# Patient Record
Sex: MALE | Race: BLACK OR AFRICAN AMERICAN | Employment: UNEMPLOYED | URBAN - METROPOLITAN AREA
[De-identification: names, ages, dates, MRNs, and addresses within clinical notes are randomized per-mention and may not be internally consistent; named-entity substitution may affect disease eponyms.]

---

## 2017-07-11 ENCOUNTER — HOSPITAL ENCOUNTER (EMERGENCY)
Age: 62
Discharge: HOME | End: 2017-07-11
Attending: EMERGENCY MEDICINE | Admitting: EMERGENCY MEDICINE
Payer: SELF-PAY

## 2017-07-11 VITALS
RESPIRATION RATE: 67 BRPM | HEIGHT: 71 IN | TEMPERATURE: 98.7 F | DIASTOLIC BLOOD PRESSURE: 78 MMHG | SYSTOLIC BLOOD PRESSURE: 140 MMHG | OXYGEN SATURATION: 96 % | BODY MASS INDEX: 26.46 KG/M2 | WEIGHT: 189 LBS

## 2017-07-11 DIAGNOSIS — Z91.14 NONCOMPLIANCE WITH MEDICATIONS: ICD-10-CM

## 2017-07-11 DIAGNOSIS — R73.9 HYPERGLYCEMIA: Primary | ICD-10-CM

## 2017-07-11 LAB — GLUCOSE BLD STRIP.AUTO-MCNC: 299 MG/DL (ref 70–110)

## 2017-07-11 PROCEDURE — 82962 GLUCOSE BLOOD TEST: CPT

## 2017-07-11 PROCEDURE — 99283 EMERGENCY DEPT VISIT LOW MDM: CPT

## 2017-07-11 NOTE — ED PROVIDER NOTES
[employer]  [facility] EMERGENCY DEPT      60 y.o. male with noted past medical history who presents to the emergency department for medication refill. Pt states that he has been out of his insulin for 2 days. Pt is supposed to take Humulin 70/30 20 units in the AM and 10 at night. Pt notes that he was released from incarceration without his medication. No other complaints. No current facility-administered medications for this encounter. Current Outpatient Prescriptions   Medication Sig    insulin NPH/insulin regular (HUMULIN 70/30) 100 unit/mL (70-30) injection Take 20 units SQ in the morning. Take 10 units SQ in the evening.  lisinopril (PRINIVIL, ZESTRIL) 20 mg tablet Take 1 Tab by mouth daily.  rosuvastatin (CRESTOR) 40 mg tablet Take 1 Tab by mouth nightly.  amLODIPine (NORVASC) 5 mg tablet Take 1 Tab by mouth daily. Indications: HYPERTENSION    carvedilol (COREG) 12.5 mg tablet Take 1 Tab by mouth two (2) times daily (with meals). Indications: CHRONIC HEART FAILURE    albuterol (PROVENTIL HFA, VENTOLIN HFA, PROAIR HFA) 90 mcg/actuation inhaler Take 2 Puffs by inhalation every four (4) hours as needed for Wheezing.  budesonide-formoterol (SYMBICORT) 80-4.5 mcg/actuation HFAA inhaler Take 2 Puffs by inhalation two (2) times a day.  thiamine (B-1) 100 mg tablet Take 1 Tab by mouth daily.  Lancets misc Use as directed for blood glucose monitoring.  Syringe, Disposable, syrg Use as directed for insuliin administration.  aspirin delayed-release 81 mg tablet Take 1 Tab by mouth daily.  multivitamin, tx-iron-ca-min (THERA-M W/ IRON) 9 mg iron-400 mcg tab tablet Take 1 Tab by mouth daily.        Past Medical History:   Diagnosis Date    Cardiac LV ejection fraction 30-35% 7/15/15    Cardiomyopathy ([address] 7/15/15    35% per ECHO    Cocaine use 8/9/14    + UDS, persistent use    Diabetes ([address] 8/9/2014    8.1 hgbA1C 8/9/14    Heart failure ([facility] [address]     Hepatitis C infection 8/20/14 Genotype 1A    History of cocaine abuse 8/10/2016    History of heroin abuse 8/10/2016    Homelessness 9/7/2015    Hypertension 2000       History reviewed. No pertinent surgical history. Family History   Problem Relation Age of Onset    Diabetes Mother     Cancer Maternal Grandmother        Social History     Social History    Marital status: SINGLE     Spouse name: N/A    Number of children: N/A    Years of education: N/A     Occupational History    Not on file. 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    Drug use: Yes     Special: Cocaine, Marijuana, Heroin      Comment: 2 x heroin experimental, smoked Cocaine with last 7/27/15    Sexual activity: No     Other Topics Concern     Service No    Blood Transfusions No    Caffeine Concern Yes    Occupational Exposure No    Hobby Hazards No    Sleep Concern No    Stress Concern No    Weight Concern No    Special Diet No    Back Care No    Exercise No    Bike Helmet No    Seat Belt Yes     Social History Narrative       No Known Allergies    Patient's primary care provider (as noted in EPIC):  Iveth Fitzpatrick NP    REVIEW OF SYSTEMS:    Constitutional:  Negative for diaphoresis. HENT:  Negative for congestion. Respiratory:  Negative for cough and shortness of breath. Cardiovascular:  Negative for chest pain and palpitations. Gastrointestinal:  Negative for diarrhea. Genitourinary:  Negative for flank pain. Musculoskeletal:  Negative for back pain. Skin:  Negative for pallor. Neurological:  Negative for weakness.      Visit Vitals    /78 (BP 1 Location: Left arm, BP Patient Position: At rest;Sitting)    Temp 98.7 °F (37.1 °C)    Resp (!) 67    Ht 5' 11\" (1.803 m)    Wt 85.7 kg (189 lb)    SpO2 96%    BMI 26.36 kg/m2       PHYSICAL EXAM:    CONSTITUTIONAL:  Alert, in no apparent distress;  well developed;  well nourished. HEAD:  Normocephalic, atraumatic. EYES:  EOMI. Non-icteric sclera. Normal conjunctiva. ENTM:  Nose:  no rhinorrhea. Throat:  no erythema or exudate, mucous membranes moist.  NECK:  No JVD. Supple  RESPIRATORY:  Chest clear, equal breath sounds, good air movement. CARDIOVASCULAR:  Regular rate and rhythm. No murmurs, rubs, or gallops. GI:  Normal bowel sounds, abdomen soft and non-tender. No rebound or guarding. BACK:  Non-tender. UPPER EXT:  Normal inspection. LOWER EXT:  No edema, no calf tenderness. Distal pulses intact. NEURO:  Moves all four extremities, and grossly normal motor exam.  SKIN:  No rashes;  Normal for age. PSYCH:  Alert and normal affect. Abnormal lab results from this emergency department encounter:  Labs Reviewed   GLUCOSE, POC - Abnormal; Notable for the following:        Result Value    Glucose (POC) 299 (*)     All other components within normal limits       Lab values for this patient within approximately the last 12 hours:  Recent Results (from the past 12 hour(s))   GLUCOSE, POC    Collection Time: 07/11/17  2:15 PM   Result Value Ref Range    Glucose (POC) 299 (H) 70 - 110 mg/dL       Radiologist and cardiologist interpretations if available at time of this note:  No results found. Medication(s) ordered for patient during this emergency visit encounter:  Medications - No data to display    [address]:  Based upon the patient's presentation with noted HPI and PE, along with the work up done in the emergency department, the patient is seeking a medication refill. DIAGNOSIS:  1. Medication refill, insulin    SPECIFIC PATIENT INSTRUCTIONS FROM THE PHYSICIAN WHO TREATED YOU IN THE ER TODAY:  1.   Return if any concerns or worsening of condition(s). 2.  Take the medication(s) as prescribed. 3.  FOLLOW UP APPOINTMENT:  Your primary doctor in 1-2 weeks. 4.  Do not run out of your medications that have been prescribed to you. When you have only a couple weeks of on a medication remaining before you run out of it, then call the physician who prescribed it to allow enough time for the your physician to rewrite for the prescription (before you run out of it). Sourav Mccormick M.D. Provider Attestation:  If a scribe was utilized in generation of this patient record, I personally performed the services described in the documentation, reviewed the documentation, as recorded by the scribe in my presence, and it accurately records the patient's history of presenting illness, review of systems, patient physical examination, and procedures performed by me as the attending physician. Sourav Mccormick M.D. [facility] Board Certified Emergency Physician  7/11/2017.  2:17 PM      Scribe Attestation:   Nathalia Cancino am scribing for and in the presence of Zena Simpson MD on this day 07/11/17 at 2:20 PM   mihaela Maldonado    Provider Attestation:  I personally performed the services described in the documentation, reviewed the documentation, as recorded by the scribe in my presence, and it accurately and completely records my words and actions.   Zena Simpson MD. 2:20 PM      Signed by: Mihaela Maldonado, 2:20 PM

## 2017-07-11 NOTE — DISCHARGE INSTRUCTIONS
SPECIFIC PATIENT INSTRUCTIONS FROM THE PHYSICIAN WHO TREATED YOU IN THE ER TODAY:  1. Return if any concerns or worsening of condition(s). 2.  Take the medication(s) as prescribed. 3.  FOLLOW UP APPOINTMENT:  Your primary doctor in 1-2 weeks. 4.  Do not run out of your medications that have been prescribed to you. When you have only a couple weeks of on a medication remaining before you run out of it, then call the physician who prescribed it to allow enough time for the your physician to rewrite for the prescription (before you run out of it). Learning About High Blood Sugar  What is high blood sugar? Your body turns the food you eat into glucose (sugar), which it uses for energy. But if your body isn't able to use the sugar right away, it can build up in your blood and lead to high blood sugar. When the amount of sugar in your blood stays too high for too much of the time, you may have diabetes. Diabetes is a disease that can cause serious health problems. The good news is that lifestyle changes may help you get your blood sugar back to normal and avoid or delay diabetes. What causes high blood sugar? Sugar (glucose) can build up in your blood if you:  · Are overweight. · Have a family history of diabetes. · Take certain medicines, such as steroids. What are the symptoms? Having high blood sugar may not cause any symptoms at all. Or it may make you feel very thirsty or very hungry. You may also urinate more often than usual, have blurry vision, or lose weight without trying. How is high blood sugar treated? You can take steps to lower your blood sugar level if you understand what makes it get higher. Your doctor may want you to learn how to test your blood sugar level at home. Then you can see how illness, stress, or different kinds of food or medicine raise or lower your blood sugar level. Other tests may be needed to see if you have diabetes.   How can you prevent high blood sugar?  · Watch your weight. If you're overweight, losing just a small amount of weight may help. Reducing fat around your waist is most important. · Limit the amount of calories, sweets, and unhealthy fat you eat. Ask your doctor if a dietitian can help you. A registered dietitian can help you create meal plans that fit your lifestyle. · Get at least 30 minutes of exercise on most days of the week. Exercise helps control your blood sugar. It also helps you maintain a healthy weight. Walking is a good choice. You also may want to do other activities, such as running, swimming, cycling, or playing tennis or team sports. · If your doctor prescribed medicines, take them exactly as prescribed. Call your doctor if you think you are having a problem with your medicine. You will get more details on the specific medicines your doctor prescribes. Follow-up care is a key part of your treatment and safety. Be sure to make and go to all appointments, and call your doctor if you are having problems. It's also a good idea to know your test results and keep a list of the medicines you take. Where can you learn more? Go to http://mami-esperanza.info/. Enter O108 in the search box to learn more about \"Learning About High Blood Sugar. \"  Current as of: March 13, 2017  Content Version: 11.3  © 0992-1664 Healthwise, Incorporated. Care instructions adapted under license by [employer] (which disclaims liability or warranty for this information). If you have questions about a medical condition or this instruction, always ask your healthcare professional. [address] any warranty or liability for your use of this information. ALLGOOB Activation    Thank you for requesting access to ALLGOOB. Please follow the instructions below to securely access and download your online medical record.  ALLGOOB allows you to send messages to your doctor, view your test results, renew your prescriptions, schedule appointments, and more. How Do I Sign Up? 1. In your internet browser, go to [email or web address]. 2. Click on the First Time User? Click Here link in the Sign In box. You will see the New Member Sign Up page. 3. Enter your iWelcome Access Code exactly as it appears below. You will not need to use this code after youve completed the sign-up process. If you do not sign up before the expiration date, you must request a new code. iWelcome Access Code: [email or web address]  Expires: 10/9/2017  2:20 PM (This is the date your iWelcome access code will )    4. Enter the last four digits of your Social Security Number (xxxx) and Date of Birth (mm/dd/yyyy) as indicated and click Submit. You will be taken to the next sign-up page. 5. Create a iWelcome ID. This will be your iWelcome login ID and cannot be changed, so think of one that is secure and easy to remember. 6. Create a iWelcome password. You can change your password at any time. 7. Enter your Password Reset Question and Answer. This can be used at a later time if you forget your password. 8. Enter your e-mail address. You will receive e-mail notification when new information is available in [address] 9. Click Sign Up. You can now view and download portions of your medical record. 10. Click the Download Summary menu link to download a portable copy of your medical information. Additional Information    If you have questions, please visit the Frequently Asked Questions section of the iWelcome website at [email or web address] Remember, iWelcome is NOT to be used for urgent needs. For medical emergencies, dial 911.

## 2017-07-11 NOTE — ED TRIAGE NOTES
Pt reports out of Humulin 70/30:dosage: 20 units in am/10 units in pm; no insulin since yesterday around 0530

## 2017-07-11 NOTE — PROGRESS NOTES
Patient stated he cannot afford his medications. Patient will be provided with indigent meds. Patient without insurance. Patient stated he was active with the [facility], however he was incarcerated and his \"paperwork \". Patient had [facility] application and instructions.   SW provided the patient with [facility] Schedule for July and August.

## 2017-08-09 ENCOUNTER — HOSPITAL ENCOUNTER (INPATIENT)
Age: 62
LOS: 2 days | Discharge: HOME | DRG: 683 | End: 2017-08-11
Attending: EMERGENCY MEDICINE | Admitting: INTERNAL MEDICINE
Payer: SELF-PAY

## 2017-08-09 ENCOUNTER — APPOINTMENT (OUTPATIENT)
Dept: CT IMAGING | Age: 62
DRG: 683 | End: 2017-08-09
Attending: EMERGENCY MEDICINE
Payer: SELF-PAY

## 2017-08-09 ENCOUNTER — APPOINTMENT (OUTPATIENT)
Dept: GENERAL RADIOLOGY | Age: 62
DRG: 683 | End: 2017-08-09
Attending: EMERGENCY MEDICINE
Payer: SELF-PAY

## 2017-08-09 DIAGNOSIS — N17.9 ACUTE ON CHRONIC RENAL FAILURE (HCC): ICD-10-CM

## 2017-08-09 DIAGNOSIS — E87.1 HYPONATREMIA: ICD-10-CM

## 2017-08-09 DIAGNOSIS — I10 ESSENTIAL HYPERTENSION WITH GOAL BLOOD PRESSURE LESS THAN 140/90: ICD-10-CM

## 2017-08-09 DIAGNOSIS — E78.5 DYSLIPIDEMIA: ICD-10-CM

## 2017-08-09 DIAGNOSIS — N18.9 ACUTE ON CHRONIC RENAL FAILURE (HCC): ICD-10-CM

## 2017-08-09 DIAGNOSIS — J44.9 COPD, MILD (HCC): ICD-10-CM

## 2017-08-09 DIAGNOSIS — R73.9 HYPERGLYCEMIA: Primary | ICD-10-CM

## 2017-08-09 LAB
ADMINISTERED INITIALS, ADMINIT: NORMAL
ALBUMIN SERPL BCP-MCNC: 3.2 G/DL (ref 3.4–5)
ALBUMIN/GLOB SERPL: 0.7 {RATIO} (ref 0.8–1.7)
ALP SERPL-CCNC: 111 U/L (ref 45–117)
ALT SERPL-CCNC: 30 U/L (ref 16–61)
ANION GAP BLD CALC-SCNC: 8 MMOL/L (ref 3–18)
AST SERPL W P-5'-P-CCNC: 27 U/L (ref 15–37)
BASOPHILS # BLD AUTO: 0 K/UL (ref 0–0.1)
BASOPHILS # BLD: 1 % (ref 0–2)
BILIRUB DIRECT SERPL-MCNC: 0.2 MG/DL (ref 0–0.2)
BILIRUB SERPL-MCNC: 0.4 MG/DL (ref 0.2–1)
BUN SERPL-MCNC: 38 MG/DL (ref 7–18)
BUN/CREAT SERPL: 12 (ref 12–20)
CALCIUM SERPL-MCNC: 7.9 MG/DL (ref 8.5–10.1)
CHLORIDE SERPL-SCNC: 88 MMOL/L (ref 100–108)
CK MB CFR SERPL CALC: 2.3 % (ref 0–4)
CK MB SERPL-MCNC: 6 NG/ML (ref 5–25)
CK SERPL-CCNC: 263 U/L (ref 39–308)
CO2 SERPL-SCNC: 25 MMOL/L (ref 21–32)
CREAT SERPL-MCNC: 3.15 MG/DL (ref 0.6–1.3)
D50 ADMINISTERED, D50ADM: 0 ML
D50 ORDER, D50ORD: 0 ML
DIFFERENTIAL METHOD BLD: ABNORMAL
EOSINOPHIL # BLD: 0.3 K/UL (ref 0–0.4)
EOSINOPHIL NFR BLD: 4 % (ref 0–5)
ERYTHROCYTE [DISTWIDTH] IN BLOOD BY AUTOMATED COUNT: 12.7 % (ref 11.6–14.5)
EST. AVERAGE GLUCOSE BLD GHB EST-MCNC: 266 MG/DL
ETHANOL SERPL-MCNC: <3 MG/DL (ref 0–3)
GLOBULIN SER CALC-MCNC: 4.4 G/DL (ref 2–4)
GLUCOSE BLD STRIP.AUTO-MCNC: >600 MG/DL (ref 70–110)
GLUCOSE SERPL-MCNC: 980 MG/DL (ref 74–99)
GLUCOSE, GLC: 980 MG/DL
HBA1C MFR BLD: 10.9 % (ref 4.2–5.6)
HCT VFR BLD AUTO: 35.8 % (ref 36–48)
HGB BLD-MCNC: 12.2 G/DL (ref 13–16)
HIGH TARGET, HITG: 180 MG/DL
INSULIN ADMINSTERED, INSADM: 18.4 UNITS/HOUR
INSULIN ORDER, INSORD: 18.4 UNITS/HOUR
LOW TARGET, LOT: 140 MG/DL
LYMPHOCYTES # BLD AUTO: 24 % (ref 21–52)
LYMPHOCYTES # BLD: 1.9 K/UL (ref 0.9–3.6)
MAGNESIUM SERPL-MCNC: 2.3 MG/DL (ref 1.6–2.6)
MCH RBC QN AUTO: 31.4 PG (ref 24–34)
MCHC RBC AUTO-ENTMCNC: 34.1 G/DL (ref 31–37)
MCV RBC AUTO: 92 FL (ref 74–97)
MINUTES UNTIL NEXT BG, NBG: 60 MIN
MONOCYTES # BLD: 0.9 K/UL (ref 0.05–1.2)
MONOCYTES NFR BLD AUTO: 11 % (ref 3–10)
MULTIPLIER, MUL: 0.02
NEUTS SEG # BLD: 4.9 K/UL (ref 1.8–8)
NEUTS SEG NFR BLD AUTO: 60 % (ref 40–73)
ORDER INITIALS, ORDINIT: NORMAL
PLATELET # BLD AUTO: 285 K/UL (ref 135–420)
PMV BLD AUTO: 11.5 FL (ref 9.2–11.8)
POTASSIUM SERPL-SCNC: 4.3 MMOL/L (ref 3.5–5.5)
PROT SERPL-MCNC: 7.6 G/DL (ref 6.4–8.2)
RBC # BLD AUTO: 3.89 M/UL (ref 4.7–5.5)
SODIUM SERPL-SCNC: 121 MMOL/L (ref 136–145)
TROPONIN I SERPL-MCNC: 0.18 NG/ML (ref 0–0.04)
WBC # BLD AUTO: 8 K/UL (ref 4.6–13.2)

## 2017-08-09 PROCEDURE — 83036 HEMOGLOBIN GLYCOSYLATED A1C: CPT | Performed by: EMERGENCY MEDICINE

## 2017-08-09 PROCEDURE — 80053 COMPREHEN METABOLIC PANEL: CPT | Performed by: EMERGENCY MEDICINE

## 2017-08-09 PROCEDURE — 65660000004 HC RM CVT STEPDOWN

## 2017-08-09 PROCEDURE — 99285 EMERGENCY DEPT VISIT HI MDM: CPT

## 2017-08-09 PROCEDURE — 80307 DRUG TEST PRSMV CHEM ANLYZR: CPT | Performed by: EMERGENCY MEDICINE

## 2017-08-09 PROCEDURE — 70450 CT HEAD/BRAIN W/O DYE: CPT

## 2017-08-09 PROCEDURE — 82248 BILIRUBIN DIRECT: CPT | Performed by: EMERGENCY MEDICINE

## 2017-08-09 PROCEDURE — 74011636637 HC RX REV CODE- 636/637: Performed by: EMERGENCY MEDICINE

## 2017-08-09 PROCEDURE — 74011000258 HC RX REV CODE- 258: Performed by: EMERGENCY MEDICINE

## 2017-08-09 PROCEDURE — 96365 THER/PROPH/DIAG IV INF INIT: CPT

## 2017-08-09 PROCEDURE — 85025 COMPLETE CBC W/AUTO DIFF WBC: CPT | Performed by: EMERGENCY MEDICINE

## 2017-08-09 PROCEDURE — 82550 ASSAY OF CK (CPK): CPT | Performed by: EMERGENCY MEDICINE

## 2017-08-09 PROCEDURE — 71010 XR CHEST PORT: CPT

## 2017-08-09 PROCEDURE — 83735 ASSAY OF MAGNESIUM: CPT | Performed by: EMERGENCY MEDICINE

## 2017-08-09 PROCEDURE — 93005 ELECTROCARDIOGRAM TRACING: CPT

## 2017-08-09 PROCEDURE — 82962 GLUCOSE BLOOD TEST: CPT

## 2017-08-09 RX ORDER — MAGNESIUM SULFATE 100 %
4 CRYSTALS MISCELLANEOUS AS NEEDED
Status: DISCONTINUED | OUTPATIENT
Start: 2017-08-09 | End: 2017-08-11 | Stop reason: HOSPADM

## 2017-08-09 RX ORDER — DEXTROSE 50 % IN WATER (D50W) INTRAVENOUS SYRINGE
25-50 AS NEEDED
Status: DISCONTINUED | OUTPATIENT
Start: 2017-08-09 | End: 2017-08-11 | Stop reason: HOSPADM

## 2017-08-09 RX ADMIN — SODIUM CHLORIDE 18.4 UNITS/HR: 900 INJECTION, SOLUTION INTRAVENOUS at 22:41

## 2017-08-09 NOTE — IP AVS SNAPSHOT
[address] [address] Joe Bond Patient: Taina Arroyo MRN: [id number] OMK:[birth year] You are allergic to the following No active allergies Recent Documentation Height Weight BMI Smoking Status 1.803 m 75.3 kg 23.15 kg/m2 Former Smoker Unresulted Labs Order Current Status COCAINE, UR, CONFIRM In process Emergency Contacts Name Discharge Info Relation Home Work Mobile aJron Dominguez DISCHARGE CAREGIVER [3] Child [2] [phone] About your hospitalization You were admitted on:  August 9, 2017 You last received care in the:  [facility] [address] You were discharged on:  August 11, 2017 Unit phone number:  [phone] Why you were hospitalized Your primary diagnosis was:  Not on File Your diagnoses also included:  Hyperglycemia, Acute On Chronic Renal Failure (Hcc) Providers Seen During Your Hospitalizations Provider Role Specialty Primary office phone Albert Fraire MD Attending Provider Emergency Medicine [phone] Lawrence Chua MD Attending Provider Internal Medicine [phone] Murali Kahn MD Attending Provider [facility] [phone] Your Primary Care Physician (PCP) Primary Care Physician Office Phone [facility] [address] [phone] Follow-up Information Follow up With Details Comments Contact Info Jb Sun NP  gave pt care-a-van schedule due to clinic not open fridays and pt is without a phone to reach later with an appt. 0 [facility] [phone] Current Discharge Medication List  
  
START taking these medications Dose & Instructions Dispensing Information Comments Morning Noon Evening Bedtime  
 insulin detemir 100 unit/mL (3 mL) Inpn Commonly known as:  Vicente Pert  
   
 Your last dose was: Your next dose is:    
   
   
 Dose:  20 Units 20 Units by SubCUTAneous route Before breakfast and dinner. Quantity:  2 Pen Refills:  4  
     
   
   
   
  
 insulin lispro 100 unit/mL kwikpen Commonly known as:  HumaLOG KwikPen Your last dose was: Your next dose is:    
   
   
 Dose:  5 Units 5 Units by SubCUTAneous route Before breakfast, lunch, and dinner. Quantity:  2 Package Refills:  4 CONTINUE these medications which have CHANGED Dose & Instructions Dispensing Information Comments Morning Noon Evening Bedtime  
 amLODIPine 10 mg tablet Commonly known as:  Ramsey Rings What changed:   
- medication strength 
- how much to take Your last dose was: Your next dose is:    
   
   
 Dose:  10 mg Take 1 Tab by mouth daily. Indications: hypertension Quantity:  30 Tab Refills:  4 CONTINUE these medications which have NOT CHANGED Dose & Instructions Dispensing Information Comments Morning Noon Evening Bedtime  
 albuterol 90 mcg/actuation inhaler Commonly known as:  PROVENTIL HFA, VENTOLIN HFA, PROAIR HFA Your last dose was: Your next dose is:    
   
   
 Dose:  2 Puff Take 2 Puffs by inhalation every four (4) hours as needed for Wheezing. Quantity:  2 Inhaler Refills:  3  
     
   
   
   
  
 aspirin delayed-release 81 mg tablet Your last dose was: Your next dose is:    
   
   
 Dose:  81 mg Take 1 Tab by mouth daily. Quantity:  30 Tab Refills:  0  
     
   
   
   
  
 budesonide-formoterol 80-4.5 mcg/actuation Hfaa inhaler Commonly known as:  SYMBICORT Your last dose was: Your next dose is:    
   
   
 Dose:  2 Puff Take 2 Puffs by inhalation two (2) times a day. Quantity:  2 Inhaler Refills:  3 Lancets Misc Your last dose was: Your next dose is: Use as directed for blood glucose monitoring. Quantity:  1 Each Refills:  11  
     
   
   
   
  
 lisinopril 20 mg tablet Commonly known as:  Amanda Alvarez Your last dose was: Your next dose is:    
   
   
 Dose:  20 mg Take 1 Tab by mouth daily. Quantity:  30 Tab Refills:  4  
     
   
   
   
  
 multivitamin, tx-iron-ca-min 9 mg iron-400 mcg Tab tablet Commonly known as:  THERA-M w/ IRON Your last dose was: Your next dose is:    
   
   
 Dose:  1 Tab Take 1 Tab by mouth daily. Quantity:  30 Tab Refills:  0  
     
   
   
   
  
 rosuvastatin 40 mg tablet Commonly known as:  CRESTOR Your last dose was: Your next dose is:    
   
   
 Dose:  40 mg Take 1 Tab by mouth nightly. Quantity:  30 Tab Refills:  4  
     
   
   
   
  
 thiamine 100 mg tablet Commonly known as:  B-1 Your last dose was: Your next dose is:    
   
   
 Dose:  100 mg Take 1 Tab by mouth daily. Quantity:  30 Tab Refills:  0 STOP taking these medications   
 carvedilol 12.5 mg tablet Commonly known as:  COREG  
   
  
 insulin NPH/insulin regular 100 unit/mL (70-30) injection Commonly known as:  HumuLIN 70/30 Syringe (Disposable) Syrg Where to Get Your Medications Information on where to get these meds will be given to you by the nurse or doctor. ! Ask your nurse or doctor about these medications  
  albuterol 90 mcg/actuation inhaler  
 amLODIPine 10 mg tablet  
 budesonide-formoterol 80-4.5 mcg/actuation Hfaa inhaler  
 insulin detemir 100 unit/mL (3 mL) Inpn  
 insulin lispro 100 unit/mL kwikpen Lancets Misc  
 lisinopril 20 mg tablet  
 rosuvastatin 40 mg tablet Discharge Instructions None Discharge Orders None MyChart Announcement  We are excited to announce that we are making your provider's discharge notes available to you in Friendster. You will see these notes when they are completed and signed by the physician that discharged you from your recent hospital stay. If you have any questions or concerns about any information you see in Friendster, please call the Health Information Department where you were seen or reach out to your Primary Care Provider for more information about your plan of care. Introducing [facility]! Hamon Sherly introduces Friendster patient portal. Now you can access parts of your medical record, email your doctor's office, and request medication refills online. 1. In your internet browser, go to [email or web address] 2. Click on the First Time User? Click Here link in the Sign In box. You will see the New Member Sign Up page. 3. Enter your Friendster Access Code exactly as it appears below. You will not need to use this code after youve completed the sign-up process. If you do not sign up before the expiration date, you must request a new code. · Friendster Access Code: [email or web address] Expires: 10/9/2017  2:20 PM 
 
4. Enter the last four digits of your Social Security Number (xxxx) and Date of Birth (mm/dd/yyyy) as indicated and click Submit. You will be taken to the next sign-up page. 5. Create a Friendster ID. This will be your Friendster login ID and cannot be changed, so think of one that is secure and easy to remember. 6. Create a Friendster password. You can change your password at any time. 7. Enter your Password Reset Question and Answer. This can be used at a later time if you forget your password. 8. Enter your e-mail address. You will receive e-mail notification when new information is available in [address] 9. Click Sign Up. You can now view and download portions of your medical record. 10. Click the Download Summary menu link to download a portable copy of your medical information. If you have questions, please visit the Frequently Asked Questions section of the MyChart website. Remember, MyChart is NOT to be used for urgent needs. For medical emergencies, dial 911. Now available from your iPhone and Android! General Information Please provide this summary of care documentation to your next provider. Patient Signature:  ____________________________________________________________ Date:  ____________________________________________________________  
  
Leola George Provider Signature:  ____________________________________________________________ Date:  ____________________________________________________________

## 2017-08-09 NOTE — ED TRIAGE NOTES
Pt state [place]. Pt states he has not taken any of his medication since he was released from custodial July 11th.

## 2017-08-10 LAB
ADMINISTERED INITIALS, ADMINIT: NORMAL
ANION GAP BLD CALC-SCNC: 7 MMOL/L (ref 3–18)
ATRIAL RATE: 73 BPM
BASOPHILS # BLD AUTO: 0.1 K/UL (ref 0–0.1)
BASOPHILS # BLD: 1 % (ref 0–2)
BUN SERPL-MCNC: 30 MG/DL (ref 7–18)
BUN/CREAT SERPL: 13 (ref 12–20)
CALCIUM SERPL-MCNC: 8.9 MG/DL (ref 8.5–10.1)
CALCULATED P AXIS, ECG09: 62 DEGREES
CALCULATED R AXIS, ECG10: -50 DEGREES
CALCULATED T AXIS, ECG11: -155 DEGREES
CHLORIDE SERPL-SCNC: 102 MMOL/L (ref 100–108)
CO2 SERPL-SCNC: 25 MMOL/L (ref 21–32)
CREAT SERPL-MCNC: 2.23 MG/DL (ref 0.6–1.3)
D50 ADMINISTERED, D50ADM: 0 ML
D50 ORDER, D50ORD: 0 ML
DIAGNOSIS, 93000: NORMAL
DIFFERENTIAL METHOD BLD: ABNORMAL
EOSINOPHIL # BLD: 0.4 K/UL (ref 0–0.4)
EOSINOPHIL NFR BLD: 3 % (ref 0–5)
ERYTHROCYTE [DISTWIDTH] IN BLOOD BY AUTOMATED COUNT: 12.2 % (ref 11.6–14.5)
EST. AVERAGE GLUCOSE BLD GHB EST-MCNC: 283 MG/DL
GLUCOSE BLD STRIP.AUTO-MCNC: 104 MG/DL (ref 70–110)
GLUCOSE BLD STRIP.AUTO-MCNC: 128 MG/DL (ref 70–110)
GLUCOSE BLD STRIP.AUTO-MCNC: 136 MG/DL (ref 70–110)
GLUCOSE BLD STRIP.AUTO-MCNC: 150 MG/DL (ref 70–110)
GLUCOSE BLD STRIP.AUTO-MCNC: 151 MG/DL (ref 70–110)
GLUCOSE BLD STRIP.AUTO-MCNC: 161 MG/DL (ref 70–110)
GLUCOSE BLD STRIP.AUTO-MCNC: 173 MG/DL (ref 70–110)
GLUCOSE BLD STRIP.AUTO-MCNC: 199 MG/DL (ref 70–110)
GLUCOSE BLD STRIP.AUTO-MCNC: 231 MG/DL (ref 70–110)
GLUCOSE BLD STRIP.AUTO-MCNC: 271 MG/DL (ref 70–110)
GLUCOSE BLD STRIP.AUTO-MCNC: 285 MG/DL (ref 70–110)
GLUCOSE BLD STRIP.AUTO-MCNC: 286 MG/DL (ref 70–110)
GLUCOSE BLD STRIP.AUTO-MCNC: 314 MG/DL (ref 70–110)
GLUCOSE BLD STRIP.AUTO-MCNC: 334 MG/DL (ref 70–110)
GLUCOSE BLD STRIP.AUTO-MCNC: 347 MG/DL (ref 70–110)
GLUCOSE BLD STRIP.AUTO-MCNC: 476 MG/DL (ref 70–110)
GLUCOSE SERPL-MCNC: 144 MG/DL (ref 74–99)
GLUCOSE, GLC: 104 MG/DL
GLUCOSE, GLC: 128 MG/DL
GLUCOSE, GLC: 136 MG/DL
GLUCOSE, GLC: 150 MG/DL
GLUCOSE, GLC: 151 MG/DL
GLUCOSE, GLC: 161 MG/DL
GLUCOSE, GLC: 173 MG/DL
GLUCOSE, GLC: 199 MG/DL
GLUCOSE, GLC: 231 MG/DL
GLUCOSE, GLC: 285 MG/DL
GLUCOSE, GLC: 286 MG/DL
GLUCOSE, GLC: 314 MG/DL
GLUCOSE, GLC: 347 MG/DL
GLUCOSE, GLC: 476 MG/DL
HBA1C MFR BLD: 11.5 % (ref 4.2–5.6)
HCT VFR BLD AUTO: 34 % (ref 36–48)
HGB BLD-MCNC: 12.2 G/DL (ref 13–16)
HIGH TARGET, HITG: 180 MG/DL
INSULIN ADMINSTERED, INSADM: 0.4 UNITS/HOUR
INSULIN ADMINSTERED, INSADM: 1.4 UNITS/HOUR
INSULIN ADMINSTERED, INSADM: 1.7 UNITS/HOUR
INSULIN ADMINSTERED, INSADM: 1.8 UNITS/HOUR
INSULIN ADMINSTERED, INSADM: 1.8 UNITS/HOUR
INSULIN ADMINSTERED, INSADM: 2 UNITS/HOUR
INSULIN ADMINSTERED, INSADM: 2.3 UNITS/HOUR
INSULIN ADMINSTERED, INSADM: 2.3 UNITS/HOUR
INSULIN ADMINSTERED, INSADM: 2.5 UNITS/HOUR
INSULIN ADMINSTERED, INSADM: 2.8 UNITS/HOUR
INSULIN ADMINSTERED, INSADM: 4.2 UNITS/HOUR
INSULIN ADMINSTERED, INSADM: 4.5 UNITS/HOUR
INSULIN ADMINSTERED, INSADM: 8.6 UNITS/HOUR
INSULIN ADMINSTERED, INSADM: 9 UNITS/HOUR
INSULIN ORDER, INSORD: 0.4 UNITS/HOUR
INSULIN ORDER, INSORD: 1.4 UNITS/HOUR
INSULIN ORDER, INSORD: 1.7 UNITS/HOUR
INSULIN ORDER, INSORD: 1.8 UNITS/HOUR
INSULIN ORDER, INSORD: 1.8 UNITS/HOUR
INSULIN ORDER, INSORD: 2 UNITS/HOUR
INSULIN ORDER, INSORD: 2.3 UNITS/HOUR
INSULIN ORDER, INSORD: 2.3 UNITS/HOUR
INSULIN ORDER, INSORD: 2.5 UNITS/HOUR
INSULIN ORDER, INSORD: 2.8 UNITS/HOUR
INSULIN ORDER, INSORD: 4.2 UNITS/HOUR
INSULIN ORDER, INSORD: 4.5 UNITS/HOUR
INSULIN ORDER, INSORD: 8.6 UNITS/HOUR
INSULIN ORDER, INSORD: 9 UNITS/HOUR
LOW TARGET, LOT: 140 MG/DL
LYMPHOCYTES # BLD AUTO: 34 % (ref 21–52)
LYMPHOCYTES # BLD: 3.7 K/UL (ref 0.9–3.6)
MAGNESIUM SERPL-MCNC: 2.4 MG/DL (ref 1.6–2.6)
MCH RBC QN AUTO: 31.1 PG (ref 24–34)
MCHC RBC AUTO-ENTMCNC: 35.9 G/DL (ref 31–37)
MCV RBC AUTO: 86.7 FL (ref 74–97)
MINUTES UNTIL NEXT BG, NBG: 60 MIN
MONOCYTES # BLD: 0.8 K/UL (ref 0.05–1.2)
MONOCYTES NFR BLD AUTO: 8 % (ref 3–10)
MULTIPLIER, MUL: 0.01
MULTIPLIER, MUL: 0.02
MULTIPLIER, MUL: 0.03
MULTIPLIER, MUL: 0.03
MULTIPLIER, MUL: 0.04
NEUTS SEG # BLD: 6.1 K/UL (ref 1.8–8)
NEUTS SEG NFR BLD AUTO: 54 % (ref 40–73)
ORDER INITIALS, ORDINIT: NORMAL
P-R INTERVAL, ECG05: 150 MS
PLATELET # BLD AUTO: 292 K/UL (ref 135–420)
PMV BLD AUTO: 10.7 FL (ref 9.2–11.8)
POTASSIUM SERPL-SCNC: 3.2 MMOL/L (ref 3.5–5.5)
Q-T INTERVAL, ECG07: 404 MS
QRS DURATION, ECG06: 90 MS
QTC CALCULATION (BEZET), ECG08: 445 MS
RBC # BLD AUTO: 3.92 M/UL (ref 4.7–5.5)
SODIUM SERPL-SCNC: 134 MMOL/L (ref 136–145)
TROPONIN I SERPL-MCNC: 0.08 NG/ML (ref 0–0.04)
TROPONIN I SERPL-MCNC: 0.13 NG/ML (ref 0–0.04)
VENTRICULAR RATE, ECG03: 73 BPM
WBC # BLD AUTO: 11 K/UL (ref 4.6–13.2)

## 2017-08-10 PROCEDURE — 65660000000 HC RM CCU STEPDOWN

## 2017-08-10 PROCEDURE — 74011000258 HC RX REV CODE- 258: Performed by: HOSPITALIST

## 2017-08-10 PROCEDURE — 74011000258 HC RX REV CODE- 258: Performed by: EMERGENCY MEDICINE

## 2017-08-10 PROCEDURE — 82962 GLUCOSE BLOOD TEST: CPT

## 2017-08-10 PROCEDURE — 74011636637 HC RX REV CODE- 636/637: Performed by: HOSPITALIST

## 2017-08-10 PROCEDURE — 80048 BASIC METABOLIC PNL TOTAL CA: CPT | Performed by: INTERNAL MEDICINE

## 2017-08-10 PROCEDURE — 84484 ASSAY OF TROPONIN QUANT: CPT | Performed by: INTERNAL MEDICINE

## 2017-08-10 PROCEDURE — 83036 HEMOGLOBIN GLYCOSYLATED A1C: CPT | Performed by: INTERNAL MEDICINE

## 2017-08-10 PROCEDURE — 74011250637 HC RX REV CODE- 250/637: Performed by: EMERGENCY MEDICINE

## 2017-08-10 PROCEDURE — 36415 COLL VENOUS BLD VENIPUNCTURE: CPT | Performed by: INTERNAL MEDICINE

## 2017-08-10 PROCEDURE — 85025 COMPLETE CBC W/AUTO DIFF WBC: CPT | Performed by: INTERNAL MEDICINE

## 2017-08-10 PROCEDURE — 74011000250 HC RX REV CODE- 250: Performed by: HOSPITALIST

## 2017-08-10 PROCEDURE — 74011250636 HC RX REV CODE- 250/636: Performed by: INTERNAL MEDICINE

## 2017-08-10 PROCEDURE — 83735 ASSAY OF MAGNESIUM: CPT | Performed by: INTERNAL MEDICINE

## 2017-08-10 PROCEDURE — 74011250636 HC RX REV CODE- 250/636: Performed by: HOSPITALIST

## 2017-08-10 PROCEDURE — 74011636637 HC RX REV CODE- 636/637: Performed by: EMERGENCY MEDICINE

## 2017-08-10 PROCEDURE — 74011250637 HC RX REV CODE- 250/637: Performed by: INTERNAL MEDICINE

## 2017-08-10 PROCEDURE — 74011636637 HC RX REV CODE- 636/637: Performed by: INTERNAL MEDICINE

## 2017-08-10 RX ORDER — ALBUTEROL SULFATE 90 UG/1
2 AEROSOL, METERED RESPIRATORY (INHALATION)
Status: DISCONTINUED | OUTPATIENT
Start: 2017-08-10 | End: 2017-08-10 | Stop reason: CLARIF

## 2017-08-10 RX ORDER — ACETAMINOPHEN 500 MG
1000 TABLET ORAL
Status: COMPLETED | OUTPATIENT
Start: 2017-08-10 | End: 2017-08-10

## 2017-08-10 RX ORDER — INSULIN GLARGINE 100 [IU]/ML
25 INJECTION, SOLUTION SUBCUTANEOUS
Status: DISCONTINUED | OUTPATIENT
Start: 2017-08-10 | End: 2017-08-11

## 2017-08-10 RX ORDER — ACETAMINOPHEN 500 MG
500 TABLET ORAL
Status: DISCONTINUED | OUTPATIENT
Start: 2017-08-10 | End: 2017-08-11 | Stop reason: HOSPADM

## 2017-08-10 RX ORDER — ASPIRIN 81 MG/1
81 TABLET ORAL DAILY
Status: DISCONTINUED | OUTPATIENT
Start: 2017-08-10 | End: 2017-08-11 | Stop reason: HOSPADM

## 2017-08-10 RX ORDER — BUDESONIDE AND FORMOTEROL FUMARATE DIHYDRATE 80; 4.5 UG/1; UG/1
2 AEROSOL RESPIRATORY (INHALATION)
Status: DISCONTINUED | OUTPATIENT
Start: 2017-08-10 | End: 2017-08-11 | Stop reason: HOSPADM

## 2017-08-10 RX ORDER — BUDESONIDE AND FORMOTEROL FUMARATE DIHYDRATE 80; 4.5 UG/1; UG/1
2 AEROSOL RESPIRATORY (INHALATION) 2 TIMES DAILY
Status: DISCONTINUED | OUTPATIENT
Start: 2017-08-10 | End: 2017-08-10

## 2017-08-10 RX ORDER — ROSUVASTATIN CALCIUM 20 MG/1
40 TABLET, COATED ORAL
Status: DISCONTINUED | OUTPATIENT
Start: 2017-08-10 | End: 2017-08-10

## 2017-08-10 RX ORDER — LANOLIN ALCOHOL/MO/W.PET/CERES
100 CREAM (GRAM) TOPICAL DAILY
Status: DISCONTINUED | OUTPATIENT
Start: 2017-08-10 | End: 2017-08-11 | Stop reason: HOSPADM

## 2017-08-10 RX ORDER — ONDANSETRON 2 MG/ML
4 INJECTION INTRAMUSCULAR; INTRAVENOUS
Status: DISCONTINUED | OUTPATIENT
Start: 2017-08-10 | End: 2017-08-10

## 2017-08-10 RX ORDER — SODIUM CHLORIDE 9 MG/ML
100 INJECTION, SOLUTION INTRAVENOUS CONTINUOUS
Status: DISCONTINUED | OUTPATIENT
Start: 2017-08-10 | End: 2017-08-11

## 2017-08-10 RX ORDER — INSULIN LISPRO 100 [IU]/ML
8 INJECTION, SOLUTION INTRAVENOUS; SUBCUTANEOUS
Status: DISCONTINUED | OUTPATIENT
Start: 2017-08-11 | End: 2017-08-11 | Stop reason: HOSPADM

## 2017-08-10 RX ORDER — INSULIN LISPRO 100 [IU]/ML
INJECTION, SOLUTION INTRAVENOUS; SUBCUTANEOUS
Status: DISCONTINUED | OUTPATIENT
Start: 2017-08-11 | End: 2017-08-10

## 2017-08-10 RX ORDER — ALBUTEROL SULFATE 0.83 MG/ML
2.5 SOLUTION RESPIRATORY (INHALATION)
Status: DISCONTINUED | OUTPATIENT
Start: 2017-08-10 | End: 2017-08-11 | Stop reason: HOSPADM

## 2017-08-10 RX ORDER — ONDANSETRON 2 MG/ML
4 INJECTION INTRAMUSCULAR; INTRAVENOUS
Status: DISCONTINUED | OUTPATIENT
Start: 2017-08-10 | End: 2017-08-11 | Stop reason: HOSPADM

## 2017-08-10 RX ORDER — ROSUVASTATIN CALCIUM 20 MG/1
40 TABLET, COATED ORAL
Status: DISCONTINUED | OUTPATIENT
Start: 2017-08-10 | End: 2017-08-11 | Stop reason: HOSPADM

## 2017-08-10 RX ORDER — DEXTROSE 50 % IN WATER (D50W) INTRAVENOUS SYRINGE
25-50 AS NEEDED
Status: DISCONTINUED | OUTPATIENT
Start: 2017-08-10 | End: 2017-08-10

## 2017-08-10 RX ORDER — INSULIN LISPRO 100 [IU]/ML
INJECTION, SOLUTION INTRAVENOUS; SUBCUTANEOUS
Status: DISCONTINUED | OUTPATIENT
Start: 2017-08-10 | End: 2017-08-11 | Stop reason: HOSPADM

## 2017-08-10 RX ORDER — AMLODIPINE BESYLATE 5 MG/1
5 TABLET ORAL DAILY
Status: DISCONTINUED | OUTPATIENT
Start: 2017-08-10 | End: 2017-08-11

## 2017-08-10 RX ORDER — MAGNESIUM SULFATE 100 %
16 CRYSTALS MISCELLANEOUS AS NEEDED
Status: DISCONTINUED | OUTPATIENT
Start: 2017-08-10 | End: 2017-08-10

## 2017-08-10 RX ORDER — HEPARIN SODIUM 5000 [USP'U]/ML
5000 INJECTION, SOLUTION INTRAVENOUS; SUBCUTANEOUS EVERY 8 HOURS
Status: DISCONTINUED | OUTPATIENT
Start: 2017-08-10 | End: 2017-08-11 | Stop reason: HOSPADM

## 2017-08-10 RX ORDER — HYDRALAZINE HYDROCHLORIDE 20 MG/ML
10 INJECTION INTRAMUSCULAR; INTRAVENOUS
Status: DISCONTINUED | OUTPATIENT
Start: 2017-08-10 | End: 2017-08-11 | Stop reason: HOSPADM

## 2017-08-10 RX ORDER — ACETAMINOPHEN 325 MG/1
650 TABLET ORAL
Status: DISCONTINUED | OUTPATIENT
Start: 2017-08-10 | End: 2017-08-10

## 2017-08-10 RX ORDER — CARVEDILOL 12.5 MG/1
12.5 TABLET ORAL 2 TIMES DAILY WITH MEALS
Status: DISCONTINUED | OUTPATIENT
Start: 2017-08-10 | End: 2017-08-11

## 2017-08-10 RX ADMIN — ACETAMINOPHEN 1000 MG: 500 TABLET ORAL at 00:41

## 2017-08-10 RX ADMIN — POTASSIUM CHLORIDE: 2 INJECTION, SOLUTION, CONCENTRATE INTRAVENOUS at 14:22

## 2017-08-10 RX ADMIN — POTASSIUM CHLORIDE: 2 INJECTION, SOLUTION, CONCENTRATE INTRAVENOUS at 16:39

## 2017-08-10 RX ADMIN — POTASSIUM CHLORIDE: 2 INJECTION, SOLUTION, CONCENTRATE INTRAVENOUS at 15:22

## 2017-08-10 RX ADMIN — SODIUM CHLORIDE 2.8 UNITS/HR: 900 INJECTION, SOLUTION INTRAVENOUS at 04:02

## 2017-08-10 RX ADMIN — HEPARIN SODIUM 5000 UNITS: 5000 INJECTION, SOLUTION INTRAVENOUS; SUBCUTANEOUS at 21:50

## 2017-08-10 RX ADMIN — Medication 100 MG: at 08:42

## 2017-08-10 RX ADMIN — SODIUM CHLORIDE 0.4 UNITS/HR: 900 INJECTION, SOLUTION INTRAVENOUS at 08:36

## 2017-08-10 RX ADMIN — CARVEDILOL 12.5 MG: 12.5 TABLET, FILM COATED ORAL at 08:42

## 2017-08-10 RX ADMIN — ASPIRIN 81 MG: 81 TABLET, COATED ORAL at 08:42

## 2017-08-10 RX ADMIN — SODIUM CHLORIDE 100 ML/HR: 900 INJECTION, SOLUTION INTRAVENOUS at 15:27

## 2017-08-10 RX ADMIN — CARVEDILOL 12.5 MG: 12.5 TABLET, FILM COATED ORAL at 16:46

## 2017-08-10 RX ADMIN — INSULIN GLARGINE 25 UNITS: 100 INJECTION, SOLUTION SUBCUTANEOUS at 16:46

## 2017-08-10 RX ADMIN — ROSUVASTATIN CALCIUM 40 MG: 20 TABLET ORAL at 21:50

## 2017-08-10 RX ADMIN — INSULIN LISPRO 8 UNITS: 100 INJECTION, SOLUTION INTRAVENOUS; SUBCUTANEOUS at 22:52

## 2017-08-10 RX ADMIN — HEPARIN SODIUM 5000 UNITS: 5000 INJECTION, SOLUTION INTRAVENOUS; SUBCUTANEOUS at 05:35

## 2017-08-10 RX ADMIN — AMLODIPINE BESYLATE 5 MG: 5 TABLET ORAL at 08:42

## 2017-08-10 RX ADMIN — SODIUM CHLORIDE 2.3 UNITS/HR: 900 INJECTION, SOLUTION INTRAVENOUS at 14:19

## 2017-08-10 RX ADMIN — HEPARIN SODIUM 5000 UNITS: 5000 INJECTION, SOLUTION INTRAVENOUS; SUBCUTANEOUS at 14:23

## 2017-08-10 RX ADMIN — MULTIPLE VITAMINS W/ MINERALS TAB 1 TABLET: TAB at 08:42

## 2017-08-10 NOTE — DIABETES MGMT
GLYCEMIC CONTROL PLAN OF CARE    Assessment/Recommendations:  Pt is a 60year old male with a past medical history significant for CHF, type 2 diabetes, Hepatitis C, and drug abuse. Blood glucose elevated upon admission and pt started on insulin drip, current rate 0.4 units per hour  Blood glucose has trended down, plan to transition from insulin drip to subcutaneous insulin. Consider 15 units of Lantus insulin, stop insulin drip two hours after giving basal insulin and add correctional Lispro insulin 4 times daily ACHS  Pt is tolerating diet and eating well  Recommend case management consult, pt reports being homeless, no longer able to get insulin from [facility] after losing ID  Diabetes education    Most recent blood glucose values:  8/10/2017 03:13 8/10/2017 04:00 8/10/2017 05:32 8/10/2017 06:38 8/10/2017 08:28   231 (H) 199 (H) 150 (H) 161 (H) 104     Current A1C of 11.5% is equivalent to average blood glucose of283 mg/dl over the past 2-3 months. Current hospital diabetes medications:   Glucostabilizer insulin drip     Home diabetes medications:  Pt reports taking Humulin 70/30 insulin 20 units in the morning and 10 units in the evening with dinner (hasn't been able to take in weeks due to financial difficulty)    Diet: Diabetic Consistent Carbohydrate    Diabetes Patient/Family Education Record    Factors That  May Influence Patients Ability  to Learn or  Comply With  Recommendations:    []   Language barrier    []   Cultural needs   []   Motivation    []   Cognitive limitation    []   Physical   [x]   Education    []   Physiological factors   []   Hearing/vision/speaking impairment   []   Pentecostalism beliefs    [x]   Financial factors   []  Other:   []  No factors identified at this time.      Person Instructed:   [x]   Patient   []   Family   []  Other     Preference for Learning:   [x]   Verbal   [x]   Written   []  Demonstration     Level of Comprehension & Competence:    []  Good [x] Fair                                     []  Poor                             [x]  Needs Reinforcement   [x]  Teachback completed    Education Component:   [x]  Medication management, including how to administer insulin (if appropriate) and potential medication integ ractions Pt states he has not been able to get insulin in a few weeks and reports he used to get insulin [facility] but after going to [facility] he lost his ID and can no longer go to the [facility]    [x]  Nutritional management including the role of carbohydrate intake pt reports getting meals at [employer], etc. Typically only gets two meals a day.  Stressed importance of eating meals when he takes his insulin to avoid low blood sugar.    []  Exercise   [x]  Signs, symptoms, and treatment of hyperglycemia and hypoglycemia   [] Treatment of hyperglycemia and hypoglycemia   [x]  Importance of blood glucose monitoring and how to obtain a blood glucose meter Pt states he lost glucometer when he was in [facility], will provide new generic brand glucometer and test strips   []  Instruction on use of blood glucose meter   [x]  Discuss the importance of HbA1C monitoring and provide patient with  results   []  Sick day guidelines   []  Proper use and disposal of lancets, needles, syringes or insulin pens (if appropriate)   [x]  Potential long-term complications (retinopathy, kidney disease, neuropathy, heart disease, stroke, vascular disease, foot care)   [] Provide emergency contact number and contact number for more information    [x]  Goal:  Patient/family will demonstrate understanding of Diabetes Self Management Skills by: 8/17/16  Plan for post-discharge education or self-management support:    [x] Outpatient class schedule provided            [] Patient Declined    [] Scheduled for outpatient classes (date) _______       Karis Mccarthy RD, CDE

## 2017-08-10 NOTE — PROGRESS NOTES
Hospitalist Progress Note    Patient: Nyasia Maciel MRN: [id number]  CSN: [id number]    YOB: 1955  Age: 60 y.o. Sex: male    DOA: 8/9/2017 LOS:  LOS: 1 day          Patient is found sitting up on edge of bed eating lunch. Reports he has been homeless for quite some time, does not like to stay with family or friends. He was staying in someone's RV, but was incarcerated for ~ 10 months, and RV no longer available. He accepts CM consult for list of homeless shelters. He denies pain anywhere. States he tested negative for HIV while incarcerated. He denies sharing needles, but in the past would leave his needles outside and thinks someone found them and used them. He reports unintentional weight loss. States he still feels thirsty. Assessment/Plan     1. DM2 with hyperglycemia, A1c 11.5 - xsition insulin gtt --> lantus / ssi / ADA diet. Consult diabetes educator. 2. ARF on CKD 3 - ACE held. On fluids  3. Hyponatremia - hypovolemic and in the setting of hyperlgycemia  4. Dyslipidemia on statin  5. Multiple uds + cocaine - recheck  6. etOH abuse teenager - August 2014 - MTF  7. Unintentional weight loss - consult nutritionist. Oren Claude. Check height  8. Hypokalemia replete as needed. Mag ok  9. Hypertension controlled on current regimen. ACE held. 10. dvt prophylaxis  11. Full code. Additional Notes:      Case discussed with:  []Patient  []Family  []Nursing  []Case Management  DVT Prophylaxis:  []Lovenox  []Hep SQ  []SCDs  []Coumadin   []On Heparin gtt    Vital signs/Intake and Output:  Visit Vitals    /90    Pulse 68    Temp 98.6 °F (37 °C)    Resp 14    Wt 76.5 kg (168 lb 10.4 oz)    SpO2 98%    BMI 23.52 kg/m2     Current Shift:     Last three shifts:  08/08 1901 - 08/10 0700  In: 38.6 [I.V.:38.6]  Out: -     Awake alert and oriented. Thin. Sitting up on edge of bed eating lunch. Ncat. Perrl. Poor dentition  RRR  cta b.l  Soft nt nd nabs  No edema.  dp 2+ b.l  No focal deficit  No rash    Medications Reviewed      Labs: Results:       Chemistry Recent Labs      08/10/17   0600 08/09/17 2019   GLU  144*  980*   NA  134*  121*   K  3.2*  4.3   CL  102  88*   CO2  25  25   BUN  30*  38*   CREA  2.23*  3.15*   CA  8.9  7.9*   AGAP  7  8   BUCR  13  12   AP   --   111   TP   --   7.6   ALB   --   3.2*   GLOB   --   4.4*   AGRAT   --   0.7*      CBC w/Diff Recent Labs      08/10/17   0600 08/09/17 2019   WBC  11.0  8.0   RBC  3.92*  3.89*   HGB  12.2*  12.2*   HCT  34.0*  35.8*   PLT  292  285   GRANS  54  60   LYMPH  34  24   EOS  3  4      Cardiac Enzymes Recent Labs      08/09/17 2019   CPK  263   CKND1  2.3      Coagulation No results for input(s): PTP, INR, APTT in the last 72 hours. No lab exists for component: INREXT    Lipid Panel Lab Results   Component Value Date/Time    Cholesterol, total 186 08/03/2016 12:37 PM    HDL Cholesterol 68 08/03/2016 12:37 PM    LDL, calculated 73.6 08/03/2016 12:37 PM    VLDL, calculated 44.4 08/03/2016 12:37 PM    Triglyceride 222 08/03/2016 12:37 PM    CHOL/HDL Ratio 2.7 08/03/2016 12:37 PM      BNP No results for input(s): BNPP in the last 72 hours. Liver Enzymes Recent Labs      08/09/17 2019   TP  7.6   ALB  3.2*   AP  111   SGOT  27      Thyroid Studies No results found for: T4, T3U, TSH, TSHEXT     Procedures/imaging: see electronic medical records for all procedures/Xrays and details which were not copied into this note but were reviewed prior to creation of Plan.

## 2017-08-10 NOTE — ED PROVIDER NOTES
HPI Comments: 9:29 PM Kelley Pina is a 60 y.o. male with a hx of HTN, DM, Heart Failure, and Cocaine and Heroine Abuse who presents to the ED c/o SOB that began today. He is also complaining of non-radiating CP, HA, and intermittent slurred speech x 1 day. The patient states that his symptoms began while walking form [employer]. He explains that he was recently released from [facility] 7/10/17 and has been without his diabetic, hypertension, and cholesterol medications since. The patient notes that he spent 10.5 months in [facility] and is currently homeless. He reports smoking cigarettes. Patient denies NVD, abd pain, changes in vision, extremity weakness, and additional complaints or concerns. The history is provided by the patient. Past Medical History:   Diagnosis Date    Cardiac LV ejection fraction 30-35% 7/15/15    Cardiomyopathy ([facility] 7/15/15    35% per ECHO    Cocaine use 8/9/14    + UDS, persistent use    Diabetes ([facility] 8/9/2014    8.1 hgbA1C 8/9/14    Heart failure ([facility]     Hepatitis C infection 8/20/14    Genotype 1A    History of cocaine abuse 8/10/2016    History of heroin abuse 8/10/2016    Homelessness 9/7/2015    Hypertension 2000       History reviewed. No pertinent surgical history. Family History:   Problem Relation Age of Onset    Diabetes Mother     Cancer Maternal Grandmother        Social History     Social History    Marital status: SINGLE     Spouse name: N/A    Number of children: N/A    Years of education: N/A     Occupational History    Not on file.      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    Drug use: Yes     Special: Cocaine, Marijuana, Heroin      Comment: 2 x heroin experimental, smoked Cocaine with last 7/27/15    Sexual activity: No     Other Topics Concern     Service No    Blood Transfusions No    Caffeine Concern Yes    Occupational Exposure No    Hobby Hazards No    Sleep Concern No    Stress Concern No    Weight Concern No    Special Diet No    Back Care No    Exercise No    Bike Helmet No    Seat Belt Yes     Social History Narrative         ALLERGIES: Review of patient's allergies indicates no known allergies. Review of Systems   Constitutional: Negative for activity change, appetite change, diaphoresis and fever. HENT: Negative for congestion, dental problem, ear pain, hearing loss, nosebleeds, postnasal drip, sinus pressure, sneezing and tinnitus. Eyes: Negative for photophobia, discharge, redness and visual disturbance. Respiratory: Positive for shortness of breath. Negative for cough, choking, wheezing and stridor. Cardiovascular: Positive for chest pain. Negative for palpitations and leg swelling. Gastrointestinal: Negative for abdominal distention, abdominal pain, anal bleeding and blood in stool. Genitourinary: Negative for decreased urine volume, difficulty urinating, discharge, dysuria, frequency, hematuria, penile swelling, scrotal swelling, testicular pain and urgency. Musculoskeletal: Negative for arthralgias, back pain, gait problem, joint swelling, myalgias and neck pain. Skin: Negative for color change and pallor. Neurological: Positive for speech difficulty and headaches. Negative for dizziness, tremors, seizures and syncope. Hematological: Negative for adenopathy. Does not bruise/bleed easily. Psychiatric/Behavioral: Negative for agitation, behavioral problems, confusion and hallucinations. The patient is not nervous/anxious.         Vitals:    08/09/17 1933 08/09/17 2015 08/09/17 2030 08/09/17 2045   BP: 144/90 (!) 182/93 (!) 196/97 (!) 191/102   Pulse: 82   72   Resp: 16   18   Temp: 98.3 °F (36.8 °C)      SpO2: 98% 100% 100% 97%   Weight: 81.6 kg (180 lb)               Physical Exam   Constitutional: He is oriented to person, place, and time. He appears well-developed and well-nourished. HENT:   Head: Normocephalic and atraumatic. Right Ear: External ear normal.   Left Ear: External ear normal.   Nose: Nose normal.   Mouth/Throat: Oropharynx is clear and moist.   Eyes: Conjunctivae and EOM are normal. Pupils are equal, round, and reactive to light. Right eye exhibits no discharge. No scleral icterus. Neck: Normal range of motion. Neck supple. No JVD present. No thyromegaly present. Cardiovascular: Normal rate, regular rhythm and intact distal pulses. Exam reveals no gallop and no friction rub. No murmur heard. Pulmonary/Chest: No respiratory distress. He has no wheezes. He has no rales. He exhibits no tenderness. Abdominal: He exhibits no distension and no mass. There is no tenderness. There is no rebound and no guarding. Musculoskeletal: Normal range of motion. He exhibits no edema. Lymphadenopathy:     He has no cervical adenopathy. Neurological: He is alert and oriented to person, place, and time. No cranial nerve deficit. Coordination normal.   Motor 5/5   Skin: Skin is warm and dry. No rash noted. No erythema. Psychiatric: He has a normal mood and affect. His behavior is normal. Judgment normal.   Nursing note and vitals reviewed. MDM  Number of Diagnoses or Management Options  Diagnosis management comments: 60year old male hypertensive diabetic with no permanent address and no ability to obtain meds. He presents with headache, slurred speech. His glucose is 900, sodium 121, creatinine rising. Treatment for Hyperglycemia initiated.  CT noted,suspect the changes are subacute to chronic       Amount and/or Complexity of Data Reviewed  Clinical lab tests: ordered and reviewed  Tests in the radiology section of CPT®: ordered and reviewed    Risk of Complications, Morbidity, and/or Mortality  Presenting problems: moderate      ED Course       Procedures    Vitals:  Patient Vitals for the past 12 hrs:   Temp Pulse Resp BP SpO2   08/09/17 2045 - 72 18 (!) 191/102 97 %   08/09/17 2030 - - - (!) 196/97 100 %   08/09/17 2015 - - - (!) 182/93 100 %   08/09/17 1933 98.3 °F (36.8 °C) 82 16 144/90 98 %   100% on RA, indicating adequate oxygenation. Medications ordered:   Medications   insulin regular (NOVOLIN R, HUMULIN R) 100 Units in 0.9% sodium chloride 100 mL infusion (not administered)   glucose chewable tablet 16 g (not administered)   glucagon (GLUCAGEN) injection 1 mg (not administered)   dextrose (D50W) injection syrg 12.5-25 g (not administered)         Lab findings:  Recent Results (from the past 12 hour(s))   EKG, 12 LEAD, INITIAL    Collection Time: 08/09/17  8:06 PM   Result Value Ref Range    Ventricular Rate 73 BPM    Atrial Rate 73 BPM    P-R Interval 150 ms    QRS Duration 90 ms    Q-T Interval 404 ms    QTC Calculation (Bezet) 445 ms    Calculated P Axis 62 degrees    Calculated R Axis -50 degrees    Calculated T Axis -155 degrees    Diagnosis       Normal sinus rhythm  Left anterior fascicular block  Left ventricular hypertrophy with repolarization abnormality  Abnormal ECG  When compared with ECG of 09-JUL-2016 05:16,  No significant change was found     CBC WITH AUTOMATED DIFF    Collection Time: 08/09/17  8:19 PM   Result Value Ref Range    WBC 8.0 4.6 - 13.2 K/uL    RBC 3.89 (L) 4.70 - 5.50 M/uL    HGB 12.2 (L) 13.0 - 16.0 g/dL    HCT 35.8 (L) 36.0 - 48.0 %    MCV 92.0 74.0 - 97.0 FL    MCH 31.4 24.0 - 34.0 PG    MCHC 34.1 31.0 - 37.0 g/dL    RDW 12.7 11.6 - 14.5 %    PLATELET 382 054 - 908 K/uL    MPV 11.5 9.2 - 11.8 FL    NEUTROPHILS 60 40 - 73 %    LYMPHOCYTES 24 21 - 52 %    MONOCYTES 11 (H) 3 - 10 %    EOSINOPHILS 4 0 - 5 %    BASOPHILS 1 0 - 2 %    ABS. NEUTROPHILS 4.9 1.8 - 8.0 K/UL    ABS. LYMPHOCYTES 1.9 0.9 - 3.6 K/UL    ABS.  MONOCYTES 0.9 0.05 - 1.2 K/UL    ABS. EOSINOPHILS 0.3 0.0 - 0.4 K/UL    ABS. BASOPHILS 0.0 0.0 - 0.1 K/UL    DF AUTOMATED     METABOLIC PANEL, COMPREHENSIVE    Collection Time: 08/09/17  8:19 PM   Result Value Ref Range    Sodium 121 (L) 136 - 145 mmol/L    Potassium 4.3 3.5 - 5.5 mmol/L    Chloride 88 (L) 100 - 108 mmol/L    CO2 25 21 - 32 mmol/L    Anion gap 8 3.0 - 18 mmol/L    Glucose 980 (HH) 74 - 99 mg/dL    BUN 38 (H) 7.0 - 18 MG/DL    Creatinine 3.15 (H) 0.6 - 1.3 MG/DL    BUN/Creatinine ratio 12 12 - 20      GFR est AA 24 (L) >60 ml/min/1.73m2    GFR est non-AA 20 (L) >60 ml/min/1.73m2    Calcium 7.9 (L) 8.5 - 10.1 MG/DL    Bilirubin, total 0.4 0.2 - 1.0 MG/DL    ALT (SGPT) 30 16 - 61 U/L    AST (SGOT) 27 15 - 37 U/L    Alk. phosphatase 111 45 - 117 U/L    Protein, total 7.6 6.4 - 8.2 g/dL    Albumin 3.2 (L) 3.4 - 5.0 g/dL    Globulin 4.4 (H) 2.0 - 4.0 g/dL    A-G Ratio 0.7 (L) 0.8 - 1.7     CARDIAC PANEL,(CK, CKMB & TROPONIN)    Collection Time: 08/09/17  8:19 PM   Result Value Ref Range     39 - 308 U/L    CK - MB 6.0 (H) <3.6 ng/ml    CK-MB Index 2.3 0.0 - 4.0 %    Troponin-I, Qt. 0.18 (H) 0.0 - 0.045 NG/ML   GLUCOSE, POC    Collection Time: 08/09/17  8:27 PM   Result Value Ref Range    Glucose (POC) >600 (HH) 70 - 110 mg/dL       EKG interpretation by ED Physician:      X-Ray, CT or other radiology findings or impressions:  XR CHEST PORT   Final Result      CT HEAD WO CONT    (Results Pending)       Progress notes, Consult notes or additional Procedure notes:       Reevaluation of patient:       Disposition:  Diagnosis: No diagnosis found. Disposition: admit    Follow-up Information     None           Patient's Medications   Start Taking    No medications on file   Continue Taking    ALBUTEROL (PROVENTIL HFA, VENTOLIN HFA, PROAIR HFA) 90 MCG/ACTUATION INHALER    Take 2 Puffs by inhalation every four (4) hours as needed for Wheezing. AMLODIPINE (NORVASC) 5 MG TABLET    Take 1 Tab by mouth daily.  Indications: HYPERTENSION    ASPIRIN DELAYED-RELEASE 81 MG TABLET    Take 1 Tab by mouth daily. BUDESONIDE-FORMOTEROL (SYMBICORT) 80-4.5 MCG/ACTUATION HFAA INHALER    Take 2 Puffs by inhalation two (2) times a day. CARVEDILOL (COREG) 12.5 MG TABLET    Take 1 Tab by mouth two (2) times daily (with meals). Indications: CHRONIC HEART FAILURE    INSULIN NPH/INSULIN REGULAR (HUMULIN 70/30) 100 UNIT/ML (70-30) INJECTION    Take 20 units SQ in the morning. Take 10 units SQ in the evening. LANCETS MISC    Use as directed for blood glucose monitoring. LISINOPRIL (PRINIVIL, ZESTRIL) 20 MG TABLET    Take 1 Tab by mouth daily. MULTIVITAMIN, TX-IRON-CA-MIN (THERA-M W/ IRON) 9 MG IRON-400 MCG TAB TABLET    Take 1 Tab by mouth daily. ROSUVASTATIN (CRESTOR) 40 MG TABLET    Take 1 Tab by mouth nightly. SYRINGE, DISPOSABLE, SYRG    Use as directed for insuliin administration. THIAMINE (B-1) 100 MG TABLET    Take 1 Tab by mouth daily. These Medications have changed    No medications on file   Stop Taking    No medications on file           Scribe Attestation      Laura Moran acting as a scribe for and in the presence of Karlynn Peabody, MD      August 09, 2017 at 9:41 PM       Provider Attestation:      I personally performed the services described in the documentation, reviewed the documentation, as recorded by the scribe in my presence, and it accurately and completely records my words and actions.  August 09, 2017 at 9:41 PM - Karlynn Peabody, MD

## 2017-08-10 NOTE — PROGRESS NOTES
NUTRITION    Nursing Referral: [facility]     RECOMMENDATIONS / PLAN:     - Add supplements: Glucerna Shake TID.   - Continue RD inpatient monitoring and evaluation. NUTRITION INTERVENTIONS & DIAGNOSIS:     [x] Meals/Snacks: modified diet  [x] Medical food supplementation: initiate   [x] Vitamin/mineral supplementation: multivitamin with iron, thiamine      Nutrition Diagnosis: Unintentional weight loss related to inadequate energy intake as evidenced by 20 lb, 11% weight loss x month. ASSESSMENT:     Pt ate 100% of breakfast. Agreeable to supplements. Average po intake adequate to meet patients estimated nutritional needs:   [x] Yes     [] No   [] Unable to determine at this time    Diet: DIET DIABETIC CONSISTENT CARB Regular      Food Allergies: NKFA  Current Appetite:   [x] Good     [] Fair     [] Poor     [] Other:  Appetite/meal intake prior to admission:   [x] Good     [] Fair     [] Poor     [] Other:  Feeding Limitations:  [] Swallowing difficulty    [] Chewing difficulty    [] Other:  Current Meal Intake: No data found. BM: PTA   Skin Integrity: WDL  Edema: none   Pertinent Medications: Reviewed: insulin drip stopped     Recent Labs      08/10/17   0600  08/09/17   2019   NA  134*  121*   K  3.2*  4.3   CL  102  88*   CO2  25  25   GLU  144*  980*   BUN  30*  38*   CREA  2.23*  3.15*   CA  8.9  7.9*   MG  2.4  2.3   ALB   --   3.2*   SGOT   --   27   ALT   --   30       Intake/Output Summary (Last 24 hours) at 08/10/17 1000  Last data filed at 08/10/17 0402   Gross per 24 hour   Intake            38.63 ml   Output                0 ml   Net            38.63 ml       Anthropometrics:  Ht Readings from Last 1 Encounters:   07/11/17 5' 11\" (1.803 m)     Last 3 Recorded Weights in this Encounter    08/09/17 1933 08/10/17 0303 08/10/17 0800   Weight: 81.6 kg (180 lb) 76.1 kg (167 lb 12.3 oz) 76.5 kg (168 lb 10.4 oz)     Body mass index is 23.52 kg/(m^2).           Weight History: patient reports 20 lb, 11% weight loss x month PTA, previous weight was 180 lb    Weight Metrics 8/10/2017 7/11/2017 8/10/2016 8/4/2016 7/11/2016 5/24/2016 2/22/2016   Weight 168 lb 10.4 oz 189 lb 185 lb 176 lb 164 lb 170 lb 180 lb   BMI 23.52 kg/m2 26.36 kg/m2 25.81 kg/m2 24.56 kg/m2 22.88 kg/m2 23.72 kg/m2 25.12 kg/m2        Admitting Diagnosis: Hyperglycemia  Acute on chronic renal failure (HCC)  Pertinent PMHx: CHF, DM, HTN, Hepatitis C    Education Needs:        [x] None identified  [] Identified - Not appropriate at this time  []  Identified and addressed - refer to education log  Learning Limitations:   [x] None identified  [] Identified    Cultural, Jewish & ethnic food preferences:  [x] None identified    [] Identified and addressed     ESTIMATED NUTRITION NEEDS:     Calories: 0161-1847 kcal (MSJx1.2-1.3) based on  [x] Actual BW 76 kg     [] IBW   Protein: 61-91 gm (0.8-1.2 gm/kg) based on  [x] Actual BW      [] IBW   Fluid: 1 mL/kcal     MONITORING & EVALUATION:     Nutrition Goal(s):   1. Po intake of meals will meet >75% of patient estimated nutritional needs within the next 7 days.   Outcome:  [] Met/Ongoing    []  Not Met    [x] New/Initial Goal     Monitoring:   [x] Diet tolerance   [x] Meal intake   [x] Supplement intake   [] GI symptoms/ability to tolerate po diet   [] Respiratory status   [] Plan of care      Previous Recommendations (for follow-up assessments only):     []   Implemented       []   Not Implemented (RD to address)     [] No Recommendation Made     Discharge Planning: diabetic diet  [x] Participated in care planning, discharge planning, & interdisciplinary rounds as appropriate      Laura Peter, [address], [address] [place] [address]    Pager: [phone]

## 2017-08-10 NOTE — ED NOTES
11:20 PM Consult: I discussed care with beulah Dowell. It was a standard discussion, including history of patients chief complaint, available diagnostic results, and treatment course. He agrees to accept the patient. X-Ray, CT or other radiology findings:  CT HEAD WO CONT   Final Result   IMPRESSIONS:     No evidence of intracranial hemorrhages.     Subacute or older prominent focal infarction in lateral portion of right  parietal lobe and temporoparietal area.     Smaller subacute or older infarction in the anterior portion of left frontal  lobe. XR CHEST PORT   Final Result   Impression:  1. No acute cardiopulmonary process. Scribe Attestation      Carvel Hearing acting as a scribe for and in the presence of Stephany Alvares MD      August 10, 2017 at 12:15 AM       Provider Attestation:      I personally performed the services described in the documentation, reviewed the documentation, as recorded by the scribe in my presence, and it accurately and completely records my words and actions.  August 10, 2017 at 1:15 AM - Stephany Alvares MD

## 2017-08-10 NOTE — ROUTINE PROCESS
TRANSFER - OUT REPORT:    Verbal report given to Tr RN(name) on [employer]  being transferred to [facility](unit) for routine progression of care       Report consisted of patients Situation, Background, Assessment and   Recommendations(SBAR). Information from the following report(s) SBAR, ED Summary, Intake/Output, MAR and Recent Results was reviewed with the receiving nurse. Lines:   Peripheral IV 08/09/17 Left Antecubital (Active)   Site Assessment Clean, dry, & intact 8/9/2017  8:43 PM   Phlebitis Assessment 0 8/9/2017  8:43 PM   Infiltration Assessment 0 8/9/2017  8:43 PM   Dressing Status Clean, dry, & intact 8/9/2017  8:43 PM       Peripheral IV 08/10/17 Right Antecubital (Active)   Site Assessment Clean, dry, & intact 8/10/2017  2:16 AM   Phlebitis Assessment 0 8/10/2017  2:16 AM   Infiltration Assessment 0 8/10/2017  2:16 AM   Dressing Status Clean, dry, & intact 8/10/2017  2:16 AM   Dressing Type Transparent 8/10/2017  2:16 AM   Hub Color/Line Status Green 8/10/2017  2:16 AM       Peripheral IV 08/10/17 Right Wrist (Active)   Site Assessment Clean, dry, & intact 8/10/2017  2:22 AM   Phlebitis Assessment 0 8/10/2017  2:22 AM   Infiltration Assessment 0 8/10/2017  2:22 AM   Dressing Status Clean, dry, & intact 8/10/2017  2:22 AM   Dressing Type Transparent 8/10/2017  2:22 AM   Hub Color/Line Status Pink 8/10/2017  2:22 AM        Opportunity for questions and clarification was provided.       Patient transported with:   Monitor  Registered Nurse

## 2017-08-10 NOTE — H&P
History and Physical      NAME:  Radha Dempsey   :   1955   MRN:   [id number]     Date/Time:  8/10/2017     CHIEF COMPLAINT: SOB     HISTORY OF PRESENT ILLNESS:     Mr. Kandi Schreiber is a 60 y.o.   male with a PMH of chronic systolic CHF with EF 52%, DM-2, HTN, Hepatitis C, Drug abuse who presents with c/c of  Shortness of breathing. He denies chest pain or palpitation. He has no fever or chills. He was recently released from [facility] on 07/10/17 and not taking any of his medications and is a homeless. He is now found to have blood glucose of 980, looks dehydrated, Na of 121 and Cr of 3.15. Here in ED he was started on insulin drip and admitted for further management. He has no abdominal pain, nausea or vomiting. No urinary complaints. Past Medical History:   Diagnosis Date    Cardiac LV ejection fraction 30-35% 7/15/15    Cardiomyopathy ([facility] [address] 7/15/15    35% per ECHO    Cocaine use 14    + UDS, persistent use    Diabetes ([facility] [address] 2014    8.1 hgbA1C 14    Heart failure ([facility] [address]     Hepatitis C infection 14    Genotype 1A    History of cocaine abuse 8/10/2016    History of heroin abuse 8/10/2016    Homelessness 2015    Hypertension 2000        History reviewed. No pertinent surgical history. Social History   Substance Use Topics    Smoking status: Former Smoker     Packs/day: 0.30     Types: Cigarettes     Quit date: 2016    Smokeless tobacco: Never Used    Alcohol use 0.0 oz/week     0 Standard drinks or equivalent per week      Comment: started heavy drinking at teen till in Aug 2014 but has gone into remission since then        Family History   Problem Relation Age of Onset    Diabetes Mother     Cancer Maternal Grandmother         No Known Allergies     Prior to Admission medications    Medication Sig Start Date End Date Taking?  Authorizing Provider   insulin NPH/insulin regular (HUMULIN 70/30) 100 unit/mL (70-30) injection Take 20 units SQ in the morning. Take 10 units SQ in the evening. 7/11/17   Karely Jauregui MD   lisinopril (PRINIVIL, ZESTRIL) 20 mg tablet Take 1 Tab by mouth daily. 8/10/16   Nubia Cameron NP   rosuvastatin (CRESTOR) 40 mg tablet Take 1 Tab by mouth nightly. 8/10/16   Nubia Cameron NP   amLODIPine (NORVASC) 5 mg tablet Take 1 Tab by mouth daily. Indications: HYPERTENSION 8/10/16   Elbert Martinez NP   carvedilol (COREG) 12.5 mg tablet Take 1 Tab by mouth two (2) times daily (with meals). Indications: CHRONIC HEART FAILURE 8/10/16   Nubia Cameron NP   albuterol (PROVENTIL HFA, VENTOLIN HFA, PROAIR HFA) 90 mcg/actuation inhaler Take 2 Puffs by inhalation every four (4) hours as needed for Wheezing. 8/3/16   Nubia Cameron NP   budesonide-formoterol (SYMBICORT) 80-4.5 mcg/actuation HFAA inhaler Take 2 Puffs by inhalation two (2) times a day. 8/3/16   Nubia Cameron NP   thiamine (B-1) 100 mg tablet Take 1 Tab by mouth daily. 7/12/16   Rose Pino MD   Lancets misc Use as directed for blood glucose monitoring. 7/12/16   Rose Pino MD   Syringe, Disposable, syrg Use as directed for insuliin administration. 7/12/16   Rose Pino MD   aspirin delayed-release 81 mg tablet Take 1 Tab by mouth daily. 12/1/15   Macrina Foster MD   multivitamin, tx-iron-ca-min (THERA-M W/ IRON) 9 mg iron-400 mcg tab tablet Take 1 Tab by mouth daily.  12/1/15   Macrina Foster MD       REVIEW OF SYSTEMS:     CONSTITUTIONAL: No Fever, No chills, No weight loss, No Night sweats  HEENT:  No epistaxis, No diff in swallowing  CVS: No chest pain, No palpitations, No syncope, No peripheral edema, No PND, No orthopnea  RS: No cough, No hemoptysis, No pleuritic chest pain  GI: No abd pain, No vomitting, No diarrhea, No hematemesis, No rectal bleeding, No acid reflux or heartburn  NEURO: No focal weakness, No headaches, No seizures  PSYCH: No anxiety, No depression  MUSCULOSKLETAL: No joint pain or swelling  : No hematuria or dysuria  SKIN: No rash      Physical Exam:    VITALS:    Vital signs reviewed; most recent are:    Visit Vitals    /88    Pulse 72    Temp 98 °F (36.7 °C)    Resp 15    Wt 76.1 kg (167 lb 12.3 oz)    SpO2 97%    BMI 23.4 kg/m2     SpO2 Readings from Last 6 Encounters:   08/10/17 97%   07/11/17 96%   08/10/16 98%   07/12/16 98%   05/25/16 95%   04/05/16 96%          Intake/Output Summary (Last 24 hours) at 08/10/17 0709  Last data filed at 08/10/17 0402   Gross per 24 hour   Intake            38.63 ml   Output                0 ml   Net            38.63 ml          GENERAL: Not in acute distress  HEENT: pink conjunctiva, un icteric sclera,   NECK: No lymphadenopthy or thyroid swelling, JVD not seen  LYMPH: No supraclavicular or cervical or axillary nodes on both sides  CVS: S1S2, No murmurs, No gallop or rub  RS: CTA, No wheezing or crackles  Abd: Soft, non tender, not distended, No guarding, No rigidity  NEURO:  No focal neurologic deficits   Extrm: no leg edema or swelling   Skin: No rash      Labs:  Recent Results (from the past 24 hour(s))   EKG, 12 LEAD, INITIAL    Collection Time: 08/09/17  8:06 PM   Result Value Ref Range    Ventricular Rate 73 BPM    Atrial Rate 73 BPM    P-R Interval 150 ms    QRS Duration 90 ms    Q-T Interval 404 ms    QTC Calculation (Bezet) 445 ms    Calculated P Axis 62 degrees    Calculated R Axis -50 degrees    Calculated T Axis -155 degrees    Diagnosis       Normal sinus rhythm  Left anterior fascicular block  Left ventricular hypertrophy with repolarization abnormality  Abnormal ECG  When compared with ECG of 09-JUL-2016 05:16,  No significant change was found     CBC WITH AUTOMATED DIFF    Collection Time: 08/09/17  8:19 PM   Result Value Ref Range    WBC 8.0 4.6 - 13.2 K/uL    RBC 3.89 (L) 4.70 - 5.50 M/uL    HGB 12.2 (L) 13.0 - 16.0 g/dL    HCT 35.8 (L) 36.0 - 48.0 %    MCV 92.0 74.0 - 97.0 FL    MCH 31.4 24.0 - 34.0 PG    MCHC 34.1 31.0 - 37.0 g/dL    RDW 12.7 11.6 - 14.5 %    PLATELET 404 724 - 605 K/uL    MPV 11.5 9.2 - 11.8 FL    NEUTROPHILS 60 40 - 73 %    LYMPHOCYTES 24 21 - 52 %    MONOCYTES 11 (H) 3 - 10 %    EOSINOPHILS 4 0 - 5 %    BASOPHILS 1 0 - 2 %    ABS. NEUTROPHILS 4.9 1.8 - 8.0 K/UL    ABS. LYMPHOCYTES 1.9 0.9 - 3.6 K/UL    ABS. MONOCYTES 0.9 0.05 - 1.2 K/UL    ABS. EOSINOPHILS 0.3 0.0 - 0.4 K/UL    ABS. BASOPHILS 0.0 0.0 - 0.1 K/UL    DF AUTOMATED     METABOLIC PANEL, COMPREHENSIVE    Collection Time: 08/09/17  8:19 PM   Result Value Ref Range    Sodium 121 (L) 136 - 145 mmol/L    Potassium 4.3 3.5 - 5.5 mmol/L    Chloride 88 (L) 100 - 108 mmol/L    CO2 25 21 - 32 mmol/L    Anion gap 8 3.0 - 18 mmol/L    Glucose 980 (HH) 74 - 99 mg/dL    BUN 38 (H) 7.0 - 18 MG/DL    Creatinine 3.15 (H) 0.6 - 1.3 MG/DL    BUN/Creatinine ratio 12 12 - 20      GFR est AA 24 (L) >60 ml/min/1.73m2    GFR est non-AA 20 (L) >60 ml/min/1.73m2    Calcium 7.9 (L) 8.5 - 10.1 MG/DL    Bilirubin, total 0.4 0.2 - 1.0 MG/DL    ALT (SGPT) 30 16 - 61 U/L    AST (SGOT) 27 15 - 37 U/L    Alk.  phosphatase 111 45 - 117 U/L    Protein, total 7.6 6.4 - 8.2 g/dL    Albumin 3.2 (L) 3.4 - 5.0 g/dL    Globulin 4.4 (H) 2.0 - 4.0 g/dL    A-G Ratio 0.7 (L) 0.8 - 1.7     CARDIAC PANEL,(CK, CKMB & TROPONIN)    Collection Time: 08/09/17  8:19 PM   Result Value Ref Range     39 - 308 U/L    CK - MB 6.0 (H) <3.6 ng/ml    CK-MB Index 2.3 0.0 - 4.0 %    Troponin-I, Qt. 0.18 (H) 0.0 - 0.045 NG/ML   HEMOGLOBIN A1C WITH EAG    Collection Time: 08/09/17  8:19 PM   Result Value Ref Range    Hemoglobin A1c 10.9 (H) 4.2 - 5.6 %    Est. average glucose 266 mg/dL   ETHYL ALCOHOL    Collection Time: 08/09/17  8:19 PM   Result Value Ref Range    ALCOHOL(ETHYL),SERUM <3 0 - 3 MG/DL   BILIRUBIN, DIRECT    Collection Time: 08/09/17  8:19 PM   Result Value Ref Range    Bilirubin, direct 0.2 0.0 - 0.2 MG/DL   MAGNESIUM    Collection Time: 08/09/17  8:19 PM   Result Value Ref Range    Magnesium 2. 3 1.6 - 2.6 mg/dL   GLUCOSE, POC    Collection Time: 08/09/17  8:27 PM   Result Value Ref Range    Glucose (POC) >600 (HH) 70 - 110 mg/dL   GLUCOSTABILIZER    Collection Time: 08/09/17 10:38 PM   Result Value Ref Range    Glucose 980 mg/dL    Insulin order 18.4 units/hour    Insulin adminstered 18.4 units/hour    Multiplier 0.020     Low target 140 mg/dL    High target 180 mg/dL    D50 order 0.0 ml    D50 administered 0.00 ml    Minutes until next BG 60 min    Order initials ae     Administered initials ae    GLUCOSE, POC    Collection Time: 08/10/17 12:06 AM   Result Value Ref Range    Glucose (POC) 476 (HH) 70 - 110 mg/dL   GLUCOSTABILIZER    Collection Time: 08/10/17 12:07 AM   Result Value Ref Range    Glucose 476 mg/dL    Insulin order 4.2 units/hour    Insulin adminstered 4.2 units/hour    Multiplier 0.010     Low target 140 mg/dL    High target 180 mg/dL    D50 order 0.0 ml    D50 administered 0.00 ml    Minutes until next BG 60 min    Order initials ae     Administered initials ae    GLUCOSE, POC    Collection Time: 08/10/17  1:54 AM   Result Value Ref Range    Glucose (POC) 314 (H) 70 - 110 mg/dL   GLUCOSTABILIZER    Collection Time: 08/10/17  1:55 AM   Result Value Ref Range    Glucose 314 mg/dL    Insulin order 2.5 units/hour    Insulin adminstered 2.5 units/hour    Multiplier 0.010     Low target 140 mg/dL    High target 180 mg/dL    D50 order 0.0 ml    D50 administered 0.00 ml    Minutes until next BG 60 min    Order initials ae     Administered initials ae    GLUCOSE, POC    Collection Time: 08/10/17  3:13 AM   Result Value Ref Range    Glucose (POC) 231 (H) 70 - 110 mg/dL   GLUCOSTABILIZER    Collection Time: 08/10/17  3:14 AM   Result Value Ref Range    Glucose 231 mg/dL    Insulin order 1.7 units/hour    Insulin adminstered 1.7 units/hour    Multiplier 0.010     Low target 140 mg/dL    High target 180 mg/dL    D50 order 0.0 ml    D50 administered 0.00 ml    Minutes until next BG 60 min    Order initials jf     Administered initials jf    GLUCOSE, POC    Collection Time: 08/10/17  4:00 AM   Result Value Ref Range    Glucose (POC) 199 (H) 70 - 110 mg/dL   GLUCOSTABILIZER    Collection Time: 08/10/17  4:01 AM   Result Value Ref Range    Glucose 199 mg/dL    Insulin order 2.8 units/hour    Insulin adminstered 2.8 units/hour    Multiplier 0.020     Low target 140 mg/dL    High target 180 mg/dL    D50 order 0.0 ml    D50 administered 0.00 ml    Minutes until next BG 60 min    Order initials jf     Administered initials jf    GLUCOSE, POC    Collection Time: 08/10/17  5:32 AM   Result Value Ref Range    Glucose (POC) 150 (H) 70 - 110 mg/dL   GLUCOSTABILIZER    Collection Time: 08/10/17  5:33 AM   Result Value Ref Range    Glucose 150 mg/dL    Insulin order 1.8 units/hour    Insulin adminstered 1.8 units/hour    Multiplier 0.020     Low target 140 mg/dL    High target 180 mg/dL    D50 order 0.0 ml    D50 administered 0.00 ml    Minutes until next BG 60 min    Order initials jf     Administered initials ethel    METABOLIC PANEL, BASIC    Collection Time: 08/10/17  6:00 AM   Result Value Ref Range    Sodium 134 (L) 136 - 145 mmol/L    Potassium 3.2 (L) 3.5 - 5.5 mmol/L    Chloride 102 100 - 108 mmol/L    CO2 25 21 - 32 mmol/L    Anion gap 7 3.0 - 18 mmol/L    Glucose 144 (H) 74 - 99 mg/dL    BUN 30 (H) 7.0 - 18 MG/DL    Creatinine 2.23 (H) 0.6 - 1.3 MG/DL    BUN/Creatinine ratio 13 12 - 20      GFR est AA 36 (L) >60 ml/min/1.73m2    GFR est non-AA 30 (L) >60 ml/min/1.73m2    Calcium 8.9 8.5 - 10.1 MG/DL   MAGNESIUM    Collection Time: 08/10/17  6:00 AM   Result Value Ref Range    Magnesium 2.4 1.6 - 2.6 mg/dL   CBC WITH AUTOMATED DIFF    Collection Time: 08/10/17  6:00 AM   Result Value Ref Range    WBC 11.0 4.6 - 13.2 K/uL    RBC 3.92 (L) 4.70 - 5.50 M/uL    HGB 12.2 (L) 13.0 - 16.0 g/dL    HCT 34.0 (L) 36.0 - 48.0 %    MCV 86.7 74.0 - 97.0 FL    MCH 31.1 24.0 - 34.0 PG    MCHC 35.9 31.0 - 37.0 g/dL    RDW 12.2 11.6 - 14.5 %    PLATELET 776 902 - 111 K/uL    MPV 10.7 9.2 - 11.8 FL    NEUTROPHILS 54 40 - 73 %    LYMPHOCYTES 34 21 - 52 %    MONOCYTES 8 3 - 10 %    EOSINOPHILS 3 0 - 5 %    BASOPHILS 1 0 - 2 %    ABS. NEUTROPHILS 6.1 1.8 - 8.0 K/UL    ABS. LYMPHOCYTES 3.7 (H) 0.9 - 3.6 K/UL    ABS. MONOCYTES 0.8 0.05 - 1.2 K/UL    ABS. EOSINOPHILS 0.4 0.0 - 0.4 K/UL    ABS. BASOPHILS 0.1 0.0 - 0.1 K/UL    DF AUTOMATED     GLUCOSE, POC    Collection Time: 08/10/17  6:38 AM   Result Value Ref Range    Glucose (POC) 161 (H) 70 - 110 mg/dL   GLUCOSTABILIZER    Collection Time: 08/10/17  6:39 AM   Result Value Ref Range    Glucose 161 mg/dL    Insulin order 2.0 units/hour    Insulin adminstered 2.0 units/hour    Multiplier 0.020     Low target 140 mg/dL    High target 180 mg/dL    D50 order 0.0 ml    D50 administered 0.00 ml    Minutes until next BG 60 min    Order initials jf     Administered initials jf          Active Problems:    Hyperglycemia (8/9/2017)      Acute on chronic renal failure (HCC) (8/9/2017)        Assessment:       1. CHICA  On CKD-2  2. Dehydration  3. Hyponatremia  4. Hyperglycemia with uncontrolled DM-2  5. Hypertensive urgency  6. Mild troponin elevation, probably demand ischemia   7. Chronic systolic CHF with EF 89%,   8. Hepatitis C,   9. H/o Drug abuse   10. Homelessness     Plan:       · Continue gentle IVF hydration  · Continue IV insulin drip for now and switch to his normal regimen  · Monitor renal function, electrolytes and volume status  · Trend  Troponin  · Echo as his last one is more than a year nnow  · Restart his home medications  · Need assistance to get his medications refilled  · Full code  · DVT prophylaxsis        Total time:  68 minutes             _______________________________________________________________________        Attending Physician:  Deborah Basilio MD

## 2017-08-10 NOTE — INTERDISCIPLINARY ROUNDS
CRITICAL CARE INTERDISCIPLINARY ROUNDS      Patient Information:    Name:   Gracie Krause    Age:   60 y.o. Admission Date:   8/9/2017    Readmit Risk Assessment Information:      Readmit Risk Tool Support Systems: None, Relationship with Primary Physician Group: Not seen within the past 12 months    Surgery Date:      Day of Stay:     Expected Discharge Date:        Attending Provider:   Charmayne Gelinas, MD    Surgeon:        Consultant:       Primary Care Provider:   Yane Menjivar NP    Problem List:     Patient Active Problem List   Diagnosis Code    Stage 3 chronic renal impairment associated with type 2 diabetes mellitus ([facility] [address] E11.22, N18.3    Hepatitis C infection B19.20    Type 2 diabetes mellitus with insulin therapy ([facility] [address] E11.9, Z79.4    Homelessness Z59.0    CHF (congestive heart failure) ([facility] [address] I50.9    Cardiomyopathy ([facility] [address] I42.9    COPD, mild ([facility] [address] J44.9    Essential hypertension I10    Insulin dependent type 2 diabetes mellitus, controlled ([facility] [address] E11.9, Z79.4    NSTEMI (non-ST elevated myocardial infarction) ([facility] [address] K43.5    Systolic CHF, chronic (HCC) I50.22    Chronic renal insufficiency N18.9    History of cocaine abuse Z87.898    History of heroin abuse Z87.898    Decreased GFR R94.4    Elevated HDL E78.89    Hypertriglyceridemia E78.1    Elevated serum creatinine R79.89    Elevated alkaline phosphatase level R74.8    Hyperglycemia R73.9    Acute on chronic renal failure (HCC) N17.9, N18.9       Principal Problem:  <principal problem not specified>    Procedure:       During rounds the following quality care indicators and evidence based practices were addressed :     DVT Prophylaxis, Pressure Injury Prevention, Pain Management, Sepsis resuscitation and management, Nutritional Status, Critical Care Interventions Airways, Drains and Lines and IHI Bundles:  Light Bundle Followed           Acute MI/PCI:   Not applicable    Heart Failure:    Not applicable    Cardiac Surgery: Not applicable    SCIP Measures for other Surgeries:   Not applicable    Pneumonia:    Appropriate Antibiotic Selection (ICU versus Non-ICU)    Stroke:  Patient's Personal Risk Factors for Stroke are:   hypertension, family history, hyperlipidemia or diabetes mellitus    NIH Stroke Score       Transfer Level of Care:  Progressing to Transfer    The patient will require the following interventions based on the Readmission Risk Assessment:  Pharmacy evaluation and teaching, Care Management involvement for home health follow up for:  mobility and assistance with ADL's and Spiritual Care evaluation      Discharge Management:  Home    Anticipated Discharge Date:  3      Interdisciplinary team rounds were held  with the following team membersCare Management, Diabetes Treatment Specialist, Nursing, Nutrition, Pastoral Care, Pharmacy, Physician and Clinical Coordinator and the  patient. Plan of care discussed. See clinical pathway and/or care plan for interventions and desired outcomes. Stop insulin drip start basal insulin and order diet. Transfer to floor.

## 2017-08-10 NOTE — ED NOTES
Pt. Reports to ED via triage for CP (substernal non-radiating), headache behind right eye and slurred speech x1day. Has not had any of his medications since he was released from long-term PMH of HTN, DM. POC BG >600.

## 2017-08-10 NOTE — PROGRESS NOTES
Albuterol neb was therapeutically interchanged for Albuterol INH  per the P&T Committee approved Therapeutic Interchanges Policy.     Jennifer Solis [facility], Pharmacist  8/10/2017 4:31 AM

## 2017-08-10 NOTE — CDMP QUERY
Please clarify if this patient is being treated/managed for:    => Mild or Moderate Protein Calorie Malnutrition  =>Other Explanation of clinical findings  =>Unable to Determine (no explanation of clinical findings)    The medical record reflects the following:    Risk:Add supplements: Glucerna Shake TID.   - Continue RD inpatient monitoring and evaluation.      NUTRITION INTERVENTIONS & DIAGNOSIS:      Meals/Snacks: modified diet   Medical food supplementation: initiate    Vitamin/mineral supplementation: multivitamin with iron, thiamine       Nutrition Diagnosis: Unintentional weight loss related to inadequate energy intake as evidenced by 20 lb, 11% weight loss x month.     Please clarify and document your clinical opinion in the progress notes and discharge summary including the definitive and/or presumptive diagnosis, (suspected or probable), related to the above clinical findings. Please include clinical findings supporting your diagnosis. If you DECLINE this query or would like to communicate with [facility], please utilize the \"[place] message box\" at the TOP of the Progress Note on the right.       Thank you,

## 2017-08-11 VITALS
HEART RATE: 66 BPM | SYSTOLIC BLOOD PRESSURE: 149 MMHG | HEIGHT: 71 IN | RESPIRATION RATE: 17 BRPM | TEMPERATURE: 98.1 F | BODY MASS INDEX: 23.24 KG/M2 | WEIGHT: 166 LBS | OXYGEN SATURATION: 99 % | DIASTOLIC BLOOD PRESSURE: 85 MMHG

## 2017-08-11 PROBLEM — N18.9 ACUTE ON CHRONIC RENAL FAILURE (HCC): Status: RESOLVED | Noted: 2017-08-09 | Resolved: 2017-08-11

## 2017-08-11 PROBLEM — R73.9 HYPERGLYCEMIA: Status: RESOLVED | Noted: 2017-08-09 | Resolved: 2017-08-11

## 2017-08-11 PROBLEM — N17.9 ACUTE ON CHRONIC RENAL FAILURE (HCC): Status: RESOLVED | Noted: 2017-08-09 | Resolved: 2017-08-11

## 2017-08-11 LAB
ANION GAP BLD CALC-SCNC: 8 MMOL/L (ref 3–18)
BASOPHILS # BLD AUTO: 0 K/UL (ref 0–0.1)
BASOPHILS # BLD: 0 % (ref 0–2)
BUN SERPL-MCNC: 28 MG/DL (ref 7–18)
BUN/CREAT SERPL: 15 (ref 12–20)
CALCIUM SERPL-MCNC: 8 MG/DL (ref 8.5–10.1)
CHLORIDE SERPL-SCNC: 105 MMOL/L (ref 100–108)
CHOLEST SERPL-MCNC: 153 MG/DL
CO2 SERPL-SCNC: 23 MMOL/L (ref 21–32)
CREAT SERPL-MCNC: 1.87 MG/DL (ref 0.6–1.3)
DIFFERENTIAL METHOD BLD: ABNORMAL
EOSINOPHIL # BLD: 0.4 K/UL (ref 0–0.4)
EOSINOPHIL NFR BLD: 4 % (ref 0–5)
ERYTHROCYTE [DISTWIDTH] IN BLOOD BY AUTOMATED COUNT: 12.1 % (ref 11.6–14.5)
GLUCOSE BLD STRIP.AUTO-MCNC: 231 MG/DL (ref 70–110)
GLUCOSE BLD STRIP.AUTO-MCNC: 317 MG/DL (ref 70–110)
GLUCOSE SERPL-MCNC: 217 MG/DL (ref 74–99)
HCT VFR BLD AUTO: 33.1 % (ref 36–48)
HDLC SERPL-MCNC: 42 MG/DL (ref 40–60)
HDLC SERPL: 3.6 {RATIO} (ref 0–5)
HGB BLD-MCNC: 11.8 G/DL (ref 13–16)
LDLC SERPL CALC-MCNC: 77.4 MG/DL (ref 0–100)
LIPID PROFILE,FLP: ABNORMAL
LYMPHOCYTES # BLD AUTO: 47 % (ref 21–52)
LYMPHOCYTES # BLD: 4.3 K/UL (ref 0.9–3.6)
MAGNESIUM SERPL-MCNC: 2.2 MG/DL (ref 1.6–2.6)
MCH RBC QN AUTO: 31 PG (ref 24–34)
MCHC RBC AUTO-ENTMCNC: 35.6 G/DL (ref 31–37)
MCV RBC AUTO: 86.9 FL (ref 74–97)
MONOCYTES # BLD: 0.8 K/UL (ref 0.05–1.2)
MONOCYTES NFR BLD AUTO: 9 % (ref 3–10)
NEUTS SEG # BLD: 3.7 K/UL (ref 1.8–8)
NEUTS SEG NFR BLD AUTO: 40 % (ref 40–73)
PHOSPHATE SERPL-MCNC: 2.4 MG/DL (ref 2.5–4.9)
PLATELET # BLD AUTO: 262 K/UL (ref 135–420)
PMV BLD AUTO: 11 FL (ref 9.2–11.8)
POTASSIUM SERPL-SCNC: 3.6 MMOL/L (ref 3.5–5.5)
RBC # BLD AUTO: 3.81 M/UL (ref 4.7–5.5)
SODIUM SERPL-SCNC: 136 MMOL/L (ref 136–145)
TRIGL SERPL-MCNC: 168 MG/DL (ref ?–150)
VLDLC SERPL CALC-MCNC: 33.6 MG/DL
WBC # BLD AUTO: 9.3 K/UL (ref 4.6–13.2)

## 2017-08-11 PROCEDURE — 74011250636 HC RX REV CODE- 250/636: Performed by: INTERNAL MEDICINE

## 2017-08-11 PROCEDURE — 83735 ASSAY OF MAGNESIUM: CPT | Performed by: HOSPITALIST

## 2017-08-11 PROCEDURE — 36415 COLL VENOUS BLD VENIPUNCTURE: CPT | Performed by: HOSPITALIST

## 2017-08-11 PROCEDURE — 80048 BASIC METABOLIC PNL TOTAL CA: CPT | Performed by: HOSPITALIST

## 2017-08-11 PROCEDURE — 85025 COMPLETE CBC W/AUTO DIFF WBC: CPT | Performed by: HOSPITALIST

## 2017-08-11 PROCEDURE — 74011636637 HC RX REV CODE- 636/637: Performed by: INTERNAL MEDICINE

## 2017-08-11 PROCEDURE — 74011250636 HC RX REV CODE- 250/636: Performed by: HOSPITALIST

## 2017-08-11 PROCEDURE — 80353 DRUG SCREENING COCAINE: CPT | Performed by: FAMILY MEDICINE

## 2017-08-11 PROCEDURE — 80061 LIPID PANEL: CPT | Performed by: HOSPITALIST

## 2017-08-11 PROCEDURE — 80307 DRUG TEST PRSMV CHEM ANLYZR: CPT | Performed by: FAMILY MEDICINE

## 2017-08-11 PROCEDURE — 74011250637 HC RX REV CODE- 250/637: Performed by: INTERNAL MEDICINE

## 2017-08-11 PROCEDURE — 74011636637 HC RX REV CODE- 636/637: Performed by: HOSPITALIST

## 2017-08-11 PROCEDURE — 82962 GLUCOSE BLOOD TEST: CPT

## 2017-08-11 PROCEDURE — 84100 ASSAY OF PHOSPHORUS: CPT | Performed by: HOSPITALIST

## 2017-08-11 RX ORDER — BUDESONIDE AND FORMOTEROL FUMARATE DIHYDRATE 80; 4.5 UG/1; UG/1
2 AEROSOL RESPIRATORY (INHALATION) 2 TIMES DAILY
Qty: 2 INHALER | Refills: 3 | Status: SHIPPED | OUTPATIENT
Start: 2017-08-11 | End: 2017-12-28

## 2017-08-11 RX ORDER — ALBUTEROL SULFATE 90 UG/1
2 AEROSOL, METERED RESPIRATORY (INHALATION)
Qty: 2 INHALER | Refills: 3 | Status: SHIPPED | OUTPATIENT
Start: 2017-08-11 | End: 2018-01-16 | Stop reason: SDUPTHER

## 2017-08-11 RX ORDER — INSULIN GLARGINE 100 [IU]/ML
30 INJECTION, SOLUTION SUBCUTANEOUS
Status: DISCONTINUED | OUTPATIENT
Start: 2017-08-11 | End: 2017-08-11 | Stop reason: HOSPADM

## 2017-08-11 RX ORDER — AMLODIPINE BESYLATE 10 MG/1
10 TABLET ORAL DAILY
Status: DISCONTINUED | OUTPATIENT
Start: 2017-08-12 | End: 2017-08-11 | Stop reason: HOSPADM

## 2017-08-11 RX ORDER — AMLODIPINE BESYLATE 10 MG/1
10 TABLET ORAL DAILY
Qty: 30 TAB | Refills: 4 | Status: SHIPPED | OUTPATIENT
Start: 2017-08-11 | End: 2017-12-28

## 2017-08-11 RX ORDER — ROSUVASTATIN CALCIUM 40 MG/1
40 TABLET, COATED ORAL
Qty: 30 TAB | Refills: 4 | Status: SHIPPED | OUTPATIENT
Start: 2017-08-11 | End: 2017-12-28

## 2017-08-11 RX ORDER — LANCETS
EACH MISCELLANEOUS
Qty: 1 EACH | Refills: 11 | Status: ON HOLD | OUTPATIENT
Start: 2017-08-11 | End: 2018-04-13

## 2017-08-11 RX ORDER — INSULIN LISPRO 100 [IU]/ML
5 INJECTION, SOLUTION INTRAVENOUS; SUBCUTANEOUS
Qty: 2 PACKAGE | Refills: 4 | Status: SHIPPED | OUTPATIENT
Start: 2017-08-11 | End: 2017-12-28

## 2017-08-11 RX ORDER — LISINOPRIL 20 MG/1
20 TABLET ORAL DAILY
Qty: 30 TAB | Refills: 4 | Status: SHIPPED | OUTPATIENT
Start: 2017-08-11 | End: 2017-12-28

## 2017-08-11 RX ADMIN — INSULIN LISPRO 8 UNITS: 100 INJECTION, SOLUTION INTRAVENOUS; SUBCUTANEOUS at 09:11

## 2017-08-11 RX ADMIN — INSULIN LISPRO 4 UNITS: 100 INJECTION, SOLUTION INTRAVENOUS; SUBCUTANEOUS at 12:14

## 2017-08-11 RX ADMIN — MULTIPLE VITAMINS W/ MINERALS TAB 1 TABLET: TAB at 08:55

## 2017-08-11 RX ADMIN — AMLODIPINE BESYLATE 5 MG: 5 TABLET ORAL at 08:55

## 2017-08-11 RX ADMIN — CARVEDILOL 12.5 MG: 12.5 TABLET, FILM COATED ORAL at 08:00

## 2017-08-11 RX ADMIN — HEPARIN SODIUM 5000 UNITS: 5000 INJECTION, SOLUTION INTRAVENOUS; SUBCUTANEOUS at 07:04

## 2017-08-11 RX ADMIN — ASPIRIN 81 MG: 81 TABLET, COATED ORAL at 08:55

## 2017-08-11 RX ADMIN — BUDESONIDE AND FORMOTEROL FUMARATE DIHYDRATE 2 PUFF: 80; 4.5 AEROSOL RESPIRATORY (INHALATION) at 08:00

## 2017-08-11 RX ADMIN — INSULIN LISPRO 8 UNITS: 100 INJECTION, SOLUTION INTRAVENOUS; SUBCUTANEOUS at 12:15

## 2017-08-11 RX ADMIN — INSULIN LISPRO 8 UNITS: 100 INJECTION, SOLUTION INTRAVENOUS; SUBCUTANEOUS at 09:08

## 2017-08-11 RX ADMIN — Medication 100 MG: at 08:55

## 2017-08-11 RX ADMIN — SODIUM CHLORIDE 100 ML/HR: 900 INJECTION, SOLUTION INTRAVENOUS at 07:04

## 2017-08-11 NOTE — ROUTINE PROCESS
Paged Hospitalist on call to inform doctor that patients sliding scale order does not start until 0730 tomorrow morning (8/10/17), verbal readback to start sliding scale immediately.

## 2017-08-11 NOTE — DISCHARGE SUMMARY
Discharge Summary    Patient: Nyasia Maciel MRN: [id number]  CSN: [id number]    YOB: 1955  Age: 60 y.o.   Sex: male    DOA: 8/9/2017 LOS:  LOS: 2 days   Discharge Date:      Admission Diagnoses: Hyperglycemia  Acute on chronic renal failure [facility])    Discharge Diagnoses:    Problem List as of 8/11/2017  Date Reviewed: 12/2/2015          Codes Class Noted - Resolved    History of cocaine abuse ICD-10-CM: Z87.898  ICD-9-CM: 305.63  8/10/2016 - Present        History of heroin abuse ICD-10-CM: Z87.898  ICD-9-CM: 305.53  8/10/2016 - Present        Decreased GFR ICD-10-CM: R94.4  ICD-9-CM: 794.4  8/10/2016 - Present        Elevated HDL ICD-10-CM: E78.89  ICD-9-CM: 785.9  8/10/2016 - Present        Hypertriglyceridemia ICD-10-CM: E78.1  ICD-9-CM: 272.1  8/10/2016 - Present        Elevated serum creatinine ICD-10-CM: R79.89  ICD-9-CM: 790.99  8/10/2016 - Present        Elevated alkaline phosphatase level ICD-10-CM: R74.8  ICD-9-CM: 790.5  8/10/2016 - Present        Systolic CHF, chronic (HCC) ICD-10-CM: I50.22  ICD-9-CM: 428.22, 428.0  7/10/2016 - Present        Chronic renal insufficiency ICD-10-CM: N18.9  ICD-9-CM: 585.9  7/10/2016 - Present        NSTEMI (non-ST elevated myocardial infarction) ([facility] ICD-10-CM: I21.4  ICD-9-CM: 410.70  7/8/2016 - Present        COPD, mild ([facility] ICD-10-CM: J44.9  ICD-9-CM: 496  12/2/2015 - Present        Essential hypertension ICD-10-CM: I10  ICD-9-CM: 401.9  12/2/2015 - Present        Insulin dependent type 2 diabetes mellitus, controlled ([facility] ICD-10-CM: E11.9, Z79.4  ICD-9-CM: 250.00, V58.67  12/2/2015 - Present        CHF (congestive heart failure) ([facility] ICD-10-CM: I50.9  ICD-9-CM: 428.0  11/30/2015 - Present        Cardiomyopathy ([facility] ICD-10-CM: I42.9  ICD-9-CM: 425.4  11/30/2015 - Present        Homelessness ICD-10-CM: Z59.0  ICD-9-CM: V60.0  10/19/2015 - Present        Type 2 diabetes mellitus with insulin therapy ([facility] ICD-10-CM: E11.9, Z79.4  ICD-9-CM: 250.00, V58.67  7/6/2015 - Present        Stage 3 chronic renal impairment associated with type 2 diabetes mellitus ([facility] ICD-10-CM: E11.22, N18.3  ICD-9-CM: 250.40, 585.3  11/4/2014 - Present        Hepatitis C infection ICD-10-CM: B19.20  ICD-9-CM: 070.70  11/4/2014 - Present        RESOLVED: Hyperglycemia ICD-10-CM: R73.9  ICD-9-CM: 790.29  8/9/2017 - 8/11/2017        RESOLVED: Acute on chronic renal failure ([facility] ICD-10-CM: N17.9, N18.9  ICD-9-CM: 584.9, 585.9  8/9/2017 - 8/11/2017        RESOLVED: Tobacco abuse counseling ICD-10-CM: Z71.6  ICD-9-CM: V65.42, 305.1  12/2/2015 - 8/10/2016        RESOLVED: Acute on chronic combined systolic and diastolic CHF, NYHA class 4 ([facility] ICD-10-CM: I50.43  ICD-9-CM: 428.43, 428.0  11/30/2015 - 8/10/2016        RESOLVED: Renovascular hypertension ICD-10-CM: I15.0  ICD-9-CM: 405.91  6/2/2015 - 8/10/2016        RESOLVED: Acute chest pain ICD-10-CM: R07.9  ICD-9-CM: 786.50  6/2/2015 - 8/10/2016        RESOLVED: Pedal edema ICD-10-CM: R60.0  ICD-9-CM: 782.3  6/2/2015 - 8/10/2016        RESOLVED: Cocaine use ICD-10-CM: F14.10  ICD-9-CM: 305.60  8/15/2014 - 8/10/2016        RESOLVED: HCV antibody positive ICD-10-CM: R76.8  ICD-9-CM: 795.79  8/15/2014 - 8/10/2016        RESOLVED: HTN (hypertension) ICD-10-CM: I10  ICD-9-CM: 401.9  8/5/2013 - 6/2/2015        RESOLVED: Diabetes mellitus ([facility] ICD-10-CM: E11.9  ICD-9-CM: 250.00  11/12/2012 - 7/6/2015            Reason for Admission  60 y.o AA male with a PMH of chronic systolic CHF with EF 08%, DM-2, HTN, Hepatitis C, drug abuse who presented to the ED with shortness of breath. He denied chest pain or palpitations, denied fever or chills. He was recently released from [facility] on 07/10/17 and not taking any of his medications and is homeless. In the ED, he was found to have blood glucose of 980, had evidence of dehydration, Na of 121 and Cr of 3.15 with baseline around 1.8. In ED, he was started on insulin drip and admitted for further management. He denied abdominal pain, nausea or vomiting. No urinary complaints. Discharge Condition: Good    PHYSICAL EXAM at discharge. Visit Vitals    /85    Pulse 62    Temp 97.8 °F (36.6 °C)    Resp 17    Ht 5' 11\" (1.803 m)    Wt 76.5 kg (168 lb 10.4 oz)    SpO2 97%    BMI 23.52 kg/m2     Awake alert and oriented. Thin. Ncat. Perrl. Poor dentition  RRR  cta b.l  Soft nt nd nabs  No edema. dp 2+ b.l  No focal deficit  No rash    Hospital Course:   1. DM2 with hyperglycemia, A1c 11.5 - xsitioned insulin gtt --> lantus / ssi / ADA diet. diabetes educator worked with him. 2. ARF on CKD 3 - ACE held. Improving after fluids  3. Hyponatremia - hypovolemic and in the setting of hyperglycemia - resolving. 4. Dyslipidemia on statin  5. Multiple uds + cocaine - uds + this admission also   6. etOH abuse teenager - August 2014 - MTF  7. Mild protein calorie malnutrition AEB Unintentional weight loss related to inadequate energy intake as evidenced by 20 lb, 11% weight loss x month. Given supplements per nutritionist; and multivit. 8. Hypokalemia repleted. Mag ok  9. Hypertension controlled on current regimen. Resume ACE at discharge; norvasc increased. 11. dvt prophylaxis was given in the form of heparin subcut tid. 11. Full code. Discharge to home, provided 1 month supply of indigent meds. Patient agrees; all questions answered to the best of my ability.      Consults: none    Significant Diagnostic Studies: labs:   Recent Results (from the past 24 hour(s))   GLUCOSE, POC    Collection Time: 08/10/17 11:29 AM   Result Value Ref Range    Glucose (POC) 347 (H) 70 - 110 mg/dL   GLUCOSTABILIZER    Collection Time: 08/10/17 11:30 AM   Result Value Ref Range    Glucose 347 mg/dL    Insulin order 8.6 units/hour    Insulin adminstered 8.6 units/hour    Multiplier 0.030     Low target 140 mg/dL    High target 180 mg/dL    D50 order 0.0 ml    D50 administered 0.00 ml    Minutes until next BG 60 min    Order initials bic     Administered initials bic    GLUCOSE, POC    Collection Time: 08/10/17 12:13 PM   Result Value Ref Range    Glucose (POC) 285 (H) 70 - 110 mg/dL   GLUCOSTABILIZER    Collection Time: 08/10/17 12:33 PM   Result Value Ref Range    Glucose 285 mg/dL    Insulin order 9.0 units/hour    Insulin adminstered 9.0 units/hour    Multiplier 0.040     Low target 140 mg/dL    High target 180 mg/dL    D50 order 0.0 ml    D50 administered 0.00 ml    Minutes until next BG 60 min    Order initials bic     Administered initials bic    GLUCOSE, POC    Collection Time: 08/10/17  2:08 PM   Result Value Ref Range    Glucose (POC) 136 (H) 70 - 110 mg/dL   GLUCOSTABILIZER    Collection Time: 08/10/17  2:10 PM   Result Value Ref Range    Glucose 136 mg/dL    Insulin order 2.3 units/hour    Insulin adminstered 2.3 units/hour    Multiplier 0.030     Low target 140 mg/dL    High target 180 mg/dL    D50 order 0.0 ml    D50 administered 0.00 ml    Minutes until next BG 60 min    Order initials bic     Administered initials bic    GLUCOSE, POC    Collection Time: 08/10/17  3:17 PM   Result Value Ref Range    Glucose (POC) 128 (H) 70 - 110 mg/dL   GLUCOSTABILIZER    Collection Time: 08/10/17  3:19 PM   Result Value Ref Range    Glucose 128 mg/dL    Insulin order 1.4 units/hour    Insulin adminstered 1.4 units/hour    Multiplier 0.020     Low target 140 mg/dL    High target 180 mg/dL    D50 order 0.0 ml    D50 administered 0.00 ml    Minutes until next BG 60 min    Order initials bic     Administered initials bic    TROPONIN I    Collection Time: 08/10/17  4:20 PM   Result Value Ref Range    Troponin-I, Qt. 0.08 (H) 0.0 - 0.045 NG/ML   GLUCOSE, POC    Collection Time: 08/10/17  4:32 PM   Result Value Ref Range    Glucose (POC) 173 (H) 70 - 110 mg/dL   GLUCOSTABILIZER    Collection Time: 08/10/17  4:33 PM   Result Value Ref Range    Glucose 173 mg/dL    Insulin order 2.3 units/hour    Insulin adminstered 2.3 units/hour    Multiplier 0.020     Low target 140 mg/dL    High target 180 mg/dL    D50 order 0.0 ml    D50 administered 0.00 ml    Minutes until next BG 60 min    Order initials bic     Administered initials bic    GLUCOSE, POC    Collection Time: 08/10/17  5:14 PM   Result Value Ref Range    Glucose (POC) 151 (H) 70 - 110 mg/dL   GLUCOSTABILIZER    Collection Time: 08/10/17  5:50 PM   Result Value Ref Range    Glucose 151 mg/dL    Insulin order 1.8 units/hour    Insulin adminstered 1.8 units/hour    Multiplier 0.020     Low target 140 mg/dL    High target 180 mg/dL    D50 order 0.0 ml    D50 administered 0.00 ml    Minutes until next BG 60 min    Order initials bc     Administered initials bc    GLUCOSE, POC    Collection Time: 08/10/17  6:52 PM   Result Value Ref Range    Glucose (POC) 271 (H) 70 - 110 mg/dL   GLUCOSE, POC    Collection Time: 08/10/17  9:47 PM   Result Value Ref Range    Glucose (POC) 334 (H) 70 - 110 mg/dL   CBC WITH AUTOMATED DIFF    Collection Time: 08/11/17  1:40 AM   Result Value Ref Range    WBC 9.3 4.6 - 13.2 K/uL    RBC 3.81 (L) 4.70 - 5.50 M/uL    HGB 11.8 (L) 13.0 - 16.0 g/dL    HCT 33.1 (L) 36.0 - 48.0 %    MCV 86.9 74.0 - 97.0 FL    MCH 31.0 24.0 - 34.0 PG    MCHC 35.6 31.0 - 37.0 g/dL    RDW 12.1 11.6 - 14.5 %    PLATELET 347 667 - 273 K/uL    MPV 11.0 9.2 - 11.8 FL    NEUTROPHILS 40 40 - 73 %    LYMPHOCYTES 47 21 - 52 %    MONOCYTES 9 3 - 10 %    EOSINOPHILS 4 0 - 5 %    BASOPHILS 0 0 - 2 %    ABS. NEUTROPHILS 3.7 1.8 - 8.0 K/UL    ABS. LYMPHOCYTES 4.3 (H) 0.9 - 3.6 K/UL    ABS. MONOCYTES 0.8 0.05 - 1.2 K/UL    ABS. EOSINOPHILS 0.4 0.0 - 0.4 K/UL    ABS.  BASOPHILS 0.0 0.0 - 0.1 K/UL    DF AUTOMATED     MAGNESIUM    Collection Time: 08/11/17  1:40 AM   Result Value Ref Range    Magnesium 2.2 1.6 - 2.6 mg/dL   METABOLIC PANEL, BASIC    Collection Time: 08/11/17  1:40 AM   Result Value Ref Range    Sodium 136 136 - 145 mmol/L    Potassium 3.6 3.5 - 5.5 mmol/L    Chloride 105 100 - 108 mmol/L    CO2 23 21 - 32 mmol/L    Anion gap 8 3.0 - 18 mmol/L    Glucose 217 (H) 74 - 99 mg/dL    BUN 28 (H) 7.0 - 18 MG/DL    Creatinine 1.87 (H) 0.6 - 1.3 MG/DL    BUN/Creatinine ratio 15 12 - 20      GFR est AA 44 (L) >60 ml/min/1.73m2    GFR est non-AA 37 (L) >60 ml/min/1.73m2    Calcium 8.0 (L) 8.5 - 10.1 MG/DL   PHOSPHORUS    Collection Time: 08/11/17  1:40 AM   Result Value Ref Range    Phosphorus 2.4 (L) 2.5 - 4.9 MG/DL   LIPID PANEL    Collection Time: 08/11/17  1:40 AM   Result Value Ref Range    LIPID PROFILE          Cholesterol, total 153 <200 MG/DL    Triglyceride 168 (H) <150 MG/DL    HDL Cholesterol 42 40 - 60 MG/DL    LDL, calculated 77.4 0 - 100 MG/DL    VLDL, calculated 33.6 MG/DL    CHOL/HDL Ratio 3.6 0 - 5.0     DRUG SCREEN UR - W/ CONFIRM    Collection Time: 08/11/17  3:25 AM   Result Value Ref Range    BENZODIAZEPINE NEGATIVE  NEG      BARBITURATES NEGATIVE  NEG      THC (TH-CANNABINOL) NEGATIVE  NEG      OPIATES NEGATIVE  NEG      PCP(PHENCYCLIDINE) NEGATIVE  NEG      COCAINE POSITIVE (A) NEG      AMPHETAMINES NEGATIVE  NEG      METHADONE NEGATIVE  NEG      HDSCOM (NOTE)    GLUCOSE, POC    Collection Time: 08/11/17  9:01 AM   Result Value Ref Range    Glucose (POC) 317 (H) 70 - 110 mg/dL     IMAGING  CT Results (most recent):    Results from [place] encounter on 08/09/17   CT HEAD WO CONT   Narrative CT scan of head, without intravenous contrast:        INDICATION:    Right-sided headache. History of hypertension, cardiomyopathy, diabetes. TECHNIQUE:    Contiguous 5 mm axial sections of head are obtained, without intravenous  contrast.    Coronal and sagittal images are reformatted.     All CT scans at this facility are performed using dose optimization technique as  appropriate to a  performed  examination, to include automated exposer control,  adjustment mA and / or  KV according to patient size (including appropriate  matching  for site specific examination), or use  of iterative reconstruction  technique. COMPARISON STUDY: None. FINDINGS:    No evidence of intracranial hemorrhage. The study shows hypodense infarction, which subacute or older  infarction  measuring 5 cm AP, 4 cm craniocaudad and 3.3 cm transverse, involving lateral  portion of right parietal lobe and temporoparietal area. There is asymmetrically  mild ex vacuo dilatation of the atrium of the right lateral ventricle. In upper anterior portion of left frontal lobe there is a focal infarction,  involving cortex and subcortical white matter. This infarction is probably older  or subacute. In the periventricular and central white matter there are moderate chronic  microvascular ischemic changes. Cerebral ventricles are of normal size without  midline shift. No definable focal abnormality in brainstem or cerebellum. No evidence of  intracranial mass lesion. No diagnostic finding in calvarium, base of skull, visualized paranasal sinuses  or in visualized portions of orbits. IMPRESSIONS:    No evidence of intracranial hemorrhages. Subacute or older prominent focal infarction in lateral portion of right  parietal lobe and temporoparietal area. Smaller subacute or older infarction in the anterior portion of left frontal  lobe. XR Results (most recent):    Results from Hospital Encounter encounter on 08/09/17   XR CHEST PORT   Narrative EXAM: Chest Radiograph    INDICATION: Shortness of breath    TECHNIQUE: AP view of the chest    COMPARISON: 7/8/2016, 4/5/2016, 2/22/2016 and 2/12/2016    FINDINGS: No pneumothorax identified. The lungs are clear. No infiltrates  appreciated. No effusions identified. The cardiomediastinal silhouette is  unremarkable. The pulmonary vasculature is unremarkable. The osseous  structures are unremarkable. Impression Impression:  1. No acute cardiopulmonary process.         EKG Results     Procedure 720 Value Units Date/Time    EKG, 12 LEAD, INITIAL [[id number]] Collected:  08/09/17 2006    Order Status:  Completed Updated:  08/10/17 0848     Ventricular Rate 73 BPM      Atrial Rate 73 BPM      P-R Interval 150 ms      QRS Duration 90 ms      Q-T Interval 404 ms      QTC Calculation (Bezet) 445 ms      Calculated P Axis 62 degrees      Calculated R Axis -50 degrees      Calculated T Axis -155 degrees      Diagnosis --     Normal sinus rhythm  Left anterior fascicular block  Left ventricular hypertrophy with repolarization abnormality  Abnormal ECG  When compared with ECG of 09-JUL-2016 05:16,  No significant change was found  Confirmed by Jaime Silva MD, --- ([id number]) on 8/10/2017 8:48:06 AM          Discharge Medications:     Current Discharge Medication List      START taking these medications    Details   insulin detemir (LEVEMIR FLEXPEN) 100 unit/mL (3 mL) inpn 20 Units by SubCUTAneous route Before breakfast and dinner. Qty: 2 Pen, Refills: 4      insulin lispro (HUMALOG KWIKPEN) 100 unit/mL kwikpen 5 Units by SubCUTAneous route Before breakfast, lunch, and dinner. Qty: 2 Package, Refills: 4         CONTINUE these medications which have CHANGED    Details   amLODIPine (NORVASC) 10 mg tablet Take 1 Tab by mouth daily. Indications: hypertension  Qty: 30 Tab, Refills: 4    Associated Diagnoses: Essential hypertension with goal blood pressure less than 140/90         CONTINUE these medications which have NOT CHANGED    Details   lisinopril (PRINIVIL, ZESTRIL) 20 mg tablet Take 1 Tab by mouth daily. Qty: 30 Tab, Refills: 6    Associated Diagnoses: Essential hypertension with goal blood pressure less than 140/90      rosuvastatin (CRESTOR) 40 mg tablet Take 1 Tab by mouth nightly. Qty: 30 Tab, Refills: 6    Associated Diagnoses: Dyslipidemia      albuterol (PROVENTIL HFA, VENTOLIN HFA, PROAIR HFA) 90 mcg/actuation inhaler Take 2 Puffs by inhalation every four (4) hours as needed for Wheezing.   Qty: 3 Inhaler, Refills: 3    Associated Diagnoses: COPD, mild ([address] budesonide-formoterol (SYMBICORT) 80-4.5 mcg/actuation HFAA inhaler Take 2 Puffs by inhalation two (2) times a day. Qty: 3 Inhaler, Refills: 3    Associated Diagnoses: COPD, mild (HCC)      thiamine (B-1) 100 mg tablet Take 1 Tab by mouth daily. Qty: 30 Tab, Refills: 0      Lancets misc Use as directed for blood glucose monitoring. Qty: 1 Each, Refills: 11      aspirin delayed-release 81 mg tablet Take 1 Tab by mouth daily. Qty: 30 Tab, Refills: 0      multivitamin, tx-iron-ca-min (THERA-M W/ IRON) 9 mg iron-400 mcg tab tablet Take 1 Tab by mouth daily. Qty: 30 Tab, Refills: 0         STOP taking these medications       insulin NPH/insulin regular (HUMULIN 70/30) 100 unit/mL (70-30) injection Comments:   Reason for Stopping:         carvedilol (COREG) 12.5 mg tablet Comments:   Reason for Stopping:         Syringe, Disposable, syrg Comments:   Reason for Stopping:               Activity: as tolerated. Diet: Diabetic Diet    Wound Care: None needed    Follow-up: care a van. Patient was instructed that he needs to obtain valid ID.     Minutes spent on discharge: greater than 30

## 2017-08-11 NOTE — PROGRESS NOTES
Care Management Interventions  PCP Verified by CM: No  Palliative Care Consult (Criteria: CHF and RRAT>21): No  Reason for No Palliative Care Consult: Other (see comment)  Mode of Transport at Discharge: Other (see comment)  Transition of Care Consult (CM Consult): Discharge Planning  MyChart Signup: No  Discharge Durable Medical Equipment: No  Health Maintenance Reviewed: Yes  Physical Therapy Consult: No  Occupational Therapy Consult: No  Speech Therapy Consult: No  Current Support Network: Other (homeless)  Confirm Follow Up Transport: Self  Plan discussed with Pt/Family/Caregiver: Yes     Patient 60years old male was seen in ICU, room 302. He is AAO x 4, he says, he is homeless, he is self care & independent w/o any DMEs, no need voiced. Patient was provided with shelters lists, he was appreciative. Patient says, he does not need any equipment, however, he will need assistance with meds at [facility] meds form filled faxed to Rx. Patient asks for bus ticket, will be provided.

## 2017-08-11 NOTE — DIABETES MGMT
GLYCEMIC CONTROL PLAN OF CARE    Pt educated regarding insulin pen insulin administration. Pt reports he is used to using an insulin pen but accepted handout discussing instructions.      Emre Hair MS, [address] [place] [address], [facility] [place]  Pager: [phone]

## 2017-08-11 NOTE — PROGRESS NOTES
Ready for discharge, awaiting outpatient medications. 1400  Patient given shoes, jacket, outpatient medications filled. Patient has bus ticket from care manager. Understands how to take medications. Has schedule for care-a-van and understands how to access care. Has no questions at present.

## 2017-08-11 NOTE — ROUTINE PROCESS
Bedside and Verbal shift change report given to Yin Stanley RN (oncoming nurse) by Ashley Cotter RN (offgoing nurse). Report included the following information SBAR, Kardex, Intake/Output, MAR, Accordion and Cardiac Rhythm Sinus Rudell Fake.

## 2017-08-17 LAB
AMPHET UR QL SCN: NEGATIVE
BARBITURATES UR QL SCN: NEGATIVE
BENZODIAZ UR QL: NEGATIVE
BZE UR CFM-MCNC: 530 NG/ML
BZE UR QL: POSITIVE
CANNABINOIDS UR QL SCN: NEGATIVE
COCAINE UR QL SCN: POSITIVE
HDSCOM,HDSCOM: ABNORMAL
METHADONE UR QL: NEGATIVE
OPIATES UR QL: NEGATIVE
PCP UR QL: NEGATIVE

## 2017-12-24 ENCOUNTER — APPOINTMENT (OUTPATIENT)
Dept: CT IMAGING | Age: 62
End: 2017-12-24
Attending: EMERGENCY MEDICINE
Payer: SELF-PAY

## 2017-12-24 ENCOUNTER — HOSPITAL ENCOUNTER (EMERGENCY)
Age: 62
Discharge: HOME | End: 2017-12-24
Attending: EMERGENCY MEDICINE
Payer: SELF-PAY

## 2017-12-24 VITALS
SYSTOLIC BLOOD PRESSURE: 198 MMHG | TEMPERATURE: 100.1 F | DIASTOLIC BLOOD PRESSURE: 92 MMHG | HEART RATE: 64 BPM | OXYGEN SATURATION: 100 % | RESPIRATION RATE: 14 BRPM

## 2017-12-24 DIAGNOSIS — T40.2X4A OPIOID OVERDOSE, UNDETERMINED INTENT, INITIAL ENCOUNTER (HCC): ICD-10-CM

## 2017-12-24 DIAGNOSIS — R73.9 HYPERGLYCEMIA: Primary | ICD-10-CM

## 2017-12-24 LAB
ALBUMIN SERPL-MCNC: 3.2 G/DL (ref 3.4–5)
ALBUMIN/GLOB SERPL: 0.6 {RATIO} (ref 0.8–1.7)
ALP SERPL-CCNC: 136 U/L (ref 45–117)
ALT SERPL-CCNC: 27 U/L (ref 16–61)
AMPHET UR QL SCN: NEGATIVE
ANION GAP SERPL CALC-SCNC: 8 MMOL/L (ref 3–18)
APAP SERPL-MCNC: <2 UG/ML (ref 10–30)
APPEARANCE UR: CLEAR
APTT PPP: 27.5 SEC (ref 23–36.4)
AST SERPL-CCNC: 24 U/L (ref 15–37)
BACTERIA URNS QL MICRO: NEGATIVE /HPF
BARBITURATES UR QL SCN: NEGATIVE
BASOPHILS # BLD: 0 K/UL (ref 0–0.1)
BASOPHILS NFR BLD: 0 % (ref 0–2)
BENZODIAZ UR QL: NEGATIVE
BILIRUB SERPL-MCNC: 0.5 MG/DL (ref 0.2–1)
BILIRUB UR QL: NEGATIVE
BUN SERPL-MCNC: 25 MG/DL (ref 7–18)
BUN/CREAT SERPL: 13 (ref 12–20)
CALCIUM SERPL-MCNC: 9.3 MG/DL (ref 8.5–10.1)
CANNABINOIDS UR QL SCN: NEGATIVE
CHLORIDE SERPL-SCNC: 97 MMOL/L (ref 100–108)
CK SERPL-CCNC: 128 U/L (ref 39–308)
CO2 SERPL-SCNC: 30 MMOL/L (ref 21–32)
COCAINE UR QL SCN: POSITIVE
COLOR UR: YELLOW
CREAT SERPL-MCNC: 1.94 MG/DL (ref 0.6–1.3)
DIFFERENTIAL METHOD BLD: ABNORMAL
EOSINOPHIL # BLD: 0.1 K/UL (ref 0–0.4)
EOSINOPHIL NFR BLD: 1 % (ref 0–5)
EPITH CASTS URNS QL MICRO: NEGATIVE /LPF (ref 0–5)
ERYTHROCYTE [DISTWIDTH] IN BLOOD BY AUTOMATED COUNT: 12 % (ref 11.6–14.5)
ETHANOL SERPL-MCNC: 3 MG/DL (ref 0–3)
GLOBULIN SER CALC-MCNC: 5.4 G/DL (ref 2–4)
GLUCOSE BLD STRIP.AUTO-MCNC: 178 MG/DL (ref 70–110)
GLUCOSE BLD STRIP.AUTO-MCNC: 383 MG/DL (ref 70–110)
GLUCOSE SERPL-MCNC: 407 MG/DL (ref 74–99)
GLUCOSE UR STRIP.AUTO-MCNC: >1000 MG/DL
HCT VFR BLD AUTO: 42.7 % (ref 36–48)
HDSCOM,HDSCOM: ABNORMAL
HGB BLD-MCNC: 14.9 G/DL (ref 13–16)
HGB UR QL STRIP: ABNORMAL
INR PPP: 0.8 (ref 0.8–1.2)
KETONES UR QL STRIP.AUTO: NEGATIVE MG/DL
LEUKOCYTE ESTERASE UR QL STRIP.AUTO: NEGATIVE
LIPASE SERPL-CCNC: 483 U/L (ref 73–393)
LYMPHOCYTES # BLD: 1.4 K/UL (ref 0.9–3.6)
LYMPHOCYTES NFR BLD: 14 % (ref 21–52)
MCH RBC QN AUTO: 30.5 PG (ref 24–34)
MCHC RBC AUTO-ENTMCNC: 34.9 G/DL (ref 31–37)
MCV RBC AUTO: 87.3 FL (ref 74–97)
METHADONE UR QL: NEGATIVE
MONOCYTES # BLD: 0.5 K/UL (ref 0.05–1.2)
MONOCYTES NFR BLD: 5 % (ref 3–10)
NEUTS SEG # BLD: 7.9 K/UL (ref 1.8–8)
NEUTS SEG NFR BLD: 80 % (ref 40–73)
NITRITE UR QL STRIP.AUTO: NEGATIVE
OPIATES UR QL: POSITIVE
PCP UR QL: NEGATIVE
PH UR STRIP: 6.5 [PH] (ref 5–8)
PLATELET # BLD AUTO: 329 K/UL (ref 135–420)
PMV BLD AUTO: 10.9 FL (ref 9.2–11.8)
POTASSIUM SERPL-SCNC: 4.1 MMOL/L (ref 3.5–5.5)
PROT SERPL-MCNC: 8.6 G/DL (ref 6.4–8.2)
PROT UR STRIP-MCNC: 300 MG/DL
PROTHROMBIN TIME: 11 SEC (ref 11.5–15.2)
RBC # BLD AUTO: 4.89 M/UL (ref 4.7–5.5)
RBC #/AREA URNS HPF: NORMAL /HPF (ref 0–5)
SALICYLATES SERPL-MCNC: <2.8 MG/DL (ref 2.8–20)
SODIUM SERPL-SCNC: 135 MMOL/L (ref 136–145)
SP GR UR REFRACTOMETRY: 1.03 (ref 1–1.03)
UROBILINOGEN UR QL STRIP.AUTO: 0.2 EU/DL (ref 0.2–1)
WBC # BLD AUTO: 9.9 K/UL (ref 4.6–13.2)
WBC URNS QL MICRO: NEGATIVE /HPF (ref 0–4)

## 2017-12-24 PROCEDURE — 85730 THROMBOPLASTIN TIME PARTIAL: CPT | Performed by: EMERGENCY MEDICINE

## 2017-12-24 PROCEDURE — 80053 COMPREHEN METABOLIC PANEL: CPT | Performed by: EMERGENCY MEDICINE

## 2017-12-24 PROCEDURE — 70450 CT HEAD/BRAIN W/O DYE: CPT

## 2017-12-24 PROCEDURE — 82962 GLUCOSE BLOOD TEST: CPT

## 2017-12-24 PROCEDURE — 81001 URINALYSIS AUTO W/SCOPE: CPT | Performed by: EMERGENCY MEDICINE

## 2017-12-24 PROCEDURE — 80307 DRUG TEST PRSMV CHEM ANLYZR: CPT | Performed by: EMERGENCY MEDICINE

## 2017-12-24 PROCEDURE — 96374 THER/PROPH/DIAG INJ IV PUSH: CPT

## 2017-12-24 PROCEDURE — 74011636637 HC RX REV CODE- 636/637: Performed by: EMERGENCY MEDICINE

## 2017-12-24 PROCEDURE — 99285 EMERGENCY DEPT VISIT HI MDM: CPT

## 2017-12-24 PROCEDURE — 82550 ASSAY OF CK (CPK): CPT | Performed by: EMERGENCY MEDICINE

## 2017-12-24 PROCEDURE — 85025 COMPLETE CBC W/AUTO DIFF WBC: CPT | Performed by: EMERGENCY MEDICINE

## 2017-12-24 PROCEDURE — 83690 ASSAY OF LIPASE: CPT | Performed by: EMERGENCY MEDICINE

## 2017-12-24 PROCEDURE — 74011250637 HC RX REV CODE- 250/637: Performed by: EMERGENCY MEDICINE

## 2017-12-24 PROCEDURE — 85610 PROTHROMBIN TIME: CPT | Performed by: EMERGENCY MEDICINE

## 2017-12-24 RX ORDER — ACETAMINOPHEN 325 MG/1
650 TABLET ORAL ONCE
Status: COMPLETED | OUTPATIENT
Start: 2017-12-24 | End: 2017-12-24

## 2017-12-24 RX ORDER — MIDAZOLAM HYDROCHLORIDE 1 MG/ML
2 INJECTION, SOLUTION INTRAMUSCULAR; INTRAVENOUS ONCE
Status: DISCONTINUED | OUTPATIENT
Start: 2017-12-24 | End: 2017-12-24 | Stop reason: HOSPADM

## 2017-12-24 RX ADMIN — ACETAMINOPHEN 650 MG: 325 TABLET, FILM COATED ORAL at 15:48

## 2017-12-24 RX ADMIN — HUMAN INSULIN 10 UNITS: 100 INJECTION, SOLUTION SUBCUTANEOUS at 16:45

## 2017-12-24 NOTE — DISCHARGE INSTRUCTIONS
Learning About High Blood Sugar  What is high blood sugar? Your body turns the food you eat into glucose (sugar), which it uses for energy. But if your body isn't able to use the sugar right away, it can build up in your blood and lead to high blood sugar. When the amount of sugar in your blood stays too high for too much of the time, you may have diabetes. Diabetes is a disease that can cause serious health problems. The good news is that lifestyle changes may help you get your blood sugar back to normal and avoid or delay diabetes. What causes high blood sugar? Sugar (glucose) can build up in your blood if you:  · Are overweight. · Have a family history of diabetes. · Take certain medicines, such as steroids. What are the symptoms? Having high blood sugar may not cause any symptoms at all. Or it may make you feel very thirsty or very hungry. You may also urinate more often than usual, have blurry vision, or lose weight without trying. How is high blood sugar treated? You can take steps to lower your blood sugar level if you understand what makes it get higher. Your doctor may want you to learn how to test your blood sugar level at home. Then you can see how illness, stress, or different kinds of food or medicine raise or lower your blood sugar level. Other tests may be needed to see if you have diabetes. How can you prevent high blood sugar? · Watch your weight. If you're overweight, losing just a small amount of weight may help. Reducing fat around your waist is most important. · Limit the amount of calories, sweets, and unhealthy fat you eat. Ask your doctor if a dietitian can help you. A registered dietitian can help you create meal plans that fit your lifestyle. · Get at least 30 minutes of exercise on most days of the week. Exercise helps control your blood sugar. It also helps you maintain a healthy weight. Walking is a good choice.  You also may want to do other activities, such as running, swimming, cycling, or playing tennis or team sports. · If your doctor prescribed medicines, take them exactly as prescribed. Call your doctor if you think you are having a problem with your medicine. You will get more details on the specific medicines your doctor prescribes. Follow-up care is a key part of your treatment and safety. Be sure to make and go to all appointments, and call your doctor if you are having problems. It's also a good idea to know your test results and keep a list of the medicines you take. Where can you learn more? Go to http://mamiTelestreamesperanza.info/. Enter O108 in the search box to learn more about \"Learning About High Blood Sugar. \"  Current as of: March 13, 2017  Content Version: 11.4  © 9353-4481 [employer]. Care instructions adapted under license by [employer] (which disclaims liability or warranty for this information). If you have questions about a medical condition or this instruction, always ask your healthcare professional. [place] [address] any warranty or liability for your use of this information. Alcohol, Drug, or Poison Ingestion: Care Instructions  Your Care Instructions    A person can become very sick, or die, from swallowing or using alcohol, drugs, or poisons. Alcohol poisoning occurs when a person drinks a large amount of alcohol. Alcohol can stop nerve signals that control breathing. It can also stop the gag reflex that prevents choking. Alcohol poisoning is serious. It can lead to brain damage or death if it's not treated right away. Drugs can be used by accident or on purpose. They can be swallowed, inhaled, injected, or absorbed through the skin. Drugs include over-the-counter medicine (such as aspirin or acetaminophen) and prescription medicine. They also include vitamins and supplements. And they include illegal drugs such as cocaine and heroin. And poisons are all around us.  They include household , cosmetics, houseplants, and garden chemicals. The doctor has checked you carefully, but problems can develop later. If you notice any problems or new symptoms, get medical treatment right away. Follow-up care is a key part of your treatment and safety. Be sure to make and go to all appointments, and call your doctor if you are having problems. It's also a good idea to know your test results and keep a list of the medicines you take. How can you care for yourself at home? Alcohol problems  · Talk to your doctor or counselor about programs that can help you stop using alcohol. · Plan ways to avoid being tempted to drink. ¨ Get rid of all alcohol in your home. ¨ Avoid places where you tend to drink. ¨ Stay away from places or events that offer alcohol. ¨ Stay away from people who drink a lot. Drug problems  · Talk to your doctor about programs that can help you stop using drugs. · Get rid of any drugs you might be tempted to misuse. · Learn how to say no when other people use drugs. · Don't spend time with people who use drugs. Poison prevention  · Keep products in the containers they came in. Keep them with the original labels. · Be careful when you use cleaning products, paints, solvents, and pesticides. Read labels before use. Use a fan to move strong odors and fumes out of your home. · Do not mix cleaning products. Try to use nontoxic . These include vinegar, lemon juice, and baking soda. When should you call for help? Poison control centers, hospitals, or your doctor can give immediate advice in the case of a poisoning. The [facility] [employer] number is [phone]. Have the poison container with you so you can give complete information to the poison control center, such as what the poison or substance is, how much was taken and when. Do not try to make the person vomit.   ?Call 911 anytime you think you may need emergency care. For example, call if you or someone else:  ? · Has used or currently uses alcohol or drugs and is very confused or can't stay awake. ? · Has passed out (lost consciousness). ? · Has severe trouble breathing. ? · Is having a seizure. ?Call your doctor now or seek immediate medical care if you or someone else:  ? · Has new symptoms, or is not acting normally. ? Watch closely for changes in your health, and be sure to contact your doctor if:  ? · You do not get better as expected. ? · You need help with drug or alcohol problems. ? · You have problems with depression or other mental health issues. Where can you learn more? Go to http://mami-esperanza.info/. Enter W334 in the search box to learn more about \"Alcohol, Drug, or Poison Ingestion: Care Instructions. \"  Current as of: March 20, 2017  Content Version: 11.4  © 6462-6504 [employer]. Care instructions adapted under license by [employer] (which disclaims liability or warranty for this information). If you have questions about a medical condition or this instruction, always ask your healthcare professional. [place] [address] any warranty or liability for your use of this information.

## 2017-12-24 NOTE — ED PROVIDER NOTES
EMERGENCY DEPARTMENT HISTORY AND PHYSICAL EXAM    4:07 PM      Date: 12/24/2017  Patient Name: Marina Medeiros    History of Presenting Illness     Chief Complaint   Patient presents with    High Blood Sugar         History Provided By: Patient    Chief Complaint: Hyperglycemia  Duration:  Unknown  Timing:  Constant  Location: N/A  Quality: N/A  Severity: Mild  Modifying Factors: Did not report  Associated Symptoms: denies any other associated signs or symptoms      Additional History (Context): Marina Medeiros is a 60 y.o. male with diabetes, hypertension and cocaine use, cardiomyopathy, hepatitis C infection, heart failure, and heroin abuse who presents with hyperglycemia. Pt does not want to be in ER. Claims to have no issues. However, friends called 46 for him. States that he only takes insulin when he has it. Denies head trauma, CP, leg pain, diarrhea, and dysuria. Pt is homeless. HPI limited due to AMS. PCP: None    Current Facility-Administered Medications   Medication Dose Route Frequency Provider Last Rate Last Dose    midazolam (VERSED) injection 2 mg  2 mg IntraVENous ONCE Radha Boateng MD         Current Outpatient Prescriptions   Medication Sig Dispense Refill    amLODIPine (NORVASC) 10 mg tablet Take 1 Tab by mouth daily. Indications: hypertension 30 Tab 4    insulin detemir (LEVEMIR FLEXPEN) 100 unit/mL (3 mL) inpn 20 Units by SubCUTAneous route Before breakfast and dinner. 2 Pen 4    insulin lispro (HUMALOG KWIKPEN) 100 unit/mL kwikpen 5 Units by SubCUTAneous route Before breakfast, lunch, and dinner. 2 Package 4    albuterol (PROVENTIL HFA, VENTOLIN HFA, PROAIR HFA) 90 mcg/actuation inhaler Take 2 Puffs by inhalation every four (4) hours as needed for Wheezing. 2 Inhaler 3    budesonide-formoterol (SYMBICORT) 80-4.5 mcg/actuation HFAA inhaler Take 2 Puffs by inhalation two (2) times a day. 2 Inhaler 3    Lancets misc Use as directed for blood glucose monitoring.  1 Each 11    lisinopril (PRINIVIL, ZESTRIL) 20 mg tablet Take 1 Tab by mouth daily. 30 Tab 4    rosuvastatin (CRESTOR) 40 mg tablet Take 1 Tab by mouth nightly. 30 Tab 4    thiamine (B-1) 100 mg tablet Take 1 Tab by mouth daily. 30 Tab 0    aspirin delayed-release 81 mg tablet Take 1 Tab by mouth daily. 30 Tab 0    multivitamin, tx-iron-ca-min (THERA-M W/ IRON) 9 mg iron-400 mcg tab tablet Take 1 Tab by mouth daily. 30 Tab 0       Past History     Past Medical History:  Past Medical History:   Diagnosis Date    Cardiac LV ejection fraction 30-35% 7/15/15    Cardiomyopathy ([facility] [address] 7/15/15    35% per ECHO    Cocaine use 8/9/14    + UDS, persistent use    Diabetes ([facility] [address] 8/9/2014    8.1 hgbA1C 8/9/14    Heart failure ([facility] [address]     Hepatitis C infection 8/20/14    Genotype 1A    History of cocaine abuse 8/10/2016    History of heroin abuse 8/10/2016    Homelessness 9/7/2015    Hypertension 2000       Past Surgical History:  No past surgical history on file. Family History:  Family History   Problem Relation Age of Onset    Diabetes Mother     Cancer Maternal Grandmother        Social History:  Social History   Substance Use Topics    Smoking status: Former Smoker     Packs/day: 0.30     Types: Cigarettes     Quit date: 7/4/2016    Smokeless tobacco: Never Used    Alcohol use 0.0 oz/week     0 Standard drinks or equivalent per week      Comment: started heavy drinking at teen till in Aug 2014 but has gone into remission since then       Allergies:  No Known Allergies      Review of Systems       Review of Systems   Unable to perform ROS: Mental status change   Cardiovascular: Negative for chest pain. Gastrointestinal: Negative for diarrhea. Genitourinary: Negative for dysuria.    Musculoskeletal:        Denies leg pain   Neurological:        Denies head trauma         Physical Exam     Visit Vitals    BP (!) 198/92    Pulse 64    Temp 100.1 °F (37.8 °C)    Resp 14    SpO2 100%         Physical Exam   Constitutional: He is oriented to person, place, and time. He appears well-developed. He is uncooperative. Appears intoxicated, stumbling. HENT:   Head: Normocephalic and atraumatic. Eyes: Conjunctivae and EOM are normal.   Neck: Normal range of motion. Cardiovascular: Normal rate, regular rhythm and normal heart sounds. Exam reveals no gallop and no friction rub. No murmur heard. Pulmonary/Chest: Effort normal and breath sounds normal. No stridor. Abdominal: Soft. There is no tenderness. Musculoskeletal: Normal range of motion. He exhibits no tenderness. No leg pain or swelling. Neurological: He is alert and oriented to person, place, and time. Equal bilateral strength   Skin: Skin is warm and dry. He is not diaphoretic. Psychiatric: He has a normal mood and affect. His behavior is normal.   Uncooperative    Nursing note and vitals reviewed.         Diagnostic Study Results     Labs -  Recent Results (from the past 12 hour(s))   GLUCOSE, POC    Collection Time: 12/24/17  3:20 PM   Result Value Ref Range    Glucose (POC) 383 (H) 70 - 110 mg/dL   URINALYSIS W/ RFLX MICROSCOPIC    Collection Time: 12/24/17  3:43 PM   Result Value Ref Range    Color YELLOW      Appearance CLEAR      Specific gravity 1.029 1.005 - 1.030      pH (UA) 6.5 5.0 - 8.0      Protein 300 (A) NEG mg/dL    Glucose >1000 (A) NEG mg/dL    Ketone NEGATIVE  NEG mg/dL    Bilirubin NEGATIVE  NEG      Blood SMALL (A) NEG      Urobilinogen 0.2 0.2 - 1.0 EU/dL    Nitrites NEGATIVE  NEG      Leukocyte Esterase NEGATIVE  NEG     METABOLIC PANEL, COMPREHENSIVE    Collection Time: 12/24/17  3:43 PM   Result Value Ref Range    Sodium 135 (L) 136 - 145 mmol/L    Potassium 4.1 3.5 - 5.5 mmol/L    Chloride 97 (L) 100 - 108 mmol/L    CO2 30 21 - 32 mmol/L    Anion gap 8 3.0 - 18 mmol/L    Glucose 407 (HH) 74 - 99 mg/dL    BUN 25 (H) 7.0 - 18 MG/DL    Creatinine 1.94 (H) 0.6 - 1.3 MG/DL    BUN/Creatinine ratio 13 12 - 20      GFR est AA 43 (L) >60 ml/min/1.73m2    GFR est non-AA 35 (L) >60 ml/min/1.73m2    Calcium 9.3 8.5 - 10.1 MG/DL    Bilirubin, total 0.5 0.2 - 1.0 MG/DL    ALT (SGPT) 27 16 - 61 U/L    AST (SGOT) 24 15 - 37 U/L    Alk. phosphatase 136 (H) 45 - 117 U/L    Protein, total 8.6 (H) 6.4 - 8.2 g/dL    Albumin 3.2 (L) 3.4 - 5.0 g/dL    Globulin 5.4 (H) 2.0 - 4.0 g/dL    A-G Ratio 0.6 (L) 0.8 - 1.7     LIPASE    Collection Time: 12/24/17  3:43 PM   Result Value Ref Range    Lipase 483 (H) 73 - 393 U/L   CBC WITH AUTOMATED DIFF    Collection Time: 12/24/17  3:43 PM   Result Value Ref Range    WBC 9.9 4.6 - 13.2 K/uL    RBC 4.89 4.70 - 5.50 M/uL    HGB 14.9 13.0 - 16.0 g/dL    HCT 42.7 36.0 - 48.0 %    MCV 87.3 74.0 - 97.0 FL    MCH 30.5 24.0 - 34.0 PG    MCHC 34.9 31.0 - 37.0 g/dL    RDW 12.0 11.6 - 14.5 %    PLATELET 880 049 - 673 K/uL    MPV 10.9 9.2 - 11.8 FL    NEUTROPHILS 80 (H) 40 - 73 %    LYMPHOCYTES 14 (L) 21 - 52 %    MONOCYTES 5 3 - 10 %    EOSINOPHILS 1 0 - 5 %    BASOPHILS 0 0 - 2 %    ABS. NEUTROPHILS 7.9 1.8 - 8.0 K/UL    ABS. LYMPHOCYTES 1.4 0.9 - 3.6 K/UL    ABS. MONOCYTES 0.5 0.05 - 1.2 K/UL    ABS. EOSINOPHILS 0.1 0.0 - 0.4 K/UL    ABS.  BASOPHILS 0.0 0.0 - 0.1 K/UL    DF AUTOMATED     URINE MICROSCOPIC ONLY    Collection Time: 12/24/17  3:43 PM   Result Value Ref Range    WBC NEGATIVE  0 - 4 /hpf    RBC 3 to 5 0 - 5 /hpf    Epithelial cells NEGATIVE  0 - 5 /lpf    Bacteria NEGATIVE  NEG /hpf   ACETAMINOPHEN    Collection Time: 12/24/17  3:43 PM   Result Value Ref Range    Acetaminophen level <2 (L) 10.0 - 30.0 ug/mL   CK    Collection Time: 12/24/17  3:43 PM   Result Value Ref Range     39 - 308 U/L   PROTHROMBIN TIME + INR    Collection Time: 12/24/17  3:43 PM   Result Value Ref Range    Prothrombin time 11.0 (L) 11.5 - 15.2 sec    INR 0.8 0.8 - 1.2     PTT    Collection Time: 12/24/17  3:43 PM   Result Value Ref Range    aPTT 27.5 23.0 - 36.4 SEC   ETHYL ALCOHOL    Collection Time: 12/24/17  3:43 PM   Result Value Ref Range ALCOHOL(ETHYL),SERUM 3 0 - 3 MG/DL   SALICYLATE    Collection Time: 12/24/17  3:43 PM   Result Value Ref Range    Salicylate level <3.3 (L) 2.8 - 20.0 MG/DL   GLUCOSE, POC    Collection Time: 12/24/17  6:37 PM   Result Value Ref Range    Glucose (POC) 178 (H) 70 - 110 mg/dL       Radiologic Studies -   CT HEAD WO CONT   Final Result        CT HEAD WO CONT:     1.   No acute intracranial process identified. If continued clinical concern for  acute ischemia, consider MRI for further evaluation.     2.  Mild proximal foraminal and loss and changes consistent with prior ischemia.             Medical Decision Making   Provider Notes (Medical Decision Making): Pt uncooperative, attempting to urinate on the wall. Will check tox cause for his altered mental status and for DKA give his noncompliance with his insulin. Narcan for diagnostic purposes as with small pupils. I am the first provider for this patient. I reviewed the vital signs, available nursing notes, past medical history, past surgical history, family history and social history. Vital Signs-Reviewed the patient's vital signs. Records Reviewed: Nursing Notes and Old Medical Records (Time of Review: 4:07 PM)    ED Course: Progress Notes, Reevaluation, and Consults:  4:15 PM  Just gave pt narcan. Woke up a little more and had better respirations. Will expand workup to include toxin related  for causes of AMS and CT of head. -Re-evaluted the patient. Came in to ED with hyperglycemia and AMS. Likely drug related as much improved after narcan and with time   -Results including labs and CT head were discussed and reviewed with pt who understood the implications. Otherwise reassuring results     -We discussed the diagnosis, treatment, and plan.  Next steps in close outpt care include: close PMD follow up, drug rehab when ready.     -They verbally convey understanding and agreement of the signs, symptoms, diagnosis, treatment and prognosis and additionally agree to follow up as discussed. All questions were answered, and we reviewed pertinent return precautions as seen in the discharge paperwork. Pt understood follow up instructions, and would return to the ED if any worsening or concerns. Diagnosis     Clinical Impression:   1. Hyperglycemia    2. Opioid overdose, undetermined intent, initial encounter        Disposition: Discharge    Follow-up Information     Follow up With Details Comments Juan Achristenbrett Schedule an appointment as soon as possible for a visit  [place] [facility] EMERGENCY DEPT  If symptoms worsen [address] [place] [id number]  [phone]           Discharge Medication List as of 12/24/2017  6:44 PM      CONTINUE these medications which have NOT CHANGED    Details   amLODIPine (NORVASC) 10 mg tablet Take 1 Tab by mouth daily. Indications: hypertension, Print, Disp-30 Tab, R-4      insulin detemir (LEVEMIR FLEXPEN) 100 unit/mL (3 mL) inpn 20 Units by SubCUTAneous route Before breakfast and dinner., Print, Disp-2 Pen, R-4      insulin lispro (HUMALOG KWIKPEN) 100 unit/mL kwikpen 5 Units by SubCUTAneous route Before breakfast, lunch, and dinner., Print, Disp-2 Package, R-4      albuterol (PROVENTIL HFA, VENTOLIN HFA, PROAIR HFA) 90 mcg/actuation inhaler Take 2 Puffs by inhalation every four (4) hours as needed for Wheezing., Print, Disp-2 Inhaler, R-3      budesonide-formoterol (SYMBICORT) 80-4.5 mcg/actuation HFAA inhaler Take 2 Puffs by inhalation two (2) times a day., Print, Disp-2 Inhaler, R-3      Lancets misc Use as directed for blood glucose monitoring., Print, Disp-1 Each, R-11      lisinopril (PRINIVIL, ZESTRIL) 20 mg tablet Take 1 Tab by mouth daily. , Print, Disp-30 Tab, R-4      rosuvastatin (CRESTOR) 40 mg tablet Take 1 Tab by mouth nightly. , Print, Disp-30 Tab, R-4      thiamine (B-1) 100 mg tablet Take 1 Tab by mouth daily. , Print, Disp-30 Tab, R-0      aspirin delayed-release 81 mg tablet Take 1 Tab by mouth daily. , Print, Disp-30 Tab, R-0      multivitamin, tx-iron-ca-min (THERA-M W/ IRON) 9 mg iron-400 mcg tab tablet Take 1 Tab by mouth daily. , Print, Disp-30 Tab, R-0           _______________________________    Attestations:  Scribe Attestation     Ederjohn Carvalhos acting as a scribe for and in the presence of Nathaniel Womack MD      December 24, 2017 at 4:07 PM       Provider Attestation:      I personally performed the services described in the documentation, reviewed the documentation, as recorded by the scribe in my presence, and it accurately and completely records my words and actions.  December 24, 2017 at 4:07 PM - Nathaniel Womack MD    _______________________________

## 2017-12-24 NOTE — ED TRIAGE NOTES
BIBA, pt states he does not want to be here. Pt BS has been high (500's) for the past few days. Pt is noncompliant with medications. Pt has not been taking insulin. Pt aaox4 pt intentionally ignores.

## 2017-12-28 ENCOUNTER — APPOINTMENT (OUTPATIENT)
Dept: GENERAL RADIOLOGY | Age: 62
End: 2017-12-28
Attending: EMERGENCY MEDICINE
Payer: SELF-PAY

## 2017-12-28 ENCOUNTER — APPOINTMENT (OUTPATIENT)
Dept: GENERAL RADIOLOGY | Age: 62
End: 2017-12-28
Attending: PHYSICIAN ASSISTANT
Payer: SELF-PAY

## 2017-12-28 ENCOUNTER — HOSPITAL ENCOUNTER (EMERGENCY)
Age: 62
Discharge: HOME | End: 2017-12-28
Attending: EMERGENCY MEDICINE
Payer: SELF-PAY

## 2017-12-28 VITALS
RESPIRATION RATE: 16 BRPM | WEIGHT: 155.3 LBS | HEART RATE: 81 BPM | SYSTOLIC BLOOD PRESSURE: 177 MMHG | BODY MASS INDEX: 21.66 KG/M2 | TEMPERATURE: 98.1 F | OXYGEN SATURATION: 99 % | DIASTOLIC BLOOD PRESSURE: 97 MMHG

## 2017-12-28 DIAGNOSIS — R73.9 HYPERGLYCEMIA: ICD-10-CM

## 2017-12-28 DIAGNOSIS — I10 ESSENTIAL HYPERTENSION WITH GOAL BLOOD PRESSURE LESS THAN 140/90: ICD-10-CM

## 2017-12-28 DIAGNOSIS — N18.9 CHRONIC RENAL IMPAIRMENT, UNSPECIFIED CKD STAGE: ICD-10-CM

## 2017-12-28 DIAGNOSIS — J44.9 COPD, MILD (HCC): ICD-10-CM

## 2017-12-28 DIAGNOSIS — E78.5 DYSLIPIDEMIA: ICD-10-CM

## 2017-12-28 DIAGNOSIS — R13.13 PHARYNGEAL DYSPHAGIA: Primary | ICD-10-CM

## 2017-12-28 LAB
AMPHET UR QL SCN: NEGATIVE
ANION GAP SERPL CALC-SCNC: 10 MMOL/L (ref 3–18)
BARBITURATES UR QL SCN: NEGATIVE
BASOPHILS # BLD: 0 K/UL (ref 0–0.06)
BASOPHILS NFR BLD: 1 % (ref 0–2)
BENZODIAZ UR QL: NEGATIVE
BUN SERPL-MCNC: 41 MG/DL (ref 7–18)
BUN/CREAT SERPL: 18 (ref 12–20)
CALCIUM SERPL-MCNC: 8.4 MG/DL (ref 8.5–10.1)
CANNABINOIDS UR QL SCN: NEGATIVE
CHLORIDE SERPL-SCNC: 101 MMOL/L (ref 100–108)
CK MB CFR SERPL CALC: 1.3 % (ref 0–4)
CK MB SERPL-MCNC: 3.9 NG/ML (ref 5–25)
CK SERPL-CCNC: 291 U/L (ref 39–308)
CO2 SERPL-SCNC: 26 MMOL/L (ref 21–32)
COCAINE UR QL SCN: POSITIVE
CREAT SERPL-MCNC: 2.26 MG/DL (ref 0.6–1.3)
DIFFERENTIAL METHOD BLD: ABNORMAL
EOSINOPHIL # BLD: 0.1 K/UL (ref 0–0.4)
EOSINOPHIL NFR BLD: 2 % (ref 0–5)
ERYTHROCYTE [DISTWIDTH] IN BLOOD BY AUTOMATED COUNT: 12.1 % (ref 11.6–14.5)
ETHANOL SERPL-MCNC: <3 MG/DL (ref 0–3)
GLUCOSE SERPL-MCNC: 373 MG/DL (ref 74–99)
HCT VFR BLD AUTO: 38 % (ref 36–48)
HDSCOM,HDSCOM: ABNORMAL
HGB BLD-MCNC: 13 G/DL (ref 13–16)
LYMPHOCYTES # BLD: 1.8 K/UL (ref 0.9–3.6)
LYMPHOCYTES NFR BLD: 22 % (ref 21–52)
MCH RBC QN AUTO: 30.2 PG (ref 24–34)
MCHC RBC AUTO-ENTMCNC: 34.2 G/DL (ref 31–37)
MCV RBC AUTO: 88.4 FL (ref 74–97)
METHADONE UR QL: NEGATIVE
MONOCYTES # BLD: 0.7 K/UL (ref 0.05–1.2)
MONOCYTES NFR BLD: 9 % (ref 3–10)
NEUTS SEG # BLD: 5.5 K/UL (ref 1.8–8)
NEUTS SEG NFR BLD: 66 % (ref 40–73)
OPIATES UR QL: NEGATIVE
PCP UR QL: NEGATIVE
PLATELET # BLD AUTO: 273 K/UL (ref 135–420)
PMV BLD AUTO: 10.8 FL (ref 9.2–11.8)
POTASSIUM SERPL-SCNC: 3.9 MMOL/L (ref 3.5–5.5)
RBC # BLD AUTO: 4.3 M/UL (ref 4.7–5.5)
SODIUM SERPL-SCNC: 137 MMOL/L (ref 136–145)
TROPONIN I SERPL-MCNC: 0.06 NG/ML (ref 0–0.04)
WBC # BLD AUTO: 8.2 K/UL (ref 4.6–13.2)

## 2017-12-28 PROCEDURE — 93005 ELECTROCARDIOGRAM TRACING: CPT

## 2017-12-28 PROCEDURE — 99284 EMERGENCY DEPT VISIT MOD MDM: CPT

## 2017-12-28 PROCEDURE — 71020 XR CHEST PA LAT: CPT

## 2017-12-28 PROCEDURE — 85025 COMPLETE CBC W/AUTO DIFF WBC: CPT | Performed by: PHYSICIAN ASSISTANT

## 2017-12-28 PROCEDURE — 80048 BASIC METABOLIC PNL TOTAL CA: CPT | Performed by: PHYSICIAN ASSISTANT

## 2017-12-28 PROCEDURE — 80307 DRUG TEST PRSMV CHEM ANLYZR: CPT | Performed by: EMERGENCY MEDICINE

## 2017-12-28 PROCEDURE — 82553 CREATINE MB FRACTION: CPT | Performed by: PHYSICIAN ASSISTANT

## 2017-12-28 PROCEDURE — 80307 DRUG TEST PRSMV CHEM ANLYZR: CPT | Performed by: PHYSICIAN ASSISTANT

## 2017-12-28 PROCEDURE — 70360 X-RAY EXAM OF NECK: CPT

## 2017-12-28 RX ORDER — AMLODIPINE BESYLATE 10 MG/1
10 TABLET ORAL DAILY
Qty: 30 TAB | Refills: 4 | Status: SHIPPED | OUTPATIENT
Start: 2017-12-28 | End: 2018-02-13 | Stop reason: ALTCHOICE

## 2017-12-28 RX ORDER — LANOLIN ALCOHOL/MO/W.PET/CERES
100 CREAM (GRAM) TOPICAL DAILY
Qty: 30 TAB | Refills: 0 | Status: ON HOLD | OUTPATIENT
Start: 2017-12-28 | End: 2018-04-13

## 2017-12-28 RX ORDER — BUDESONIDE AND FORMOTEROL FUMARATE DIHYDRATE 80; 4.5 UG/1; UG/1
2 AEROSOL RESPIRATORY (INHALATION) 2 TIMES DAILY
Qty: 2 INHALER | Refills: 3 | Status: SHIPPED | OUTPATIENT
Start: 2017-12-28 | End: 2018-01-16 | Stop reason: SDUPTHER

## 2017-12-28 RX ORDER — LISINOPRIL 20 MG/1
20 TABLET ORAL DAILY
Qty: 30 TAB | Refills: 4 | Status: ON HOLD | OUTPATIENT
Start: 2017-12-28 | End: 2018-04-13

## 2017-12-28 RX ORDER — ROSUVASTATIN CALCIUM 40 MG/1
40 TABLET, COATED ORAL
Qty: 30 TAB | Refills: 4 | Status: SHIPPED | OUTPATIENT
Start: 2017-12-28 | End: 2018-02-13 | Stop reason: ALTCHOICE

## 2017-12-28 RX ORDER — INSULIN LISPRO 100 [IU]/ML
5 INJECTION, SOLUTION INTRAVENOUS; SUBCUTANEOUS
Qty: 2 PACKAGE | Refills: 4 | Status: SHIPPED | OUTPATIENT
Start: 2017-12-28 | End: 2018-02-01 | Stop reason: SDUPTHER

## 2017-12-28 NOTE — PROGRESS NOTES
Per ED provider patient lost all identification and is unable to  his medications. Per chart, patient is indigent. SW will fill the patient's medication this ED visit. Patient provided with [facility] Jan and Feb schedules. Additionally, patient given enrollment packet for [facility]. Patient provided with address and phone number of [place] for assistance with obtaining a new ID. Patient instructed to bring his ED Discharge Papers to  [facility] for proof of identity to be seen. Patient also informed he can contact the retirement at which he was incarcerated. They can also assist in providing copies of wrist band or any proof he may have had on his person at the time of incarceration.

## 2017-12-28 NOTE — ED NOTES
The L.C. met with the client in the ER Department room 20 to discuss what day and location they will use for their follow up appointment with the [facility]. The client stated they will attend the location on [address] [phone] and arrive time is 7:30 a.m.

## 2017-12-28 NOTE — DISCHARGE INSTRUCTIONS
Resume taking your regular medications including aspirin and increase your oral hydration  Monitor you blood sugars and resume taking your insulin  Return immediately for increasing pain, fever, weakness, change in vision or any other concerns           Learning About Swallowing Problems  What are swallowing problems? Certain health problems that affect the nervous system can cause trouble swallowing. These conditions include stroke, ALS (also known as Mere Gehrig's disease), Parkinson's disease, and multiple sclerosis. The muscles and nerves that help move food through the throat and esophagus may not work right. Growths, such as cancer, and other problems with your esophagus can also make it hard to swallow. The esophagus is the tube that leads from your throat to your stomach. How are swallowing problems diagnosed? A doctor or speech therapist will examine you to check for swallowing problems. You may get swallowing tests to check how well your throat muscles work. For these tests, you swallow a special liquid that helps the doctor see your throat and esophagus on an X-ray or video screen. Other tests use a thin, flexible tube called a scope to check for problems with your esophagus. The doctor puts the scope in your mouth and down your throat to look at your esophagus. What are the symptoms? Symptoms of swallowing problems may include:  · Trouble getting food or liquids to go down on the first try. · Gagging, choking, or coughing when you swallow. · Having food or liquids come back up through your throat, mouth, or nose after you swallow. · Feeling like foods or liquids are stuck in some part of your throat or chest.  · Pain when you swallow. How are swallowing problems treated? How swallowing problems are treated depends on the cause. The main goals of treatment will be to help you eat and swallow safely and get good nutrition. This is important for your health and quality of life.   You may learn exercises to train your throat muscles to work together so you're able to swallow better. Learning certain ways to put food in your mouth or to position your head while eating may also help. Your doctor or a speech therapist may recommend changes to your diet to help make it easier to swallow. You may need to avoid certain foods or liquids. You also may need to change the thickness of foods or liquids in your diet. To eat and swallow safely, follow any instructions you get from your doctor or therapist. These ideas may help:  · Sit upright when eating, drinking, and taking pills. · Take small bites of food. Chew completely and swallow before taking another bite. · Take small sips of liquids. Hold the liquid in your mouth as you prepare to swallow. · If eating makes you tired, eat smaller but more frequent meals. · If you cough or choke, lean forward and keep your chin tipped downward while you cough. Where can you learn more? Go to http://mami-esperanza.info/. Enter [id number] in the search box to learn more about \"Learning About Swallowing Problems. \"  Current as of: March 20, 2017  Content Version: 11.4  © 0596-7990 [employer]. Care instructions adapted under license by [employer] (which disclaims liability or warranty for this information). If you have questions about a medical condition or this instruction, always ask your healthcare professional. [address] any warranty or liability for your use of this information.

## 2017-12-28 NOTE — ED NOTES
61 yo M with hx of DM, HTN, HLD, CHF who presents to the ED c/o central chest pain and slurred speech x 3 days. No hx of TIA/CVA. CN II-XII intact. Good  strength b/l UE, no facial droop or extremity weakness. Was evaluated in the ED 4 days ago for intoxication, CT brain performed at that time. I performed a brief evaluation, including history and physical, of the patient here in triage and I have determined that pt will need further treatment and evaluation from the main side ER physician. I have placed initial orders to help in expediting patients care.      December 28, 2017 at 2:10 PM - Gonsalo White        Visit Vitals    /89 (BP 1 Location: Left arm, BP Patient Position: At rest)    Pulse 85    Temp 98.1 °F (36.7 °C)    Resp 18    Wt 70.4 kg (155 lb 4.8 oz)    SpO2 98%    BMI 21.66 kg/m2

## 2017-12-28 NOTE — ED PROVIDER NOTES
EMERGENCY DEPARTMENT HISTORY AND PHYSICAL EXAM    2:59 PM      Date: 12/28/2017  Patient Name: Latrice Gordon    History of Presenting Illness     Chief Complaint   Patient presents with    Dysarthria    Lethargy    Chest Pain         History Provided By: Patient    Chief Complaint: CP  Duration:  3 days  Timing:  Intermittent, Progressive and Worsening  Location: Chest  Quality: Sharp  Severity: not reported  Modifying Factors: None  Associated Symptoms: Nausea and slurred speech      Additional History (Context): Latrice Gordon is a 60 y.o. male with PMHx of HTN, DM and CHFwho presents to the ED with c/o intermittent, sharp, CP for the past 3 days. Associated symptoms include slurred speech, productive cough with slimy sputum and nausea after eating. Admits he was in the ED for similar sx 4 days ago, notes symptoms have progressively worsened. Claims he is out of his daily medications, ran out 1-2 weeks ago. States he visits [facility] for medications, but lost his wallet and ID and has been unable to obtain entry. Claims he took his insulin this morning. Denies vomiting or fever. Denies recent trauma or injury. No other symptoms or concerns were expressed. PCP: None    Current Outpatient Prescriptions   Medication Sig Dispense Refill    amLODIPine (NORVASC) 10 mg tablet Take 1 Tab by mouth daily. Indications: hypertension 30 Tab 4    insulin detemir (LEVEMIR FLEXPEN) 100 unit/mL (3 mL) inpn 20 Units by SubCUTAneous route Before breakfast and dinner. 2 Pen 4    insulin lispro (HUMALOG KWIKPEN) 100 unit/mL kwikpen 5 Units by SubCUTAneous route Before breakfast, lunch, and dinner. 2 Package 4    budesonide-formoterol (SYMBICORT) 80-4.5 mcg/actuation HFAA Take 2 Puffs by inhalation two (2) times a day. 2 Inhaler 3    lisinopril (PRINIVIL, ZESTRIL) 20 mg tablet Take 1 Tab by mouth daily. 30 Tab 4    rosuvastatin (CRESTOR) 40 mg tablet Take 1 Tab by mouth nightly.  30 Tab 4    thiamine (B-1) 100 mg tablet Take 1 Tab by mouth daily. 30 Tab 0    multivitamin, tx-iron-ca-min (THERA-M W/ IRON) 9 mg iron-400 mcg tab tablet Take 1 Tab by mouth daily. 30 Tab 0    albuterol (PROVENTIL HFA, VENTOLIN HFA, PROAIR HFA) 90 mcg/actuation inhaler Take 2 Puffs by inhalation every four (4) hours as needed for Wheezing. 2 Inhaler 3    Lancets misc Use as directed for blood glucose monitoring. 1 Each 11    aspirin delayed-release 81 mg tablet Take 1 Tab by mouth daily. 30 Tab 0       Past History     Past Medical History:  Past Medical History:   Diagnosis Date    Cardiac LV ejection fraction 30-35% 7/15/15    Cardiomyopathy ([facility] [address] 7/15/15    35% per ECHO    Cocaine use 8/9/14    + UDS, persistent use    Diabetes ([facility] [address] 8/9/2014    8.1 hgbA1C 8/9/14    Heart failure ([facility] [address]     Hepatitis C infection 8/20/14    Genotype 1A    History of cocaine abuse 8/10/2016    History of heroin abuse 8/10/2016    Homelessness 9/7/2015    Hypertension 2000       Past Surgical History:  History reviewed. No pertinent surgical history. Family History:  Family History   Problem Relation Age of Onset    Diabetes Mother     Cancer Maternal Grandmother        Social History:  Social History   Substance Use Topics    Smoking status: Former Smoker     Packs/day: 0.30     Types: Cigarettes     Quit date: 7/4/2016    Smokeless tobacco: Never Used    Alcohol use 0.0 oz/week     0 Standard drinks or equivalent per week      Comment: started heavy drinking at teen till in Aug 2014 but has gone into remission since then       Allergies:  No Known Allergies      Review of Systems       Review of Systems   Constitutional: Negative for activity change, appetite change, chills, diaphoresis, fatigue, fever and unexpected weight change. HENT: Positive for trouble swallowing.  Negative for congestion, dental problem, drooling, ear discharge, ear pain, facial swelling, hearing loss, mouth sores, nosebleeds, postnasal drip, rhinorrhea, sinus pressure, sneezing, sore throat and tinnitus. Eyes: Negative for photophobia, pain, discharge, redness, itching and visual disturbance. Respiratory: Positive for cough (productive, slimy). Negative for apnea, choking, chest tightness, shortness of breath, wheezing and stridor. Cardiovascular: Negative for chest pain, palpitations and leg swelling. Gastrointestinal: Positive for nausea. Negative for abdominal distention, abdominal pain, anal bleeding, blood in stool, constipation, diarrhea, rectal pain and vomiting. Endocrine: Negative for cold intolerance, heat intolerance, polydipsia, polyphagia and polyuria. Genitourinary: Negative for decreased urine volume, difficulty urinating, dysuria, enuresis, flank pain, frequency, genital sores, hematuria and urgency. Musculoskeletal: Negative for arthralgias, back pain, gait problem, joint swelling, myalgias, neck pain and neck stiffness. Skin: Negative for color change, pallor, rash and wound. Allergic/Immunologic: Negative for environmental allergies, food allergies and immunocompromised state. Neurological: Positive for speech difficulty. Negative for dizziness, tremors, seizures, syncope, facial asymmetry, weakness, light-headedness, numbness and headaches. Hematological: Negative for adenopathy. Does not bruise/bleed easily. Psychiatric/Behavioral: Negative for agitation, behavioral problems, confusion, decreased concentration, dysphoric mood, hallucinations, self-injury, sleep disturbance and suicidal ideas. The patient is not nervous/anxious and is not hyperactive. All other systems reviewed and are negative. Physical Exam     Visit Vitals    /89 (BP 1 Location: Left arm, BP Patient Position: At rest)    Pulse 85    Temp 98.1 °F (36.7 °C)    Resp 18    Wt 70.4 kg (155 lb 4.8 oz)    SpO2 98%    BMI 21.66 kg/m2         Physical Exam   Constitutional: He is oriented to person, place, and time.  He appears well-developed and well-nourished. Alert and appropriate in no apparent marked discomfort or acute respiratory distress   HENT:   Head: Normocephalic and atraumatic. Right Ear: External ear normal.   Left Ear: External ear normal.   Mouth/Throat: Oropharynx is clear and moist. No oropharyngeal exudate. Eyes: Conjunctivae and EOM are normal. Pupils are equal, round, and reactive to light. Right eye exhibits no discharge. Left eye exhibits no discharge. No scleral icterus. Neck: Normal range of motion. Neck supple. No tracheal deviation present. No thyromegaly present. Cardiovascular: Normal rate, regular rhythm, normal heart sounds and intact distal pulses. No murmur heard. Pulmonary/Chest: Effort normal and breath sounds normal. No respiratory distress. He has no wheezes. He has no rales. Abdominal: Soft. Bowel sounds are normal. He exhibits no distension. There is no tenderness. There is no rebound and no guarding. Musculoskeletal: Normal range of motion. He exhibits no edema or tenderness. Lymphadenopathy:     He has no cervical adenopathy. Neurological: He is alert and oriented to person, place, and time. No cranial nerve deficit. Coordination normal.   CN II-XII grossly intact; gait intact; no perceptible difficulty with speech   Skin: Skin is warm. No rash noted. No erythema. Psychiatric: He has a normal mood and affect. His behavior is normal. Judgment and thought content normal.   Nursing note and vitals reviewed.         Diagnostic Study Results     Labs -  Recent Results (from the past 12 hour(s))   EKG, 12 LEAD, INITIAL    Collection Time: 12/28/17  2:14 PM   Result Value Ref Range    Ventricular Rate 79 BPM    Atrial Rate 79 BPM    P-R Interval 130 ms    QRS Duration 80 ms    Q-T Interval 408 ms    QTC Calculation (Bezet) 467 ms    Calculated P Axis 79 degrees    Calculated R Axis -16 degrees    Calculated T Axis -135 degrees    Diagnosis       Normal sinus rhythm  Possible Left atrial enlargement  Left ventricular hypertrophy  T wave abnormality, consider inferolateral ischemia  Prolonged QT  Abnormal ECG  When compared with ECG of 09-AUG-2017 20:06,  Left anterior fascicular block is no longer present     DRUG SCREEN, URINE    Collection Time: 12/28/17  3:00 PM   Result Value Ref Range    BENZODIAZEPINES NEGATIVE  NEG      BARBITURATES NEGATIVE  NEG      THC (TH-CANNABINOL) NEGATIVE  NEG      OPIATES NEGATIVE  NEG      PCP(PHENCYCLIDINE) NEGATIVE  NEG      COCAINE POSITIVE (A) NEG      AMPHETAMINES NEGATIVE  NEG      METHADONE NEGATIVE  NEG      HDSCOM (NOTE)    CBC WITH AUTOMATED DIFF    Collection Time: 12/28/17  3:17 PM   Result Value Ref Range    WBC 8.2 4.6 - 13.2 K/uL    RBC 4.30 (L) 4.70 - 5.50 M/uL    HGB 13.0 13.0 - 16.0 g/dL    HCT 38.0 36.0 - 48.0 %    MCV 88.4 74.0 - 97.0 FL    MCH 30.2 24.0 - 34.0 PG    MCHC 34.2 31.0 - 37.0 g/dL    RDW 12.1 11.6 - 14.5 %    PLATELET 307 264 - 741 K/uL    MPV 10.8 9.2 - 11.8 FL    NEUTROPHILS 66 40 - 73 %    LYMPHOCYTES 22 21 - 52 %    MONOCYTES 9 3 - 10 %    EOSINOPHILS 2 0 - 5 %    BASOPHILS 1 0 - 2 %    ABS. NEUTROPHILS 5.5 1.8 - 8.0 K/UL    ABS. LYMPHOCYTES 1.8 0.9 - 3.6 K/UL    ABS. MONOCYTES 0.7 0.05 - 1.2 K/UL    ABS. EOSINOPHILS 0.1 0.0 - 0.4 K/UL    ABS.  BASOPHILS 0.0 0.0 - 0.06 K/UL    DF AUTOMATED     METABOLIC PANEL, BASIC    Collection Time: 12/28/17  3:17 PM   Result Value Ref Range    Sodium 137 136 - 145 mmol/L    Potassium 3.9 3.5 - 5.5 mmol/L    Chloride 101 100 - 108 mmol/L    CO2 26 21 - 32 mmol/L    Anion gap 10 3.0 - 18 mmol/L    Glucose 373 (H) 74 - 99 mg/dL    BUN 41 (H) 7.0 - 18 MG/DL    Creatinine 2.26 (H) 0.6 - 1.3 MG/DL    BUN/Creatinine ratio 18 12 - 20      GFR est AA 36 (L) >60 ml/min/1.73m2    GFR est non-AA 29 (L) >60 ml/min/1.73m2    Calcium 8.4 (L) 8.5 - 10.1 MG/DL   CARDIAC PANEL,(CK, CKMB & TROPONIN)    Collection Time: 12/28/17  3:17 PM   Result Value Ref Range     39 - 308 U/L    CK - MB 3.9 (H) <3.6 ng/ml    CK-MB Index 1.3 0.0 - 4.0 %    Troponin-I, Qt. 0.06 (H) 0.0 - 0.045 NG/ML   ETHYL ALCOHOL    Collection Time: 12/28/17  3:17 PM   Result Value Ref Range    ALCOHOL(ETHYL),SERUM <3 0 - 3 MG/DL       Radiologic Studies -   XR NECK SOFT TISSUE   Final Result      XR CHEST PA LAT   Final Result      XR CHEST PA LAT:   IMPRESSION:    Unremarkable chest.         XR NECK SOFT TISSUE:   IMPRESSION: Unremarkable soft tissue of the neck. Advanced degenerative disc  disease at C5-6 and C6-7. Medical Decision Making   I am the first provider for this patient. I reviewed the vital signs, available nursing notes, past medical history, past surgical history, family history and social history. Vital Signs-Reviewed the patient's vital signs. Pulse Oximetry Analysis -  98% on room air, normal    Cardiac Monitor:  Rate: 83  Rhythm:  Normal Sinus Rhythm    EKG: Interpreted by the EP. Time Interpreted: 1415   Rate: 79   Rhythm: Normal Sinus Rhythm, prolonged QT, non-specific S-T and T-wave changes without interval change from prior EKG   Interpretation:Abnormal   Comparison: 8/2017    Records Reviewed: Nursing Notes and Old Medical Records (Time of Review: 2:59 PM)    ED Course: Progress Notes, Reevaluation, and Consults:  Serial neurovascular examinations were stable and normal. Social work was consulted and assisted with helping obtain patient's regular medications which were restarted prior to discharge, including medication for hypertension. Patient had progressive improvement in his blood glucose and felt comfortable with outpatient follow-up. He was tolerating oral intake without difficulty. Provider Notes (Medical Decision Making): Patient with perceived change in speech but reassuring neurovascular examination and recent CT scan. Has been non-compliant with medications and will evaluate for possible electrolyte abnormality, toxidrome, anemia, arrhythmia, ACS, or renal injury.  Will resume his regular medications and plan on establishing outpatient follow-up. The patient agrees with this plan. Diagnosis     Clinical Impression:   1. Essential hypertension with goal blood pressure less than 140/90    2. COPD, mild ([address]    3. Dyslipidemia        Disposition: Discharge. Follow-up Information     None           Patient's Medications   Start Taking    No medications on file   Continue Taking    ALBUTEROL (PROVENTIL HFA, VENTOLIN HFA, PROAIR HFA) 90 MCG/ACTUATION INHALER    Take 2 Puffs by inhalation every four (4) hours as needed for Wheezing. ASPIRIN DELAYED-RELEASE 81 MG TABLET    Take 1 Tab by mouth daily. LANCETS MISC    Use as directed for blood glucose monitoring. These Medications have changed    Modified Medication Previous Medication    AMLODIPINE (NORVASC) 10 MG TABLET amLODIPine (NORVASC) 10 mg tablet       Take 1 Tab by mouth daily. Indications: hypertension    Take 1 Tab by mouth daily. Indications: hypertension    BUDESONIDE-FORMOTEROL (SYMBICORT) 80-4.5 MCG/ACTUATION HFAA budesonide-formoterol (SYMBICORT) 80-4.5 mcg/actuation HFAA inhaler       Take 2 Puffs by inhalation two (2) times a day. Take 2 Puffs by inhalation two (2) times a day. INSULIN DETEMIR (LEVEMIR FLEXPEN) 100 UNIT/ML (3 ML) INPN insulin detemir (LEVEMIR FLEXPEN) 100 unit/mL (3 mL) inpn       20 Units by SubCUTAneous route Before breakfast and dinner. 20 Units by SubCUTAneous route Before breakfast and dinner. INSULIN LISPRO (HUMALOG KWIKPEN) 100 UNIT/ML KWIKPEN insulin lispro (HUMALOG KWIKPEN) 100 unit/mL kwikpen       5 Units by SubCUTAneous route Before breakfast, lunch, and dinner. 5 Units by SubCUTAneous route Before breakfast, lunch, and dinner. LISINOPRIL (PRINIVIL, ZESTRIL) 20 MG TABLET lisinopril (PRINIVIL, ZESTRIL) 20 mg tablet       Take 1 Tab by mouth daily. Take 1 Tab by mouth daily.     MULTIVITAMIN, TX-IRON-CA-MIN (THERA-M W/ IRON) 9 MG IRON-400 MCG TAB TABLET multivitamin, tx-iron-ca-min (THERA-M W/ IRON) 9 mg iron-400 mcg tab tablet       Take 1 Tab by mouth daily. Take 1 Tab by mouth daily. ROSUVASTATIN (CRESTOR) 40 MG TABLET rosuvastatin (CRESTOR) 40 mg tablet       Take 1 Tab by mouth nightly. Take 1 Tab by mouth nightly. THIAMINE (B-1) 100 MG TABLET thiamine (B-1) 100 mg tablet       Take 1 Tab by mouth daily. Take 1 Tab by mouth daily. Stop Taking    No medications on file     _______________________________    Attestations:  Navdeep E Malachi Weinstein acting as a scribe for and in the presence of Katie Messer MD      December 28, 2017 at 2:59 PM       Provider Attestation:      I personally performed the services described in the documentation, reviewed the documentation, as recorded by the scribe in my presence, and it accurately and completely records my words and actions.  December 28, 2017 at 2:59 PM - Katie Messer MD    _______________________________

## 2017-12-28 NOTE — ED TRIAGE NOTES
C/o slurred speech and lethargy X 3 days. Pt alert and oriented X 4 no weakness to extremities pt ambulatory in ED with steady gait no assist and no difficulty controlling secretions.  Pt speech clear, States not HX of stroke

## 2017-12-29 LAB
ATRIAL RATE: 79 BPM
CALCULATED P AXIS, ECG09: 79 DEGREES
CALCULATED R AXIS, ECG10: -16 DEGREES
CALCULATED T AXIS, ECG11: -135 DEGREES
DIAGNOSIS, 93000: NORMAL
P-R INTERVAL, ECG05: 130 MS
Q-T INTERVAL, ECG07: 408 MS
QRS DURATION, ECG06: 80 MS
QTC CALCULATION (BEZET), ECG08: 467 MS
VENTRICULAR RATE, ECG03: 79 BPM

## 2018-01-03 ENCOUNTER — OFFICE VISIT (OUTPATIENT)
Dept: FAMILY MEDICINE CLINIC | Age: 63
End: 2018-01-03

## 2018-01-03 VITALS
OXYGEN SATURATION: 100 % | BODY MASS INDEX: 22.26 KG/M2 | RESPIRATION RATE: 18 BRPM | DIASTOLIC BLOOD PRESSURE: 74 MMHG | HEART RATE: 87 BPM | SYSTOLIC BLOOD PRESSURE: 114 MMHG | HEIGHT: 71 IN | WEIGHT: 159 LBS

## 2018-01-03 DIAGNOSIS — Z00.00 HEALTH CARE MAINTENANCE: Primary | ICD-10-CM

## 2018-01-03 LAB — GLUCOSE POC: 172 MG/DL

## 2018-01-03 NOTE — PROGRESS NOTES
HPI  Maribel Ramirez is a 60 y.o. male being seen today for   Chief Complaint   Patient presents with    New Patient     [facility] services   [facility] Follow Up     [facility] ED-  HTN and Diabetes monitor and medicate   . IOV for this patient who was seen in ED last week. At the time he was out of meds for chf, htn and DM. he states that he currently has all his medications and is taking them. Feels well. In the past was seen at [facility] clinic for chronic care. At this time he believes he will be following up with care a [facility] although may transfer back to [facility] clinic at some time. Past Medical History:   Diagnosis Date    Cardiac LV ejection fraction 30-35% 7/15/15    Cardiomyopathy ([facility] [address] 7/15/15    35% per ECHO    Cocaine use 8/9/14    + UDS, persistent use    Diabetes ([facility] [address] 8/9/2014    8.1 hgbA1C 8/9/14    Heart failure ([facility] [address]     Hepatitis C infection 8/20/14    Genotype 1A    History of cocaine abuse 8/10/2016    History of heroin abuse 8/10/2016    Homelessness 9/7/2015    Hypertension 2000         ROS  Patient states that he is feeling well. Denies complaints of chest pain, shortness of breath, swelling of legs, dizziness or weakness. he denies nausea, vomiting or diarrhea. Current Outpatient Prescriptions   Medication Sig    insulin detemir (LEVEMIR FLEXPEN) 100 unit/mL (3 mL) inpn 20 Units by SubCUTAneous route Before breakfast and dinner.  insulin lispro (HUMALOG KWIKPEN) 100 unit/mL kwikpen 5 Units by SubCUTAneous route Before breakfast, lunch, and dinner.  budesonide-formoterol (SYMBICORT) 80-4.5 mcg/actuation HFAA Take 2 Puffs by inhalation two (2) times a day.  lisinopril (PRINIVIL, ZESTRIL) 20 mg tablet Take 1 Tab by mouth daily.  rosuvastatin (CRESTOR) 40 mg tablet Take 1 Tab by mouth nightly.  albuterol (PROVENTIL HFA, VENTOLIN HFA, PROAIR HFA) 90 mcg/actuation inhaler Take 2 Puffs by inhalation every four (4) hours as needed for Wheezing.     Lancets misc Use as directed for blood glucose monitoring.  aspirin delayed-release 81 mg tablet Take 1 Tab by mouth daily.  amLODIPine (NORVASC) 10 mg tablet Take 1 Tab by mouth daily. Indications: hypertension    thiamine (B-1) 100 mg tablet Take 1 Tab by mouth daily.  multivitamin, tx-iron-ca-min (THERA-M W/ IRON) 9 mg iron-400 mcg tab tablet Take 1 Tab by mouth daily. No current facility-administered medications for this visit. PE  Visit Vitals    /74 (BP 1 Location: Right arm, BP Patient Position: Sitting)    Pulse 87    Resp 18    Ht 5' 11\" (1.803 m)    Wt 159 lb (72.1 kg)    SpO2 100%    BMI 22.18 kg/m2        Alert and oriented with normal mood and affect. he is well developed and well nourished . Lungs are clear without wheezing. Heart rate is regular without murmurs or gallops. There is no lower extremity edema. Results for orders placed or performed in visit on 01/03/18   AMB POC GLUCOSE BLOOD, BY GLUCOSE MONITORING DEVICE   Result Value Ref Range    Glucose  mg/dL         Assessment and Plan:        ICD-10-CM ICD-9-CM    1. Health care maintenance Z00.00 V70.0 AMB POC GLUCOSE BLOOD, BY GLUCOSE MONITORING DEVICE   doing great now he has all his meds.    Will sign him up for Pap for all his current meds (change to caduet 10/80 and benicar 40 for avaialabilty on program)  Return if he is running out of meds before his program meds come in        Jl Sanon MD

## 2018-01-03 NOTE — PROGRESS NOTES
No chief complaint on file. 1. When and where did you last receive medical care? Yes When: 12/28/[facility]-Blood sugar high- speech slurred      2. When and where did you last have preventive care such as mammogram, pap smears or colon screening? No history of colon screening. 3. What is your current living situation (for example, live alone, live in home with immediate family members)? Homeless    4. Do you have any problems with communication such trouble seeing, hearing, or understanding instructions? No    5. Do you have an advance directive? This is a document that you can give to family members with instructions for how you would want them to make health care decisions for you if you were unable to speak for yourself. (For example, unconscious, delerious)No    PMH/FH/Social Hx reviewed and updated as needed     Applicable screenings reviewed and updated as needed  Medication reconciliation performed. Patient does not need medication refills. Health Maintenance reviewed.

## 2018-01-16 DIAGNOSIS — J44.9 COPD, MILD (HCC): ICD-10-CM

## 2018-01-16 RX ORDER — BUDESONIDE AND FORMOTEROL FUMARATE DIHYDRATE 80; 4.5 UG/1; UG/1
2 AEROSOL RESPIRATORY (INHALATION) 2 TIMES DAILY
Qty: 2 INHALER | Refills: 3 | Status: ON HOLD | COMMUNITY
Start: 2018-01-16 | End: 2018-04-13

## 2018-01-16 RX ORDER — ALBUTEROL SULFATE 90 UG/1
2 AEROSOL, METERED RESPIRATORY (INHALATION)
Qty: 3 INHALER | Refills: 3 | Status: ON HOLD | COMMUNITY
Start: 2018-01-16 | End: 2018-04-13

## 2018-01-16 NOTE — TELEPHONE ENCOUNTER
Received fax application from [employer] and chart notes reviewed. Pharmacy Assistance Medication approved for direct mail to pt.     Ventolin 2 puff q 4 hour prn  symbicort 80/4.5 2 puff bid

## 2018-01-30 NOTE — TELEPHONE ENCOUNTER
Kayleigh sent patient Mayme Hamman 4 vials of Humalog through the PAP.  Letter sent to patient for  and order routed to provider

## 2018-01-30 NOTE — LETTER
1/30/2018 11:58 AM 
 
Mr. Pyle Files [address] 
[address] [place] [address] Thank you for participating in the [address] [place] [address] Patient Assistance Program. 
 
This is notification that your item(s) was delivered to us. Please  your item(s) between 11:00 am and 1:00 pm, at one of our [place] sites within 14 days. When you arrive, you will need to register inside as a \"nurse visit\" to  your medication. Notify the registrar that you are there to  medication and they will register you as soon as possible. There may be a short wait but we try to make the process as fast as possible. If it has been more than 3 months since you saw the doctor, please come early and plan to stay for an office visit on the day you  your medicine. If you fail to follow-up for regular appointments, the [address] [place] [address] may discontinue providing your medication. To see when the [address] will be in your neighborhood, go to 
[email or web address] 
or call [phone], select your language (1 for english or 2 for Uzbek), and then option 2 Sincerely, Abbey Fothergill, MD

## 2018-02-01 RX ORDER — INSULIN LISPRO 100 [IU]/ML
INJECTION, SOLUTION INTRAVENOUS; SUBCUTANEOUS
Qty: 4 VIAL | Refills: 0 | Status: SHIPPED | COMMUNITY
Start: 2018-02-01 | End: 2018-04-13

## 2018-02-01 NOTE — TELEPHONE ENCOUNTER
Med list and chart notes reviewed.   Pharmacy Assistance Medication approved for pickup.    5 units tid with meals

## 2018-02-09 NOTE — LETTER
2/9/2018 8:39 AM 
 
Mr. Sabas Ochoa [address] 
[address] [facility] Thank you for participating in the [address] [place] [address] Patient Assistance Program. 
 
This is notification that your item(s) was delivered to us. Please  your item(s) between 11:00 am and 1:00 pm, at one of our [place] sites within 14 days. When you arrive, you will need to register inside as a \"nurse visit\" to  your medication. Notify the registrar that you are there to  medication and they will register you as soon as possible. There may be a short wait but we try to make the process as fast as possible. If you fail to follow-up for regular appointments, the [address] [place] [address] may discontinue providing your medication. To see when the [address] will be in your neighborhood, go to 
[email or web address] 
or call [phone], select your language (1 for english or 2 for Chinese), and then option 2. Sincerely, Jessie Rogers MD

## 2018-02-09 NOTE — TELEPHONE ENCOUNTER
[employer] sent patient, Latrice Gordon, 90 tablets of Caduet 10-80 mg per tablet via [address] [place] [address]. Letter sent to patient and order routed to provider.

## 2018-02-13 RX ORDER — AMLODIPINE BESYLATE AND ATORVASTATIN CALCIUM 10; 80 MG/1; MG/1
1 TABLET, FILM COATED ORAL DAILY
Qty: 90 TAB | Refills: 0 | Status: ON HOLD | COMMUNITY
Start: 2018-02-13 | End: 2018-04-13

## 2018-02-13 NOTE — TELEPHONE ENCOUNTER
Med list and chart notes reviewed. Pharmacy Assistance Medication approved for pickup.     caduet 10/80mg po daily    Will replace his amlodipine and crestor

## 2018-02-21 ENCOUNTER — OFFICE VISIT (OUTPATIENT)
Dept: FAMILY MEDICINE CLINIC | Age: 63
End: 2018-02-21

## 2018-02-21 VITALS
DIASTOLIC BLOOD PRESSURE: 85 MMHG | HEIGHT: 71 IN | OXYGEN SATURATION: 99 % | TEMPERATURE: 98.6 F | SYSTOLIC BLOOD PRESSURE: 160 MMHG | WEIGHT: 170 LBS | HEART RATE: 82 BPM | BODY MASS INDEX: 23.8 KG/M2 | RESPIRATION RATE: 16 BRPM

## 2018-02-21 DIAGNOSIS — Z00.00 ROUTINE CHECK-UP: Primary | ICD-10-CM

## 2018-02-21 LAB — GLUCOSE POC: NORMAL MG/DL

## 2018-02-21 NOTE — PROGRESS NOTES
HPI  Yael Wong is a 60 y.o. male being seen today for   Chief Complaint   Patient presents with    Follow-up   . he states that he is out of amlodipine and low on levemir. Here to  PAP meds. caduet is here but not levemir yet. He feels fine. Past Medical History:   Diagnosis Date    Cardiac LV ejection fraction 30-35% 7/15/15    Cardiomyopathy ([facility] 7/15/15    35% per ECHO    Cocaine use 8/9/14    + UDS, persistent use    Diabetes ([facility] 8/9/2014    8.1 hgbA1C 8/9/14    [facility]     Hepatitis C infection 8/20/14    Genotype 1A    History of cocaine abuse 8/10/2016    History of heroin abuse 8/10/2016    Homelessness 9/7/2015    Hypertension 2000         ROS  Patient states that he is feeling well. Denies complaints of chest pain, shortness of breath, swelling of legs, dizziness or weakness. he denies nausea, vomiting or diarrhea. Current Outpatient Prescriptions   Medication Sig    amLODIPine-atorvastatin (CADUET) 10-80 mg per tablet Take 1 Tab by mouth daily.  insulin lispro (HUMALOG) 100 unit/mL injection 5 units tid with meals    budesonide-formoterol (SYMBICORT) 80-4.5 mcg/actuation HFAA Take 2 Puffs by inhalation two (2) times a day.  albuterol (PROVENTIL HFA, VENTOLIN HFA, PROAIR HFA) 90 mcg/actuation inhaler Take 2 Puffs by inhalation every four (4) hours as needed for Wheezing.  insulin detemir (LEVEMIR FLEXPEN) 100 unit/mL (3 mL) inpn 20 Units by SubCUTAneous route Before breakfast and dinner.  lisinopril (PRINIVIL, ZESTRIL) 20 mg tablet Take 1 Tab by mouth daily.  thiamine (B-1) 100 mg tablet Take 1 Tab by mouth daily.  multivitamin, tx-iron-ca-min (THERA-M W/ IRON) 9 mg iron-400 mcg tab tablet Take 1 Tab by mouth daily.  Lancets misc Use as directed for blood glucose monitoring.  aspirin delayed-release 81 mg tablet Take 1 Tab by mouth daily. No current facility-administered medications for this visit.         PE  Visit Vitals  /85 (BP 1 Location: Left arm, BP Patient Position: Sitting)    Pulse 82    Temp 98.6 °F (37 °C) (Oral)    Resp 16    Ht 5' 11\" (1.803 m)    Wt 170 lb (77.1 kg)    SpO2 99%    BMI 23.71 kg/m2        Alert and oriented with normal mood and affect. he is well developed and well nourished . Lungs are clear without wheezing. Heart rate is regular without murmurs or gallops. There is no lower extremity edema. Results for orders placed or performed in visit on 02/21/18   AMB POC GLUCOSE BLOOD, BY GLUCOSE MONITORING DEVICE   Result Value Ref Range    Glucose POC [facility] mg/dL         Assessment and Plan:        ICD-10-CM ICD-9-CM    1. Routine check-up Z00.00 V70.0 AMB POC GLUCOSE BLOOD, BY GLUCOSE MONITORING DEVICE       Restart bp meds  Await levemir. He can increase his humalog to 10 tid if he runs out of levemir.      Follow up 3 mos or prn    David Rosales MD

## 2018-02-21 NOTE — PROGRESS NOTES
Per provider patient picked up Pharmacy Assistance Program medication. Medication: Caduet 10-80 mg tablet (90 tablets)  : [employer]    Lot  [id number]          Exp 01/2019    Medication: Humalog 100 unit/mL 10 mL vials (4 vials)  ManufacturerGladarsh Horse     Lot  [id number]         Exp 07/2020    Patient verified understanding of medication and directions. Discharge instructions reviewed with patient. Medication list and understanding of medications reviewed with patient. OTC and herbal medications reviewed and added to med list if applicable  Barriers to adherence assessed.     Guidance given regarding new medications this visit, including reason for taking this medicine, and common side effects

## 2018-04-10 ENCOUNTER — HOSPITAL ENCOUNTER (INPATIENT)
Age: 63
LOS: 2 days | Discharge: HOME | DRG: 065 | End: 2018-04-13
Attending: EMERGENCY MEDICINE | Admitting: EMERGENCY MEDICINE
Payer: SELF-PAY

## 2018-04-10 ENCOUNTER — APPOINTMENT (OUTPATIENT)
Dept: GENERAL RADIOLOGY | Age: 63
DRG: 065 | End: 2018-04-10
Attending: EMERGENCY MEDICINE
Payer: SELF-PAY

## 2018-04-10 ENCOUNTER — APPOINTMENT (OUTPATIENT)
Dept: CT IMAGING | Age: 63
DRG: 065 | End: 2018-04-10
Attending: EMERGENCY MEDICINE
Payer: SELF-PAY

## 2018-04-10 DIAGNOSIS — J44.9 COPD, MILD (HCC): ICD-10-CM

## 2018-04-10 DIAGNOSIS — I10 ESSENTIAL HYPERTENSION WITH GOAL BLOOD PRESSURE LESS THAN 140/90: ICD-10-CM

## 2018-04-10 LAB
APTT PPP: 27.5 SEC (ref 23–36.4)
ETHANOL SERPL-MCNC: <3 MG/DL (ref 0–3)
INR PPP: 0.8 (ref 0.8–1.2)
PROTHROMBIN TIME: 11.1 SEC (ref 11.5–15.2)

## 2018-04-10 PROCEDURE — 80307 DRUG TEST PRSMV CHEM ANLYZR: CPT | Performed by: EMERGENCY MEDICINE

## 2018-04-10 PROCEDURE — 99285 EMERGENCY DEPT VISIT HI MDM: CPT

## 2018-04-10 PROCEDURE — 93005 ELECTROCARDIOGRAM TRACING: CPT

## 2018-04-10 PROCEDURE — 85730 THROMBOPLASTIN TIME PARTIAL: CPT | Performed by: EMERGENCY MEDICINE

## 2018-04-10 PROCEDURE — 84484 ASSAY OF TROPONIN QUANT: CPT | Performed by: EMERGENCY MEDICINE

## 2018-04-10 PROCEDURE — 70450 CT HEAD/BRAIN W/O DYE: CPT

## 2018-04-10 PROCEDURE — 85025 COMPLETE CBC W/AUTO DIFF WBC: CPT | Performed by: EMERGENCY MEDICINE

## 2018-04-10 PROCEDURE — 80053 COMPREHEN METABOLIC PANEL: CPT | Performed by: EMERGENCY MEDICINE

## 2018-04-10 PROCEDURE — 71045 X-RAY EXAM CHEST 1 VIEW: CPT

## 2018-04-10 PROCEDURE — 85610 PROTHROMBIN TIME: CPT | Performed by: EMERGENCY MEDICINE

## 2018-04-10 PROCEDURE — 82010 KETONE BODYS QUAN: CPT | Performed by: EMERGENCY MEDICINE

## 2018-04-10 NOTE — IP AVS SNAPSHOT
[address] [address] Agatha Martinez Patient: Javire Resendiz MRN: [id number] CAREY:[id number] About your hospitalization You were admitted on:  April 11, 2018 You last received care in the:  [facility] [address] You were discharged on:  April 13, 2018 Why you were hospitalized Your primary diagnosis was:  Not on File Your diagnoses also included:  Tia (Transient Ischemic Attack), Cva (Cerebral Vascular Accident) (Hcc), Cardiac Lv Ejection Fraction 30-35%, Cocaine Use, Hypertension Follow-up Information Follow up With Details Comments Contact Info None Call in 1 day  None ([phone] Patient stated that they have no PCP Discharge Orders None A check erick indicates which time of day the medication should be taken. My Medications START taking these medications Instructions Each Dose to Equal  
 Morning Noon Evening Bedtime  
 aspirin 325 mg tablet Commonly known as:  ASPIRIN Start taking on:  4/14/2018 Replaces:  aspirin delayed-release 81 mg tablet Your last dose was: Your next dose is: Take 1 Tab by mouth daily. 325 mg Blood-Glucose Meter monitoring kit Your last dose was: Your next dose is:    
   
   
 Check blood sugar qAC and qHS. carvedilol 3.125 mg tablet Commonly known as:  Nicolasa Nguyen Your last dose was: Your next dose is: Take 1 Tab by mouth every twelve (12) hours. 3.125 mg  
    
   
   
   
  
 glucose blood VI test strips strip Commonly known as:  GENSTRIP TEST STRIP Your last dose was: Your next dose is:    
   
   
 Check blood sugar qAC and qHS. hydrALAZINE 25 mg tablet Commonly known as:  APRESOLINE Your last dose was: Your next dose is: Take 1 Tab by mouth three (3) times daily.   
 25 mg  
 insulin glargine 100 unit/mL injection Commonly known as:  LANTUS Your last dose was: Your next dose is:    
   
   
 12 Units by SubCUTAneous route daily. 12 Units Insulin Syringe-Needle U-100 1/2 mL 30 gauge x 5/16 Syrg Commonly known as:  INSULIN SYRINGE Your last dose was: Your next dose is: For insulin use. isosorbide mononitrate ER 30 mg tablet Commonly known as:  IMDUR Your last dose was: Your next dose is: Take 1 Tab by mouth daily. 30 mg CHANGE how you take these medications Instructions Each Dose to Equal  
 Morning Noon Evening Bedtime  
 insulin lispro 100 unit/mL injection Commonly known as:  HUMALOG What changed:  additional instructions Your last dose was: Your next dose is: For Blood Sugar (mg/dL) of:  Less than 150 =  0 units 150 -199 =  3 units 200 -249 =  6 units 250 -299 =  9 units 300 -349 =  12 units 350 and above =  15 units, call MD. Janes Lind taking these medications Instructions Each Dose to Equal  
 Morning Noon Evening Bedtime  
 albuterol 90 mcg/actuation inhaler Commonly known as:  PROVENTIL HFA, VENTOLIN HFA, PROAIR HFA Your last dose was: Your next dose is: Take 2 Puffs by inhalation every four (4) hours as needed for Wheezing. 2 Puff  
    
   
   
   
  
 amLODIPine-atorvastatin 10-80 mg per tablet Commonly known as:  CADUET Your last dose was: Your next dose is: Take 1 Tab by mouth daily. 1 Tab  
    
   
   
   
  
 budesonide-formoterol 80-4.5 mcg/actuation Hfaa Commonly known as:  SYMBICORT Your last dose was: Your next dose is: Take 2 Puffs by inhalation two (2) times a day. 2 Puff Lancets Misc Your last dose was: Your next dose is:    
   
   
 Use as directed for blood glucose monitoring. lisinopril 20 mg tablet Commonly known as:  Michael George Your last dose was: Your next dose is: Take 1 Tab by mouth daily. 20 mg  
    
   
   
   
  
 multivitamin, tx-iron-ca-min 9 mg iron-400 mcg Tab tablet Commonly known as:  THERA-M w/ IRON Your last dose was: Your next dose is: Take 1 Tab by mouth daily. 1 Tab  
    
   
   
   
  
 thiamine 100 mg tablet Commonly known as:  B-1 Your last dose was: Your next dose is: Take 1 Tab by mouth daily. 100 mg  
    
   
   
   
  
  
STOP taking these medications   
 aspirin delayed-release 81 mg tablet Replaced by:  aspirin 325 mg tablet  
   
  
 insulin detemir U-100 100 unit/mL (3 mL) Inpn Commonly known as:  Thomas Richard Where to Get Your Medications These medications were sent to [address] [facility]  [address], [address] [facility] [address] Phone:  [phone]  
  albuterol 90 mcg/actuation inhaler  
 amLODIPine-atorvastatin 10-80 mg per tablet  
 aspirin 325 mg tablet Blood-Glucose Meter monitoring kit  
 budesonide-formoterol 80-4.5 mcg/actuation Hfaa  
 carvedilol 3.125 mg tablet  
 glucose blood VI test strips strip  
 hydrALAZINE 25 mg tablet  
 insulin glargine 100 unit/mL injection Insulin Syringe-Needle U-100 1/2 mL 30 gauge x 5/16 Syrg  
 isosorbide mononitrate ER 30 mg tablet Lancets Misc  
 lisinopril 20 mg tablet  
 multivitamin, tx-iron-ca-min 9 mg iron-400 mcg Tab tablet  
 thiamine 100 mg tablet Information on where to get these meds will be given to you by the nurse or doctor. ! Ask your nurse or doctor about these medications  
  insulin lispro 100 unit/mL injection Discharge Instructions Patient armband removed and shredded MyChart Activation Thank you for requesting access to [employer]. Please follow the instructions below to securely access and download your online medical record. [employer] allows you to send messages to your doctor, view your test results, renew your prescriptions, schedule appointments, and more. How Do I Sign Up? 1. In your internet browser, go to www.[address] 
2. Click on the First Time User? Click Here link in the Sign In box. You will be redirect to the New Member Sign Up page. 3. Enter your [employer] Access Code exactly as it appears below. You will not need to use this code after youve completed the sign-up process. If you do not sign up before the expiration date, you must request a new code. [employer] Access Code: [email or web address] Expires: 2018 11:40 PM (This is the date your [employer] access code will ) 4. Enter the last four digits of your Social Security Number (xxxx) and Date of Birth (mm/dd/yyyy) as indicated and click Submit. You will be taken to the next sign-up page. 5. Create a [employer] ID. This will be your [employer] login ID and cannot be changed, so think of one that is secure and easy to remember. 6. Create a [employer] password. You can change your password at any time. 7. Enter your Password Reset Question and Answer. This can be used at a later time if you forget your password. 8. Enter your e-mail address. You will receive e-mail notification when new information is available in [address] 9. Click Sign Up. You can now view and download portions of your medical record. 10. Click the Download Summary menu link to download a portable copy of your medical information. Additional Information If you have questions, please visit the Frequently Asked Questions section of the [employer] website at [email or web address] ([email or web address] Remember, [employer] is NOT to be used for urgent needs. For medical emergencies, dial 911. DISCHARGE SUMMARY from Nurse PATIENT INSTRUCTIONS: 
 
 
F-face looks uneven A-arms unable to move or move unevenly S-speech slurred or non-existent T-time-call 911 as soon as signs and symptoms begin-DO NOT go Back to bed or wait to see if you get better-TIME IS BRAIN. Warning Signs of HEART ATTACK Call 911 if you have these symptoms: 
? Chest discomfort. Most heart attacks involve discomfort in the center of the chest that lasts more than a few minutes, or that goes away and comes back. It can feel like uncomfortable pressure, squeezing, fullness, or pain. ? Discomfort in other areas of the upper body. Symptoms can include pain or discomfort in one or both arms, the back, neck, jaw, or stomach. ? Shortness of breath with or without chest discomfort. ? Other signs may include breaking out in a cold sweat, nausea, or lightheadedness. Don't wait more than five minutes to call [address]! Fast action can save your life. Calling 911 is almost always the fastest way to get lifesaving treatment. Emergency Medical Services staff can begin treatment when they arrive  up to an hour sooner than if someone gets to the hospital by car. The discharge information has been reviewed with the patient. The patient verbalized understanding. Discharge medications reviewed with the patient and appropriate educational materials and side effects teaching were provided. ___________________________________________________________________________________________________________________________________ MyChart Announcement We are excited to announce that we are making your provider's discharge notes available to you in [employer]. You will see these notes when they are completed and signed by the physician that discharged you from your recent hospital stay. If you have any questions or concerns about any information you see in [employer], please call the Health Information Department where you were seen or reach out to your Primary Care Provider for more information about your plan of care. Introducing [facility]! Bea Gordon introduces [employer] patient portal. Now you can access parts of your medical record, email your doctor's office, and request medication refills online. 1. In your internet browser, go to [email or web address] 2. Click on the First Time User? Click Here link in the Sign In box. You will see the New Member Sign Up page. 3. Enter your [employer] Access Code exactly as it appears below. You will not need to use this code after youve completed the sign-up process. If you do not sign up before the expiration date, you must request a new code. · [employer] Access Code: [email or web address] Expires: 7/9/2018 11:40 PM 
 
4. Enter the last four digits of your Social Security Number (xxxx) and Date of Birth (mm/dd/yyyy) as indicated and click Submit. You will be taken to the next sign-up page. 5. Create a [employer] ID. This will be your [employer] login ID and cannot be changed, so think of one that is secure and easy to remember. 6. Create a [employer] password. You can change your password at any time. 7. Enter your Password Reset Question and Answer. This can be used at a later time if you forget your password. 8. Enter your e-mail address. You will receive e-mail notification when new information is available in [address] 9. Click Sign Up. You can now view and download portions of your medical record.  
10. Click the Download Summary menu link to download a portable copy of your medical information. If you have questions, please visit the Frequently Asked Questions section of the [employer] website. Remember, [employer] is NOT to be used for urgent needs. For medical emergencies, dial 911. Now available from your iPhone and Android! Introducing George Villareal As a [facility]using patient, I wanted to make you aware of our electronic visit tool called George Villareal. [facility] 24/7 allows you to connect within minutes with a medical provider 24 hours a day, seven days a week via a mobile device or tablet or logging into a secure website from your computer. You can access George Villareal from anywhere in the [place]. A virtual visit might be right for you when you have a simple condition and feel like you just dont want to get out of bed, or cant get away from work for an appointment, when your regular [facility]using provider is not available (evenings, weekends or holidays), or when youre out of town and need minor care. Electronic visits cost only $49 and if the [facility] 24/7 provider determines a prescription is needed to treat your condition, one can be electronically transmitted to a nearby pharmacy*. Please take a moment to enroll today if you have not already done so. The enrollment process is free and takes just a few minutes. To enroll, please download the [facility]using 24/7 beau to your tablet or phone, or visit [email or web address] to enroll on your computer. And, as an [address] [place] [address] patient with a [employer] account, the results of your visits will be scanned into your electronic medical record and your primary care provider will be able to view the scanned results. We urge you to continue to see your regular [facility]using provider for your ongoing medical care.   And while your primary care provider may not be the one available when you seek a George Villareal virtual visit, the peace of mind you get from getting a real diagnosis real time can be priceless. For more information on George Garzarossifin, view our Frequently Asked Questions (FAQs) at [email or web address]. Sincerely, 
 
Irene Velazco MD 
Chief Medical Officer [employer] *:  certain medications cannot be prescribed via George Villareal Unresulted Labs-Please follow up with your PCP about these lab tests Order Current Status BETA-HYDROXYBUTYRATE In process Providers Seen During Your Hospitalization Provider Specialty Primary office phone Filipe Kelly MD Emergency Medicine [phone] Carrol Mcgraw MD Internal Medicine [phone] Reina Garcia MD Internal Medicine [phone] Priti Christy MD [facility] [phone] Your Primary Care Physician (PCP) Primary Care Physician Office Phone Office Fax NONE ** None ** ** None ** You are allergic to the following No active allergies Recent Documentation Height Weight BMI Smoking Status 1.803 m 69.4 kg 21.35 kg/m2 Former Smoker Emergency Contacts Name Discharge Info Relation Home Work Mobile Jaron Dominguez DISCHARGE CAREGIVER [3] Child [2] [phone] Patient Belongings The following personal items are in your possession at time of discharge: 
  Dental Appliances: None  Visual Aid: Glasses, At home      Home Medications: None   Jewelry: Watch, With patient  Clothing: Pants, Socks, Belt, Shirt, Undergarments, Footwear, Jacket/Coat (2 jackets)    Other Valuables: Cell Phone, Wallet (, 5 dollars in wallet) Discharge Instructions Attachments/References HYDRALAZINE (BY MOUTH) (ENGLISH) AMLODIPINE (BY MOUTH) (ENGLISH) LISINOPRIL (BY MOUTH) (ENGLISH) CARVEDILOL (BY MOUTH) (ENGLISH) INSULIN GLARGINE (BY INJECTION) (ENGLISH) INSULIN LISPRO (BY INJECTION) (ENGLISH) Patient Handouts Hydralazine (By mouth) Hydralazine Hydrochloride (xno-MKSY-m-carolinaen judy-droe-KLOR-kevyn) Treats high blood pressure. Brand Name(s):  
There may be other brand names for this medicine. When This Medicine Should Not Be Used: This medicine is not right for everyone. Do not use it if you had an allergic reaction to hydralazine, or if you have coronary artery disease or rheumatic heart disease. How to Use This Medicine:  
Tablet · Take your medicine as directed. Your dose may need to be changed several times to find what works best for you. · Missed dose: Take a dose as soon as you remember. If it is almost time for your next dose, wait until then and take a regular dose. Do not take extra medicine to make up for a missed dose. · Store the medicine in a closed container at room temperature, away from heat, moisture, and direct light. Drugs and Foods to Avoid: Ask your doctor or pharmacist before using any other medicine, including over-the-counter medicines, vitamins, and herbal products. · Some foods and medicines can affect how hydralazine works. Tell your doctor if you are using diazoxide or an MAO inhibitor. Warnings While Using This Medicine: · Tell your doctor if you are pregnant or breastfeeding, or if you have kidney disease, heart or blood vessel disease, heart rhythm problems, lupus, or if you had a heart attack or stroke. · This medicine may cause the following problems: 
¨ Lupus-like syndrome ¨ Changes in heart rhythm ¨ Nerve problems · This medicine may lower your blood pressure too much and cause you to feel dizzy. Do not drive or do anything else that could be dangerous until you know how this medicine affects you. · Your doctor will do lab tests at regular visits to check on the effects of this medicine. Keep all appointments. · Keep all medicine out of the reach of children. Never share your medicine with anyone. Possible Side Effects While Using This Medicine: Call your doctor right away if you notice any of these side effects: · Allergic reaction: Itching or hives, swelling in your face or hands, swelling or tingling in your mouth or throat, chest tightness, trouble breathing · Change in how much or how often you urinate · Chest pain that may spread to your arms, jaw, back, or neck, trouble breathing, unusual sweating, faintness · Fast, pounding, or uneven heartbeat · Fever, chills, cough, sore throat, and body aches · Lightheadedness, dizziness, or fainting · Numbness, tingling, or burning pain in your hands, arms, legs, or feet · Unusual bleeding, bruising, or weakness If you notice these less serious side effects, talk with your doctor: · Diarrhea, nausea, vomiting, loss of appetite · Headache · Stuffy nose or watery eyes If you notice other side effects that you think are caused by this medicine, tell your doctor. Call your doctor for medical advice about side effects. You may report side effects to FDA at 8-629-FDA-4379 © 2017 [facility] Information is for End User's use only and may not be sold, redistributed or otherwise used for commercial purposes. The above information is an  only. It is not intended as medical advice for individual conditions or treatments. Talk to your doctor, nurse or pharmacist before following any medical regimen to see if it is safe and effective for you. Amlodipine (By mouth) Amlodipine (aq-EGQ-um-peen) Treats high blood pressure and angina (chest pain). This medicine is a calcium channel blocker. Brand Name(s): Norvasc There may be other brand names for this medicine. When This Medicine Should Not Be Used: This medicine is not right for everyone. Do not use it if you had an allergic reaction to amlodipine. How to Use This Medicine:  
Tablet, Dissolving Tablet · Take your medicine as directed.  Your dose may need to be changed several times to find what works best for you. Take this medicine at the same time each day. · Read and follow the patient instructions that come with this medicine. Talk to your doctor or pharmacist if you have any questions. · Missed dose: Take a dose as soon as you remember. If it has been more than 12 hours since you were supposed to take your dose, skip the missed dose and take your next regular dose at the regular time. · Store the medicine in a closed container at room temperature, away from heat, moisture, and direct light. Drugs and Foods to Avoid: Ask your doctor or pharmacist before using any other medicine, including over-the-counter medicines, vitamins, and herbal products. · Some medicines can affect how amlodipine works. Tell your doctor if you are also using any of the following: ¨ Clarithromycin, cyclosporine, diltiazem, itraconazole, ritonavir, sildenafil, simvastatin, tacrolimus Warnings While Using This Medicine: · Tell your doctor if you are pregnant or breastfeeding, or if you have liver disease, heart disease, coronary artery disease, or aortic stenosis. · This medicine could lower your blood pressure too much, especially when you first use it or if you are dehydrated. Stand or sit up slowly if you feel lightheaded or dizzy. · Your doctor will check your progress and the effects of this medicine at regular visits. Keep all appointments. · Do not stop using this medicine without asking your doctor, even if you feel well. This medicine will not cure high blood pressure, but it will help keep it in normal range. You may have to take blood pressure medicine for the rest of your life. · Keep all medicine out of the reach of children. Never share your medicine with anyone. Possible Side Effects While Using This Medicine:  
Call your doctor right away if you notice any of these side effects: · Allergic reaction: Itching or hives, swelling in your face or hands, swelling or tingling in your mouth or throat, chest tightness, trouble breathing · Lightheadedness, dizziness · New or worsening chest pain · Swelling in your hands, ankles, or legs · Trouble breathing, nausea, unusual sweating, fainting If you notice other side effects that you think are caused by this medicine, tell your doctor. Call your doctor for medical advice about side effects. You may report side effects to FDA at 1-432-EWL-9201 © 2017 [address] Information is for End User's use only and may not be sold, redistributed or otherwise used for commercial purposes. The above information is an  only. It is not intended as medical advice for individual conditions or treatments. Talk to your doctor, nurse or pharmacist before following any medical regimen to see if it is safe and effective for you. Lisinopril (By mouth) Lisinopril (lye-SIN-oh-pril) Treats high blood pressure and heart failure. Also given to reduce the risk of death after a heart attack. This medicine is an ACE inhibitor. Brand Name(s): Prinivil, Qbrelis, Zestril There may be other brand names for this medicine. When This Medicine Should Not Be Used: This medicine is not right for everyone. Do not use it if you had an allergic reaction to lisinopril or another ACE inhibitor, or if you are pregnant. How to Use This Medicine:  
Liquid, Tablet · Take your medicine as directed. Your dose may need to be changed several times to find what works best for you. · Oral liquid: Measure the oral liquid medicine with a marked measuring spoon, oral syringe, or medicine cup. · Missed dose: Take a dose as soon as you remember. If it is almost time for your next dose, wait until then and take a regular dose. Do not take extra medicine to make up for a missed dose. · Store the medicine in a closed container at room temperature, away from heat, moisture, and direct light. Drugs and Foods to Avoid: Ask your doctor or pharmacist before using any other medicine, including over-the-counter medicines, vitamins, and herbal products. · Do not use this medicine together with aliskiren if you have diabetes. · Some foods and medicines may affect how lisinopril works. Tell your doctor if you are using any of the following: ¨ Aliskiren, everolimus, lithium, sirolimus, temsirolimus ¨ Another blood pressure medicine, including an angiotensin receptor blocker (ARB) ¨ Diuretic (water pill, including amiloride, spironolactone, triamterene) ¨ Insulin or diabetes medicine ¨ NSAID pain or arthritis medicine (including aspirin, celecoxib, diclofenac, ibuprofen, naproxen) · Ask your doctor before you use any medicine, supplement, or salt substitute that contains potassium. Warnings While Using This Medicine: · It is not safe to take this medicine during pregnancy. It could harm an unborn baby. Tell your doctor right away if you become pregnant. · Tell your doctor if you are breastfeeding, or if you have kidney disease, liver disease, diabetes, or heart or blood vessel disease. · This medicine may cause the following problems: ¨ Angioedema (severe swelling) ¨ Kidney problems ¨ Serious liver problems · This medicine could lower your blood pressure too much, especially when you first use it or if you are dehydrated. Stand or sit up slowly if you feel lightheaded or dizzy. · Do not stop using this medicine without asking your doctor, even if you feel well. This medicine will not cure your high blood pressure, but it will help keep it in a normal range. You may have to take blood pressure medicine for the rest of your life. · Tell any doctor or dentist who treats you that you are using this medicine. · Your doctor will do lab tests at regular visits to check on the effects of this medicine. Keep all appointments. · Keep all medicine out of the reach of children. Never share your medicine with anyone. Possible Side Effects While Using This Medicine:  
Call your doctor right away if you notice any of these side effects: · Allergic reaction: Itching or hives, swelling in your face or hands, swelling or tingling in your mouth or throat, chest tightness, trouble breathing · Blistering, peeling, or red skin rash · Change in how much or how often you urinate · Confusion, weakness, uneven heartbeat, trouble breathing, numbness or tingling in your hands, feet, or lips · Dark urine or pale stools, nausea, vomiting, loss of appetite, stomach pain, yellow skin or eyes · Fever, chills, sore throat, body aches · Lightheadedness, dizziness, fainting · Severe stomach pain (with or without nausea or vomiting) If you notice these less serious side effects, talk with your doctor: · Dry cough If you notice other side effects that you think are caused by this medicine, tell your doctor. Call your doctor for medical advice about side effects. You may report side effects to FDA at 0-115-JDU-7907 © 2017 [facility] Information is for End User's use only and may not be sold, redistributed or otherwise used for commercial purposes. The above information is an  only. It is not intended as medical advice for individual conditions or treatments. Talk to your doctor, nurse or pharmacist before following any medical regimen to see if it is safe and effective for you. Carvedilol (By mouth) Carvedilol ([id number]-ol) Treats high blood pressure and heart failure. Also reduces the risk of death after a heart attack. This medicine is a beta-blocker. Brand Name(s): Coreg, Coreg CR There may be other brand names for this medicine. When This Medicine Should Not Be Used: This medicine is not right for everyone. Do not use it if you had an allergic reaction to carvedilol, or if you have asthma, severe liver disease, or certain heart problems. Ask your doctor about these heart problems. How to Use This Medicine:  
Long Acting Capsule, Tablet · Take your medicine as directed. Your dose may need to be changed several times to find what works best for you. · It is best to take this medicine with food or milk. · Extended-release capsule instructions: ¨ Take the capsule in the morning with food. ¨ Swallow the capsule whole. Do not crush or chew it. ¨ If you cannot swallow the capsule, you may open it and sprinkle the medicine over a spoonful of applesauce. Swallow the applesauce right away. · Read and follow the patient instructions that come with this medicine. Talk to your doctor or pharmacist if you have any questions. · Store the medicine in a closed container at room temperature, away from heat, moisture, and direct light. · Missed dose: Take a dose as soon as you remember. If it is almost time for your next dose, wait until then and take a regular dose. Do not take extra medicine to make up for a missed dose. Drugs and Foods to Avoid: Ask your doctor or pharmacist before using any other medicine, including over-the-counter medicines, vitamins, and herbal products. · Some medicines can affect how carvedilol works. Tell your doctor if you are using amiodarone, clonidine, diltiazem, cyclosporine, digoxin, fluconazole, reserpine, rifampin, verapamil, or an MAO inhibitor (MAOI). Warnings While Using This Medicine: · Tell your doctor if you are pregnant or breastfeeding, or if you have kidney disease, liver disease, bradycardia (slow heartbeat), coronary artery disease, circulation problems, edema (fluid retention or swelling), heart or blood vessel problems, low blood pressure, lung problems (such as bronchitis or emphysema), an overactive thyroid, pheochromocytoma, or frequent chest pains. Tell your doctor if you have a history of severe allergic reactions or if you are scheduled to have surgery. · This medicine may cause the following problems: ¨ Changes to your blood sugar level (if you have diabetes, report any blood sugar level changes to your doctor) ¨ Fewer tears than usual in contact lens wearers ¨ An eye problem called Intraoperative Floppy Iris Syndrome during cataract surgery · This medicine may make you dizzy or drowsy. Do not drive, use machines, or do anything else that could be dangerous if you are not alert. · Do not stop using this medicine suddenly. Your doctor will need to slowly decrease your dose before you stop it completely. · Keep all medicine out of the reach of children. Never share your medicine with anyone. Possible Side Effects While Using This Medicine:  
Call your doctor right away if you notice any of these side effects: · Allergic reaction: Itching or hives, swelling in your face or hands, swelling or tingling in your mouth or throat, chest tightness, trouble breathing · Change in how much or how often you urinate · Chest pain that may spread to your arms, jaw, back, or neck, trouble breathing, nausea, unusual sweating, faintness · Leg pain when you walk, legs and feet that feel cold or numb · Lightheadedness, dizziness, or fainting · Rapid weight gain, swelling in your hands, ankles, or feet · Shaking, trembling, sweating, hunger, confusion · Slow, fast, or uneven heartbeat · Unusual bleeding or bruising · Wheezing or trouble breathing If you notice these less serious side effects, talk with your doctor: · Diarrhea · Trouble having sex · Unusual tiredness or weakness If you notice other side effects that you think are caused by this medicine, tell your doctor. Call your doctor for medical advice about side effects. You may report side effects to FDA at 6-704-FDA-0574 © 2017 [address] Information is for End User's use only and may not be sold, redistributed or otherwise used for commercial purposes. The above information is an  only.  It is not intended as medical advice for individual conditions or treatments. Talk to your doctor, nurse or pharmacist before following any medical regimen to see if it is safe and effective for you. Insulin Glargine (By injection) Insulin Glargine, Recombinant (IN-franklin-yadi ARIANNEceferinoStellaMMary Graceliyah) Treats diabetes. Brand Name(s): Basaglar KwikPen, Lantus, Lantus Novaplus, Lantus SoloStar, Lantus SoloStar Novaplus, Toujeo There may be other brand names for this medicine. When This Medicine Should Not Be Used: This medicine is not right for everyone. Do not use it if you had an allergic reaction to insulin glargine. How to Use This Medicine:  
Injectable · Your healthcare provider will work with you to personalize your dose and treatment based on your insulin needs and lifestyle. You will be taught how to give yourself the injections. Make sure you understand all instructions. Ask the doctor, nurse, or pharmacist if you have questions. · If you use insulin once a day, it is best to use it at about the same time every day. · Always double-check both the concentration (strength) of your insulin and your dose. Concentration and dose are not the same. The dose is how many units of insulin you will use. The concentration tells how many units of insulin are in each milliliter (mL), such as 100 units/mL (U-100), but this does not mean you will use 100 units at a time. · Read and follow the patient instructions that come with this medicine. Talk to your doctor or pharmacist if you have any questions. · This medicine should look clear before you use it. Do not shake the vial. Do not mix this medicine with any other insulin or with water. · You will be shown the body areas where this shot can be given. Use a different body area each time you give yourself a shot. Keep track of where you give each shot to make sure you rotate body areas. · Use a new needle and syringe each time you inject your medicine.  If you use a syringe, use only the kind that is made for insulin injections. Some insulin must be given with a specific type of syringe or needle. Ask your pharmacist if you are not sure which one to use. · Always check the label before use, to make sure you have the correct type of insulin. Do not change the brand, type, or concentration unless your doctor tells you to. If you use a pump or other device, make sure the insulin is made for that device. · Unopened medicine: Store the vials, cartridges, and SoloStar® pens in the refrigerator. Protect from light. Do not freeze. · Opened medicine: ¨ Vials: Store in the refrigerator or at room temperature in a cool place, away from sunlight and heat. Use within 28 days. ¨ Cartridge or [employer]: Store at room temperature, away from direct heat and light. Do not refrigerate. Throw away any opened cartridge or Lantus® SoloStar® pen after 28 days. Throw away any opened [employer] after 42 days. · Throw away used needles in a hard, closed container that the needles cannot poke through. Keep this container away from children and pets. Drugs and Foods to Avoid: Ask your doctor or pharmacist before using any other medicine, including over-the-counter medicines, vitamins, and herbal products. · Some medicines can change the amount of insulin you need to use and make it harder for you to control your diabetes. Tell your doctor about all other medicines that you are using. · Do not drink alcohol while you are using this medicine. Warnings While Using This Medicine: · Tell your doctor if you are pregnant or breastfeeding, or if you have kidney disease, liver disease, heart disease, or heart failure. · This medicine may cause the following problems: 
¨ Low blood sugar or low potassium levels in the blood ¨ Fluid retention or heart failure (when used with thiazolidinedione [TZD] medicine) · Never share insulin pens, needles, or cartridges with anyone.  Sharing these can pass hepatitis viruses, HIV, or other illnesses from one person to another. · Keep all medicine out of the reach of children. Never share your medicine with anyone. Possible Side Effects While Using This Medicine:  
Call your doctor right away if you notice any of these side effects: · Allergic reaction: Itching or hives, swelling in your face or hands, swelling or tingling in your mouth or throat, chest tightness, trouble breathing · Dry mouth, increased thirst, muscle cramps, nausea or vomiting, uneven heartbeat · Rapid weight gain, swelling in your hands, ankles, or feet, trouble breathing, tiredness · Shaking, trembling, sweating, fast or pounding heartbeat, lightheadedness, hunger, confusion If you notice these less serious side effects, talk with your doctor: · Redness, pain, itching, swelling, or any skin changes where the shot was given If you notice other side effects that you think are caused by this medicine, tell your doctor. Call your doctor for medical advice about side effects. You may report side effects to FDA at 9-245-FDA-0897 © 2017 [address] Information is for End User's use only and may not be sold, redistributed or otherwise used for commercial purposes. The above information is an  only. It is not intended as medical advice for individual conditions or treatments. Talk to your doctor, nurse or pharmacist before following any medical regimen to see if it is safe and effective for you. Insulin Lispro (By injection) Insulin Lispro, Recombinant (IN-franklin-yadi LIS-pro, ree-KOM-bi-nant) Treats diabetes. Brand Name(s): HumaLOG, HumaLOG Jayson KwikPen, HumaLOG KwikPen, HumaLOG Pen, Lispro-PFC There may be other brand names for this medicine. When This Medicine Should Not Be Used: This medicine is not right for everyone. Do not use it if you had an allergic reaction to insulin lispro. How to Use This Medicine:  
Injectable · IV: A nurse or other health professional may give you this medicine into a vein if you are in the hospital. 
· Your healthcare provider will work with you to personalize your dose and treatment based on your insulin needs and lifestyle. You will be taught how to give yourself the injections. Make sure you understand all instructions. Ask the doctor, nurse, or pharmacist if you have questions. · Always double-check both the concentration (strength) of your insulin and your dose. Concentration and dose are not the same. The dose is how many units of insulin you will use. The concentration tells how many units of insulin are in each milliliter (mL), such as 100 units/mL (U-100), but this does not mean you will use 100 units at a time. · Read and follow the patient instructions that come with this medicine. Talk to your doctor or pharmacist if you have any questions. · You will be shown the body areas where this shot can be given. Use a different body area each time you give yourself a shot. Keep track of where you give each shot to make sure you rotate body areas. · Always check the label before use to make sure you have the correct type of insulin. Do not change the brand, type, or concentration unless your doctor tells you to. If you use a pump or other device, make sure the insulin is made for that device. · Use this medicine 15 minutes before a meal or right after you eat. · Vial: Use only syringes that are made for insulin injections. Use a new syringe and needle each time you give yourself an injection. · Cartridge, KwikPen®, or Jayson Michaela Pedrazaie: Use a new needle each time you give yourself an injection. · Do not use the pen if it is broken or damaged. · Insulin pump:  
¨ Keep the pump and pump equipment away from heat and direct light. Heat may increase the temperature of the insulin, and prevent it from working as it should. ¨ Use insulin lispro by itself. Do not mix it with other insulins. ¨ Change the insulin solution in the pump reservoir at least every 7 days. Change the infusion set and infusion site at least every 3 days. ¨ Tell your doctor right away if your insulin pump breaks or leaks. Your blood sugar levels may change rapidly. You may need to give yourself injections until your pump is fixed. · Do not mix different types of insulin, unless your doctor tells you to. If you are told to mix lispro with a longer-acting insulin, draw up insulin lispro into the syringe first. Then draw up the longer-acting insulin and inject it right away. · Do not transfer Humalog® U-200 from the KwikPen to a syringe for use. · The insulin solution should look clear and colorless. Do not use it if it is cloudy or clumpy. · Keep all medicine away from heat and direct light. · Unopened medicine: Store unused vials, pens, or cartridges in the refrigerator. Do not freeze. This medicine may be stored at room temperature for up to 28 days. Throw the medicine away after the expiration date has passed. · Opened medicine: ¨ Vials: Keep in the refrigerator or at room temperature for up to 28 days. ¨ Cartridge, KwikPen®, or Jayson Lindie Click: Do not refrigerate the cartridge or pen that you are currently using. Store it at room temperature in a cool place for up to 28 days. · Throw away used needles in a hard, closed container that the needles cannot poke through. Keep this container away from children and pets. Drugs and Foods to Avoid: Ask your doctor or pharmacist before using any other medicine, including over-the-counter medicines, vitamins, and herbal products. · Some medicines can change the amount of insulin you need to use and make it harder for you to control your diabetes. Make sure your doctor knows about all other medicines that you are using. · Do not drink alcohol while you are using this medicine. Warnings While Using This Medicine: · Tell your doctor if you are pregnant or breastfeeding, or if you have kidney disease, liver disease, heart disease, or heart failure. · This medicine may cause the following problems: 
¨ Low blood sugar or low potassium levels in the blood ¨ Fluid retention or heart failure (when used together with a thiazolidinedione [TZD] medicine) · Never share insulin pens, cartridges, or needles with anyone. Sharing these can pass hepatitis viruses, HIV, and other illnesses from one person to another. · Keep all medicine out of the reach of children. Never share your medicine with anyone. Possible Side Effects While Using This Medicine:  
Call your doctor right away if you notice any of these side effects: · Allergic reaction: Itching or hives, swelling in your face or hands, swelling or tingling in your mouth or throat, chest tightness, trouble breathing · Dry mouth, increased thirst, muscle cramps, nausea or vomiting, uneven heartbeat · Rapid weight gain, swelling in your hands, ankles, or feet, trouble breathing, tiredness · Shaking, trembling, sweating, fast or pounding heartbeat, lightheadedness, hunger, confusion If you notice these less serious side effects, talk with your doctor: · Redness, itching, swelling, or any skin changes where the shot is given If you notice other side effects that you think are caused by this medicine, tell your doctor. Call your doctor for medical advice about side effects. You may report side effects to FDA at 5-697-FDA-6656 © 2017 [address]  Information is for End User's use only and may not be sold, redistributed or otherwise used for commercial purposes. The above information is an  only. It is not intended as medical advice for individual conditions or treatments. Talk to your doctor, nurse or pharmacist before following any medical regimen to see if it is safe and effective for you. Please provide this summary of care documentation to your next provider. Signatures-by signing, you are acknowledging that this After Visit Summary has been reviewed with you and you have received a copy. Patient Signature:  ____________________________________________________________ Date:  ____________________________________________________________  
  
Leola George Provider Signature:  ____________________________________________________________ Date:  ____________________________________________________________

## 2018-04-10 NOTE — IP AVS SNAPSHOT
Summary of Care Report The Summary of Care report has been created to help improve care coordination. Users with access to [employer] or [address] (Web-based application) may access additional patient information including the Discharge Summary. If you are not currently a [address] user and need more information, please call the number listed below in the [place] [address] section and ask to be connected with Medical Records. Facility Information Name Address Phone [address] [facility] [address] [phone] Patient Information Patient Name Sex  Samantha Narayan ([id number]) Male 1955 Discharge Information Admitting Provider Service Area Unit Harvey Ochoa MD / [address] Neuro Sci [facility] / [phone] Discharge Provider Discharge Date/Time Discharge Disposition Destination (none) 2018 (Pending) AHR (none) Patient Language Language ENGLISH [13] Hospital Problems as of 2018  Reviewed: 2015  9:08 AM by Malik Mcknight NP Class Noted - Resolved Last Modified POA Active Problems TIA (transient ischemic attack)  2018 - Present 2018 by Sera Wilkerson MD Unknown Entered by Sera Wilkerson MD  
  CVA (cerebral vascular accident) ([facility] [address]  2018 - Present 2018 by Harvey Ochoa MD Unknown Entered by Harvey Ochoa MD  
  Cardiac LV ejection fraction 30-35%  7/15/2015 - Present 2018 by Jackie Martinez, NP Unknown Entered by Jackie Martinez NP Cocaine use  2014 - Present 2018 by Jackie Martinez, NP Unknown Entered by Jackie Martinez NP Overview Signed 2018 12:01 PM by Jackie Martinez, NP  
   + UDS, persistent use Hypertension  2000 - Present 2018 by Jackie Martinez, NP Unknown Entered by Christiano Motta, NP Non-Hospital Problems as of 4/13/2018  Reviewed: 12/2/2015  9:08 AM by Carrol Painter, NP Class Noted - Resolved Last Modified Active Problems Stage 3 chronic renal impairment associated with type 2 diabetes mellitus ([address]  11/4/2014 - Present 11/30/2015 by Christiano Motta, NP Entered by Carrol Painter, NP Hepatitis C infection  11/4/2014 - Present 11/30/2015 by Christiano Motta, NP Entered by Carrol Painter, NP Type 2 diabetes mellitus with insulin therapy ([address]  7/6/2015 - Present 7/10/2016 by Celeste Poole MD  
  Entered by Carrol Painter, NP Homelessness  10/19/2015 - Present 11/30/2015 by Christiano Motta, NP Entered by Carrol Painter, NP  
  CHF (congestive heart failure) ([address]  11/30/2015 - Present 11/30/2015 by Vish Watson MD  
  Entered by Vish Watson MD  
  Cardiomyopathy [facility])  11/30/2015 - Present 11/30/2015 by Christiano Motta, NP Entered by Christiano Motta, NP  
  COPD, mild ([address]  12/2/2015 - Present 12/2/2015 by Carrol Painter, NP Entered by Carrol Painter, NP Essential hypertension  12/2/2015 - Present 7/10/2016 by Celeste Poole MD  
  Entered by Carrol Painter, NP Insulin dependent type 2 diabetes mellitus, controlled ([address]  12/2/2015 - Present 12/2/2015 by Carrol Painter, NP Entered by Carrol Painter, NP  
  NSTEMI (non-ST elevated myocardial infarction) ([address]  7/8/2016 - Present 7/8/2016 by Tavia Garcia  
  Entered by Latasha Robert  
  Systolic CHF, chronic ([address]  7/10/2016 - Present 7/10/2016 by Celeste Poole MD  
  Entered by Celeste Poole MD  
  Chronic renal insufficiency  7/10/2016 - Present 7/10/2016 by Celeste Poole MD  
  Entered by Celeste Poole MD  
  History of cocaine abuse  8/10/2016 - Present 8/10/2016 by Trey Santiago NP Entered by Trey Santiago NP History of heroin abuse  8/10/2016 - Present 8/10/2016 by Trey Santiago NP   Entered by Trey Santiago NP  
 Decreased GFR  8/10/2016 - Present 8/10/2016 by [facility], NP Entered by [facility], NP Elevated HDL  8/10/2016 - Present 8/10/2016 by [facility], NP Entered by [facility], NP Hypertriglyceridemia  8/10/2016 - Present 8/10/2016 by [facility], NP Entered by [facility], NP Elevated serum creatinine  8/10/2016 - Present 8/10/2016 by [facility], NP Entered by [facility], NP Elevated alkaline phosphatase level  8/10/2016 - Present 8/10/2016 by [facility], NP Entered by [facility], NP You are allergic to the following No active allergies Current Discharge Medication List  
  
START taking these medications Dose & Instructions Dispensing Information Comments  
 aspirin 325 mg tablet Commonly known as:  ASPIRIN Start taking on:  4/14/2018 Replaces:  aspirin delayed-release 81 mg tablet Dose:  325 mg Take 1 Tab by mouth daily. Quantity:  30 Tab Refills:  1 Blood-Glucose Meter monitoring kit Check blood sugar qAC and qHS. Quantity:  1 Kit Refills:  0  
   
 carvedilol 3.125 mg tablet Commonly known as:  Boaz Lopez Dose:  3.125 mg Take 1 Tab by mouth every twelve (12) hours. Quantity:  60 Tab Refills:  1  
   
 glucose blood VI test strips strip Commonly known as:  GENSTRIP TEST STRIP Check blood sugar qAC and qHS. Quantity:  200 Strip Refills:  1  
   
 hydrALAZINE 25 mg tablet Commonly known as:  APRESOLINE Dose:  25 mg Take 1 Tab by mouth three (3) times daily. Quantity:  90 Tab Refills:  1  
   
 insulin glargine 100 unit/mL injection Commonly known as:  LANTUS Dose:  12 Units 12 Units by SubCUTAneous route daily. Quantity:  1 Vial  
Refills:  3 Insulin Syringe-Needle U-100 1/2 mL 30 gauge x 5/16 Syrg Commonly known as:  INSULIN SYRINGE For insulin use. Quantity:  100 Syringe Refills:  2 isosorbide mononitrate ER 30 mg tablet Commonly known as:  IMDUR Dose:  30 mg Take 1 Tab by mouth daily. Quantity:  30 Tab Refills:  1 CONTINUE these medications which have CHANGED Dose & Instructions Dispensing Information Comments  
 insulin lispro 100 unit/mL injection Commonly known as:  HUMALOG What changed:  additional instructions For Blood Sugar (mg/dL) of:  Less than 150 =  0 units 150 -199 =  3 units 200 -249 =  6 units 250 -299 =  9 units 300 -349 =  12 units 350 and above =  15 units, call MD.  
 Quantity:  1 Vial  
Refills:  3 CONTINUE these medications which have NOT CHANGED Dose & Instructions Dispensing Information Comments  
 albuterol 90 mcg/actuation inhaler Commonly known as:  PROVENTIL HFA, VENTOLIN HFA, PROAIR HFA Dose:  2 Puff Take 2 Puffs by inhalation every four (4) hours as needed for Wheezing. Quantity:  1 Inhaler Refills:  1 Pharmacy Assistance Medication. This medication is mailed directly to patient from [employer]. amLODIPine-atorvastatin 10-80 mg per tablet Commonly known as:  CADUET Dose:  1 Tab Take 1 Tab by mouth daily. Quantity:  30 Tab Refills:  1 Pharmacy Assistance Program  
  
 budesonide-formoterol 80-4.5 mcg/actuation Hfaa Commonly known as:  SYMBICORT Dose:  2 Puff Take 2 Puffs by inhalation two (2) times a day. Quantity:  1 Inhaler Refills:  1 Pharmacy Assistance Medication. This medication is mailed directly to patient from [employer]. Lancets Misc Use as directed for blood glucose monitoring. Quantity:  200 Each Refills:  1  
   
 lisinopril 20 mg tablet Commonly known as:  Tildon Hyacinth Dose:  20 mg Take 1 Tab by mouth daily. Quantity:  30 Tab Refills:  1  
   
 multivitamin, tx-iron-ca-min 9 mg iron-400 mcg Tab tablet Commonly known as:  THERA-M w/ IRON Dose:  1 Tab Take 1 Tab by mouth daily. Quantity:  30 Tab Refills:  1  
   
 thiamine 100 mg tablet Commonly known as:  B-1 Dose:  100 mg Take 1 Tab by mouth daily. Quantity:  30 Tab Refills:  1 STOP taking these medications Comments  
 aspirin delayed-release 81 mg tablet Replaced by:  aspirin 325 mg tablet  
   
   
 insulin detemir U-100 100 unit/mL (3 mL) Inpn Commonly known as:  Arby Melcroft Current Immunizations Name Date Influenza Vaccine Deb Chong) 10/19/2015 Influenza Vaccine (Quad) PF 2013 Influenza Vaccine (Trivalent) 10/13/2014 Influenza Vaccine Split 2012 Pneumococcal Polysaccharide (PPSV-23) 2015 Follow-up Information Follow up With Details Comments Contact Info None Call in 1 day  None ([phone]) Patient stated that they have no PCP Discharge Instructions Patient armband removed and shredded Key [employer] Activation Thank you for requesting access to [employer]. Please follow the instructions below to securely access and download your online medical record. [employer] allows you to send messages to your doctor, view your test results, renew your prescriptions, schedule appointments, and more. How Do I Sign Up? 1. In your internet browser, go to [email or web address] 
2. Click on the First Time User? Click Here link in the Sign In box. You will be redirect to the New Member Sign Up page. 3. Enter your [employer] Access Code exactly as it appears below. You will not need to use this code after youve completed the sign-up process. If you do not sign up before the expiration date, you must request a new code. [employer] Access Code: [email or web address] Expires: 2018 11:40 PM (This is the date your [employer] access code will ) 4. Enter the last four digits of your Social Security Number (xxxx) and Date of Birth (mm/dd/yyyy) as indicated and click Submit. You will be taken to the next sign-up page. 5. Create a [employer] ID. This will be your [employer] login ID and cannot be changed, so think of one that is secure and easy to remember. 6. Create a [employer] password. You can change your password at any time. 7. Enter your Password Reset Question and Answer. This can be used at a later time if you forget your password. 8. Enter your e-mail address. You will receive e-mail notification when new information is available in [address] 9. Click Sign Up. You can now view and download portions of your medical record. 10. Click the Download Summary menu link to download a portable copy of your medical information. Additional Information If you have questions, please visit the Frequently Asked Questions section of the [employer] website at [email or web address] Remember, [employer] is NOT to be used for urgent needs. For medical emergencies, dial 911. DISCHARGE SUMMARY from Nurse PATIENT INSTRUCTIONS: 
 
 
F-face looks uneven A-arms unable to move or move unevenly S-speech slurred or non-existent T-time-call 911 as soon as signs and symptoms begin-DO NOT go Back to bed or wait to see if you get better-TIME IS BRAIN. Warning Signs of HEART ATTACK Call 911 if you have these symptoms: 
? Chest discomfort. Most heart attacks involve discomfort in the center of the chest that lasts more than a few minutes, or that goes away and comes back. It can feel like uncomfortable pressure, squeezing, fullness, or pain. ? Discomfort in other areas of the upper body. Symptoms can include pain or discomfort in one or both arms, the back, neck, jaw, or stomach. ? Shortness of breath with or without chest discomfort. ? Other signs may include breaking out in a cold sweat, nausea, or lightheadedness. Don't wait more than five minutes to call [address]! Fast action can save your life. Calling 911 is almost always the fastest way to get lifesaving treatment. Emergency Medical Services staff can begin treatment when they arrive  up to an hour sooner than if someone gets to the hospital by car. The discharge information has been reviewed with the patient. The patient verbalized understanding. Discharge medications reviewed with the patient and appropriate educational materials and side effects teaching were provided. ___________________________________________________________________________________________________________________________________ Chart Review Routing History Recipient Method Report Sent By [facility] Fax: [phone] Fax [facility] IP AMB RESULT REPORT [employer] [[id number]] 11/30/2015  5:10 AM 11/30/2015 Mindy Ag NP Phone: [phone] In Basket IP Auto Routed Billy Mendoza MD [[id number]] 12/2/2015 10:32 AM 12/02/2015 Willian Mondragon MD  
Phone: [phone] In Basket IP Auto Routed Billy Mendoza MD [[id number]] 12/2/2015 10:32 AM 12/02/2015 Marilu Rosenthal MD  
Phone: [phone] In Basket IP Auto Routed [employer], MD [[id number]] 12/2/2015 10:32 AM 12/02/2015 Isis Cain MD  
Phone: [phone] In Basket IP Auto Routed [employer], MD [[id number]] 12/2/2015 10:32 AM 12/02/2015 Isis Cain MD  
Phone: [phone] In [employer] Routed Levell Diones [[id number]] 12/26/2015 12:37 AM 12/26/2015 Mindy Ag NP Phone: [phone] In Basket IP Auto Routed [employer] [[id number]] 7/12/2016 11:00 AM 07/12/2016 Hanna Cortez MD  
Phone: [phone] In Basket IP Auto Routed [employer] [[id number]] 7/12/2016 11:00 AM 07/12/2016 Bev Carcamo MD  
Phone: [phone] In Basket IP Auto Routed [employer] [[id number]] 7/12/2016 11:00 AM 07/12/2016 [facility] Fax: [phone] Fax [facility] IP AMB RESULT REPORT IMAGING Lorne Friends [[id number]] 12/24/2017  6:01 PM 12/24/2017 Estephania Platt MD  
Phone: [phone] In [employer] Routed [employer] [place] [[id number]] 4/12/2018 10:06 PM 04/12/2018

## 2018-04-10 NOTE — IP AVS SNAPSHOT
[address] [address] Agatha Martinez Patient: Javier Resendiz MRN: [id number] QSE:[birth year]47 A check erick indicates which time of day the medication should be taken. My Medications START taking these medications Instructions Each Dose to Equal  
 Morning Noon Evening Bedtime  
 aspirin 325 mg tablet Commonly known as:  ASPIRIN Start taking on:  4/14/2018 Replaces:  aspirin delayed-release 81 mg tablet Your last dose was: Your next dose is: Take 1 Tab by mouth daily. 325 mg Blood-Glucose Meter monitoring kit Your last dose was: Your next dose is:    
   
   
 Check blood sugar qAC and qHS. carvedilol 3.125 mg tablet Commonly known as:  Nicolasa Nguyen Your last dose was: Your next dose is: Take 1 Tab by mouth every twelve (12) hours. 3.125 mg  
    
   
   
   
  
 glucose blood VI test strips strip Commonly known as:  GENSTRIP TEST STRIP Your last dose was: Your next dose is:    
   
   
 Check blood sugar qAC and qHS. hydrALAZINE 25 mg tablet Commonly known as:  APRESOLINE Your last dose was: Your next dose is: Take 1 Tab by mouth three (3) times daily. 25 mg  
    
   
   
   
  
 insulin glargine 100 unit/mL injection Commonly known as:  LANTUS Your last dose was: Your next dose is:    
   
   
 12 Units by SubCUTAneous route daily. 12 Units Insulin Syringe-Needle U-100 1/2 mL 30 gauge x 5/16 Syrg Commonly known as:  INSULIN SYRINGE Your last dose was: Your next dose is: For insulin use. isosorbide mononitrate ER 30 mg tablet Commonly known as:  IMDUR Your last dose was: Your next dose is: Take 1 Tab by mouth daily. 30 mg CHANGE how you take these medications Instructions Each Dose to Equal  
 Morning Noon Evening Bedtime  
 insulin lispro 100 unit/mL injection Commonly known as:  HUMALOG What changed:  additional instructions Your last dose was: Your next dose is: For Blood Sugar (mg/dL) of:  Less than 150 =  0 units 150 -199 =  3 units 200 -249 =  6 units 250 -299 =  9 units 300 -349 =  12 units 350 and above =  15 units, call MD. Kay Boo taking these medications Instructions Each Dose to Equal  
 Morning Noon Evening Bedtime  
 albuterol 90 mcg/actuation inhaler Commonly known as:  PROVENTIL HFA, VENTOLIN HFA, PROAIR HFA Your last dose was: Your next dose is: Take 2 Puffs by inhalation every four (4) hours as needed for Wheezing. 2 Puff  
    
   
   
   
  
 amLODIPine-atorvastatin 10-80 mg per tablet Commonly known as:  CADUET Your last dose was: Your next dose is: Take 1 Tab by mouth daily. 1 Tab  
    
   
   
   
  
 budesonide-formoterol 80-4.5 mcg/actuation Hfaa Commonly known as:  SYMBICORT Your last dose was: Your next dose is: Take 2 Puffs by inhalation two (2) times a day. 2 Puff Lancets Misc Your last dose was: Your next dose is:    
   
   
 Use as directed for blood glucose monitoring. lisinopril 20 mg tablet Commonly known as:  Tildon Hyacinth Your last dose was: Your next dose is: Take 1 Tab by mouth daily. 20 mg  
    
   
   
   
  
 multivitamin, tx-iron-ca-min 9 mg iron-400 mcg Tab tablet Commonly known as:  THERA-M w/ IRON Your last dose was: Your next dose is: Take 1 Tab by mouth daily. 1 Tab  
    
   
   
   
  
 thiamine 100 mg tablet Commonly known as:  B-1  
   
 Your last dose was: Your next dose is: Take 1 Tab by mouth daily. 100 mg  
    
   
   
   
  
  
STOP taking these medications   
 aspirin delayed-release 81 mg tablet Replaced by:  aspirin 325 mg tablet  
   
  
 insulin detemir U-100 100 unit/mL (3 mL) Inpn Commonly known as:  Letty Maldonado Where to Get Your Medications These medications were sent to [address], [address] [facility]  [address], [address] [place], [place] [address] Phone:  [phone]  
  albuterol 90 mcg/actuation inhaler  
 amLODIPine-atorvastatin 10-80 mg per tablet  
 aspirin 325 mg tablet Blood-Glucose Meter monitoring kit  
 budesonide-formoterol 80-4.5 mcg/actuation Hfaa  
 carvedilol 3.125 mg tablet  
 glucose blood VI test strips strip  
 hydrALAZINE 25 mg tablet  
 insulin glargine 100 unit/mL injection Insulin Syringe-Needle U-100 1/2 mL 30 gauge x 5/16 Syrg  
 isosorbide mononitrate ER 30 mg tablet Lancets Misc  
 lisinopril 20 mg tablet  
 multivitamin, tx-iron-ca-min 9 mg iron-400 mcg Tab tablet  
 thiamine 100 mg tablet Information on where to get these meds will be given to you by the nurse or doctor. ! Ask your nurse or doctor about these medications  
  insulin lispro 100 unit/mL injection

## 2018-04-11 ENCOUNTER — APPOINTMENT (OUTPATIENT)
Dept: MRI IMAGING | Age: 63
DRG: 065 | End: 2018-04-11
Attending: EMERGENCY MEDICINE
Payer: SELF-PAY

## 2018-04-11 PROBLEM — G45.9 TIA (TRANSIENT ISCHEMIC ATTACK): Status: ACTIVE | Noted: 2018-04-11

## 2018-04-11 PROBLEM — I63.9 CVA (CEREBRAL VASCULAR ACCIDENT) (HCC): Status: ACTIVE | Noted: 2018-04-11

## 2018-04-11 LAB
ALBUMIN SERPL-MCNC: 2.5 G/DL (ref 3.4–5)
ALBUMIN/GLOB SERPL: 0.6 {RATIO} (ref 0.8–1.7)
ALP SERPL-CCNC: 131 U/L (ref 45–117)
ALT SERPL-CCNC: 43 U/L (ref 16–61)
AMPHET UR QL SCN: NEGATIVE
ANION GAP SERPL CALC-SCNC: 6 MMOL/L (ref 3–18)
AST SERPL-CCNC: 36 U/L (ref 15–37)
BARBITURATES UR QL SCN: NEGATIVE
BASOPHILS # BLD: 0 K/UL (ref 0–0.1)
BASOPHILS NFR BLD: 1 % (ref 0–2)
BENZODIAZ UR QL: NEGATIVE
BILIRUB SERPL-MCNC: 0.3 MG/DL (ref 0.2–1)
BUN SERPL-MCNC: 27 MG/DL (ref 7–18)
BUN/CREAT SERPL: 10 (ref 12–20)
CALCIUM SERPL-MCNC: 8.1 MG/DL (ref 8.5–10.1)
CANNABINOIDS UR QL SCN: NEGATIVE
CHLORIDE SERPL-SCNC: 94 MMOL/L (ref 100–108)
CK MB CFR SERPL CALC: 1.9 % (ref 0–4)
CK MB CFR SERPL CALC: 1.9 % (ref 0–4)
CK MB SERPL-MCNC: 3.9 NG/ML (ref 5–25)
CK MB SERPL-MCNC: 5.5 NG/ML (ref 5–25)
CK SERPL-CCNC: 209 U/L (ref 39–308)
CK SERPL-CCNC: 294 U/L (ref 39–308)
CO2 SERPL-SCNC: 30 MMOL/L (ref 21–32)
COCAINE UR QL SCN: POSITIVE
CREAT SERPL-MCNC: 2.67 MG/DL (ref 0.6–1.3)
DIFFERENTIAL METHOD BLD: ABNORMAL
EOSINOPHIL # BLD: 0.3 K/UL (ref 0–0.4)
EOSINOPHIL NFR BLD: 4 % (ref 0–5)
ERYTHROCYTE [DISTWIDTH] IN BLOOD BY AUTOMATED COUNT: 12.2 % (ref 11.6–14.5)
GLOBULIN SER CALC-MCNC: 4.1 G/DL (ref 2–4)
GLUCOSE BLD STRIP.AUTO-MCNC: 204 MG/DL (ref 70–110)
GLUCOSE BLD STRIP.AUTO-MCNC: 293 MG/DL (ref 70–110)
GLUCOSE BLD STRIP.AUTO-MCNC: 386 MG/DL (ref 70–110)
GLUCOSE BLD STRIP.AUTO-MCNC: 404 MG/DL (ref 70–110)
GLUCOSE BLD STRIP.AUTO-MCNC: 477 MG/DL (ref 70–110)
GLUCOSE SERPL-MCNC: 634 MG/DL (ref 74–99)
HBA1C MFR BLD: 13.2 % (ref 4.2–5.6)
HCT VFR BLD AUTO: 34 % (ref 36–48)
HDSCOM,HDSCOM: ABNORMAL
HGB BLD-MCNC: 11.6 G/DL (ref 13–16)
LYMPHOCYTES # BLD: 2.4 K/UL (ref 0.9–3.6)
LYMPHOCYTES NFR BLD: 30 % (ref 21–52)
MCH RBC QN AUTO: 29.5 PG (ref 24–34)
MCHC RBC AUTO-ENTMCNC: 34.1 G/DL (ref 31–37)
MCV RBC AUTO: 86.5 FL (ref 74–97)
METHADONE UR QL: NEGATIVE
MONOCYTES # BLD: 0.8 K/UL (ref 0.05–1.2)
MONOCYTES NFR BLD: 9 % (ref 3–10)
NEUTS SEG # BLD: 4.6 K/UL (ref 1.8–8)
NEUTS SEG NFR BLD: 56 % (ref 40–73)
OPIATES UR QL: POSITIVE
PCP UR QL: NEGATIVE
PLATELET # BLD AUTO: 306 K/UL (ref 135–420)
PMV BLD AUTO: 10.6 FL (ref 9.2–11.8)
POTASSIUM SERPL-SCNC: 4.5 MMOL/L (ref 3.5–5.5)
PROT SERPL-MCNC: 6.6 G/DL (ref 6.4–8.2)
RBC # BLD AUTO: 3.93 M/UL (ref 4.7–5.5)
SODIUM SERPL-SCNC: 130 MMOL/L (ref 136–145)
TROPONIN I SERPL-MCNC: 0.08 NG/ML (ref 0–0.04)
TROPONIN I SERPL-MCNC: 0.08 NG/ML (ref 0–0.04)
TROPONIN I SERPL-MCNC: 0.09 NG/ML (ref 0–0.04)
WBC # BLD AUTO: 8.1 K/UL (ref 4.6–13.2)

## 2018-04-11 PROCEDURE — 92526 ORAL FUNCTION THERAPY: CPT

## 2018-04-11 PROCEDURE — 74011250636 HC RX REV CODE- 250/636: Performed by: EMERGENCY MEDICINE

## 2018-04-11 PROCEDURE — 74011250637 HC RX REV CODE- 250/637: Performed by: EMERGENCY MEDICINE

## 2018-04-11 PROCEDURE — 70547 MR ANGIOGRAPHY NECK W/O DYE: CPT

## 2018-04-11 PROCEDURE — 96360 HYDRATION IV INFUSION INIT: CPT

## 2018-04-11 PROCEDURE — 65660000000 HC RM CCU STEPDOWN

## 2018-04-11 PROCEDURE — 92610 EVALUATE SWALLOWING FUNCTION: CPT

## 2018-04-11 PROCEDURE — 83036 HEMOGLOBIN GLYCOSYLATED A1C: CPT | Performed by: INTERNAL MEDICINE

## 2018-04-11 PROCEDURE — 70551 MRI BRAIN STEM W/O DYE: CPT

## 2018-04-11 PROCEDURE — 82962 GLUCOSE BLOOD TEST: CPT

## 2018-04-11 PROCEDURE — 74011636637 HC RX REV CODE- 636/637: Performed by: EMERGENCY MEDICINE

## 2018-04-11 PROCEDURE — 74011000250 HC RX REV CODE- 250: Performed by: INTERNAL MEDICINE

## 2018-04-11 PROCEDURE — 82550 ASSAY OF CK (CPK): CPT | Performed by: EMERGENCY MEDICINE

## 2018-04-11 PROCEDURE — 70544 MR ANGIOGRAPHY HEAD W/O DYE: CPT

## 2018-04-11 PROCEDURE — 80307 DRUG TEST PRSMV CHEM ANLYZR: CPT | Performed by: EMERGENCY MEDICINE

## 2018-04-11 PROCEDURE — 36415 COLL VENOUS BLD VENIPUNCTURE: CPT | Performed by: INTERNAL MEDICINE

## 2018-04-11 PROCEDURE — 93306 TTE W/DOPPLER COMPLETE: CPT

## 2018-04-11 RX ORDER — DOCUSATE SODIUM 100 MG/1
100 CAPSULE, LIQUID FILLED ORAL 2 TIMES DAILY
Status: DISCONTINUED | OUTPATIENT
Start: 2018-04-11 | End: 2018-04-13 | Stop reason: HOSPADM

## 2018-04-11 RX ORDER — ASPIRIN 300 MG/1
300 SUPPOSITORY RECTAL DAILY
Status: DISCONTINUED | OUTPATIENT
Start: 2018-04-11 | End: 2018-04-11

## 2018-04-11 RX ORDER — INSULIN LISPRO 100 [IU]/ML
INJECTION, SOLUTION INTRAVENOUS; SUBCUTANEOUS
Status: DISCONTINUED | OUTPATIENT
Start: 2018-04-11 | End: 2018-04-13 | Stop reason: HOSPADM

## 2018-04-11 RX ORDER — HEPARIN SODIUM 5000 [USP'U]/ML
5000 INJECTION, SOLUTION INTRAVENOUS; SUBCUTANEOUS EVERY 8 HOURS
Status: DISCONTINUED | OUTPATIENT
Start: 2018-04-11 | End: 2018-04-13 | Stop reason: HOSPADM

## 2018-04-11 RX ORDER — ASPIRIN 325 MG
325 TABLET ORAL DAILY
Status: DISCONTINUED | OUTPATIENT
Start: 2018-04-11 | End: 2018-04-13 | Stop reason: HOSPADM

## 2018-04-11 RX ORDER — BUDESONIDE AND FORMOTEROL FUMARATE DIHYDRATE 80; 4.5 UG/1; UG/1
2 AEROSOL RESPIRATORY (INHALATION)
Status: DISCONTINUED | OUTPATIENT
Start: 2018-04-11 | End: 2018-04-13 | Stop reason: HOSPADM

## 2018-04-11 RX ORDER — SODIUM CHLORIDE 0.9 % (FLUSH) 0.9 %
5-10 SYRINGE (ML) INJECTION AS NEEDED
Status: DISCONTINUED | OUTPATIENT
Start: 2018-04-11 | End: 2018-04-13 | Stop reason: HOSPADM

## 2018-04-11 RX ORDER — INSULIN LISPRO 100 [IU]/ML
INJECTION, SOLUTION INTRAVENOUS; SUBCUTANEOUS EVERY 6 HOURS
Status: DISCONTINUED | OUTPATIENT
Start: 2018-04-11 | End: 2018-04-11

## 2018-04-11 RX ORDER — SODIUM CHLORIDE 9 MG/ML
75 INJECTION, SOLUTION INTRAVENOUS CONTINUOUS
Status: DISPENSED | OUTPATIENT
Start: 2018-04-11 | End: 2018-04-12

## 2018-04-11 RX ORDER — LABETALOL HYDROCHLORIDE 5 MG/ML
5 INJECTION, SOLUTION INTRAVENOUS
Status: DISCONTINUED | OUTPATIENT
Start: 2018-04-11 | End: 2018-04-13 | Stop reason: HOSPADM

## 2018-04-11 RX ORDER — ATORVASTATIN CALCIUM 10 MG/1
10 TABLET, FILM COATED ORAL
Status: DISCONTINUED | OUTPATIENT
Start: 2018-04-11 | End: 2018-04-13 | Stop reason: HOSPADM

## 2018-04-11 RX ORDER — INSULIN GLARGINE 100 [IU]/ML
10 INJECTION, SOLUTION SUBCUTANEOUS DAILY
Status: DISCONTINUED | OUTPATIENT
Start: 2018-04-11 | End: 2018-04-11

## 2018-04-11 RX ORDER — DEXTROSE 50 % IN WATER (D50W) INTRAVENOUS SYRINGE
25-50 AS NEEDED
Status: DISCONTINUED | OUTPATIENT
Start: 2018-04-11 | End: 2018-04-13 | Stop reason: HOSPADM

## 2018-04-11 RX ORDER — SODIUM CHLORIDE 9 MG/ML
10 INJECTION INTRAMUSCULAR; INTRAVENOUS; SUBCUTANEOUS
Status: COMPLETED | OUTPATIENT
Start: 2018-04-11 | End: 2018-04-11

## 2018-04-11 RX ORDER — SODIUM CHLORIDE 0.9 % (FLUSH) 0.9 %
5-10 SYRINGE (ML) INJECTION EVERY 8 HOURS
Status: DISCONTINUED | OUTPATIENT
Start: 2018-04-11 | End: 2018-04-13 | Stop reason: HOSPADM

## 2018-04-11 RX ORDER — ALBUTEROL SULFATE 0.83 MG/ML
2.5 SOLUTION RESPIRATORY (INHALATION)
Status: DISCONTINUED | OUTPATIENT
Start: 2018-04-11 | End: 2018-04-13 | Stop reason: HOSPADM

## 2018-04-11 RX ORDER — ALBUTEROL SULFATE 90 UG/1
2 AEROSOL, METERED RESPIRATORY (INHALATION)
Status: DISCONTINUED | OUTPATIENT
Start: 2018-04-11 | End: 2018-04-11

## 2018-04-11 RX ORDER — MAGNESIUM SULFATE 100 %
4 CRYSTALS MISCELLANEOUS AS NEEDED
Status: DISCONTINUED | OUTPATIENT
Start: 2018-04-11 | End: 2018-04-13 | Stop reason: HOSPADM

## 2018-04-11 RX ORDER — INSULIN GLARGINE 100 [IU]/ML
10 INJECTION, SOLUTION SUBCUTANEOUS ONCE
Status: COMPLETED | OUTPATIENT
Start: 2018-04-11 | End: 2018-04-11

## 2018-04-11 RX ORDER — ACETAMINOPHEN 325 MG/1
650 TABLET ORAL
Status: DISCONTINUED | OUTPATIENT
Start: 2018-04-11 | End: 2018-04-13 | Stop reason: HOSPADM

## 2018-04-11 RX ORDER — ASPIRIN 325 MG
325 TABLET ORAL
Status: COMPLETED | OUTPATIENT
Start: 2018-04-11 | End: 2018-04-11

## 2018-04-11 RX ORDER — ASPIRIN 325 MG
325 TABLET ORAL DAILY
Status: DISCONTINUED | OUTPATIENT
Start: 2018-04-11 | End: 2018-04-11

## 2018-04-11 RX ORDER — INSULIN GLARGINE 100 [IU]/ML
20 INJECTION, SOLUTION SUBCUTANEOUS DAILY
Status: DISCONTINUED | OUTPATIENT
Start: 2018-04-12 | End: 2018-04-12

## 2018-04-11 RX ADMIN — DOCUSATE SODIUM 100 MG: 100 CAPSULE, LIQUID FILLED ORAL at 17:20

## 2018-04-11 RX ADMIN — SODIUM CHLORIDE 75 ML/HR: 900 INJECTION, SOLUTION INTRAVENOUS at 11:03

## 2018-04-11 RX ADMIN — ASPIRIN 325 MG ORAL TABLET 325 MG: 325 PILL ORAL at 17:23

## 2018-04-11 RX ADMIN — INSULIN GLARGINE 10 UNITS: 100 INJECTION, SOLUTION SUBCUTANEOUS at 10:50

## 2018-04-11 RX ADMIN — HEPARIN SODIUM 5000 UNITS: 5000 INJECTION, SOLUTION INTRAVENOUS; SUBCUTANEOUS at 17:21

## 2018-04-11 RX ADMIN — ATORVASTATIN CALCIUM 10 MG: 20 TABLET, FILM COATED ORAL at 22:14

## 2018-04-11 RX ADMIN — ATORVASTATIN CALCIUM 10 MG: 20 TABLET, FILM COATED ORAL at 03:57

## 2018-04-11 RX ADMIN — INSULIN LISPRO 10 UNITS: 100 INJECTION, SOLUTION INTRAVENOUS; SUBCUTANEOUS at 11:08

## 2018-04-11 RX ADMIN — HEPARIN SODIUM 5000 UNITS: 5000 INJECTION, SOLUTION INTRAVENOUS; SUBCUTANEOUS at 10:00

## 2018-04-11 RX ADMIN — Medication 10 ML: at 22:14

## 2018-04-11 RX ADMIN — Medication 10 ML: at 10:59

## 2018-04-11 RX ADMIN — ASPIRIN 325 MG ORAL TABLET 325 MG: 325 PILL ORAL at 03:57

## 2018-04-11 RX ADMIN — INSULIN LISPRO 9 UNITS: 100 INJECTION, SOLUTION INTRAVENOUS; SUBCUTANEOUS at 22:15

## 2018-04-11 RX ADMIN — INSULIN GLARGINE 10 UNITS: 100 INJECTION, SOLUTION SUBCUTANEOUS at 15:00

## 2018-04-11 RX ADMIN — Medication 10 ML: at 17:23

## 2018-04-11 RX ADMIN — SODIUM CHLORIDE 10 ML: 9 INJECTION, SOLUTION INTRAMUSCULAR; INTRAVENOUS; SUBCUTANEOUS at 15:42

## 2018-04-11 RX ADMIN — SODIUM CHLORIDE 1000 ML: 900 INJECTION, SOLUTION INTRAVENOUS at 01:30

## 2018-04-11 NOTE — ED PROVIDER NOTES
EMERGENCY DEPARTMENT HISTORY AND PHYSICAL EXAM    11:27 PM      Date: 4/10/2018  Patient Name: Nyasia Maciel    History of Presenting Illness     Chief Complaint   Patient presents with    Other     slurred speach         History Provided By: Patient    Chief Complaint: Slurred speech  Duration:  Days  Timing:  Intermittent  Severity: Mild  Associated Symptoms: drooling with speech      Additional History (Context): Nyasia Maciel is a 58 y.o. male with a history of HTN, diabetes, Hepatitis C, and cardiomyopathy, who presents to the ED with complaint of slurred speech which has been intermittent since yesterday morning (4/9/18). Patient states that he has been drooling with speech. He denies history of CVA. Patient denies associated headache, facial asymmetry, focal weakness, chest pain, shortness of breath, nausea, vomiting, abdominal pain, or vision changes. PCP: None    Current Facility-Administered Medications   Medication Dose Route Frequency Provider Last Rate Last Dose    atorvastatin (LIPITOR) tablet 10 mg  10 mg Oral QHS Cindy Munoz MD   10 mg at 04/11/18 1507     Current Outpatient Prescriptions   Medication Sig Dispense Refill    amLODIPine-atorvastatin (CADUET) 10-80 mg per tablet Take 1 Tab by mouth daily. 90 Tab 0    insulin lispro (HUMALOG) 100 unit/mL injection 5 units tid with meals 4 Vial 0    budesonide-formoterol (SYMBICORT) 80-4.5 mcg/actuation HFAA Take 2 Puffs by inhalation two (2) times a day. 2 Inhaler 3    albuterol (PROVENTIL HFA, VENTOLIN HFA, PROAIR HFA) 90 mcg/actuation inhaler Take 2 Puffs by inhalation every four (4) hours as needed for Wheezing. 3 Inhaler 3    insulin detemir (LEVEMIR FLEXPEN) 100 unit/mL (3 mL) inpn 20 Units by SubCUTAneous route Before breakfast and dinner. 2 Pen 4    lisinopril (PRINIVIL, ZESTRIL) 20 mg tablet Take 1 Tab by mouth daily. 30 Tab 4    thiamine (B-1) 100 mg tablet Take 1 Tab by mouth daily.  30 Tab 0    multivitamin, tx-iron-ca-min (THERA-M W/ IRON) 9 mg iron-400 mcg tab tablet Take 1 Tab by mouth daily. 30 Tab 0    Lancets misc Use as directed for blood glucose monitoring. 1 Each 11    aspirin delayed-release 81 mg tablet Take 1 Tab by mouth daily. 30 Tab 0       Past History     Past Medical History:  Past Medical History:   Diagnosis Date    Cardiac LV ejection fraction 30-35% 7/15/15    Cardiomyopathy ([facility] [address] 7/15/15    35% per ECHO    Cocaine use 8/9/14    + UDS, persistent use    Diabetes ([facility] [address] 8/9/2014    8.1 hgbA1C 8/9/14    Heart failure ([facility] [address]     Hepatitis C infection 8/20/14    Genotype 1A    History of cocaine abuse 8/10/2016    History of heroin abuse 8/10/2016    Homelessness 9/7/2015    Hypertension 2000       Past Surgical History:  History reviewed. No pertinent surgical history. Family History:  Family History   Problem Relation Age of Onset    Liver Disease Father     Diabetes Mother     Cancer Maternal Grandmother     No Known Problems Sister     Diabetes Brother     No Known Problems Brother     No Known Problems Sister     No Known Problems Sister        Social History:  Social History   Substance Use Topics    Smoking status: Former Smoker     Packs/day: 0.30     Types: Cigarettes     Quit date: 7/4/2016    Smokeless tobacco: Never Used    Alcohol use 0.0 oz/week     0 Standard drinks or equivalent per week      Comment: started heavy drinking at teen till in Aug 2014 but has gone into remission since then       Allergies:  No Known Allergies      Review of Systems       Review of Systems   Constitutional: Negative. Negative for chills and fever. HENT: Negative. Eyes: Negative. Negative for visual disturbance. Respiratory: Negative. Negative for shortness of breath. Cardiovascular: Negative. Negative for chest pain. Gastrointestinal: Negative. Negative for abdominal pain, nausea and vomiting. Endocrine: Negative. Genitourinary: Negative. Musculoskeletal: Negative. Negative for back pain. Skin: Negative. Allergic/Immunologic: Negative. Neurological: Positive for speech difficulty. Negative for facial asymmetry, weakness and headaches. Hematological: Negative. Psychiatric/Behavioral: Negative. All other systems reviewed and are negative. Physical Exam     Visit Vitals    /86    Pulse 70    Temp 98.4 °F (36.9 °C)    Resp 15    SpO2 97%         Physical Exam:  . Patient Vitals for the past 12 hrs:   Temp Pulse Resp BP SpO2   04/11/18 0530 - - - 155/86 97 %   04/11/18 0515 - 70 15 152/87 99 %   04/11/18 0430 - 66 10 144/75 99 %   04/11/18 0345 - 61 9 146/80 99 %   04/11/18 0334 - 63 8 149/84 99 %   04/11/18 0300 - 65 11 153/86 100 %   04/11/18 0230 - - - 151/90 99 %   04/11/18 0215 - - - 155/84 100 %   04/11/18 0200 - - - (!) 156/92 99 %   04/11/18 0145 - - - 145/78 100 %   04/11/18 0130 - 87 15 (!) 167/119 100 %   04/11/18 0119 - 70 15 161/90 99 %   04/11/18 0045 - 80 16 151/55 97 %   04/10/18 2330 98.4 °F (36.9 °C) 69 13 151/89 99 %     Gen: Well developed, well nourished 58 y.o. male  HEENT: Normocephalic, atraumatic  Respiratory: No accessory muscle use No wheeze, No rales, No rhonchi. Normal chest wall excursion. No subcutaneous air, no rib crepitus  Cardiovascular: Regular rhythm and rate, Normal pulses, Normal perfusion. No edema  Gastrointestinal: Non distended, Non tender, No masses. No ascites. No organomegaly. No evidence of trauma  Musculoskeletal: Full range of motion at all other tested joints. No joint effusions. Neuro: Normal strength, Normal sensation. No ataxia. Cranial nerves: Weaker on left face that right. Exam is intermittent in that when distracted, he has normal movement on both sides of his face. Skin: No rash, petechia or purpura. Warm and dry  Psyche: No suicidal ideation, No homicidal ideation. No hallucinations. Organized thoughts.   Heme: Normal  : Deferred      Diagnostic Study Results     Labs -  Recent Results (from the past 12 hour(s))   CBC WITH AUTOMATED DIFF    Collection Time: 04/10/18 11:33 PM   Result Value Ref Range    WBC 8.1 4.6 - 13.2 K/uL    RBC 3.93 (L) 4.70 - 5.50 M/uL    HGB 11.6 (L) 13.0 - 16.0 g/dL    HCT 34.0 (L) 36.0 - 48.0 %    MCV 86.5 74.0 - 97.0 FL    MCH 29.5 24.0 - 34.0 PG    MCHC 34.1 31.0 - 37.0 g/dL    RDW 12.2 11.6 - 14.5 %    PLATELET 718 077 - 065 K/uL    MPV 10.6 9.2 - 11.8 FL    NEUTROPHILS 56 40 - 73 %    LYMPHOCYTES 30 21 - 52 %    MONOCYTES 9 3 - 10 %    EOSINOPHILS 4 0 - 5 %    BASOPHILS 1 0 - 2 %    ABS. NEUTROPHILS 4.6 1.8 - 8.0 K/UL    ABS. LYMPHOCYTES 2.4 0.9 - 3.6 K/UL    ABS. MONOCYTES 0.8 0.05 - 1.2 K/UL    ABS. EOSINOPHILS 0.3 0.0 - 0.4 K/UL    ABS. BASOPHILS 0.0 0.0 - 0.1 K/UL    DF AUTOMATED     PTT    Collection Time: 04/10/18 11:33 PM   Result Value Ref Range    aPTT 27.5 23.0 - 36.4 SEC   PROTHROMBIN TIME + INR    Collection Time: 04/10/18 11:33 PM   Result Value Ref Range    Prothrombin time 11.1 (L) 11.5 - 15.2 sec    INR 0.8 0.8 - 1.2     METABOLIC PANEL, COMPREHENSIVE    Collection Time: 04/10/18 11:33 PM   Result Value Ref Range    Sodium 130 (L) 136 - 145 mmol/L    Potassium 4.5 3.5 - 5.5 mmol/L    Chloride 94 (L) 100 - 108 mmol/L    CO2 30 21 - 32 mmol/L    Anion gap 6 3.0 - 18 mmol/L    Glucose 634 (HH) 74 - 99 mg/dL    BUN 27 (H) 7.0 - 18 MG/DL    Creatinine 2.67 (H) 0.6 - 1.3 MG/DL    BUN/Creatinine ratio 10 (L) 12 - 20      GFR est AA 29 (L) >60 ml/min/1.73m2    GFR est non-AA 24 (L) >60 ml/min/1.73m2    Calcium 8.1 (L) 8.5 - 10.1 MG/DL    Bilirubin, total 0.3 0.2 - 1.0 MG/DL    ALT (SGPT) 43 16 - 61 U/L    AST (SGOT) 36 15 - 37 U/L    Alk.  phosphatase 131 (H) 45 - 117 U/L    Protein, total 6.6 6.4 - 8.2 g/dL    Albumin 2.5 (L) 3.4 - 5.0 g/dL    Globulin 4.1 (H) 2.0 - 4.0 g/dL    A-G Ratio 0.6 (L) 0.8 - 1.7     TROPONIN I    Collection Time: 04/10/18 11:33 PM   Result Value Ref Range    Troponin-I, Qt. 0.09 (H) 0.0 - 0.045 NG/ML   ETHYL ALCOHOL    Collection Time: 04/10/18 11:33 PM   Result Value Ref Range    ALCOHOL(ETHYL),SERUM <3 0 - 3 MG/DL   DRUG SCREEN, URINE    Collection Time: 04/11/18  3:00 AM   Result Value Ref Range    BENZODIAZEPINES NEGATIVE  NEG      BARBITURATES NEGATIVE  NEG      THC (TH-CANNABINOL) NEGATIVE  NEG      OPIATES POSITIVE (A) NEG      PCP(PHENCYCLIDINE) NEGATIVE  NEG      COCAINE POSITIVE (A) NEG      AMPHETAMINES NEGATIVE  NEG      METHADONE NEGATIVE  NEG      HDSCOM (NOTE)    GLUCOSE, POC    Collection Time: 04/11/18  4:04 AM   Result Value Ref Range    Glucose (POC) 477 (HH) 70 - 110 mg/dL       Radiologic Studies -   CT HEAD WO CONT   Final Result      XR CHEST SNGL V    (Results Pending)   Radiologist's interpretation of CT Head (Read by Dr. Rhonda Perkins): No acute abnormalities. Chronic white matter ischemic changes and evidence of  remote insults to the high left frontal and right parietal lobe. Medical Decision Making   I am the first provider for this patient. I reviewed the vital signs, available nursing notes, past medical history, past surgical history, family history and social history. Vital Signs-Reviewed the patient's vital signs. Pulse Oximetry Analysis -  98% on room air     Cardiac Monitor:  Rate: 64 bpm  Rhythm:  Sinus Rhythm    EKG: Interpreted by the EP. Time Interpreted: 11:54 PM   Rate: 69 bpm   Rhythm: Sinus Rhythm    Interpretation: Left anterior fascicular block, T-wave inversions in V3-V6. No STEMI. Records Reviewed: Nursing Notes, Old Medical Records, Previous electrocardiograms, Previous Radiology Studies and Previous Laboratory Studies (Time of Review: 11:27 PM)    ED Course: Progress Notes, Reevaluation, and Consults:  Consult:  Discussed care with Dr. Aurelio Mackenzie. Standard discussion; including history of patients chief complaint, available diagnostic results, and treatment course. Will evaluate patient.   2:54 AM, 4/10/2018      Consult:  Discussed care with Dr. Shira Florence, [place]. Standard discussion; including history of patients chief complaint, available diagnostic results, and treatment course. Recommending giving aspirin, order MRI for the morning, admission for further workup. 3:05 AM, 4/10/2018      Consult:  Discussed care with Dr. Shruthi Urrutia, Hospitalist. Standard discussion; including history of patients chief complaint, available diagnostic results, and treatment course. Accepts patient for admission.    3:22 AM, 4/10/2018        For Hospitalized Patients:    Hospitalization Decision Time:  The decision to hospitalize the patient was made by Dr. Demetria Mortimer at 3:07 AM on 4/10/2018      Diagnosis     Clinical Impression:   Patient Active Problem List   Diagnosis Code    Stage 3 chronic renal impairment associated with type 2 diabetes mellitus ([address] E11.22, N18.3    Hepatitis C infection B19.20    Type 2 diabetes mellitus with insulin therapy ([address] E11.9, Z79.4    Homelessness Z59.0    CHF (congestive heart failure) ([address] I50.9    Cardiomyopathy ([address] I42.9    COPD, mild ([address] J44.9    Essential hypertension I10    Insulin dependent type 2 diabetes mellitus, controlled ([address] E11.9, Z79.4    NSTEMI (non-ST elevated myocardial infarction) ([address] S97.6    Systolic CHF, chronic (HCC) I50.22    Chronic renal insufficiency N18.9    History of cocaine abuse Z87.898    History of heroin abuse Z87.898    Decreased GFR R94.4    Elevated HDL E78.89    Hypertriglyceridemia E78.1    Elevated serum creatinine R79.89    Elevated alkaline phosphatase level R74.8    TIA (transient ischemic attack) G45.9    CVA (cerebral vascular accident) ([address] I63.9    Cardiac LV ejection fraction 30-35% R94.30    Cocaine use F14.90    Hypertension I10           Disposition: Admission    _______________________________    Scribe Attestation:     Iqra Mosher, acting as a scribe for and in the presence of She Hayes MD      April 10, 2018 at 11:27 PM       Provider Attestation:      I personally performed the services described in the documentation, reviewed the documentation, as recorded by the scribe in my presence, and it accurately and completely records my words and actions.  April 10, 2018 at 11:27 PM - Amando Munoz MD    _______________________________

## 2018-04-11 NOTE — ROUTINE PROCESS
TRANSFER - OUT REPORT:    Verbal report given to [facility] RN(name) on [employer]  being transferred to [address] [place] [address] (unit) for routine progression of care       Report consisted of patients Situation, Background, Assessment and   Recommendations(SBAR). Information from the following report(s) SBAR, ED Summary, Procedure Summary and Intake/Output was reviewed with the receiving nurse. Lines:   Peripheral IV 04/10/18 Left Antecubital (Active)   Site Assessment Clean, dry, & intact 4/10/2018 11:37 PM   Phlebitis Assessment 0 4/10/2018 11:37 PM   Infiltration Assessment 0 4/10/2018 11:37 PM   Dressing Status Clean, dry, & intact 4/10/2018 11:37 PM   Dressing Type Transparent 4/10/2018 11:37 PM   Hub Color/Line Status Green;Flushed 4/10/2018 11:37 PM   Action Taken Blood drawn 4/10/2018 11:37 PM        Opportunity for questions and clarification was provided.       Patient transported with:   Monitor  Registered Nurse

## 2018-04-11 NOTE — PROGRESS NOTES
ARU/IPR REFERRAL CONTACT NOTE  [id number] [facility]    Re: Dwayne Evans    IP Consult for IP Rehab Screen received. Case reviewed, workup underway for stroke. Will continue to follow and advise as appropriate. Thank you for the consult.     Jamar Farley

## 2018-04-11 NOTE — PROGRESS NOTES
MRI Safety Screening form needs to be filled out and faxed to [phone] MRI can be scheduled. If unable to acquire information from pt, [facility] must be contacted.  If pt is claustrophobic or will need pain meds, please have ordered in advance to help facilitate MRI exam.

## 2018-04-11 NOTE — H&P
[facility]  HISTORY AND PHYSICAL      Bhaskar Dave  MR#: [id number]  : 1955  ACCOUNT #: [de-identified]   ADMIT DATE: 04/10/2018    Please note that the patient was seen and evaluated earlier in the day. CHIEF COMPLAINT FOR PATIENT:  Slurring of speech. HISTORY OF PRESENT ILLNESS:  This is a 65-year-old male, presented to the ER with complaints of slurring of his speech. The patient states he has noted this since Monday. The patient also gives history of some drooling when he talks. The patient also states that he also has some numbness on his lips. The patient states that 2 days ago he had a headache, which is now resolved. The patient denies any visual changes. The patient denies any focal weakness in his arms or legs. No history of any chest pain, shortness of breath, palpitations. No history of any cough, fever or chills. No history of any neck pain or stiffness. No history of any abdominal pain, urinary complaints, diarrhea or rectal bleeding. No history of any leg swellings. No history of any rash. PAST MEDICAL HISTORY:  Significant for history of hepatitis C, hypertension, type 2 diabetes, chronic kidney disease stage III, and a history of cardiomyopathy in the past.  The patient has a history of polysubstance abuse. ALLERGIES:  NO KNOWN DRUG ALLERGIES. PAST SURGICAL HISTORY:  The patient denies any significant surgical history. SOCIAL HISTORY:  The patient continues to use crack cocaine. The patient states that he quit smoking a year ago. The patient denies any current drinking, used to drink heavily in the past.    FAMILY HISTORY:  The patient states father had liver disease and mother had diabetes. REVIEW OF SYSTEMS:  Apart from the complaints mentioned above, complete review of systems was done and is negative. MEDICATIONS:  Prior to admission medications as listed in the chart include:    1. Symbicort 2 puffs inhaled twice daily.   2. Albuterol as needed. 3.  Lisinopril 20 mg p.o. daily. 4.  Aspirin 81 mg p.o. daily. 5.  Caduet 10/80 one tablet p.o. daily. 6.  Humalog insulin 5 units 3 times daily with meals. 7.  Levemir 20 units subQ twice daily. 8.  Thiamine 100 mg p.o. daily. 9.  Multivitamin 1 tablet p.o. daily. PHYSICAL EXAMINATION:  VITAL SIGNS:  Today show temperature of 98.5, pulse rate 63, blood pressure 162/84, respiratory rate of 16, oxygen saturation of 98%. GENERAL:  This is a 19-year-old male sitting in bed in no apparent distress. HEAD:  Normocephalic, atraumatic. EYES:  Pupils are reactive to light. EAR, NOSE, THROAT:  No ear or nasal discharge is seen. Mucous membranes are moist.  NECK:  Supple, no JVD, no carotid bruit, no thyromegaly, no lymphadenopathy. LUNGS:  Clear to auscultation bilaterally. No rales, no rhonchi, no wheezing heard. HEART:  S1, S2 were heard, regular rate and rhythm. ABDOMEN:  Soft, bowel sounds positive, nontender, nondistended. EXTREMITIES:  No lower extremity edema is noted. Pulses are 1+ bilaterally equal.  NEUROLOGIC:  The patient is awake, alert, and oriented x3. The patient follows command and responds appropriately. Some dysarthria is noted. LABORATORY DATA:  Today show WBC count of 8.1, hemoglobin 11.6, hematocrit 34.0, platelet count of 831. INR of 0.8. Sodium of 130, potassium 4.5, chloride of 94, CO2 of 30, glucose of 634, BUN 27, creatinine 2.67. Troponin first set was 0.09. Urine drug screen was positive for cocaine and opioids. CT scan of the head was done in the ER and did not show any acute changes. Chest x-ray showed no infiltrate, no effusion. Chest x-ray was read by myself. EKG showed sinus rhythm at the rate of 69 beats per minute. Some LVH was noted. Some ST-T changes were noted. IMPRESSION:  1. Likely acute stroke with dysarthria and dysphasia. 2.  Hypertension, which is uncontrolled.   3.  Uncontrolled type 2 diabetes with hyperglycemia. 4.  Polysubstance abuse. 5.  History of hepatitis C.  6.  History of cardiomyopathy. 7.  Chronic kidney disease stage III to IV. 8.  Elevated troponins. PLAN:  The patient will be admitted to the neuro floor. The patient will be placed on stroke protocol. I have discussed with Dr. Gaetano Nyhan, the neurologist.  We will continue the patient on aspirin. Permissive hypertension will be continued. We will continue the patient on a statin. The patient will be continued on basal bolus insulin regimen. Blood pressure will be monitored. The patient will be continued on IV fluids. We will check an MRI of the brain and MRA of the brain and neck. We will check an echocardiogram.  Cardiac biomarkers will be trended. Rest depending on the patient's further hospital course. I discussed the plan of care with the patient and he verbalized understanding and agreed. THE PATIENT WISHES TO BE A FULL CODE. I counseled the patient at length regarding quitting illicit drug use. The patient states that he will try. I counseled the patient regarding compliance with medication and regular followup with physicians. The patient verbalized understanding and agreed. The patient follows up with Dr. Angelia Liang at the [facility].       MD CLAUDIA Dunne/CHANTEL  D: 04/11/2018 17:37     T: 04/11/2018 18:20  JOB #: [id number]  CC: Abi Valladares MD

## 2018-04-11 NOTE — PROGRESS NOTES
A rehab staff member and I were headed in to Mr. Michelle Lopez room and we noticed him slumped over. He was all right, but has occasional bouts of \"trembling lips\" that make it difficult for him to eat. While the rehab staff member went to check with a physician, I spoke with Mr. Ernestina Torres and offered him support. I prayed with him, and he expressed a desire to continue eating, so I said I would check back with him later. He was eating fine when I left, and the rehab staff member checked back with him as well. I invited him to call us at any time.      [address] [facility] Medical CenterDragan, COREEN Resident   Pager: [phone]  Phone: [phone]

## 2018-04-11 NOTE — PROGRESS NOTES
Problem: Dysphagia (Adult)  Goal: *Acute Goals and Plan of Care (Insert Text)  Patient will:  1. Tolerate PO trials with 0 s/s overt distress in 4/5 trials  2. Utilize compensatory swallow strategies/maneuvers (decrease bite/sip, size/rate, alt. liq/sol) with min cues in 4/5 trials  3. Perform oral-motor/laryngeal exercises to increase oropharyngeal swallow function with min cues  4. Complete an objective swallow study (i.e., MBSS) to assess swallow integrity, r/o aspiration, and determine of safest LRD, min A as indicated/ordered by MD    Recommend:   Soft solids, thin liquids  Meds as tolerated  Aspiration precautions  HOB >45 degrees during all intake and for at least 30 min after po   Small bites/sips, slow rate of intake, alternating bites/sips  MBS to further assess swallow integrity and rule out aspiration   Motor speech evaluation    Speech LAnguage Pathology bedside swallow evaluation/treatment    Patient: Taina Arroyo (86 y.o. male)  Date: 4/11/2018  Primary Diagnosis: TIA (transient ischemic attack)  CVA (cerebral vascular accident) ([facility] [address]  Precautions: Aspiration       ASSESSMENT :  Based on the objective data described below, the patient presents with mild oral dysphagia and mod dysarthria. Pt with L sided oral motor weakness, oriented x4 and following commands. Per RN, pt with ? Seizure like activity after oral intake during bedside nursing dysphagia screen so kept NPO. Pt observed self-feeding trials of thin liquid + straw, tolerating via consecutive swallows with timely swallow initiation, adequate laryngeal elevation to palpation and no overt s/sx aspiration. Pt with mildly increased oral prep phase with regular solids; would benefit from soft solids. Pt with tensing of jaw  during consumption of lunch meal, spitting out green beans that had not yet been chewed. This was not accompanied by any overt s/sx aspiration (no coughing, throat clearing, facial redness or eye tearing noted).   Following episode, pt resumed eating diet with no overt s/sx aspiration and no distress. Pt fully awake and alert during episode, demo positive safety awareness and able to verbalize/demo understanding of comp strategies, oral care and positioning. Recommend soft solid diet with thin liquids with use of the above mentioned compensatory strategies. May benefit from [facility] to further assess swallow integrity and rule out silent aspiration. Further recommend motor speech evaluation to address dysarthria. D/w pt, RN and neurologist.      Patient will benefit from skilled intervention to address the above impairments. Patients rehabilitation potential is considered to be Good  Factors which may influence rehabilitation potential include:   []            None noted  [x]            Mental ability/status  [x]            Medical condition  []            Home/family situation and support systems  [x]            Safety awareness  []            Pain tolerance/management  []            Other:      PLAN :  Recommendations and Planned Interventions:  As above   Frequency/Duration: Patient will be followed by speech-language pathology 1-2 times per day/4-7 days per week to address goals. Discharge Recommendations: To Be Determined     SUBJECTIVE:   Patient stated I'm so Porsha Garcia. OBJECTIVE:     Past Medical History:   Diagnosis Date    Cardiac LV ejection fraction 30-35% 7/15/15    Cardiomyopathy ([facility] [address] 7/15/15    35% per ECHO    Cocaine use 8/9/14    + UDS, persistent use    Diabetes ([address] 8/9/2014    8.1 hgbA1C 8/9/14    Heart failure ([facility] [address]     Hepatitis C infection 8/20/14    Genotype 1A    History of cocaine abuse 8/10/2016    History of heroin abuse 8/10/2016    Homelessness 9/7/2015    Hypertension 2000   History reviewed. No pertinent surgical history.   Prior Level of Function/Home Situation:  Home Situation  Home Environment: Other (comment) (homeless)  One/Two Story Residence: Other (Comment) (homeless)  Living Alone: Yes  Support Systems: Family member(s)  Patient Expects to be Discharged to[de-identified] Other (comment) (back on the street)  Current DME Used/Available at Home: None  Diet prior to admission: Regular  Current Diet:  NPO   Cognitive and Communication Status:  Neurologic State: Alert  Orientation Level: Oriented X4  Cognition: Follows commands  Oral Assessment:  Oral Assessment  Labial: Left droop; Impaired coordination;Decreased seal  Dentition: Natural  Oral Hygiene: Good  Lingual: Decreased strength;Decreased rate  Velum: No impairment  Mandible: No impairment  P.O. Trials:  Patient Position: [facility] at [facility]  Vocal quality prior to P.O.: Low volume  Consistency Presented: Solid; Thin liquid;Puree  How Presented: Self-fed/presented;Cup/sip;Straw;Successive swallows;Spoon  Bolus Acceptance: No impairment  Bolus Formation/Control: Impaired  Type of Impairment: Mastication  Propulsion: Discoordination  Oral Residue: None  Initiation of Swallow: No impairment  Laryngeal Elevation: Functional  Aspiration Signs/Symptoms: None  Pharyngeal Phase Characteristics: No impairment, issues, or problems   Oral Phase Severity: Mild  Pharyngeal Phase Severity : No impairment    GCODESwallowing:  Swallow Current Status CI= 1-19%   Swallow Goal Status CH= 0%    The severity rating is based on the following outcomes:    Clinical Judgment    Pain:  Pt c/o 0/10 pain prior to evaluation. Pt c/o 0/10 pain post evaluation. Treatment Performed Following Evaluation:  Training and education provided related to anatomy/physiology of oropharyngeal swallow, diet recs, compensatory strategy use (small sips, slow rate of intake). Further educated with regard to POC including MBS to objectively assess swallow integrity and r/o aspiration. Pt highly engaged in training and education and able to verbalize understanding.       After treatment:   []            Patient left in no apparent distress sitting up in chair  [x]            Patient left in no apparent distress in bed  [x]            Call bell left within reach  [x]            Nursing notified  []            Caregiver present  []            Bed alarm activated    COMMUNICATION/EDUCATION:   [x]            SLP educated pt with regard to compensatory swallow strategies and       aspiration/reflux precautions including: small bites/sips,       alternate liquids/solids, decrease feeding rate, HOB > 45 with all po, and upright in bed at 30 degrees after po for at least 45 minutes. [x]            Patient/family have participated as able in goal setting and plan of care. []            Patient/family agree to work toward stated goals and plan of care. []            Patient understands intent and goals of therapy; neutral about participation. []            Patient is unable to participate in goal setting and plan of care.     Thank you for this referral.  Travis Sandoval M.S., [id number] [facility]  Speech-Language Pathologist

## 2018-04-11 NOTE — PROGRESS NOTES
Albuterol nebulizer solution was therapeutically interchanged for albuterol inhaler per the P&T Committee approved Therapeutic Interchanges Policy.     Grey Betts [facility], Pharmacist  4/11/2018 6:07 PM

## 2018-04-11 NOTE — PROGRESS NOTES
NUTRITION    Nutrition Consult: General Nutrition Management & Supplements     RECOMMENDATIONS / PLAN:     - Add supplements: Glucerna Shake, BID.  - Monitor and encourage po/supplement intake. - Continue RD inpatient monitoring and evaluation. NUTRITION INTERVENTIONS & DIAGNOSIS:     [x] Meals/snacks: modify composition  [x] Medical food supplement therapy: Initiate    Nutrition Diagnosis: Unintended weight loss related to predicted sub-optimal energy intake as evidenced by 16 lbs, 9% weight loss x 2 months PTA, per chart hx review. ASSESSMENT:     Diet started per SLP. Pt off floor during visit but noted 100% meal intake at lunch. Pt with elevated BG levels upon admission, glycemic control following. Noted pt homeless. Average po intake adequate to meet patients estimated nutritional needs:   [] Yes     [] No   [x] Unable to determine at this time    Diet: DIET DIABETIC WITH OPTIONS Consistent Carb 1800kcal; Soft Solids; No Conc. Sweets      Food Allergies: NKFA  Current Appetite:   [] Good     [] Fair     [] Poor     [x] Other: unknown  Appetite/meal intake prior to admission:   [] Good     [] Fair     [] Poor     [x] Other:unknown  Feeding Limitations:  [x] Swallowing difficulty: SLP following    [] Chewing difficulty    [] Other:  Current Meal Intake: No data found. BM:  PTA  Skin Integrity: No pressure injuries or wounds documented at this time    Edema: None  Pertinent Medications: Reviewed: NS at 75 mL/hr, colace, lantus, SSI,    Recent Labs      04/10/18   2333   NA  130*   K  4.5   CL  94*   CO2  30   GLU  634*   BUN  27*   CREA  2.67*   CA  8.1*   ALB  2.5*   SGOT  36   ALT  43     No intake or output data in the 24 hours ending 04/11/18 1532    Anthropometrics:  Ht Readings from Last 1 Encounters:   04/11/18 5' 11\" (1.803 m)     Last 3 Recorded Weights in this Encounter    04/11/18 0820   Weight: 69.9 kg (154 lb)     Body mass index is 21.48 kg/(m^2).           Weight History: Noted weight loss per chart hx review. -16 lbs, 9% x 2 months PTA. Weight Metrics 4/11/2018 2/21/2018 1/3/2018 12/28/2017 8/11/2017 7/11/2017 8/10/2016   Weight 154 lb 170 lb 159 lb 155 lb 4.8 oz 166 lb 189 lb 185 lb   BMI 21.48 kg/m2 23.71 kg/m2 22.18 kg/m2 21.66 kg/m2 23.15 kg/m2 26.36 kg/m2 25.81 kg/m2        Admitting Diagnosis: TIA (transient ischemic attack)  CVA (cerebral vascular accident) ([facility] [address]  Pertinent PMHx: DM, CHF, COPD, hepatitis C, HTN, heroin/cocaine abuse, CKD stage 3,     Education Needs:        [x] None identified  [] Identified - Not appropriate at this time  []  Identified and addressed - refer to education log  Learning Limitations:   [x] None identified  [] Identified    Cultural, Muslim & ethnic food preferences:  [x] None identified    [] Identified and addressed     ESTIMATED NUTRITION NEEDS:     Calories: 9174-6998 kcal (25-35 kcal/kg) based on  [x] Actual BW: 70 kg       [] IBW  Protein: 56-70 gm (0.8-1 gm/kg) based on  [x] Actual BW     [] IBW   Fluid: 1 mL/kcal     MONITORING & EVALUATION:     Nutrition Goal(s):   1. Po intake of meals will meet >75% of patient estimated nutritional needs within the next 7 days.   Outcome:  [] Met/Ongoing    []  Not Met    [x] New/Initial Goal     Monitoring:   [x] Food and beverage intake   [x] Diet order   [x] Nutrition-focused physical findings   [x] Treatment/therapy   [] Weight   [] Enteral nutrition intake        Previous Recommendations (for follow-up assessments only):     []   Implemented       []   Not Implemented (RD to address)      [] No Longer Appropriate     [] No Recommendation Made     Discharge Planning: diabetic, cardiac diet  [x] Participated in care planning, discharge planning, & interdisciplinary rounds as appropriate      Sylvie Palafox RD   Pager: [phone]

## 2018-04-11 NOTE — DIABETES MGMT
Glycemic Control Plan of Care    T2DM with current A1c of 13.2% (04/11/2018). Pending full assessment of diabetes management and education. Attempted to do this on 04/11/2018 but he had to go for testing. Diabetes meds PTA reported by patient on 04/11/2018: Levemir insulin 20 units BID and humalog insulin 5 units TID AC. Patient is homeless and stated that [facility] [place] [facility] [place] [id number]. POC BG report on 04/11/2018 at time of review: 477, 362, 404, 386 mg/dL. Patient received 10 units of lantus insulin at 10:50 AM today and placed on very resistant dose of correctional lispro insulin ACHS. Received nursing report that patient was already evaluated by SLP and placed on dental soft diet. Patient ate 100% of late lunch without any swallowing problem. Recommendation(s):  1.) Discussed with Dr. Nellie Wade and obtained verbal order to give an additional 10 units of lantus insulin x1 dose today and increase basal lantus insulin dose to 20 units daily starting 04/12/2018.  2.) Continue inpatient glycemic monitoring and consider increasing basal lantus insulin dose to 30 units daily if POC BG remain above target range. 3.) Continue on very resistant dose of correctional lispro insulin ACHS. 4.) Obtained verbal order from Dr. Ariadna Danielson to modify current diet of soft solids to include consistent carb no concentrated sweets. Assessment:  Patient is 58year old with past medical history including type 2 diabetes mellitus, CKD stage 3, systolic CHF, COPD, NSTEMI, cocaine and heroin abuse, hepatitis C, and cardiomyopathy - was admitted on 04/11/2018 with report of left hand weakness and dysarthria x3 days. Also noted report that patient is homeless. Noted:  TIA.  CVA. Neurology following. Most recent blood glucose values:    Results for Aldo Kc (MRN [id number]) as of 4/11/2018 14:49   Ref.  Range 4/11/2018 04:04 4/11/2018 10:36 4/11/2018 10:39 4/11/2018 11:53   GLUCOSE,FAST - POC Latest Ref Range: 70 - 110 mg/dL 477 (HH) 362 (H) 404 (HH) 386 (H)     Current A1C: 13.2% (04/11/2018) is equivalent to average blood glucose of 332 mg/dL during the past 2-3 months. Current hospital diabetes medications:  Basal lantus insulin 20 units daily. Correctional lispro insulin ACHS. Very resistant dose. Total daily dose insulin requirement previous day: Patient was admitted on 04/11/2018. Home diabetes medications: Patient stated on 04/11/2018 that he is receiving care through [id number] and reported the following diabetes meds PTA:  Levemir insulin 20 units twice daily: morning and bedtime. Humalog insulin 5 units 3x daily before meals. Diet: Diabetic consistent carb 1800kcal; soft solids; no concentrated sweets. Goals:  Blood glucose will be within target range of  mg/dL by 04/14/2018. Education:  Unable to complete assessment of diabetes management and education on 04/11/2018 because mobility staff came to pick him up for testing.  Plan to see patient for f/u.  ___  Refer to Diabetes Education Record  ___  Education not indicated at this time    Guille Zelaya RN [facility]  Pager: [phone]

## 2018-04-11 NOTE — CONSULTS
Consult    Patient: Annalisa Mejia MRN: [id number]  SSN: [id number]    YOB: 1955  Age: 58 y.o. Sex: male      Subjective:      Annalisa Mejia is a 58 y.o. male who is being seen for left hand weakness and dysarthria for about three days. He has been homeless, has used cocaine and heroin, has DM (with one admission in 12/2017 for similar sxs with a BS of 980), a NSTEMI, CHF, and hepatitis C; smoking. His BS yesterday was 634, Cr 2.67, Na 130, hct 34. Head CT shows mainly WM changes. Past Medical History:   Diagnosis Date    Cardiac LV ejection fraction 30-35% 7/15/15    Cardiomyopathy ([facility] [address] 7/15/15    35% per ECHO    Cocaine use 8/9/14    + UDS, persistent use    Diabetes ([facility] [address] 8/9/2014    8.1 hgbA1C 8/9/14    Heart failure ([facility] [address]     Hepatitis C infection 8/20/14    Genotype 1A    History of cocaine abuse 8/10/2016    History of heroin abuse 8/10/2016    Homelessness 9/7/2015    Hypertension 2000     History reviewed. No pertinent surgical history.    Family History   Problem Relation Age of Onset    Liver Disease Father     Diabetes Mother     Cancer Maternal Grandmother     No Known Problems Sister     Diabetes Brother     No Known Problems Brother     No Known Problems Sister     No Known Problems Sister      Social History   Substance Use Topics    Smoking status: Former Smoker     Packs/day: 0.30     Types: Cigarettes     Quit date: 7/4/2016    Smokeless tobacco: Never Used    Alcohol use 0.0 oz/week     0 Standard drinks or equivalent per week      Comment: started heavy drinking at teen till in Aug 2014 but has gone into remission since then      Current Facility-Administered Medications   Medication Dose Route Frequency Provider Last Rate Last Dose    atorvastatin (LIPITOR) tablet 10 mg  10 mg Oral QHS Cesario Munoz MD   10 mg at 04/11/18 0357    sodium chloride (NS) flush 5-10 mL  5-10 mL IntraVENous Q8H Josue Ann MD   10 mL at 04/11/18 1059    sodium chloride (NS) flush 5-10 mL  5-10 mL IntraVENous PRN Harvey Ochoa MD        0.9% sodium chloride infusion  75 mL/hr IntraVENous CONTINUOUS Harvey Ochoa MD 75 mL/hr at 04/11/18 1103 75 mL/hr at 04/11/18 1103    labetalol (NORMODYNE;TRANDATE) injection 5 mg  5 mg IntraVENous Q10MIN PRN Harvey Ochoa MD        acetaminophen (TYLENOL) tablet 650 mg  650 mg Oral Q4H PRN Harvey Ochoa MD        docusate sodium (COLACE) capsule 100 mg  100 mg Oral BID Harvey Ochoa MD   Stopped at 04/11/18 1059    heparin (porcine) injection 5,000 Units  5,000 Units SubCUTAneous Jina MD Jason   5,000 Units at 04/11/18 1000    insulin glargine (LANTUS) injection 10 Units  10 Units SubCUTAneous DAILY Harvey Ochoa MD   10 Units at 04/11/18 1050    insulin lispro (HUMALOG) injection   SubCUTAneous Q6H Harvey Ochoa MD   10 Units at 04/11/18 1108    glucose chewable tablet 16 g  4 Tab Oral PRN Harvey Ochoa MD        glucagon (GLUCAGEN) injection 1 mg  1 mg IntraMUSCular PRN Harvey Ochoa MD        dextrose (D50W) injection syrg 12.5-25 g  25-50 mL IntraVENous PRN Harvey Ochoa MD        aspirin (ASA) suppository 300 mg  300 mg Rectal DAILY Harvey Ochoa MD            No Known Allergies    Review of Systems:  A comprehensive review of systems was negative except for that written in the History of Present Illness. Objective:     Vitals:    04/11/18 0800 04/11/18 0820 04/11/18 1000 04/11/18 1200   BP: 160/90  162/84 (!) 168/101   Pulse: 64  63 82   Resp: 15  16 17   Temp: 98.4 °F (36.9 °C)  98.5 °F (36.9 °C) 97.7 °F (36.5 °C)   SpO2: 100%  98% 97%   Weight:  69.9 kg (154 lb)     Height:  5' 11\" (1.803 m)          Physical Exam: This is a cachectic AA male who is alert. He described shaking of his jaw, and after speaking for five minutes, his jaw began to tremble and he began to spit up saliva. After 30 seconds this stopped.      CNs: VF TC showed absent vision OS in nasal and temporal upper quadrants, normal vision OD. His EOMs were full and pupils normal. Rather subtle left orbicularis oculi weakness and no movement of his mouth on the left. Tongue midline. Motor: No drift, mild ataxia LUE and mild slowing of fine finger movements in this L handed man. Suggestion of extensor plantar response on the left only. Intact sensation. Assessment:     Hospital Problems  Date Reviewed: 12/2/2015          Codes Class Noted POA    TIA (transient ischemic attack) ICD-10-CM: G45.9  ICD-9-CM: 435.9  4/11/2018 Unknown        CVA (cerebral vascular accident) [facility]) ICD-10-CM: I63.9  ICD-9-CM: 434.91  4/11/2018 Unknown            Rule out sub cortical stroke (clumsy hand dysarthria?)    Plan:     I'll f/u on MRI.     Signed By: Sheri Dubois MD     April 11, 2018

## 2018-04-11 NOTE — ROUTINE PROCESS
TRANSFER - IN REPORT:    Verbal report received from Aston (name) on Sander Huerta  being received from ER (unit) for routine progression of care      Report consisted of patients Situation, Background, Assessment and   Recommendations(SBAR). Information from the following report(s) SBAR and Med Rec Status was reviewed with the receiving nurse. Opportunity for questions and clarification was provided.

## 2018-04-11 NOTE — ROUTINE PROCESS
Pt refused aspirin suppository. Benefits of taking asa with dx of stroke was given and pt stated, \" I don't want it and you ain't going up there. \" Will continue to encourage medication compliance. Pt asa switched back to po since cleared by speech therapy, asa given approx 1723.

## 2018-04-11 NOTE — ROUTINE PROCESS
Stroke Education provided to patient and the following topics were discussed    1. Patients personal risk factors for stroke are hypertension, smoking, diabetes mellitus and HgA1C    2. Warning signs of Stroke:        * Sudden numbness or weakness of the face, arm or leg, especially on one side of          The body            * Sudden confusion, trouble speaking or understanding        * Sudden trouble seeing in one or both eyes        * Sudden trouble walking, dizziness, loss of balance or coordination        * Sudden severe headache with no known cause      3. Importance of activation Emergency Medical Services ( 9-1-1 ) immediately if experience any warning signs of stroke. 4. Be sure and schedule a follow-up appointment with your primary care doctor or any specialists as instructed. 5. You must take medicine every day to treat your risk factors for stroke. Be sure to take your medicines exactly as your doctor tells you: no more, no less. Know what your medicines are for , what they do. Anti-thrombotics /anticoagulants can help prevent strokes. You are taking the following medicine(s)  asa     6. Smoking and second-hand smoke greatly increase your risk of stroke, cardiovascular disease and death. Smoking history cigarettes, pt didn't give a specific number per pt didn't give a specific number    7. Information provided was [facility] Stroke Education Binder    8. Documentation of teaching completed in Patient Education Activity and on Care Plan with teaching response noted?   yes

## 2018-04-12 ENCOUNTER — APPOINTMENT (OUTPATIENT)
Dept: GENERAL RADIOLOGY | Age: 63
DRG: 065 | End: 2018-04-12
Attending: EMERGENCY MEDICINE
Payer: SELF-PAY

## 2018-04-12 LAB
ALBUMIN SERPL-MCNC: 2.2 G/DL (ref 3.4–5)
ALBUMIN/GLOB SERPL: 0.6 {RATIO} (ref 0.8–1.7)
ALP SERPL-CCNC: 118 U/L (ref 45–117)
ALT SERPL-CCNC: 36 U/L (ref 16–61)
ANION GAP SERPL CALC-SCNC: 3 MMOL/L (ref 3–18)
AST SERPL-CCNC: 33 U/L (ref 15–37)
ATRIAL RATE: 69 BPM
BASOPHILS # BLD: 0 K/UL (ref 0–0.1)
BASOPHILS NFR BLD: 0 % (ref 0–2)
BILIRUB SERPL-MCNC: 0.3 MG/DL (ref 0.2–1)
BUN SERPL-MCNC: 20 MG/DL (ref 7–18)
BUN/CREAT SERPL: 10 (ref 12–20)
CALCIUM SERPL-MCNC: 8.7 MG/DL (ref 8.5–10.1)
CALCULATED P AXIS, ECG09: 59 DEGREES
CALCULATED R AXIS, ECG10: -52 DEGREES
CALCULATED T AXIS, ECG11: 134 DEGREES
CHLORIDE SERPL-SCNC: 103 MMOL/L (ref 100–108)
CHOLEST SERPL-MCNC: 139 MG/DL
CO2 SERPL-SCNC: 31 MMOL/L (ref 21–32)
CREAT SERPL-MCNC: 1.97 MG/DL (ref 0.6–1.3)
DIAGNOSIS, 93000: NORMAL
DIFFERENTIAL METHOD BLD: ABNORMAL
EOSINOPHIL # BLD: 0.2 K/UL (ref 0–0.4)
EOSINOPHIL NFR BLD: 2 % (ref 0–5)
ERYTHROCYTE [DISTWIDTH] IN BLOOD BY AUTOMATED COUNT: 12.2 % (ref 11.6–14.5)
EST. AVERAGE GLUCOSE BLD GHB EST-MCNC: 355 MG/DL
GLOBULIN SER CALC-MCNC: 3.9 G/DL (ref 2–4)
GLUCOSE BLD STRIP.AUTO-MCNC: 115 MG/DL (ref 70–110)
GLUCOSE BLD STRIP.AUTO-MCNC: 189 MG/DL (ref 70–110)
GLUCOSE BLD STRIP.AUTO-MCNC: 191 MG/DL (ref 70–110)
GLUCOSE BLD STRIP.AUTO-MCNC: 307 MG/DL (ref 70–110)
GLUCOSE BLD STRIP.AUTO-MCNC: 362 MG/DL (ref 70–110)
GLUCOSE BLD STRIP.AUTO-MCNC: 376 MG/DL (ref 70–110)
GLUCOSE BLD STRIP.AUTO-MCNC: 92 MG/DL (ref 70–110)
GLUCOSE SERPL-MCNC: 68 MG/DL (ref 74–99)
HBA1C MFR BLD: 14 % (ref 4.2–5.6)
HCT VFR BLD AUTO: 35.7 % (ref 36–48)
HDLC SERPL-MCNC: 65 MG/DL (ref 40–60)
HDLC SERPL: 2.1 {RATIO} (ref 0–5)
HGB BLD-MCNC: 12.1 G/DL (ref 13–16)
INR PPP: 0.9 (ref 0.8–1.2)
LDLC SERPL CALC-MCNC: 47.6 MG/DL (ref 0–100)
LIPID PROFILE,FLP: ABNORMAL
LYMPHOCYTES # BLD: 2.4 K/UL (ref 0.9–3.6)
LYMPHOCYTES NFR BLD: 19 % (ref 21–52)
MAGNESIUM SERPL-MCNC: 2.1 MG/DL (ref 1.6–2.6)
MCH RBC QN AUTO: 29.3 PG (ref 24–34)
MCHC RBC AUTO-ENTMCNC: 33.9 G/DL (ref 31–37)
MCV RBC AUTO: 86.4 FL (ref 74–97)
MONOCYTES # BLD: 1 K/UL (ref 0.05–1.2)
MONOCYTES NFR BLD: 7 % (ref 3–10)
NEUTS SEG # BLD: 9.3 K/UL (ref 1.8–8)
NEUTS SEG NFR BLD: 72 % (ref 40–73)
P-R INTERVAL, ECG05: 146 MS
PLATELET # BLD AUTO: 316 K/UL (ref 135–420)
PMV BLD AUTO: 10.8 FL (ref 9.2–11.8)
POTASSIUM SERPL-SCNC: 4.1 MMOL/L (ref 3.5–5.5)
PROT SERPL-MCNC: 6.1 G/DL (ref 6.4–8.2)
PROTHROMBIN TIME: 11.6 SEC (ref 11.5–15.2)
Q-T INTERVAL, ECG07: 444 MS
QRS DURATION, ECG06: 92 MS
QTC CALCULATION (BEZET), ECG08: 475 MS
RBC # BLD AUTO: 4.13 M/UL (ref 4.7–5.5)
SODIUM SERPL-SCNC: 137 MMOL/L (ref 136–145)
TRIGL SERPL-MCNC: 132 MG/DL (ref ?–150)
TSH SERPL DL<=0.05 MIU/L-ACNC: 2.11 UIU/ML (ref 0.36–3.74)
VENTRICULAR RATE, ECG03: 69 BPM
VLDLC SERPL CALC-MCNC: 26.4 MG/DL
WBC # BLD AUTO: 12.9 K/UL (ref 4.6–13.2)

## 2018-04-12 PROCEDURE — 74011636637 HC RX REV CODE- 636/637: Performed by: EMERGENCY MEDICINE

## 2018-04-12 PROCEDURE — 80053 COMPREHEN METABOLIC PANEL: CPT | Performed by: EMERGENCY MEDICINE

## 2018-04-12 PROCEDURE — 92526 ORAL FUNCTION THERAPY: CPT

## 2018-04-12 PROCEDURE — 74230 X-RAY XM SWLNG FUNCJ C+: CPT

## 2018-04-12 PROCEDURE — 97535 SELF CARE MNGMENT TRAINING: CPT

## 2018-04-12 PROCEDURE — 97162 PT EVAL MOD COMPLEX 30 MIN: CPT

## 2018-04-12 PROCEDURE — 84443 ASSAY THYROID STIM HORMONE: CPT | Performed by: EMERGENCY MEDICINE

## 2018-04-12 PROCEDURE — 92611 MOTION FLUOROSCOPY/SWALLOW: CPT

## 2018-04-12 PROCEDURE — 36415 COLL VENOUS BLD VENIPUNCTURE: CPT | Performed by: EMERGENCY MEDICINE

## 2018-04-12 PROCEDURE — 85025 COMPLETE CBC W/AUTO DIFF WBC: CPT | Performed by: EMERGENCY MEDICINE

## 2018-04-12 PROCEDURE — 97165 OT EVAL LOW COMPLEX 30 MIN: CPT

## 2018-04-12 PROCEDURE — 74011000255 HC RX REV CODE- 255: Performed by: EMERGENCY MEDICINE

## 2018-04-12 PROCEDURE — 83735 ASSAY OF MAGNESIUM: CPT | Performed by: EMERGENCY MEDICINE

## 2018-04-12 PROCEDURE — 74011250637 HC RX REV CODE- 250/637: Performed by: EMERGENCY MEDICINE

## 2018-04-12 PROCEDURE — 83036 HEMOGLOBIN GLYCOSYLATED A1C: CPT | Performed by: EMERGENCY MEDICINE

## 2018-04-12 PROCEDURE — 94640 AIRWAY INHALATION TREATMENT: CPT

## 2018-04-12 PROCEDURE — 82962 GLUCOSE BLOOD TEST: CPT

## 2018-04-12 PROCEDURE — 80061 LIPID PANEL: CPT | Performed by: EMERGENCY MEDICINE

## 2018-04-12 PROCEDURE — 74011250636 HC RX REV CODE- 250/636: Performed by: EMERGENCY MEDICINE

## 2018-04-12 PROCEDURE — 85610 PROTHROMBIN TIME: CPT | Performed by: EMERGENCY MEDICINE

## 2018-04-12 PROCEDURE — 65660000000 HC RM CCU STEPDOWN

## 2018-04-12 RX ORDER — CARVEDILOL 3.12 MG/1
3.12 TABLET ORAL EVERY 12 HOURS
Status: DISCONTINUED | OUTPATIENT
Start: 2018-04-13 | End: 2018-04-13 | Stop reason: HOSPADM

## 2018-04-12 RX ORDER — INSULIN GLARGINE 100 [IU]/ML
12 INJECTION, SOLUTION SUBCUTANEOUS DAILY
Status: DISCONTINUED | OUTPATIENT
Start: 2018-04-13 | End: 2018-04-13 | Stop reason: HOSPADM

## 2018-04-12 RX ORDER — AMLODIPINE BESYLATE 10 MG/1
10 TABLET ORAL DAILY
Status: DISCONTINUED | OUTPATIENT
Start: 2018-04-12 | End: 2018-04-13 | Stop reason: HOSPADM

## 2018-04-12 RX ORDER — LISINOPRIL 20 MG/1
20 TABLET ORAL DAILY
Status: DISCONTINUED | OUTPATIENT
Start: 2018-04-13 | End: 2018-04-13 | Stop reason: HOSPADM

## 2018-04-12 RX ADMIN — ATORVASTATIN CALCIUM 10 MG: 20 TABLET, FILM COATED ORAL at 21:49

## 2018-04-12 RX ADMIN — BUDESONIDE AND FORMOTEROL FUMARATE DIHYDRATE 2 PUFF: 80; 4.5 AEROSOL RESPIRATORY (INHALATION) at 20:39

## 2018-04-12 RX ADMIN — INSULIN LISPRO 3 UNITS: 100 INJECTION, SOLUTION INTRAVENOUS; SUBCUTANEOUS at 17:39

## 2018-04-12 RX ADMIN — DOCUSATE SODIUM 100 MG: 100 CAPSULE, LIQUID FILLED ORAL at 17:38

## 2018-04-12 RX ADMIN — BARIUM SULFATE 30 G: 960 POWDER, FOR SUSPENSION ORAL at 14:00

## 2018-04-12 RX ADMIN — BARIUM SULFATE 700 MG: 700 TABLET ORAL at 14:00

## 2018-04-12 RX ADMIN — ASPIRIN 325 MG ORAL TABLET 325 MG: 325 PILL ORAL at 09:41

## 2018-04-12 RX ADMIN — BUDESONIDE AND FORMOTEROL FUMARATE DIHYDRATE 2 PUFF: 80; 4.5 AEROSOL RESPIRATORY (INHALATION) at 09:17

## 2018-04-12 RX ADMIN — ACETAMINOPHEN 650 MG: 325 TABLET ORAL at 09:46

## 2018-04-12 RX ADMIN — HEPARIN SODIUM 5000 UNITS: 5000 INJECTION, SOLUTION INTRAVENOUS; SUBCUTANEOUS at 09:41

## 2018-04-12 RX ADMIN — HEPARIN SODIUM 5000 UNITS: 5000 INJECTION, SOLUTION INTRAVENOUS; SUBCUTANEOUS at 17:39

## 2018-04-12 RX ADMIN — INSULIN GLARGINE 20 UNITS: 100 INJECTION, SOLUTION SUBCUTANEOUS at 09:40

## 2018-04-12 RX ADMIN — INSULIN LISPRO 2 UNITS: 100 INJECTION, SOLUTION INTRAVENOUS; SUBCUTANEOUS at 22:00

## 2018-04-12 RX ADMIN — Medication 10 ML: at 17:40

## 2018-04-12 RX ADMIN — BARIUM SULFATE 30 ML: 400 PASTE ORAL at 14:00

## 2018-04-12 RX ADMIN — Medication 10 ML: at 22:00

## 2018-04-12 RX ADMIN — INSULIN LISPRO 15 UNITS: 100 INJECTION, SOLUTION INTRAVENOUS; SUBCUTANEOUS at 11:43

## 2018-04-12 RX ADMIN — HEPARIN SODIUM 5000 UNITS: 5000 INJECTION, SOLUTION INTRAVENOUS; SUBCUTANEOUS at 03:59

## 2018-04-12 RX ADMIN — Medication 10 ML: at 06:56

## 2018-04-12 RX ADMIN — AMLODIPINE BESYLATE 10 MG: 10 TABLET ORAL at 11:44

## 2018-04-12 RX ADMIN — DOCUSATE SODIUM 100 MG: 100 CAPSULE, LIQUID FILLED ORAL at 09:41

## 2018-04-12 NOTE — ROUTINE PROCESS
Bedside and Verbal shift change report given to [address] Cho (oncoming nurse) by Jerry Krishna (offgoing nurse). Report included the following information SBAR, Kardex, Intake/Output and MAR.

## 2018-04-12 NOTE — PROGRESS NOTES
ARU/IPR REFERRAL CONTACT NOTE  [id number] [facility]    Re: Ova Osler    Follow up on IP Consult for IP Rehab Screen. MRI negative for acute stroke, PT/OT evaluations reflect patient at baseline no need for continued treatment. Patient does not meet criteria for IPR. Thank you for the consult.     Diana Mark

## 2018-04-12 NOTE — PROGRESS NOTES
Problem: Mobility Impaired (Adult and Pediatric)  Goal: *Acute Goals and Plan of Care (Insert Text)  Acute goals not established. Patient reports/demonstrates independent functional mobility, acute skilled PT services not indicated at this time. physical Therapy EVALUATION and Discharge    Patient: Sander Huerta (76 y.o. male)  Date: 4/12/2018  Primary Diagnosis: TIA (transient ischemic attack)  CVA (cerebral vascular accident) [facility])  Precautions: Fall    OBJECTIVE/ASSESSMENT AND RECOMMENDATIONS:  Based on the objective data described below, the patient presents with baseline independent functional mobility including bed mobility, transfers, ambulation, stairs, and general activity tolerance following admission for TIA. Patient presents today semi-reclined in bed, alert and agreeable to PT evaluation. He transferred to standing with Han, initiated gait training with RW however patient demonstrates good balance and began carrying walker. Remainder of gait training completed with occasional use of HR or IV pole, otherwise independent. Patient ambulated ~300 ft total without LOB, demonstrated mildly increased lateral sway which may be improved with use of SPC. Patient ascended/descended 4 stairs using single HR with supervision for safety. Patient then returned to room where he completed independent toileting and transferred to sitting in recliner, left with call bell and needs in reach. Education: bed mobility, transfers, ambulation, stairs, assistive device management -- patient verbalized/demonstrated understanding  Skilled physical therapy is not indicated at this time. Discharge Recommendations: None  Further Equipment Recommendations for Discharge: straight cane      SUBJECTIVE:   Patient stated I live on the street.     OBJECTIVE DATA SUMMARY:     Past Medical History:   Diagnosis Date    Cardiac LV ejection fraction 30-35% 7/15/15    Cardiomyopathy ([address] 7/15/15    35% per ECHO    Cocaine use 8/9/14 + UDS, persistent use    Diabetes ([facility] [address] 8/9/2014    8.1 hgbA1C 8/9/14    Heart failure ([facility] [address]     Hepatitis C infection 8/20/14    Genotype 1A    History of cocaine abuse 8/10/2016    History of heroin abuse 8/10/2016    Homelessness 9/7/2015    Hypertension 2000   History reviewed. No pertinent surgical history. Barriers to Learning/Limitations: None  Compensate with: N/A  Prior Level of Function/Home Situation: Patient is homeless, was independent with functional mobility without assistive device PTA. Home Situation  Home Environment: Other (comment) (Homeless)  One/Two Story Residence: Other (Comment) (homeless)  Living Alone: Yes  Support Systems: Family member(s)  Patient Expects to be Discharged to[de-identified] Other (comment) (back on the street)  Current DME Used/Available at Home: None  Tub or Shower Type:  (pt is homeless)  Critical Behavior:  Neurologic State: Alert  Psychosocial  Patient Behaviors: Calm; Cooperative  Strength:    Strength: Within functional limits (BLE)  Tone & Sensation:   Tone: Normal (BLE)  Sensation: Intact (BLE)   Range Of Motion:  AROM: Within functional limits (BLE)  Functional Mobility:  Bed Mobility:  Rolling: Independent  Supine to Sit: Independent  Scooting: Modified independent  Transfers:  Sit to Stand: Modified independent  Stand to Sit: Modified independent  Balance:   Sitting: Intact  Standing: Intact  Ambulation/Gait Training:  Distance (ft): 300 Feet (ft)  Assistive Device:  (Occasional use of HR or IV pole for balance)  Ambulation - Level of Assistance: Supervision  Base of Support: Widened  Speed/Sara: Pace decreased (<100 feet/min)  Stairs:  Number of Stairs Trained: 4  Stairs - Level of Assistance: Supervision  Rail Use: Right   Pain:  Pre session: 0/10  Post session: 0/10  Activity Tolerance:   Good  Please refer to the flowsheet for vital signs taken during this treatment.   After treatment:   [x] Patient left in no apparent distress sitting up in chair  [] Patient left sitting on EOB  [] Patient left in no apparent distress in bed  [] Patient declined to be OOB at this time due to  [x] Call bell left within reach  [x] Nursing notified(Alyssa)  [] Caregiver present  [] Bed alarm activated  [] SCDs in place  COMMUNICATION/EDUCATION:   [x]         Fall prevention education was provided and the patient/caregiver indicated understanding. [x]         Patient/family have participated as able in goal setting and plan of care. []         Patient/family agree to work toward stated goals and plan of care. []         Patient understands intent and goals of therapy, but is neutral about his/her participation. []         Patient is unable to participate in goal setting and plan of care. Thank you for this referral.  Dimitris Biggs   Time Calculation: 15 mins    Mobility  Current  CI= 1-19%  D/C  CI= 1-19%. The severity rating is based on the Level of Assistance required for Functional Mobility and ADLs.     Eval Complexity: History: MEDIUM  Complexity : 1-2 comorbidities / personal factors will impact the outcome/ POC Exam:MEDIUM Complexity : 3 Standardized tests and measures addressing body structure, function, activity limitation and / or participation in recreation  Presentation: LOW Complexity : Stable, uncomplicated  Clinical Decision Making:Medium Complexity   Overall Complexity:MEDIUM

## 2018-04-12 NOTE — PROGRESS NOTES
Problem: Self Care Deficits Care Plan (Adult)  Goal: *Acute Goals and Plan of Care (Insert Text)  Outcome: Resolved/Met Date Met: 04/12/18  Occupational Therapy EVALUATION/discharge    Patient: Annalisa Mejia (27 y.o. male)  Date: 4/12/2018  Primary Diagnosis: TIA (transient ischemic attack)  CVA (cerebral vascular accident) [facility])        Precautions:   Fall    ASSESSMENT AND RECOMMENDATIONS:  Based on the objective data described below, the patient presents with baseline level ADL status. Completed dressing, toilet transfer and simulated grooming independent no AD. Pt was pleasant and motivated, expressed that he his homeless. Pt would benefit from more supportive housing arrangement. Encouraged use of shelters available, pt states he does not prefer them. Chair alarm placed per RN request.     Skilled occupational therapy is not indicated at this time. Discharge Recommendations: none  Further Equipment Recommendations for Discharge: N/A      Barriers to Learning/Limitations: None  Compensate with: visual, verbal, tactile, kinesthetic cues/model     COMPLEXITY     Eval Complexity: History: LOW Complexity : Brief history review ; Examination: LOW Complexity : 1-3 performance deficits relating to physical, cognitive , or psychosocial skils that result in activity limitations and / or participation restrictions ; Decision Making:LOW Complexity : No comorbidities that affect functional and no verbal or physical assistance needed to complete eval tasks  Assessment: low Complexity        G-CODES:     Self Care  Current  CH= 0%   Goal  CH= 0%   D/C  CH= 0%. The severity rating is based on the Level of Assistance required for Functional Mobility and ADLs. SUBJECTIVE:   Patient stated I dont like how many people are at a shelter.     OBJECTIVE DATA SUMMARY:     Past Medical History:   Diagnosis Date    Cardiac LV ejection fraction 30-35% 7/15/15    Cardiomyopathy ([facility] [address] 7/15/15    35% per ECHO    Cocaine use 8/9/14    + UDS, persistent use    Diabetes ([facility] [address] 8/9/2014    8.1 hgbA1C 8/9/14    Heart failure ([facility] [address]     Hepatitis C infection 8/20/14    Genotype 1A    History of cocaine abuse 8/10/2016    History of heroin abuse 8/10/2016    Homelessness 9/7/2015    Hypertension 2000   History reviewed. No pertinent surgical history. Prior Level of Function/Home Situation: independent  Home Situation  Home Environment: Other (comment) (homeless)  One/Two Story Residence: Other (Comment) (homeless)  Living Alone: Yes  Support Systems: Family member(s)  Patient Expects to be Discharged to[de-identified] Other (comment) (back on the street)  Current DME Used/Available at Home: None  Tub or Shower Type:  (pt is homeless)  []     Right hand dominant   []     Left hand dominant  Cognitive/Behavioral Status:  Neurologic State: Alert  Orientation Level: Oriented X4          Skin: no noted concern    Edema: no noted concern    Vision/Perceptual:    Tracking:  (no noted concern)                                Coordination:  Coordination: Within functional limits (BUE)            Balance:       Strength:    Strength: Within functional limits (BUE)                Tone & Sensation:    Tone: Normal (BUE)  Sensation: Intact (BUE)                      Range of Motion:    AROM: Within functional limits (BUE)  PROM: Within functional limits (BUE)                      Functional Mobility and Transfers for ADLs:  Bed Mobility:     Supine to Sit: Independent        Transfers: Toilet Transfer : Independent                ADL Assessment:  Feeding: Independent    Oral Facial Hygiene/Grooming: Independent    Bathing: Independent    Upper Body Dressing: Independent    Lower Body Dressing: Independent    Toileting: Independent          Pain:  Pt reports 0/10 pain or discomfort prior to treatment.    Pt reports 0/10 pain or discomfort post treatment.      Activity Tolerance:   good    Please refer to the flowsheet for vital signs taken during this treatment. After treatment:   [x]  Patient left in no apparent distress sitting up in chair  []  Patient left in no apparent distress in bed  [x]  Call bell left within reach  [x]  Nursing notified  []  Caregiver present  [x]  Bed alarm activated    COMMUNICATION/EDUCATION:   Communication/Collaboration:  [x]      Home safety education was provided and the patient/caregiver indicated understanding. []      Patient/family have participated as able and agree with findings and recommendations. []      Patient is unable to participate in plan of care at this time.     Lyla Jackson OT  Time Calculation: 11 mins

## 2018-04-12 NOTE — DIABETES MGMT
Diabetes Patient/Family Education Record    Factors That  May Influence Patients Ability  to Learn or  Comply with Recommendations   []   Language barrier    []   Cultural needs   []   Motivation    []   Cognitive limitation    []   Physical   [x]   Education    []   Physiological factors   []   Hearing/vision/speaking impairment   []   Jewish beliefs    []   Financial factors   []  Other:   []  No factors identified at this time. Person Instructed:   [x]   Patient   []   Family   []  Other     Preference for Learning:   [x]   Verbal   [x]   Written   []  Demonstration     Level of Comprehension & Competence:    [x]  Good                                      [] Fair                                     []  Poor                             []  Needs Reinforcement   [x]  Teachback completed    Education Component:   [x]  Medication management, including how to administer insulin (if appropriate) and potential medication interactions: Patient stated that he is homeless but has diabetes meds: levemir insulin 20 units twice daily (AM and bedtime) and humalog sliding scale 3x daily before meals. Patient stated that he has a particular place where he keeps insulin and diabetes supplies to make sure that he does not lose them or he will be out of luck. Patient stated that he knows the importance of not losing his diabetes supplies. He is getting medical care through [id number]. [x]  Nutritional management: Patient stated that following recommended nutrition for diabetes is a challenge for him because he is homeless. Encouraged patient regarding serving size and portion control of carbs (starches, fruits, dairy) and limiting intake of concentrated sweets including regular beverages and sweet snacks. He verbalized understanding and agreed. [x]  Exercise: Patient stated that he is able to tolerate walking exercise.    [x]  Signs, symptoms, and treatment of hyperglycemia and hypoglycemia   [x] Prevention, recognition and treatment of hyperglycemia and hypoglycemia   [x]  Importance of blood glucose monitoring and how to obtain a blood glucose meter: Patient stated that he has BG meter and testing supplies. He is checking his blood sugar regularly. []  Instruction on use of the blood glucose meter   [x]  Discuss the importance of HbA1C monitoring: Discussed with patient his current A1c of 14.0% (04/12/2018) which is equivalent to average blood glucose of 360 mg/dL during the past 2-3 months. Patient stated that he is taking prescribed insulin (lantus and humalog) but nutrition is a challenge for him because he is homeless. Encouraged patient regarding serving size and portion control of carbs (starches, fruits, dairy) and limiting intake of concentrated sweets including regular beverages and sweet snacks.     []  Sick day guidelines   []  Proper use and disposal of lancets, needles, syringes or insulin pens (if appropriate)   [x]  Potential long-term complications (retinopathy, kidney disease, neuropathy, foot care)   [x] Information about whom to contact in case of emergency or for more information    []  Goal:  Patient/family will demonstrate understanding of Diabetes Self Management Skills by: (date) _______  Plan for post-discharge education or self-management support:    [x] Outpatient class schedule provided            [] Patient Declined    [] Scheduled for outpatient classes (date) _______     Atif Severino RN  pgr [phone]

## 2018-04-12 NOTE — PROGRESS NOTES
While in room to assess and apply tele box p[t has a small twitching motion like which appears to look like seizure affecting left side. Lasted bout 25 seconds. Pt began twitching on left side and sat up immediately and began to have  Small drool and cough in bag that was already at bedside. This type of episode was given that pt does this in report.

## 2018-04-12 NOTE — PROGRESS NOTES
R/O stroke    S: Somewhat better. He still has trembling of his jaw accompanied by spitting up saliva. O: His speech is less dysarthric, tongue is midline, and the weakness of the left lower face is less apparent. His glucose is close to normal, down from >600 yesterday. Cranial MRI w/o acute changes. A: Could this all be associated with his hyperglycemia? Small infarcts can be missed even on MRI, and it shows old small scattered infarcts. P: Continue with current care. He is homeless, but he should be on ASA.      Colonel Jf PEARSON

## 2018-04-12 NOTE — CONSULTS
[facility] - Consult Note    Date of  Admission: 4/10/2018 11:14 PM     Primary Care Physician:  None     Plan:     1. Possible acute CVA with dysarthria- neurology w/u in progress   2. Mildly elevated troponin- no chest pain no chest pressure. likely related to [address], CKD and recent CVA. Continue asa. clinically not an ACS. 3. Chronic combined systolic and diastolic heart failure-stage C class II NYHA- appears compensated no clinical evidence fluid overload. 4. Non ischemic Cardiomyopathy with EF 25%- on recent echo possible related to cocaine and alcohol use. In 7/2016  Patient had cardiac cath which revealed non critical CAD. Added low dose Coreg starting tomorrow. Not a candidate for AICD due to active polysubstance abuse  5. Hypertension- uncontrolled patient is homeless and has hx of non compliance with medications. On Norvasc. Will resume ACE if ok with neurology. 6. Polysubstance abuse- have discussion with patient and patient was advised to quit cocaine and alcohol use. 7. Non compliance with medications  8. CKD-stable creatinine. 9. Uncontrolled diabetes with HBGA1c 14- w/u and medications per medical team         Patient education included a review of the importance of compliance in the treatment regimen      Echo 4/18  Left ventricle: The ventricle was dilated. Systolic function was severely   reduced by visual assessment. Ejection  fraction was estimated to be 25 %. There was moderate diffuse hypokinesis. Wall thickness was mildly increased. Features were consistent with a pseudonormal left ventricular filling   pattern, with concomitant abnormal relaxation and  increased filling pressure (grade 2 diastolic dysfunction). Left atrium: The atrium was moderately dilated. Atrial septum: Contrast injection was performed. There was no right-to-left   shunt, with provocative maneuvers to  increase right atrial pressure. Mitral valve: There was moderate regurgitation.  The effective orifice of   mitral regurgitation by proximal isovelocity  surface area was 0.1 cm-sq. The volume of mitral regurgitation by proximal   isovelocity surface area was 18 ml.         [facility] stress test 07/2015  CONCLUSIONS  1. Nondiagnostic exercise stress test from an electrocardiographic  standpoint. 2. Abnormal perfusion study. 3. Perfusion imaging shows no evidence of significant ongoing ischemia or  prior infarction. 4. Gated SPECT imaging shows severely dilated left ventricle with severely  diminished LV function with estimated ejection fraction of 27%. No obvious  regional wall motion abnormalities noted. This is consistent with dilated  cardiomyopathy. 5. This is a moderate-to-high risk finding. Assessment:     Hospital Problems  Date Reviewed: 12/2/2015          Codes Class Noted POA    TIA (transient ischemic attack) ICD-10-CM: G45.9  ICD-9-CM: 435.9  4/11/2018 Unknown        CVA (cerebral vascular accident) [facility]) ICD-10-CM: I63.9  ICD-9-CM: 434.91  4/11/2018 Unknown        Cardiac LV ejection fraction 30-35% ICD-10-CM: R94.30  ICD-9-CM: 785.9  7/15/2015 Unknown        Cocaine use ICD-10-CM: F14.90  ICD-9-CM: 305.60  8/9/2014 Unknown    Overview Signed 4/12/2018 12:01 PM by Jose Mosqueda NP     + UDS, persistent use             Hypertension ICD-10-CM: I10  ICD-9-CM: 401.9  1/1/2000 Unknown                   History of Present Illness: This is a 58 y.o. male admitted for TIA (transient ischemic attack)CVA (cerebral vascular accident) ([facility] [address] Patient with PMHx of hypertension, CMO, CHF, atypical chest pain, polysubstance abuse and hepatitis C. Patient who presents to the ED with complaint of slurred speech which has been intermittent since yesterday morning (4/9/18). Patient states that he has been drooling with speech. He reports using cocaine and heroin on Tuesday. 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 He reports non compliance with medications. He denies any chest pain pressure or SOB.  His  slurred speech has improved. He denies any focal deficits. No previous CVA. No palpitations no previous syncopal episode.           Past Medical History:     Past Medical History:   Diagnosis Date    Cardiac LV ejection fraction 30-35% 7/15/15    Cardiomyopathy ([facility] [address] 7/15/15    35% per ECHO    Cocaine use 8/9/14    + UDS, persistent use    Diabetes ([facility] [address] 8/9/2014    8.1 hgbA1C 8/9/14    Heart failure ([facility] [address]     Hepatitis C infection 8/20/14    Genotype 1A    History of cocaine abuse 8/10/2016    History of heroin abuse 8/10/2016    Homelessness 9/7/2015    Hypertension 2000         Social History:     Social History     Social History    Marital status: SINGLE     Spouse name: N/A    Number of children: N/A    Years of education: N/A     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    Drug use: No      Comment: 2 x heroin experimental, smoked Cocaine with last 7/27/15    Sexual activity: No     Other Topics Concern     Service No    Blood Transfusions No    Caffeine Concern Yes    Occupational Exposure No    Hobby Hazards No    Sleep Concern No    Stress Concern No    Weight Concern No    Special Diet No    Back Care No    Exercise No    Bike Helmet No    Seat Belt Yes     Social History Narrative        Family History:     Family History   Problem Relation Age of Onset    Liver Disease Father     Diabetes Mother     Cancer Maternal Grandmother     No Known Problems Sister     Diabetes Brother     No Known Problems Brother     No Known Problems Sister     No Known Problems Sister         Medications:   No Known Allergies     Current Facility-Administered Medications   Medication Dose Route Frequency    amLODIPine (NORVASC) tablet 10 mg  10 mg Oral DAILY    [START ON 4/13/2018] insulin glargine (LANTUS) injection 12 Units  12 Units SubCUTAneous DAILY    atorvastatin (LIPITOR) tablet 10 mg  10 mg Oral QHS    sodium chloride (NS) flush 5-10 mL  5-10 mL IntraVENous Q8H    sodium chloride (NS) flush 5-10 mL  5-10 mL IntraVENous PRN    labetalol (NORMODYNE;TRANDATE) injection 5 mg  5 mg IntraVENous Q10MIN PRN    acetaminophen (TYLENOL) tablet 650 mg  650 mg Oral Q4H PRN    docusate sodium (COLACE) capsule 100 mg  100 mg Oral BID    heparin (porcine) injection 5,000 Units  5,000 Units SubCUTAneous Q8H    glucose chewable tablet 16 g  4 Tab Oral PRN    glucagon (GLUCAGEN) injection 1 mg  1 mg IntraMUSCular PRN    dextrose (D50W) injection syrg 12.5-25 g  25-50 mL IntraVENous PRN    insulin lispro (HUMALOG) injection   SubCUTAneous AC&HS    aspirin (ASPIRIN) tablet 325 mg  325 mg Oral DAILY    budesonide-formoterol (SYMBICORT) 80-4.5 mcg inhaler  2 Puff Inhalation BID RT    albuterol (PROVENTIL VENTOLIN) nebulizer solution 2.5 mg  2.5 mg Nebulization Q4H PRN        Review Of Systems:       Constitutional: No fever, no chills, no weight loss, no night sweats   HEENT: No epistaxis, no nasal drainage, no difficulty in swallowing, no redness in eyes  Respiratory:  dyspnea on exertion.   Cardiovascular: dyspnea  Gastrointestinal: no abd pain, no vomiting, no diarrhea, no bleeding symptoms  Genitourinary: No urinary symptoms or hematuria  Integument/breast: No ulcers or rashes  Musculoskeletal: no muscle pain, no weakness  Neurological: slurred speech,  headaches  Behvioral/Psych: No anxiety, no depression       Physical Exam:     Visit Vitals    /82 (BP 1 Location: Right arm, BP Patient Position: Sitting)    Pulse 79    Temp 98.1 °F (36.7 °C)    Resp 19    Ht 5' 11\" (1.803 m)    Wt 68.9 kg (152 lb)    SpO2 100%    BMI 21.2 kg/m2     BP Readings from Last 3 Encounters:   04/12/18 156/82   02/21/18 160/85   01/03/18 114/74     Pulse Readings from Last 3 Encounters:   04/12/18 79   02/21/18 82   01/03/18 87     Wt Readings from Last 3 Encounters:   04/12/18 68.9 kg (152 lb)   02/21/18 77.1 kg (170 lb)   01/03/18 72.1 kg (159 lb)       General:  alert, cooperative, no distress, appears stated age  Skin: Warm and dry, acyanotic, normal color. Head: Normocephalic, atraumatic. Eyes: Sclerae anicteric, conjunctivae without injection. Neck:  nontender, no nuchal rigidity, no masses, no stridor, no carotid bruit, no JVD  Lungs:  clear to auscultation bilaterally. Heart:  regular rate and rhythm, no click, no rub  Abdomen:  abdomen is soft without significant tenderness, masses, organomegaly or guarding  Extremities:  extremities normal, atraumatic, no cyanosis or edema  Neurological: grossly intact. No focal abnormalities, moves all extremities well. Psychiatric Affect: The patient is awake, alert and oriented x3. [address] is interactive and appropriate. Data Review:     Recent Results (from the past 48 hour(s))   CBC WITH AUTOMATED DIFF    Collection Time: 04/10/18 11:33 PM   Result Value Ref Range    WBC 8.1 4.6 - 13.2 K/uL    RBC 3.93 (L) 4.70 - 5.50 M/uL    HGB 11.6 (L) 13.0 - 16.0 g/dL    HCT 34.0 (L) 36.0 - 48.0 %    MCV 86.5 74.0 - 97.0 FL    MCH 29.5 24.0 - 34.0 PG    MCHC 34.1 31.0 - 37.0 g/dL    RDW 12.2 11.6 - 14.5 %    PLATELET 257 485 - 835 K/uL    MPV 10.6 9.2 - 11.8 FL    NEUTROPHILS 56 40 - 73 %    LYMPHOCYTES 30 21 - 52 %    MONOCYTES 9 3 - 10 %    EOSINOPHILS 4 0 - 5 %    BASOPHILS 1 0 - 2 %    ABS. NEUTROPHILS 4.6 1.8 - 8.0 K/UL    ABS. LYMPHOCYTES 2.4 0.9 - 3.6 K/UL    ABS. MONOCYTES 0.8 0.05 - 1.2 K/UL    ABS. EOSINOPHILS 0.3 0.0 - 0.4 K/UL    ABS.  BASOPHILS 0.0 0.0 - 0.1 K/UL    DF AUTOMATED     PTT    Collection Time: 04/10/18 11:33 PM   Result Value Ref Range    aPTT 27.5 23.0 - 36.4 SEC   PROTHROMBIN TIME + INR    Collection Time: 04/10/18 11:33 PM   Result Value Ref Range Prothrombin time 11.1 (L) 11.5 - 15.2 sec    INR 0.8 0.8 - 1.2     METABOLIC PANEL, COMPREHENSIVE    Collection Time: 04/10/18 11:33 PM   Result Value Ref Range    Sodium 130 (L) 136 - 145 mmol/L    Potassium 4.5 3.5 - 5.5 mmol/L    Chloride 94 (L) 100 - 108 mmol/L    CO2 30 21 - 32 mmol/L    Anion gap 6 3.0 - 18 mmol/L    Glucose 634 (HH) 74 - 99 mg/dL    BUN 27 (H) 7.0 - 18 MG/DL    Creatinine 2.67 (H) 0.6 - 1.3 MG/DL    BUN/Creatinine ratio 10 (L) 12 - 20      GFR est AA 29 (L) >60 ml/min/1.73m2    GFR est non-AA 24 (L) >60 ml/min/1.73m2    Calcium 8.1 (L) 8.5 - 10.1 MG/DL    Bilirubin, total 0.3 0.2 - 1.0 MG/DL    ALT (SGPT) 43 16 - 61 U/L    AST (SGOT) 36 15 - 37 U/L    Alk.  phosphatase 131 (H) 45 - 117 U/L    Protein, total 6.6 6.4 - 8.2 g/dL    Albumin 2.5 (L) 3.4 - 5.0 g/dL    Globulin 4.1 (H) 2.0 - 4.0 g/dL    A-G Ratio 0.6 (L) 0.8 - 1.7     TROPONIN I    Collection Time: 04/10/18 11:33 PM   Result Value Ref Range    Troponin-I, Qt. 0.09 (H) 0.0 - 0.045 NG/ML   ETHYL ALCOHOL    Collection Time: 04/10/18 11:33 PM   Result Value Ref Range    ALCOHOL(ETHYL),SERUM <3 0 - 3 MG/DL   EKG, 12 LEAD, INITIAL    Collection Time: 04/10/18 11:54 PM   Result Value Ref Range    Ventricular Rate 69 BPM    Atrial Rate 69 BPM    P-R Interval 146 ms    QRS Duration 92 ms    Q-T Interval 444 ms    QTC Calculation (Bezet) 475 ms    Calculated P Axis 59 degrees    Calculated R Axis -52 degrees    Calculated T Axis 134 degrees    Diagnosis       Normal sinus rhythm  Left anterior fascicular block  Voltage criteria for left ventricular hypertrophy  ST & Marked T wave abnormality, consider anterolateral ischemia  Prolonged QT  Abnormal ECG  When compared with ECG of 28-DEC-2017 14:14,  Left anterior fascicular block is now present  T wave inversion no longer evident in Inferior leads  T wave inversion more evident in Lateral leads  Confirmed by Camron Cardoso ([id number]) on 4/12/2018 7:42:45 AM     DRUG SCREEN, URINE    Collection Time: 04/11/18  3:00 AM   Result Value Ref Range    BENZODIAZEPINES NEGATIVE  NEG      BARBITURATES NEGATIVE  NEG      THC (TH-CANNABINOL) NEGATIVE  NEG      OPIATES POSITIVE (A) NEG      PCP(PHENCYCLIDINE) NEGATIVE  NEG      COCAINE POSITIVE (A) NEG      AMPHETAMINES NEGATIVE  NEG      METHADONE NEGATIVE  NEG      HDSCOM (NOTE)    GLUCOSE, POC    Collection Time: 04/11/18  4:04 AM   Result Value Ref Range    Glucose (POC) 477 (HH) 70 - 110 mg/dL   HEMOGLOBIN A1C W/O EAG    Collection Time: 04/11/18  9:20 AM   Result Value Ref Range    Hemoglobin A1c 13.2 (H) 4.2 - 5.6 %   CARDIAC PANEL,(CK, CKMB & TROPONIN)    Collection Time: 04/11/18  9:20 AM   Result Value Ref Range     39 - 308 U/L    CK - MB 5.5 (H) <3.6 ng/ml    CK-MB Index 1.9 0.0 - 4.0 %    Troponin-I, Qt. 0.08 (H) 0.0 - 0.045 NG/ML   GLUCOSE, POC    Collection Time: 04/11/18 10:36 AM   Result Value Ref Range    Glucose (POC) 362 (H) 70 - 110 mg/dL   GLUCOSE, POC    Collection Time: 04/11/18 10:39 AM   Result Value Ref Range    Glucose (POC) 404 (HH) 70 - 110 mg/dL   GLUCOSE, POC    Collection Time: 04/11/18 11:53 AM   Result Value Ref Range    Glucose (POC) 386 (H) 70 - 110 mg/dL   CARDIAC PANEL,(CK, CKMB & TROPONIN)    Collection Time: 04/11/18  4:40 PM   Result Value Ref Range     39 - 308 U/L    CK - MB 3.9 (H) <3.6 ng/ml    CK-MB Index 1.9 0.0 - 4.0 %    Troponin-I, Qt. 0.08 (H) 0.0 - 0.045 NG/ML   GLUCOSE, POC    Collection Time: 04/11/18  4:50 PM   Result Value Ref Range    Glucose (POC) 204 (H) 70 - 110 mg/dL   GLUCOSE, POC    Collection Time: 04/11/18  9:33 PM   Result Value Ref Range    Glucose (POC) 293 (H) 70 - 110 mg/dL   GLUCOSE, POC    Collection Time: 04/12/18 12:23 AM   Result Value Ref Range    Glucose (POC) 55 (L) 70 - 110 mg/dL   GLUCOSE, POC    Collection Time: 04/12/18 12:31 AM   Result Value Ref Range    Glucose (POC) 62 (L) 70 - 110 mg/dL   GLUCOSE, POC    Collection Time: 04/12/18 12:37 AM   Result Value Ref Range Glucose (POC) 92 70 - 110 mg/dL   LIPID PANEL    Collection Time: 04/12/18  2:44 AM   Result Value Ref Range    LIPID PROFILE          Cholesterol, total 139 <200 MG/DL    Triglyceride 132 <150 MG/DL    HDL Cholesterol 65 (H) 40 - 60 MG/DL    LDL, calculated 47.6 0 - 100 MG/DL    VLDL, calculated 26.4 MG/DL    CHOL/HDL Ratio 2.1 0 - 5.0     HEMOGLOBIN A1C WITH EAG    Collection Time: 04/12/18  2:44 AM   Result Value Ref Range    Hemoglobin A1c 14.0 (H) 4.2 - 5.6 %    Est. average glucose 355 mg/dL   CBC WITH AUTOMATED DIFF    Collection Time: 04/12/18  2:44 AM   Result Value Ref Range    WBC 12.9 4.6 - 13.2 K/uL    RBC 4.13 (L) 4.70 - 5.50 M/uL    HGB 12.1 (L) 13.0 - 16.0 g/dL    HCT 35.7 (L) 36.0 - 48.0 %    MCV 86.4 74.0 - 97.0 FL    MCH 29.3 24.0 - 34.0 PG    MCHC 33.9 31.0 - 37.0 g/dL    RDW 12.2 11.6 - 14.5 %    PLATELET 948 951 - 339 K/uL    MPV 10.8 9.2 - 11.8 FL    NEUTROPHILS 72 40 - 73 %    LYMPHOCYTES 19 (L) 21 - 52 %    MONOCYTES 7 3 - 10 %    EOSINOPHILS 2 0 - 5 %    BASOPHILS 0 0 - 2 %    ABS. NEUTROPHILS 9.3 (H) 1.8 - 8.0 K/UL    ABS. LYMPHOCYTES 2.4 0.9 - 3.6 K/UL    ABS. MONOCYTES 1.0 0.05 - 1.2 K/UL    ABS. EOSINOPHILS 0.2 0.0 - 0.4 K/UL    ABS. BASOPHILS 0.0 0.0 - 0.1 K/UL    DF AUTOMATED     METABOLIC PANEL, COMPREHENSIVE    Collection Time: 04/12/18  2:44 AM   Result Value Ref Range    Sodium 137 136 - 145 mmol/L    Potassium 4.1 3.5 - 5.5 mmol/L    Chloride 103 100 - 108 mmol/L    CO2 31 21 - 32 mmol/L    Anion gap 3 3.0 - 18 mmol/L    Glucose 68 (L) 74 - 99 mg/dL    BUN 20 (H) 7.0 - 18 MG/DL    Creatinine 1.97 (H) 0.6 - 1.3 MG/DL    BUN/Creatinine ratio 10 (L) 12 - 20      GFR est AA 42 (L) >60 ml/min/1.73m2    GFR est non-AA 34 (L) >60 ml/min/1.73m2    Calcium 8.7 8.5 - 10.1 MG/DL    Bilirubin, total 0.3 0.2 - 1.0 MG/DL    ALT (SGPT) 36 16 - 61 U/L    AST (SGOT) 33 15 - 37 U/L    Alk.  phosphatase 118 (H) 45 - 117 U/L    Protein, total 6.1 (L) 6.4 - 8.2 g/dL    Albumin 2.2 (L) 3.4 - 5.0 g/dL Globulin 3.9 2.0 - 4.0 g/dL    A-G Ratio 0.6 (L) 0.8 - 1.7     MAGNESIUM    Collection Time: 04/12/18  2:44 AM   Result Value Ref Range    Magnesium 2.1 1.6 - 2.6 mg/dL   PROTHROMBIN TIME + INR    Collection Time: 04/12/18  2:44 AM   Result Value Ref Range    Prothrombin time 11.6 11.5 - 15.2 sec    INR 0.9 0.8 - 1.2     TSH 3RD GENERATION    Collection Time: 04/12/18  2:44 AM   Result Value Ref Range    TSH 2.11 0.36 - 3.74 uIU/mL   GLUCOSE, POC    Collection Time: 04/12/18  7:40 AM   Result Value Ref Range    Glucose (POC) 115 (H) 70 - 110 mg/dL   GLUCOSE, POC    Collection Time: 04/12/18  9:31 AM   Result Value Ref Range    Glucose (POC) 307 (H) 70 - 110 mg/dL   GLUCOSE, POC    Collection Time: 04/12/18 11:25 AM   Result Value Ref Range    Glucose (POC) 376 (H) 70 - 110 mg/dL         Intake/Output Summary (Last 24 hours) at 04/12/18 1202  Last data filed at 04/12/18 0009   Gross per 24 hour   Intake             1008 ml   Output              900 ml   Net              108 ml       Cardiographics:     ECG: ST-T wave abnormality    Echocardiogram: ordered by medical team       Signed By: Boo Perry NP     April 12, 2018      Patient seen independently  Discussed the details with NP and patient.  Please see orders & recommendations  Gina Cerrato MD

## 2018-04-12 NOTE — PROGRESS NOTES
[facility] Hospitalist Group  Progress Note    Patient: Stephen Graves Age: 58 y.o. : 1955 MR#: [id number] SSN: [id number]  Date: 2018     Subjective:     Patient in NAD, awake, alert, denies CP or sob    Assessment/Plan:     1. Likely acute stroke with dysarthria and dysphasia ( not seen on MRI Brain )  2. Hypertension, which is uncontrolled. 3.  Uncontrolled type 2 diabetes with hyperglycemia. 4.  Polysubstance abuse. 5.  History of hepatitis C.  6.  cardiomyopathy. 7.  Chronic kidney disease stage III to IV. 8.  Elevated troponins.   9- Brain Tumor on MRI- ?subepindyoma    PLAN  On stroke protocol,  On asa, statin  Restart norvasc  D/w DR Ricardo regarding Brain tumor- he recommends OP f/u for Neurosurgery  Await PT, OT  Cardio following  Counseled cessation of illicit drug use  Decrease lantus  D/w patient    Case discussed with:  []Patient  []Family  []Nursing  []Case Management  DVT Prophylaxis:  []Lovenox  []Hep SQ  []SCDs  []Coumadin   []On Heparin gtt    Objective:   VS:   Visit Vitals    BP (!) 174/109 (BP 1 Location: Right arm, BP Patient Position: At rest)  Comment: Nurse Alyssa notified    Pulse 76    Temp 99.5 °F (37.5 °C)    Resp 20    Ht 5' 11\" (1.803 m)    Wt 68.9 kg (152 lb)    SpO2 99%    BMI 21.2 kg/m2      Tmax/24hrs: Temp (24hrs), Av.3 °F (36.8 °C), Min:97.6 °F (36.4 °C), Max:99.5 °F (37.5 °C)    Intake/Output Summary (Last 24 hours) at 18 1006  Last data filed at 18 0009   Gross per 24 hour   Intake             1008 ml   Output              900 ml   Net              108 ml       General:  Awake, alert  Cardiovascular:  S1S2+, RRR  Pulmonary:  CTA b/l  GI:  Soft, BS+, NT, ND  Extremities:  No edema  +dysarthria    Labs:    Recent Results (from the past 24 hour(s))   GLUCOSE, POC    Collection Time: 18 10:36 AM   Result Value Ref Range    Glucose (POC) 362 (H) 70 - 110 mg/dL   GLUCOSE, POC    Collection Time: 18 10:39 AM Result Value Ref Range    Glucose (POC) 404 (HH) 70 - 110 mg/dL   GLUCOSE, POC    Collection Time: 04/11/18 11:53 AM   Result Value Ref Range    Glucose (POC) 386 (H) 70 - 110 mg/dL   CARDIAC PANEL,(CK, CKMB & TROPONIN)    Collection Time: 04/11/18  4:40 PM   Result Value Ref Range     39 - 308 U/L    CK - MB 3.9 (H) <3.6 ng/ml    CK-MB Index 1.9 0.0 - 4.0 %    Troponin-I, Qt. 0.08 (H) 0.0 - 0.045 NG/ML   GLUCOSE, POC    Collection Time: 04/11/18  4:50 PM   Result Value Ref Range    Glucose (POC) 204 (H) 70 - 110 mg/dL   GLUCOSE, POC    Collection Time: 04/11/18  9:33 PM   Result Value Ref Range    Glucose (POC) 293 (H) 70 - 110 mg/dL   GLUCOSE, POC    Collection Time: 04/12/18 12:23 AM   Result Value Ref Range    Glucose (POC) 55 (L) 70 - 110 mg/dL   GLUCOSE, POC    Collection Time: 04/12/18 12:31 AM   Result Value Ref Range    Glucose (POC) 62 (L) 70 - 110 mg/dL   GLUCOSE, POC    Collection Time: 04/12/18 12:37 AM   Result Value Ref Range    Glucose (POC) 92 70 - 110 mg/dL   LIPID PANEL    Collection Time: 04/12/18  2:44 AM   Result Value Ref Range    LIPID PROFILE          Cholesterol, total 139 <200 MG/DL    Triglyceride 132 <150 MG/DL    HDL Cholesterol 65 (H) 40 - 60 MG/DL    LDL, calculated 47.6 0 - 100 MG/DL    VLDL, calculated 26.4 MG/DL    CHOL/HDL Ratio 2.1 0 - 5.0     HEMOGLOBIN A1C WITH EAG    Collection Time: 04/12/18  2:44 AM   Result Value Ref Range    Hemoglobin A1c 14.0 (H) 4.2 - 5.6 %    Est. average glucose 355 mg/dL   CBC WITH AUTOMATED DIFF    Collection Time: 04/12/18  2:44 AM   Result Value Ref Range    WBC 12.9 4.6 - 13.2 K/uL    RBC 4.13 (L) 4.70 - 5.50 M/uL    HGB 12.1 (L) 13.0 - 16.0 g/dL    HCT 35.7 (L) 36.0 - 48.0 %    MCV 86.4 74.0 - 97.0 FL    MCH 29.3 24.0 - 34.0 PG    MCHC 33.9 31.0 - 37.0 g/dL    RDW 12.2 11.6 - 14.5 %    PLATELET 071 771 - 462 K/uL    MPV 10.8 9.2 - 11.8 FL    NEUTROPHILS 72 40 - 73 %    LYMPHOCYTES 19 (L) 21 - 52 %    MONOCYTES 7 3 - 10 %    EOSINOPHILS 2 0 - 5 %    BASOPHILS 0 0 - 2 %    ABS. NEUTROPHILS 9.3 (H) 1.8 - 8.0 K/UL    ABS. LYMPHOCYTES 2.4 0.9 - 3.6 K/UL    ABS. MONOCYTES 1.0 0.05 - 1.2 K/UL    ABS. EOSINOPHILS 0.2 0.0 - 0.4 K/UL    ABS. BASOPHILS 0.0 0.0 - 0.1 K/UL    DF AUTOMATED     METABOLIC PANEL, COMPREHENSIVE    Collection Time: 04/12/18  2:44 AM   Result Value Ref Range    Sodium 137 136 - 145 mmol/L    Potassium 4.1 3.5 - 5.5 mmol/L    Chloride 103 100 - 108 mmol/L    CO2 31 21 - 32 mmol/L    Anion gap 3 3.0 - 18 mmol/L    Glucose 68 (L) 74 - 99 mg/dL    BUN 20 (H) 7.0 - 18 MG/DL    Creatinine 1.97 (H) 0.6 - 1.3 MG/DL    BUN/Creatinine ratio 10 (L) 12 - 20      GFR est AA 42 (L) >60 ml/min/1.73m2    GFR est non-AA 34 (L) >60 ml/min/1.73m2    Calcium 8.7 8.5 - 10.1 MG/DL    Bilirubin, total 0.3 0.2 - 1.0 MG/DL    ALT (SGPT) 36 16 - 61 U/L    AST (SGOT) 33 15 - 37 U/L    Alk.  phosphatase 118 (H) 45 - 117 U/L    Protein, total 6.1 (L) 6.4 - 8.2 g/dL    Albumin 2.2 (L) 3.4 - 5.0 g/dL    Globulin 3.9 2.0 - 4.0 g/dL    A-G Ratio 0.6 (L) 0.8 - 1.7     MAGNESIUM    Collection Time: 04/12/18  2:44 AM   Result Value Ref Range    Magnesium 2.1 1.6 - 2.6 mg/dL   PROTHROMBIN TIME + INR    Collection Time: 04/12/18  2:44 AM   Result Value Ref Range    Prothrombin time 11.6 11.5 - 15.2 sec    INR 0.9 0.8 - 1.2     TSH 3RD GENERATION    Collection Time: 04/12/18  2:44 AM   Result Value Ref Range    TSH 2.11 0.36 - 3.74 uIU/mL   GLUCOSE, POC    Collection Time: 04/12/18  7:40 AM   Result Value Ref Range    Glucose (POC) 115 (H) 70 - 110 mg/dL       Signed By: Goran Hernandez MD     April 12, 2018 10:06 AM

## 2018-04-12 NOTE — DIABETES MGMT
Glycemic Control Plan of Care    T2DM with current A1c of 13.2% (04/11/2018). Completed assessment of diabetes management and education, see separate notes, 04/12/2018. Diabetes meds PTA reported by patient on 04/11/2018: Levemir insulin 20 units BID and humalog insulin 5 units TID AC. Patient is homeless and stated that he is getting medical care through [place] [id number]. POC BG range on 04/11/2018 at time of review: 204-477 mg/dL. Patient received a total of 20 units of lantus insulin and 19 units of correctional lispro insulin modified at very resistant dose. POC BG report on 04/12/2018: 55, 62, 92, 115, 376 mg/dL. Noted hypoglycemia treatment by giving patient fruit juice and milk with f/u BG of 115 mg/dL. Patient already received his 20 units lantus insulin this AM    Patient reported that he continue to eat 100% of meals plus he also ate bedtime snack last night before sleeping and that is why he cannot understand why his blood sugar dropped at midnight. Dr. Ariadna Danielson decided to decrease basal lantus insulin from 20 to 12 units daily starting at 04/13/2018. Recommendation(s):  1.) Continue inpatient glycemic monitoring and evaluate need to increase lantus insulin dose back to 20 units on 04/13/2018 if patient continue to eat and BG continue stay elevated since POC BG already up to 307 and 376 mg/dL before lunch on 04/12/2018    Assessment:  Patient is 58year old with past medical history including type 2 diabetes mellitus, CKD stage 3, systolic CHF, COPD, NSTEMI, cocaine and heroin abuse, hepatitis C, and cardiomyopathy - was admitted on 04/11/2018 with report of left hand weakness and dysarthria x3 days. Also noted report that patient is homeless. Noted:  TIA.  CVA. Neurology following. Most recent blood glucose values:    Results for Aldo Kc (MRN [id number]) as of 4/12/2018 11:49   Ref.  Range 4/11/2018 04:04 4/11/2018 10:36 4/11/2018 10:39 4/11/2018 11:53 4/11/2018 16:50 4/11/2018 21:33   GLUCOSE,FAST - POC Latest Ref Range: 70 - 110 mg/dL 477 (HH) 362 (H) 404 (HH) 386 (H) 204 (H) 293 (H)     Results for Shaun Perkins (MRN [id number]) as of 4/12/2018 11:49   Ref. Range 4/12/2018 00:23 4/12/2018 00:31 4/12/2018 00:37 4/12/2018 07:40 4/12/2018 09:31 4/12/2018 11:25   GLUCOSE,FAST - POC Latest Ref Range: 70 - 110 mg/dL 55 (L) 62 (L) 92 115 (H) 307 (H) 376 (H)     Current A1C: 13.2% (04/11/2018) is equivalent to average blood glucose of 332 mg/dL during the past 2-3 months. Current hospital diabetes medications:  Basal lantus insulin 12 units daily starting 04/13/2018  Correctional lispro insulin ACHS. Very resistant dose. Total daily dose insulin requirement previous day: 04/11/2018:  Lantus: 20 units  Lispro: 19 units  TDD: 49 units of insulin    Home diabetes medications: Patient stated on 04/11/2018 that he is receiving care through [id number] and reported the following diabetes meds PTA:  Levemir insulin 20 units twice daily: morning and bedtime. Humalog insulin 5 units 3x daily before meals. Diet: Diabetic consistent carb 1800kcal; soft solids; no concentrated sweets; nutr suppl: glucerna shake with breakfast and dinner. Goals:  Blood glucose will be within target range of  mg/dL by 04/15/2018.     Education:   _X__  Refer to Diabetes Education Record: 04/12/2018  ___  Education not indicated at this time    Raya Gallo RN [facility]  Pager: [phone]

## 2018-04-12 NOTE — PROGRESS NOTES
Problem: Dysphagia (Adult)  Goal: *Acute Goals and Plan of Care (Insert Text)  Patient will:  1. Tolerate PO trials with 0 s/s overt distress in 4/5 trials-met  2. Utilize compensatory swallow strategies/maneuvers (decrease bite/sip, size/rate, alt. liq/sol) with min cues in 4/5 trials-n/a  3. Perform oral-motor/laryngeal exercises to increase oropharyngeal swallow function with min cues-n/a  4. Complete an objective swallow study (i.e., MBSS) to assess swallow integrity, r/o aspiration, and determine of safest LRD, min A as indicated/ordered by MD-met 4/12/18    Recommend:   Regular, thin liquids   Meds as tolerated  General safe swallow precautions     Outcome: Resolved/Met Date Met: 04/12/18  Speech Pathology Modified barium swallow Study/treatment/discharge    Patient: Nadine Martin (32 y.o. male)  Date: 4/12/2018  Primary Diagnosis: TIA (transient ischemic attack)  CVA (cerebral vascular accident) ([facility] [address]   Precautions: Aspiration,  Fall    ASSESSMENT :  Based on the objective data described below, the patient presents with intact oropharyngeal swallow mechanics. Pt tolerating thin liquids +/- straw, puree, regular solids and 13 mm Ba pill with thin liquid wash with positive airway protection noted across multiple trials. Pt demo adequate bolus formation/control, timely swallow initiation, adequate laryngeal elevation/adduction, positive epiglottic inversion and positive pharyngeal clearance across all trials. Of note, pt with twitching/clenching of jaw and ejection of saliva following examination (not related to airway compromise). Twitching/clenching of jaw resolved and pt reporting \"I'm fine\". Pt also with intact motor speech, reporting \"that's better\". Recommend regular diet with thin liquids with meds as tolerated. Treatment performed following evaluation (see below).        PLAN :  Recommendations and Planned Interventions:  Eval/tx only; no further skilled SLP services indicated   Discharge Recommendations: None     G-CODES:      GCODESwallowing:  Swallow Current Status CH= 0%   Swallow Goal Status CH= 0%   Swallow D/C Status CH= 0%      SUBJECTIVE:   Patient stated Thank you. OBJECTIVE:     Past Medical History:   Diagnosis Date    Cardiac LV ejection fraction 30-35% 7/15/15    Cardiomyopathy ([facility] [address] 7/15/15    35% per ECHO    Cocaine use 8/9/14    + UDS, persistent use    Diabetes ([facility] [address] 8/9/2014    8.1 hgbA1C 8/9/14    [facility]     Hepatitis C infection 8/20/14    Genotype 1A    History of cocaine abuse 8/10/2016    History of heroin abuse 8/10/2016    Homelessness 9/7/2015    Hypertension 2000   History reviewed. No pertinent surgical history. Prior Level of Function/Home Situation:  Home Situation  Home Environment: Other (comment) (Homeless)  One/Two Story Residence: Other (Comment) (homeless)  Living Alone: Yes  Support Systems: Family member(s)  Patient Expects to be Discharged to[de-identified] Other (comment) (back on the street)  Current DME Used/Available at Home: None  Tub or Shower Type:  (pt is homeless)  Diet prior to admission: Regular  Current Diet: Soft solids; recommend regular/thin   Radiologist:    Film Views: Fluoro;Lateral  Patient Position: 90 in chair    Trial 1:   Consistency Presented: Thin liquid; Solid;Puree;Pill/Tablet   How Presented: Self-fed/presented;Cup/sip;Straw;Successive swallows;Spoon       Bolus Acceptance: No impairment   Bolus Formation/Control: No impairment:     Propulsion: No impairment   Oral Residue: None   Initiation of Swallow: No impairment   Timing: No impairment   Penetration: None   Aspiration/Timing: No evidence of aspiration   Pharyngeal Clearance: No residue         Decreased Tongue Base Retraction?: No  Laryngeal Elevation: WFL (within functional limits)  Aspiration/Penetration Score: 1 (No penetration or aspiration-Contrast does not enter the airway)  Pharyngeal Symmetry: Not assessed  Pharyngeal-Esophageal Segment: No impairment  Pharyngeal Dysfunction: None  Oral Phase Severity: No impairment  Pharyngeal Phase Severity: N/A    Treatment Performed Following Examination:    Treatment provided post diagnostic testing including oropharyngeal anatomy/physiology, MBS results, risk of aspiration, role of SLP and POC. Pt able to verbalize understanding. Will sign off as no further skilled SLP services indicated at this time. Pain:  Pt reports 0/10 pain or discomfort prior to MBS. Pt reports 0/10 pain or discomfort post MBS. COMMUNICATION/EDUCATION:     [x]   Patient/family have participated as able and agree with findings and recommendations.     Thank you for this referral.  Cecilio Anaya M.S., [address] [facility]  Speech-Language Pathologist

## 2018-04-12 NOTE — ROUTINE PROCESS
Bedside and Verbal shift change report given to [address] [place] [address] (oncoming nurse) by Manny Baez (offgoing nurse). Report included the following information SBAR, Kardex and Procedure Summary.

## 2018-04-12 NOTE — PROGRESS NOTES
Problem: Dysphagia (Adult)  Goal: *Acute Goals and Plan of Care (Insert Text)  Patient will:  1. Tolerate PO trials with 0 s/s overt distress in 4/5 trials  2. Utilize compensatory swallow strategies/maneuvers (decrease bite/sip, size/rate, alt. liq/sol) with min cues in 4/5 trials  3. Perform oral-motor/laryngeal exercises to increase oropharyngeal swallow function with min cues  4. Complete an objective swallow study (i.e., MBSS) to assess swallow integrity, r/o aspiration, and determine of safest LRD, min A as indicated/ordered by MD    Recommend:   Soft solids, thin liquids  Meds as tolerated  Aspiration precautions  HOB >45 degrees during all intake and for at least 30 min after po   Small bites/sips, slow rate of intake, alternating bites/sips  MBS to further assess swallow integrity and rule out aspiration      Outcome: Progressing Towards Goal  Speech language pathology dysphagia treatment    Patient: Geovany Wahl (60 y.o. male)  Date: 4/12/2018  Diagnosis: TIA (transient ischemic attack)  CVA (cerebral vascular accident) ([facility] [address]   Precautions: Aspiration      ASSESSMENT:  Pt seen for follow up dysphagia tx, alert with improved motor speech. Pt denying difficulty with intake, no twitching noted. Pt observed with thin liquids + straw, tolerating with timely swallow initiation, adequate laryngeal elevation to palpation and no overt s/sx aspiration. Pt also tolerating regular solids with mildly increased oral prep phase. Recommend continue current diet with MBS to further assess swallow integrity and rule out silent airway compromise. D/w pt and RN.    Progression toward goals:  [x]         Improving appropriately and progressing toward goals  []         Improving slowly and progressing toward goals  []         Not making progress toward goals and plan of care will be adjusted     PLAN:  Recommendations and Planned Interventions:  Patient continues to benefit from skilled intervention to address the above impairments. Continue treatment per established plan of care. Discharge Recommendations: To be determined     SUBJECTIVE:   Patient stated That sounds good. OBJECTIVE:   Cognitive and Communication Status:  Neurologic State: Alert  Orientation Level: Oriented X4  Cognition: Follows commands  Dysphagia Treatment:  Oral Assessment:  Oral Assessment  Labial: Left droop, Impaired coordination, Decreased seal  Dentition: Natural  Oral Hygiene: Good  Lingual: Decreased strength, Decreased rate  Velum: No impairment  Mandible: No impairment  P.O. Trials:   Patient Position: [facility] at [facility]   Vocal quality prior to P.O.: Low volume   Consistency Presented: Solid, Thin liquid, Puree   How Presented: Self-fed/presented, Cup/sip, Straw, Successive swallows, Spoon   Bolus Acceptance: No impairment   Bolus Formation/Control: Impaired   Type of Impairment: Mastication   Propulsion: Discoordination   Oral Residue: None   Initiation of Swallow: No impairment   Laryngeal Elevation: Functional   Aspiration Signs/Symptoms: None   Pharyngeal Phase Characteristics: No impairment, issues, or problems    Oral Phase Severity: Mild   Pharyngeal Phase Severity : No impairment     PAIN:  Pt reports 0/10 pain or discomfort prior to tx. Pt reports 0/10 pain or discomfort post tx. After treatment:   []              Patient left in no apparent distress sitting up in chair  [x]              Patient left in no apparent distress in bed  [x]              Call bell left within reach  [x]              Nursing notified  []              Caregiver present  []              Bed alarm activated      COMMUNICATION/EDUCATION:   [x]              SLP educated pt with regard to compensatory swallow strategies and        aspiration/reflux precautions including: small bites/sips,        alternate liquids/solids, decrease feeding rate, HOB > 45 with all po, and                   upright in bed at 30 degrees after po for at least 45        minutes.      Leetchi Jorge Ritchie M.S., [address] [facility]  Speech-Language Pathologist

## 2018-04-12 NOTE — ROUTINE PROCESS
Observed pt having multiple \"twitching\" episodes where he can't speak and his jaws become clinched and he begins to drool. Episodes last approx 40-60 seconds and pt denies a hx of seizures. After episodes pt says he is fine and I observe no signs of distress from pt. Pt stated this has been happening since approx Monday of this week before his admission. Will continue to monitor for safety.

## 2018-04-12 NOTE — ROUTINE PROCESS
Bedside and Verbal shift change report given to  FAN (oncoming nurse) by Teo Bella RN  (offgoing nurse). Report included the following information SBAR, MAR and Recent Results.

## 2018-04-13 VITALS
OXYGEN SATURATION: 100 % | SYSTOLIC BLOOD PRESSURE: 126 MMHG | RESPIRATION RATE: 18 BRPM | HEART RATE: 86 BPM | TEMPERATURE: 98 F | BODY MASS INDEX: 21.43 KG/M2 | HEIGHT: 71 IN | DIASTOLIC BLOOD PRESSURE: 75 MMHG | WEIGHT: 153.1 LBS

## 2018-04-13 LAB
ALBUMIN SERPL-MCNC: 2 G/DL (ref 3.4–5)
ALBUMIN/GLOB SERPL: 0.5 {RATIO} (ref 0.8–1.7)
ALP SERPL-CCNC: 107 U/L (ref 45–117)
ALT SERPL-CCNC: 34 U/L (ref 16–61)
ANION GAP SERPL CALC-SCNC: 6 MMOL/L (ref 3–18)
AST SERPL-CCNC: 33 U/L (ref 15–37)
B-OH-BUTYR SERPL-MCNC: 0.8 MG/DL
BASOPHILS # BLD: 0 K/UL (ref 0–0.06)
BASOPHILS NFR BLD: 0 % (ref 0–2)
BILIRUB SERPL-MCNC: 0.2 MG/DL (ref 0.2–1)
BUN SERPL-MCNC: 18 MG/DL (ref 7–18)
BUN/CREAT SERPL: 10 (ref 12–20)
CALCIUM SERPL-MCNC: 8 MG/DL (ref 8.5–10.1)
CHLORIDE SERPL-SCNC: 102 MMOL/L (ref 100–108)
CO2 SERPL-SCNC: 28 MMOL/L (ref 21–32)
CREAT SERPL-MCNC: 1.75 MG/DL (ref 0.6–1.3)
DIFFERENTIAL METHOD BLD: ABNORMAL
EOSINOPHIL # BLD: 0.3 K/UL (ref 0–0.4)
EOSINOPHIL NFR BLD: 2 % (ref 0–5)
ERYTHROCYTE [DISTWIDTH] IN BLOOD BY AUTOMATED COUNT: 12.1 % (ref 11.6–14.5)
GLOBULIN SER CALC-MCNC: 3.7 G/DL (ref 2–4)
GLUCOSE BLD STRIP.AUTO-MCNC: 155 MG/DL (ref 70–110)
GLUCOSE BLD STRIP.AUTO-MCNC: 193 MG/DL (ref 70–110)
GLUCOSE BLD STRIP.AUTO-MCNC: 194 MG/DL (ref 70–110)
GLUCOSE BLD STRIP.AUTO-MCNC: 262 MG/DL (ref 70–110)
GLUCOSE BLD STRIP.AUTO-MCNC: 55 MG/DL (ref 70–110)
GLUCOSE BLD STRIP.AUTO-MCNC: 62 MG/DL (ref 70–110)
GLUCOSE SERPL-MCNC: 171 MG/DL (ref 74–99)
HCT VFR BLD AUTO: 34.3 % (ref 36–48)
HGB BLD-MCNC: 11.9 G/DL (ref 13–16)
INR PPP: 0.8 (ref 0.8–1.2)
LYMPHOCYTES # BLD: 2.8 K/UL (ref 0.9–3.6)
LYMPHOCYTES NFR BLD: 21 % (ref 21–52)
MAGNESIUM SERPL-MCNC: 1.9 MG/DL (ref 1.6–2.6)
MCH RBC QN AUTO: 29.8 PG (ref 24–34)
MCHC RBC AUTO-ENTMCNC: 34.7 G/DL (ref 31–37)
MCV RBC AUTO: 85.8 FL (ref 74–97)
MONOCYTES # BLD: 1 K/UL (ref 0.05–1.2)
MONOCYTES NFR BLD: 7 % (ref 3–10)
NEUTS SEG # BLD: 9.4 K/UL (ref 1.8–8)
NEUTS SEG NFR BLD: 70 % (ref 40–73)
PLATELET # BLD AUTO: 301 K/UL (ref 135–420)
PMV BLD AUTO: 11 FL (ref 9.2–11.8)
POTASSIUM SERPL-SCNC: 3.7 MMOL/L (ref 3.5–5.5)
PROT SERPL-MCNC: 5.7 G/DL (ref 6.4–8.2)
PROTHROMBIN TIME: 10.8 SEC (ref 11.5–15.2)
RBC # BLD AUTO: 4 M/UL (ref 4.7–5.5)
SODIUM SERPL-SCNC: 136 MMOL/L (ref 136–145)
WBC # BLD AUTO: 13.5 K/UL (ref 4.6–13.2)

## 2018-04-13 PROCEDURE — 36415 COLL VENOUS BLD VENIPUNCTURE: CPT | Performed by: EMERGENCY MEDICINE

## 2018-04-13 PROCEDURE — 74011250637 HC RX REV CODE- 250/637: Performed by: NURSE PRACTITIONER

## 2018-04-13 PROCEDURE — 74011250636 HC RX REV CODE- 250/636: Performed by: EMERGENCY MEDICINE

## 2018-04-13 PROCEDURE — 82962 GLUCOSE BLOOD TEST: CPT

## 2018-04-13 PROCEDURE — 74011250637 HC RX REV CODE- 250/637: Performed by: EMERGENCY MEDICINE

## 2018-04-13 PROCEDURE — 83735 ASSAY OF MAGNESIUM: CPT | Performed by: EMERGENCY MEDICINE

## 2018-04-13 PROCEDURE — 94640 AIRWAY INHALATION TREATMENT: CPT

## 2018-04-13 PROCEDURE — 80053 COMPREHEN METABOLIC PANEL: CPT | Performed by: EMERGENCY MEDICINE

## 2018-04-13 PROCEDURE — 85610 PROTHROMBIN TIME: CPT | Performed by: EMERGENCY MEDICINE

## 2018-04-13 PROCEDURE — 85025 COMPLETE CBC W/AUTO DIFF WBC: CPT | Performed by: EMERGENCY MEDICINE

## 2018-04-13 PROCEDURE — 74011250637 HC RX REV CODE- 250/637: Performed by: INTERNAL MEDICINE

## 2018-04-13 PROCEDURE — 74011636637 HC RX REV CODE- 636/637: Performed by: EMERGENCY MEDICINE

## 2018-04-13 RX ORDER — ASPIRIN 325 MG
325 TABLET ORAL DAILY
Qty: 30 TAB | Refills: 1 | Status: SHIPPED | OUTPATIENT
Start: 2018-04-14 | End: 2022-08-28

## 2018-04-13 RX ORDER — ASPIRIN 81 MG/1
81 TABLET ORAL DAILY
Qty: 30 TAB | Refills: 1 | Status: SHIPPED | OUTPATIENT
Start: 2018-04-13 | End: 2018-04-13

## 2018-04-13 RX ORDER — AMLODIPINE BESYLATE AND ATORVASTATIN CALCIUM 10; 80 MG/1; MG/1
1 TABLET, FILM COATED ORAL DAILY
Qty: 30 TAB | Refills: 1 | Status: SHIPPED | OUTPATIENT
Start: 2018-04-13 | End: 2022-08-28

## 2018-04-13 RX ORDER — LANCETS
EACH MISCELLANEOUS
Qty: 200 EACH | Refills: 1 | Status: SHIPPED | OUTPATIENT
Start: 2018-04-13 | End: 2022-08-28

## 2018-04-13 RX ORDER — LISINOPRIL 20 MG/1
20 TABLET ORAL DAILY
Qty: 30 TAB | Refills: 1 | Status: SHIPPED | OUTPATIENT
Start: 2018-04-13 | End: 2022-08-28

## 2018-04-13 RX ORDER — INSULIN GLARGINE 100 [IU]/ML
12 INJECTION, SOLUTION SUBCUTANEOUS DAILY
Qty: 1 VIAL | Refills: 3 | Status: SHIPPED | OUTPATIENT
Start: 2018-04-13 | End: 2022-08-28

## 2018-04-13 RX ORDER — INSULIN LISPRO 100 [IU]/ML
INJECTION, SOLUTION INTRAVENOUS; SUBCUTANEOUS
Qty: 1 VIAL | Refills: 3 | Status: SHIPPED | OUTPATIENT
Start: 2018-04-13 | End: 2022-08-28

## 2018-04-13 RX ORDER — LANOLIN ALCOHOL/MO/W.PET/CERES
100 CREAM (GRAM) TOPICAL DAILY
Qty: 30 TAB | Refills: 1 | Status: SHIPPED | OUTPATIENT
Start: 2018-04-13 | End: 2022-08-28

## 2018-04-13 RX ORDER — ISOSORBIDE MONONITRATE 30 MG/1
30 TABLET, EXTENDED RELEASE ORAL DAILY
Qty: 30 TAB | Refills: 1 | Status: SHIPPED | OUTPATIENT
Start: 2018-04-13 | End: 2022-08-28

## 2018-04-13 RX ORDER — ISOSORBIDE MONONITRATE 30 MG/1
30 TABLET, EXTENDED RELEASE ORAL DAILY
Status: DISCONTINUED | OUTPATIENT
Start: 2018-04-13 | End: 2018-04-13 | Stop reason: HOSPADM

## 2018-04-13 RX ORDER — SYRINGE-NEEDLE,INSULIN,0.5 ML 30 GX5/16"
SYRINGE, EMPTY DISPOSABLE MISCELLANEOUS
Qty: 100 SYRINGE | Refills: 2 | Status: SHIPPED | OUTPATIENT
Start: 2018-04-13 | End: 2022-08-28

## 2018-04-13 RX ORDER — ALBUTEROL SULFATE 90 UG/1
2 AEROSOL, METERED RESPIRATORY (INHALATION)
Qty: 1 INHALER | Refills: 1 | Status: SHIPPED | OUTPATIENT
Start: 2018-04-13 | End: 2021-12-30 | Stop reason: SDUPTHER

## 2018-04-13 RX ORDER — HYDRALAZINE HYDROCHLORIDE 25 MG/1
25 TABLET, FILM COATED ORAL 3 TIMES DAILY
Qty: 90 TAB | Refills: 1 | Status: SHIPPED | OUTPATIENT
Start: 2018-04-13 | End: 2022-08-28

## 2018-04-13 RX ORDER — CARVEDILOL 3.12 MG/1
3.12 TABLET ORAL EVERY 12 HOURS
Qty: 60 TAB | Refills: 1 | Status: SHIPPED | OUTPATIENT
Start: 2018-04-13 | End: 2019-02-21 | Stop reason: DRUGHIGH

## 2018-04-13 RX ORDER — INSULIN PUMP SYRINGE, 3 ML
EACH MISCELLANEOUS
Qty: 1 KIT | Refills: 0 | Status: SHIPPED | OUTPATIENT
Start: 2018-04-13 | End: 2022-08-28

## 2018-04-13 RX ORDER — HYDRALAZINE HYDROCHLORIDE 25 MG/1
25 TABLET, FILM COATED ORAL 3 TIMES DAILY
Status: DISCONTINUED | OUTPATIENT
Start: 2018-04-13 | End: 2018-04-13 | Stop reason: HOSPADM

## 2018-04-13 RX ORDER — BUDESONIDE AND FORMOTEROL FUMARATE DIHYDRATE 80; 4.5 UG/1; UG/1
2 AEROSOL RESPIRATORY (INHALATION) 2 TIMES DAILY
Qty: 1 INHALER | Refills: 1 | Status: SHIPPED | OUTPATIENT
Start: 2018-04-13 | End: 2022-08-28

## 2018-04-13 RX ADMIN — BUDESONIDE AND FORMOTEROL FUMARATE DIHYDRATE 2 PUFF: 80; 4.5 AEROSOL RESPIRATORY (INHALATION) at 08:40

## 2018-04-13 RX ADMIN — DOCUSATE SODIUM 100 MG: 100 CAPSULE, LIQUID FILLED ORAL at 08:24

## 2018-04-13 RX ADMIN — CARVEDILOL 3.12 MG: 3.12 TABLET, FILM COATED ORAL at 08:24

## 2018-04-13 RX ADMIN — INSULIN LISPRO 3 UNITS: 100 INJECTION, SOLUTION INTRAVENOUS; SUBCUTANEOUS at 08:25

## 2018-04-13 RX ADMIN — ISOSORBIDE MONONITRATE 30 MG: 30 TABLET, EXTENDED RELEASE ORAL at 12:36

## 2018-04-13 RX ADMIN — Medication 10 ML: at 08:26

## 2018-04-13 RX ADMIN — AMLODIPINE BESYLATE 10 MG: 10 TABLET ORAL at 08:24

## 2018-04-13 RX ADMIN — HEPARIN SODIUM 5000 UNITS: 5000 INJECTION, SOLUTION INTRAVENOUS; SUBCUTANEOUS at 02:00

## 2018-04-13 RX ADMIN — HEPARIN SODIUM 5000 UNITS: 5000 INJECTION, SOLUTION INTRAVENOUS; SUBCUTANEOUS at 12:40

## 2018-04-13 RX ADMIN — INSULIN LISPRO 9 UNITS: 100 INJECTION, SOLUTION INTRAVENOUS; SUBCUTANEOUS at 12:37

## 2018-04-13 RX ADMIN — INSULIN GLARGINE 12 UNITS: 100 INJECTION, SOLUTION SUBCUTANEOUS at 08:25

## 2018-04-13 RX ADMIN — HYDRALAZINE HYDROCHLORIDE 25 MG: 25 TABLET, FILM COATED ORAL at 12:36

## 2018-04-13 RX ADMIN — ASPIRIN 325 MG ORAL TABLET 325 MG: 325 PILL ORAL at 08:24

## 2018-04-13 NOTE — DISCHARGE SUMMARY
Discharge summary dictated. Dispo pending: pt is homeless. Indigent meds. Aim for d/c today if dispo found. ID#: [id number].

## 2018-04-13 NOTE — INTERDISCIPLINARY ROUNDS
Interdisciplinary STROKE Rounds       Recommendations:     Recommendations are as follows:   1. Discontinue q4 NIHSS on this patient. If pt experiences sudden neurological decline, contact physician and begin floor standard again. 2. A1C of 14.0. Continue diabetes education. 3. Continue education about strokes and stroke risk factors. Stroke Meds currently prescribed:  mg, Lipitor 10 mg with LDL of 47.6  DVT prophylaxis: heparin sq    Discharge Plan: PT/OT discharged pt. Plan is to dc to home. Discipline Participation:   Interdisciplinary rounds were conducted by:  Dr. Blanca Posey, Neuroscience Medical director,   Beba Ford RN, stroke coordinator  Angela Quezada, OT,  Alyssa AMBRIZ, the patient's nurse. Discussion included patient's current condition, any tests and patient's expected discharge outcome.

## 2018-04-13 NOTE — PROGRESS NOTES
Problem: Falls - Risk of  Goal: *Absence of Falls  Document Rosalio Fall Risk and appropriate interventions in the flowsheet.    Outcome: Progressing Towards Goal  Fall Risk Interventions:  Mobility Interventions: Assess mobility with egress test         Medication Interventions: Teach patient to arise slowly, Patient to call before getting OOB, Evaluate medications/consider consulting pharmacy, Bed/chair exit alarm    Elimination Interventions: Urinal in reach, Toileting schedule/hourly rounds, Toilet paper/wipes in reach, Patient to call for help with toileting needs, Call light in reach, Bed/chair exit alarm    History of Falls Interventions: Room close to nurse's station, Investigate reason for fall, Evaluate medications/consider consulting pharmacy, Door open when patient unattended, Consult care management for discharge planning, Bed/chair exit alarm

## 2018-04-13 NOTE — PROGRESS NOTES
This pt reports that he is homeless and that due to his being on probation, he can not go outside of [place]. Pt was provided bus tickets and will go to the [facility] for shelter referral and pt was provided indigent medications. Pt states that he is active with the [facility] for his follow-up appointment.

## 2018-04-13 NOTE — DISCHARGE INSTRUCTIONS
Patient armband removed and shredded    MyChart Activation    Thank you for requesting access to [facility]. Please follow the instructions below to securely access and download your online medical record. [facility] allows you to send messages to your doctor, view your test results, renew your prescriptions, schedule appointments, and more. How Do I Sign Up? 1. In your internet browser, go to [email or web address]  2. Click on the First Time User? Click Here link in the Sign In box. You will be redirect to the New Member Sign Up page. 3. Enter your [facility] Access Code exactly as it appears below. You will not need to use this code after youve completed the sign-up process. If you do not sign up before the expiration date, you must request a new code. [facility] Access Code: [email or web address]  Expires: 2018 11:40 PM (This is the date your [facility] access code will )    4. Enter the last four digits of your Social Security Number (xxxx) and Date of Birth (mm/dd/yyyy) as indicated and click Submit. You will be taken to the next sign-up page. 5. Create a [facility] ID. This will be your [facility] login ID and cannot be changed, so think of one that is secure and easy to remember. 6. Create a [facility] password. You can change your password at any time. 7. Enter your Password Reset Question and Answer. This can be used at a later time if you forget your password. 8. Enter your e-mail address. You will receive e-mail notification when new information is available in [address] 9. Click Sign Up. You can now view and download portions of your medical record. 10. Click the Download Summary menu link to download a portable copy of your medical information. Additional Information    If you have questions, please visit the Frequently Asked Questions section of the [facility] website at [email or web address] Remember, [facility] is NOT to be used for urgent needs.  For medical emergencies, dial Ted Christina from Nurse    PATIENT INSTRUCTIONS:    After general anesthesia or intravenous sedation, for 24 hours or while taking prescription Narcotics:  · Limit your activities  · Do not drive and operate hazardous machinery  · Do not make important personal or business decisions  · Do  not drink alcoholic beverages  · If you have not urinated within 8 hours after discharge, please contact your surgeon on call. Report the following to your surgeon:  · Excessive pain, swelling, redness or odor of or around the surgical area  · Temperature over 100.5  · Nausea and vomiting lasting longer than 4 hours or if unable to take medications  · Any signs of decreased circulation or nerve impairment to extremity: change in color, persistent  numbness, tingling, coldness or increase pain  · Any questions    What to do at Home:  Recommended activity: Activity as tolerated,     If you experience any of the following symptoms seizure like activity, facial droop, blurred vision, arm numbness or weakness or tingling, slurred speech, please follow up with 911. *  Please give a list of your current medications to your Primary Care Provider. *  Please update this list whenever your medications are discontinued, doses are      changed, or new medications (including over-the-counter products) are added. *  Please carry medication information at all times in case of emergency situations. These are general instructions for a healthy lifestyle:    No smoking/ No tobacco products/ Avoid exposure to second hand smoke  Surgeon General's Warning:  Quitting smoking now greatly reduces serious risk to your health.     Obesity, smoking, and sedentary lifestyle greatly increases your risk for illness    A healthy diet, regular physical exercise & weight monitoring are important for maintaining a healthy lifestyle    You may be retaining fluid if you have a history of heart failure or if you experience any of the following symptoms:  Weight gain of 3 pounds or more overnight or 5 pounds in a week, increased swelling in our hands or feet or shortness of breath while lying flat in bed. Please call your doctor as soon as you notice any of these symptoms; do not wait until your next office visit. Recognize signs and symptoms of STROKE:    F-face looks uneven    A-arms unable to move or move unevenly    S-speech slurred or non-existent    T-time-call 911 as soon as signs and symptoms begin-DO NOT go       Back to bed or wait to see if you get better-TIME IS BRAIN. Warning Signs of HEART ATTACK     Call 911 if you have these symptoms:   Chest discomfort. Most heart attacks involve discomfort in the center of the chest that lasts more than a few minutes, or that goes away and comes back. It can feel like uncomfortable pressure, squeezing, fullness, or pain.  Discomfort in other areas of the upper body. Symptoms can include pain or discomfort in one or both arms, the back, neck, jaw, or stomach.  Shortness of breath with or without chest discomfort.  Other signs may include breaking out in a cold sweat, nausea, or lightheadedness. Don't wait more than five minutes to call 911 - MINUTES MATTER! Fast action can save your life. Calling 911 is almost always the fastest way to get lifesaving treatment. Emergency Medical Services staff can begin treatment when they arrive -- up to an hour sooner than if someone gets to the hospital by car. The discharge information has been reviewed with the patient. The patient verbalized understanding. Discharge medications reviewed with the patient and appropriate educational materials and side effects teaching were provided.   ___________________________________________________________________________________________________________________________________

## 2018-04-13 NOTE — DISCHARGE SUMMARY
[facility]    Bhaskar Dave  MR#: [id number]  : 1955  ACCOUNT #: [de-identified]   ADMIT DATE: 04/10/2018  DISCHARGE DATE:     DISCHARGE DIAGNOSES  1. Acute cerebrovascular accident. 2.  Hypertension. 3.  Diabetes mellitus type 2.  4.  Polysubstance abuse. 5.  Hepatitis C history. 6.  Cardiomyopathy history. 7.  Chronic kidney disease stage III-IV by history. 8.  Elevated troponin I.    9.  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 10.  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 SERVICE:  The patient was admitted to the hospitalist service. CONSULTATIONS:  Dr. Federico Hitchcock was consulted for neurology, Dr. Mitchell Horn for cardiology. PROCEDURES:  None. RELEVANT STUDIES  Include the following:    CTA of the head on  showing no acute abnormalities. Chronic white matter ischemic changes and evidence of remote insults to the high left frontal and right parietal lobe. MRI of the brain on  showed mild to moderately motion degraded scan. No acute stroke. A 2 cm complex mass at caudal 4th ventricle, adjacent. 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 Less likely choroid plexus tumor, meningioma, or other. Presently back to earliest comparison brain CT on 2017. Contrast enhanced brain MRI might add more information. Recommend neurosurgery referral.  Recommend brain MRI without and with contrast in 3 months. Chronic cerebral cortical strokes, embolic. Moderately extensive lesions of chronic ischemic small vessel disease. May have punctate old hemorrhage, right posterior cerebrum. Suggestion that cervical cord is atrophic, cause indeterminate. Could investigate further with cervical spine MRI. MRA of the brain showing no significant stenoses of largest brain arteries. MRA of the neck showing significantly compromised exam.  Recommend consideration of carotid PVL.   Appearance of significant MECHELLE bulb stenosis may be entirely artifactual.  Same for appearance, a 50% left ICA bulb stenosis. Cephalad flow midcervical and more distal RVA and LVA, more proximal portions not effectively evaluated. He had a 2D echocardiogram on the 11th showing an ejection fraction of 25%, grade II diastolic dysfunction. No right to left shunt. HOSPITAL COURSE:  For HPI, please refer to admission H and P.  Briefly, the patient is a 65-year-old gentleman with a history significant for the above who came into through the emergency department with slurring speech. This has been going on since Monday, a couple of days prior to presentation. Also, reporting having some drooling when he talks, numbness of the lips. Two days prior to presentation, he had a headache, which had resolved. No visual change. No weakness involving his arms or his legs. No other complaints on review. Vital signs were significant stable and normal with a pressure of 162/84. He appeared to be in no distress, had a regular heart, clear lungs, benign abdomen, no edema. Admission labs included a CBC within normal limits with H and H of 11.6/34.0. Metabolic panel showing sodium 130, BUN and creatinine of 27/2.67, glucose of 634. Troponin I 0.09. Urine drug screen positive for cocaine and opioids. HOSPITAL COURSE BY PROBLEM   1. Acute cerebrovascular accident:  CT scan and MRI findings are negative for acute stroke, but he has continued dysarthria, which had been improving by his own admission today. It is felt that he may have had a small infarct, he does have some old infarcts noted. Maintain on aspirin, medical therapy as listed below. He has no other complaints today. No further recommendation of speech, PT or OT. He ambulates without any difficulty. 2.  Hypertension, uncontrolled:  History of noncompliance. Continue medical therapy as listed below, outpatient followup.     3.  Diabetes mellitus type 2, uncontrolled with hyperglycemia:  Hemoglobin A1c measured at 14.0%. Continue basal bolus insulin regimen as listed below, outpatient followup. 4.  Polysubstance abuse:  Counseled on cessation. 5.  Hepatitis C by history:  No acute issues, this is by history. 6.  Cardiomyopathy with mixed systolic and diastolic congestive heart failure:  Has been compensated while here. Maintain medical therapy with aspirin, beta blocker, ACE inhibitor and statin therapy. We will need close following of his renal function. 7.  Chronic kidney disease stage III-IV:  BUN and creatinine have been improving while here, today measuring at 18/1.75. Outpatient followup, maintain on ACE inhibitor. 8.  Elevated troponin:  Likely demand ischemia. No chest pain complaints. Repeat troponin I at 0.08. Other biomarkers showed a normal CPK, MB fraction of 5.5 and 3.9 on repeat. Index within normal limits. 9.  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 Outpatient followup with neurosurgery. Today, on examination vital signs are stable and normal.  The pressure last at 157/85. Patient reports that his speech seems to be doing a little bit better. He has had no difficulty swallowing. No difficulty ambulating. No weakness or numbness or tingling. Denies any fevers, chills, nausea, vomiting, any chest pain or shortness of breath. He has regular heart, clear lungs, benign abdomen. No calf tenderness or edema. Metabolic panel normal with a BUN and creatinine of 18/1.75, glucose 171. ALT, AST normal at 34/33. CBC normal with a white count of 13.5, H and H of 11.9/34. 3.    DISPOSITION:  Patient is homeless. Working on disposition. Case discussed with case management. He will be receiving indigent medications. MEDICATIONS  All new prescriptions written for the patient:  1. Albuterol MDI 2 puffs every 4 hours as needed for wheezing or shortness of breath. 2.  Amlodipine/atorvastatin 10/80 mg p.o. at bedtime.   3.  Aspirin 325 mg p.o. every day.    4.  Symbicort 80/4.5 mcg 2 puffs b.i.d.    5.  Coreg 3.125 mg p.o. b.i.d.  6.  Hydralazine 25 mg p.o. t.i.d.   7.  Lantus 12 units subQ every day. 8.  Humalog per sliding scale q.a.c. and at bedtime. 9.  Imdur 30 mg p.o. every day. 10.  Lisinopril 20 mg p.o. every day. 11.  Multivitamin plus iron p.o. every day. 12.  Thiamine 100 mg p.o. every day. 13.  I have written for a glucometer, test strips, lancets and syringes as well for this patient. FOLLOWUP  1. We will arrange for this patient within 7-10 days with a local PCP. 2.  With cardiology in 3-4 weeks. 3.  With neurosurgery in 3-4 weeks. Total time of discharge greater than 30 minutes.       MD MINDI Velásquez/SAQIB  D: 04/13/2018 11:19     T: 04/13/2018 12:07  JOB #: [id number]

## 2018-04-13 NOTE — DIABETES MGMT
Glycemic Control Plan of Care    T2DM with current A1c of 13.2% (04/11/2018). Completed assessment of diabetes management and education, see separate notes, 04/12/2018. Diabetes meds PTA reported by patient on 04/11/2018: Levemir insulin 20 units BID and humalog insulin 5 units TID AC. Patient is homeless and stated that he is getting medical care through [place] [id number]. POC BG range on 04/12/2018 at time of review:  mg/dL. Noted patient was given fruit juice and milk for hypoglycemia and f/u BG of 115 mg/dL. MD also decreased basal lantus insulin dose from 20 to 12 units daily starting 04/13/2018. POC BG report on 04/13/2018: 155, 193, 262 mg/dL. Seen patient earlier this afternoon and stated that he did not have anymore episode of low blood sugar. Noted d/c plan for today, 04/13/2018. Recommendation(s):  1.) Continue inpatient glycemic monitoring and current insulin orders while inpatient. Assessment:  Patient is 58year old with past medical history including type 2 diabetes mellitus, CKD stage 3, systolic CHF, COPD, NSTEMI, cocaine and heroin abuse, hepatitis C, and cardiomyopathy - was admitted on 04/11/2018 with report of left hand weakness and dysarthria x3 days. Also noted report that patient is homeless. Noted:  TIA.  CVA. Neurology following. Most recent blood glucose values:    Results for Yelitza Rendon (MRN [id number]) as of 4/13/2018 15:19   Ref. Range 4/12/2018 00:23 4/12/2018 00:31 4/12/2018 00:37 4/12/2018 07:40 4/12/2018 09:31 4/12/2018 11:25 4/12/2018 15:57 4/12/2018 21:20   GLUCOSE,FAST - POC Latest Ref Range: 70 - 110 mg/dL 55 (L) 62 (L) 92 115 (H) 307 (H) 376 (H) 191 (H) 189 (H)      Results for Yelitza Rendon (MRN [id number]) as of 4/13/2018 15:19   Ref.  Range 4/13/2018 05:09 4/13/2018 07:57 4/13/2018 11:06   GLUCOSE,FAST - POC Latest Ref Range: 70 - 110 mg/dL 155 (H) 193 (H) 262 (H)     Current A1C: 13.2% (04/11/2018) is equivalent to average blood glucose of 332 mg/dL during the past 2-3 months. Current hospital diabetes medications:  Basal lantus insulin 12 units daily starting 04/13/2018  Correctional lispro insulin ACHS. Very resistant dose. Total daily dose insulin requirement previous day: 04/12/2018:  Lantus: 20 units  Lispro: 20 units  TDD: 40 units of insulin    Home diabetes medications: Patient stated on 04/11/2018 that he is receiving care through [id number] [facility] and reported the following diabetes meds PTA:  Levemir insulin 20 units twice daily: morning and bedtime. Humalog insulin 5 units 3x daily before meals. Diet: Diabetic consistent carb 1800kcal; soft solids; no concentrated sweets; nutr suppl: glucerna shake with breakfast and dinner. Goals:  Blood glucose will be within target range of  mg/dL by 04/15/2018.     Education:   _X__  Refer to Diabetes Education Record: 04/12/2018  ___  Education not indicated at this time    Nuvia Lamar RN [facility]  Pager: [phone]

## 2019-02-21 ENCOUNTER — OFFICE VISIT (OUTPATIENT)
Dept: CARDIOLOGY CLINIC | Age: 64
End: 2019-02-21

## 2019-02-21 VITALS
DIASTOLIC BLOOD PRESSURE: 88 MMHG | WEIGHT: 153 LBS | BODY MASS INDEX: 21.42 KG/M2 | SYSTOLIC BLOOD PRESSURE: 166 MMHG | HEIGHT: 71 IN | HEART RATE: 66 BPM

## 2019-02-21 DIAGNOSIS — Z79.4 TYPE 2 DIABETES MELLITUS WITH INSULIN THERAPY (HCC): ICD-10-CM

## 2019-02-21 DIAGNOSIS — E11.9 TYPE 2 DIABETES MELLITUS WITH INSULIN THERAPY (HCC): ICD-10-CM

## 2019-02-21 DIAGNOSIS — I50.22 SYSTOLIC CHF, CHRONIC (HCC): ICD-10-CM

## 2019-02-21 DIAGNOSIS — I10 ESSENTIAL HYPERTENSION: ICD-10-CM

## 2019-02-21 DIAGNOSIS — E78.1 HYPERTRIGLYCERIDEMIA: ICD-10-CM

## 2019-02-21 DIAGNOSIS — I42.0 DILATED CARDIOMYOPATHY (HCC): ICD-10-CM

## 2019-02-21 DIAGNOSIS — I63.9 CEREBROVASCULAR ACCIDENT (CVA), UNSPECIFIED MECHANISM (HCC): Primary | ICD-10-CM

## 2019-02-21 RX ORDER — FUROSEMIDE 20 MG/1
20 TABLET ORAL DAILY
Qty: 30 TAB | Refills: 4 | Status: SHIPPED | OUTPATIENT
Start: 2019-02-21 | End: 2022-08-28

## 2019-02-21 RX ORDER — CARVEDILOL 12.5 MG/1
12.5 TABLET ORAL 2 TIMES DAILY WITH MEALS
Qty: 60 TAB | Refills: 4 | Status: SHIPPED | OUTPATIENT
Start: 2019-02-21 | End: 2022-08-28

## 2019-02-21 NOTE — PROGRESS NOTES
HISTORY OF PRESENT ILLNESS  Jasmyn Hall is a 58 y.o. male. CHF   The history is provided by the patient. This is a chronic problem. The problem occurs daily. The problem has not changed since onset. Associated symptoms include shortness of breath. Pertinent negatives include no chest pain, no abdominal pain and no headaches. Review of Systems   Constitutional: Negative for chills, diaphoresis, fever and weight loss. HENT: Negative for ear pain and hearing loss. Eyes: Negative for blurred vision. Respiratory: Positive for shortness of breath. Negative for cough, hemoptysis, sputum production, wheezing and stridor. Cardiovascular: Negative for chest pain, palpitations, orthopnea, claudication, leg swelling and PND. Gastrointestinal: Negative for abdominal pain, heartburn, nausea and vomiting. Musculoskeletal: Negative for myalgias and neck pain. Skin: Negative for rash. Neurological: Negative for dizziness, tingling, tremors, focal weakness, loss of consciousness, weakness and headaches. Psychiatric/Behavioral: Negative for depression and suicidal ideas. Family History   Problem Relation Age of Onset    Liver Disease Father     Diabetes Mother     Cancer Maternal Grandmother     No Known Problems Sister     Diabetes Brother     No Known Problems Brother     No Known Problems Sister     No Known Problems Sister        Past Medical History:   Diagnosis Date    Cardiac LV ejection fraction 30-35% 7/15/15    Cardiomyopathy ([address] 7/15/15    35% per ECHO    Cocaine use 8/9/14    + UDS, persistent use    Diabetes ([address] 8/9/2014    8.1 hgbA1C 8/9/14    Heart failure ([facility]     Hepatitis C infection 8/20/14    Genotype 1A    History of cocaine abuse 8/10/2016    History of heroin abuse 8/10/2016    Homelessness 9/7/2015    Hypertension 2000       No past surgical history on file.     Social History     Tobacco Use    Smoking status: Former Smoker     Packs/day: 0.30     Types: Cigarettes     Last attempt to quit: 2016     Years since quittin.6    Smokeless tobacco: Never Used   Substance Use Topics    Alcohol use: Yes     Alcohol/week: 0.0 oz     Comment: started heavy drinking at teen till in Aug 2014 but has gone into remission since then       No Known Allergies    Outpatient Medications Marked as Taking for the 19 encounter (Office Visit) with Kassidy Perkins MD   Medication Sig Dispense Refill    carvedilol (COREG) 12.5 mg tablet Take 1 Tab by mouth two (2) times daily (with meals). 60 Tab 4    furosemide (LASIX) 20 mg tablet Take 1 Tab by mouth daily. 30 Tab 4    albuterol (PROVENTIL HFA, VENTOLIN HFA, PROAIR HFA) 90 mcg/actuation inhaler Take 2 Puffs by inhalation every four (4) hours as needed for Wheezing. 1 Inhaler 1    amLODIPine-atorvastatin (CADUET) 10-80 mg per tablet Take 1 Tab by mouth daily. 30 Tab 1    budesonide-formoterol (SYMBICORT) 80-4.5 mcg/actuation HFAA Take 2 Puffs by inhalation two (2) times a day. 1 Inhaler 1    Lancets misc Use as directed for blood glucose monitoring. 200 Each 1    lisinopril (PRINIVIL, ZESTRIL) 20 mg tablet Take 1 Tab by mouth daily. 30 Tab 1    multivitamin, tx-iron-ca-min (THERA-M W/ IRON) 9 mg iron-400 mcg tab tablet Take 1 Tab by mouth daily. 30 Tab 1    thiamine (B-1) 100 mg tablet Take 1 Tab by mouth daily. 30 Tab 1    aspirin (ASPIRIN) 325 mg tablet Take 1 Tab by mouth daily. 30 Tab 1    hydrALAZINE (APRESOLINE) 25 mg tablet Take 1 Tab by mouth three (3) times daily. 90 Tab 1    insulin glargine (LANTUS) 100 unit/mL injection 12 Units by SubCUTAneous route daily. 1 Vial 3    insulin lispro (HUMALOG) 100 unit/mL injection For Blood Sugar (mg/dL) of:   Less than 150 =  0 units  150 -199 =  3 units  200 -249 =  6 units  250 -299 =  9 units  300 -349 =  12 units  350 and above =  15 units, call MD. 1 Vial 3    isosorbide mononitrate ER (IMDUR) 30 mg tablet Take 1 Tab by mouth daily.  30 Tab 1    Blood-Glucose Meter monitoring kit Check blood sugar qAC and qHS. 1 Kit 0    glucose blood VI test strips (GENSTRIP TEST STRIP) strip Check blood sugar qAC and qHS. 200 Strip 1    Insulin Syringe-Needle U-100 (INSULIN SYRINGE) 1/2 mL 30 gauge x 5/16 syrg For insulin use. 100 Syringe 2        Visit Vitals  /88   Pulse 66   Ht 5' 11\" (1.803 m)   Wt 69.4 kg (153 lb)   BMI 21.34 kg/m²     Physical Exam   Constitutional: He is oriented to person, place, and time. He appears well-developed and well-nourished. No distress. HENT:   Head: Atraumatic. Mouth/Throat: No oropharyngeal exudate. Eyes: Conjunctivae are normal. No scleral icterus. Neck: Neck supple. No JVD present. Cardiovascular: Normal rate and regular rhythm. Exam reveals no gallop. No murmur heard. Pulmonary/Chest: Effort normal and breath sounds normal. No stridor. He has no wheezes. He has no rales. Abdominal: Soft. There is no tenderness. There is no rebound. Musculoskeletal: Normal range of motion. He exhibits edema (mild ble). He exhibits no tenderness. Neurological: He is alert and oriented to person, place, and time. He exhibits normal muscle tone. Skin: Skin is warm. He is not diaphoretic. Psychiatric: He has a normal mood and affect. His behavior is normal.     Echo 04/2018:  SUMMARY:  Left ventricle: The ventricle was dilated. Systolic function was severely   reduced by visual assessment. Ejection  fraction was estimated to be 25 %. There was moderate diffuse hypokinesis. Wall thickness was mildly increased. Features were consistent with a pseudonormal left ventricular filling   pattern, with concomitant abnormal relaxation and  increased filling pressure (grade 2 diastolic dysfunction). Left atrium: The atrium was moderately dilated. Atrial septum: Contrast injection was performed. There was no right-to-left   shunt, with provocative maneuvers to  increase right atrial pressure. Mitral valve:  There was moderate regurgitation. The effective orifice of   mitral regurgitation by proximal isovelocity  surface area was 0.1 cm-sq. The volume of mitral regurgitation by proximal   isovelocity surface area was 18 ml. ASSESSMENT and PLAN    ICD-10-CM ICD-9-CM    1. Cerebrovascular accident (CVA), unspecified mechanism ([facility] I63.9 434.91    2. Hypertriglyceridemia E78.1 272.1    3. Systolic CHF, chronic (HCC) I50.22 428.22 ECHO ADULT COMPLETE     428.0 MAGNESIUM      METABOLIC PANEL, BASIC    stage C NYHA class II   4. Essential hypertension I10 401.9    5. Type 2 diabetes mellitus with insulin therapy ([facility]) E11.9 250.00     Z79.4 V58.67    6. Dilated cardiomyopathy ([facility] I42.0 425.4      Orders Placed This Encounter    MAGNESIUM     Standing Status:   Future     Standing Expiration Date:   9/69/0836    METABOLIC PANEL, BASIC     Standing Status:   Future     Standing Expiration Date:   2/22/2020    carvedilol (COREG) 12.5 mg tablet     Sig: Take 1 Tab by mouth two (2) times daily (with meals). Dispense:  60 Tab     Refill:  4    furosemide (LASIX) 20 mg tablet     Sig: Take 1 Tab by mouth daily. Dispense:  30 Tab     Refill:  4     Follow-up Disposition:  Return in about 3 weeks (around 3/14/2019). cardiovascular risk and specific lipid/LDL goals reviewed. Patient is a 58 yr old male with known non ischemic cardiomyopathy/ chronic systolic CHF stage NYHA class II seen for f/u.   Has mild BLE edema-- recommend lasix 20mg po daily and recheck bmp/mg in 2 weeks  Will also increase coreg to 12.5mg bid  F/u in 3 weeks

## 2019-04-18 ENCOUNTER — OFFICE VISIT (OUTPATIENT)
Dept: CARDIOLOGY CLINIC | Age: 64
End: 2019-04-18

## 2019-04-18 VITALS
HEIGHT: 71 IN | WEIGHT: 199 LBS | DIASTOLIC BLOOD PRESSURE: 92 MMHG | BODY MASS INDEX: 27.86 KG/M2 | HEART RATE: 66 BPM | SYSTOLIC BLOOD PRESSURE: 164 MMHG

## 2019-04-18 DIAGNOSIS — I50.22 SYSTOLIC CHF, CHRONIC (HCC): ICD-10-CM

## 2019-04-18 DIAGNOSIS — I63.9 CEREBROVASCULAR ACCIDENT (CVA), UNSPECIFIED MECHANISM (HCC): ICD-10-CM

## 2019-04-18 DIAGNOSIS — Z79.4 TYPE 2 DIABETES MELLITUS WITH INSULIN THERAPY (HCC): ICD-10-CM

## 2019-04-18 DIAGNOSIS — I42.0 DILATED CARDIOMYOPATHY (HCC): ICD-10-CM

## 2019-04-18 DIAGNOSIS — E11.9 TYPE 2 DIABETES MELLITUS WITH INSULIN THERAPY (HCC): ICD-10-CM

## 2019-04-18 DIAGNOSIS — I10 ESSENTIAL HYPERTENSION: ICD-10-CM

## 2019-04-18 RX ORDER — OMEPRAZOLE 20 MG/1
20 CAPSULE, DELAYED RELEASE ORAL DAILY
COMMUNITY
End: 2022-08-28

## 2019-04-18 RX ORDER — MAG HYDROX/ALUMINUM HYD/SIMETH 200-200-20
30 SUSPENSION, ORAL (FINAL DOSE FORM) ORAL
COMMUNITY
End: 2022-08-28

## 2019-04-18 RX ORDER — SIMETHICONE 80 MG
80 TABLET,CHEWABLE ORAL 2 TIMES DAILY
COMMUNITY
End: 2022-08-28

## 2019-04-18 RX ORDER — PRAVASTATIN SODIUM 20 MG/1
20 TABLET ORAL
COMMUNITY
End: 2022-08-28

## 2019-04-18 RX ORDER — DOCUSATE SODIUM 100 MG/1
100 CAPSULE, LIQUID FILLED ORAL 2 TIMES DAILY
COMMUNITY
End: 2022-08-28

## 2019-04-18 RX ORDER — CETIRIZINE HCL 10 MG
TABLET ORAL
COMMUNITY
End: 2022-08-28

## 2019-04-18 RX ORDER — TAMSULOSIN HYDROCHLORIDE 0.4 MG/1
0.4 CAPSULE ORAL DAILY
COMMUNITY
End: 2022-08-28

## 2019-04-18 RX ORDER — AMLODIPINE BESYLATE 10 MG/1
10 TABLET ORAL DAILY
COMMUNITY
End: 2022-08-28

## 2019-04-18 RX ORDER — BISACODYL 5 MG
5 TABLET, DELAYED RELEASE (ENTERIC COATED) ORAL DAILY PRN
COMMUNITY
End: 2022-08-28

## 2019-04-18 NOTE — PROGRESS NOTES
Patient brought medications list      1. Have you been to the ER, urgent care clinic since your last visit? Hospitalized since your last visit? No    2. Have you seen or consulted any other health care providers outside of the [address] [place] [address] since your last visit? Include any pap smears or colon screening.       No

## 2019-06-18 ENCOUNTER — OFFICE VISIT (OUTPATIENT)
Dept: CARDIOLOGY CLINIC | Age: 64
End: 2019-06-18

## 2019-06-18 VITALS
HEART RATE: 76 BPM | HEIGHT: 71 IN | SYSTOLIC BLOOD PRESSURE: 188 MMHG | DIASTOLIC BLOOD PRESSURE: 101 MMHG | BODY MASS INDEX: 28.84 KG/M2 | WEIGHT: 206 LBS

## 2019-06-18 DIAGNOSIS — I50.22 SYSTOLIC CHF, CHRONIC (HCC): Primary | ICD-10-CM

## 2019-06-18 DIAGNOSIS — I42.0 DILATED CARDIOMYOPATHY (HCC): ICD-10-CM

## 2019-06-18 DIAGNOSIS — E11.9 TYPE 2 DIABETES MELLITUS WITH INSULIN THERAPY (HCC): ICD-10-CM

## 2019-06-18 DIAGNOSIS — Z79.4 TYPE 2 DIABETES MELLITUS WITH INSULIN THERAPY (HCC): ICD-10-CM

## 2019-06-18 DIAGNOSIS — I10 ESSENTIAL HYPERTENSION: ICD-10-CM

## 2019-06-18 RX ORDER — CHOLECALCIFEROL TAB 125 MCG (5000 UNIT) 125 MCG
TAB ORAL DAILY
COMMUNITY
End: 2022-08-28

## 2019-06-18 RX ORDER — ACETAMINOPHEN 325 MG/1
TABLET ORAL
COMMUNITY
End: 2022-08-28

## 2019-06-18 RX ORDER — NAPROXEN 500 MG/1
500 TABLET ORAL 2 TIMES DAILY WITH MEALS
COMMUNITY
End: 2022-08-28

## 2019-06-18 NOTE — PROGRESS NOTES
HISTORY OF PRESENT ILLNESS  Beatriz Hawley is a 71 y.o. male. CHF   The history is provided by the patient. This is a chronic problem. The problem occurs daily. The problem has not changed since onset. Associated symptoms include shortness of breath. Pertinent negatives include no chest pain, no abdominal pain and no headaches. Cardiomyopathy   Associated symptoms include shortness of breath. Pertinent negatives include no chest pain, no abdominal pain and no headaches. Hypertension   Associated symptoms include shortness of breath. Pertinent negatives include no chest pain, no abdominal pain and no headaches. Review of Systems   Constitutional: Negative for chills, diaphoresis, fever and weight loss. HENT: Negative for ear pain and hearing loss. Eyes: Negative for blurred vision. Respiratory: Positive for shortness of breath. Negative for cough, hemoptysis, sputum production, wheezing and stridor. Cardiovascular: Negative for chest pain, palpitations, orthopnea, claudication, leg swelling and PND. Gastrointestinal: Negative for abdominal pain, heartburn, nausea and vomiting. Musculoskeletal: Negative for myalgias and neck pain. Skin: Negative for rash. Neurological: Negative for dizziness, tingling, tremors, focal weakness, loss of consciousness, weakness and headaches. Psychiatric/Behavioral: Negative for depression and suicidal ideas.      Family History   Problem Relation Age of Onset    Liver Disease Father     Diabetes Mother     Cancer Maternal Grandmother     No Known Problems Sister     Diabetes Brother     No Known Problems Brother     No Known Problems Sister     No Known Problems Sister        Past Medical History:   Diagnosis Date    Cardiac LV ejection fraction 30-35% 7/15/15    Cardiomyopathy ([facility] 7/15/15    35% per ECHO    Cocaine use 8/9/14    + UDS, persistent use    Diabetes ([address] 8/9/2014    8.1 hgbA1C 8/9/14    Heart failure ([facility]     Hepatitis C infection 14    Genotype 1A    History of cocaine abuse 8/10/2016    History of heroin abuse 8/10/2016    Homelessness 2015    Hypertension 2000       No past surgical history on file. Social History     Tobacco Use    Smoking status: Former Smoker     Packs/day: 0.30     Types: Cigarettes     Last attempt to quit: 2016     Years since quittin.9    Smokeless tobacco: Never Used   Substance Use Topics    Alcohol use: Yes     Alcohol/week: 0.0 oz     Comment: started heavy drinking at teen till in Aug 2014 but has gone into remission since then       No Known Allergies    Outpatient Medications Marked as Taking for the 19 encounter (Office Visit) with Trish Patiño MD   Medication Sig Dispense Refill    acetaminophen (TYLENOL) 325 mg tablet Take  by mouth every four (4) hours as needed for Pain.  naproxen (NAPROSYN) 500 mg tablet Take 500 mg by mouth two (2) times daily (with meals).  cholecalciferol, VITAMIN D3, (VITAMIN D3) 5,000 unit tab tablet Take  by mouth daily.  cetirizine (ZYRTEC) 10 mg tablet Take  by mouth.  omeprazole (PRILOSEC) 20 mg capsule Take 20 mg by mouth daily.  simethicone (MYLICON) 80 mg chewable tablet Take 80 mg by mouth two (2) times a day. Give 2 tablets      tamsulosin (FLOMAX) 0.4 mg capsule Take 0.4 mg by mouth daily.  amLODIPine (NORVASC) 10 mg tablet Take 10 mg by mouth daily.  pravastatin (PRAVACHOL) 20 mg tablet Take 20 mg by mouth nightly.  carvedilol (COREG) 12.5 mg tablet Take 1 Tab by mouth two (2) times daily (with meals). 60 Tab 4    furosemide (LASIX) 20 mg tablet Take 1 Tab by mouth daily. 30 Tab 4    Lancets misc Use as directed for blood glucose monitoring. 200 Each 1    lisinopril (PRINIVIL, ZESTRIL) 20 mg tablet Take 1 Tab by mouth daily. 30 Tab 1    multivitamin, tx-iron-ca-min (THERA-M W/ IRON) 9 mg iron-400 mcg tab tablet Take 1 Tab by mouth daily.  30 Tab 1    aspirin (ASPIRIN) 325 mg tablet Take 1 Tab by mouth daily. (Patient taking differently: Take 81 mg by mouth daily.) 30 Tab 1    insulin glargine (LANTUS) 100 unit/mL injection 12 Units by SubCUTAneous route daily. 1 Vial 3    insulin lispro (HUMALOG) 100 unit/mL injection For Blood Sugar (mg/dL) of:   Less than 150 =  0 units  150 -199 =  3 units  200 -249 =  6 units  250 -299 =  9 units  300 -349 =  12 units  350 and above =  15 units, call MD. 1 Vial 3    Blood-Glucose Meter monitoring kit Check blood sugar qAC and qHS. 1 Kit 0    glucose blood VI test strips (GENSTRIP TEST STRIP) strip Check blood sugar qAC and qHS. 200 Strip 1    Insulin Syringe-Needle U-100 (INSULIN SYRINGE) 1/2 mL 30 gauge x 5/16 syrg For insulin use. 100 Syringe 2        Visit Vitals  BP (!) 188/101   Pulse 76   Ht 5' 11\" (1.803 m)   Wt 93.4 kg (206 lb)   BMI 28.73 kg/m²     Physical Exam   Constitutional: He is oriented to person, place, and time. He appears well-developed and well-nourished. No distress. HENT:   Head: Atraumatic. Mouth/Throat: No oropharyngeal exudate. Eyes: Conjunctivae are normal. No scleral icterus. Neck: Neck supple. No JVD present. Cardiovascular: Normal rate and regular rhythm. Exam reveals no gallop. No murmur heard. Pulmonary/Chest: Effort normal and breath sounds normal. No stridor. He has no wheezes. He has no rales. Abdominal: Soft. There is no tenderness. There is no rebound. Musculoskeletal: Normal range of motion. He exhibits edema (1+ ble edema). He exhibits no tenderness. Neurological: He is alert and oriented to person, place, and time. He exhibits normal muscle tone. Skin: Skin is warm. He is not diaphoretic. Psychiatric: He has a normal mood and affect. His behavior is normal.     Echo 04/2018:  SUMMARY:  Left ventricle: The ventricle was dilated. Systolic function was severely   reduced by visual assessment. Ejection  fraction was estimated to be 25 %. There was moderate diffuse hypokinesis.    Blondell Sprang thickness was mildly increased. Features were consistent with a pseudonormal left ventricular filling   pattern, with concomitant abnormal relaxation and  increased filling pressure (grade 2 diastolic dysfunction). Left atrium: The atrium was moderately dilated. Atrial septum: Contrast injection was performed. There was no right-to-left   shunt, with provocative maneuvers to  increase right atrial pressure. Mitral valve: There was moderate regurgitation. The effective orifice of   mitral regurgitation by proximal isovelocity  surface area was 0.1 cm-sq. The volume of mitral regurgitation by proximal   isovelocity surface area was 18 ml. ASSESSMENT and PLAN    ICD-10-CM ICD-9-CM    1. Systolic CHF, chronic (HCC) I50.22 428.22      428.0     stage C NYHA class II   2. Dilated cardiomyopathy ([facility]) I42.0 425.4    3. Essential hypertension I10 401.9    4. Type 2 diabetes mellitus with insulin therapy ([facility]) E11.9 250.00     Z79.4 V58.67      No orders of the defined types were placed in this encounter. Follow-up and Dispositions    · Return in about 1 month (around 7/18/2019). cardiovascular risk and specific lipid/LDL goals reviewed. Patient is a 58 yr old male with known non ischemic cardiomyopathy/ chronic systolic CHF stage NYHA class II/III seen for f/u. Seen for follow-up. Blood pressure is uncontrolled at this time. We will increase lisinopril to 40 mg daily. Change Lasix to 40 mg a.m. and 20 mg p.m. Needs BMP/mag in 1 week. Also recommend echocardiogram prior to next appointment.

## 2022-03-19 PROBLEM — I63.9 CVA (CEREBRAL VASCULAR ACCIDENT) (HCC): Status: ACTIVE | Noted: 2018-04-11

## 2022-03-19 PROBLEM — G45.9 TIA (TRANSIENT ISCHEMIC ATTACK): Status: ACTIVE | Noted: 2018-04-11

## 2022-08-16 ENCOUNTER — APPOINTMENT (OUTPATIENT)
Dept: CT IMAGING | Age: 67
DRG: 194 | End: 2022-08-16
Attending: PHYSICIAN ASSISTANT
Payer: MEDICAID

## 2022-08-16 ENCOUNTER — HOSPITAL ENCOUNTER (INPATIENT)
Age: 67
LOS: 12 days | Discharge: HOME | DRG: 194 | End: 2022-08-28
Attending: EMERGENCY MEDICINE | Admitting: INTERNAL MEDICINE
Payer: MEDICAID

## 2022-08-16 DIAGNOSIS — Z99.2 ESRD ON DIALYSIS (HCC): ICD-10-CM

## 2022-08-16 DIAGNOSIS — N18.6 END STAGE RENAL DISEASE (HCC): Primary | ICD-10-CM

## 2022-08-16 DIAGNOSIS — R73.9 HYPERGLYCEMIA: ICD-10-CM

## 2022-08-16 DIAGNOSIS — N18.6 ESRD ON DIALYSIS (HCC): ICD-10-CM

## 2022-08-16 DIAGNOSIS — I31.39 PERICARDIAL EFFUSION: ICD-10-CM

## 2022-08-16 DIAGNOSIS — R79.89 ELEVATED SERUM CREATININE: ICD-10-CM

## 2022-08-16 PROBLEM — F19.10 SUBSTANCE ABUSE (HCC): Status: ACTIVE | Noted: 2022-08-16

## 2022-08-16 PROBLEM — D64.9 ANEMIA: Status: ACTIVE | Noted: 2022-08-16

## 2022-08-16 PROBLEM — N18.5 CKD (CHRONIC KIDNEY DISEASE) STAGE 5, GFR LESS THAN 15 ML/MIN (HCC): Status: ACTIVE | Noted: 2022-08-16

## 2022-08-16 LAB
ALBUMIN SERPL-MCNC: 3.5 G/DL (ref 3.4–5)
ALBUMIN/GLOB SERPL: 0.8 {RATIO} (ref 0.8–1.7)
ALP SERPL-CCNC: 81 U/L (ref 45–117)
ALT SERPL-CCNC: 20 U/L (ref 16–61)
AMPHET UR QL SCN: NEGATIVE
ANION GAP SERPL CALC-SCNC: 8 MMOL/L (ref 3–18)
APPEARANCE UR: CLEAR
AST SERPL-CCNC: 24 U/L (ref 10–38)
ATRIAL RATE: 73 BPM
BARBITURATES UR QL SCN: NEGATIVE
BASOPHILS # BLD: 0.1 K/UL (ref 0–0.1)
BASOPHILS NFR BLD: 1 % (ref 0–2)
BENZODIAZ UR QL: NEGATIVE
BILIRUB SERPL-MCNC: 0.6 MG/DL (ref 0.2–1)
BILIRUB UR QL: NEGATIVE
BNP SERPL-MCNC: 1074 PG/ML (ref 0–900)
BUN SERPL-MCNC: 103 MG/DL (ref 7–18)
BUN/CREAT SERPL: 17 (ref 12–20)
CALCIUM SERPL-MCNC: 8.8 MG/DL (ref 8.5–10.1)
CALCULATED P AXIS, ECG09: 40 DEGREES
CALCULATED R AXIS, ECG10: -46 DEGREES
CALCULATED T AXIS, ECG11: 101 DEGREES
CANNABINOIDS UR QL SCN: NEGATIVE
CHLORIDE SERPL-SCNC: 112 MMOL/L (ref 100–111)
CO2 SERPL-SCNC: 20 MMOL/L (ref 21–32)
COCAINE UR QL SCN: POSITIVE
COLOR UR: YELLOW
CREAT SERPL-MCNC: 6.23 MG/DL (ref 0.6–1.3)
DIAGNOSIS, 93000: NORMAL
DIFFERENTIAL METHOD BLD: ABNORMAL
EOSINOPHIL # BLD: 0.4 K/UL (ref 0–0.4)
EOSINOPHIL NFR BLD: 5 % (ref 0–5)
ERYTHROCYTE [DISTWIDTH] IN BLOOD BY AUTOMATED COUNT: 13.9 % (ref 11.6–14.5)
GLOBULIN SER CALC-MCNC: 4.5 G/DL (ref 2–4)
GLUCOSE BLD STRIP.AUTO-MCNC: 158 MG/DL (ref 70–110)
GLUCOSE SERPL-MCNC: 143 MG/DL (ref 74–99)
GLUCOSE UR STRIP.AUTO-MCNC: NEGATIVE MG/DL
HCT VFR BLD AUTO: 29.3 % (ref 36–48)
HDSCOM,HDSCOM: ABNORMAL
HGB BLD-MCNC: 9.9 G/DL (ref 13–16)
HGB UR QL STRIP: NEGATIVE
IMM GRANULOCYTES # BLD AUTO: 0 K/UL (ref 0–0.04)
IMM GRANULOCYTES NFR BLD AUTO: 1 % (ref 0–0.5)
KETONES UR QL STRIP.AUTO: NEGATIVE MG/DL
LEUKOCYTE ESTERASE UR QL STRIP.AUTO: NEGATIVE
LYMPHOCYTES # BLD: 1.4 K/UL (ref 0.9–3.6)
LYMPHOCYTES NFR BLD: 20 % (ref 21–52)
MCH RBC QN AUTO: 30.1 PG (ref 24–34)
MCHC RBC AUTO-ENTMCNC: 33.8 G/DL (ref 31–37)
MCV RBC AUTO: 89.1 FL (ref 78–100)
METHADONE UR QL: NEGATIVE
MONOCYTES # BLD: 0.7 K/UL (ref 0.05–1.2)
MONOCYTES NFR BLD: 10 % (ref 3–10)
NEUTS SEG # BLD: 4.4 K/UL (ref 1.8–8)
NEUTS SEG NFR BLD: 64 % (ref 40–73)
NITRITE UR QL STRIP.AUTO: NEGATIVE
NRBC # BLD: 0 K/UL (ref 0–0.01)
NRBC BLD-RTO: 0 PER 100 WBC
OPIATES UR QL: NEGATIVE
P-R INTERVAL, ECG05: 144 MS
PCP UR QL: NEGATIVE
PH UR STRIP: 5 [PH] (ref 5–8)
PLATELET # BLD AUTO: 250 K/UL (ref 135–420)
PMV BLD AUTO: 9.5 FL (ref 9.2–11.8)
POTASSIUM SERPL-SCNC: 4 MMOL/L (ref 3.5–5.5)
PROT SERPL-MCNC: 8 G/DL (ref 6.4–8.2)
PROT UR STRIP-MCNC: NEGATIVE MG/DL
Q-T INTERVAL, ECG07: 420 MS
QRS DURATION, ECG06: 86 MS
QTC CALCULATION (BEZET), ECG08: 462 MS
RBC # BLD AUTO: 3.29 M/UL (ref 4.35–5.65)
SODIUM SERPL-SCNC: 140 MMOL/L (ref 136–145)
SP GR UR REFRACTOMETRY: 1.01 (ref 1–1.03)
UROBILINOGEN UR QL STRIP.AUTO: 0.2 EU/DL (ref 0.2–1)
VENTRICULAR RATE, ECG03: 73 BPM
WBC # BLD AUTO: 6.9 K/UL (ref 4.6–13.2)

## 2022-08-16 PROCEDURE — 80307 DRUG TEST PRSMV CHEM ANLYZR: CPT

## 2022-08-16 PROCEDURE — 74011636637 HC RX REV CODE- 636/637: Performed by: INTERNAL MEDICINE

## 2022-08-16 PROCEDURE — 99285 EMERGENCY DEPT VISIT HI MDM: CPT

## 2022-08-16 PROCEDURE — 85025 COMPLETE CBC W/AUTO DIFF WBC: CPT

## 2022-08-16 PROCEDURE — 74176 CT ABD & PELVIS W/O CONTRAST: CPT

## 2022-08-16 PROCEDURE — 2709999900 HC NON-CHARGEABLE SUPPLY

## 2022-08-16 PROCEDURE — 83880 ASSAY OF NATRIURETIC PEPTIDE: CPT

## 2022-08-16 PROCEDURE — 81003 URINALYSIS AUTO W/O SCOPE: CPT

## 2022-08-16 PROCEDURE — 99223 1ST HOSP IP/OBS HIGH 75: CPT | Performed by: INTERNAL MEDICINE

## 2022-08-16 PROCEDURE — 65270000046 HC RM TELEMETRY

## 2022-08-16 PROCEDURE — 82962 GLUCOSE BLOOD TEST: CPT

## 2022-08-16 PROCEDURE — 93005 ELECTROCARDIOGRAM TRACING: CPT

## 2022-08-16 PROCEDURE — 74011000250 HC RX REV CODE- 250: Performed by: INTERNAL MEDICINE

## 2022-08-16 PROCEDURE — 80053 COMPREHEN METABOLIC PANEL: CPT

## 2022-08-16 PROCEDURE — 74011250636 HC RX REV CODE- 250/636: Performed by: INTERNAL MEDICINE

## 2022-08-16 RX ORDER — FAMOTIDINE 20 MG/1
20 TABLET, FILM COATED ORAL 2 TIMES DAILY
COMMUNITY
End: 2022-08-28

## 2022-08-16 RX ORDER — INSULIN LISPRO 100 [IU]/ML
INJECTION, SOLUTION INTRAVENOUS; SUBCUTANEOUS
Status: DISCONTINUED | OUTPATIENT
Start: 2022-08-16 | End: 2022-08-28 | Stop reason: HOSPADM

## 2022-08-16 RX ORDER — LOSARTAN POTASSIUM 100 MG/1
100 TABLET ORAL DAILY
COMMUNITY
End: 2022-08-28

## 2022-08-16 RX ORDER — TAMSULOSIN HYDROCHLORIDE 0.4 MG/1
0.4 CAPSULE ORAL DAILY
Status: DISCONTINUED | OUTPATIENT
Start: 2022-08-17 | End: 2022-08-28 | Stop reason: HOSPADM

## 2022-08-16 RX ORDER — MAGNESIUM SULFATE 100 %
4 CRYSTALS MISCELLANEOUS AS NEEDED
Status: DISCONTINUED | OUTPATIENT
Start: 2022-08-16 | End: 2022-08-28 | Stop reason: HOSPADM

## 2022-08-16 RX ORDER — CARVEDILOL 25 MG/1
25 TABLET ORAL 2 TIMES DAILY
COMMUNITY
End: 2022-08-28

## 2022-08-16 RX ORDER — CALCITRIOL 0.25 UG/1
0.25 CAPSULE ORAL DAILY
COMMUNITY
End: 2022-08-28

## 2022-08-16 RX ORDER — HEPARIN SODIUM 5000 [USP'U]/ML
5000 INJECTION, SOLUTION INTRAVENOUS; SUBCUTANEOUS EVERY 8 HOURS
Status: DISCONTINUED | OUTPATIENT
Start: 2022-08-16 | End: 2022-08-28 | Stop reason: HOSPADM

## 2022-08-16 RX ORDER — ACETAMINOPHEN 325 MG/1
650 TABLET ORAL
Status: DISCONTINUED | OUTPATIENT
Start: 2022-08-16 | End: 2022-08-28 | Stop reason: HOSPADM

## 2022-08-16 RX ORDER — POLYETHYLENE GLYCOL 3350 17 G/17G
17 POWDER, FOR SOLUTION ORAL DAILY PRN
Status: DISCONTINUED | OUTPATIENT
Start: 2022-08-16 | End: 2022-08-28 | Stop reason: HOSPADM

## 2022-08-16 RX ORDER — MINOXIDIL 2.5 MG/1
2.5 TABLET ORAL 2 TIMES DAILY
COMMUNITY
End: 2022-08-28

## 2022-08-16 RX ORDER — ONDANSETRON 2 MG/ML
4 INJECTION INTRAMUSCULAR; INTRAVENOUS
Status: DISCONTINUED | OUTPATIENT
Start: 2022-08-16 | End: 2022-08-28 | Stop reason: HOSPADM

## 2022-08-16 RX ORDER — SODIUM CHLORIDE 0.9 % (FLUSH) 0.9 %
5-40 SYRINGE (ML) INJECTION EVERY 8 HOURS
Status: DISCONTINUED | OUTPATIENT
Start: 2022-08-16 | End: 2022-08-28 | Stop reason: HOSPADM

## 2022-08-16 RX ORDER — ALLOPURINOL 100 MG/1
100 TABLET ORAL DAILY
COMMUNITY
End: 2022-08-28

## 2022-08-16 RX ORDER — DEXTROSE MONOHYDRATE 100 MG/ML
0-250 INJECTION, SOLUTION INTRAVENOUS AS NEEDED
Status: DISCONTINUED | OUTPATIENT
Start: 2022-08-16 | End: 2022-08-28 | Stop reason: HOSPADM

## 2022-08-16 RX ORDER — SODIUM CHLORIDE 0.9 % (FLUSH) 0.9 %
5-40 SYRINGE (ML) INJECTION AS NEEDED
Status: DISCONTINUED | OUTPATIENT
Start: 2022-08-16 | End: 2022-08-28 | Stop reason: HOSPADM

## 2022-08-16 RX ORDER — ACETAMINOPHEN 650 MG/1
650 SUPPOSITORY RECTAL
Status: DISCONTINUED | OUTPATIENT
Start: 2022-08-16 | End: 2022-08-28 | Stop reason: HOSPADM

## 2022-08-16 RX ORDER — PROMETHAZINE HYDROCHLORIDE 12.5 MG/1
12.5 TABLET ORAL
Status: DISCONTINUED | OUTPATIENT
Start: 2022-08-16 | End: 2022-08-28 | Stop reason: HOSPADM

## 2022-08-16 RX ORDER — SIMVASTATIN 20 MG/1
20 TABLET, FILM COATED ORAL
COMMUNITY
End: 2022-08-28

## 2022-08-16 RX ORDER — ASPIRIN 325 MG
325 TABLET ORAL DAILY
Status: DISCONTINUED | OUTPATIENT
Start: 2022-08-17 | End: 2022-08-25

## 2022-08-16 RX ADMIN — HEPARIN SODIUM 5000 UNITS: 5000 INJECTION INTRAVENOUS; SUBCUTANEOUS at 22:08

## 2022-08-16 RX ADMIN — SODIUM CHLORIDE, PRESERVATIVE FREE 10 ML: 5 INJECTION INTRAVENOUS at 22:10

## 2022-08-16 RX ADMIN — Medication 2 UNITS: at 22:08

## 2022-08-16 NOTE — ED NOTES
TRANSFER - OUT REPORT:    Verbal report given to Carla Reyes RN(name) on [employer]  being transferred to [address] [place] [address] ([facility]) for routine progression of care       Report consisted of patients Situation, Background, Assessment and   Recommendations(SBAR). Information from the following report(s) SBAR, ED Summary, Intake/Output, MAR and Recent Results was reviewed with the receiving nurse. Lines:   Peripheral IV 08/16/22 Left Antecubital (Active)   Site Assessment Clean, dry, & intact 08/16/22 0814   Phlebitis Assessment 0 08/16/22 0814   Infiltration Assessment 0 08/16/22 0814   Dressing Status Clean, dry, & intact 08/16/22 0814   Dressing Type 4 X 4 08/16/22 1867        Opportunity for questions and clarification was provided.       Patient transported with:   IV  Patient belongings

## 2022-08-16 NOTE — DISCHARGE INSTRUCTIONS
Discharge Instructions    Patient: Janet Jain MRN: [id number]  CSN: [id number]    YOB: 1955  Age: 75 y.o. Sex: male    DOA: 8/16/2022 LOS: [unfilled]  Discharge Date: [unfilled]     DIET:  Diabetic Diet and Renal Diet    ACTIVITY: Activity as tolerated    ADDITIONAL INFORMATION: If you experience any of the following symptoms but not limited to Fever, chills, nausea, vomiting, diarrhea, change in mentation, falling, bleeding, shortness of breath, chest pain, please call your primary care physician or return to the emergency room if you cannot get hold of your doctor:     FOLLOW UP CARE:   in 5-7 days. Please call and set up an appointment.   Dr. Lyn Leon in 2 week  HD at [facility] on Tuesday, Thursday and Saturday      Bebe Sparks MD  8/27/2022 9:21 AM

## 2022-08-16 NOTE — ED TRIAGE NOTES
Client reports having sharp intermitten  r. Lower abdominal pain x 1 day. Denies N/V. Worse with movment. [facility] evaluation for stage 5 CKD. NAD. AXOX4. Client producing urine. Pain 8/10.

## 2022-08-16 NOTE — PROGRESS NOTES
Consult Note    Patient: Arias iRvas               Sex: male          DOA: 8/16/2022         YOB: 1955      Age:  75 y.o.        LOS:  LOS: 0 days              HPI:     Arias Rivas is a 75 y.o. male who has been seen on consultation for advanced renal failure. .  He has history of type 2 diabetes mellitus,hypertension, systolic dysfunction and advanced renal failure from underlying type 2 diabetes mellitus. He was evaluated and managed in the abdomen residual general as long as possible by medical management with a goal net eventually he will need dialysis for that reason axis work-up was initiated and the vein mapping was also done. Now the patient is discharged and lost all the care. He was known to me on previous consultation when I did see him sometime this year or earlier attempted residual gel did not follow on regular basis. .  Patient denies any nausea vomiting any urinary complaints any loss of appetite but feels diaphoretic and has his usual shortness of breath with some swelling of his ankle. Denies any chest pain. Denies abusing any NSAID including Naprosyn despite on this list of medicine Naprosyn is mentioned as one of his regular medication. Also is taking Coreg twice daily for his cardiac problem decided that he is on hydralazine as well as lisinopril for systolic dysfunction. Muriel Laurel He denies that he is using any cocaine anymore. But screen test still positive for cocaine. He claims that he moves around people who uses cocaine but he is not. Currently his home situation is not good since being released from the abdomen residual gel lives in a homeless shelter and he expects that he will be rehabilitated to home soon and his medical care will be managing a better way.   He presented to the emergency room with right upper quadrant pain with flank pain also without any urinary complaints    Past Medical History:   Diagnosis Date    Cardiac LV ejection fraction 30-35% 7/15/15 Cardiomyopathy ([facility] [address] 7/15/15    35% per ECHO    Cocaine use 14    + UDS, persistent use    Diabetes ([facility] [address] 2014    8.1 hgbA1C 14    [facility]     Hepatitis C infection 14    Genotype 1A    History of cocaine abuse ([facility] 8/10/2016    History of heroin abuse ([facility] 8/10/2016    Homelessness 2015    Hypertension 2000       No past surgical history on file. Family History   Problem Relation Age of Onset    Liver Disease Father     Diabetes Mother     Cancer Maternal Grandmother     No Known Problems Sister     Diabetes Brother     No Known Problems Brother     No Known Problems Sister     No Known Problems Sister        Social History     Socioeconomic History    Marital status: SINGLE   Tobacco Use    Smoking status: Former     Packs/day: 0.30     Types: Cigarettes     Quit date: 2016     Years since quittin.1    Smokeless tobacco: Never   Substance and Sexual Activity    Alcohol use: Not Currently     Alcohol/week: 0.0 standard drinks     Comment: started heavy drinking at teen till in Aug 2014 but has gone into remission since then    Drug use: No     Types: Cocaine, Marijuana, Heroin     Comment: 2 x heroin experimental, smoked Cocaine with last 7/27/15    Sexual activity: Never     Partners: Female   Other Topics Concern     Service No    Blood Transfusions No    Caffeine Concern Yes    Occupational Exposure No    Hobby Hazards No    Sleep Concern No    Stress Concern No    Weight Concern No    Special Diet No    Back Care No    Exercise No    Bike Helmet No    Seat Belt Yes       Prior to Admission medications    Medication Sig Start Date End Date Taking? Authorizing Provider   albuterol (PROVENTIL HFA, VENTOLIN HFA, PROAIR HFA) 90 mcg/actuation inhaler Take 2 Puffs by inhalation every six (6) hours as needed for Wheezing or Cough. 21   Wing Wick MD   acetaminophen (TYLENOL) 325 mg tablet Take  by mouth every four (4) hours as needed for Pain.     Provider, Historical   naproxen (NAPROSYN) 500 mg tablet Take 500 mg by mouth two (2) times daily (with meals). Provider, Historical   cholecalciferol, VITAMIN D3, (VITAMIN D3) 5,000 unit tab tablet Take  by mouth daily. Provider, Historical   cetirizine (ZYRTEC) 10 mg tablet Take  by mouth. Provider, Historical   alum-mag hydroxide-simeth (MAALOX ADVANCED) 200-200-20 mg/5 mL susp Take 30 mL by mouth every four (4) hours as needed. Provider, Historical   omeprazole (PRILOSEC) 20 mg capsule Take 20 mg by mouth daily. Provider, Historical   docusate sodium (COLACE) 100 mg capsule Take 100 mg by mouth two (2) times a day. Provider, Historical   bisacodyl (DULCOLAX, BISACODYL,) 5 mg EC tablet Take 5 mg by mouth daily as needed for Constipation. Provider, Historical   simethicone (MYLICON) 80 mg chewable tablet Take 80 mg by mouth two (2) times a day. Give 2 tablets    Provider, Historical   tamsulosin (FLOMAX) 0.4 mg capsule Take 0.4 mg by mouth daily. Provider, Historical   amLODIPine (NORVASC) 10 mg tablet Take 10 mg by mouth daily. Provider, Historical   pravastatin (PRAVACHOL) 20 mg tablet Take 20 mg by mouth nightly. Provider, Historical   carvedilol (COREG) 12.5 mg tablet Take 1 Tab by mouth two (2) times daily (with meals). 2/21/19   Massiel Gallo MD   furosemide (LASIX) 20 mg tablet Take 1 Tab by mouth daily. 2/21/19   Massiel Gallo MD   amLODIPine-atorvastatin (CADUET) 10-80 mg per tablet Take 1 Tab by mouth daily. 4/13/18   Chris Tucker MD   budesonide-formoterol (SYMBICORT) 80-4.5 mcg/actuation HFAA Take 2 Puffs by inhalation two (2) times a day. 4/13/18   Chris Tucker MD   Lancets misc Use as directed for blood glucose monitoring. 4/13/18   Chris Tucker MD   lisinopril (PRINIVIL, ZESTRIL) 20 mg tablet Take 1 Tab by mouth daily. 4/13/18   Chris Tucker MD   multivitamin, tx-iron-ca-min (THERA-M W/ IRON) 9 mg iron-400 mcg tab tablet Take 1 Tab by mouth daily. 4/13/18   Cintia Nascimento MD   thiamine (B-1) 100 mg tablet Take 1 Tab by mouth daily. 4/13/18   Cintia Nascimento MD   aspirin (ASPIRIN) 325 mg tablet Take 1 Tab by mouth daily. Patient taking differently: Take 81 mg by mouth daily. 4/14/18   Cintia Nascimento MD   hydrALAZINE (APRESOLINE) 25 mg tablet Take 1 Tab by mouth three (3) times daily. 4/13/18   Cintia Nascimento MD   insulin glargine (LANTUS) 100 unit/mL injection 12 Units by SubCUTAneous route daily. 4/13/18   Cintia Nascimento MD   insulin lispro (HUMALOG) 100 unit/mL injection For Blood Sugar (mg/dL) of:   Less than 150 =  0 units  150 -199 =  3 units  200 -249 =  6 units  250 -299 =  9 units  300 -349 =  12 units  350 and above =  15 units, call MD. 4/13/18   Cintia Nascimento MD   isosorbide mononitrate ER (IMDUR) 30 mg tablet Take 1 Tab by mouth daily. 4/13/18   Cintia Nascimento MD   Blood-Glucose Meter monitoring kit Check blood sugar qAC and qHS. 4/13/18   Cintia Nascimento MD   glucose blood VI test strips (GENSTRIP TEST STRIP) strip Check blood sugar qAC and qHS. 4/13/18   Cintia Nascimento MD   Insulin Syringe-Needle U-100 (INSULIN SYRINGE) 1/2 mL 30 gauge x 5/16 syrg For insulin use. 4/13/18   Cintia Nascimento MD       Allergies   Allergen Reactions    Tylenol [Acetaminophen] Other (comments)     Pt was advised not to take Tylenol per Dr.       Review of Systems  A comprehensive review of systems was negative except for that written in the History of Present Illness. Physical Exam:      Visit Vitals  /77 (BP 1 Location: Left upper arm, BP Patient Position: At rest)   Pulse 73   Temp 97.5 °F (36.4 °C)   Resp 20   Ht 6' (1.829 m)   Wt 98.9 kg (218 lb)   SpO2 95%   BMI 29.57 kg/m²       Physical Exam:  Physical Exam:   General:  Alert, cooperative, no distress, appears stated age. Eyes:  Conjunctivae/corneas clear. PERRL, EOMs intact. Fundi benign   Ears:  Normal TMs and external ear canals both ears. Nose: Nares normal. Septum midline.  Mucosa normal. No drainage or sinus tenderness. Mouth/Throat: Lips, mucosa, and tongue normal. Teeth and gums normal.   Neck: Supple, symmetrical, trachea midline, no adenopathy, thyroid: no enlargement/tenderness/nodules, no carotid bruit and no JVD. Back:   Symmetric, no curvature. ROM normal. No CVA tenderness. Lungs:   Clear to auscultation bilaterally. Heart:  Regular rate and rhythm, S1, S2 normal, no murmur, click, rub or gallop. Abdomen:   Soft, non-tender. Bowel sounds normal. No masses,  No organomegaly. Extremities: Extremities normal, atraumatic, no cyanosis, has edema.                        Labs Reviewed:  CMP:   Lab Results   Component Value Date/Time     08/16/2022 07:35 AM    K 4.0 08/16/2022 07:35 AM     (H) 08/16/2022 07:35 AM    CO2 20 (L) 08/16/2022 07:35 AM    AGAP 8 08/16/2022 07:35 AM     (H) 08/16/2022 07:35 AM     (H) 08/16/2022 07:35 AM    CREA 6.23 (H) 08/16/2022 07:35 AM    GFRAA 11 (L) 08/16/2022 07:35 AM    GFRNA 9 (L) 08/16/2022 07:35 AM    CA 8.8 08/16/2022 07:35 AM    ALB 3.5 08/16/2022 07:35 AM    TP 8.0 08/16/2022 07:35 AM    GLOB 4.5 (H) 08/16/2022 07:35 AM    AGRAT 0.8 08/16/2022 07:35 AM    ALT 20 08/16/2022 07:35 AM     CBC:   Lab Results   Component Value Date/Time    WBC 6.9 08/16/2022 07:35 AM    HGB 9.9 (L) 08/16/2022 07:35 AM    HCT 29.3 (L) 08/16/2022 07:35 AM     08/16/2022 07:35 AM      Latest Reference Range & Units Most Recent   Color -   YELLOW  8/16/22 07:35   Appearance -   CLEAR  8/16/22 07:35   Specific gravity 1.005 - 1.030   1.010  8/16/22 07:35   pH (UA) 5.0 - 8.0   5.0  8/16/22 07:35   Protein NEG mg/dL Negative  8/16/22 07:35   Glucose NEG mg/dL Negative  8/16/22 07:35   Ketone NEG mg/dL Negative  8/16/22 07:35   Blood NEG   Negative  8/16/22 07:35   Bilirubin NEG   Negative  8/16/22 07:35   Urobilinogen 0.2 - 1.0 EU/dL 0.2  8/16/22 07:35   Nitrites NEG   Negative  8/16/22 07:35   Leukocyte Esterase NEG   Negative  8/16/22 07:35 Epithelial cells 0 - 5 /lpf NEGATIVE   12/24/17 15:43   WBC 0 - 4 /hpf NEGATIVE   12/24/17 15:43   RBC 0 - 5 /hpf 3 to 5  12/24/17 15:43   Bacteria NEG /hpf NEGATIVE   12/24/17 15:43   INR 0.8 - 1.2   0.8  4/13/18 03:07   Prothrombin time 11.5 - 15.2 sec 10.8 (L)  4/13/18 03:07   aPTT 23.0 - 36.4 SEC 27.5  4/10/18 23:33   D DIMER <0.46 ug/ml(FEU) 0.86 (H)  9/13/15 13:40   Sodium 136 - 145 mmol/L 140  8/16/22 07:35   Potassium 3.5 - 5.5 mmol/L 4.0  8/16/22 07:35   Chloride 100 - 111 mmol/L 112 (H)  8/16/22 07:35   CO2 21 - 32 mmol/L 20 (L)  8/16/22 07:35   Anion gap 3.0 - 18 mmol/L 8  8/16/22 07:35   (L): Data is abnormally low  (H): Data is abnormally high   Latest Reference Range & Units Most Recent   Creatinine, urine 30 - 125 mg/dL 274.20 (H)  10/13/15 08:11   Microalbumin,urine random <30 MG/.70 (H)  10/13/15 08:11   Microalbumin/Creat. Ratio mg/g 969  10/13/15 08:11   Creatinine Clearance mL/min 86  11/19/14 09:27   Weight   11/19/14 09:27   Total volume, urine mL 2,350  11/19/14 09:27   Protein,urine 24 hr mg/24hr 2,491  11/19/14 09:27   Volume mL 2,350  11/19/14 09:27   Period of collection hr 24  11/19/14 09:27   PROTEIN, URINE, 24 HR  Rpt  11/19/14 09:27   (H): Data is abnormally high  Rpt: View report in Results Review for more information   Latest Reference Range & Units Most Recent   METHADONE NEG   Negative  8/16/22 07:35       Assessment/Plan     Active Problems:    Type 2 diabetes mellitus with insulin therapy ([facility] (7/6/2015)      Homelessness (40/90/6637)      Systolic CHF, chronic ([facility] (7/10/2016)      Hypertension (1/1/2000)      ESRD (end stage renal disease) ([facility] (8/16/2022)      Substance abuse ([facility] (8/16/2022)      Anemia (8/16/2022)    Patient has reached ESRD all stage VI chronic renal failure with underlying type 2 diabetes mellitus and hypertension also has systolic dysfunction has anemia. Unfortunately still he is using cocaine.   Now [facility]. His renal function is so poor and quite advanced with a blood urea more than initiate the work-up for his health insurance also and the creatinine more than 6 he needs dialysis. Discussed with him and he is quite agreeable. Recommend not to use any Coreg. Make sure he is not any NSAIDs for the time being make sure no ACE inhibitor or ARB is given until he starts dialysis. Also consult vascular surgery for a tunneled dialysis catheter,we will check his hepatitis B and hepatitis C status. Also will check labs related to dialysis and anemia. .  Subsequently will have to find out outpatient dialysis center for him which will not be easy 1. His current social status. It will be almost impossible to follow him as outpatient which is current social status. .  Recommend hospital discharge planner to initiate for his health insurance. He claims that he may have Medicaid. Plan of care of admission was discussed with the emergency room physician. Cayden Rice

## 2022-08-16 NOTE — H&P
History and Physical    Patient: Laurence Chun MRN: [id number]  SSN: [id number]    YOB: 1955    Age: 75 y.o. Sex: male    None    C/C : right lower abd pains     Subjective:      Laurence Chun is a 75 y.o. male who  was recently released from correctional facility being admitted with his initial complaint of abdominal pain however found to have worsening renal function now almost as end-stage renal disease, renal and vascular consulted for starting patient on dialysis. And also has a history of CHF-chronic systolic heart failure last EF was very low in 2018, currently no evidence of fluid overload or patient being symptomatic    Patient also has UDS which is positive for cocaine    Upon discharge patient is planning to go back to shelter    Past Medical History:   Diagnosis Date    Cardiac LV ejection fraction 30-35% 7/15/15    Cardiomyopathy ([facility] 7/15/15    35% per ECHO    Cocaine use 14    + UDS, persistent use    Diabetes ([facility] 2014    8.1 hgbA1C 14    Heart failure ([facility]     Hepatitis C infection 14    Genotype 1A    History of [facility] abuse ([facility] 8/10/2016    History of heroin abuse ([facility] 8/10/2016    Homelessness 2015    Hypertension 2000     No past surgical history on file.    Family History   Problem Relation Age of Onset    Liver Disease Father     Diabetes Mother     Cancer Maternal Grandmother     No Known Problems Sister     Diabetes Brother     No Known Problems Brother     No Known Problems Sister     No Known Problems Sister      Social History     Tobacco Use    Smoking status: Former     Packs/day: 0.30     Types: Cigarettes     Quit date: 2016     Years since quittin.1    Smokeless tobacco: Never   Substance Use Topics    Alcohol use: Not Currently     Alcohol/week: 0.0 standard drinks     Comment: started heavy drinking at teen till in Aug 2014 but has gone into remission since then      Prior to Admission medications    Medication Sig Start Date End Date Taking? Authorizing Provider   albuterol (PROVENTIL HFA, VENTOLIN HFA, PROAIR HFA) 90 mcg/actuation inhaler Take 2 Puffs by inhalation every six (6) hours as needed for Wheezing or Cough. 12/30/21   Nelly Vazquez MD   acetaminophen (TYLENOL) 325 mg tablet Take  by mouth every four (4) hours as needed for Pain. Provider, Historical   naproxen (NAPROSYN) 500 mg tablet Take 500 mg by mouth two (2) times daily (with meals). Provider, Historical   cholecalciferol, VITAMIN D3, (VITAMIN D3) 5,000 unit tab tablet Take  by mouth daily. Provider, Historical   cetirizine (ZYRTEC) 10 mg tablet Take  by mouth. Provider, Historical   alum-mag hydroxide-simeth (MAALOX ADVANCED) 200-200-20 mg/5 mL susp Take 30 mL by mouth every four (4) hours as needed. Provider, Historical   omeprazole (PRILOSEC) 20 mg capsule Take 20 mg by mouth daily. Provider, Historical   docusate sodium (COLACE) 100 mg capsule Take 100 mg by mouth two (2) times a day. Provider, Historical   bisacodyl (DULCOLAX, BISACODYL,) 5 mg EC tablet Take 5 mg by mouth daily as needed for Constipation. Provider, Historical   simethicone (MYLICON) 80 mg chewable tablet Take 80 mg by mouth two (2) times a day. Give 2 tablets    Provider, Historical   tamsulosin (FLOMAX) 0.4 mg capsule Take 0.4 mg by mouth daily. Provider, Historical   amLODIPine (NORVASC) 10 mg tablet Take 10 mg by mouth daily. Provider, Historical   pravastatin (PRAVACHOL) 20 mg tablet Take 20 mg by mouth nightly. Provider, Historical   carvedilol (COREG) 12.5 mg tablet Take 1 Tab by mouth two (2) times daily (with meals). 2/21/19   Shilo Monahan MD   furosemide (LASIX) 20 mg tablet Take 1 Tab by mouth daily. 2/21/19   Shilo Monahan MD   amLODIPine-atorvastatin (CADUET) 10-80 mg per tablet Take 1 Tab by mouth daily.  4/13/18   Tunde Harris MD   budesonide-formoterol (SYMBICORT) 80-4.5 mcg/actuation HFAA Take 2 Puffs by inhalation two (2) times a day. 4/13/18   Jose L Avilez MD   Lancets misc Use as directed for blood glucose monitoring. 4/13/18   Jose L Avilez MD   lisinopril (PRINIVIL, ZESTRIL) 20 mg tablet Take 1 Tab by mouth daily. 4/13/18   Jose L Avilez MD   multivitamin, tx-iron-ca-min (THERA-M W/ IRON) 9 mg iron-400 mcg tab tablet Take 1 Tab by mouth daily. 4/13/18   Jose L Avilez MD   thiamine (B-1) 100 mg tablet Take 1 Tab by mouth daily. 4/13/18   Jose L Avilez MD   aspirin (ASPIRIN) 325 mg tablet Take 1 Tab by mouth daily. Patient taking differently: Take 81 mg by mouth daily. 4/14/18   Jose L Avilez MD   hydrALAZINE (APRESOLINE) 25 mg tablet Take 1 Tab by mouth three (3) times daily. 4/13/18   Jose L Avilez MD   insulin glargine (LANTUS) 100 unit/mL injection 12 Units by SubCUTAneous route daily. 4/13/18   Jose L Avilez MD   insulin lispro (HUMALOG) 100 unit/mL injection For Blood Sugar (mg/dL) of:   Less than 150 =  0 units  150 -199 =  3 units  200 -249 =  6 units  250 -299 =  9 units  300 -349 =  12 units  350 and above =  15 units, call MD. 4/13/18   Jose L Avilez MD   isosorbide mononitrate ER (IMDUR) 30 mg tablet Take 1 Tab by mouth daily. 4/13/18   Jose L Avilez MD   Blood-Glucose Meter monitoring kit Check blood sugar qAC and qHS. 4/13/18   Jose L Avilez MD   glucose blood VI test strips (GENSTRIP TEST STRIP) strip Check blood sugar qAC and qHS. 4/13/18   Jose L Avilez MD   Insulin Syringe-Needle U-100 (INSULIN SYRINGE) 1/2 mL 30 gauge x 5/16 syrg For insulin use. 4/13/18   Jose L Avilez MD        Allergies   Allergen Reactions    Tylenol [Acetaminophen] Other (comments)     Pt was advised not to take Tylenol per Dr.       Review of Systems:  positive responses in bold type   Constitutional: Negative for fever, chills, diaphoresis and unexpected weight change. HENT: Negative for ear pain, congestion, sore throat, rhinorrhea, drooling, trouble swallowing, neck pain and tinnitus.    Eyes: Negative for photophobia, pain, redness and visual disturbance. Respiratory: negative for shortness of breath, cough, choking, chest tightness, wheezing or stridor. Cardiovascular: Negative for chest pain, palpitations and leg swelling. Gastrointestinal: Negative for nausea, vomiting, abdominal pain, diarrhea, constipation, blood in stool, abdominal distention and anal bleeding. Genitourinary: Negative for dysuria, urgency, frequency, hematuria, flank pain and difficulty urinating. Musculoskeletal: Negative for back pain and arthralgias. Skin: Negative for color change, rash and wound. Neurological: Negative for dizziness, seizures, syncope, speech difficulty, light-headedness or headaches. Hematological: Does not bruise/bleed easily. Psychiatric/Behavioral: Negative for suicidal ideas, hallucinations, behavioral problems, self-injury or agitation          Objective: Body mass index is 29.57 kg/m².   Vitals:    08/16/22 0728   BP: 131/77   Pulse: 73   Resp: 20   Temp: 97.5 °F (36.4 °C)   SpO2: 95%   Weight: 98.9 kg (218 lb)   Height: 6' (1.829 m)        Physical Exam:  General appearance - alert, well appearing, and in no distress  Mental status - affect appropriate to mood  Ears - bilateral TM's and external ear canals normal  Nose - normal and patent, no erythema, discharge or polyps  Mouth - mucous membranes moist, pharynx normal without lesions  Chest - clear to auscultation, no wheezes, rales or rhonchi, symmetric air entry  Heart - normal rate, regular rhythm, normal S1, S2, no murmurs, rubs, clicks or gallops  Abdomen - soft, nontender, nondistended, no masses or organomegaly  Neurological - alert, oriented, normal speech, no focal findings or movement disorder noted  Extremities - peripheral pulses normal, no pedal edema, no clubbing or cyanosis          Hospital Problems  Date Reviewed: 8/16/2022            Codes Class Noted POA    ESRD (end stage renal disease) [facility] ICD-10-CM: N18.6  ICD-9-CM: 585.6  8/16/2022 Unknown Substance abuse ([place] [address] ICD-10-CM: F19.10  ICD-9-CM: 305.90  8/16/2022 Unknown        Anemia ICD-10-CM: D64.9  ICD-9-CM: 285.9  8/16/2022 Unknown        End stage renal disease ([place] [address] ICD-10-CM: N18.6  ICD-9-CM: 585.6  8/16/2022 Unknown        Pericardial effusion ICD-10-CM: I31.3  ICD-9-CM: 423.9  8/16/2022 Unknown        Hyperglycemia ICD-10-CM: R73.9  ICD-9-CM: 790.29  8/16/2022 Unknown        CKD (chronic kidney disease) stage 5, GFR less than 15 ml/min (HCC) ICD-10-CM: N18.5  ICD-9-CM: 585.5  8/16/2022 Unknown        Elevated serum creatinine ICD-10-CM: R79.89  ICD-9-CM: 790.99  8/10/2016 Unknown        Systolic CHF, chronic ([facility]) ICD-10-CM: I50.22  ICD-9-CM: 428.22, 428.0  7/10/2016 Yes        CHF (congestive heart failure) ([place] [address] ICD-10-CM: I50.9  ICD-9-CM: 428.0  11/30/2015 Unknown        Homelessness ICD-10-CM: Z59.00  ICD-9-CM: V60.0  10/19/2015 Yes        Type 2 diabetes mellitus with insulin therapy ([place] [address] ICD-10-CM: E11.9, Z79.4  ICD-9-CM: 250.00, V58.67  7/6/2015 Yes        Hypertension ICD-10-CM: I10  ICD-9-CM: 401.9  1/1/2000 Yes           CBC:  Lab Results   Component Value Date/Time    WBC 6.9 08/16/2022 07:35 AM    HGB 9.9 (L) 08/16/2022 07:35 AM    HCT 29.3 (L) 08/16/2022 07:35 AM    PLATELET 088 43/99/1802 07:35 AM    MCV 89.1 08/16/2022 07:35 AM        CMP:  Lab Results   Component Value Date/Time    Sodium 140 08/16/2022 07:35 AM    Potassium 4.0 08/16/2022 07:35 AM    Chloride 112 (H) 08/16/2022 07:35 AM    CO2 20 (L) 08/16/2022 07:35 AM    Anion gap 8 08/16/2022 07:35 AM    Glucose 143 (H) 08/16/2022 07:35 AM     (H) 08/16/2022 07:35 AM    Creatinine 6.23 (H) 08/16/2022 07:35 AM    BUN/Creatinine ratio 17 08/16/2022 07:35 AM    GFR est AA 11 (L) 08/16/2022 07:35 AM    GFR est non-AA 9 (L) 08/16/2022 07:35 AM    Calcium 8.8 08/16/2022 07:35 AM    Alk.  phosphatase 81 08/16/2022 07:35 AM    Protein, total 8.0 08/16/2022 07:35 AM    Albumin 3.5 08/16/2022 07:35 AM    Globulin 4.5 (H) 08/16/2022 07:35 AM    A-G Ratio 0.8 08/16/2022 07:35 AM    ALT (SGPT) 20 08/16/2022 07:35 AM        PT/INR  Lab Results   Component Value Date/Time    INR 0.8 04/13/2018 03:07 AM    INR 0.9 04/12/2018 02:44 AM    INR 0.8 04/10/2018 11:33 PM    Prothrombin time 10.8 (L) 04/13/2018 03:07 AM    Prothrombin time 11.6 04/12/2018 02:44 AM    Prothrombin time 11.1 (L) 04/10/2018 11:33 PM            EKG: No results found for this or any previous visit. Assessment and  Plan:     1 Abdominal pain -right lower quadrant on admission-denies any pain during my interview(CT chest abdomen pelvis unremarkable for any acute pathology)     2 worsening renal function-CKD 5-now reaching end-stage renal disease requiring hemodialysis    3 hypertension    4 history of CHF-neck systolic heart failure with last EF of 25%- Non urgent cardiology consult in AM , ECHO ordered , moderate pericardial effusion on CT chest and abdomen    5 UDS positive for cocaine    6 DM2-long-term insulin use-SSI      PLAN     -Patient who is residing in shelters, recently released from correctional facility, came to emergency room for abdominal pain but found to have worsening renal functions. Patient is aware of this. According to him he was supposed to get hemodialysis in the past but at least arrange for same but due to incarceration he could not get it.   Here Dr. Jaime Daniels nephrologist was consulted who is arranging for hemodialysis, vascular has been consulted      -Unless patient has persistent abdominal pain I would not pursue any further work-up    -Cardiomyopathy, severely reduced ejection fraction-repeat echocardiogram and cardiology consultation    -Avoid beta-blocker as patient is positive cocaine in UDS            Signed By: Malissa Matthews MD     August 16, 2022

## 2022-08-16 NOTE — DIABETES MGMT
Diabetes Patient/Family Education Record    Factors That May Influence Patients Ability to Learn or Comply with Recommendations   []   Language barrier    []   Cultural needs   []   Motivation    []   Cognitive limitation    []   Physical   [x]   Education    []   Physiological factors   []   Hearing/vision/speaking impairment   []   Evangelical beliefs    []   Financial factors   []  Other:   []  No factors identified at this time. Person Instructed:   [x]   Patient   []   Family   []  Other     Preference for Learning:   [x]   Verbal   [x]   Written: diab educ packet; contents reviewed and discussed with patient   []  Demonstration     Level of Comprehension & Competence:    [x]  Good                                      [] Fair                                     []  Poor                             []  Needs Reinforcement   [x]  Teach back completed    Education Component:  Seen patient in ED waiting room area prior to discharge. Patient receptive to diabetes education. He also accepted flyer for free diabetes classes and agreed to attend. [x]  Medication management, including how to administer insulin (if appropriate) and potential medication interactions : Patient reported vial NPH twice daily:  26 units daily every morning  12 units daily every evening     [x]  Nutritional management - [x] Obtained usual meal pattern: Patient receptive to the following education:    [x]   Basic carbohydrate counting  [x]  Plate method  [x]  Limit concentrated sweets and avoid sweetened beverages  [x]  Portion control  [x]    Avoid skipping meals     [x]  Exercise: Walk 30 minutes five days a week as tolerated. Examine feet daily and report changes including blister/wound. [x]  Signs, symptoms, and treatment of hyperglycemia and hypoglycemia: Discussed high blood sugar in detail with patient.  He verbalized understanding about the importance of taking prescribed diabetes medication, healthy eating, and regular exercise to help prevent high blood sugar. Patient also verbalized understanding when to contact a PCP: pattern of high blood sugar in the 300's that he cannot explain. He also verbalized understanding to seek emergency medical care if blood sugar meter reads \"HI. \"     [x] Prevention, recognition and treatment of hyperglycemia and hypoglycemia: Discussed low blood sugar less than 70 in detail with patient. He reported history of low blood sugar in the past. He's able to recognize symptoms. Educated patient on how to treat to blood sugar above 70. [x]  Importance of blood glucose monitoring  [x] Fasting Blood Glucose    []   Provided patient with blood glucose meter  [x]  Has glucometer and supplies at home: Patient reported that he's compliant in checking blood sugar at home.     []  Instruction on use of the blood glucose meter and recommended monitoring schedule   []  Discuss the importance of HbA1C monitoring. Patients A1c is ___ %. This is equivalent to average glucose of ___ mg/dl for the past 2-3 months. Last A1c of 14% (4/12/2018)  Educated patient and he's planning to see a new PCP with plan to get A1c lab done. []  Sick day guidelines   []  Proper use and disposal of lancets, needles, syringes or insulin pens (if appropriate)   [x]  Potential long-term complications (retinopathy, kidney disease, neuropathy, foot care)   [x] Information about whom to contact in case of emergency or for more information: Patient stated that he's looking for a new PCP to see.     [x]  Goal:  Patient/family will demonstrate understanding of Diabetes Self- Management Skills by: 8/21/2022  Plan for post-discharge education or self-management support:    [x] Outpatient class schedule provided            [] Patient Declined    [] Scheduled for outpatient classes (date) _______    [x] Written information provided  Verify: [x] Prior to admission Diabetes medications Does patient understand how diabetes medications work? Yes. Does patient have difficulty obtaining diabetes medications and testing supplies?  No.

## 2022-08-16 NOTE — CONSULTS
Surgery Consult      Patient: Kristine Schmitt MRN: [id number]  CSN: [id number]      YOB: 1955    Age: 75 y.o. Sex: male      DOA: 2022       HPI:     Kristine Schmitt is a 75 y.o. male with ESRD currently in need of a tunneled dialysis catheter. Per  of nephrology the pt can have it placed tomorrow. Today's UDS positive for cocaine. He presented to the [facility] today with complaints of abdominal pain. He was recently discharged form a correctional facility 2022. He is currently homeless but believes he may be able to       He has had a vein mapping completed for a permanent dialysis access. He has history of type 2 diabetes mellitus,hypertension, systolic dysfunction and advanced renal failure from underlying type 2 diabetes mellitus     Past Medical History:   Diagnosis Date    Cardiac LV ejection fraction 30-35% 7/15/15    Cardiomyopathy ([facility] 7/15/15    35% per ECHO    Cocaine use 14    + UDS, persistent use    Diabetes ([facility] 2014    8.1 hgbA1C 14    Heart failure ([facility]     Hepatitis C infection 14    Genotype 1A    History of cocaine abuse ([facility] 8/10/2016    History of heroin abuse ([facility] 8/10/2016    Homelessness 2015    Hypertension 2000       No past surgical history on file.     Family History   Problem Relation Age of Onset    Liver Disease Father     Diabetes Mother     Cancer Maternal Grandmother     No Known Problems Sister     Diabetes Brother     No Known Problems Brother     No Known Problems Sister     No Known Problems Sister        Social History     Socioeconomic History    Marital status: SINGLE   Tobacco Use    Smoking status: Former     Packs/day: 0.30     Types: Cigarettes     Quit date: 2016     Years since quittin.1    Smokeless tobacco: Never   Substance and Sexual Activity    Alcohol use: Not Currently     Alcohol/week: 0.0 standard drinks     Comment: started heavy drinking at teen till in Aug 2014 but has gone into remission since then Drug use: No     Types: Cocaine, Marijuana, Heroin     Comment: 2 x heroin experimental, smoked Cocaine with last 7/27/15    Sexual activity: Never     Partners: Female   Other Topics Concern     Service No    Blood Transfusions No    Caffeine Concern Yes    Occupational Exposure No    Hobby Hazards No    Sleep Concern No    Stress Concern No    Weight Concern No    Special Diet No    Back Care No    Exercise No    Bike Helmet No    Seat Belt Yes       Prior to Admission medications    Medication Sig Start Date End Date Taking? Authorizing Provider   albuterol (PROVENTIL HFA, VENTOLIN HFA, PROAIR HFA) 90 mcg/actuation inhaler Take 2 Puffs by inhalation every six (6) hours as needed for Wheezing or Cough. 12/30/21   Lucas Resendiz MD   acetaminophen (TYLENOL) 325 mg tablet Take  by mouth every four (4) hours as needed for Pain. Provider, Historical   naproxen (NAPROSYN) 500 mg tablet Take 500 mg by mouth two (2) times daily (with meals). Provider, Historical   cholecalciferol, VITAMIN D3, (VITAMIN D3) 5,000 unit tab tablet Take  by mouth daily. Provider, Historical   cetirizine (ZYRTEC) 10 mg tablet Take  by mouth. Provider, Historical   alum-mag hydroxide-simeth (MAALOX ADVANCED) 200-200-20 mg/5 mL susp Take 30 mL by mouth every four (4) hours as needed. Provider, Historical   omeprazole (PRILOSEC) 20 mg capsule Take 20 mg by mouth daily. Provider, Historical   docusate sodium (COLACE) 100 mg capsule Take 100 mg by mouth two (2) times a day. Provider, Historical   bisacodyl (DULCOLAX, BISACODYL,) 5 mg EC tablet Take 5 mg by mouth daily as needed for Constipation. Provider, Historical   simethicone (MYLICON) 80 mg chewable tablet Take 80 mg by mouth two (2) times a day. Give 2 tablets    Provider, Historical   tamsulosin (FLOMAX) 0.4 mg capsule Take 0.4 mg by mouth daily. Provider, Historical   amLODIPine (NORVASC) 10 mg tablet Take 10 mg by mouth daily.     Provider, Historical pravastatin (PRAVACHOL) 20 mg tablet Take 20 mg by mouth nightly. Provider, Historical   carvedilol (COREG) 12.5 mg tablet Take 1 Tab by mouth two (2) times daily (with meals). 2/21/19   Comfort Patrick MD   furosemide (LASIX) 20 mg tablet Take 1 Tab by mouth daily. 2/21/19   Comfort Patrick MD   amLODIPine-atorvastatin (CADUET) 10-80 mg per tablet Take 1 Tab by mouth daily. 4/13/18   Luda Hatfield MD   budesonide-formoterol (SYMBICORT) 80-4.5 mcg/actuation HFAA Take 2 Puffs by inhalation two (2) times a day. 4/13/18   Luda Hatfield MD   Lancets misc Use as directed for blood glucose monitoring. 4/13/18   Luda Hatfield MD   lisinopril (PRINIVIL, ZESTRIL) 20 mg tablet Take 1 Tab by mouth daily. 4/13/18   Luda Hatfield MD   multivitamin, tx-iron-ca-min (THERA-M W/ IRON) 9 mg iron-400 mcg tab tablet Take 1 Tab by mouth daily. 4/13/18   Luda Hatfield MD   thiamine (B-1) 100 mg tablet Take 1 Tab by mouth daily. 4/13/18   Luda Hatfield MD   aspirin (ASPIRIN) 325 mg tablet Take 1 Tab by mouth daily. Patient taking differently: Take 81 mg by mouth daily. 4/14/18   Luda Hatfield MD   hydrALAZINE (APRESOLINE) 25 mg tablet Take 1 Tab by mouth three (3) times daily. 4/13/18   Luda Hatfield MD   insulin glargine (LANTUS) 100 unit/mL injection 12 Units by SubCUTAneous route daily. 4/13/18   Luda Hatfield MD   insulin lispro (HUMALOG) 100 unit/mL injection For Blood Sugar (mg/dL) of:   Less than 150 =  0 units  150 -199 =  3 units  200 -249 =  6 units  250 -299 =  9 units  300 -349 =  12 units  350 and above =  15 units, call MD. 4/13/18   Luda Hatfield MD   isosorbide mononitrate ER (IMDUR) 30 mg tablet Take 1 Tab by mouth daily. 4/13/18   Luda Hatfield MD   Blood-Glucose Meter monitoring kit Check blood sugar qAC and qHS. 4/13/18   Luda Hatfield MD   glucose blood VI test strips (GENSTRIP TEST STRIP) strip Check blood sugar qAC and qHS.  4/13/18   Luda Hatfield MD   Insulin Syringe-Needle U-100 (INSULIN SYRINGE) 1/2 mL 30 gauge x 5/16 syrg For insulin use. 4/13/18   uLis Santiago MD       Allergies   Allergen Reactions    Tylenol [Acetaminophen] Other (comments)     Pt was advised not to take Tylenol per        Review of Systems  Review of Systems - no complaints       Physical Exam:      Visit Vitals  /77 (BP 1 Location: Left upper arm, BP Patient Position: At rest)   Pulse 73   Temp 97.5 °F (36.4 °C)   Resp 20   Ht 6' (1.829 m)   Wt 218 lb (98.9 kg)   SpO2 95%   BMI 29.57 kg/m²       Physical Exam:  Constitutional:  Patient is well developed, well nourished, and not distressed. HEENT: atraumatic, normocephalic, wearing a mask. Eyes:   Cunjunctivae clear, no scleral icterus  Neck:   No JVD present. Cardiovascular:  Normal rate, regular rhythm, normal heart sounds. No murmur heard. Pulmonary/Chest: Effort normal .  Extremities: Normal range of motion. BLE edema     Neurological:  he  is alert and oriented x3 . Gait normal. Motor & sensory grossly intact in all 4 limbs. Psych: Appropriate mood and affect. Skin:  Skin is warm and dry.    No ulcerations     Data Review:  CBC:   Lab Results   Component Value Date/Time    WBC 6.9 08/16/2022 07:35 AM    RBC 3.29 (L) 08/16/2022 07:35 AM    HGB 9.9 (L) 08/16/2022 07:35 AM    HCT 29.3 (L) 08/16/2022 07:35 AM    PLATELET 291 02/41/7097 07:35 AM      BMP:   Lab Results   Component Value Date/Time    Glucose 143 (H) 08/16/2022 07:35 AM    Sodium 140 08/16/2022 07:35 AM    Potassium 4.0 08/16/2022 07:35 AM    Chloride 112 (H) 08/16/2022 07:35 AM    CO2 20 (L) 08/16/2022 07:35 AM     (H) 08/16/2022 07:35 AM    Creatinine 6.23 (H) 08/16/2022 07:35 AM    Calcium 8.8 08/16/2022 07:35 AM         Assessment/Plan     75year old male with ESRD in need of Tunneled dialysis Catheter   - [facility] scheduled tomorrow with Dr. Misti Rivera  - Consent signed and placed in chart    Negin Stevenson FNP-C  Vascular Nurse [address]  [phone]      Active Problems:    Type 2 diabetes mellitus with insulin therapy ([facility] (7/6/2015)      Homelessness (46/91/4483)      Systolic CHF, chronic ([facility] (7/10/2016)      Elevated serum creatinine (8/10/2016)      Hypertension (1/1/2000)      ESRD (end stage renal disease) ([facility] (8/16/2022)      Substance abuse ([facility] (8/16/2022)      Anemia (8/16/2022)      End stage renal disease ([facility] (8/16/2022)      Pericardial effusion (8/16/2022)      Hyperglycemia (8/16/2022)        [place] Liriano NP  August 16, 2022

## 2022-08-16 NOTE — Clinical Note
TRANSFER - IN REPORT:     Verbal report received from: [facility]. Report consisted of patient's Situation, Background, Assessment and   Recommendations(SBAR). Opportunity for questions and clarification was provided. Assessment completed upon patient's arrival to unit and care assumed. Patient transported with a Registered Nurse.

## 2022-08-16 NOTE — Clinical Note
Status[de-identified] INPATIENT [101]   Type of Bed: Telemetry [19]   Cardiac Monitoring Required?: Yes   Inpatient Hospitalization Certified Necessary for the Following Reasons: 3. Patient receiving treatment that can only be provided in an inpatient setting (further clarification in H&P documentation)   Admitting Diagnosis: End stage renal disease ([facility] R[place].Confer. 6. ICD-9-CM]   Admitting Diagnosis: Elevated serum creatinine [5876177]   Admitting Diagnosis: Hyperglycemia [164770]   Admitting Diagnosis: Pericardial effusion [704920]   Admitting Physician: Marii Nguyen [[id number]]   Attending Physician: Marii Nguyen [[id number]]   Estimated Length of Stay: 3-4 Midnights   Discharge Plan[de-identified] Home with Office Follow-up

## 2022-08-16 NOTE — ED PROVIDER NOTES
EMERGENCY DEPARTMENT HISTORY AND PHYSICAL EXAM    Date: 8/16/2022  Patient Name: Nba Bolivar    History of Presenting Illness     Chief Complaint   Patient presents with    Flank Pain         History Provided By: Patient    Chief Complaint: Right flank pain  Duration: 2 days  Timing: Acute  Location: Right flank  Quality: Aching  Severity: Moderate  Modifying Factors: None  Associated Symptoms: none       Additional History (Context): Nba Bolivar is a 75 y.o. male with a history of diabetes, hypertension, CKD, substance abuse who presents today for issues listed above. Patient denies history of kidney stones but does report a history of stage V kidney disease. Reports he was recently released from prison on August 5 roughly 2 weeks ago. Reports that they were managing his CKD while incarcerated. Patient reports he has been compliant with all of his daily medications. Denies any hematuria or prostate problems. Denies any penile or testicular problems. Has not tried thing for this at home. Denies any associated nausea, vomiting, fevers or chills. PCP: None    Current Outpatient Medications   Medication Sig Dispense Refill    albuterol (PROVENTIL HFA, VENTOLIN HFA, PROAIR HFA) 90 mcg/actuation inhaler Take 2 Puffs by inhalation every six (6) hours as needed for Wheezing or Cough. 1 Each 0    acetaminophen (TYLENOL) 325 mg tablet Take  by mouth every four (4) hours as needed for Pain. naproxen (NAPROSYN) 500 mg tablet Take 500 mg by mouth two (2) times daily (with meals). cholecalciferol, VITAMIN D3, (VITAMIN D3) 5,000 unit tab tablet Take  by mouth daily. cetirizine (ZYRTEC) 10 mg tablet Take  by mouth. alum-mag hydroxide-simeth (MAALOX ADVANCED) 200-200-20 mg/5 mL susp Take 30 mL by mouth every four (4) hours as needed. omeprazole (PRILOSEC) 20 mg capsule Take 20 mg by mouth daily. docusate sodium (COLACE) 100 mg capsule Take 100 mg by mouth two (2) times a day. bisacodyl (DULCOLAX, BISACODYL,) 5 mg EC tablet Take 5 mg by mouth daily as needed for Constipation. simethicone (MYLICON) 80 mg chewable tablet Take 80 mg by mouth two (2) times a day. Give 2 tablets      tamsulosin (FLOMAX) 0.4 mg capsule Take 0.4 mg by mouth daily. amLODIPine (NORVASC) 10 mg tablet Take 10 mg by mouth daily. pravastatin (PRAVACHOL) 20 mg tablet Take 20 mg by mouth nightly. carvedilol (COREG) 12.5 mg tablet Take 1 Tab by mouth two (2) times daily (with meals). 60 Tab 4    furosemide (LASIX) 20 mg tablet Take 1 Tab by mouth daily. 30 Tab 4    amLODIPine-atorvastatin (CADUET) 10-80 mg per tablet Take 1 Tab by mouth daily. 30 Tab 1    budesonide-formoterol (SYMBICORT) 80-4.5 mcg/actuation HFAA Take 2 Puffs by inhalation two (2) times a day. 1 Inhaler 1    Lancets misc Use as directed for blood glucose monitoring. 200 Each 1    lisinopril (PRINIVIL, ZESTRIL) 20 mg tablet Take 1 Tab by mouth daily. 30 Tab 1    multivitamin, tx-iron-ca-min (THERA-M W/ IRON) 9 mg iron-400 mcg tab tablet Take 1 Tab by mouth daily. 30 Tab 1    thiamine (B-1) 100 mg tablet Take 1 Tab by mouth daily. 30 Tab 1    aspirin (ASPIRIN) 325 mg tablet Take 1 Tab by mouth daily. (Patient taking differently: Take 81 mg by mouth daily.) 30 Tab 1    hydrALAZINE (APRESOLINE) 25 mg tablet Take 1 Tab by mouth three (3) times daily. 90 Tab 1    insulin glargine (LANTUS) 100 unit/mL injection 12 Units by SubCUTAneous route daily. 1 Vial 3    insulin lispro (HUMALOG) 100 unit/mL injection For Blood Sugar (mg/dL) of:   Less than 150 =  0 units  150 -199 =  3 units  200 -249 =  6 units  250 -299 =  9 units  300 -349 =  12 units  350 and above =  15 units, call MD. 1 Vial 3    isosorbide mononitrate ER (IMDUR) 30 mg tablet Take 1 Tab by mouth daily. 30 Tab 1    Blood-Glucose Meter monitoring kit Check blood sugar qAC and qHS. 1 Kit 0    glucose blood VI test strips (GENSTRIP TEST STRIP) strip Check blood sugar qAC and qHS. 200 Strip 1    Insulin Syringe-Needle U-100 (INSULIN SYRINGE) 1/2 mL 30 gauge x 5/16 syrg For insulin use. 100 Syringe 2       Past History     Past Medical History:  Past Medical History:   Diagnosis Date    Cardiac LV ejection fraction 30-35% 7/15/15    Cardiomyopathy ([facility] 7/15/15    35% per ECHO    Cocaine use 14    + UDS, persistent use    Diabetes ([facility] 2014    8.1 hgbA1C 14    Heart failure ([facility]     Hepatitis C infection 14    Genotype 1A    History of cocaine abuse ([facility] 8/10/2016    [facility] 8/10/2016    Homelessness 2015    Hypertension 2000       Past Surgical History:  No past surgical history on file. Family History:  Family History   Problem Relation Age of Onset    Liver Disease Father     Diabetes Mother     Cancer Maternal Grandmother     No Known Problems Sister     Diabetes Brother     No Known Problems Brother     No Known Problems Sister     No Known Problems Sister        Social History:  Social History     Tobacco Use    Smoking status: Former     Packs/day: 0.30     Types: Cigarettes     Quit date: 2016     Years since quittin.1    Smokeless tobacco: Never   Substance Use Topics    Alcohol use: Not Currently     Alcohol/week: 0.0 standard drinks     Comment: started heavy drinking at teen till in Aug 2014 but has gone into remission since then    Drug use: No     Types: Cocaine, Marijuana, Heroin     Comment: 2 x heroin experimental, smoked Cocaine with last 7/27/15       Allergies: Allergies   Allergen Reactions    Tylenol [Acetaminophen] Other (comments)     Pt was advised not to take Tylenol per          Review of Systems   Review of Systems   Constitutional:  Negative for chills and fever. HENT:  Negative for congestion, rhinorrhea and sore throat. Respiratory:  Negative for cough and shortness of breath. Cardiovascular:  Negative for chest pain.    Gastrointestinal:  Negative for abdominal pain, blood in stool, constipation, diarrhea, nausea and vomiting. Genitourinary:  Positive for flank pain. Negative for dysuria, frequency and hematuria. Musculoskeletal:  Negative for back pain and myalgias. Skin:  Negative for rash and wound. Neurological:  Negative for dizziness and headaches. All other systems reviewed and are negative. All Other Systems Negative  Physical Exam     Vitals:    08/16/22 0728   BP: 131/77   Pulse: 73   Resp: 20   Temp: 97.5 °F (36.4 °C)   SpO2: 95%   Weight: 98.9 kg (218 lb)   Height: 6' (1.829 m)     Physical Exam  Vitals and nursing note reviewed. Constitutional:       General: He is not in acute distress. Appearance: He is well-developed. He is not diaphoretic. Comments: Overall well appearing, no distress    HENT:      Head: Normocephalic and atraumatic. Eyes:      Conjunctiva/sclera: Conjunctivae normal.   Cardiovascular:      Rate and Rhythm: Normal rate and regular rhythm. Heart sounds: Normal heart sounds. Pulmonary:      Effort: Pulmonary effort is normal. No respiratory distress. Breath sounds: Normal breath sounds. Chest:      Chest wall: No tenderness. Abdominal:      General: Bowel sounds are normal. There is no distension. Palpations: Abdomen is soft. Tenderness: There is no abdominal tenderness. There is no guarding or rebound. Musculoskeletal:         General: No deformity. Normal range of motion. Cervical back: Normal range of motion and neck supple. Skin:     General: Skin is warm and dry. Neurological:      Mental Status: He is alert and oriented to person, place, and time.          Diagnostic Study Results     Labs -     Recent Results (from the past 12 hour(s))   EKG, 12 LEAD, INITIAL    Collection Time: 08/16/22  7:33 AM   Result Value Ref Range    Ventricular Rate 73 BPM    Atrial Rate 73 BPM    P-R Interval 144 ms    QRS Duration 86 ms    Q-T Interval 420 ms    QTC Calculation (Bezet) 462 ms    Calculated P Axis 40 degrees    Calculated R Axis -46 degrees    Calculated T Axis 101 degrees    Diagnosis       Normal sinus rhythm  Left anterior fascicular block  Cannot rule out Inferior infarct , age undetermined  Abnormal ECG  When compared with ECG of 10-APR-2018 23:54,  T wave inversion no longer evident in Anterior leads     CBC WITH AUTOMATED DIFF    Collection Time: 08/16/22  7:35 AM   Result Value Ref Range    WBC 6.9 4.6 - 13.2 K/uL    RBC 3.29 (L) 4.35 - 5.65 M/uL    HGB 9.9 (L) 13.0 - 16.0 g/dL    HCT 29.3 (L) 36.0 - 48.0 %    MCV 89.1 78.0 - 100.0 FL    MCH 30.1 24.0 - 34.0 PG    MCHC 33.8 31.0 - 37.0 g/dL    RDW 13.9 11.6 - 14.5 %    PLATELET 035 359 - 179 K/uL    MPV 9.5 9.2 - 11.8 FL    NRBC 0.0 0  WBC    ABSOLUTE NRBC 0.00 0.00 - 0.01 K/uL    NEUTROPHILS 64 40 - 73 %    LYMPHOCYTES 20 (L) 21 - 52 %    MONOCYTES 10 3 - 10 %    EOSINOPHILS 5 0 - 5 %    BASOPHILS 1 0 - 2 %    IMMATURE GRANULOCYTES 1 (H) 0.0 - 0.5 %    ABS. NEUTROPHILS 4.4 1.8 - 8.0 K/UL    ABS. LYMPHOCYTES 1.4 0.9 - 3.6 K/UL    ABS. MONOCYTES 0.7 0.05 - 1.2 K/UL    ABS. EOSINOPHILS 0.4 0.0 - 0.4 K/UL    ABS. BASOPHILS 0.1 0.0 - 0.1 K/UL    ABS. IMM. GRANS. 0.0 0.00 - 0.04 K/UL    DF AUTOMATED     METABOLIC PANEL, COMPREHENSIVE    Collection Time: 08/16/22  7:35 AM   Result Value Ref Range    Sodium 140 136 - 145 mmol/L    Potassium 4.0 3.5 - 5.5 mmol/L    Chloride 112 (H) 100 - 111 mmol/L    CO2 20 (L) 21 - 32 mmol/L    Anion gap 8 3.0 - 18 mmol/L    Glucose 143 (H) 74 - 99 mg/dL     (H) 7.0 - 18 MG/DL    Creatinine 6.23 (H) 0.6 - 1.3 MG/DL    BUN/Creatinine ratio 17 12 - 20      GFR est AA 11 (L) >60 ml/min/1.73m2    GFR est non-AA 9 (L) >60 ml/min/1.73m2    Calcium 8.8 8.5 - 10.1 MG/DL    Bilirubin, total 0.6 0.2 - 1.0 MG/DL    ALT (SGPT) 20 16 - 61 U/L    AST (SGOT) 24 10 - 38 U/L    Alk.  phosphatase 81 45 - 117 U/L    Protein, total 8.0 6.4 - 8.2 g/dL    Albumin 3.5 3.4 - 5.0 g/dL    Globulin 4.5 (H) 2.0 - 4.0 g/dL    A-G Ratio 0.8 0.8 - 1.7     NT-PRO BNP    Collection Time: 08/16/22  7:35 AM   Result Value Ref Range    NT pro-BNP 1,074 (H) 0 - 900 PG/ML   URINALYSIS W/ RFLX MICROSCOPIC    Collection Time: 08/16/22  7:35 AM   Result Value Ref Range    Color YELLOW      Appearance CLEAR      Specific gravity 1.010 1.005 - 1.030      pH (UA) 5.0 5.0 - 8.0      Protein Negative NEG mg/dL    Glucose Negative NEG mg/dL    Ketone Negative NEG mg/dL    Bilirubin Negative NEG      Blood Negative NEG      Urobilinogen 0.2 0.2 - 1.0 EU/dL    Nitrites Negative NEG      Leukocyte Esterase Negative NEG     DRUG SCREEN, URINE    Collection Time: 08/16/22  7:35 AM   Result Value Ref Range    BENZODIAZEPINES Negative NEG      BARBITURATES Negative NEG      THC (TH-CANNABINOL) Negative NEG      OPIATES Negative NEG      PCP(PHENCYCLIDINE) Negative NEG      COCAINE Positive (A) NEG      AMPHETAMINES Negative NEG      METHADONE Negative NEG      HDSCOM (NOTE)        Radiologic Studies -   CT ABD PELV WO CONT   Final Result      1. No evidence of nephrolithiasis or obstructive uropathy identified. 2. No other significant abnormality in abdomen or pelvis identified to explain   patient's symptom. 3. Stable borderline cardiomegaly but increased moderate pericardial effusion. Minimal bibasilar fluid and pleural thickening. Thank you for this referral.      [address] [place] [address]    (Results Pending)     CT Results  (Last 48 hours)                 08/16/22 0930  CT ABD PELV WO CONT Final result    Impression:      1. No evidence of nephrolithiasis or obstructive uropathy identified. 2. No other significant abnormality in abdomen or pelvis identified to explain   patient's symptom. 3. Stable borderline cardiomegaly but increased moderate pericardial effusion. Minimal bibasilar fluid and pleural thickening.        Thank you for this referral.       Narrative:  CT abdomen and pelvis for stone study        HISTORY: RIGHT flank pain       COMPARISON: None.       TECHNIQUE: Helical scan to the abdomen and pelvis is obtained without oral or IV   contrast administration per stone protocol. All CT scans at this facility performed using dose optimization techniques as   appreciated to a performed exam, to include automated exposure control,   adjustment of the mA and or KU according to patient size (including appropriate   matching for site specific examination), or use of iterative reconstruction   technique. FINDINGS: Imaging portion to the lung bases shows mild pleural thickening and   minimal fluid present bilaterally, much improved compared to the CT in 2016. Small wedge-shaped atelectasis is in right middle lobe. Borderline cardiac   enlargement appears stable but moderate pericardial effusion is interval   increased. KIDNEYS, URETERS AND BLADDER:        The bilateral kidneys appear normal.  No evidence of renal calculi or   hydronephrosis  identified. The bilateral ureters are nondilated with no   ureteral calculi present. The bladder shows no calculi. CT ABDOMEN AND PELVIS:        The liver, spleen, pancreas, and both adrenal glands appear grossly normal on   this noncontrast study. The gallbladder is contracted. The small and large bowel   appear nondilated. Normal appendix. The periumbilical fat-containing hernia   shows no bowel involvement. No retroperitoneal adenopathy identified. Abdominal   aorta appears unremarkable for age. No pelvic free fluid identified. .        CT OSSEOUS STRUCTURES:        Unremarkable for age. CXR Results  (Last 48 hours)      None              Medical Decision Making   I am the first provider for this patient. I reviewed the vital signs, available nursing notes, past medical history, past surgical history, family history and social history. Vital Signs-Reviewed the patient's vital signs.       Records Reviewed: Nursing Notes and Old Medical Records     Procedures: None Procedures    Provider Notes (Medical Decision Making):     Differential: Cancer, kidney stone, UTI, pyelonephritis, hydronephrosis, kidney failure, acute kidney injury      Plan: We will order labs and CT abdomen and pelvis  ED Course as of 08/16/22 1150   Tue Aug 16, 2022   1104 Case with Dr. Alannah Sharma, nephrology who is going to come and evaluate the patient. Discussed I also discussed case with our nail, diabetes management who is also going to discuss home care with the patient [CS]   1104 CT imaging reviewed by both myself and with Dr. Alonso Hein. Have discussed need for close cardiology follow-up. [CS]   2911 Patient has been seen and evaluated by nephrology and nephrology has advised patient be admitted to initiate dialysis. Patient agreeable to this. Have discussed case with hospitalist, Dr. Marie Hampton who agrees with need for admission. Admission orders have been placed. [CS]      ED Course User Index  [CS] Sofia Zavala         MED RECONCILIATION:  No current facility-administered medications for this encounter. Current Outpatient Medications   Medication Sig    albuterol (PROVENTIL HFA, VENTOLIN HFA, PROAIR HFA) 90 mcg/actuation inhaler Take 2 Puffs by inhalation every six (6) hours as needed for Wheezing or Cough. acetaminophen (TYLENOL) 325 mg tablet Take  by mouth every four (4) hours as needed for Pain. naproxen (NAPROSYN) 500 mg tablet Take 500 mg by mouth two (2) times daily (with meals). cholecalciferol, VITAMIN D3, (VITAMIN D3) 5,000 unit tab tablet Take  by mouth daily. cetirizine (ZYRTEC) 10 mg tablet Take  by mouth. alum-mag hydroxide-simeth (MAALOX ADVANCED) 200-200-20 mg/5 mL susp Take 30 mL by mouth every four (4) hours as needed. omeprazole (PRILOSEC) 20 mg capsule Take 20 mg by mouth daily. docusate sodium (COLACE) 100 mg capsule Take 100 mg by mouth two (2) times a day.     bisacodyl (DULCOLAX, BISACODYL,) 5 mg EC tablet Take 5 mg by mouth daily as needed for Constipation. simethicone (MYLICON) 80 mg chewable tablet Take 80 mg by mouth two (2) times a day. Give 2 tablets    tamsulosin (FLOMAX) 0.4 mg capsule Take 0.4 mg by mouth daily. amLODIPine (NORVASC) 10 mg tablet Take 10 mg by mouth daily. pravastatin (PRAVACHOL) 20 mg tablet Take 20 mg by mouth nightly. carvedilol (COREG) 12.5 mg tablet Take 1 Tab by mouth two (2) times daily (with meals). furosemide (LASIX) 20 mg tablet Take 1 Tab by mouth daily. amLODIPine-atorvastatin (CADUET) 10-80 mg per tablet Take 1 Tab by mouth daily. budesonide-formoterol (SYMBICORT) 80-4.5 mcg/actuation HFAA Take 2 Puffs by inhalation two (2) times a day. Lancets misc Use as directed for blood glucose monitoring. lisinopril (PRINIVIL, ZESTRIL) 20 mg tablet Take 1 Tab by mouth daily. multivitamin, tx-iron-ca-min (THERA-M W/ IRON) 9 mg iron-400 mcg tab tablet Take 1 Tab by mouth daily. thiamine (B-1) 100 mg tablet Take 1 Tab by mouth daily. aspirin (ASPIRIN) 325 mg tablet Take 1 Tab by mouth daily. (Patient taking differently: Take 81 mg by mouth daily.)    hydrALAZINE (APRESOLINE) 25 mg tablet Take 1 Tab by mouth three (3) times daily. insulin glargine (LANTUS) 100 unit/mL injection 12 Units by SubCUTAneous route daily. insulin lispro (HUMALOG) 100 unit/mL injection For Blood Sugar (mg/dL) of:   Less than 150 =  0 units  150 -199 =  3 units  200 -249 =  6 units  250 -299 =  9 units  300 -349 =  12 units  350 and above =  15 units, call MD.    isosorbide mononitrate ER (IMDUR) 30 mg tablet Take 1 Tab by mouth daily. Blood-Glucose Meter monitoring kit Check blood sugar qAC and qHS. glucose blood VI test strips (GENSTRIP TEST STRIP) strip Check blood sugar qAC and qHS. Insulin Syringe-Needle U-100 (INSULIN SYRINGE) 1/2 mL 30 gauge x 5/16 syrg For insulin use.        Disposition:  Admit     Follow-up Information       Follow up With Specialties Details Why [address] [facility]    [facility] EMERGENCY DEPT Emergency Medicine  As needed [address] [facility]    Linda Pradhan MD Nephrology Schedule an appointment as soon as possible for a visit   [address] [place] [address] [address] [facility] [address] [address]  MissaelLesvia Melissafercho [address] Cardiology Schedule an appointment as soon as possible for a visit   [address] [place] [address]    Perfecto Ballesteros MD Internal Medicine Physician Schedule an appointment as soon as possible for a visit   [address] [place] [address]  [phone]              Current Discharge Medication List              Diagnosis     Clinical Impression:   1. End stage renal disease (HCC)    2. Elevated serum creatinine    3. Hyperglycemia    4. Pericardial effusion          \"Please note that this dictation was completed with [employer], the computer voice recognition software. Quite often unanticipated grammatical, syntax, homophones, and other interpretive errors are inadvertently transcribed by the computer software. Please disregard these errors. Please excuse any errors that have escaped final proofreading. \"

## 2022-08-17 ENCOUNTER — HOSPITAL ENCOUNTER (INPATIENT)
Dept: ULTRASOUND IMAGING | Age: 67
Discharge: HOME | DRG: 194 | End: 2022-08-17
Attending: INTERNAL MEDICINE
Payer: MEDICAID

## 2022-08-17 ENCOUNTER — APPOINTMENT (OUTPATIENT)
Dept: NON INVASIVE DIAGNOSTICS | Age: 67
DRG: 194 | End: 2022-08-17
Attending: INTERNAL MEDICINE
Payer: MEDICAID

## 2022-08-17 LAB
ANION GAP SERPL CALC-SCNC: 8 MMOL/L (ref 3–18)
BASOPHILS # BLD: 0.1 K/UL (ref 0–0.1)
BASOPHILS NFR BLD: 1 % (ref 0–2)
BUN SERPL-MCNC: 105 MG/DL (ref 7–18)
BUN/CREAT SERPL: 19 (ref 12–20)
CALCIUM SERPL-MCNC: 9.2 MG/DL (ref 8.5–10.1)
CALCIUM SERPL-MCNC: 9.3 MG/DL (ref 8.5–10.1)
CHLORIDE SERPL-SCNC: 112 MMOL/L (ref 100–111)
CO2 SERPL-SCNC: 21 MMOL/L (ref 21–32)
CREAT SERPL-MCNC: 5.62 MG/DL (ref 0.6–1.3)
DIFFERENTIAL METHOD BLD: ABNORMAL
ECHO AO ROOT DIAM: 3.7 CM
ECHO AO ROOT INDEX: 1.67 CM/M2
ECHO LA VOL 2C: 82 ML (ref 18–58)
ECHO LA VOL 4C: 55 ML (ref 18–58)
ECHO LA VOLUME AREA LENGTH: 73 ML
ECHO LA VOLUME INDEX A2C: 37 ML/M2 (ref 16–34)
ECHO LA VOLUME INDEX A4C: 25 ML/M2 (ref 16–34)
ECHO LA VOLUME INDEX AREA LENGTH: 33 ML/M2 (ref 16–34)
ECHO LV E' LATERAL VELOCITY: 7 CM/S
ECHO LV E' SEPTAL VELOCITY: 8 CM/S
ECHO LV FRACTIONAL SHORTENING: 27 % (ref 28–44)
ECHO LV INTERNAL DIMENSION DIASTOLE INDEX: 2.49 CM/M2
ECHO LV INTERNAL DIMENSION DIASTOLIC: 5.5 CM (ref 4.2–5.9)
ECHO LV INTERNAL DIMENSION SYSTOLIC INDEX: 1.81 CM/M2
ECHO LV INTERNAL DIMENSION SYSTOLIC: 4 CM
ECHO LV IVSD: 1.1 CM (ref 0.6–1)
ECHO LV MASS 2D: 257 G (ref 88–224)
ECHO LV MASS INDEX 2D: 116.3 G/M2 (ref 49–115)
ECHO LV POSTERIOR WALL DIASTOLIC: 1.2 CM (ref 0.6–1)
ECHO LV RELATIVE WALL THICKNESS RATIO: 0.44
ECHO LVOT AREA: 3.5 CM2
ECHO LVOT DIAM: 2.1 CM
ECHO LVOT MEAN GRADIENT: 3 MMHG
ECHO LVOT PEAK GRADIENT: 6 MMHG
ECHO LVOT PEAK VELOCITY: 1.2 M/S
ECHO LVOT STROKE VOLUME INDEX: 42 ML/M2
ECHO LVOT SV: 92.8 ML
ECHO LVOT VTI: 26.8 CM
ECHO MV A VELOCITY: 0.85 M/S
ECHO MV E DECELERATION TIME (DT): 146.3 MS
ECHO MV E VELOCITY: 0.72 M/S
ECHO MV E/A RATIO: 0.85
ECHO MV E/E' LATERAL: 10.29
ECHO MV E/E' RATIO (AVERAGED): 9.64
ECHO MV E/E' SEPTAL: 9
ECHO RV FREE WALL PEAK S': 13 CM/S
ECHO RV TAPSE: 2.7 CM (ref 1.7–?)
EOSINOPHIL # BLD: 0.4 K/UL (ref 0–0.4)
EOSINOPHIL NFR BLD: 6 % (ref 0–5)
ERYTHROCYTE [DISTWIDTH] IN BLOOD BY AUTOMATED COUNT: 13.9 % (ref 11.6–14.5)
EST. AVERAGE GLUCOSE BLD GHB EST-MCNC: 148 MG/DL
FERRITIN SERPL-MCNC: 163 NG/ML (ref 8–388)
GLUCOSE BLD STRIP.AUTO-MCNC: 120 MG/DL (ref 70–110)
GLUCOSE BLD STRIP.AUTO-MCNC: 154 MG/DL (ref 70–110)
GLUCOSE BLD STRIP.AUTO-MCNC: 167 MG/DL (ref 70–110)
GLUCOSE SERPL-MCNC: 90 MG/DL (ref 74–99)
HBA1C MFR BLD: 6.8 % (ref 4.2–5.6)
HBV SURFACE AB SER QL IA: POSITIVE
HBV SURFACE AB SERPL IA-ACNC: 289.1 MIU/ML
HBV SURFACE AG SER QL: <0.1 INDEX
HBV SURFACE AG SER QL: NEGATIVE
HCT VFR BLD AUTO: 30.3 % (ref 36–48)
HCV AB SER IA-ACNC: >11 INDEX
HCV AB SERPL QL IA: POSITIVE
HCV COMMENT,HCGAC: ABNORMAL
HEP BS AB COMMENT,HBSAC: NORMAL
HGB BLD-MCNC: 10.2 G/DL (ref 13–16)
IMM GRANULOCYTES # BLD AUTO: 0 K/UL (ref 0–0.04)
IMM GRANULOCYTES NFR BLD AUTO: 1 % (ref 0–0.5)
IRON SATN MFR SERPL: 22 % (ref 20–50)
IRON SERPL-MCNC: 45 UG/DL (ref 50–175)
LYMPHOCYTES # BLD: 1.6 K/UL (ref 0.9–3.6)
LYMPHOCYTES NFR BLD: 23 % (ref 21–52)
MCH RBC QN AUTO: 30.3 PG (ref 24–34)
MCHC RBC AUTO-ENTMCNC: 33.7 G/DL (ref 31–37)
MCV RBC AUTO: 89.9 FL (ref 78–100)
MONOCYTES # BLD: 0.7 K/UL (ref 0.05–1.2)
MONOCYTES NFR BLD: 10 % (ref 3–10)
NEUTS SEG # BLD: 4.1 K/UL (ref 1.8–8)
NEUTS SEG NFR BLD: 60 % (ref 40–73)
NRBC # BLD: 0 K/UL (ref 0–0.01)
NRBC BLD-RTO: 0 PER 100 WBC
PHOSPHATE SERPL-MCNC: 5.3 MG/DL (ref 2.5–4.9)
PLATELET # BLD AUTO: 275 K/UL (ref 135–420)
PMV BLD AUTO: 10.6 FL (ref 9.2–11.8)
POTASSIUM SERPL-SCNC: 3.9 MMOL/L (ref 3.5–5.5)
PTH-INTACT SERPL-MCNC: 249.5 PG/ML (ref 18.4–88)
RBC # BLD AUTO: 3.37 M/UL (ref 4.35–5.65)
SODIUM SERPL-SCNC: 141 MMOL/L (ref 136–145)
TIBC SERPL-MCNC: 208 UG/DL (ref 250–450)
WBC # BLD AUTO: 6.9 K/UL (ref 4.6–13.2)

## 2022-08-17 PROCEDURE — 74011000250 HC RX REV CODE- 250: Performed by: INTERNAL MEDICINE

## 2022-08-17 PROCEDURE — 76770 US EXAM ABDO BACK WALL COMP: CPT

## 2022-08-17 PROCEDURE — 87340 HEPATITIS B SURFACE AG IA: CPT

## 2022-08-17 PROCEDURE — 83036 HEMOGLOBIN GLYCOSYLATED A1C: CPT

## 2022-08-17 PROCEDURE — 74011250636 HC RX REV CODE- 250/636: Performed by: INTERNAL MEDICINE

## 2022-08-17 PROCEDURE — C1894 INTRO/SHEATH, NON-LASER: HCPCS

## 2022-08-17 PROCEDURE — 05HM33Z INSERTION OF INFUSION DEVICE INTO RIGHT INTERNAL JUGULAR VEIN, PERCUTANEOUS APPROACH: ICD-10-PCS

## 2022-08-17 PROCEDURE — 99254 IP/OBS CNSLTJ NEW/EST MOD 60: CPT | Performed by: INTERNAL MEDICINE

## 2022-08-17 PROCEDURE — 5A1D70Z PERFORMANCE OF URINARY FILTRATION, INTERMITTENT, LESS THAN 6 HOURS PER DAY: ICD-10-PCS | Performed by: INTERNAL MEDICINE

## 2022-08-17 PROCEDURE — 74011250636 HC RX REV CODE- 250/636

## 2022-08-17 PROCEDURE — 86704 HEP B CORE ANTIBODY TOTAL: CPT

## 2022-08-17 PROCEDURE — 83970 ASSAY OF PARATHORMONE: CPT

## 2022-08-17 PROCEDURE — 77030013744

## 2022-08-17 PROCEDURE — 76937 US GUIDE VASCULAR ACCESS: CPT

## 2022-08-17 PROCEDURE — 36415 COLL VENOUS BLD VENIPUNCTURE: CPT

## 2022-08-17 PROCEDURE — 82728 ASSAY OF FERRITIN: CPT

## 2022-08-17 PROCEDURE — 74011636637 HC RX REV CODE- 636/637: Performed by: INTERNAL MEDICINE

## 2022-08-17 PROCEDURE — 93306 TTE W/DOPPLER COMPLETE: CPT

## 2022-08-17 PROCEDURE — 83540 ASSAY OF IRON: CPT

## 2022-08-17 PROCEDURE — 84100 ASSAY OF PHOSPHORUS: CPT

## 2022-08-17 PROCEDURE — 80048 BASIC METABOLIC PNL TOTAL CA: CPT

## 2022-08-17 PROCEDURE — 82962 GLUCOSE BLOOD TEST: CPT

## 2022-08-17 PROCEDURE — 85025 COMPLETE CBC W/AUTO DIFF WBC: CPT

## 2022-08-17 PROCEDURE — 77030018865 HC TRNSDCR PRSS MON EDWD -A

## 2022-08-17 PROCEDURE — C1892 INTRO/SHEATH,FIXED,PEEL-AWAY: HCPCS

## 2022-08-17 PROCEDURE — 86706 HEP B SURFACE ANTIBODY: CPT

## 2022-08-17 PROCEDURE — 0JH63XZ INSERTION OF TUNNELED VASCULAR ACCESS DEVICE INTO CHEST SUBCUTANEOUS TISSUE AND FASCIA, PERCUTANEOUS APPROACH: ICD-10-PCS

## 2022-08-17 PROCEDURE — 99152 MOD SED SAME PHYS/QHP 5/>YRS: CPT

## 2022-08-17 PROCEDURE — 90935 HEMODIALYSIS ONE EVALUATION: CPT

## 2022-08-17 PROCEDURE — 74011000250 HC RX REV CODE- 250

## 2022-08-17 PROCEDURE — 65270000046 HC RM TELEMETRY

## 2022-08-17 PROCEDURE — 99232 SBSQ HOSP IP/OBS MODERATE 35: CPT | Performed by: FAMILY MEDICINE

## 2022-08-17 PROCEDURE — C1750 CATH, HEMODIALYSIS,LONG-TERM: HCPCS

## 2022-08-17 PROCEDURE — 86803 HEPATITIS C AB TEST: CPT

## 2022-08-17 PROCEDURE — 36558 INSERT TUNNELED CV CATH: CPT

## 2022-08-17 RX ORDER — MIDAZOLAM HYDROCHLORIDE 1 MG/ML
INJECTION, SOLUTION INTRAMUSCULAR; INTRAVENOUS AS NEEDED
Status: DISCONTINUED | OUTPATIENT
Start: 2022-08-17 | End: 2022-08-17 | Stop reason: HOSPADM

## 2022-08-17 RX ORDER — HEPARIN SODIUM 1000 [USP'U]/ML
INJECTION, SOLUTION INTRAVENOUS; SUBCUTANEOUS AS NEEDED
Status: DISCONTINUED | OUTPATIENT
Start: 2022-08-17 | End: 2022-08-17 | Stop reason: HOSPADM

## 2022-08-17 RX ORDER — LIDOCAINE HYDROCHLORIDE 10 MG/ML
INJECTION, SOLUTION EPIDURAL; INFILTRATION; INTRACAUDAL; PERINEURAL AS NEEDED
Status: DISCONTINUED | OUTPATIENT
Start: 2022-08-17 | End: 2022-08-17 | Stop reason: HOSPADM

## 2022-08-17 RX ORDER — FENTANYL CITRATE 50 UG/ML
INJECTION, SOLUTION INTRAMUSCULAR; INTRAVENOUS AS NEEDED
Status: DISCONTINUED | OUTPATIENT
Start: 2022-08-17 | End: 2022-08-17 | Stop reason: HOSPADM

## 2022-08-17 RX ORDER — LOSARTAN POTASSIUM 50 MG/1
50 TABLET ORAL DAILY
Status: DISCONTINUED | OUTPATIENT
Start: 2022-08-18 | End: 2022-08-21

## 2022-08-17 RX ORDER — HEPARIN SODIUM 200 [USP'U]/100ML
INJECTION, SOLUTION INTRAVENOUS
Status: COMPLETED | OUTPATIENT
Start: 2022-08-17 | End: 2022-08-17

## 2022-08-17 RX ADMIN — HEPARIN SODIUM 5000 UNITS: 5000 INJECTION INTRAVENOUS; SUBCUTANEOUS at 21:40

## 2022-08-17 RX ADMIN — HEPARIN SODIUM 5000 UNITS: 5000 INJECTION INTRAVENOUS; SUBCUTANEOUS at 05:57

## 2022-08-17 RX ADMIN — SODIUM CHLORIDE, PRESERVATIVE FREE 10 ML: 5 INJECTION INTRAVENOUS at 21:40

## 2022-08-17 RX ADMIN — SODIUM CHLORIDE, PRESERVATIVE FREE 10 ML: 5 INJECTION INTRAVENOUS at 05:58

## 2022-08-17 RX ADMIN — Medication 2 UNITS: at 22:07

## 2022-08-17 NOTE — PROGRESS NOTES
[facility] Hospitalists  Progress Note    Patient: Arias Rivas Age: 75 y.o. : 1955 MR#: [id number] SSN: [id number]  Date: 2022     Subjective/24-hour events:     HD catheter placed this morning without difficulty. Patient seen on hemodialysis - no chest pain, shortness of breath or other plaints. Assessment:   CKD 5 now ESRD, HD dependent  Hypertension  DM2 with hyperglycemia  Chronic systolic and diastolic CHF, EF 37% with grade 2 DD by echo 2018, now 55 to 60% by echo 2020  Cocaine abuse - UDS positive on admission  Homelessness - patient living in shelter prior to admission    Plan:   Hemodialysis per nephrology. Outpatient hemodialysis already being worked on. Discussed with Dr. Kem Casper. Monitor BPs, adjust antihypertensive/cardiac regimen as necessary. Lantus/SSI, adjust as necessary to optimize glycemic control. Mobilize as tolerated. Early case management involvement for assistance with disposition given patient's need for dialysis and homelessness. Case discussed with:  [x]Patient  []Family  [x]Nursing  [x]Case Management  DVT Prophylaxis:  []Lovenox  [x]Hep SQ  []SCDs  []Coumadin   []On Heparin gtt    Objective:   VS: Visit Vitals  BP (!) 184/107   Pulse 75   Temp 97.5 °F (36.4 °C) (Temporal)   Resp 18   Ht 6' (1.829 m)   Wt 98.9 kg (218 lb)   SpO2 99%   BMI 29.57 kg/m²      Tmax/24hrs: Temp (24hrs), Av.9 °F (36.6 °C), Min:97.5 °F (36.4 °C), Max:98.6 °F (37 °C)    Intake/Output Summary (Last 24 hours) at 2022 1619  Last data filed at 2022 0929  Gross per 24 hour   Intake 0 ml   Output --   Net 0 ml       General:  In NAD. Nontoxic-appearing. Cardiovascular:  RRR. Pulmonary:  Lungs clear bilaterally, no wheezes. No accessory muscle use. GI:  Abdomen soft, NTTP. Extremities:  Warm, no edema or ischemia. Neuro:  Awake and alert.   Moves extremities spontaneously, motor grossly nonfocal.    Labs:    Recent Results (from the past 24 hour(s))   GLUCOSE, POC    Collection Time: 08/16/22  9:12 PM   Result Value Ref Range    Glucose (POC) 158 (H) 70 - 110 mg/dL   CBC WITH AUTOMATED DIFF    Collection Time: 08/17/22  2:57 AM   Result Value Ref Range    WBC 6.9 4.6 - 13.2 K/uL    RBC 3.37 (L) 4.35 - 5.65 M/uL    HGB 10.2 (L) 13.0 - 16.0 g/dL    HCT 30.3 (L) 36.0 - 48.0 %    MCV 89.9 78.0 - 100.0 FL    MCH 30.3 24.0 - 34.0 PG    MCHC 33.7 31.0 - 37.0 g/dL    RDW 13.9 11.6 - 14.5 %    PLATELET 053 271 - 422 K/uL    MPV 10.6 9.2 - 11.8 FL    NRBC 0.0 0  WBC    ABSOLUTE NRBC 0.00 0.00 - 0.01 K/uL    NEUTROPHILS 60 40 - 73 %    LYMPHOCYTES 23 21 - 52 %    MONOCYTES 10 3 - 10 %    EOSINOPHILS 6 (H) 0 - 5 %    BASOPHILS 1 0 - 2 %    IMMATURE GRANULOCYTES 1 (H) 0.0 - 0.5 %    ABS. NEUTROPHILS 4.1 1.8 - 8.0 K/UL    ABS. LYMPHOCYTES 1.6 0.9 - 3.6 K/UL    ABS. MONOCYTES 0.7 0.05 - 1.2 K/UL    ABS. EOSINOPHILS 0.4 0.0 - 0.4 K/UL    ABS. BASOPHILS 0.1 0.0 - 0.1 K/UL    ABS. IMM. GRANS. 0.0 0.00 - 0.04 K/UL    DF AUTOMATED     PHOSPHORUS    Collection Time: 08/17/22  2:57 AM   Result Value Ref Range    Phosphorus 5.3 (H) 2.5 - 4.9 MG/DL   PTH INTACT    Collection Time: 08/17/22  2:57 AM   Result Value Ref Range    Calcium 9.3 8.5 - 10.1 MG/DL    PTH, Intact 249.5 (H) 18.4 - 88.0 pg/mL   HEP B SURFACE AB    Collection Time: 08/17/22  2:57 AM   Result Value Ref Range    Hepatitis B surface Ab 289.10 >10.0 mIU/mL    Hep B surface Ab Interp. Positive POS      Hep B surface Ab comment        Samples with a  value of 10 mIU/mL or greater are considered positive (protective immunity) in accordance with the CDC guidelines. HEP B SURFACE AG    Collection Time: 08/17/22  2:57 AM   Result Value Ref Range    Hepatitis B surface Ag <0.10 <1.00 Index    Hep B surface Ag Interp. Negative NEG     HEPATITIS C AB    Collection Time: 08/17/22  2:57 AM   Result Value Ref Range    Hepatitis C virus Ab >11.00 (H) <0.80 Index    Hep C virus Ab Interp.  Positive (A) NEG Hep C  virus Ab comment         HEMOGLOBIN A1C WITH EAG    Collection Time: 08/17/22  2:57 AM   Result Value Ref Range    Hemoglobin A1c 6.8 (H) 4.2 - 5.6 %    Est. average glucose 148 mg/dL   IRON PROFILE    Collection Time: 08/17/22  2:57 AM   Result Value Ref Range    Iron 45 (L) 50 - 175 ug/dL    TIBC 208 (L) 250 - 450 ug/dL    Iron % saturation 22 20 - 50 %   FERRITIN    Collection Time: 08/17/22  2:57 AM   Result Value Ref Range    Ferritin 163 8 - 656 NG/ML   METABOLIC PANEL, BASIC    Collection Time: 08/17/22  2:57 AM   Result Value Ref Range    Sodium 141 136 - 145 mmol/L    Potassium 3.9 3.5 - 5.5 mmol/L    Chloride 112 (H) 100 - 111 mmol/L    CO2 21 21 - 32 mmol/L    Anion gap 8 3.0 - 18 mmol/L    Glucose 90 74 - 99 mg/dL     (H) 7.0 - 18 MG/DL    Creatinine 5.62 (H) 0.6 - 1.3 MG/DL    BUN/Creatinine ratio 19 12 - 20      GFR est AA 12 (L) >60 ml/min/1.73m2    GFR est non-AA 10 (L) >60 ml/min/1.73m2    Calcium 9.2 8.5 - 10.1 MG/DL   GLUCOSE, POC    Collection Time: 08/17/22  8:27 AM   Result Value Ref Range    Glucose (POC) 154 (H) 70 - 110 mg/dL   ECHO ADULT COMPLETE    Collection Time: 08/17/22 10:13 AM   Result Value Ref Range    IVSd 1.1 (A) 0.6 - 1.0 cm    LVIDd 5.5 4.2 - 5.9 cm    LVIDs 4.0 cm    LVOT Diameter 2.1 cm    LVPWd 1.2 (A) 0.6 - 1.0 cm    LVOT Peak Gradient 6 mmHg    LVOT Mean Gradient 3 mmHg    LVOT SV 92.8 ml    LVOT Peak Velocity 1.2 m/s    LVOT VTI 26.8 cm    RV Free Wall Peak S' 13 cm/s    LA Volume A/L 73 mL    LA Volume 2C 82 (A) 18 - 58 mL    LA Volume 4C 55 18 - 58 mL    MV A Velocity 0.85 m/s    MV E Wave Deceleration Time 146.3 ms    MV E Velocity 0.72 m/s    LV E' Lateral Velocity 7 cm/s    LV E' Septal Velocity 8 cm/s    TAPSE 2.7 1.7 cm    Aortic Root 3.7 cm    Fractional Shortening 2D 27 28 - 44 %    LVIDd Index 2.49 cm/m2    LVIDs Index 1.81 cm/m2    LV RWT Ratio 0.44     LV Mass 2D 257.0 (A) 88 - 224 g    LV Mass 2D Index 116.3 (A) 49 - 115 g/m2    MV E/A 0.85 E/E' Ratio (Averaged) 9.64     E/E' Lateral 10.29     E/E' Septal 9.00     LA Volume Index A/L 33 16 - 34 mL/m2    LVOT Stroke Volume Index 42.0 mL/m2    LVOT Area 3.5 cm2    LA Volume Index 2C 37 (A) 16 - 34 mL/m2    LA Volume Index 4C 25 16 - 34 mL/m2    Ao Root Index 1.67 cm/m2       Signed By: Shilpa Subramanian MD     August 17, 2022

## 2022-08-17 NOTE — PROGRESS NOTES
TRANSFER - OUT REPORT:    Verbal report given to Dialysis(name) on Arias Rivas  being transferred to dialysis(unit) for routine progression of care       Report consisted of patients Situation, Background, Assessment and   Recommendations(SBAR). Information from the following report(s) SBAR, Procedure Summary, Intake/Output, and MAR was reviewed with the receiving nurse. Lines:   Peripheral IV 08/16/22 Left Antecubital (Active)   Site Assessment Clean, dry, & intact 08/16/22 0814   Phlebitis Assessment 0 08/16/22 0814   Infiltration Assessment 0 08/16/22 0814   Dressing Status Clean, dry, & intact 08/16/22 0814   Dressing Type 4 X 4 08/16/22 4701        Opportunity for questions and clarification was provided.       Patient transported with:   [facility]

## 2022-08-17 NOTE — PROGRESS NOTES
Patient is off to the floor (dialysis). Patient is not available to be assessed at this time.       Katherine Mann [phone]

## 2022-08-17 NOTE — PROGRESS NOTES
TRANSFER - IN REPORT:    Verbal report received from Jonathan Drake 855, RAKAN(name) on [employer]  being received from cath lab(unit) for routine post - op      Report consisted of patients Situation, Background, Assessment and   Recommendations(SBAR). Information from the following report(s) SBAR, Procedure Summary, Intake/Output, and MAR was reviewed with the receiving nurse. Opportunity for questions and clarification was provided. Assessment completed upon patients arrival to unit and care assumed.

## 2022-08-17 NOTE — PROGRESS NOTES
Pt arrived to suite stable, in no distress.   Hemodialysis today for 2.5 hours   Net removal of 1500, tolerated well  PCT accessed right side CVC      Post HD  Pt stable, no complaints at this time   Report given to Primary RNGERARDO         GENERAL ASSESSMENT:    LUNGS:  Resp Rate 18   [x] Clear  [] Coarse  [] Crackles  [] Wheezing  [] Diminished                                                           [] RLL   [] LLL  [] RUL   [] AZUCENA            Respirations:  [x]Easy  []Labored  []N/A  Cough:  []Productive  []Dry  []N/A               Therapy:  [x]RA   [] Ventilated   [] Intubated   [] Trach            O2 Device:  [] NC   [] NRB  [] Trach Mask  [] BiPaP  Flow:   l/min                                                    CARDIAC: [x] Regular      [] Irregular   [] Rhythm:          [] Monitored   [] Bedside   [] Remotely monitored       EDEMA: [x] None   []Generalized  [] Pitting [] 1+   [] 2 +   [] 3+    [] 4+        SKIN:   [] Hot     [] Cold    [x] Warm   [x] Dry    [] Diaphoretic                 [] Flushed  [] Jaundiced  [] Cyanotic  [] Pale      LOC:    [x] Alert      [x]Oriented:    [x] Person     [x] Place   [x]Time               [] Confused  [] Lethargic  [] Medicated  [] Non-responsive  [] Non-Verbal     GI / ABDOMEN:                     [] Flat    [] Distended    [x] Soft    [] Firm   []  Obese                   [] Diarrhea   [] FMS [] Bowel Sounds  [] Nausea  [] Vomiting                   [] NGT  [] OGT  [] PEG  [] Tube Feedings @     mL/hr     / URINE ASSESSMENT:                   [] Voiding    [x] Oliguria  [] Anuria                     []  Light   [] Incontinent  []  Incontinent Brief   []  PureWick     PAIN:  [x] 0 []1  []2   []3   []4   []5   []6   []7   []8   []9   []10                MOBILITY:  [x] Bed    [] Stretcher      All Vitals and Treatment Details on Attached Flowsheet

## 2022-08-17 NOTE — PROGRESS NOTES
INTERVENTION:  HEMODYNAMIC STABILIZATION  MAINTAIN BP WNL WHILE ON HD. INTERVENTION:  FLUID MANAGEMENT  WILL ATTEMPT 2000 ML TOTAL FLUID REMOVAL AS TOLERATED. INTERVENTION:  METABOLIC/ELECTROLYTE MANAGEMENT  3.0 POTASSIUM 2.5 CALCIUM DIALYSATE USED WITH HD TODAY. INTERVENTION:  HEMODIALYSIS ACCESS SITE MANAGEMENT  Right side CVC ACCESSED  USING ASEPTIC TECHNIQUE. GOAL:  SIGNS AND SYMPTOMS OF LISTED POTENTIAL PROBLEMS WILL BE ABSENT OR MANAGEABLE. OUTCOME:  PROGRESSING. HD PLANNED FOR 2.5 HOURS TODAY.

## 2022-08-17 NOTE — PROGRESS NOTES
Problem: Falls - Risk of  Goal: *Absence of Falls  Description: Document [place] Fall Risk and appropriate interventions in the flowsheet. Outcome: Progressing Towards Goal  Note: Fall Risk Interventions:  Mobility Interventions: Patient to call before getting OOB, Assess mobility with egress test         Medication Interventions: Teach patient to arise slowly, Patient to call before getting OOB, Evaluate medications/consider consulting pharmacy    Elimination Interventions: Call light in reach, Stay With Me (per policy), Toilet paper/wipes in reach, Urinal in reach, Toileting schedule/hourly rounds, Patient to call for help with toileting needs              Problem: Diabetes Self-Management  Goal: *Disease process and treatment process  Description: Define diabetes and identify own type of diabetes; list 3 options for treating diabetes.   Outcome: Progressing Towards Goal

## 2022-08-17 NOTE — PROGRESS NOTES
Progress Note    Herb Kirby  75 y.o. Admit Date: 8/16/2022  Active Problems:    Type 2 diabetes mellitus with insulin therapy ([facility] (7/6/2015) POA: Yes      Homelessness (10/19/2015) POA: Yes      CHF (congestive heart failure) ([facility] (11/30/2015) POA: Unknown      Systolic CHF, chronic ([facility] (7/10/2016) POA: Yes      Elevated serum creatinine (8/10/2016) POA: Unknown      Hypertension (1/1/2000) POA: Yes      ESRD (end stage renal disease) ([facility] (8/16/2022) POA: Unknown      Substance abuse ([facility] (8/16/2022) POA: Unknown      Anemia (8/16/2022) POA: Unknown      End stage renal disease ([facility] (8/16/2022) POA: Unknown      Pericardial effusion (8/16/2022) POA: Unknown      Hyperglycemia (8/16/2022) POA: Unknown      CKD (chronic kidney disease) stage 5, GFR less than 15 ml/min ([facility]) (8/16/2022) POA: Unknown            Subjective:     Patient feels good, has Right IJ TDC. No SOB. ReviewedUS,Echocardiogram report      A comprehensive review of systems was negative except for that written in the History of Present Illness.     Objective:     Visit Vitals  BP (!) 163/101   Pulse 75   Temp 97.3 °F (36.3 °C) (Temporal)   Resp 18   Ht 6' (1.829 m)   Wt 98.9 kg (218 lb)   SpO2 99%   BMI 29.57 kg/m²         Intake/Output Summary (Last 24 hours) at 8/17/2022 1642  Last data filed at 8/17/2022 1614  Gross per 24 hour   Intake 0 ml   Output 1500 ml   Net -1500 ml       Current Facility-Administered Medications   Medication Dose Route Frequency Provider Last Rate Last Admin    [START ON 8/18/2022] losartan (COZAAR) tablet 50 mg  50 mg Oral DAILY Charisma Zelaya MD        [START ON 8/18/2022] B complex-vitaminC-folic acid (NEPHROCAP) cap  1 Capsule Oral DAILY Charisma Zelaya MD        insulin lispro (HUMALOG) injection   SubCUTAneous AC&HS Anjum Camp MD   2 Units at 08/16/22 2208    glucose chewable tablet 16 g  4 Tablet Oral PRN Anjum Camp MD        glucagon (GLUCAGEN) injection 1 mg  1 mg IntraMUSCular PRN Georgette Gentleman, MD Jones        dextrose 10% infusion 0-250 mL  0-250 mL IntraVENous PRN Keith Perez MD        [facility] [place] by provider] aspirin tablet 325 mg  325 mg Oral DAILY Keith Perez MD        [facility]) capsule 0.4 mg  0.4 mg Oral DAILY Keith Perez MD        sodium chloride (NS) flush 5-40 mL  5-40 mL IntraVENous Q8H Keith Perez MD   10 mL at 08/17/22 0558    sodium chloride (NS) flush 5-40 mL  5-40 mL IntraVENous PRN Keith Perez MD        acetaminophen (TYLENOL) tablet 650 mg  650 mg Oral Q6H PRN Keith Perez MD        Or    acetaminophen (TYLENOL) suppository 650 mg  650 mg Rectal Q6H PRN Keith Perez MD        polyethylene glycol (MIRALAX) packet 17 g  17 g Oral DAILY PRN Keith Perez MD        promethazine (PHENERGAN) tablet 12.5 mg  12.5 mg Oral Q6H PRN Keith Perez MD        Or    ondansetron [facility]) injection 4 mg  4 mg IntraVENous Q6H PRN Keith Perez MD        heparin (porcine) injection 5,000 Units  5,000 Units SubCUTAneous Q8H Keith Perez MD   5,000 Units at 08/17/22 0557        Physical Exam:     Physical Exam:   General:  Alert, cooperative, no distress, appears stated age. Eyes:  Conjunctivae/corneas clear. PERRL, EOMs intact. Mouth/Throat: Lips, mucosa, and tongue normal. Teeth and gums normal.   Neck: Supple, symmetrical, trachea midline, no adenopathy, thyroid: no enlargement/tenderness/nodules, no carotid bruit and no JVD. Lungs:   Clear to auscultation bilaterally. Heart:  Regular rate and rhythm, S1, S2 normal, no murmur, click, rub or gallop. Abdomen:   Soft, non-tender. Bowel sounds normal. No masses,  No organomegaly. Extremities: Extremities normal, atraumatic, no cyanosis or edema   Pulses: 2+ and symmetric all extremities.    Skin: Skin color, texture, turgor normal. No rashes or lesions       Data Review:    CBC w/Diff    Recent Labs     08/17/22  0257 08/16/22  0735   WBC 6.9 6.9   RBC 3.37* 3.29*   HGB 10.2* 9.9*   HCT 30.3* 29.3*   MCV 89.9 89.1   MCH 30.3 30.1   MCHC 33.7 33.8   RDW 13.9 13.9    Recent Labs     08/17/22 0257 08/16/22  0735   MONOS 10 10   EOS 6* 5   BASOS 1 1   RDW 13.9 13.9        Comprehensive Metabolic Profile    Recent Labs     08/17/22 0257 08/16/22  0735    140   K 3.9 4.0   * 112*   CO2 21 20*   * 103*   CREA 5.62* 6.23*    Recent Labs     08/17/22 0257 08/16/22  0735   CA 9.3  9.2 8.8   PHOS 5.3*  --    ALB  --  3.5   TP  --  8.0   TBILI  --  0.6        Result Information    Status: Final result (Exam End: 8/17/2022 09:19) Provider Status: Open       US RETROPERITONEUM COMP: Patient Communication     Released  Not seen     Study Result    Narrative & Impression   EXAM: US RETROPERITONEUM COMP     CLINICAL INDICATION/HISTORY: 75 years Male. ckd   ADDITIONAL HISTORY: None     COMPARISON: Abdominal ultrasound 11/17/2014     TECHNIQUE: Ultrasound of the bilateral kidneys with grayscale and color Doppler. FINDINGS:     RIGHT KIDNEY:  Partial obscuration of the lower pole of the kidney due to shadowing from bowel  gas. Size: 11.6 cm in length  Cortex: Mild diffuse cortical thinning. Cortical echogenicity appears within  normal limits. Hydronephrosis: None identified  Calculus: None detected  Cyst/mass: None detected        LEFT KIDNEY:  Size: 10.6 cm in length  Cortex: Diffuse cortical thinning. Echogenicity appears within normal limits. Hydronephrosis: None identified  Calculus: None detected  Cyst/mass: None detected     URINARY BLADDER:  Mural thickness: Unremarkable   Filling defects: None detected  Ureteral jets: Not evaluated  Other: Mild to moderate urinary bladder distention. OTHER: Estimated prostate volume: 17 cc     IMPRESSION  1. Diffuse bilateral renal cortical thinning, suggestive of chronic renal  disease. 2.  Mild to moderate urinary bladder distention.      CHF / Cardiomyopathy     Vitals    Weight Height BSA (calculated - sq m) BP Pulse (Heart Rate)   98.9 kg (218 lb) 6' (1.829 m) 2.24 sq meters 154/72      Interpretation Summary    Result status: Final result       Left Ventricle: Normal left ventricular systolic function with a visually estimated EF of 55 - 60%. Left ventricle size is normal. Increased wall thickness. Findings consistent with mild concentric hypertrophy. Normal wall motion. Diastolic dysfunction present with normal LV EF. Right Ventricle: Right ventricle is mildly dilated. Normal wall thickness. Mitral Valve: Mildly thickened leaflet. Mild regurgitation. Right Atrium: Right atrium is dilated. Pericardium: Small (<1 cm) circumferential pericardial effusion present. No indication of cardiac tamponade. Comparison Study Information      Prior Study    There is a prior study available for comparison. Prior study date: 4/11/2018. As compared to the previous study, there are significant changes. LV function has increased. Impression:       Active Hospital Problems    Diagnosis Date Noted    ESRD (end stage renal disease) ([facility] 08/16/2022    Substance abuse ([facility] 08/16/2022    Anemia 08/16/2022    [facility] 08/16/2022    Pericardial effusion 08/16/2022    Hyperglycemia 08/16/2022    CKD (chronic kidney disease) stage 5, GFR less than 15 ml/min ([facility]) 08/16/2022    Elevated serum creatinine 76/33/7694    Systolic CHF, chronic ([facility] 07/10/2016    CHF (congestive heart failure) ([facility] 11/30/2015    Homelessness 10/19/2015    Type 2 diabetes mellitus with insulin therapy ([facility] 07/06/2015    Hypertension 01/01/2000            Plan:   Dialysis   for 2 & 1/2 hours today,tomorrow 3 hours, restart ARB, no Beta Blocker. ,await for Chest X-ray, Hep B antigen ,antibody, core antibody result . OP dialysis placement work up in Progress.       Yelitza Vasques MD

## 2022-08-17 NOTE — PROGRESS NOTES
Patient not in his room. Waiting for dialysis catheter placement. Once being done we will start hemodialysis today. Labs report reviewed. Hemodynamically stable. .  Already initiated for placement for outpatient dialysis unit and work-up is in progress. Kenna Russell

## 2022-08-17 NOTE — DIALYSIS
ACUTE HEMODIALYSIS FLOW SHEET    HEMODIALYSIS ORDERS: Physician: Dr. Fraga Fairly: [facility]   Duration: 2.5 hr   BFR: 300   DFR: 600   Dialysate:  Temp 36-37*C   K+  2    Ca+ 3.0   Na 138   Bicarb 35   Wt Readings from Last 1 Encounters:   08/17/22 98.9 kg (218 lb)    Patient Chart [x]   Unable to Obtain []  Dry weight/UF Goal: 1500 ml    Heparin []  Bolus    Units    [] Hourly    Units    [x]None       Pre BP: 173/92  Pulse: 75  Respirations: 18 Temp: 97.5 temporal  [] Oral  [] Ax  [] Esoph   Labs: []  Pre  []  Post:   [x] N/A   Additional Orders (medications, blood products, hypotension management): [] Yes   [x] No     [x]  [facility] Consent Verified     CATHETER ACCESS:  []N/A   [x]Right   []Left   []IJ   []Fem  [x]Chest wall  []TransHepatic   [] First use X-ray verified     [x]Tunnel    [] Non Tunneled   [x]No S/S infection  []Redness  []Drainage []Cultured []Swelling []Pain   [x]Medical Aseptic Prep Utilized   []Dressing Changed  [] Biopatch  Date:    []Clotted   [x]Patent   Flows: [x]Good  []Poor  []Reversed   If access problem,  notified: []Yes    [x]N/A        GRAFT/FISTULA ACCESS:   [x]N/A                       Hospital: [facility]          Room # CCL/PL    [x] Routine         [] 1st Time Acute/Chronic   [] Urgent      [] Stat            [x] Acute Room   []  Bedside    [] ICU/CCU     [] ER     Isolation Precautions:  [x] Dialysis    There are currently no Active Isolations     ALLERGIES:     Allergies   Allergen Reactions    Tylenol [Acetaminophen] Other (comments)     Pt was advised not to take Tylenol per Dr. Ron Estrada Status:  Full Code     Hepatitis Status      Lab Results   Component Value Date/Time    Hepatitis A, IgM NEGATIVE  08/09/2014 08:40 AM    Hepatitis B surface Ag <0.10 08/17/2022 02:57 AM    Hepatitis B surface Ab 289.10 08/17/2022 02:57 AM    Hepatitis B core, IgM NEGATIVE  08/09/2014 08:40 AM    Hepatitis C virus Ab >11.00 (H) 08/17/2022 02:57 AM        Current Labs:      Lab Results   Component Value Date/Time    WBC 6.9 08/17/2022 02:57 AM    HGB 10.2 (L) 08/17/2022 02:57 AM    HCT 30.3 (L) 08/17/2022 02:57 AM    PLATELET 504 89/27/3168 02:57 AM    MCV 89.9 08/17/2022 02:57 AM     Lab Results   Component Value Date/Time    Sodium 141 08/17/2022 02:57 AM    Potassium 3.9 08/17/2022 02:57 AM    Chloride 112 (H) 08/17/2022 02:57 AM    CO2 21 08/17/2022 02:57 AM    Anion gap 8 08/17/2022 02:57 AM    Glucose 90 08/17/2022 02:57 AM     (H) 08/17/2022 02:57 AM    Creatinine 5.62 (H) 08/17/2022 02:57 AM    BUN/Creatinine ratio 19 08/17/2022 02:57 AM    GFR est AA 12 (L) 08/17/2022 02:57 AM    GFR est non-AA 10 (L) 08/17/2022 02:57 AM    Calcium 9.3 08/17/2022 02:57 AM    Calcium 9.2 08/17/2022 02:57 AM          DIET:  DIET NPO     PRIMARY NURSE REPORT:   Pre Dialysis: Gonzalez    Time: 1300      EDUCATION:    [x] Patient           Knowledge Basis: []None []Minimal [x] Substantial [] Unknown  Barriers to learning  [x]None  [] Intubated/Trached/Ventilated  [] Sedated/Paralyzed   [x] Access Care     [] S&S of infection  [] Fluid Management  [] K+   [x] Procedural    [] Medications   [] Tx Options   [] Transplant   [] Diet      Teaching Tools:  [x] Explain  [] Demo  [] Handouts [] Video  Patient response: [x] Verbalized understanding   [] Requires follow up        [x] Time Out/Safety Check    [x] Extracorporeal Circuit Tested for integrity       RO/HEMODIALYSIS MACHINE SAFETY CHECKS - Before each treatment:        [facility]                                    [x] Unit Machine # 7 with centralized RO                                  [] Portable Machine #1/RO serial # [id number]                                  [] Portable Machine #2/RO serial # [id number]                                  [] Portable Machine #4/RO serial # [id number]                                  [] Portable Machine #10/RO serial # [id number] Alarm Test:  Pass time 8440            [x] RO/Machine Log Complete    Machine Temp    36-37*C             Dialysate: pH  7.4    Conductivity: Meter 14.0    HD Machine  7     TCD: 13.9  Dialyzer Lot # [id number]     Blood Tubing Lot # [id number]     Saline Lot # [id number]     CHLORINE TESTING-Before each treatment and every 4 hours    Total Chlorine: [x] less than 0.1 ppm  Initial Time Check: 1300       4 Hr/2nd Check Time: 1600   (if greater than 0.1 ppm from Primary then every 30 minutes from Secondary)     TREATMENT INITIATION - with Dialysis Precautions:   [x] All Connections Secured              [x] Saline Line Double Clamped   [x] Venous Parameters Set               [x] Arterial Parameters Set    [x] Prime Given 250ml NSS              [x]Air Foam Detector Engaged        Treatment Initiation Note:  SEE HD RN NOTES    During Treatment Notes:  4418  Face & Vascular access visible with art and thong line connections intact. Pt tolerating dialysis. 1339  Face & Vascular access visible with art and thong line connections intact. Pt tolerating dialysis. 1345  Face & Vascular access visible with art and thong line connections intact. Pt tolerating dialysis. 1400  Face & Vascular access visible with art and thong line connections intact. Pt tolerating dialysis. 1415  Face & Vascular access visible with art and thong line connections intact. Pt tolerating dialysis. 1430  Face & Vascular access visible with art and thong line connections intact. Pt tolerating dialysis. 1445  Face & Vascular access visible with art and thong line connections intact. Pt tolerating dialysis. 1500  Face & Vascular access visible with art and thong line connections intact. Pt tolerating dialysis. 1515  Face & Vascular access visible with art and thong line connections intact. Pt tolerating dialysis. 1530  Face & Vascular access visible with art and thong line connections intact. Pt tolerating dialysis.   1545  Face & Vascular access visible with art and thong line connections intact. Pt tolerating dialysis. 1600  Face & Vascular access visible with art and thong line connections intact. Pt tolerating dialysis. 1620  Dialysis treatment complete.            Post Assessment  Dialyzer Cleared:   [x] Good  [] Fair  [] Poor  Blood processed:  43.9 L  UF Removed:  1500 Ml    Post BP: 163/101  Pulse: 75  Respirations: 18   Temp: 97.3 temporal  [] Oral  [] Ax  [] Esophageal   Lungs: [] Clear                [x] No change from initial assessment   Post Tx Vascular Access: [x] N/A     Cardiac:  [x] Regular   [] Irregular   Rhythm:  [] Monitored   [x] Not Monitored    CVC Catheter: [] N/A  Locking solution: Heparin 1:1000 U  Arterial port 1.6 ml   Venous port 1.6 ml   Edema:  [x] None  [] Generalized                     Skin:[] Warm  [x] Dry [] Diaphoretic               [] Flushed  [] Pale [] Cyanotic Pain:  [x]0  []1-2  []3-4  []5-6   []7-8  []9-10         Post Treatment Note:   SEE HD RN NOTES     POST TREATMENT PRIMARY NURSE HANDOFF REPORT:   Post Dialysis: Matias Keller RN        Time:  1630       Abbreviations: AVG-arterial venous graft, AVF-arterial venous fistula, IJ-Internal Jugular, Subcl-Subclavian, Fem-Femoral, Tx-treatment, AP/HR-apical heart rate, VSS- Vital Signs Stable, CVC- Central Venous Catheter, DFR-dialysate flow rate, BFR-blood flow rate, AP-arterial pressure, -venous pressure, UF-ultrafiltrate, TMP-transmembrane pressure, Thong-Venous, Art-Arterial, RO-Reverse Osmosis

## 2022-08-17 NOTE — CONSULTS
Cardiology Initial Patient Referral Note    Cardiology referral request from Dr. Kannan Sierra for evaluation and management/treatment of chronic HFrEF    Date of  Admission: 8/16/2022  7:33 AM   Primary Care Physician:  None    Attending Cardiologist: Dr. Amalia Bright    Seen and independently examined. Agree with below with the following comments: Patient with a history of a nonischemic cardiomyopathy. He was admitted for volume overload and worsening renal dysfunction now end-stage requiring hemodialysis. Repeat echocardiogram done this morning shows significant improvement in his overall LV function. EF now normal at 55%. He had no valvular heart disease. He had a small circumferential pericardial effusion without hemodynamic compromise. This is likely uremic in nature. I suspect this will improve once hemodialysis is initiated. His blood pressure is only mildly elevated, so I would not start blood pressure lowering until after he is dialyzed for several days. No additional cardiac testing or work-up needed now that his LV function has returned to normal.  He was advised to abstain from cocaine and other drug use. We will be available if additional questions arise. Jo Reyes MD   Assessment:     -Presented due to abdominal pain, CT A/P negative for significant finding. -ESRD, pending initiation of HD  -Chronic HFrEF/nonischemic cardiomyopathy  Echo 04/2018: dilated LV with EF 25% (decreased from 35% in 07/2016), grade 2 diastolic dysfunction, moderately dilated LA, moderate mitral regurgitation  Cardiac cath 07/2016 with mild, hemodynamically insignificant CAD (at most 30% ostial OM2/OM3 disease)  -HTN  -DMII, A1c 6.8 with  on 8/17/2022  -Recent cocaine use  -Hx tobacco/EtOH use, reports quit both 4 years ago  -FHx negative for heart disease    Remotely followed by Dr. Marcos Barragan, last seen 06/2019     Plan:     -Echocardiogram being completed this AM, will review results as able.     -Will check CXR for completeness.  -Outpatient medications reviewed, pt currently prescribed Coreg 25 mg BID, Losartan 100 mg daily, Amlodipine 10 mg daily, Lasix 80 mg BID, Hydralazine 25 mg BID, Mioxidil 2.5 mg BID.   -Pt needs to abstain from cocaine use, he expresses understanding and agreement. Reports he smoked crack cocaine approx. 4 days ago. History of Present Illness: This is a 75 y.o. male admitted for End stage renal disease ([facility] [N18.6]  Elevated serum creatinine [R79.89]  Hyperglycemia [R73.9]  Pericardial effusion [I31.3]  CKD (chronic kidney disease) stage 5, GFR less than 15 ml/min ([facility]) [N18.5]  CHF (congestive heart failure) ([facility] [I50.9]. Patient complains of: abdominal pain      Alfonso Tolentino is a 75 y.o. male who presented to the hospital due to intermittent sharp R flank pain x 2-3 days, rated as 8/10 on presentation, occurring with movement, resolved at rest.  No associated N/V. Pt was recently released from long term on 8/5/2022. He reports chronic exertional shortness of breath, unchanged from baseline. Denies chest pain. Notes swelling in his legs when he is on his feet, currently improved. Reports he makes urine, although less than he used to.     Cardiac risk factors: diabetes mellitus, male gender, hypertension, known nonischemic cardiomyopathy      Review of Symptoms:  Except as stated above include:  Constitutional:  negative  Respiratory:  negative  Cardiovascular:  negative  Gastrointestinal: negative  Genitourinary:  As per HPI  Musculoskeletal:  Negative  Neurological:  Negative  Dermatological:  Negative  Endocrinological: Negative  Psychological:  Negative     Past Medical History:     Past Medical History:   Diagnosis Date    Cardiac LV ejection fraction 30-35% 7/15/15    Cardiomyopathy ([facility] 7/15/15    35% per ECHO    Cocaine use 8/9/14    + UDS, persistent use    Diabetes ([facility] 8/9/2014    8.1 hgbA1C 8/9/14    Heart failure ([facility]     Hepatitis C infection 8/20/14 Genotype 1A    History of cocaine abuse ([facility] 8/10/2016    History of heroin abuse ([facility] 8/10/2016    Homelessness 2015    Hypertension 2000         Social History:     Social History     Socioeconomic History    Marital status: SINGLE   Tobacco Use    Smoking status: Former     Packs/day: 0.30     Types: Cigarettes     Quit date: 2016     Years since quittin.1    Smokeless tobacco: Never   Substance and Sexual Activity    Alcohol use: Not Currently     Alcohol/week: 0.0 standard drinks     Comment: started heavy drinking at teen till in Aug 2014 but has gone into remission since then    Drug use: No     Types: Cocaine, Marijuana, Heroin     Comment: 2 x heroin experimental, smoked Cocaine with last 7/27/15    Sexual activity: Never     Partners: Female   Other Topics Concern     Service No    Blood Transfusions No    Caffeine Concern Yes    Occupational Exposure No    Hobby Hazards No    Sleep Concern No    Stress Concern No    Weight Concern No    Special Diet No    Back Care No    Exercise No    Bike Helmet No    Seat Belt Yes        Family History:     Family History   Problem Relation Age of Onset    Liver Disease Father     Diabetes Mother     Cancer Maternal Grandmother     No Known Problems Sister     Diabetes Brother     No Known Problems Brother     No Known Problems Sister     No Known Problems Sister         Medications:      Allergies   Allergen Reactions    Tylenol [Acetaminophen] Other (comments)     Pt was advised not to take Tylenol per DrLesvia        Current Facility-Administered Medications   Medication Dose Route Frequency    insulin lispro (HUMALOG) injection   SubCUTAneous AC&HS    glucose chewable tablet 16 g  4 Tablet Oral PRN    glucagon (GLUCAGEN) injection 1 mg  1 mg IntraMUSCular PRN    dextrose 10% infusion 0-250 mL  0-250 mL IntraVENous PRN    [Held by provider] aspirin tablet 325 mg  325 mg Oral DAILY    tamsulosin (FLOMAX) capsule 0.4 mg  0.4 mg Oral DAILY    sodium chloride (NS) flush 5-40 mL  5-40 mL IntraVENous Q8H    sodium chloride (NS) flush 5-40 mL  5-40 mL IntraVENous PRN    acetaminophen (TYLENOL) tablet 650 mg  650 mg Oral Q6H PRN    Or    acetaminophen (TYLENOL) suppository 650 mg  650 mg Rectal Q6H PRN    polyethylene glycol (MIRALAX) packet 17 g  17 g Oral DAILY PRN    promethazine (PHENERGAN) tablet 12.5 mg  12.5 mg Oral Q6H PRN    Or    ondansetron (ZOFRAN) injection 4 mg  4 mg IntraVENous Q6H PRN    heparin (porcine) injection 5,000 Units  5,000 Units SubCUTAneous Q8H         Physical Exam:   Visit Vitals  BP (!) 151/74 (BP 1 Location: Left upper arm, BP Patient Position: At rest)   Pulse 77   Temp 98.6 °F (37 °C)   Resp 16   Ht 6' (1.829 m)   Wt 98.9 kg (218 lb)   SpO2 95%   BMI 29.57 kg/m²       BP Readings from Last 3 Encounters:   08/17/22 (!) 151/74   12/30/21 (!) 148/82   06/18/19 (!) 188/101     Pulse Readings from Last 3 Encounters:   08/17/22 77   12/30/21 84   06/18/19 76     Wt Readings from Last 3 Encounters:   08/16/22 98.9 kg (218 lb)   06/18/19 93.4 kg (206 lb)   04/18/19 90.3 kg (199 lb)       General:  alert, cooperative, no distress, appears stated age  Neck:  supple  Lungs:  clear to auscultation bilaterally  Heart:  regular rate and rhythm  Abdomen:  abdomen is soft without significant tenderness, masses, organomegaly or guarding  Extremities:  atraumatic, trace edema  Skin: Warm and dry.    Neuro: alert, oriented x3, affect appropriate, no focal neurological deficits, moves all extremities well, no involuntary movements  Psych: non focal     Data Review:     Recent Labs     08/17/22  0257 08/16/22  0735   WBC 6.9 6.9   HGB 10.2* 9.9*   HCT 30.3* 29.3*    250     Recent Labs     08/17/22  0257 08/16/22  0735    140   K 3.9 4.0   * 112*   CO2 21 20*   GLU 90 143*   * 103*   CREA 5.62* 6.23*   CA 9.2  9.3 8.8   PHOS 5.3*  --    ALB  --  3.5   ALT  --  20       Results for orders placed or performed during the hospital encounter of 08/16/22   EKG, 12 LEAD, INITIAL   Result Value Ref Range    Ventricular Rate 73 BPM    Atrial Rate 73 BPM    P-R Interval 144 ms    QRS Duration 86 ms    Q-T Interval 420 ms    QTC Calculation (Bezet) 462 ms    Calculated P Axis 40 degrees    Calculated R Axis -46 degrees    Calculated T Axis 101 degrees    Diagnosis       Normal sinus rhythm  Left anterior fascicular block  Cannot rule out Inferior infarct , age undetermined  Non-specific ST/T wave changes  Abnormal ECG  When compared with ECG of 10-APR-2018 23:54,  T wave inversion no longer evident in Anterior leads  Confirmed by Bart Landry MD, Adair Mckee ([id number]) on 8/16/2022 12:25:47 PM         All Cardiac Markers in the last 24 hours:  No results found for: CPK, CK, CKMMB, CKMB, RCK3, CKMBT, CKNDX, CKND1, BRAYAN, TROPT, TROIQ, DEVONTE, TROPT, TNIPOC, BNP, BNPP    Last Lipid:    Lab Results   Component Value Date/Time    Cholesterol, total 139 04/12/2018 02:44 AM    HDL Cholesterol 65 (H) 04/12/2018 02:44 AM    LDL, calculated 47.6 04/12/2018 02:44 AM    Triglyceride 132 04/12/2018 02:44 AM    CHOL/HDL Ratio 2.1 04/12/2018 02:44 AM       Cardiographics:     EKG Results       Procedure 720 Value Units Date/Time    EKG, 12 LEAD, INITIAL [[id number]] Collected: 08/16/22 0733    Order Status: Completed Updated: 08/16/22 1225     Ventricular Rate 73 BPM      Atrial Rate 73 BPM      P-R Interval 144 ms      QRS Duration 86 ms      Q-T Interval 420 ms      QTC Calculation (Bezet) 462 ms      Calculated P Axis 40 degrees      Calculated R Axis -46 degrees      Calculated T Axis 101 degrees      Diagnosis --     Normal sinus rhythm  Left anterior fascicular block  Cannot rule out Inferior infarct , age undetermined  Non-specific ST/T wave changes  Abnormal ECG  When compared with ECG of 10-APR-2018 23:54,  T wave inversion no longer evident in Anterior leads  Confirmed by Bart Landry MD, Adair Mckee ([id number]) on 8/16/2022 12:25:47 PM                      XR Results (most recent):  Results from [place] encounter on 12/30/21    XR CHEST SNGL V    Narrative  Examination:  Frontal view of the chest.    INDICATION: cough, covid positive    COMPARISON: None    Impression  Impression: Moderate cardiomegaly. Patchy right lung base density, concerning  for pneumonia.         Signed By: Suze Little PA-C     August 17, 2022

## 2022-08-17 NOTE — PROGRESS NOTES
0030 Pt's Bp 173/92 paged the doctor made aware and placed new orders for high Bp above SBp 160      Bedside and Verbal shift change report given to Simeon Doyle RN (oncoming nurse) by sister Hood Martinez RN (offgoing nurse). Report included the following information SBAR, Kardex, Intake/Output, and MAR.

## 2022-08-17 NOTE — PROGRESS NOTES
Bedside and Verbal shift change report given to Jesus RN (oncoming nurse) by Sindi Sauceda RN (offgoing nurse). Report included the following information SBAR, Kardex, and MAR. Compressive given to day shift nurse. [phone], [phone].

## 2022-08-17 NOTE — PROGRESS NOTES
TRANSFER - IN REPORT:    Verbal report received from PB Lemons(name) on [employer]  being received from [facility] [employer](unit) for ordered procedure      Report consisted of patients Situation, Background, Assessment and   Recommendations(SBAR). Information from the following report(s) SBAR, Intake/Output, and MAR was reviewed with the receiving nurse. Opportunity for questions and clarification was provided. Assessment completed upon patients arrival to unit and care assumed.

## 2022-08-17 NOTE — PROGRESS NOTES
Patient out of room. Attempted x 2 to complete initial assessment.        MADIE GreenN, RN  Pager # [phone]  Care Manager

## 2022-08-18 ENCOUNTER — APPOINTMENT (OUTPATIENT)
Dept: GENERAL RADIOLOGY | Age: 67
DRG: 194 | End: 2022-08-18
Attending: INTERNAL MEDICINE
Payer: MEDICAID

## 2022-08-18 LAB
ANION GAP SERPL CALC-SCNC: 7 MMOL/L (ref 3–18)
BASOPHILS # BLD: 0.1 K/UL (ref 0–0.1)
BASOPHILS NFR BLD: 1 % (ref 0–2)
BUN SERPL-MCNC: 60 MG/DL (ref 7–18)
BUN/CREAT SERPL: 15 (ref 12–20)
CALCIUM SERPL-MCNC: 8.7 MG/DL (ref 8.5–10.1)
CHLORIDE SERPL-SCNC: 107 MMOL/L (ref 100–111)
CO2 SERPL-SCNC: 25 MMOL/L (ref 21–32)
CREAT SERPL-MCNC: 3.94 MG/DL (ref 0.6–1.3)
DIFFERENTIAL METHOD BLD: ABNORMAL
EOSINOPHIL # BLD: 0.2 K/UL (ref 0–0.4)
EOSINOPHIL NFR BLD: 3 % (ref 0–5)
ERYTHROCYTE [DISTWIDTH] IN BLOOD BY AUTOMATED COUNT: 13.9 % (ref 11.6–14.5)
GLUCOSE BLD STRIP.AUTO-MCNC: 115 MG/DL (ref 70–110)
GLUCOSE BLD STRIP.AUTO-MCNC: 149 MG/DL (ref 70–110)
GLUCOSE BLD STRIP.AUTO-MCNC: 179 MG/DL (ref 70–110)
GLUCOSE BLD STRIP.AUTO-MCNC: 210 MG/DL (ref 70–110)
GLUCOSE SERPL-MCNC: 143 MG/DL (ref 74–99)
HBV CORE AB SERPL QL IA: NEGATIVE
HCT VFR BLD AUTO: 30.9 % (ref 36–48)
HGB BLD-MCNC: 10.3 G/DL (ref 13–16)
IMM GRANULOCYTES # BLD AUTO: 0 K/UL (ref 0–0.04)
IMM GRANULOCYTES NFR BLD AUTO: 0 % (ref 0–0.5)
LYMPHOCYTES # BLD: 1.7 K/UL (ref 0.9–3.6)
LYMPHOCYTES NFR BLD: 21 % (ref 21–52)
MCH RBC QN AUTO: 29.9 PG (ref 24–34)
MCHC RBC AUTO-ENTMCNC: 33.3 G/DL (ref 31–37)
MCV RBC AUTO: 89.6 FL (ref 78–100)
MONOCYTES # BLD: 0.7 K/UL (ref 0.05–1.2)
MONOCYTES NFR BLD: 9 % (ref 3–10)
NEUTS SEG # BLD: 5.2 K/UL (ref 1.8–8)
NEUTS SEG NFR BLD: 66 % (ref 40–73)
NRBC # BLD: 0 K/UL (ref 0–0.01)
NRBC BLD-RTO: 0 PER 100 WBC
PHOSPHATE SERPL-MCNC: 3.3 MG/DL (ref 2.5–4.9)
PLATELET # BLD AUTO: 286 K/UL (ref 135–420)
PMV BLD AUTO: 10 FL (ref 9.2–11.8)
POTASSIUM SERPL-SCNC: 3.8 MMOL/L (ref 3.5–5.5)
RBC # BLD AUTO: 3.45 M/UL (ref 4.35–5.65)
SODIUM SERPL-SCNC: 139 MMOL/L (ref 136–145)
WBC # BLD AUTO: 7.9 K/UL (ref 4.6–13.2)

## 2022-08-18 PROCEDURE — 74011000250 HC RX REV CODE- 250: Performed by: INTERNAL MEDICINE

## 2022-08-18 PROCEDURE — 74011250636 HC RX REV CODE- 250/636: Performed by: INTERNAL MEDICINE

## 2022-08-18 PROCEDURE — 74011250637 HC RX REV CODE- 250/637: Performed by: INTERNAL MEDICINE

## 2022-08-18 PROCEDURE — 36415 COLL VENOUS BLD VENIPUNCTURE: CPT

## 2022-08-18 PROCEDURE — 90935 HEMODIALYSIS ONE EVALUATION: CPT

## 2022-08-18 PROCEDURE — 80048 BASIC METABOLIC PNL TOTAL CA: CPT

## 2022-08-18 PROCEDURE — 86704 HEP B CORE ANTIBODY TOTAL: CPT

## 2022-08-18 PROCEDURE — 74011636637 HC RX REV CODE- 636/637: Performed by: INTERNAL MEDICINE

## 2022-08-18 PROCEDURE — 84100 ASSAY OF PHOSPHORUS: CPT

## 2022-08-18 PROCEDURE — 85025 COMPLETE CBC W/AUTO DIFF WBC: CPT

## 2022-08-18 PROCEDURE — 99232 SBSQ HOSP IP/OBS MODERATE 35: CPT | Performed by: FAMILY MEDICINE

## 2022-08-18 PROCEDURE — 71045 X-RAY EXAM CHEST 1 VIEW: CPT

## 2022-08-18 PROCEDURE — 65270000046 HC RM TELEMETRY

## 2022-08-18 PROCEDURE — 82962 GLUCOSE BLOOD TEST: CPT

## 2022-08-18 RX ORDER — HYDRALAZINE HYDROCHLORIDE 20 MG/ML
10 INJECTION INTRAMUSCULAR; INTRAVENOUS
Status: DISCONTINUED | OUTPATIENT
Start: 2022-08-18 | End: 2022-08-28 | Stop reason: HOSPADM

## 2022-08-18 RX ADMIN — LOSARTAN POTASSIUM 50 MG: 50 TABLET, FILM COATED ORAL at 12:09

## 2022-08-18 RX ADMIN — SODIUM CHLORIDE, PRESERVATIVE FREE 10 ML: 5 INJECTION INTRAVENOUS at 05:11

## 2022-08-18 RX ADMIN — HEPARIN SODIUM 5000 UNITS: 5000 INJECTION INTRAVENOUS; SUBCUTANEOUS at 22:30

## 2022-08-18 RX ADMIN — SODIUM CHLORIDE, PRESERVATIVE FREE 10 ML: 5 INJECTION INTRAVENOUS at 22:25

## 2022-08-18 RX ADMIN — HEPARIN SODIUM 5000 UNITS: 5000 INJECTION INTRAVENOUS; SUBCUTANEOUS at 05:10

## 2022-08-18 RX ADMIN — TAMSULOSIN HYDROCHLORIDE 0.4 MG: 0.4 CAPSULE ORAL at 12:09

## 2022-08-18 RX ADMIN — NEPHROCAP 1 CAPSULE: 1 CAP ORAL at 12:09

## 2022-08-18 RX ADMIN — Medication 2 UNITS: at 18:15

## 2022-08-18 RX ADMIN — HEPARIN SODIUM 5000 UNITS: 5000 INJECTION INTRAVENOUS; SUBCUTANEOUS at 18:14

## 2022-08-18 RX ADMIN — NITROGLYCERIN 0.5 INCH: 20 OINTMENT TOPICAL at 01:24

## 2022-08-18 NOTE — PROGRESS NOTES
Received pre HD report from MARCEL Johnson RN. Pt in bed, A+O x4, no s/s of distress noted. Accessed right CVC per protocol. Tx initiated at 8830. TDC flowing with ease. For hemodynamic stability UF goal 2000 ml. Offered assistance with repositioning every 2 hours. Vascular access visible at all times during treatment, line connections intact at all times. Tx completed at 1144, tolerated well 1.5L removed. De-accessed per protocol. Heparin indwell 1.6ml in arterial, and 1.6ml in venous catheter. Unit nurse given report.                      ACUTE HEMODIALYSIS FLOW SHEET      HEMODIALYSIS ORDERS: Physician:  Rochelle Altamirano     Dialyzer: Revaclear   Duration: 3   BFR: 300  DFR: 600   Dialysate:  Temp 36-37*C   K+  3    Ca+ 3   Na 138  Bicarb 35   Wt Readings from Last 1 Encounters:        Patient Chart [x]   Unable to Obtain []  Dry weight/UF Goal:2000   Heparin []  Bolus    Units    [] Hourly    Units    []None       Pre BP  159/88    Pulse: 68 Respirations: 18 Temp: 98.6 [x] Temporal  [] Ax  [] Esoph   Labs: []  Pre  []  Post:   [x] N/A   Additional Orders (medications, blood products, hypotension management): [] Yes   [x] No     CATHETER ACCESS:  []N/A   [x]Right   []Left   []IJ   []Fem  [x]Chest wall  []TransHepatic   [] First use X-ray verified     [x]Tunnel    [] Non Tunneled   [x]No S/S infection  []Redness  []Drainage []Cultured []Swelling []Pain   [x]Medical Aseptic Prep Utilized   []Dressing Changed  [x] Biopatch  Date: 8/17/22   []Clotted   [x]Patent   Flows: [x]Good  []Poor  []Reversed   If access problem,  notified: []Yes    [x]N/A                     GENERAL ASSESSMENT:    LUNGS:  Resp Rate 18   [x] Clear  [] Coarse  [] Crackles  [] Wheezing  [] Diminished                                                           [] RLL   [] LLL  [] RUL   [] AZUCENA            Respirations:  [x]Easy  []Labored  []N/A  Cough:  []Productive  []Dry  []N/A               Therapy:  [x]RA   [] Ventilated   [] Intubated   [] Trach            O2 Device:  [] NC   [] NRB  [] Trach Mask  [] BiPaP  Flow:   l/min                                                    CARDIAC: [x] Regular      [] Irregular   [] Rhythm:          [] Monitored   [] Bedside   [] Remotely monitored       EDEMA: [x] None   []Generalized  [] Pitting [] 1+   [] 2 +   [] 3+    [] 4+        SKIN:   [] Hot     [] Cold    [x] Warm   [x] Dry    [] Diaphoretic                 [] Flushed  [] Jaundiced  [] Cyanotic  [] Pale      LOC:    [x] Alert      [x]Oriented:    [x] Person     [x] Place   [x]Time               [] Confused  [] Lethargic  [] Medicated  [] Non-responsive  [] Non-Verbal     GI / ABDOMEN:                     [] Flat    [] Distended    [x] Soft    [] Firm   []  Obese                   [] Diarrhea   [] FMS [] Bowel Sounds  [] Nausea  [] Vomiting                   [] NGT  [] OGT  [] PEG  [] Tube Feedings @     mL/hr     / URINE ASSESSMENT:                   [] Voiding    [x] Oliguria  [] Anuria                     []  Light   [] Incontinent  []  Incontinent Brief   []  PureWick     PAIN:  [x] 0 []1  []2   []3   []4   []5   []6   []7   []8   []9   []10                MOBILITY:  [x] Bed    [] Stretcher      All Vitals and Treatment Details on Attached [address]: [facility]          Room # 466/01    [x] Routine         [] 1st Time Acute/Chronic   [] Urgent      [] Stat            [x] Acute Room   []  Bedside    [] ICU/CCU     [] ER     Isolation Precautions:  [x] Dialysis    There are currently no Active Isolations     ALLERGIES:     Allergies   Allergen Reactions    Tylenol [Acetaminophen] Other (comments)     Pt was advised not to take Tylenol per Dr. Araceli Lam Status:  Full Code     Hepatitis Status      Lab Results   Component Value Date/Time    Hepatitis A, IgM NEGATIVE  08/09/2014 08:40 AM    Hepatitis B surface Ag <0.10 08/17/2022 02:57 AM    Hepatitis B surface Ab 289.10 08/17/2022 02:57 AM    Hepatitis B core, IgM NEGATIVE  08/09/2014 08:40 AM Hepatitis C virus Ab >11.00 (H) 08/17/2022 02:57 AM        Current Labs:      Lab Results   Component Value Date/Time    WBC 7.9 08/18/2022 02:52 AM    HGB 10.3 (L) 08/18/2022 02:52 AM    HCT 30.9 (L) 08/18/2022 02:52 AM    PLATELET 811 31/95/8801 02:52 AM    MCV 89.6 08/18/2022 02:52 AM     Lab Results   Component Value Date/Time    Sodium 139 08/18/2022 02:52 AM    Potassium 3.8 08/18/2022 02:52 AM    Chloride 107 08/18/2022 02:52 AM    CO2 25 08/18/2022 02:52 AM    Anion gap 7 08/18/2022 02:52 AM    Glucose 143 (H) 08/18/2022 02:52 AM    BUN 60 (H) 08/18/2022 02:52 AM    Creatinine 3.94 (H) 08/18/2022 02:52 AM    BUN/Creatinine ratio 15 08/18/2022 02:52 AM    GFR est AA 19 (L) 08/18/2022 02:52 AM    GFR est non-AA 15 (L) 08/18/2022 02:52 AM    Calcium 8.7 08/18/2022 02:52 AM          DIET:  DIET ADULT     PRIMARY NURSE REPORT:   Pre Dialysis: MARCEL Higgins RN    Time: 7549    EDUCATION:    [x] Patient           Knowledge Basis: []None [x]Minimal [] Substantial [] Unknown  Barriers to learning  [x]None  [] Intubated/Trached/Ventilated  [] Sedated/Paralyzed   [x] Access Care     [] S&S of infection  [] Fluid Management  [x] K+   [] Procedural    [] Medications   [] Tx Options   [] Transplant   [] Diet      Teaching Tools:  [x] Explain  [] Demo  [] Handouts [] Video  Patient response: [x] Verbalized understanding   [] Requires follow up        [x] Time Out/Safety Check    [x] Extracorporeal Circuit Tested for integrity       RO/HEMODIALYSIS MACHINE SAFETY CHECKS - Before each treatment:        [facility]                                    [x] Unit Machine # 3 with centralized RO                                  [] Portable Machine #13/RO serial # [id number]                                  [] Portable Machine #2/RO serial # [id number]                                  [] Portable Machine #4/RO serial # [id number]                                  [] Portable Machine #10/RO serial # [id number] Alarm Test:  Pass time 0802         [x] RO/Machine Log Complete    Machine Temp    36-37*C             Dialysate: pH  7.4    Conductivity: Meter 14.0    HD Machine  13.5    TCD: 13.8  Dialyzer Lot # [id number]    Blood Tubing Lot # [id number]    Saline Lot #[id number]     CHLORINE TESTING-Before each treatment and every 4 hours    Total Chlorine: [x] less than 0.1 ppm  Initial Time Check: 0800     4 Hr/2nd Check Time: 1200   (if greater than 0.1 ppm from Primary then every 30 minutes from Secondary)     TREATMENT INITIATION - with Dialysis Precautions:   [x] All Connections Secured              [x] Saline Line Double Clamped   [x] Venous Parameters Set               [x] Arterial Parameters Set    [x] Prime Given 250ml NSS              [x]Air Foam Detector Engaged          Treatment Initiation Note:  See above note    During Treatment Notes:    0845  Face & Vascular access visible with art and thong line connections intact. Pt tolerating dialysis. 0900  Face & Vascular access visible with art and thong line connections intact. Pt tolerating dialysis. 0915 Face & Vascular access visible with art and thong line connections intact. Pt tolerating dialysis. 0930 Face & Vascular access visible with art and thong line connections intact. Pt tolerating dialysis. 0945  Face & Vascular access visible with art and thong line connections intact. Pt tolerating dialysis. 1000  Face & Vascular access visible with art and thong line connections intact. Pt tolerating dialysis. 1015 Face & Vascular access visible with art and thong line connections intact. Pt tolerating dialysis. 1030 Face & Vascular access visible with art and thong line connections intact. Pt tolerating dialysis. 1045  Face & Vascular access visible with art and thong line connections intact. Pt tolerating dialysis. 1100  Face & Vascular access visible with art and thong line connections intact.  Pt tolerating dialysis. 1115 Face & Vascular access visible with art and thong line connections intact. Pt tolerating dialysis. 1130 Face & Vascular access visible with art and thong line connections intact. Pt tolerating dialysis. 1144  Dialysis treatment complete. Medication    Dose    Volume Route      Time       [place] Nurse      HD  Wetzel Cushing, RN      HD         HD                  Post Assessment  Dialyzer Cleared:   [] Good  [] Fair  [x] Poor  Blood processed:  50 L  UF Removed:  1500 Ml    Post BP: 188/102  Pulse: 69  Respirations: 18   Temp: 99  [x] Temporal  [] Ax  [] Esophageal   Lungs: [] Clear                [x] No change from initial assessment   Post Tx Vascular Access: [x] N/A  AVF/AVG: Bleeding stopped with  Arterial Pressure for   min   Venous Pressure for   min      Cardiac:  [x] Regular   [] Irregular   Rhythm:  [] Monitored   [] Not Monitored    CVC Catheter: [] N/A  Locking solution: Heparin 1:1000 U  Arterial port 1.6 ml   Venous port 1.6 ml   Edema:  [x] None  [] Generalized                     Skin:[x] Warm  [x] Dry [] Diaphoretic               [] Flushed  [] Pale [] Cyanotic Pain:  [x]0  []1-2  []3-4  []5-6   []7-8  []9-10           Post Treatment Note:   See above note             POST TREATMENT PRIMARY NURSE HANDOFF REPORT:   Post Dialysis: MARCEL Elizabeth RN        Time:  2732         Abbreviations: AVG-arterial venous graft, AVF-arterial venous fistula, IJ-Internal Jugular, Subcl-Subclavian, Fem-Femoral, Tx-treatment, AP/HR-apical heart rate, VSS- Vital Signs Stable, CVC- Central Venous Catheter, DFR-dialysate flow rate, BFR-blood flow rate, AP-arterial pressure, -venous pressure, UF-ultrafiltrate, TMP-transmembrane pressure, Thong-Venous, Art-Arterial, RO-Reverse Osmosis

## 2022-08-18 NOTE — PROGRESS NOTES
Received call at 8:56 am from Toni Araiza ([phone]) from [facility]. Per Toni Araiza, additional information is needed to secure dialysis chair. Needs the following chest Xray, dialysis notes, and labs for Hepatitis  antigen. Also, patient will need to complete form for uninsured. Patient is out of room at this time. CM will try again later to meet with patient.        EMMA Mi, RN  Pager # [phone]  Care Manager

## 2022-08-18 NOTE — PROGRESS NOTES
[facility] Hospitalists  Progress Note    Patient: Jayjay Guan Age: 75 y.o. : 1955 MR#: [id number] SSN: [id number]  Date: 2022     Subjective/24-hour events:     No complaints. Assessment:   CKD 5 now ESRD, HD dependent  Hypertension  DM2 with hyperglycemia  Chronic systolic and diastolic CHF, EF 51% with grade 2 DD by echo 2018, now 55 to 60% by echo 2020  Cocaine abuse - UDS positive on admission  Homelessness - patient living in shelter prior to admission    Plan:   Hemodialysis per nephrology. Noted plans for second treatment session today. Outpatient hemodialysis already being worked on. ARB resumed yesterday, monitor BPs. Adjust dosage further as necessary. Avoiding beta-blockers for now due to cocaine use. Lantus/SSI. Mobilize as tolerated. Disposition to be determined. Patient is homeless and was living in a shelter PTA. Case discussed with:  [x]Patient  []Family  [x]Nursing  [x]Case Management  DVT Prophylaxis:  []Lovenox  [x]Hep SQ  []SCDs  []Coumadin   []On Heparin gtt    Objective:   VS: Visit Vitals  BP (!) 160/84   Pulse 71   Temp 98.5 °F (36.9 °C)   Resp 18   Ht 6' (1.829 m)   Wt 98.9 kg (218 lb)   SpO2 95%   BMI 29.57 kg/m²      Tmax/24hrs: Temp (24hrs), Av.3 °F (36.8 °C), Min:97.3 °F (36.3 °C), Max:99.3 °F (37.4 °C)    Intake/Output Summary (Last 24 hours) at 2022 0856  Last data filed at 2022 0449  Gross per 24 hour   Intake 480 ml   Output 1700 ml   Net -1220 ml       General:  In NAD. Nontoxic-appearing. Cardiovascular:  RRR. Pulmonary:  Lungs clear bilaterally, no wheezes. No accessory muscle use. GI:  Abdomen soft, NTTP. Extremities:  Warm, no edema or ischemia. Neuro:  Awake and alert.   Moves extremities spontaneously, motor grossly nonfocal.    Labs:    Recent Results (from the past 24 hour(s))   ECHO ADULT COMPLETE    Collection Time: 22 10:13 AM   Result Value Ref Range    IVSd 1.1 (A) 0.6 - 1.0 cm LVIDd 5.5 4.2 - 5.9 cm    LVIDs 4.0 cm    LVOT Diameter 2.1 cm    LVPWd 1.2 (A) 0.6 - 1.0 cm    LVOT Peak Gradient 6 mmHg    LVOT Mean Gradient 3 mmHg    LVOT SV 92.8 ml    LVOT Peak Velocity 1.2 m/s    LVOT VTI 26.8 cm    RV Free Wall Peak S' 13 cm/s    LA Volume A/L 73 mL    LA Volume 2C 82 (A) 18 - 58 mL    LA Volume 4C 55 18 - 58 mL    MV A Velocity 0.85 m/s    MV E Wave Deceleration Time 146.3 ms    MV E Velocity 0.72 m/s    LV E' Lateral Velocity 7 cm/s    LV E' Septal Velocity 8 cm/s    TAPSE 2.7 1.7 cm    Aortic Root 3.7 cm    Fractional Shortening 2D 27 28 - 44 %    LVIDd Index 2.49 cm/m2    LVIDs Index 1.81 cm/m2    LV RWT Ratio 0.44     LV Mass 2D 257.0 (A) 88 - 224 g    LV Mass 2D Index 116.3 (A) 49 - 115 g/m2    MV E/A 0.85     E/E' Ratio (Averaged) 9.64     E/E' Lateral 10.29     E/E' Septal 9.00     LA Volume Index A/L 33 16 - 34 mL/m2    LVOT Stroke Volume Index 42.0 mL/m2    LVOT Area 3.5 cm2    LA Volume Index 2C 37 (A) 16 - 34 mL/m2    LA Volume Index 4C 25 16 - 34 mL/m2    Ao Root Index 1.67 cm/m2   GLUCOSE, POC    Collection Time: 08/17/22  5:10 PM   Result Value Ref Range    Glucose (POC) 120 (H) 70 - 110 mg/dL   GLUCOSE, POC    Collection Time: 08/17/22  9:53 PM   Result Value Ref Range    Glucose (POC) 167 (H) 70 - 735 mg/dL   METABOLIC PANEL, BASIC    Collection Time: 08/18/22  2:52 AM   Result Value Ref Range    Sodium 139 136 - 145 mmol/L    Potassium 3.8 3.5 - 5.5 mmol/L    Chloride 107 100 - 111 mmol/L    CO2 25 21 - 32 mmol/L    Anion gap 7 3.0 - 18 mmol/L    Glucose 143 (H) 74 - 99 mg/dL    BUN 60 (H) 7.0 - 18 MG/DL    Creatinine 3.94 (H) 0.6 - 1.3 MG/DL    BUN/Creatinine ratio 15 12 - 20      GFR est AA 19 (L) >60 ml/min/1.73m2    GFR est non-AA 15 (L) >60 ml/min/1.73m2    Calcium 8.7 8.5 - 10.1 MG/DL   CBC WITH AUTOMATED DIFF    Collection Time: 08/18/22  2:52 AM   Result Value Ref Range    WBC 7.9 4.6 - 13.2 K/uL    RBC 3.45 (L) 4.35 - 5.65 M/uL    HGB 10.3 (L) 13.0 - 16.0 g/dL HCT 30.9 (L) 36.0 - 48.0 %    MCV 89.6 78.0 - 100.0 FL    MCH 29.9 24.0 - 34.0 PG    MCHC 33.3 31.0 - 37.0 g/dL    RDW 13.9 11.6 - 14.5 %    PLATELET 601 535 - 521 K/uL    MPV 10.0 9.2 - 11.8 FL    NRBC 0.0 0  WBC    ABSOLUTE NRBC 0.00 0.00 - 0.01 K/uL    NEUTROPHILS 66 40 - 73 %    LYMPHOCYTES 21 21 - 52 %    MONOCYTES 9 3 - 10 %    EOSINOPHILS 3 0 - 5 %    BASOPHILS 1 0 - 2 %    IMMATURE GRANULOCYTES 0 0.0 - 0.5 %    ABS. NEUTROPHILS 5.2 1.8 - 8.0 K/UL    ABS. LYMPHOCYTES 1.7 0.9 - 3.6 K/UL    ABS. MONOCYTES 0.7 0.05 - 1.2 K/UL    ABS. EOSINOPHILS 0.2 0.0 - 0.4 K/UL    ABS. BASOPHILS 0.1 0.0 - 0.1 K/UL    ABS. IMM.  GRANS. 0.0 0.00 - 0.04 K/UL    DF AUTOMATED     PHOSPHORUS    Collection Time: 08/18/22  2:52 AM   Result Value Ref Range    Phosphorus 3.3 2.5 - 4.9 MG/DL   GLUCOSE, POC    Collection Time: 08/18/22  7:47 AM   Result Value Ref Range    Glucose (POC) 210 (H) 70 - 110 mg/dL       Signed By: Anushka Smith MD     August 18, 2022

## 2022-08-18 NOTE — PROGRESS NOTES
Progress Note    Manasa Angel  75 y.o. Admit Date: 8/16/2022  Active Problems:    [facility] 2 [facility] mellitus with insulin therapy [facility] (7/6/2015) POA: Yes      Homelessness (10/19/2015) POA: Yes      CHF (congestive heart failure) ([facility] (11/30/2015) POA: Unknown      Systolic CHF, chronic ([facility] (7/10/2016) POA: Yes      Elevated serum creatinine (8/10/2016) POA: Unknown      Hypertension (1/1/2000) POA: Yes      ESRD (end stage renal disease) ([facility] (8/16/2022) POA: Unknown      Substance abuse ([facility] (8/16/2022) POA: Unknown      Anemia (8/16/2022) POA: Unknown      End stage renal disease ([facility] (8/16/2022) POA: Unknown      Pericardial effusion (8/16/2022) POA: Unknown      Hyperglycemia (8/16/2022) POA: Unknown      CKD (chronic kidney disease) stage 5, GFR less than 15 ml/min ([facility] (8/16/2022) POA: Unknown            Subjective:     Patient feels good. Back from dialysis. Tolerating dialysis well. No more abdominal pain. His hepatitis B : Surface antigen is negative, surface antibody is positive, core antibody results is pending and is a send out lab and aspirate the lab information it will take 4 days before the results are back. He has positive hepatitis C      A comprehensive review of systems was negative except for that written in the History of Present Illness.     Objective:     Visit Vitals  BP (!) 170/86   Pulse 69   Temp 98 °F (36.7 °C)   Resp 18   Ht 6' (1.829 m)   Wt 98.9 kg (218 lb)   SpO2 98%   BMI 29.57 kg/m²         Intake/Output Summary (Last 24 hours) at 8/18/2022 1339  Last data filed at 8/18/2022 1144  Gross per 24 hour   Intake 480 ml   Output 3200 ml   Net -2720 ml       Current Facility-Administered Medications   Medication Dose Route Frequency Provider Last Rate Last Admin    nitroglycerin (NITROBID) 2 % ointment 0.5 Inch  0.5 Inch Topical Q6H PRN Raynald Button E, DO   0.5 Inch at 08/18/22 0124    hydrALAZINE (APRESOLINE) 20 mg/mL injection 10 mg  10 mg IntraVENous Q6H PRN Karena Ling DO        losartan (COZAAR) tablet 50 mg  50 mg Oral DAILY Kvng Ling MD   50 mg at 08/18/22 1209    B complex-vitaminC-folic acid (NEPHROCAP) cap  1 Capsule Oral DAILY Kvng Ling MD   1 Capsule at 08/18/22 1209    insulin lispro (HUMALOG) injection   SubCUTAneous AC&HS Favian Howell MD   2 Units at 08/17/22 2207    glucose chewable tablet 16 g  4 Tablet Oral PRN Favian Howell MD        glucagon (GLUCAGEN) injection 1 mg  1 mg IntraMUSCular PRN Favian Howell MD        dextrose 10% infusion 0-250 mL  0-250 mL IntraVENous PRN Favian Howell MD        [facility] by provider] aspirin tablet 325 mg  325 mg Oral DAILY Favian Howell MD        [facility]) capsule 0.4 mg  0.4 mg Oral DAILY Favian Howell MD   0.4 mg at 08/18/22 1209    sodium chloride (NS) flush 5-40 mL  5-40 mL IntraVENous Q8H Favian Howell MD   10 mL at 08/18/22 0511    sodium chloride (NS) flush 5-40 mL  5-40 mL IntraVENous PRN Favian Howell MD        acetaminophen (TYLENOL) tablet 650 mg  650 mg Oral Q6H PRN Fvaian Howell MD        Or    acetaminophen (TYLENOL) suppository 650 mg  650 mg Rectal Q6H PRN Favian Howell MD        polyethylene glycol (MIRALAX) packet 17 g  17 g Oral DAILY PRN Favian Howell MD        promethazine (PHENERGAN) tablet 12.5 mg  12.5 mg Oral Q6H PRN Favian Howell MD        Or    ondansetron [facility]) injection 4 mg  4 mg IntraVENous Q6H PRN Favian Howell MD        heparin (porcine) injection 5,000 Units  5,000 Units SubCUTAneous Q8H Favian Howell MD   5,000 Units at 08/18/22 0510        Physical Exam:     Physical Exam:   General:  Alert, cooperative, no distress, appears stated age. Neck: Supple, symmetrical, trachea midline, no adenopathy, thyroid: no enlargement/tenderness/nodules, no carotid bruit and no JVD. Lungs:   Clear to auscultation bilaterally. Heart:  Regular rate and rhythm, S1, S2 normal, no murmur, click, rub or gallop.    Abdomen:   Soft, non-tender. Bowel sounds normal. No masses,  No organomegaly. Extremities: Extremities normal, atraumatic, no cyanosis or edema, dialysis catheter is well-placed   Skin: Skin color, texture, turgor normal. No rashes or lesions         Data Review:    CBC w/Diff    Recent Labs     08/18/22 0252 08/17/22 0257 08/16/22  0735   WBC 7.9 6.9 6.9   RBC 3.45* 3.37* 3.29*   HGB 10.3* 10.2* 9.9*   HCT 30.9* 30.3* 29.3*   MCV 89.6 89.9 89.1   MCH 29.9 30.3 30.1   MCHC 33.3 33.7 33.8   RDW 13.9 13.9 13.9    Recent Labs     08/18/22 0252 08/17/22 0257 08/16/22  0735   MONOS 9 10 10   EOS 3 6* 5   BASOS 1 1 1   RDW 13.9 13.9 13.9        Comprehensive Metabolic Profile    Recent Labs     08/18/22 0252 08/17/22 0257 08/16/22  0735    141 140   K 3.8 3.9 4.0    112* 112*   CO2 25 21 20*   BUN 60* 105* 103*   CREA 3.94* 5.62* 6.23*    Recent Labs     08/18/22 0252 08/17/22 0257 08/16/22  0735   CA 8.7 9.3  9.2 8.8   PHOS 3.3 5.3*  --    ALB  --   --  3.5   TP  --   --  8.0   TBILI  --   --  0.6          & Impression   EXAMINATION: Chest single view     INDICATION: End-stage renal disease     COMPARISON: 4/10/2018     FINDINGS: Single frontal view the chest. Low lung volumes. Dual-lumen right neck  catheter tip at upper SVC level. Borderline prominent cardiac silhouette. Pulmonary vasculature unremarkable. Minimal infrahilar streaky densities. No  confluent consolidation. No definite pneumothorax. No obvious acute osseous  findings. IMPRESSION     Suspected infrahilar streaky atelectasis versus infiltrate, likely exaggerated  by low lung volume.                  Impression:       Active Hospital Problems    Diagnosis Date Noted    [facility] 08/16/2022    Substance abuse ([facility] 08/16/2022    Anemia 08/16/2022    End stage renal disease ([facility] 08/16/2022    Pericardial effusion 08/16/2022    Hyperglycemia 08/16/2022    CKD (chronic kidney disease) stage 5, GFR less than 15 ml/min ([facility]) 08/16/2022    Elevated serum creatinine 61/31/2799    Systolic CHF, chronic ([facility] 07/10/2016    CHF (congestive heart failure) ([facility] 11/30/2015    Homelessness 10/19/2015    Type 2 diabetes mellitus with insulin therapy ([facility] 07/06/2015    Hypertension 01/01/2000            Plan: Will dialyze him again tomorrow as planned. Wait for the pending lab results before the outpatient dialysis unit can give the decision whether to accept this patient or not. Barrier for discharge again pending lab report particularly hepatitis  B core antibody which is essential for any new patient to be accepted by getting dialysis unit. Other requirement for acceptance by the outpatient dialysis center is done including chest x-ray and other hepatitis B panel.   Continue current care      Isreal Jerome MD

## 2022-08-18 NOTE — OP NOTES
[address] [place] [address]   OPERATIVE REPORT    Name:  Soledad Garnica  MR#:   [id number]  :  1955  ACCOUNT #:  [de-identified]  DATE OF SERVICE:  2022    PREOPERATIVE DIAGNOSIS:  End-stage renal disease. POSTOPERATIVE DIAGNOSIS:  End-stage renal disease. PROCEDURE PERFORMED:  Right internal jugular tunneled dialysis catheter placement. SURGEON:  Molly Bowles MD    ASSISTANT:  None. ANESTHESIA:  IV sedation with local anesthesia. COMPLICATIONS:  None. SPECIMENS REMOVED:  None. IMPLANTS:  Catheter. ESTIMATED BLOOD LOSS:  Minimal.    INDICATION FOR PROCEDURE:  The patient is a 26-year-old male with end-stage renal disease requiring dialysis. Decision was made to place a tunneled catheter. TECHNICAL DETAILS:  The patient was taken to the cath lab. Once the suitable level of anesthesia was introduced, he was prepped and draped in the typical sterile fashion. The right internal jugular vein was accessed under direct ultrasound guidance and a sheath and wire placed. Counterincisions were made on the right chest.  Catheter was tunneled inferior to superior and sheath dilated up. Catheter was then positioned at the atriocaval junction under fluoroscopic guidance. Catheter was flushed with heparinized saline. There were no kinks or obvious complications. Catheter was secured in place with Prolene suture. He tolerated the procedure well. Sponge and instrument counts were correct x2. He was awakened and transferred to the recovery area in good condition.         Dany Batsita MD      [id number]/  D:  2022 13:37  T:  2022 23:02  JOB #:  [id number]

## 2022-08-18 NOTE — DIABETES MGMT
Diabetes/ Glycemic Control Plan of Care    Patient with history of T2DM was seen in ED waiting area on 8/16/2022 with report of abdominal/flak pain and provided diabetes education prior to planned released. Then he was seen by nephrology for worsening renal function and was admitted with ESRD. 8/17/2022: Placement of internal jugular tunneled dialysis catheter  8/18/2022: Patient seen in dialysis unit. Patient stated that he has vial NPH insulin and syringes at home and plan to resume use at disch. He verbalized understanding of diabetes and insulin use to help prevent hyperglycemia. He  agreed to contact medical provider if he experience high blood sugar 300 and above that he cannot explain. He was also able to state how to treat low blood sugar less than 70 and when to contact medical provider: frequent BG less than 70 that he cannot explain. Patient also plan to attend diabetes classes at [facility] in [place]. Assessment today: BG values within target yesterday but fasting BG this morning was elevated at 210. Patient reported that he only drank water before BG check. He is currently on correctional insulin. Recommendations:   1.) cont correctional lispro as ordered. 2.) consider adding low dose lantus insulin 5 units daily when two BGs are 200 and above within 24 hour period. 3.) resume NPH insulin (home) at disch: 26 units every morning and 12 units every evening. DX:   1. End stage renal disease (HCC)        2. Elevated serum creatinine        3. Hyperglycemia        4. Pericardial effusion        5. ESRD on dialysis ([address]  INVASIVE VASCULAR PROCEDURE    INVASIVE VASCULAR PROCEDURE         Fasting/ Morning blood glucose:   Lab Results   Component Value Date/Time    Glucose 143 (H) 08/18/2022 02:52 AM    Glucose (POC) 210 (H) 08/18/2022 07:47 AM     IV Fluids containing dextrose: none    Steroids:  None    Blood glucose values:          Within target range (70-180mg/dL):  NO. Fasting POC BG this morning of 210    Current insulin orders:   Correctional lispro insulin. Normal sensitivity dose    Total Daily Dose previous 24 hours: Correctional lispro insulin 2 units    Current A1c:   Lab Results   Component Value Date/Time    Hemoglobin A1c 6.8 (H) 08/17/2022 02:57 AM      equivalent  to ave Blood Glucose of 148 mg/dl for 2-3 months prior to admission    Adequate glycemic control PTA: YES    Nutrition/Diet:   Active Orders   Diet    ADULT DIET Regular; 4 carb choices (60 gm/meal); Low Sodium (2 gm); Low Potassium (Less than 3000 mg/day); Low Phosphorus (Less than 1000 mg)      Meal Intake:  Patient Vitals for the past 168 hrs:   % Diet Eaten   08/17/22 0929 0%     Supplement Intake:  No data found. Home diabetes medications:  Patient reported differently on 8/15/2022 that he's only taking NPH insulin twice daily at home:  26 units daily every morning  12 units daily every evening  Key Antihyperglycemic Medications               insulin glargine (LANTUS) 100 unit/mL injection 12 Units by SubCUTAneous route daily. insulin lispro (HUMALOG) 100 unit/mL injection For Blood Sugar (mg/dL) of:   Less than 150 =  0 units  150 -199 =  3 units  200 -249 =  6 units  250 -299 =  9 units  300 -349 =  12 units  350 and above =  15 units, call MD.            Plan/Goals:   Blood glucose will be within target of 70 - 180 mg/dl within 72 hours  Reinforce dietary and medication compliance at home.           Education:  [x] Refer to Diabetes Education Record: 8/16/2022                       [] Education not indicated at this time     Caitlin Lucas RN CCM

## 2022-08-18 NOTE — PROGRESS NOTES
Reason for Admission:  End stage renal disease ([facility] [N18.6]  Elevated serum creatinine [R79.89]  Hyperglycemia [R73.9]  Pericardial effusion [I31.3]  CKD (chronic kidney disease) stage 5, GFR less than 15 ml/min ([facility]) [N18.5]  CHF (congestive heart failure) ([facility] [I50.9]                 RUR Score:    15%            Plan for utilizing home health:    TBD                      Likelihood of Readmission:   Moderate                         Do you (patient/family) have any concerns for transition/discharge? Yes. Patient will need dialysis center and chairtime prior to discharge. Transition of Care Plan:       Initial assessment completed with patient. Cognitive status of patient: oriented to time, place, person and situation. Face sheet information confirmed:  yes. The patient designates Roma Lazar ([phone]) to participate in his discharge plan and to receive any needed information. This patient lives in a homeless shelter. Patient was able to navigate steps as needed. Prior to hospitalization, patient was considered to be independent with ADLs/IADLS : yes. Patient has a current ACP document on file: no      Transportation needs to be determined prior to discharge. The patient reported no medical equipment available in the home. Patient is not currently active with home health. Patient has not stayed in a skilled nursing facility or rehab. This patient is on dialysis :no Patient is new dialysis    Freedom of choice signed: no.     Currently, the discharge plan is  Shelter. Patient will need transportation to [place] [address], [address] [place] [address] at discharge by 5 pm to return to shelter. Called Roma Lazar, at [phone] per patient's request.     CM will continue to monitor and assist with transition of care needs. The patient states that he can obtain his medications from the pharmacy, and take his medications as directed.     Patient's current insurance: Per patient, medicaid number is [id number]. Will get verification from medicaid portal.       Care Management Interventions  PCP Verified by CM: Yes  Mode of Transport at Discharge:  Other (see comment) (Will need transportation to shelter)  Transition of Care Consult (CM Consult): Discharge Planning  Discharge Durable Medical Equipment: No  Physical Therapy Consult: No  Occupational Therapy Consult: No  Speech Therapy Consult: No  Support Systems: Shelter  Confirm Follow Up Transport: Self  Discharge Location  Patient Expects to be Discharged to[de-identified] EMMA Liu, RN  Pager # [phone]  Care Manager

## 2022-08-18 NOTE — PROGRESS NOTES
INTERVENTION:  HEMODYNAMIC STABILIZATION  MAINTAIN BP WNL WHILE ON HD. INTERVENTION:  FLUID MANAGEMENT  WILL ATTEMPT 2000 ML TOTAL FLUID REMOVAL AS TOLERATED. INTERVENTION:  METABOLIC/ELECTROLYTE MANAGEMENT  3.0 POTASSIUM 3.0 CALCIUM DIALYSATE USED WITH HD TODAY. INTERVENTION:  HEMODIALYSIS ACCESS SITE MANAGEMENT  RIGHT SIDE CVC ACCESSED USING ASEPTIC TECHNIQUE. GOAL:  SIGNS AND SYMPTOMS OF LISTED POTENTIAL PROBLEMS WILL BE ABSENT OR MANAGEABLE. OUTCOME:  PROGRESSING. HD PLANNED FOR 3 HOURS TODAY.

## 2022-08-18 NOTE — PROGRESS NOTES
Bedside and Verbal shift change report given to Kerri Fleming RN (oncoming nurse) by Elda Simons RN (offgoing nurse). Report included the following information SBAR, Kardex, Intake/Output, and MAR.

## 2022-08-19 LAB
ANION GAP SERPL CALC-SCNC: 8 MMOL/L (ref 3–18)
BASOPHILS # BLD: 0 K/UL (ref 0–0.1)
BASOPHILS NFR BLD: 0 % (ref 0–2)
BUN SERPL-MCNC: 45 MG/DL (ref 7–18)
BUN/CREAT SERPL: 11 (ref 12–20)
CALCIUM SERPL-MCNC: 9.5 MG/DL (ref 8.5–10.1)
CHLORIDE SERPL-SCNC: 102 MMOL/L (ref 100–111)
CO2 SERPL-SCNC: 26 MMOL/L (ref 21–32)
CREAT SERPL-MCNC: 4.04 MG/DL (ref 0.6–1.3)
DIFFERENTIAL METHOD BLD: ABNORMAL
EOSINOPHIL # BLD: 0.3 K/UL (ref 0–0.4)
EOSINOPHIL NFR BLD: 3 % (ref 0–5)
ERYTHROCYTE [DISTWIDTH] IN BLOOD BY AUTOMATED COUNT: 13.8 % (ref 11.6–14.5)
GLUCOSE BLD STRIP.AUTO-MCNC: 129 MG/DL (ref 70–110)
GLUCOSE BLD STRIP.AUTO-MCNC: 154 MG/DL (ref 70–110)
GLUCOSE BLD STRIP.AUTO-MCNC: 157 MG/DL (ref 70–110)
GLUCOSE BLD STRIP.AUTO-MCNC: 214 MG/DL (ref 70–110)
GLUCOSE SERPL-MCNC: 172 MG/DL (ref 74–99)
HBV CORE AB SERPL QL IA: NEGATIVE
HCT VFR BLD AUTO: 33.9 % (ref 36–48)
HGB BLD-MCNC: 11.1 G/DL (ref 13–16)
IMM GRANULOCYTES # BLD AUTO: 0 K/UL (ref 0–0.04)
IMM GRANULOCYTES NFR BLD AUTO: 0 % (ref 0–0.5)
LYMPHOCYTES # BLD: 2.4 K/UL (ref 0.9–3.6)
LYMPHOCYTES NFR BLD: 27 % (ref 21–52)
MCH RBC QN AUTO: 29.4 PG (ref 24–34)
MCHC RBC AUTO-ENTMCNC: 32.7 G/DL (ref 31–37)
MCV RBC AUTO: 89.7 FL (ref 78–100)
MONOCYTES # BLD: 0.7 K/UL (ref 0.05–1.2)
MONOCYTES NFR BLD: 8 % (ref 3–10)
NEUTS SEG # BLD: 5.4 K/UL (ref 1.8–8)
NEUTS SEG NFR BLD: 61 % (ref 40–73)
NRBC # BLD: 0 K/UL (ref 0–0.01)
NRBC BLD-RTO: 0 PER 100 WBC
PLATELET # BLD AUTO: 282 K/UL (ref 135–420)
PMV BLD AUTO: 10.3 FL (ref 9.2–11.8)
POTASSIUM SERPL-SCNC: 3.9 MMOL/L (ref 3.5–5.5)
RBC # BLD AUTO: 3.78 M/UL (ref 4.35–5.65)
SODIUM SERPL-SCNC: 136 MMOL/L (ref 136–145)
WBC # BLD AUTO: 8.9 K/UL (ref 4.6–13.2)

## 2022-08-19 PROCEDURE — 36415 COLL VENOUS BLD VENIPUNCTURE: CPT

## 2022-08-19 PROCEDURE — 65270000046 HC RM TELEMETRY

## 2022-08-19 PROCEDURE — 74011000250 HC RX REV CODE- 250: Performed by: INTERNAL MEDICINE

## 2022-08-19 PROCEDURE — 2709999900 HC NON-CHARGEABLE SUPPLY

## 2022-08-19 PROCEDURE — 80048 BASIC METABOLIC PNL TOTAL CA: CPT

## 2022-08-19 PROCEDURE — 82962 GLUCOSE BLOOD TEST: CPT

## 2022-08-19 PROCEDURE — 74011636637 HC RX REV CODE- 636/637: Performed by: INTERNAL MEDICINE

## 2022-08-19 PROCEDURE — 74011250636 HC RX REV CODE- 250/636: Performed by: INTERNAL MEDICINE

## 2022-08-19 PROCEDURE — 74011250637 HC RX REV CODE- 250/637: Performed by: INTERNAL MEDICINE

## 2022-08-19 PROCEDURE — 99232 SBSQ HOSP IP/OBS MODERATE 35: CPT | Performed by: FAMILY MEDICINE

## 2022-08-19 PROCEDURE — 85025 COMPLETE CBC W/AUTO DIFF WBC: CPT

## 2022-08-19 PROCEDURE — 90935 HEMODIALYSIS ONE EVALUATION: CPT

## 2022-08-19 RX ADMIN — TAMSULOSIN HYDROCHLORIDE 0.4 MG: 0.4 CAPSULE ORAL at 17:17

## 2022-08-19 RX ADMIN — NEPHROCAP 1 CAPSULE: 1 CAP ORAL at 17:18

## 2022-08-19 RX ADMIN — Medication 2 UNITS: at 17:18

## 2022-08-19 RX ADMIN — SODIUM CHLORIDE, PRESERVATIVE FREE 10 ML: 5 INJECTION INTRAVENOUS at 17:19

## 2022-08-19 RX ADMIN — SODIUM CHLORIDE, PRESERVATIVE FREE 10 ML: 5 INJECTION INTRAVENOUS at 06:28

## 2022-08-19 RX ADMIN — LOSARTAN POTASSIUM 50 MG: 50 TABLET, FILM COATED ORAL at 17:17

## 2022-08-19 RX ADMIN — Medication 4 UNITS: at 22:54

## 2022-08-19 RX ADMIN — HEPARIN SODIUM 5000 UNITS: 5000 INJECTION INTRAVENOUS; SUBCUTANEOUS at 17:18

## 2022-08-19 RX ADMIN — HEPARIN SODIUM 5000 UNITS: 5000 INJECTION INTRAVENOUS; SUBCUTANEOUS at 06:28

## 2022-08-19 NOTE — PROGRESS NOTES
Discharge planning    Spoke with Marci Cohen ([phone]) with [facility] concerning needed information. Per Marci Cohen, she has chest xray results and waiting on results for Hepatitis Antigen. She will check on medicaid ([id number]).     EMMA Diaz, RN  Pager # [phone]  Care Manager

## 2022-08-19 NOTE — DIABETES MGMT
Diabetes/ Glycemic Control Plan of Care    Patient with history of T2DM was seen in ED waiting area on 8/16/2022 with report of abdominal/flak pain and provided diabetes education prior to planned released. Then he was seen by nephrology for worsening renal function and was admitted with ESRD. 8/17/2022: Placement of internal jugular tunneled dialysis catheter  8/18/2022: Patient seen in dialysis unit. Patient stated that he has vial NPH insulin and syringes at home and plan to resume use at disch. He verbalized understanding of diabetes and insulin use to help prevent hyperglycemia. He  agreed to contact medical provider if he experience high blood sugar 300 and above that he cannot explain. He was also able to state how to treat low blood sugar less than 70 and when to contact medical provider: frequent BG less than 70 that he cannot explain. Patient also plan to attend diabetes classes at [facility] in [place]. Assessment today: Patient had one elevated fasting BG of 210 yesterday at 07:47. His fasting BG this morning was 157 at 07:34. Recommendations:   1.) cont correctional lispro as ordered. 2.) resume NPH insulin (home) at disch: 26 units every morning and 12 units every evening. DX:   1. End stage renal disease (HCC)        2. Elevated serum creatinine        3. Hyperglycemia        4. Pericardial effusion        5. ESRD on dialysis ([address]  INVASIVE VASCULAR PROCEDURE    INVASIVE VASCULAR PROCEDURE         Fasting/ Morning blood glucose:   Lab Results   Component Value Date/Time    Glucose 172 (H) 08/19/2022 04:14 AM    Glucose (POC) 157 (H) 08/19/2022 07:34 AM     IV Fluids containing dextrose: none    Steroids:  None    Blood glucose values: Within target range (70-180mg/dL):  NO. Fasting POC BG this morning of 157. Current insulin orders:   Correctional lispro insulin.  Normal sensitivity dose    Total Daily Dose previous 24 hours: Correctional lispro insulin 2 units    Current A1c:   Lab Results   Component Value Date/Time    Hemoglobin A1c 6.8 (H) 08/17/2022 02:57 AM      equivalent  to ave Blood Glucose of 148 mg/dl for 2-3 months prior to admission    Adequate glycemic control PTA: YES    Nutrition/Diet:   Active Orders   Diet    ADULT DIET Regular; 4 carb choices (60 gm/meal); Low Sodium (2 gm); Low Potassium (Less than 3000 mg/day); Low Phosphorus (Less than 1000 mg)      Meal Intake:  Patient Vitals for the past 168 hrs:   % Diet Eaten   08/17/22 0929 0%       Supplement Intake:  No data found. Home diabetes medications:  Patient reported differently on 8/15/2022 that he's only taking NPH insulin twice daily at home:  26 units daily every morning  12 units daily every evening  Key Antihyperglycemic Medications               insulin glargine (LANTUS) 100 unit/mL injection 12 Units by SubCUTAneous route daily. insulin lispro (HUMALOG) 100 unit/mL injection For Blood Sugar (mg/dL) of:   Less than 150 =  0 units  150 -199 =  3 units  200 -249 =  6 units  250 -299 =  9 units  300 -349 =  12 units  350 and above =  15 units, call MD.            Plan/Goals:   Blood glucose will be within target of 70 - 180 mg/dl within 72 hours  Reinforce dietary and medication compliance at home.           Education:  [x] Refer to Diabetes Education Record: 8/16/2022                       [] Education not indicated at this time     Karlee Wynn RN CCM

## 2022-08-19 NOTE — DIALYSIS
ACUTE HEMODIALYSIS FLOW SHEET    HEMODIALYSIS ORDERS: Physician: Dr. Lawyer Bauman: Alicia   Duration: 3.0 hr   BFR: 300   DFR: 600   Dialysate:  Temp 36-37*C   K+  2    Ca+ 2.5   Na 138   Bicarb 35   Wt Readings from Last 1 Encounters:   08/17/22 98.9 kg (218 lb)    Patient Chart [x]   Unable to Obtain []  Dry weight/UF Goal: 1500 ml    Heparin []  Bolus    Units    [] Hourly    Units    [x]None       Pre BP: 145/83  Pulse: 73  Respirations: 16 Temp: 98.1  [] Oral  [x] Ax  [] Esoph   Labs: []  Pre  []  Post:   [x] N/A   Additional Orders (medications, blood products, hypotension management): [] Yes   [x] No     [x]  [facility] Consent Verified     CATHETER ACCESS:  []N/A   [x]Right   []Left   []IJ   []Fem  [x]Chest wall  []TransHepatic   [] First use X-ray verified     [x]Tunnel    [] Non Tunneled   [x]No S/S infection  []Redness  []Drainage []Cultured []Swelling []Pain   [x]Medical Aseptic Prep Utilized   []Dressing Changed  [] Biopatch  Date: 08/19/22   []Clotted   [x]Patent   Flows: [x]Good  []Poor  []Reversed   If access problem,  notified: []Yes    [x]N/A        GRAFT/FISTULA ACCESS:   [x]N/A     []Right     []Left     []UE     []LE                   GENERAL ASSESSMENT:    LUNGS:  Resp Rate 16   [] Clear  [] Coarse  [] Crackles  [] Wheezing  [x] Diminished                                                           [x] RLL   [x] LLL  [x] RUL   [x] AZUCENA            Respirations:  [x]Easy  []Labored  []N/A  Cough:  []Productive  []Dry  []N/A               Therapy:  [x]RA   [] Ventilated   [] Intubated   [] Trach            O2 Device:  [] NC   [] NRB  [] Trach Mask  [] BiPaP  Flow:   l/min                                                    CARDIAC: [x] Regular      [] Irregular   [] Rhythm:not monitored          [] Monitored   [] Bedside   [] Remotely monitored       EDEMA: [] None   [x]Generalized  [] Pitting [x] 1+   [] 2 +   [] 3+    [] 4+        SKIN:   [] Hot     [] Cold    [x] Warm   [x] Dry [] Diaphoretic                 [] Flushed  [] Jaundiced  [] Cyanotic  [] Pale      LOC:    [x] Alert      [x]Oriented:    [x] Person     [x] Place   [x]Time               [] Confused  [] Lethargic  [] Medicated  [] Non-responsive  [] Non-Verbal     GI / ABDOMEN:                     [] Flat    [] Distended    [x] Soft    [] Firm   []  Obese                   [] Diarrhea   [] FMS [x] Bowel Sounds  [] Nausea  [] Vomiting                   [] NGT  [] OGT  [] PEG  [] Tube Feedings @     mL/hr     / URINE ASSESSMENT:                   [] Voiding    [x] Oliguria  [] Anuria                     []  Light   [] Incontinent  []  Incontinent Brief   []  PureWick     PAIN:  [x] 0 []1  []2   []3   []4   []5   []6   []7   []8   []9   []10                MOBILITY:  [x] Bed    [] Stretcher      All Vitals and Treatment Details on Attached [address]: [facility]          Room # 466/01    [x] Routine         [] 1st Time Acute/Chronic   [] Urgent      [] Stat            [x] Acute Room   []  Bedside    [] ICU/CCU     [] ER     Isolation Precautions:  [x] Dialysis    There are currently no Active Isolations     ALLERGIES:     Allergies   Allergen Reactions    Tylenol [Acetaminophen] Other (comments)     Pt was advised not to take Tylenol per Dr. Marilin Dutta Status:  Full Code     Hepatitis Status      Lab Results   Component Value Date/Time    Hepatitis A, IgM NEGATIVE  08/09/2014 08:40 AM    Hepatitis B surface Ag <0.10 08/17/2022 02:57 AM    Hepatitis B surface Ab 289.10 08/17/2022 02:57 AM    Hepatitis B core, IgM NEGATIVE  08/09/2014 08:40 AM    Hep B Core Ab, total Negative 08/17/2022 02:57 AM    Hepatitis C virus Ab >11.00 (H) 08/17/2022 02:57 AM        Current Labs:      Lab Results   Component Value Date/Time    WBC 8.9 08/19/2022 04:14 AM    HGB 11.1 (L) 08/19/2022 04:14 AM    HCT 33.9 (L) 08/19/2022 04:14 AM    PLATELET 412 76/54/5629 04:14 AM    MCV 89.7 08/19/2022 04:14 AM     Lab Results   Component Value Date/Time Sodium 136 08/19/2022 04:14 AM    Potassium 3.9 08/19/2022 04:14 AM    Chloride 102 08/19/2022 04:14 AM    CO2 26 08/19/2022 04:14 AM    Anion gap 8 08/19/2022 04:14 AM    Glucose 172 (H) 08/19/2022 04:14 AM    BUN 45 (H) 08/19/2022 04:14 AM    Creatinine 4.04 (H) 08/19/2022 04:14 AM    BUN/Creatinine ratio 11 (L) 08/19/2022 04:14 AM    GFR est AA 18 (L) 08/19/2022 04:14 AM    GFR est non-AA 15 (L) 08/19/2022 04:14 AM    Calcium 9.5 08/19/2022 04:14 AM          DIET:  DIET ADULT     PRIMARY NURSE REPORT:   Pre Dialysis: MARCEL Martins RN    Time: 3522      EDUCATION:    [x] Patient           Knowledge Basis: []None [x]Minimal [] Substantial [] Unknown  Barriers to learning  [x]None  [] Intubated/Trached/Ventilated  [] Sedated/Paralyzed   [] Access Care     [] S&S of infection  [] Fluid Management  [] K+   [x] Procedural    [] Medications   [] Tx Options   [] Transplant   [] Diet      Teaching Tools:  [x] Explain  [] Demo  [] Handouts [] Video  Patient response: [x] Verbalized understanding   [] Requires follow up        [x] Time Out/Safety Check    [x] Extracorporeal Circuit Tested for integrity       RO/HEMODIALYSIS MACHINE SAFETY CHECKS - Before each treatment:        [address] [place] [address]                                     [x] Unit Machine # 3 with centralized RO                                                                                                                                Alarm Test:  Pass time 0800            [x] RO/Machine Log Complete    Machine Temp    36-37*C             Dialysate: pH  7.4    Conductivity: Meter 14.0    HD Machine  14.0     TCD: 13.8  Dialyzer Lot # [id number]     Blood Tubing Lot # [id number]     Saline Lot # [id number]     CHLORINE TESTING-Before each treatment and every 4 hours    Total Chlorine: [x] less than 0.1 ppm  Initial Time Check: 0800       4 Hr/2nd Check Time: N/A   (if greater than 0.1 ppm from Primary then every 30 minutes from Secondary)     TREATMENT INITIATION - with Dialysis Precautions:   [x] All Connections Secured              [x] Saline Line Double Clamped   [x] Venous Parameters Set               [x] Arterial Parameters Set    [x] Prime Given 250ml NSS              [x]Air Foam Detector Engaged        Treatment Initiation Note:  0840--Pt arrived for dialysis, Pt assessed and is stable for dialysis(see above note). 0850--HD initiated without difficulty with 1000 units bolus and 500 units/hr maintenance. During Treatment Notes:  0900  Face & Vascular access visible with art and thong line connections intact. Pt tolerating dialysis. 0915  Face & Vascular access visible with art and thong line connections intact. Pt tolerating dialysis. 0930  Face & Vascular access visible with art and thong line connections intact. Pt tolerating dialysis. 0945  Face & Vascular access visible with art and thong line connections intact. Pt tolerating dialysis. 1000  Face & Vascular access visible with art and thong line connections intact. Pt tolerating dialysis. 1015  Face & Vascular access visible with art and thong line connections intact. Pt tolerating dialysis. 1030  Face & Vascular access visible with art and thong line connections intact. Pt tolerating dialysis. 1045  Face & Vascular access visible with art and thong line connections intact. Pt tolerating dialysis. 1100  Face & Vascular access visible with art and thong line connections intact. Pt tolerating dialysis. 1115  Face & Vascular access visible with art and thong line connections intact. Pt tolerating dialysis. 1130  Face & Vascular access visible with art and thong line connections intact. Pt tolerating dialysis. 1145  Face & Vascular access visible with art and thong line connections intact. Pt tolerating dialysis. 1155  Dialysis treatment complete.        Medication    Dose    Volume Route      Time       [place] Nurse   none   HD  Mancel Foil, RN      HD  Mancel Foil, RN      HD  Mancel Foil, RN     Post Assessment  Dialyzer Cleared:   [] Good  [x] Fair  [] Poor  Blood processed:  50.2 L  UF Removed:  1500 Ml    Post BP: 172/102  Pulse: 67  Respirations: 16   Temp: 98.5  [] Oral  [x] Ax  [] Esophageal   Lungs: [] Clear                [x] No change from initial assessment   Post Tx Vascular Access: [x] N/A     Cardiac:  [x] Regular   [] Irregular   Rhythm:  [] Monitored   [x] Not Monitored    CVC Catheter: [] N/A  Locking solution: Heparin 1:1000 U  Arterial port 1.6 ml   Venous port 1.6 ml   Edema:  [x] None  [] Generalized                     Skin:[x] Warm  [x] Dry [] Diaphoretic               [] Flushed  [] Pale [] Cyanotic Pain:  [x]0  []1-2  []3-4  []5-6   []7-8  []9-10         Post Treatment Note:   Pt tolerated dialysis well. Dialysis catheter intact, patent and heplocked as noted above. POST TREATMENT PRIMARY NURSE HANDOFF REPORT:   Post Dialysis: MARCEL Saleh RN        Time:  4933       Abbreviations: AVG-arterial venous graft, AVF-arterial venous fistula, IJ-Internal Jugular, Subcl-Subclavian, Fem-Femoral, Tx-treatment, AP/HR-apical heart rate, VSS- Vital Signs Stable, CVC- Central Venous Catheter, DFR-dialysate flow rate, BFR-blood flow rate, AP-arterial pressure, -venous pressure, UF-ultrafiltrate, TMP-transmembrane pressure, Alpesh-Venous, Art-Arterial, RO-Reverse Osmosis

## 2022-08-19 NOTE — PROGRESS NOTES
Progress Note    Mariel Contreras  75 y.o. Admit Date: 8/16/2022  Active Problems:    Type 2 diabetes mellitus with insulin therapy ([facility] (7/6/2015) POA: Yes      Homelessness (10/19/2015) POA: Yes      CHF (congestive heart failure) ([facility] (11/30/2015) POA: Unknown      Systolic CHF, chronic ([facility] (7/10/2016) POA: Yes      Elevated serum creatinine (8/10/2016) POA: Unknown      Hypertension (1/1/2000) POA: Yes      ESRD (end stage renal disease) ([facility] (8/16/2022) POA: Unknown      Substance abuse ([facility] (8/16/2022) POA: Unknown      Anemia (8/16/2022) POA: Unknown      End stage renal disease ([facility] (8/16/2022) POA: Unknown      Pericardial effusion (8/16/2022) POA: Unknown      Hyperglycemia (8/16/2022) POA: Unknown      CKD (chronic kidney disease) stage 5, GFR less than 15 ml/min ([facility] (8/16/2022) POA: Unknown            Subjective:     Patient feels in good. Eating lunch. Earlier completed her 3 consecutive dialysis treatment as an inpatient without any problem. His hepatitis core antibody result is still pending and ureterography daily until beginning of next week or so. Outpatient dialysis center search is in progress. Discussed with [address] [place] [address] unit. A comprehensive review of systems was negative except for that written in the History of Present Illness. Objective:     Visit Vitals  BP (!) 146/84 (BP 1 Location: Left upper arm, BP Patient Position: Semi fowlers; Lying)   Pulse 75   Temp 98.5 °F (36.9 °C)   Resp 16   Ht 6' (1.829 m)   Wt 98.9 kg (218 lb)   SpO2 97%   BMI 29.57 kg/m²         Intake/Output Summary (Last 24 hours) at 8/19/2022 1258  Last data filed at 8/19/2022 1219  Gross per 24 hour   Intake 240 ml   Output 1900 ml   Net -1660 ml       Current Facility-Administered Medications   Medication Dose Route Frequency Provider Last Rate Last Admin    nitroglycerin (NITROBID) 2 % ointment 0.5 Inch  0.5 Inch Topical Q6H PRN Luisa DAVIS DO   0.5 Inch at 08/18/22 0122 hydrALAZINE (APRESOLINE) 20 mg/mL injection 10 mg  10 mg IntraVENous Q6H PRN Aubrey Horner DO        losartan (COZAAR) tablet 50 mg  50 mg Oral DAILY Sylvain Medina MD   50 mg at 08/18/22 1209    B complex-vitaminC-folic acid (NEPHROCAP) cap  1 Capsule Oral DAILY Sylvain Medina MD   1 Capsule at 08/18/22 1209    insulin lispro (HUMALOG) injection   SubCUTAneous AC&HS Carter Werner MD   2 Units at 08/18/22 1815    glucose chewable tablet 16 g  4 Tablet Oral PRN Carter Werner MD        glucagon (GLUCAGEN) injection 1 mg  1 mg IntraMUSCular PRN Carter Werner MD        dextrose 10% infusion 0-250 mL  0-250 mL IntraVENous PRN Carter Werner MD        [facility] [place] by provider] aspirin tablet 325 mg  325 mg Oral DAILY Carter Werner MD        [facility]) capsule 0.4 mg  0.4 mg Oral DAILY Carter Werner MD   0.4 mg at 08/18/22 1209    sodium chloride (NS) flush 5-40 mL  5-40 mL IntraVENous Q8H Carter Werner MD   10 mL at 08/19/22 0730    sodium chloride (NS) flush 5-40 mL  5-40 mL IntraVENous PRN Carter Werner MD        acetaminophen (TYLENOL) tablet 650 mg  650 mg Oral Q6H PRN Carter Werner MD        Or    acetaminophen (TYLENOL) suppository 650 mg  650 mg Rectal Q6H PRN Carter Werner MD        polyethylene glycol (MIRALAX) packet 17 g  17 g Oral DAILY PRN Carter Werner MD        promethazine (PHENERGAN) tablet 12.5 mg  12.5 mg Oral Q6H PRN Carter Werner MD        Or    [facility]) injection 4 mg  4 mg IntraVENous Q6H PRN Carter Werner MD        heparin (porcine) injection 5,000 Units  5,000 Units SubCUTAneous Q8H Carter Werner MD   5,000 Units at 08/19/22 6937        Physical Exam:     Physical Exam:   General:  Alert, cooperative, no distress, appears stated age. Eyes:  Conjunctivae/corneas clear. PERRL, EOMs intact.     Mouth/Throat: Lips, mucosa, and tongue normal. Teeth and gums normal.   Neck: Supple, symmetrical, trachea midline, no adenopathy, thyroid: no enlargement/tenderness/nodules, no carotid bruit and no JVD. Lungs:   Clear to auscultation bilaterally. Heart:  Regular rate and rhythm, S1, S2 normal, no murmur, click, rub or gallop. Abdomen:   Soft, non-tender. Bowel sounds normal. No masses,  No organomegaly. Extremities: Extremities normal, atraumatic, no cyanosis or edema, has right IJ tunneled dialysis catheter   Pulses: 2+ and symmetric all extremities. Skin: Skin color, texture, turgor normal. No rashes or lesions   Lymph nodes: Cervical, supraclavicular, and axillary nodes normal.   Neurologic: CNII-XII intact. Normal strength, sensation and reflexes throughout.          Data Review:    CBC w/Diff    Recent Labs     08/19/22 0414 08/18/22 0252 08/17/22 0257   WBC 8.9 7.9 6.9   RBC 3.78* 3.45* 3.37*   HGB 11.1* 10.3* 10.2*   HCT 33.9* 30.9* 30.3*   MCV 89.7 89.6 89.9   MCH 29.4 29.9 30.3   MCHC 32.7 33.3 33.7   RDW 13.8 13.9 13.9    Recent Labs     08/19/22 0414 08/18/22 0252 08/17/22  0257   MONOS 8 9 10   EOS 3 3 6*   BASOS 0 1 1   RDW 13.8 13.9 13.9        Comprehensive Metabolic Profile    Recent Labs     08/19/22 0414 08/18/22 0252 08/17/22 0257    139 141   K 3.9 3.8 3.9    107 112*   CO2 26 25 21   BUN 45* 60* 105*   CREA 4.04* 3.94* 5.62*    Recent Labs     08/19/22 0414 08/18/22 0252 08/17/22 0257   CA 9.5 8.7 9.3  9.2   PHOS  --  3.3 5.3*                        Impression:       Active Hospital Problems    Diagnosis Date Noted    [facility] 08/16/2022    [facility] 08/16/2022    Anemia 08/16/2022    End stage renal disease [facility] 08/16/2022    Pericardial effusion 08/16/2022    Hyperglycemia 08/16/2022    CKD (chronic kidney disease) stage 5, GFR less than 15 ml/min ([facility]) 08/16/2022    Elevated serum creatinine 28/23/8219    Systolic CHF, chronic ([facility] 07/10/2016    CHF (congestive heart failure) ([facility] 11/30/2015    Homelessness 10/19/2015    Type 2 diabetes mellitus with insulin therapy ([facility] 07/06/2015    Hypertension 01/01/2000            Plan:     No plan for any dialysis in the weekend Next dialysis will be on Monday. Hopefully by this time her appetite is core antibodies results will be available. And in the meantime [facility] unit also will accept him as a patient and probably by that time patient will be ready to be discharged.       Hari Dang MD

## 2022-08-19 NOTE — PROGRESS NOTES
Bedside report given to PB Hilario. Pt resting quietly in bed at this time, call bell within reach, vital signs stable.

## 2022-08-19 NOTE — PROGRESS NOTES
[facility] Hospitalists  Progress Note    Patient: Candy Aguila Age: 75 y.o. : 1955 MR#: [id number] SSN: [id number]  Date: 2022     Subjective/24-hour events:     Patient seen on dialysis. No shortness of breath or other complaints reported. Assessment:   CKD 5 now ESRD, HD dependent  Hypertension  DM2 with hyperglycemia  Chronic systolic and diastolic CHF, EF 68% with grade 2 DD by echo 2018, now 55 to 60% by echo 2020  Cocaine abuse - UDS positive on admission  Homelessness - patient living in shelter prior to admission    Plan:   Hemodialysis management per usual schedule. Hemodialysis per nephrology. Discussed with Dr. Jazmyne Thayer - outpatient unit being worked on but will arrangements will likely not be finalized until early next week. Adjust insulin and antihypertensive therapy as necessary. Mobilize as much as able. Disposition was outpatient hemodialysis arrangements in place and safe disposition confirmed. Case discussed with:  [x]Patient  []Family  [x]Nursing  [x]Case Management  DVT Prophylaxis:  []Lovenox  [x]Hep SQ  []SCDs  []Coumadin   []On Heparin gtt    Objective:   VS: Visit Vitals  BP (!) 146/84 (BP 1 Location: Left upper arm, BP Patient Position: Semi fowlers; Lying)   Pulse 75   Temp 98.5 °F (36.9 °C)   Resp 16   Ht 6' (1.829 m)   Wt 98.9 kg (218 lb)   SpO2 97%   BMI 29.57 kg/m²      Tmax/24hrs: Temp (24hrs), Av.6 °F (37 °C), Min:98.1 °F (36.7 °C), Max:99 °F (37.2 °C)    Intake/Output Summary (Last 24 hours) at 2022 1328  Last data filed at 2022 1219  Gross per 24 hour   Intake 240 ml   Output 1900 ml   Net -1660 ml       General:  In NAD. Nontoxic-appearing. Cardiovascular:  RRR. Pulmonary:  Lungs clear bilaterally, no wheezes. No accessory muscle use. GI:  Abdomen soft, NTTP. Extremities:  Warm, no edema or ischemia. Neuro:  Awake and alert.   Moves extremities spontaneously, motor grossly nonfocal.    Labs:    Recent Results (from the past 24 hour(s))   GLUCOSE, POC    Collection Time: 08/18/22  3:50 PM   Result Value Ref Range    Glucose (POC) 179 (H) 70 - 110 mg/dL   GLUCOSE, POC    Collection Time: 08/18/22 10:23 PM   Result Value Ref Range    Glucose (POC) 149 (H) 70 - 012 mg/dL   METABOLIC PANEL, BASIC    Collection Time: 08/19/22  4:14 AM   Result Value Ref Range    Sodium 136 136 - 145 mmol/L    Potassium 3.9 3.5 - 5.5 mmol/L    Chloride 102 100 - 111 mmol/L    CO2 26 21 - 32 mmol/L    Anion gap 8 3.0 - 18 mmol/L    Glucose 172 (H) 74 - 99 mg/dL    BUN 45 (H) 7.0 - 18 MG/DL    Creatinine 4.04 (H) 0.6 - 1.3 MG/DL    BUN/Creatinine ratio 11 (L) 12 - 20      GFR est AA 18 (L) >60 ml/min/1.73m2    GFR est non-AA 15 (L) >60 ml/min/1.73m2    Calcium 9.5 8.5 - 10.1 MG/DL   CBC WITH AUTOMATED DIFF    Collection Time: 08/19/22  4:14 AM   Result Value Ref Range    WBC 8.9 4.6 - 13.2 K/uL    RBC 3.78 (L) 4.35 - 5.65 M/uL    HGB 11.1 (L) 13.0 - 16.0 g/dL    HCT 33.9 (L) 36.0 - 48.0 %    MCV 89.7 78.0 - 100.0 FL    MCH 29.4 24.0 - 34.0 PG    MCHC 32.7 31.0 - 37.0 g/dL    RDW 13.8 11.6 - 14.5 %    PLATELET 588 510 - 417 K/uL    MPV 10.3 9.2 - 11.8 FL    NRBC 0.0 0  WBC    ABSOLUTE NRBC 0.00 0.00 - 0.01 K/uL    NEUTROPHILS 61 40 - 73 %    LYMPHOCYTES 27 21 - 52 %    MONOCYTES 8 3 - 10 %    EOSINOPHILS 3 0 - 5 %    BASOPHILS 0 0 - 2 %    IMMATURE GRANULOCYTES 0 0.0 - 0.5 %    ABS. NEUTROPHILS 5.4 1.8 - 8.0 K/UL    ABS. LYMPHOCYTES 2.4 0.9 - 3.6 K/UL    ABS. MONOCYTES 0.7 0.05 - 1.2 K/UL    ABS. EOSINOPHILS 0.3 0.0 - 0.4 K/UL    ABS. BASOPHILS 0.0 0.0 - 0.1 K/UL    ABS. IMM.  GRANS. 0.0 0.00 - 0.04 K/UL    DF AUTOMATED     GLUCOSE, POC    Collection Time: 08/19/22  7:34 AM   Result Value Ref Range    Glucose (POC) 157 (H) 70 - 110 mg/dL   GLUCOSE, POC    Collection Time: 08/19/22 12:17 PM   Result Value Ref Range    Glucose (POC) 129 (H) 70 - 110 mg/dL       Signed By: Freddie Davis MD     August 19, 2022

## 2022-08-20 LAB
GLUCOSE BLD STRIP.AUTO-MCNC: 172 MG/DL (ref 70–110)
GLUCOSE BLD STRIP.AUTO-MCNC: 182 MG/DL (ref 70–110)
GLUCOSE BLD STRIP.AUTO-MCNC: 183 MG/DL (ref 70–110)
GLUCOSE BLD STRIP.AUTO-MCNC: 212 MG/DL (ref 70–110)

## 2022-08-20 PROCEDURE — 74011250636 HC RX REV CODE- 250/636: Performed by: INTERNAL MEDICINE

## 2022-08-20 PROCEDURE — 74011250637 HC RX REV CODE- 250/637: Performed by: INTERNAL MEDICINE

## 2022-08-20 PROCEDURE — 74011000250 HC RX REV CODE- 250: Performed by: INTERNAL MEDICINE

## 2022-08-20 PROCEDURE — 74011636637 HC RX REV CODE- 636/637: Performed by: INTERNAL MEDICINE

## 2022-08-20 PROCEDURE — 65270000046 HC RM TELEMETRY

## 2022-08-20 PROCEDURE — 82962 GLUCOSE BLOOD TEST: CPT

## 2022-08-20 PROCEDURE — 99232 SBSQ HOSP IP/OBS MODERATE 35: CPT | Performed by: FAMILY MEDICINE

## 2022-08-20 RX ADMIN — HEPARIN SODIUM 5000 UNITS: 5000 INJECTION INTRAVENOUS; SUBCUTANEOUS at 22:25

## 2022-08-20 RX ADMIN — HEPARIN SODIUM 5000 UNITS: 5000 INJECTION INTRAVENOUS; SUBCUTANEOUS at 06:07

## 2022-08-20 RX ADMIN — HEPARIN SODIUM 5000 UNITS: 5000 INJECTION INTRAVENOUS; SUBCUTANEOUS at 17:38

## 2022-08-20 RX ADMIN — NEPHROCAP 1 CAPSULE: 1 CAP ORAL at 10:20

## 2022-08-20 RX ADMIN — LOSARTAN POTASSIUM 50 MG: 50 TABLET, FILM COATED ORAL at 10:20

## 2022-08-20 RX ADMIN — Medication 2 UNITS: at 17:38

## 2022-08-20 RX ADMIN — SODIUM CHLORIDE, PRESERVATIVE FREE 10 ML: 5 INJECTION INTRAVENOUS at 22:25

## 2022-08-20 RX ADMIN — Medication 2 UNITS: at 08:53

## 2022-08-20 RX ADMIN — Medication 4 UNITS: at 12:53

## 2022-08-20 RX ADMIN — Medication 2 UNITS: at 22:25

## 2022-08-20 RX ADMIN — TAMSULOSIN HYDROCHLORIDE 0.4 MG: 0.4 CAPSULE ORAL at 10:20

## 2022-08-20 RX ADMIN — ACETAMINOPHEN 650 MG: 325 TABLET, FILM COATED ORAL at 10:20

## 2022-08-20 NOTE — PROGRESS NOTES
Bedside report given to Salina Katz RN. Pt resting quietly in bed at this time, call bell within reach, vital signs stable.

## 2022-08-20 NOTE — PROGRESS NOTES
Progress Note    Mariel Contreras  75 y.o. Admit Date: 8/16/2022  Active Problems:    Type 2 diabetes mellitus with insulin therapy ([facility] (7/6/2015) POA: Yes      Homelessness (10/19/2015) POA: Yes      [facility]congest[facility]) [facility] (11/30/2015) POA: Unknown      Systolic CHF, chronic ([facility] (7/10/2016) POA: Yes      Elevated serum creatinine (8/10/2016) POA: Unknown      Hypertension (1/1/2000) POA: Yes      ESRD (end stage renal disease) ([facility] (8/16/2022) POA: Unknown      Substance abuse ([facility] (8/16/2022) POA: Unknown      Anemia (8/16/2022) POA: Unknown      End stage renal disease ([facility] (8/16/2022) POA: Unknown      Pericardial effusion (8/16/2022) POA: Unknown      Hyperglycemia (8/16/2022) POA: Unknown      CKD (chronic kidney disease) stage 5, GFR less than 15 ml/min ([facility] (8/16/2022) POA: Unknown            Subjective:     Patient feels good no new complaint. Mild to mild bleeding from the catheter site last night that has been stopped. Still waiting for the hepatitis B core antibody result. A comprehensive review of systems was negative except for that written in the History of Present Illness.     Objective:     Visit Vitals  /84   Pulse 70   Temp 99.1 °F (37.3 °C)   Resp 14   Ht 6' (1.829 m)   Wt 94.4 kg (208 lb 3.2 oz)   SpO2 97%   BMI 28.24 kg/m²       No intake or output data in the 24 hours ending 08/20/22 1411    Current Facility-Administered Medications   Medication Dose Route Frequency Provider Last Rate Last Admin    nitroglycerin (NITROBID) 2 % ointment 0.5 Inch  0.5 Inch Topical Q6H PRN Luisa DAVIS DO   0.5 Inch at 08/18/22 0124    hydrALAZINE (APRESOLINE) 20 mg/mL injection 10 mg  10 mg IntraVENous Q6H PRN Aubrey Horner, DO        losartan (COZAAR) tablet 50 mg  50 mg Oral DAILY Feli Adrian MD   50 mg at 08/20/22 1020    B complex-vitaminC-folic acid (NEPHROCAP) cap  1 Capsule Oral DAILY Feli Adrian MD   1 Capsule at 08/20/22 1020    insulin lispro (HUMALOG) injection   SubCUTAneous AC&HS Constanza Cobos MD   4 Units at 08/20/22 1253    glucose chewable tablet 16 g  4 Tablet Oral PRN Constanza Cobos MD        glucagon (GLUCAGEN) injection 1 mg  1 mg IntraMUSCular PRN Constanza Cobos MD        dextrose 10% infusion 0-250 mL  0-250 mL IntraVENous PRN Constanza Cobos MD        [facility]] aspirin tablet 325 mg  325 mg Oral DAILY Constanza Cobos MD        [facility]) capsule 0.4 mg  0.4 mg Oral DAILY Constanza Cobos MD   0.4 mg at 08/20/22 1020    sodium chloride (NS) flush 5-40 mL  5-40 mL IntraVENous Q8H Constanza Cobos MD   10 mL at 08/19/22 1719    sodium chloride (NS) flush 5-40 mL  5-40 mL IntraVENous PRN Constanza Cobos MD        acetaminophen (TYLENOL) tablet 650 mg  650 mg Oral Q6H PRN Constanza Cobos MD   650 mg at 08/20/22 1020    Or    acetaminophen (TYLENOL) suppository 650 mg  650 mg Rectal Q6H PRN Constanza Cobos MD        polyethylene glycol (MIRALAX) packet 17 g  17 g Oral DAILY PRN Constanza Cobos MD        promethazine (PHENERGAN) tablet 12.5 mg  12.5 mg Oral Q6H PRN Constanza Cobos MD        Or    ondansetron [facility]) injection 4 mg  4 mg IntraVENous Q6H PRN Constanza Cobos MD        heparin (porcine) injection 5,000 Units  5,000 Units SubCUTAneous Q8H Constanza Cobos MD   5,000 Units at 08/20/22 0607        Physical Exam:     Physical Exam:   General:  Alert, cooperative, no distress, appears stated age. Neck: Supple, symmetrical, trachea midline, no adenopathy, thyroid: no enlargement/tenderness/nodules, no carotid bruit and no JVD. Lungs:   Clear to auscultation bilaterally. Heart:  Regular rate and rhythm, S1, S2 normal, no murmur, click, rub or gallop. Abdomen:   Soft, non-tender. Bowel sounds normal. No masses,  No organomegaly. Extremities: Extremities normal, atraumatic, no cyanosis or edema, hemodialysis catheter is well dressed.    Skin: Skin color, texture, turgor normal. No rashes or lesions         Data Review:    CBC w/Diff    Recent Labs     08/19/22 0414 08/18/22  0252   WBC 8.9 7.9   RBC 3.78* 3.45*   HGB 11.1* 10.3*   HCT 33.9* 30.9*   MCV 89.7 89.6   MCH 29.4 29.9   MCHC 32.7 33.3   RDW 13.8 13.9    Recent Labs     08/19/22  0414 08/18/22  0252   MONOS 8 9   EOS 3 3   BASOS 0 1   RDW 13.8 13.9        Comprehensive Metabolic Profile    Recent Labs     08/19/22 0414 08/18/22 0252    139   K 3.9 3.8    107   CO2 26 25   BUN 45* 60*   CREA 4.04* 3.94*    Recent Labs     08/19/22 0414 08/18/22 0252   CA 9.5 8.7   PHOS  --  3.3                        Impression:       Active Hospital Problems    Diagnosis Date Noted    ESRD (end stage renal disease) ([facility] 08/16/2022    [facility] 08/16/2022    Anemia 08/16/2022    End stage renal disease ([facility] 08/16/2022    Pericardial effusion 08/16/2022    Hyperglycemia 08/16/2022    CKD (chronic kidney disease) stage 5, GFR less than 15 ml/min ([facility]) 08/16/2022    Elevated serum creatinine 27/78/2319    Systolic CHF, chronic ([facility] 07/10/2016    CHF (congestive heart failure) ([facility] 11/30/2015    Homelessness 10/19/2015    Type 2 diabetes mellitus with insulin therapy ([facility] 07/06/2015    Hypertension 01/01/2000            Plan:   New hemodialysis patient. Completed first 3 consecutive dialysis treatment yesterday. No need for any dialysis today. Outpatient dialysis center finding is in process. Waiting for the hepatitis B core antibody result. Next dialysis will be on Monday. Once hepatitis B core antibody results is available and the patient dialysis center is finalized and then the patient can be discharged.       Brianna Joyner MD

## 2022-08-20 NOTE — PROGRESS NOTES
0518 SBp   177/87 Nitroglycerin ointment 0.5 topically on chest      Bedside and Verbal shift change report given to Trina Joyce (oncoming nurse) by Robert Simpson RN (offgoing nurse). Report included the following information SBAR, Kardex, Intake/Output, and MAR.

## 2022-08-20 NOTE — PROGRESS NOTES
[facility] Hospitalists  Progress Note    Patient: Cindy Clay Age: 75 y.o. : 1955 MR#: [id number] SSN: [id number]  Date: 2022     Subjective/24-hour events: Tolerated dialysis well yesterday, no new clinical issues overnight. Nursing staff does report noting seeing some mild bleeding at site of dialysis catheter this morning. Pressure dressing was applied adequate result. Assessment:   CKD 5 now ESRD, HD dependent  Hypertension  DM2 with hyperglycemia  Chronic systolic and diastolic CHF, EF 39% with grade 2 DD by echo 2018, now 55 to 60% by echo 2020  Cocaine abuse - UDS positive on admission  Homelessness - patient living in shelter prior to admission    Plan:   Continue to monitor HD catheter for any additional bleeding. Have asked nursing staff to call with concerns. Continue HD management per nephrology. Outpatient hemodialysis being worked on but likely to not be finalized until early next week per discussion with nephrology yesterday. Continue to adjust insulin for attempted optimizing glycemic control. Adjust antihypertensive as necessary for better blood pressure control. Mobilize as much as able/tolerated. Will need to verify safe disposition and ability to get back and forth to hemodialysis as patient is homeless. Care management already involved will follow-up with CM on Monday.     Case discussed with:  [x]Patient  []Family  [x]Nursing  [x]Case Management  DVT Prophylaxis:  []Lovenox  [x]Hep SQ  []SCDs  []Coumadin   []On Heparin gtt    Objective:   VS: Visit Vitals  BP (!) 158/82 (BP 1 Location: Left upper arm, BP Patient Position: At rest;Semi fowlers)   Pulse 67   Temp 98.8 °F (37.1 °C)   Resp 19   Ht 6' (1.829 m)   Wt 94.4 kg (208 lb 3.2 oz)   SpO2 94%   BMI 28.24 kg/m²      Tmax/24hrs: Temp (24hrs), Av.9 °F (37.2 °C), Min:98.5 °F (36.9 °C), Max:99.4 °F (37.4 °C)    Intake/Output Summary (Last 24 hours) at 2022 0842  Last data filed at 8/19/2022 1219  Gross per 24 hour   Intake 240 ml   Output 1500 ml   Net -1260 ml       General:  In NAD. Nontoxic-appearing. Cardiovascular:  RRR. Pulmonary:  Lungs clear bilaterally, no wheezes. GI:  Abdomen soft, NTTP. Extremities:  Warm, no edema or ischemia. Neuro:  Awake and alert.   Moves extremities spontaneously, motor grossly nonfocal.    Labs:    Recent Results (from the past 24 hour(s))   GLUCOSE, POC    Collection Time: 08/19/22 12:17 PM   Result Value Ref Range    Glucose (POC) 129 (H) 70 - 110 mg/dL   GLUCOSE, POC    Collection Time: 08/19/22  3:27 PM   Result Value Ref Range    Glucose (POC) 154 (H) 70 - 110 mg/dL   GLUCOSE, POC    Collection Time: 08/19/22  9:38 PM   Result Value Ref Range    Glucose (POC) 214 (H) 70 - 110 mg/dL   GLUCOSE, POC    Collection Time: 08/20/22  8:29 AM   Result Value Ref Range    Glucose (POC) 183 (H) 70 - 110 mg/dL       Signed By: Osvaldo Friedman MD     August 20, 2022

## 2022-08-21 LAB
ANION GAP SERPL CALC-SCNC: 6 MMOL/L (ref 3–18)
BASOPHILS # BLD: 0.1 K/UL (ref 0–0.1)
BASOPHILS NFR BLD: 1 % (ref 0–2)
BUN SERPL-MCNC: 58 MG/DL (ref 7–18)
BUN/CREAT SERPL: 13 (ref 12–20)
CALCIUM SERPL-MCNC: 9.5 MG/DL (ref 8.5–10.1)
CHLORIDE SERPL-SCNC: 103 MMOL/L (ref 100–111)
CO2 SERPL-SCNC: 28 MMOL/L (ref 21–32)
CREAT SERPL-MCNC: 4.54 MG/DL (ref 0.6–1.3)
DIFFERENTIAL METHOD BLD: ABNORMAL
EOSINOPHIL # BLD: 0.5 K/UL (ref 0–0.4)
EOSINOPHIL NFR BLD: 5 % (ref 0–5)
ERYTHROCYTE [DISTWIDTH] IN BLOOD BY AUTOMATED COUNT: 13.6 % (ref 11.6–14.5)
GLUCOSE BLD STRIP.AUTO-MCNC: 175 MG/DL (ref 70–110)
GLUCOSE BLD STRIP.AUTO-MCNC: 186 MG/DL (ref 70–110)
GLUCOSE BLD STRIP.AUTO-MCNC: 200 MG/DL (ref 70–110)
GLUCOSE BLD STRIP.AUTO-MCNC: 213 MG/DL (ref 70–110)
GLUCOSE SERPL-MCNC: 181 MG/DL (ref 74–99)
HCT VFR BLD AUTO: 32.5 % (ref 36–48)
HGB BLD-MCNC: 10.8 G/DL (ref 13–16)
IMM GRANULOCYTES # BLD AUTO: 0.1 K/UL (ref 0–0.04)
IMM GRANULOCYTES NFR BLD AUTO: 1 % (ref 0–0.5)
LYMPHOCYTES # BLD: 1.9 K/UL (ref 0.9–3.6)
LYMPHOCYTES NFR BLD: 21 % (ref 21–52)
MCH RBC QN AUTO: 30 PG (ref 24–34)
MCHC RBC AUTO-ENTMCNC: 33.2 G/DL (ref 31–37)
MCV RBC AUTO: 90.3 FL (ref 78–100)
MONOCYTES # BLD: 1 K/UL (ref 0.05–1.2)
MONOCYTES NFR BLD: 11 % (ref 3–10)
NEUTS SEG # BLD: 5.5 K/UL (ref 1.8–8)
NEUTS SEG NFR BLD: 62 % (ref 40–73)
NRBC # BLD: 0 K/UL (ref 0–0.01)
NRBC BLD-RTO: 0 PER 100 WBC
PHOSPHATE SERPL-MCNC: 3.9 MG/DL (ref 2.5–4.9)
PLATELET # BLD AUTO: 284 K/UL (ref 135–420)
PMV BLD AUTO: 10 FL (ref 9.2–11.8)
POTASSIUM SERPL-SCNC: 3.8 MMOL/L (ref 3.5–5.5)
RBC # BLD AUTO: 3.6 M/UL (ref 4.35–5.65)
SODIUM SERPL-SCNC: 137 MMOL/L (ref 136–145)
WBC # BLD AUTO: 9 K/UL (ref 4.6–13.2)

## 2022-08-21 PROCEDURE — 74011250636 HC RX REV CODE- 250/636: Performed by: INTERNAL MEDICINE

## 2022-08-21 PROCEDURE — 85025 COMPLETE CBC W/AUTO DIFF WBC: CPT

## 2022-08-21 PROCEDURE — 74011250637 HC RX REV CODE- 250/637: Performed by: INTERNAL MEDICINE

## 2022-08-21 PROCEDURE — 99232 SBSQ HOSP IP/OBS MODERATE 35: CPT | Performed by: FAMILY MEDICINE

## 2022-08-21 PROCEDURE — 74011000250 HC RX REV CODE- 250: Performed by: INTERNAL MEDICINE

## 2022-08-21 PROCEDURE — 82962 GLUCOSE BLOOD TEST: CPT

## 2022-08-21 PROCEDURE — 84100 ASSAY OF PHOSPHORUS: CPT

## 2022-08-21 PROCEDURE — 74011636637 HC RX REV CODE- 636/637: Performed by: INTERNAL MEDICINE

## 2022-08-21 PROCEDURE — 65270000046 HC RM TELEMETRY

## 2022-08-21 PROCEDURE — 36415 COLL VENOUS BLD VENIPUNCTURE: CPT

## 2022-08-21 PROCEDURE — 80048 BASIC METABOLIC PNL TOTAL CA: CPT

## 2022-08-21 RX ORDER — LOSARTAN POTASSIUM 50 MG/1
100 TABLET ORAL DAILY
Status: DISCONTINUED | OUTPATIENT
Start: 2022-08-22 | End: 2022-08-28 | Stop reason: HOSPADM

## 2022-08-21 RX ADMIN — TAMSULOSIN HYDROCHLORIDE 0.4 MG: 0.4 CAPSULE ORAL at 09:20

## 2022-08-21 RX ADMIN — SODIUM CHLORIDE, PRESERVATIVE FREE 5 ML: 5 INJECTION INTRAVENOUS at 14:00

## 2022-08-21 RX ADMIN — NEPHROCAP 1 CAPSULE: 1 CAP ORAL at 09:20

## 2022-08-21 RX ADMIN — Medication 2 UNITS: at 12:28

## 2022-08-21 RX ADMIN — LOSARTAN POTASSIUM 50 MG: 50 TABLET, FILM COATED ORAL at 09:20

## 2022-08-21 RX ADMIN — Medication 2 UNITS: at 17:05

## 2022-08-21 RX ADMIN — SODIUM CHLORIDE, PRESERVATIVE FREE 10 ML: 5 INJECTION INTRAVENOUS at 05:05

## 2022-08-21 RX ADMIN — SODIUM CHLORIDE, PRESERVATIVE FREE 10 ML: 5 INJECTION INTRAVENOUS at 21:15

## 2022-08-21 RX ADMIN — NITROGLYCERIN 0.5 INCH: 20 OINTMENT TOPICAL at 20:12

## 2022-08-21 RX ADMIN — Medication 4 UNITS: at 09:23

## 2022-08-21 RX ADMIN — HEPARIN SODIUM 5000 UNITS: 5000 INJECTION INTRAVENOUS; SUBCUTANEOUS at 15:58

## 2022-08-21 RX ADMIN — HEPARIN SODIUM 5000 UNITS: 5000 INJECTION INTRAVENOUS; SUBCUTANEOUS at 05:05

## 2022-08-21 RX ADMIN — NITROGLYCERIN 0.5 INCH: 20 OINTMENT TOPICAL at 05:27

## 2022-08-21 RX ADMIN — Medication 4 UNITS: at 21:13

## 2022-08-21 RX ADMIN — HEPARIN SODIUM 5000 UNITS: 5000 INJECTION INTRAVENOUS; SUBCUTANEOUS at 21:14

## 2022-08-21 NOTE — PROGRESS NOTES
[facility] Hospitalists  Progress Note    Patient: Giana Veras Age: 75 y.o. : 1955 MR#: [id number] SSN: [id number]  Date: 2022     Subjective/24-hour events:     BPs continue to be high at times. No chest pain or shortness of breath reported, night uneventful. Assessment:   CKD 5 now ESRD, HD dependent  Hypertension  DM2 with hyperglycemia  Chronic systolic and diastolic CHF, EF 69% with grade 2 DD by echo 2018, now 55 to 60% by echo 2020  Cocaine abuse - UDS positive on admission  Homelessness - patient living in shelter prior to admission    Plan:   Hemodialysis per nephrology. Outpatient hemodialysis being arranged, will follow up with nephrology again in AM to discuss progress. BPs continue to be slightly higher than tolerable. Add low-dose Lantus, continue SSI. Adjust further as necessary. Continue to adjust antihypertensive regimen as necessary in the attempt to avoid continued as needed antihypertensive dosing. Monitor. Mobilize as tolerated. Disposition to be determined. Patient is homeless and will need to verify safe disposition and patient has ability to make it back and forth to hemodialysis prior to discharge. Case discussed with:  [x]Patient  []Family  [x]Nursing  []Case Management  DVT Prophylaxis:  []Lovenox  [x]Hep SQ  []SCDs  []Coumadin   []On Heparin gtt    Objective:   VS: Visit Vitals  BP (!) 143/82 (BP 1 Location: Left upper arm, BP Patient Position: At rest)   Pulse 65   Temp 98.4 °F (36.9 °C)   Resp 16   Ht 6' (1.829 m)   Wt 94.4 kg (208 lb 3.2 oz)   SpO2 99%   BMI 28.24 kg/m²      Tmax/24hrs: Temp (24hrs), Av.5 °F (36.9 °C), Min:97.7 °F (36.5 °C), Max:99.1 °F (37.3 °C)    Intake/Output Summary (Last 24 hours) at 2022 0808  Last data filed at 2022 0504  Gross per 24 hour   Intake --   Output 600 ml   Net -600 ml       General:  In NAD. Nontoxic-appearing. Cardiovascular:  RRR.     Pulmonary:  Lungs clear bilaterally, no wheezes. GI:  Abdomen soft, NTTP. Extremities:  Warm, no edema or ischemia. Neuro:  Awake and alert. Moves extremities spontaneously, motor grossly nonfocal.    Labs:    Recent Results (from the past 24 hour(s))   GLUCOSE, POC    Collection Time: 08/20/22  8:29 AM   Result Value Ref Range    Glucose (POC) 183 (H) 70 - 110 mg/dL   GLUCOSE, POC    Collection Time: 08/20/22 12:49 PM   Result Value Ref Range    Glucose (POC) 212 (H) 70 - 110 mg/dL   GLUCOSE, POC    Collection Time: 08/20/22  5:37 PM   Result Value Ref Range    Glucose (POC) 182 (H) 70 - 110 mg/dL   GLUCOSE, POC    Collection Time: 08/20/22  9:56 PM   Result Value Ref Range    Glucose (POC) 172 (H) 70 - 110 mg/dL   CBC WITH AUTOMATED DIFF    Collection Time: 08/21/22  2:59 AM   Result Value Ref Range    WBC 9.0 4.6 - 13.2 K/uL    RBC 3.60 (L) 4.35 - 5.65 M/uL    HGB 10.8 (L) 13.0 - 16.0 g/dL    HCT 32.5 (L) 36.0 - 48.0 %    MCV 90.3 78.0 - 100.0 FL    MCH 30.0 24.0 - 34.0 PG    MCHC 33.2 31.0 - 37.0 g/dL    RDW 13.6 11.6 - 14.5 %    PLATELET 658 584 - 294 K/uL    MPV 10.0 9.2 - 11.8 FL    NRBC 0.0 0  WBC    ABSOLUTE NRBC 0.00 0.00 - 0.01 K/uL    NEUTROPHILS 62 40 - 73 %    LYMPHOCYTES 21 21 - 52 %    MONOCYTES 11 (H) 3 - 10 %    EOSINOPHILS 5 0 - 5 %    BASOPHILS 1 0 - 2 %    IMMATURE GRANULOCYTES 1 (H) 0.0 - 0.5 %    ABS. NEUTROPHILS 5.5 1.8 - 8.0 K/UL    ABS. LYMPHOCYTES 1.9 0.9 - 3.6 K/UL    ABS. MONOCYTES 1.0 0.05 - 1.2 K/UL    ABS. EOSINOPHILS 0.5 (H) 0.0 - 0.4 K/UL    ABS. BASOPHILS 0.1 0.0 - 0.1 K/UL    ABS. IMM.  GRANS. 0.1 (H) 0.00 - 0.04 K/UL    DF AUTOMATED     METABOLIC PANEL, BASIC    Collection Time: 08/21/22  2:59 AM   Result Value Ref Range    Sodium 137 136 - 145 mmol/L    Potassium 3.8 3.5 - 5.5 mmol/L    Chloride 103 100 - 111 mmol/L    CO2 28 21 - 32 mmol/L    Anion gap 6 3.0 - 18 mmol/L    Glucose 181 (H) 74 - 99 mg/dL    BUN 58 (H) 7.0 - 18 MG/DL    Creatinine 4.54 (H) 0.6 - 1.3 MG/DL    BUN/Creatinine ratio 13 12 - 20      GFR est AA 16 (L) >60 ml/min/1.73m2    GFR est non-AA 13 (L) >60 ml/min/1.73m2    Calcium 9.5 8.5 - 10.1 MG/DL   PHOSPHORUS    Collection Time: 08/21/22  2:59 AM   Result Value Ref Range    Phosphorus 3.9 2.5 - 4.9 MG/DL       Signed By: Sofía Montalvo MD     August 21, 2022

## 2022-08-21 NOTE — PROGRESS NOTES
Bedside shift change report given to Sr. Selina RN (oncoming nurse) by Maree Schirmer and Italia AMBRIZ (offgoing nurses). Report included the following information SBAR, Kardex, Intake/Output, MAR, and Recent Results.

## 2022-08-21 NOTE — PROGRESS NOTES
Bedside and Verbal shift change report given to [address] (oncoming nurse) by Robert Simpson RN (offgoing nurse). Report included the following information SBAR, Kardex, Intake/Output, and MAR.

## 2022-08-21 NOTE — PROGRESS NOTES
Problem: Falls - Risk of  Goal: *Absence of Falls  Description: Document Royal Torres Fall Risk and appropriate interventions in the flowsheet. Outcome: Progressing Towards Goal  Note: Fall Risk Interventions:  Mobility Interventions: Patient to call before getting OOB         Medication Interventions: Patient to call before getting OOB. Elimination Interventions: Patient to call for help with toileting needs    History of Falls Interventions: Consult care management for discharge planning         Problem: Patient Education: Go to Patient Education Activity  Goal: Patient/Family Education  Outcome: Progressing Towards Goal     Problem: Diabetes Self-Management  Goal: *Disease process and treatment process  Description: Define diabetes and identify own type of diabetes; list 3 options for treating diabetes. Outcome: Progressing Towards Goal  Goal: *Incorporating nutritional management into lifestyle  Description: Describe effect of type, amount and timing of food on blood glucose; list 3 methods for planning meals. Outcome: Progressing Towards Goal  Goal: *Incorporating physical activity into lifestyle  Description: State effect of exercise on blood glucose levels. Outcome: Progressing Towards Goal  Goal: *Developing strategies to promote health/change behavior  Description: Define the ABC's of diabetes; identify appropriate screenings, schedule and personal plan for screenings. Outcome: Progressing Towards Goal  Goal: *Using medications safely  Description: State effect of diabetes medications on diabetes; name diabetes medication taking, action and side effects. Outcome: Progressing Towards Goal  Goal: *Monitoring blood glucose, interpreting and using results  Description: Identify recommended blood glucose targets  and personal targets.   Outcome: Progressing Towards Goal  Goal: *Prevention, detection, treatment of acute complications  Description: List symptoms of hyper- and hypoglycemia; describe how to treat low blood sugar and actions for lowering  high blood glucose level. Outcome: Progressing Towards Goal  Goal: *Prevention, detection and treatment of chronic complications  Description: Define the natural course of diabetes and describe the relationship of blood glucose levels to long term complications of diabetes.   Outcome: Progressing Towards Goal  Goal: *Developing strategies to address psychosocial issues  Description: Describe feelings about living with diabetes; identify support needed and support network  Outcome: Progressing Towards Goal  Goal: *Insulin pump training  Outcome: Progressing Towards Goal  Goal: *Sick day guidelines  Outcome: Progressing Towards Goal  Goal: *Patient Specific Goal (EDIT GOAL, INSERT TEXT)  Outcome: Progressing Towards Goal     Problem: Patient Education: Go to Patient Education Activity  Goal: Patient/Family Education  Outcome: Progressing Towards Goal     Problem: Chronic Renal Failure  Goal: *Fluid and electrolytes stabilized  Outcome: Progressing Towards Goal     Problem: Patient Education: Go to Patient Education Activity  Goal: Patient/Family Education  Outcome: Progressing Towards Goal

## 2022-08-22 LAB
ANION GAP SERPL CALC-SCNC: 8 MMOL/L (ref 3–18)
BASOPHILS # BLD: 0.1 K/UL (ref 0–0.1)
BASOPHILS NFR BLD: 1 % (ref 0–2)
BUN SERPL-MCNC: 70 MG/DL (ref 7–18)
BUN/CREAT SERPL: 15 (ref 12–20)
CALCIUM SERPL-MCNC: 9.6 MG/DL (ref 8.5–10.1)
CHLORIDE SERPL-SCNC: 105 MMOL/L (ref 100–111)
CO2 SERPL-SCNC: 24 MMOL/L (ref 21–32)
CREAT SERPL-MCNC: 4.58 MG/DL (ref 0.6–1.3)
DIFFERENTIAL METHOD BLD: ABNORMAL
EOSINOPHIL # BLD: 0.5 K/UL (ref 0–0.4)
EOSINOPHIL NFR BLD: 6 % (ref 0–5)
ERYTHROCYTE [DISTWIDTH] IN BLOOD BY AUTOMATED COUNT: 13.6 % (ref 11.6–14.5)
GLUCOSE BLD STRIP.AUTO-MCNC: 124 MG/DL (ref 70–110)
GLUCOSE BLD STRIP.AUTO-MCNC: 179 MG/DL (ref 70–110)
GLUCOSE BLD STRIP.AUTO-MCNC: 187 MG/DL (ref 70–110)
GLUCOSE BLD STRIP.AUTO-MCNC: 201 MG/DL (ref 70–110)
GLUCOSE SERPL-MCNC: 160 MG/DL (ref 74–99)
HCT VFR BLD AUTO: 32.2 % (ref 36–48)
HGB BLD-MCNC: 10.9 G/DL (ref 13–16)
IMM GRANULOCYTES # BLD AUTO: 0.1 K/UL (ref 0–0.04)
IMM GRANULOCYTES NFR BLD AUTO: 1 % (ref 0–0.5)
LYMPHOCYTES # BLD: 2.2 K/UL (ref 0.9–3.6)
LYMPHOCYTES NFR BLD: 24 % (ref 21–52)
MCH RBC QN AUTO: 30.3 PG (ref 24–34)
MCHC RBC AUTO-ENTMCNC: 33.9 G/DL (ref 31–37)
MCV RBC AUTO: 89.4 FL (ref 78–100)
MONOCYTES # BLD: 1.1 K/UL (ref 0.05–1.2)
MONOCYTES NFR BLD: 11 % (ref 3–10)
NEUTS SEG # BLD: 5.4 K/UL (ref 1.8–8)
NEUTS SEG NFR BLD: 58 % (ref 40–73)
NRBC # BLD: 0 K/UL (ref 0–0.01)
NRBC BLD-RTO: 0 PER 100 WBC
PHOSPHATE SERPL-MCNC: 3.8 MG/DL (ref 2.5–4.9)
PLATELET # BLD AUTO: 296 K/UL (ref 135–420)
PMV BLD AUTO: 10.1 FL (ref 9.2–11.8)
POTASSIUM SERPL-SCNC: 3.8 MMOL/L (ref 3.5–5.5)
RBC # BLD AUTO: 3.6 M/UL (ref 4.35–5.65)
SODIUM SERPL-SCNC: 137 MMOL/L (ref 136–145)
WBC # BLD AUTO: 9.4 K/UL (ref 4.6–13.2)

## 2022-08-22 PROCEDURE — 36415 COLL VENOUS BLD VENIPUNCTURE: CPT

## 2022-08-22 PROCEDURE — 65270000046 HC RM TELEMETRY

## 2022-08-22 PROCEDURE — 74011636637 HC RX REV CODE- 636/637: Performed by: INTERNAL MEDICINE

## 2022-08-22 PROCEDURE — 74011250637 HC RX REV CODE- 250/637: Performed by: INTERNAL MEDICINE

## 2022-08-22 PROCEDURE — 80048 BASIC METABOLIC PNL TOTAL CA: CPT

## 2022-08-22 PROCEDURE — 82962 GLUCOSE BLOOD TEST: CPT

## 2022-08-22 PROCEDURE — 74011636637 HC RX REV CODE- 636/637: Performed by: FAMILY MEDICINE

## 2022-08-22 PROCEDURE — 99232 SBSQ HOSP IP/OBS MODERATE 35: CPT | Performed by: FAMILY MEDICINE

## 2022-08-22 PROCEDURE — 85025 COMPLETE CBC W/AUTO DIFF WBC: CPT

## 2022-08-22 PROCEDURE — 84100 ASSAY OF PHOSPHORUS: CPT

## 2022-08-22 PROCEDURE — 74011000250 HC RX REV CODE- 250: Performed by: INTERNAL MEDICINE

## 2022-08-22 PROCEDURE — 74011250636 HC RX REV CODE- 250/636: Performed by: INTERNAL MEDICINE

## 2022-08-22 PROCEDURE — 90935 HEMODIALYSIS ONE EVALUATION: CPT

## 2022-08-22 RX ORDER — INSULIN GLARGINE 100 [IU]/ML
5 INJECTION, SOLUTION SUBCUTANEOUS
Status: DISCONTINUED | OUTPATIENT
Start: 2022-08-22 | End: 2022-08-24

## 2022-08-22 RX ADMIN — SODIUM CHLORIDE, PRESERVATIVE FREE 10 ML: 5 INJECTION INTRAVENOUS at 05:06

## 2022-08-22 RX ADMIN — SODIUM CHLORIDE, PRESERVATIVE FREE 10 ML: 5 INJECTION INTRAVENOUS at 14:15

## 2022-08-22 RX ADMIN — SODIUM CHLORIDE, PRESERVATIVE FREE 10 ML: 5 INJECTION INTRAVENOUS at 22:37

## 2022-08-22 RX ADMIN — Medication 4 UNITS: at 08:38

## 2022-08-22 RX ADMIN — HYDRALAZINE HYDROCHLORIDE 10 MG: 20 INJECTION INTRAMUSCULAR; INTRAVENOUS at 05:06

## 2022-08-22 RX ADMIN — HEPARIN SODIUM 5000 UNITS: 5000 INJECTION INTRAVENOUS; SUBCUTANEOUS at 14:15

## 2022-08-22 RX ADMIN — Medication 3 UNITS: at 22:36

## 2022-08-22 RX ADMIN — HEPARIN SODIUM 5000 UNITS: 5000 INJECTION INTRAVENOUS; SUBCUTANEOUS at 05:06

## 2022-08-22 RX ADMIN — TAMSULOSIN HYDROCHLORIDE 0.4 MG: 0.4 CAPSULE ORAL at 08:38

## 2022-08-22 RX ADMIN — Medication 5 UNITS: at 22:35

## 2022-08-22 RX ADMIN — HEPARIN SODIUM 5000 UNITS: 5000 INJECTION INTRAVENOUS; SUBCUTANEOUS at 22:35

## 2022-08-22 RX ADMIN — Medication 3 UNITS: at 17:25

## 2022-08-22 RX ADMIN — NEPHROCAP 1 CAPSULE: 1 CAP ORAL at 08:38

## 2022-08-22 NOTE — DIALYSIS
ACUTE HEMODIALYSIS FLOW SHEET    HEMODIALYSIS ORDERS: Physician:      Dialyzer: Revaclear   Duration: 3 hr   BFR: 300   DFR: 600   Dialysate:  Temp 36-37*C   K+  3    Ca+ 2.5   Na 138   Bicarb 35   Wt Readings from Last 1 Encounters:   08/20/22 94.4 kg (208 lb 3.2 oz)    Patient Chart []   Unable to Obtain []  Dry weight/UF Goal: 2500 ml    Heparin []  Bolus    Units    [] Hourly    Units    []None       Pre BP: 174/99  Pulse: 71  Respirations: 16 Temp: 97.6  temporal   Labs: []  Pre  []  Post:   [x] N/A   Additional Orders (medications, blood products, hypotension management): [] Yes   [x] No     []  [facility] Consent Verified     CATHETER ACCESS:  []N/A   [x]Right   []Left   []IJ   []Fem  [x]Chest wall  []TransHepatic   [] First use X-ray verified     [x]Tunnel    [] Non Tunneled   [x]No S/S infection  []Redness  []Drainage []Cultured []Swelling []Pain   [x]Medical Aseptic Prep Utilized   []Dressing Changed  [x] Biopatch  Date: 08/21   []Clotted   [x]Patent   Flows: [x]Good  []Poor  [x]Reversed   If access problem,  notified: []Yes    [x]N/A        GRAFT/FISTULA ACCESS:   [x]N/A     []Right     []Left     []UE     []LE   []AVG   []AVF       []Medical Aseptic Prep Utilized   []No S/S infection  []Redness  []Drainage [] Cultured  [] Swelling  [] Pain  Bruit:   [] Strong    [] Weak       Thrill :   [] Strong    [] Weak     Needle Gauge: 15   Length: 1 inch   If access problem,  notified: []Yes     [x]N/A          Hospital: [facility]          Room # 466/01    [] Routine         [] 1st Time Acute/Chronic   [] Urgent      [] Stat            0820 Acute Room   []  Bedside    [] ICU/CCU     [] ER     Isolation Precautions:  [x] Dialysis    There are currently no Active Isolations     ALLERGIES:     Allergies   Allergen Reactions    Tylenol [Acetaminophen] Other (comments)     Pt was advised not to take Tylenol per Dr. Amberly Ohara Status:  Full Code     Hepatitis Status      Lab Results   Component Value Date/Time    Hepatitis A, IgM NEGATIVE  08/09/2014 08:40 AM    Hepatitis B surface Ag <0.10 08/17/2022 02:57 AM    Hepatitis B surface Ab 289.10 08/17/2022 02:57 AM    Hepatitis B core, IgM NEGATIVE  08/09/2014 08:40 AM    Hep B Core Ab, total Negative 08/18/2022 02:52 AM    Hepatitis C virus Ab >11.00 (H) 08/17/2022 02:57 AM        Current Labs:      Lab Results   Component Value Date/Time    WBC 9.4 08/22/2022 02:35 AM    HGB 10.9 (L) 08/22/2022 02:35 AM    HCT 32.2 (L) 08/22/2022 02:35 AM    PLATELET 016 20/43/6087 02:35 AM    MCV 89.4 08/22/2022 02:35 AM     Lab Results   Component Value Date/Time    Sodium 137 08/22/2022 02:35 AM    Potassium 3.8 08/22/2022 02:35 AM    Chloride 105 08/22/2022 02:35 AM    CO2 24 08/22/2022 02:35 AM    Anion gap 8 08/22/2022 02:35 AM    Glucose 160 (H) 08/22/2022 02:35 AM    BUN 70 (H) 08/22/2022 02:35 AM    Creatinine 4.58 (H) 08/22/2022 02:35 AM    BUN/Creatinine ratio 15 08/22/2022 02:35 AM    GFR est AA 16 (L) 08/22/2022 02:35 AM    GFR est non-AA 13 (L) 08/22/2022 02:35 AM    Calcium 9.6 08/22/2022 02:35 AM          DIET:  DIET ADULT     PRIMARY NURSE REPORT:   Pre Dialysis: Linwood Hatchet, RN    Time: 0820      EDUCATION:    [] Patient           Knowledge Basis: []None []Minimal [] Substantial [] Unknown  Barriers to learning  []None  [] Intubated/Trached/Ventilated  [] Sedated/Paralyzed   [] Access Care     [] S&S of infection  [] Fluid Management  [] K+   [x] Procedural    [] Medications   [] Tx Options   [] Transplant   [] Diet      Teaching Tools:  [x] Explain  [] Demo  [] Handouts [] Video  Patient response: [] Verbalized understanding   [] Requires follow up        [x] Time Out/Safety Check    [x] Extracorporeal Circuit Tested for integrity       RO/HEMODIALYSIS MACHINE SAFETY CHECKS - Before each treatment:        [address] [place] [address]                                     [x] Unit Machine # 6 with centralized RO                                  [] Portable Machine #1/RO serial # [id number]                                  [] Portable Machine #2/RO serial # [id number]                                  [] Portable Machine #4/RO serial # [id number]                                  [] Portable Machine #10/RO serial # [id number]                                                                                                       Alarm Test:  Pass time 4238            [x] RO/Machine Log Complete    Machine Temp    36-37*C             Dialysate: pH  7.4    Conductivity: Meter 14.0    HD Machine  14.1     TCD: 13.9  Dialyzer Lot # [id number]     Blood Tubing Lot # [id number]     Saline Lot # [id number]     CHLORINE TESTING-Before each treatment and every 4 hours    Total Chlorine: [x] less than 0.1 ppm  Initial Time Check: 0800       4 Hr/2nd Check Time: 1200   (if greater than 0.1 ppm from Primary then every 30 minutes from Secondary)     TREATMENT INITIATION - with Dialysis Precautions:   [x] All Connections Secured              [x] Saline Line Double Clamped   [x] Venous Parameters Set               [x] Arterial Parameters Set    [x] Prime Given 250ml NSS              [x]Air Foam Detector Engaged        Treatment Initiation Note:  0854Tx. initiated via R chest cvc    During Treatment Notes:  0900  Face & Vascular access visible with art and thong line connections intact. Pt tolerating dialysis. 0915  Face & Vascular access visible with art and thong line connections intact. Pt tolerating dialysis. 0930  Face & Vascular access visible with art and thong line connections intact. Pt tolerating dialysis. 0945  Face & Vascular access visible with art and thong line connections intact. Pt tolerating dialysis. 1000  Face & Vascular access visible with art and thong line connections intact. Pt tolerating dialysis. 1015  Face & Vascular access visible with art and thong line connections intact. Pt tolerating dialysis. 1030  Face & Vascular access visible with art and thong line connections intact.  Pt tolerating dialysis. 1045  Face & Vascular access visible with art and thong line connections intact. Pt tolerating dialysis. 1100  Face & Vascular access visible with art and thong line connections intact. Pt tolerating dialysis. 1115  Face & Vascular access visible with art and thong line connections intact. Pt tolerating dialysis. 1130  Face & Vascular access visible with art and thong line connections intact. Pt tolerating dialysis. 1145  Face & Vascular access visible with art and thong line connections intact. Pt tolerating dialysis. 1154  Face & Vascular access visible with art and thong line connections intact. Pt tolerating dialysis. 1154  Dialysis treatment complete. Post Assessment  Dialyzer Cleared:   [] Good  [x] Fair  [] Poor  Blood processed:  49.2 L  UF Removed:  1500 Ml    Post BP: 149/95  Pulse: 79  Respirations: 16   Temp: 97.6  temporal   Lungs: [] Clear                [x] No change from initial assessment   Post Tx Vascular Access: [x] N/A  AVF/AVG: Bleeding stopped with  Arterial Pressure for  min   Venous Pressure for  min      Cardiac:  [] Regular   [] Irregular   Rhythm:  [] Monitored   [x] Not Monitored    CVC Catheter: [] N/A  Locking solution: Heparin 1:1000 U  Arterial port 1.6 ml   Venous port 1.6 ml   Edema:  [] None  [] Generalized                     Skin:[] Warm  [] Dry [] Diaphoretic               [] Flushed  [] Pale [] Cyanotic Pain:  []0  []1-2  []3-4  []5-6   []7-8  []9-10         Post Treatment Note:   1200 Tolerated Tx.well. Stable post.     POST TREATMENT PRIMARY NURSE HANDOFF REPORT:   Post Dialysis: N. Flora Simmonds, RN        Time:  1210       Abbreviations: AVG-arterial venous graft, AVF-arterial venous fistula, IJ-Internal Jugular, Subcl-Subclavian, Fem-Femoral, Tx-treatment, AP/HR-apical heart rate, VSS- Vital Signs Stable, CVC- Central Venous Catheter, DFR-dialysate flow rate, BFR-blood flow rate, AP-arterial pressure, -venous pressure, UF-ultrafiltrate, TMP-transmembrane pressure, Alpesh-Venous, Art-Arterial, RO-Reverse Osmosis

## 2022-08-22 NOTE — DIABETES MGMT
Diabetes/ Glycemic Control Plan of Care    Patient with history of T2DM was seen in ED waiting area on 8/16/2022 with report of abdominal/flak pain and provided diabetes education prior to planned released. Then he was seen by nephrology for worsening renal function and was admitted with ESRD. 8/17/2022: Placement of internal jugular tunneled dialysis catheter  8/18/2022: Patient seen in dialysis unit. Patient stated that he has vial NPH insulin and syringes at home and plan to resume use at disch. He verbalized understanding of diabetes and insulin use to help prevent hyperglycemia. He  agreed to contact medical provider if he experience high blood sugar 300 and above that he cannot explain. He was also able to state how to treat low blood sugar less than 70 and when to contact medical provider: frequent BG less than 70 that he cannot explain. Patient also plan to attend diabetes classes at [facility] in [place]. Assessment today: Elevated BG values on correctional lispro insulin. POC BG 8/21/2022: 213, 186, 200. Patient received a total of 12 units of correctional lispro insulin. POC BG 8/22/2022: 201    Recommendations:   1.) modify correctional lispro insulin to very resistant dose per protocol. Done  2.) add basal lantus insulin 5 units daily at bedtime starting tonight. Order obtained. 2.) resume NPH insulin (home) at disch: 26 units every morning and 12 units every evening. DX:   1. End stage renal disease (HCC)        2. Elevated serum creatinine        3. Hyperglycemia        4. Pericardial effusion        5. ESRD on dialysis ([facility] [address]  INVASIVE VASCULAR PROCEDURE    INVASIVE VASCULAR PROCEDURE         Fasting/ Morning blood glucose:   Lab Results   Component Value Date/Time    Glucose 160 (H) 08/22/2022 02:35 AM    Glucose (POC) 124 (H) 08/22/2022 12:18 PM     IV Fluids containing dextrose: none    Steroids:  None    Blood glucose values:          Within target range (70-180mg/dL):  NO.     Current insulin orders:   Correctional lispro insulin. Modified to very resistant dose  Basal lantus insulin 5 units daily at bedtime, first dose ordered 8/22/2022    Total Daily Dose previous 24 hours: Correctional lispro insulin 12 units    Current A1c:   Lab Results   Component Value Date/Time    Hemoglobin A1c 6.8 (H) 08/17/2022 02:57 AM      equivalent  to ave Blood Glucose of 148 mg/dl for 2-3 months prior to admission    Adequate glycemic control PTA: YES    Nutrition/Diet:   Active Orders   Diet    ADULT DIET Regular; 4 carb choices (60 gm/meal); Low Sodium (2 gm); Low Potassium (Less than 3000 mg/day); Low Phosphorus (Less than 1000 mg)      Meal Intake:  Patient Vitals for the past 168 hrs:   % Diet Eaten   08/22/22 0919 76 - 100%   08/19/22 1219 76 - 100%   08/17/22 0929 0%       Supplement Intake:  No data found. Home diabetes medications:  Patient reported differently on 8/15/2022 that he's only taking NPH insulin twice daily at home:  26 units daily every morning  12 units daily every evening  Key Antihyperglycemic Medications               insulin glargine (LANTUS) 100 unit/mL injection 12 Units by SubCUTAneous route daily. insulin lispro (HUMALOG) 100 unit/mL injection For Blood Sugar (mg/dL) of:   Less than 150 =  0 units  150 -199 =  3 units  200 -249 =  6 units  250 -299 =  9 units  300 -349 =  12 units  350 and above =  15 units, call MD.            Plan/Goals:   Blood glucose will be within target of 70 - 180 mg/dl within 72 hours  Reinforce dietary and medication compliance at home.           Education:  [x] Refer to Diabetes Education Record: 8/16/2022                       [] Education not indicated at this time     Robert Hernandez RN CCM

## 2022-08-22 NOTE — PROGRESS NOTES
[facility] Hospitalists  Progress Note    Patient: Karan Braun Age: 75 y.o. : 1955 MR#: [id number] SSN: [id number]  Date: 2022     Subjective/24-hour events:     Patient seen on HD. No acute issues, denies SOB and chest pain. Assessment:   CKD 5 now ESRD, HD dependent  Hypertension  DM2 with hyperglycemia  Chronic systolic and diastolic CHF, EF 30% with grade 2 DD by echo 2018, now 55 to 60% by echo 2020  Cocaine abuse - UDS positive on admission  Homelessness - patient living in shelter prior to admission    Plan:   Per discussion with nephrology, outpatient hemodialysis has been arranged. Will be TTS schedule with chair time of 10:30 AM at [facility]. Medically stable for discharge once able to confirm patient's ability to make it HD center per schedule. Case discussed with:  [x]Patient  []Family  [x]Nursing  [x]Case Management  DVT Prophylaxis:  []Lovenox  [x]Hep SQ  []SCDs  []Coumadin   []On Heparin gtt    Objective:   VS: Visit Vitals  BP (!) 166/84 (BP 1 Location: Left upper arm, BP Patient Position: At rest)   Pulse 81   Temp 98.8 °F (37.1 °C)   Resp 16   Ht 6' (1.829 m)   Wt 94.4 kg (208 lb 3.2 oz)   SpO2 95%   BMI 28.24 kg/m²      Tmax/24hrs: Temp (24hrs), Av.2 °F (36.8 °C), Min:97.1 °F (36.2 °C), Max:98.8 °F (37.1 °C)    Intake/Output Summary (Last 24 hours) at 2022 1219  Last data filed at 2022 1154  Gross per 24 hour   Intake 360 ml   Output 2325 ml   Net -1965 ml       General:  In NAD. Nontoxic-appearing. Cardiovascular:  RRR. Pulmonary:  Lungs clear bilaterally, no wheezes. GI:  Abdomen soft, NTTP. Extremities:  Warm, no edema or ischemia. Neuro:  Awake and alert.   Moves extremities spontaneously, motor grossly nonfocal.    Labs:    Recent Results (from the past 24 hour(s))   GLUCOSE, POC    Collection Time: 22  4:15 PM   Result Value Ref Range    Glucose (POC) 175 (H) 70 - 110 mg/dL GLUCOSE, POC    Collection Time: 08/21/22  9:09 PM   Result Value Ref Range    Glucose (POC) 200 (H) 70 - 110 mg/dL   CBC WITH AUTOMATED DIFF    Collection Time: 08/22/22  2:35 AM   Result Value Ref Range    WBC 9.4 4.6 - 13.2 K/uL    RBC 3.60 (L) 4.35 - 5.65 M/uL    HGB 10.9 (L) 13.0 - 16.0 g/dL    HCT 32.2 (L) 36.0 - 48.0 %    MCV 89.4 78.0 - 100.0 FL    MCH 30.3 24.0 - 34.0 PG    MCHC 33.9 31.0 - 37.0 g/dL    RDW 13.6 11.6 - 14.5 %    PLATELET 834 295 - 120 K/uL    MPV 10.1 9.2 - 11.8 FL    NRBC 0.0 0  WBC    ABSOLUTE NRBC 0.00 0.00 - 0.01 K/uL    NEUTROPHILS 58 40 - 73 %    LYMPHOCYTES 24 21 - 52 %    MONOCYTES 11 (H) 3 - 10 %    EOSINOPHILS 6 (H) 0 - 5 %    BASOPHILS 1 0 - 2 %    IMMATURE GRANULOCYTES 1 (H) 0.0 - 0.5 %    ABS. NEUTROPHILS 5.4 1.8 - 8.0 K/UL    ABS. LYMPHOCYTES 2.2 0.9 - 3.6 K/UL    ABS. MONOCYTES 1.1 0.05 - 1.2 K/UL    ABS. EOSINOPHILS 0.5 (H) 0.0 - 0.4 K/UL    ABS. BASOPHILS 0.1 0.0 - 0.1 K/UL    ABS. IMM.  GRANS. 0.1 (H) 0.00 - 0.04 K/UL    DF AUTOMATED     METABOLIC PANEL, BASIC    Collection Time: 08/22/22  2:35 AM   Result Value Ref Range    Sodium 137 136 - 145 mmol/L    Potassium 3.8 3.5 - 5.5 mmol/L    Chloride 105 100 - 111 mmol/L    CO2 24 21 - 32 mmol/L    Anion gap 8 3.0 - 18 mmol/L    Glucose 160 (H) 74 - 99 mg/dL    BUN 70 (H) 7.0 - 18 MG/DL    Creatinine 4.58 (H) 0.6 - 1.3 MG/DL    BUN/Creatinine ratio 15 12 - 20      GFR est AA 16 (L) >60 ml/min/1.73m2    GFR est non-AA 13 (L) >60 ml/min/1.73m2    Calcium 9.6 8.5 - 10.1 MG/DL   PHOSPHORUS    Collection Time: 08/22/22  2:35 AM   Result Value Ref Range    Phosphorus 3.8 2.5 - 4.9 MG/DL   GLUCOSE, POC    Collection Time: 08/22/22  8:00 AM   Result Value Ref Range    Glucose (POC) 201 (H) 70 - 110 mg/dL       Signed By: Sabino Mojica MD     August 22, 2022

## 2022-08-22 NOTE — PROGRESS NOTES
Progress Note    Abena Garcia  75 y.o. Admit Date: 8/16/2022  Active Problems:    Type 2 diabetes mellitus with insulin therapy ([facility] (7/6/2015) POA: Yes      Homelessness (10/19/2015) POA: Yes      CHF (congestive heart failure) ([facility] (11/30/2015) POA: Unknown      Systolic CHF, chronic ([facility] (7/10/2016) POA: Yes      Elevated serum creatinine (8/10/2016) POA: Unknown      Hypertension (1/1/2000) POA: Yes      ESRD (end stage renal disease) ([facility] (8/16/2022) POA: Unknown      Substance abuse ([facility] (8/16/2022) POA: Unknown      Anemia (8/16/2022) POA: Unknown      End stage renal disease ([facility] (8/16/2022) POA: Unknown      Pericardial effusion (8/16/2022) POA: Unknown      Hyperglycemia (8/16/2022) POA: Unknown      CKD (chronic kidney disease) stage 5, GFR less than 15 ml/min ([facility] (8/16/2022) POA: Unknown            Subjective:     Patient feels good. Seen during dialysis. Tolerating blood flow of 307 attending dialysis catheter right IJ vein by this time his hepatitis core antibody result is back and is negative. Patient denies any chest pain any shortness of breath and palpitation. Still makes urine. A comprehensive review of systems was negative except for that written in the History of Present Illness.     Objective:     Visit Vitals  BP (!) 156/102   Pulse 74   Temp 97.6 °F (36.4 °C) (Temporal)   Resp 16   Ht 6' (1.829 m)   Wt 94.4 kg (208 lb 3.2 oz)   SpO2 95%   BMI 28.24 kg/m²         Intake/Output Summary (Last 24 hours) at 8/22/2022 1148  Last data filed at 8/22/2022 0919  Gross per 24 hour   Intake 360 ml   Output 825 ml   Net -465 ml       Current Facility-Administered Medications   Medication Dose Route Frequency Provider Last Rate Last Admin    losartan (COZAAR) tablet 100 mg  100 mg Oral DAILY Efren Serrano MD        nitroglycerin (NITROBID) 2 % ointment 0.5 Inch  0.5 Inch Topical Q6H PRN Aubrey Horner,    0.5 Inch at 08/21/22 2012    hydrALAZINE (APRESOLINE) 20 mg/mL injection 10 mg  10 mg IntraVENous Q6H PRN Vikas Medina E, DO   10 mg at 08/22/22 0506    B complex-vitaminC-folic acid (NEPHROCAP) cap  1 Capsule Oral DAILY Maciej Funk MD   1 Capsule at 08/22/22 0179    insulin lispro (HUMALOG) injection   SubCUTAneous AC&HS Idania Charlton MD   4 Units at 08/22/22 8384    glucose chewable tablet 16 g  4 Tablet Oral PRN Gabriella Aldana MD        glucagon (GLUCAGEN) injection 1 mg  1 mg IntraMUSCular PRN Gabriella Aldana MD        dextrose 10% infusion 0-250 mL  0-250 mL IntraVENous PRN Gabriella Aldana MD        [facility]] aspirin tablet 325 mg  325 mg Oral DAILY Gabriella Aldana MD        [facility]) capsule 0.4 mg  0.4 mg Oral DAILY Gabriella Aldana MD   0.4 mg at 08/22/22 7534    sodium chloride (NS) flush 5-40 mL  5-40 mL IntraVENous Q8H Gabriella Aldana MD   10 mL at 08/22/22 0506    sodium chloride (NS) flush 5-40 mL  5-40 mL IntraVENous PRN Gabriella Aldana MD        acetaminophen (TYLENOL) tablet 650 mg  650 mg Oral Q6H PRN Gabriella Aldana MD   650 mg at 08/20/22 1020    Or    acetaminophen (TYLENOL) suppository 650 mg  650 mg Rectal Q6H PRN Gabriella Aldana MD        polyethylene glycol (MIRALAX) packet 17 g  17 g Oral DAILY PRN Gabriella Aldana MD        promethazine (PHENERGAN) tablet 12.5 mg  12.5 mg Oral Q6H PRN Gabriella Aldana MD        Or    ondansetron [facility]) injection 4 mg  4 mg IntraVENous Q6H PRN Gabriella Aldana MD        heparin (porcine) injection 5,000 Units  5,000 Units SubCUTAneous Q8H Gabriella Aldana MD   5,000 Units at 08/22/22 9194        Physical Exam:     Physical Exam:   General:  Alert, cooperative, no distress, appears stated age. Neck: Supple, symmetrical, trachea midline, no adenopathy, thyroid: no enlargement/tenderness/nodules, no carotid bruit and no JVD. Lungs:   Clear to auscultation bilaterally. Heart:  Regular rate and rhythm, S1, S2 normal, no murmur, click, rub or gallop. Abdomen:   Soft, non-tender.  Bowel sounds normal. No masses,  No organomegaly. Extremities: Extremities normal, atraumatic, no cyanosis or edema, has right IJ tunneled dialysis cath functioning with a blood flow of 300                     Data Review:    CBC w/Diff    Recent Labs     08/22/22 0235 08/21/22 0259   WBC 9.4 9.0   RBC 3.60* 3.60*   HGB 10.9* 10.8*   HCT 32.2* 32.5*   MCV 89.4 90.3   MCH 30.3 30.0   MCHC 33.9 33.2   RDW 13.6 13.6    Recent Labs     08/22/22 0235 08/21/22 0259   MONOS 11* 11*   EOS 6* 5   BASOS 1 1   RDW 13.6 13.6        Comprehensive Metabolic Profile    Recent Labs     08/22/22 0235 08/21/22 0259    137   K 3.8 3.8    103   CO2 24 28   BUN 70* 58*   CREA 4.58* 4.54*    Recent Labs     08/22/22 0235 08/21/22 0259   CA 9.6 9.5   PHOS 3.8 3.9                        Impression:       Active Hospital Problems    Diagnosis Date Noted    ESRD (end stage renal disease) ([facility] 08/16/2022    Substance abuse ([facility] 08/16/2022    Anemia 08/16/2022    End stage renal disease ([facility] 08/16/2022    Pericardial effusion 08/16/2022    Hyperglycemia 08/16/2022    CKD (chronic kidney disease) stage 5, GFR less than 15 ml/min ([facility]) 08/16/2022    Elevated serum creatinine 39/50/4828    Systolic CHF, chronic ([address] 07/10/2016    CHF (congestive heart failure) ([facility] 11/30/2015    Homelessness 10/19/2015    Type 2 diabetes mellitus with insulin therapy ([facility] 07/06/2015    Hypertension 01/01/2000            Plan: Tolerating dialysis well. With 3K and 2.5 calcium bath. UF goal of 2.5 kg. Blood pressure remained good. No need for any Retacrit no need for any Hectorol. .  From renal point of view patient can be discharged with current medications. He will need indigent medications. He will start his outpatient dialysis treatment with indefinite elevated. Dialysis unit every Tuesday Thursday Saturday starting on 25th of this month at 1030. Patient should arrive to the dialysis unit for the treatment.   Telephone number of [facility] unit is area code [phone]. Plan of discharge was discussed with the patient's attending.         Ariana Duncan MD

## 2022-08-22 NOTE — PROGRESS NOTES
Wound Prevention Checklist    Patient: Mariel Contreras (72 y.o. male)  Date: 8/21/2022  Diagnosis: End stage renal disease ([facility] [N18.6]  Elevated serum creatinine [R79.89]  Hyperglycemia [R73.9]  Pericardial effusion [I31.3]  CKD (chronic kidney disease) stage 5, GFR less than 15 ml/min ([facility]) [N18.5]  CHF (congestive heart failure) ([facility] [I50.9] <principal problem not specified>    Precautions:         []  Heel prevention boots placed on patient    []  Patient turned q2h during shift    []  Lift team ordered    []  Patient on [place] bed/Specialty bed    []  Each Wound is documented during shift (Stage, Color, drainage, odor, measurements, and dressings)    [x]  Dual skin checks done at bedside during shift report with Sr. Jose David Paredes RN

## 2022-08-22 NOTE — DIALYSIS
GENERAL ASSESSMENT:    LUNGS:  Resp Rate 16   [] Clear  [] Coarse  [] Crackles  [] Wheezing  [x] Diminished                                                           [x] RLL   [x] LLL  [x] RUL   [x] AZUCENA            Respirations:  [x]Easy  []Labored  []N/A  Cough:  []Productive  []Dry  []N/A               Therapy:  [x]RA   [] Ventilated   [] Intubated   [] Trach            O2 Device:  [] NC   [] NRB  [] Trach Mask  [] BiPaP  Flow:   l/min                                                    CARDIAC: [x] Regular      [] Irregular   [] Rhythm:          [] Monitored   [] Bedside   [] Remotely monitored       EDEMA: [] None   [x]Generalized  [] Pitting [x] 1+   [] 2 +   [] 3+    [] 4+        SKIN:   [] Hot     [] Cold    [x] Warm   [x] Dry    [] Diaphoretic                 [] Flushed  [] Jaundiced  [] Cyanotic  [] Pale      LOC:    [x] Alert      [x]Oriented:    [x] Person     [x] Place   [x]Time               [] Confused  [] Lethargic  [] Medicated  [] Non-responsive  [] Non-Verbal     GI / ABDOMEN:                     [] Flat    [] Distended    [x] Soft    [] Firm   []  Obese                   [] Diarrhea   [] FMS [x] Bowel Sounds  [] Nausea  [] Vomiting                   [] NGT  [] OGT  [] PEG  [] Tube Feedings @     mL/hr     / URINE ASSESSMENT:                   [] Voiding    [x] Oliguria  [] Anuria                     []  Light   [] Incontinent  []  Incontinent Brief   []  PureWick     PAIN:  [x] 0 []1  []2   []3   []4   []5   []6   []7   []8   []9   []10                MOBILITY:  [x] Bed    [] Stretcher      All Vitals and Treatment Details on Attached Flowsheet

## 2022-08-23 LAB
GLUCOSE BLD STRIP.AUTO-MCNC: 143 MG/DL (ref 70–110)
GLUCOSE BLD STRIP.AUTO-MCNC: 164 MG/DL (ref 70–110)
GLUCOSE BLD STRIP.AUTO-MCNC: 199 MG/DL (ref 70–110)
GLUCOSE BLD STRIP.AUTO-MCNC: 204 MG/DL (ref 70–110)

## 2022-08-23 PROCEDURE — 74011250637 HC RX REV CODE- 250/637: Performed by: INTERNAL MEDICINE

## 2022-08-23 PROCEDURE — 82962 GLUCOSE BLOOD TEST: CPT

## 2022-08-23 PROCEDURE — 74011000250 HC RX REV CODE- 250: Performed by: INTERNAL MEDICINE

## 2022-08-23 PROCEDURE — 74011636637 HC RX REV CODE- 636/637: Performed by: FAMILY MEDICINE

## 2022-08-23 PROCEDURE — 74011250636 HC RX REV CODE- 250/636: Performed by: INTERNAL MEDICINE

## 2022-08-23 PROCEDURE — 65270000046 HC RM TELEMETRY

## 2022-08-23 PROCEDURE — 99231 SBSQ HOSP IP/OBS SF/LOW 25: CPT | Performed by: FAMILY MEDICINE

## 2022-08-23 RX ADMIN — SODIUM CHLORIDE, PRESERVATIVE FREE 10 ML: 5 INJECTION INTRAVENOUS at 21:14

## 2022-08-23 RX ADMIN — HYDRALAZINE HYDROCHLORIDE 10 MG: 20 INJECTION INTRAMUSCULAR; INTRAVENOUS at 21:14

## 2022-08-23 RX ADMIN — LOSARTAN POTASSIUM 100 MG: 50 TABLET, FILM COATED ORAL at 09:21

## 2022-08-23 RX ADMIN — HEPARIN SODIUM 5000 UNITS: 5000 INJECTION INTRAVENOUS; SUBCUTANEOUS at 13:21

## 2022-08-23 RX ADMIN — Medication 6 UNITS: at 12:30

## 2022-08-23 RX ADMIN — SODIUM CHLORIDE, PRESERVATIVE FREE 10 ML: 5 INJECTION INTRAVENOUS at 05:13

## 2022-08-23 RX ADMIN — Medication 5 UNITS: at 23:10

## 2022-08-23 RX ADMIN — TAMSULOSIN HYDROCHLORIDE 0.4 MG: 0.4 CAPSULE ORAL at 09:21

## 2022-08-23 RX ADMIN — Medication 3 UNITS: at 08:57

## 2022-08-23 RX ADMIN — HEPARIN SODIUM 5000 UNITS: 5000 INJECTION INTRAVENOUS; SUBCUTANEOUS at 21:14

## 2022-08-23 RX ADMIN — Medication 3 UNITS: at 17:15

## 2022-08-23 RX ADMIN — NEPHROCAP 1 CAPSULE: 1 CAP ORAL at 09:21

## 2022-08-23 RX ADMIN — HEPARIN SODIUM 5000 UNITS: 5000 INJECTION INTRAVENOUS; SUBCUTANEOUS at 05:12

## 2022-08-23 RX ADMIN — SODIUM CHLORIDE, PRESERVATIVE FREE 10 ML: 5 INJECTION INTRAVENOUS at 14:00

## 2022-08-23 NOTE — PROGRESS NOTES
Problem: Falls - Risk of  Goal: *Absence of Falls  Description: Document Delroy Mai Fall Risk and appropriate interventions in the flowsheet.   Outcome: Progressing Towards Goal  Note: Fall Risk Interventions:  Mobility Interventions: Patient to call before getting OOB         Medication Interventions: Patient to call before getting OOB, Teach patient to arise slowly    Elimination Interventions: Patient to call for help with toileting needs    History of Falls Interventions: Door open when patient unattended         Problem: Patient Education: Go to Patient Education Activity  Goal: Patient/Family Education  Outcome: Progressing Towards Goal

## 2022-08-23 NOTE — PROGRESS NOTES
Problem: Falls - Risk of  Goal: *Absence of Falls  Description: Document Royal Torres Fall Risk and appropriate interventions in the flowsheet. Outcome: Progressing Towards Goal  Note: Fall Risk Interventions:  Mobility Interventions: Patient to call before getting OOB         Medication Interventions: Patient to call before getting OOB, Teach patient to arise slowly    Elimination Interventions: Patient to call for help with toileting needs    History of Falls Interventions: Door open when patient unattended         Problem: Patient Education: Go to Patient Education Activity  Goal: Patient/Family Education  Outcome: Progressing Towards Goal     Problem: Diabetes Self-Management  Goal: *Disease process and treatment process  Description: Define diabetes and identify own type of diabetes; list 3 options for treating diabetes. Outcome: Progressing Towards Goal  Goal: *Incorporating nutritional management into lifestyle  Description: Describe effect of type, amount and timing of food on blood glucose; list 3 methods for planning meals. Outcome: Progressing Towards Goal  Goal: *Incorporating physical activity into lifestyle  Description: State effect of exercise on blood glucose levels. Outcome: Progressing Towards Goal  Goal: *Developing strategies to promote health/change behavior  Description: Define the ABC's of diabetes; identify appropriate screenings, schedule and personal plan for screenings. Outcome: Progressing Towards Goal  Goal: *Using medications safely  Description: State effect of diabetes medications on diabetes; name diabetes medication taking, action and side effects. Outcome: Progressing Towards Goal  Goal: *Monitoring blood glucose, interpreting and using results  Description: Identify recommended blood glucose targets  and personal targets.   Outcome: Progressing Towards Goal  Goal: *Prevention, detection, treatment of acute complications  Description: List symptoms of hyper- and hypoglycemia; describe how to treat low blood sugar and actions for lowering  high blood glucose level. Outcome: Progressing Towards Goal  Goal: *Prevention, detection and treatment of chronic complications  Description: Define the natural course of diabetes and describe the relationship of blood glucose levels to long term complications of diabetes.   Outcome: Progressing Towards Goal  Goal: *Developing strategies to address psychosocial issues  Description: Describe feelings about living with diabetes; identify support needed and support network  Outcome: Progressing Towards Goal  Goal: *Insulin pump training  Outcome: Progressing Towards Goal  Goal: *Sick day guidelines  Outcome: Progressing Towards Goal  Goal: *Patient Specific Goal (EDIT GOAL, INSERT TEXT)  Outcome: Progressing Towards Goal     Problem: Patient Education: Go to Patient Education Activity  Goal: Patient/Family Education  Outcome: Progressing Towards Goal     Problem: Chronic Renal Failure  Goal: *Fluid and electrolytes stabilized  Outcome: Progressing Towards Goal     Problem: Patient Education: Go to Patient Education Activity  Goal: Patient/Family Education  Outcome: Progressing Towards Goal

## 2022-08-23 NOTE — PROGRESS NOTES
Discharge planning    Spoke with Sharee Harrell ([phone])TIMUR [phone] concerning status of  dialysis unit and chair. Per Sharee Harrell, they are still waiting on financial clearance for [facility]. CM will continue to assist with transition of care needs.      EMMA Titus, RN  Pager # [phone]  Care Manager

## 2022-08-23 NOTE — PROGRESS NOTES
Feels good, no new complain. Work up for hs insurance /Financial issue with [phone] in progress, once done he can be discharged. He will start dialysis on  on 8/25/22 as OP at [facility].

## 2022-08-23 NOTE — ROUTINE PROCESS
Wound Prevention Checklist    Patient: Alfonso Tolentino (10 y.o. male)  Date: 8/23/2022  Diagnosis: End stage renal disease ([facility] [N18.6]  Elevated serum creatinine [R79.89]  Hyperglycemia [R73.9]  Pericardial effusion [I31.3]  CKD (chronic kidney disease) stage 5, GFR less than 15 ml/min ([facility]) [N18.5]  CHF (congestive heart failure) ([facility] [I50.9] <principal problem not specified>    Precautions:         []  Heel prevention boots placed on patient    []  Patient turned q2h during shift    []  Lift team ordered    []  Patient on [place] bed/Specialty bed    []  Each Wound is documented during shift (Stage, Color, drainage, odor, measurements, and dressings)    [x]  Dual skin checks done at bedside during shift report with PB Francis RN

## 2022-08-23 NOTE — ROUTINE PROCESS
Bedside shift change report given to Faye Moss RN (oncoming nurse) by Robert Rodriguez RN (offgoing nurse). Report included the following information SBAR, Kardex, Intake/Output, and Recent Results.

## 2022-08-24 LAB
GLUCOSE BLD STRIP.AUTO-MCNC: 117 MG/DL (ref 70–110)
GLUCOSE BLD STRIP.AUTO-MCNC: 157 MG/DL (ref 70–110)
GLUCOSE BLD STRIP.AUTO-MCNC: 167 MG/DL (ref 70–110)
GLUCOSE BLD STRIP.AUTO-MCNC: 219 MG/DL (ref 70–110)

## 2022-08-24 PROCEDURE — 74011250636 HC RX REV CODE- 250/636: Performed by: INTERNAL MEDICINE

## 2022-08-24 PROCEDURE — 74011000250 HC RX REV CODE- 250: Performed by: INTERNAL MEDICINE

## 2022-08-24 PROCEDURE — 74011636637 HC RX REV CODE- 636/637: Performed by: FAMILY MEDICINE

## 2022-08-24 PROCEDURE — 74011250637 HC RX REV CODE- 250/637: Performed by: INTERNAL MEDICINE

## 2022-08-24 PROCEDURE — 99231 SBSQ HOSP IP/OBS SF/LOW 25: CPT | Performed by: FAMILY MEDICINE

## 2022-08-24 PROCEDURE — 90935 HEMODIALYSIS ONE EVALUATION: CPT

## 2022-08-24 PROCEDURE — 82962 GLUCOSE BLOOD TEST: CPT

## 2022-08-24 PROCEDURE — 65270000046 HC RM TELEMETRY

## 2022-08-24 RX ORDER — INSULIN GLARGINE 100 [IU]/ML
10 INJECTION, SOLUTION SUBCUTANEOUS
Status: DISCONTINUED | OUTPATIENT
Start: 2022-08-24 | End: 2022-08-28 | Stop reason: HOSPADM

## 2022-08-24 RX ADMIN — LOSARTAN POTASSIUM 100 MG: 50 TABLET, FILM COATED ORAL at 12:56

## 2022-08-24 RX ADMIN — Medication 3 UNITS: at 17:13

## 2022-08-24 RX ADMIN — SODIUM CHLORIDE, PRESERVATIVE FREE 10 ML: 5 INJECTION INTRAVENOUS at 22:57

## 2022-08-24 RX ADMIN — NITROGLYCERIN 0.5 INCH: 20 OINTMENT TOPICAL at 06:30

## 2022-08-24 RX ADMIN — HEPARIN SODIUM 5000 UNITS: 5000 INJECTION INTRAVENOUS; SUBCUTANEOUS at 15:41

## 2022-08-24 RX ADMIN — HEPARIN SODIUM 5000 UNITS: 5000 INJECTION INTRAVENOUS; SUBCUTANEOUS at 06:30

## 2022-08-24 RX ADMIN — NEPHROCAP 1 CAPSULE: 1 CAP ORAL at 12:56

## 2022-08-24 RX ADMIN — Medication 10 UNITS: at 22:57

## 2022-08-24 RX ADMIN — Medication 3 UNITS: at 22:56

## 2022-08-24 RX ADMIN — HEPARIN SODIUM 5000 UNITS: 5000 INJECTION INTRAVENOUS; SUBCUTANEOUS at 22:38

## 2022-08-24 RX ADMIN — TAMSULOSIN HYDROCHLORIDE 0.4 MG: 0.4 CAPSULE ORAL at 12:56

## 2022-08-24 RX ADMIN — Medication 6 UNITS: at 08:30

## 2022-08-24 RX ADMIN — SODIUM CHLORIDE, PRESERVATIVE FREE 10 ML: 5 INJECTION INTRAVENOUS at 15:42

## 2022-08-24 RX ADMIN — SODIUM CHLORIDE, PRESERVATIVE FREE 10 ML: 5 INJECTION INTRAVENOUS at 06:31

## 2022-08-24 NOTE — DIABETES MGMT
Diabetes/ Glycemic Control Plan of Care    Patient with history of T2DM was seen in ED waiting area on 8/16/2022 with report of abdominal/flak pain and provided diabetes education prior to planned released. Then he was seen by nephrology for worsening renal function and was admitted with ESRD. 8/17/2022: Placement of internal jugular tunneled dialysis catheter  8/18/2022: Patient seen in dialysis unit. Patient stated that he has vial NPH insulin and syringes at home and plan to resume use at disch. He verbalized understanding of diabetes and insulin use to help prevent hyperglycemia. He  agreed to contact medical provider if he experience high blood sugar 300 and above that he cannot explain. He was also able to state how to treat low blood sugar less than 70 and when to contact medical provider: frequent BG less than 70 that he cannot explain. Patient also plan to attend diabetes classes at [facility] area in [facility]. Assessment today: Elevated BG values on 5 units lantus daily and correctional    POC BG 8/23/2022: 199, 204    POC BG 8/24/2022: 219    Recommendations:   21) increase basal lantus insulin dose from 5 units to 10 units daily at bedtime starting tonight.  2.) resume NPH insulin (home) at disch: 26 units every morning and 12 units every evening. DX:   1. End stage renal disease (HCC)        2. Elevated serum creatinine        3. Hyperglycemia        4. Pericardial effusion        5. ESRD on dialysis ([address]  INVASIVE VASCULAR PROCEDURE    INVASIVE VASCULAR PROCEDURE         Fasting/ Morning blood glucose:   Lab Results   Component Value Date/Time    Glucose 160 (H) 08/22/2022 02:35 AM    Glucose (POC) 219 (H) 08/24/2022 07:33 AM     IV Fluids containing dextrose: none    Steroids:  None    Blood glucose values: Within target range (70-180mg/dL):  NO.     Current insulin orders:   Correctional lispro insulin.  Very resistant dose  Basal lantus insulin 10 units daily at bedtime    Total Daily Dose previous 24 hours: 17 units of insulin  Lantus: 10 units  Lispro: 12 units    Current A1c:   Lab Results   Component Value Date/Time    Hemoglobin A1c 6.8 (H) 08/17/2022 02:57 AM      equivalent  to ave Blood Glucose of 148 mg/dl for 2-3 months prior to admission    Adequate glycemic control PTA: YES    Nutrition/Diet:   Active Orders   Diet    ADULT DIET Regular; 4 carb choices (60 gm/meal); Low Sodium (2 gm); Low Potassium (Less than 3000 mg/day); Low Phosphorus (Less than 1000 mg)      Meal Intake:  Patient Vitals for the past 168 hrs:   % Diet Eaten   08/23/22 1230 76 - 100%   08/23/22 0804 76 - 100%   08/22/22 1824 76 - 100%   08/22/22 1302 51 - 75%   08/22/22 0919 76 - 100%   08/19/22 1219 76 - 100%   08/17/22 0929 0%       Supplement Intake:  No data found. Home diabetes medications:  Patient reported differently on 8/15/2022 that he's only taking NPH insulin twice daily at home:  26 units daily every morning  12 units daily every evening  Key Antihyperglycemic Medications               insulin glargine (LANTUS) 100 unit/mL injection 12 Units by SubCUTAneous route daily. insulin lispro (HUMALOG) 100 unit/mL injection For Blood Sugar (mg/dL) of:   Less than 150 =  0 units  150 -199 =  3 units  200 -249 =  6 units  250 -299 =  9 units  300 -349 =  12 units  350 and above =  15 units, call MD.            Plan/Goals:   Blood glucose will be within target of 70 - 180 mg/dl within 72 hours  Reinforce dietary and medication compliance at home.           Education:  [x] Refer to Diabetes Education Record: 8/16/2022                       [] Education not indicated at this time     Tiara Smith RN CCM

## 2022-08-24 NOTE — DIALYSIS
ACUTE HEMODIALYSIS FLOW SHEET    HEMODIALYSIS ORDERS: Physician: Dr. Cruz Haro: [facility]   Duration: 3.0 hr   BFR: 350   DFR: 600   Dialysate:  Temp 36-37*C   K+  3    Ca+ 2.5   Na 138   Bicarb 35   Wt Readings from Last 1 Encounters:   08/20/22 94.4 kg (208 lb 3.2 oz)    Patient Chart [x]   Unable to Obtain []  Dry weight/UF Goal: 1500 ml    Heparin []  Bolus    Units    [] Hourly    Units    [x]None       Pre BP: 161/85  Pulse: 73  Respirations: 16 Temp: 97.8  [] Oral  [x] Ax  [] Esoph   Labs: []  Pre  []  Post:   [x] N/A   Additional Orders (medications, blood products, hypotension management): [] Yes   [] No     [x]  [facility] Consent Verified     CATHETER ACCESS:  [x]N/A   [x]Right   []Left   []IJ   []Fem  [x]Chest wall  []TransHepatic   [] First use X-ray verified     [x]Tunnel    [] Non Tunneled   [x]No S/S infection  []Redness  []Drainage []Cultured []Swelling []Pain   [x]Medical Aseptic Prep Utilized   []Dressing Changed  [] Biopatch  Date: 08/24/22   []Clotted   [x]Patent   Flows: [x]Good  []Poor  []Reversed   If access problem,  notified: []Yes    [x]N/A        GRAFT/FISTULA ACCESS:   [x]N/A     []Right     []Left     []UE     []LE                   GENERAL ASSESSMENT:    LUNGS:  Resp Rate 16   [] Clear  [] Coarse  [] Crackles  [] Wheezing  [x] Diminished                                                           [x] RLL   [x] LLL  [x] RUL   [x] AZUCENA            Respirations:  [x]Easy  []Labored  []N/A  Cough:  []Productive  []Dry  []N/A               Therapy:  [x]RA   [] Ventilated   [] Intubated   [] Trach            O2 Device:  [] NC   [] NRB  [] Trach Mask  [] BiPaP  Flow:   l/min                                                    CARDIAC: [x] Regular      [] Irregular   [] Rhythm:not monitored          [] Monitored   [] Bedside   [] Remotely monitored       EDEMA: [] None   [x]Generalized  [] Pitting [x] 1+   [] 2 +   [] 3+    [] 4+        SKIN:   [] Hot     [] Cold    [x] Warm [x] Dry    [] Diaphoretic                 [] Flushed  [] Jaundiced  [] Cyanotic  [] Pale      LOC:    [x] Alert      [x]Oriented:    [x] Person     [x] Place   [x]Time               [] Confused  [] Lethargic  [] Medicated  [] Non-responsive  [] Non-Verbal     GI / ABDOMEN:                     [] Flat    [] Distended    [x] Soft    [] Firm   []  Obese                   [] Diarrhea   [] FMS [x] Bowel Sounds  [] Nausea  [] Vomiting                   [] NGT  [] OGT  [] PEG  [] Tube Feedings @     mL/hr     / URINE ASSESSMENT:                   [] Voiding    [x] Oliguria  [] Anuria                     []  Light   [] Incontinent  []  Incontinent Brief   []  PureWick     PAIN:  [x] 0 []1  []2   []3   []4   []5   []6   []7   []8   []9   []10                MOBILITY:  [x] Bed    [] Stretcher      All Vitals and Treatment Details on Attached [address]: [facility]          Room # 466/01    [x] Routine         [] 1st Time Acute/Chronic   [] Urgent      [] Stat            [x] Acute Room   []  Bedside    [] ICU/CCU     [] ER     Isolation Precautions:  [x] Dialysis    There are currently no Active Isolations     ALLERGIES:     Allergies   Allergen Reactions    Tylenol [Acetaminophen] Other (comments)     Pt was advised not to take Tylenol per Dr. Jayjay Martin Status:  Full Code     Hepatitis Status      Lab Results   Component Value Date/Time    Hepatitis A, IgM NEGATIVE  08/09/2014 08:40 AM    Hepatitis B surface Ag <0.10 08/17/2022 02:57 AM    Hepatitis B surface Ab 289.10 08/17/2022 02:57 AM    Hepatitis B core, IgM NEGATIVE  08/09/2014 08:40 AM    Hep B Core Ab, total Negative 08/18/2022 02:52 AM    Hepatitis C virus Ab >11.00 (H) 08/17/2022 02:57 AM        Current Labs:      Lab Results   Component Value Date/Time    WBC 9.4 08/22/2022 02:35 AM    HGB 10.9 (L) 08/22/2022 02:35 AM    HCT 32.2 (L) 08/22/2022 02:35 AM    PLATELET 347 23/16/2903 02:35 AM    MCV 89.4 08/22/2022 02:35 AM     Lab Results   Component Value Date/Time    Sodium 137 08/22/2022 02:35 AM    Potassium 3.8 08/22/2022 02:35 AM    Chloride 105 08/22/2022 02:35 AM    CO2 24 08/22/2022 02:35 AM    Anion gap 8 08/22/2022 02:35 AM    Glucose 160 (H) 08/22/2022 02:35 AM    BUN 70 (H) 08/22/2022 02:35 AM    Creatinine 4.58 (H) 08/22/2022 02:35 AM    BUN/Creatinine ratio 15 08/22/2022 02:35 AM    GFR est AA 16 (L) 08/22/2022 02:35 AM    GFR est non-AA 13 (L) 08/22/2022 02:35 AM    Calcium 9.6 08/22/2022 02:35 AM          DIET:  DIET ADULT     PRIMARY NURSE REPORT:   Pre Dialysis: Kendal Mercer RN    Time: 082      EDUCATION:    [x] Patient           Knowledge Basis: []None [x]Minimal [] Substantial [] Unknown  Barriers to learning  [x]None  [] Intubated/Trached/Ventilated  [] Sedated/Paralyzed   [] Access Care     [] S&S of infection  [] Fluid Management  [] K+   [x] Procedural    [] Medications   [] Tx Options   [] Transplant   [] Diet      Teaching Tools:  [x] Explain  [] Demo  [] Handouts [] Video  Patient response: [x] Verbalized understanding   [] Requires follow up        [x] Time Out/Safety Check    [x] Extracorporeal Circuit Tested for integrity       RO/HEMODIALYSIS MACHINE SAFETY CHECKS - Before each treatment:        [facility]                                    [x] Unit Machine # 9 with centralized RO                                                                                                                                  Alarm Test:  Pass time 0830            [x] RO/Machine Log Complete    Machine Temp    36-37*C             Dialysate: pH  7.4    Conductivity: Meter 14.0    HD Machine  14.0     TCD: 13.3  Dialyzer Lot # [id number]     Blood Tubing Lot # [id number]     Saline Lot # [id number]     CHLORINE TESTING-Before each treatment and every 4 hours    Total Chlorine: [x] less than 0.1 ppm  Initial Time Check: 0800       4 Hr/2nd Check Time: 1200   (if greater than 0.1 ppm from Primary then every 30 minutes from Secondary)     TREATMENT INITIATION - with Dialysis Precautions:   [x] All Connections Secured              [x] Saline Line Double Clamped   [x] Venous Parameters Set               [x] Arterial Parameters Set    [x] Prime Given 250ml NSS              [x]Air Foam Detector Engaged        Treatment Initiation Note:  0900--Pt arrived for dialysis, Pt assessed and is stable for dialysis(see above note). 0905--HD initiated without difficulty with 1200 units heparin bolus, 500 units/hr maintenance     During Treatment Notes:  0915  Face & Vascular access visible with art and thong line connections intact. Pt tolerating dialysis. 0930  Face & Vascular access visible with art and thong line connections intact. Pt tolerating dialysis. 0945  Face & Vascular access visible with art and thong line connections intact. Pt tolerating dialysis. 1000  Face & Vascular access visible with art and thong line connections intact. Pt tolerating dialysis. 1015  Face & Vascular access visible with art and thong line connections intact. Pt tolerating dialysis. 1030  Face & Vascular access visible with art and thong line connections intact. Pt tolerating dialysis. 1045  Face & Vascular access visible with art and thong line connections intact. Pt tolerating dialysis. 1100  Face & Vascular access visible with art and thong line connections intact. Pt tolerating dialysis. 1115  Face & Vascular access visible with art and thong line connections intact. Pt tolerating dialysis. 1130  Face & Vascular access visible with art and thong line connections intact. Pt tolerating dialysis. 1145  Face & Vascular access visible with art and thong line connections intact. Pt tolerating dialysis. 1200  Face & Vascular access visible with art and thong line connections intact. Pt tolerating dialysis. 1215  Face & Vascular access visible with art and thong line connections intact. Pt tolerating dialysis. 1218  Dialysis treatment complete.        Medication    Dose    Volume Route      Time [place] Nurse   none   HD Dionicio Krabbe, RN HD Dionicio Krabbe, RN HD Dionicio Krabbe, RN     Post Assessment  Dialyzer Cleared:   [] Good  [x] Fair  [] Poor  Blood processed:  56.4 L  UF Removed:  1500 Ml    Post BP: 156/96  Pulse: 73  Respirations: 16   Temp: 97.5  [] Oral  [x] Ax  [] Esophageal   Lungs: [] Clear                [x] No change from initial assessment   Post Tx Vascular Access: [x] N/A     Cardiac:  [x] Regular   [] Irregular   Rhythm:  [] Monitored   [x] Not Monitored    CVC Catheter: [] N/A  Locking solution: Heparin 1:1000 U  Arterial port 1.6 ml   Venous port 1.6 ml   Edema:  [x] None  [] Generalized                     Skin:[x] Warm  [x] Dry [] Diaphoretic               [] Flushed  [] Pale [] Cyanotic Pain:  [x]0  []1-2  []3-4  []5-6   []7-8  []9-10         Post Treatment Note:   Pt tolerated dialysis well. Dialysis catheter intact, patent and heplocked as noted above.       POST TREATMENT PRIMARY NURSE HANDOFF REPORT:   Post Dialysis: Henok Kapoor RN        Time:  200       Abbreviations: AVG-arterial venous graft, AVF-arterial venous fistula, IJ-Internal Jugular, Subcl-Subclavian, Fem-Femoral, Tx-treatment, AP/HR-apical heart rate, VSS- Vital Signs Stable, CVC- Central Venous Catheter, DFR-dialysate flow rate, BFR-blood flow rate, AP-arterial pressure, -venous pressure, UF-ultrafiltrate, TMP-transmembrane pressure, Alpesh-Venous, Art-Arterial, RO-Reverse Osmosis

## 2022-08-24 NOTE — PROGRESS NOTES
Comprehensive Nutrition Assessment    Type and Reason for Visit: Initial, RD nutrition re-screen/LOS    Nutrition Recommendations/Plan:   Add supplement: Nepro once daily  Continue all other nutrition interventions. Encourage/ monitor po intake of meals and supplements. Malnutrition Assessment:  Malnutrition Status:  No malnutrition (08/24/22 1454)      Nutrition History and Allergies:   Past medical hx:  cocaine use, DM, CHF, hepatitis C, heroin abuse, homelessness, HTN. Wt trends PTA per chart hx:  153 lb on 2/21/2019,  206 lb on 6/18/2019,   208 lb on 8/20/2022;  stable weight in past 3 years PTA per chart hx. Pt denied experiencing any unplanned wt loss PTA or decreased po intake PTA per nutrition screen. No known food allergies     Nutrition Assessment:    Pt admitted with c/o abdominal pain (has since been resolved); was also found to have worsening renal function, almost ESRD. Pt also had UDS positive for cocaine upon admission. Nephrology is following; pt s/p right internal jugular TDC placement and was started on HD on 8/17/22. Pt on po diet; good meal intake, eating % of meals. Tolerating diet. Noted pt medically stable for discharge once outpatient dialysis is set up. Nutrition Related Findings:    BM 8/22,  8/21 soft. No edema. Pertinent meds: nephrocap, SSI, lantus 10 units ordered (increased from 5 units). Glycemic Control following. Last HD was 8/24/22;  1.5 L UF removed. Wound Type: None    Current Nutrition Intake & Therapies:  Average Meal Intake: %  Average Supplement Intake: None ordered  ADULT DIET Regular; 4 carb choices (60 gm/meal); Low Sodium (2 gm); Low Potassium (Less than 3000 mg/day); Low Phosphorus (Less than 1000 mg)    Anthropometric Measures:  Height: 6' (182.9 cm)  Ideal Body Weight (IBW): 178 lbs (81 kg)  Admission Body Weight: 217 lb 13 oz  Current Body Wt:  94.4 kg (208 lb 1.8 oz), 116.9 % IBW.  Bed scale  Current BMI (kg/m2): 28.2  Usual Body Weight: 93.4 kg (206 lb) (June 2019)  % Weight Change (Calculated): 1  Weight Adjustment: No adjustment  BMI Category: Overweight (BMI 25.0-29. 9)    Estimated Daily Nutrient Needs:  Energy Requirements Based On: Kcal/kg (wt x25-35)  Weight Used for Energy Requirements: Other (specify) (SBW 90 kg)  Energy (kcal/day): 8761-1810  Weight Used for Protein Requirements: Other (specify)  Protein (g/day):  (SBW x1-1.2)  Method Used for Fluid Requirements: Standard renal  Fluid (ml/day): 750-1500 (per recommendations for HD pt)    Nutrition Diagnosis:   Increased nutrient needs related to increased demand for energy/nutrients, renal dysfunction as evidenced by dialysis    Nutrition Interventions:   Food and/or Nutrient Delivery: Continue current diet, Vitamin supplement, Start oral nutrition supplement  Nutrition Education/Counseling: Education not indicated  Coordination of Nutrition Care: Continue to monitor while inpatient       Goals:     Goals: Meet at least 75% of estimated needs, by next RD assessment       Nutrition Monitoring and Evaluation:   Behavioral-Environmental Outcomes: None identified  Food/Nutrient Intake Outcomes: Food and nutrient intake, Supplement intake, Vitamin/mineral intake  Physical Signs/Symptoms Outcomes: Biochemical data, Meal time behavior    Discharge Planning:    Continue oral nutrition supplement, Continue current diet    Anai Mar, [address]  Contact: [phone]

## 2022-08-24 NOTE — PROGRESS NOTES
Progress Note    Arias Sol  75 y.o. Admit Date: 8/16/2022  Active Problems:    Type 2 diabetes mellitus with insulin therapy ([facility] (7/6/2015) POA: Yes      Homelessness (10/19/2015) POA: Yes      CHF (congestive heart failure) ([facility] (11/30/2015) POA: Unknown      Systolic CHF, chronic ([facility] (7/10/2016) POA: Yes      Elevated serum creatinine (8/10/2016) POA: Unknown      Hypertension (1/1/2000) POA: Yes      ESRD (end stage renal disease) ([facility] (8/16/2022) POA: Unknown      Substance abuse ([facility] (8/16/2022) POA: Unknown      Anemia (8/16/2022) POA: Unknown      End stage renal disease ([facility] (8/16/2022) POA: Unknown      Pericardial effusion (8/16/2022) POA: Unknown      Hyperglycemia (8/16/2022) POA: Unknown      CKD (chronic kidney disease) stage 5, GFR less than 15 ml/min ([facility] (8/16/2022) POA: Unknown            Subjective:     Patient still in the hospital.  Expected to be discharged any day seen during dialysis no complaint today. Dialysis catheter is working fine ,blood flow 400 cc/min      A comprehensive review of systems was negative except for that written in the History of Present Illness.     Objective:     Visit Vitals  BP (!) 169/98   Pulse 65   Temp 97.8 °F (36.6 °C) (Axillary)   Resp 16   Ht 6' (1.829 m)   Wt 94.4 kg (208 lb 3.2 oz)   SpO2 96%   BMI 28.24 kg/m²         Intake/Output Summary (Last 24 hours) at 8/24/2022 1012  Last data filed at 8/24/2022 0758  Gross per 24 hour   Intake 300 ml   Output 895 ml   Net -595 ml       Current Facility-Administered Medications   Medication Dose Route Frequency Provider Last Rate Last Admin    insulin glargine (LANTUS) injection 10 Units  10 Units SubCUTAneous QHS Sony Mcdaniels MD        losartan (COZAAR) tablet 100 mg  100 mg Oral DAILY Virginie Leija MD   100 mg at 08/23/22 0921    nitroglycerin (NITROBID) 2 % ointment 0.5 Inch  0.5 Inch Topical Q6H PRN Indu DAVIS DO   0.5 Inch at 08/24/22 0630    hydrALAZINE (APRESOLINE) 20 mg/mL injection 10 mg  10 mg IntraVENous Q6H PRN Stanley Garcia E, DO   10 mg at 08/23/22 2114    B complex-vitaminC-folic acid (NEPHROCAP) cap  1 Capsule Oral DAILY Royal Dev MD   1 Capsule at 08/23/22 8956    insulin lispro (HUMALOG) injection   SubCUTAneous AC&HS Niki Ch MD   6 Units at 08/24/22 0830    glucose chewable tablet 16 g  4 Tablet Oral PRN Ricky Keys MD        glucagon (GLUCAGEN) injection 1 mg  1 mg IntraMUSCular PRN Ricky Keys MD        dextrose 10% infusion 0-250 mL  0-250 mL IntraVENous PRN Ricky Keys MD        [facility]] aspirin tablet 325 mg  325 mg Oral DAILY Ricky Keys MD        [facility]) capsule 0.4 mg  0.4 mg Oral DAILY Ricky Keys MD   0.4 mg at 08/23/22 7898    sodium chloride (NS) flush 5-40 mL  5-40 mL IntraVENous Q8H Ricky Keys MD   10 mL at 08/24/22 0631    sodium chloride (NS) flush 5-40 mL  5-40 mL IntraVENous PRN Ricky Keys MD        acetaminophen (TYLENOL) tablet 650 mg  650 mg Oral Q6H PRN Ricky Keys MD   650 mg at 08/20/22 1020    Or    acetaminophen (TYLENOL) suppository 650 mg  650 mg Rectal Q6H PRN Ricky Keys MD        polyethylene glycol (MIRALAX) packet 17 g  17 g Oral DAILY PRN Ricky Keys MD        promethazine (PHENERGAN) tablet 12.5 mg  12.5 mg Oral Q6H PRN Ricky Keys MD        Or    [facility]) injection 4 mg  4 mg IntraVENous Q6H PRN Ricky Keys MD        heparin (porcine) injection 5,000 Units  5,000 Units SubCUTAneous Q8H Ricky Keys MD   5,000 Units at 08/24/22 0630        Physical Exam:     Physical Exam:   General:  Alert, cooperative, no distress, appears stated age. Eyes:  Conjunctivae/corneas clear. PERRL, EOMs intact. Mouth/Throat: Lips, mucosa, and tongue normal. Teeth and gums normal.   Neck: Supple, symmetrical, trachea midline, no adenopathy, thyroid: no enlargement/tenderness/nodules, no carotid bruit and no JVD.    Lungs:   Clear to auscultation bilaterally. Heart:  Regular rate and rhythm, S1, S2 normal, no murmur, click, rub or gallop. Abdomen:   Soft, non-tender. Bowel sounds normal. No masses,  No organomegaly. Extremities: Dialysis catheter in the right IJ vein. Exit site is clean no drainage       Data Review:    CBC w/Diff    Recent Labs     08/22/22 0235   WBC 9.4   RBC 3.60*   HGB 10.9*   HCT 32.2*   MCV 89.4   MCH 30.3   MCHC 33.9   RDW 13.6    Recent Labs     08/22/22 0235   MONOS 11*   EOS 6*   BASOS 1   RDW 13.6        Comprehensive Metabolic Profile    Recent Labs     08/22/22 0235      K 3.8      CO2 24   BUN 70*   CREA 4.58*    Recent Labs     08/22/22 0235   CA 9.6   PHOS 3.8        No labs drawn today. Her last lab from 22nd reviewed              Impression:       NAM/Ed Kidd [address] Problems    Diagnosis Date Noted    ESRD (end stage renal disease) ([facility] 08/16/2022    Substance abuse ([facility] 08/16/2022    Anemia 08/16/2022    End stage renal disease ([facility] 08/16/2022    Pericardial effusion 08/16/2022    Hyperglycemia 08/16/2022    CKD (chronic kidney disease) stage 5, GFR less than 15 ml/min ([facility]) 08/16/2022    Elevated serum creatinine 17/31/5825    Systolic CHF, chronic ([facility] 07/10/2016    CHF (congestive heart failure) ([facility] 11/30/2015    Homelessness 10/19/2015    Type 2 diabetes mellitus with insulin therapy ([facility] 07/06/2015    Hypertension 01/01/2000            Plan:     Getting dialysis with 2K 2.5 calcium bath UF goal of 2 kg. Tolerating fluid removal.  No need of any Retacrit  & Hectorol. Renal point indefinitely patient can be discharged. He can start his outpatient dialysis as scheduled every Tuesday Thursday Saturday at [address] [place] [address] unit around 10:30 AM that was discussed with him in the past.  And if he is discharged today he was encouraged to go to the dialysis unit tomorrow.     If he is still in the hospital his dialysis in the hospital will be next Saturday as his schedule has been changed to Tuesday Thursday Saturday.       Nba Garcia MD

## 2022-08-24 NOTE — PROGRESS NOTES
[facility] Hospitalists  Progress Note    Patient: Kristine Schmitt Age: 75 y.o. : 1955 MR#: [id number] SSN: [id number]  Date: 2022     Subjective/24-hour events:     No complaints. Assessment:   CKD 5 now ESRD, HD dependent  Hypertension  DM2 with hyperglycemia  Chronic systolic and diastolic CHF, EF 46% with grade 2 DD by echo 2018, now 55 to 60% by echo 2020  Cocaine abuse - UDS positive on admission  Homelessness - patient living in shelter prior to admission    Plan:   HD per usual schedule. Awaiting finalization of outpatient HD arrangements at [facility]. Medicaid inactive and being worked on. Case discussed with:  [x]Patient  []Family  [x]Nursing  [x]Case Management  DVT Prophylaxis:  []Lovenox  [x]Hep SQ  []SCDs  []Coumadin   []On Heparin gtt    Objective:   VS: Visit Vitals  /90   Pulse 72   Temp 98.1 °F (36.7 °C)   Resp 17   Ht 6' (1.829 m)   Wt 94.4 kg (208 lb 3.2 oz)   SpO2 96%   BMI 28.24 kg/m²     General:  In NAD. Nontoxic-appearing. Cardiovascular:  RRR. Pulmonary:  Lungs clear bilaterally, no wheezes. GI:  Abdomen soft, NTTP. Extremities:  Warm, no edema or ischemia. Neuro:  Awake and alert.   Moves extremities spontaneously, motor grossly nonfocal.        Signed By: Ralph Gerber MD     2022

## 2022-08-24 NOTE — PROGRESS NOTES
[facility]    Patient: Mauro Molina Age: 75 y.o. : 1955 MR#: [id number] SSN: [id number]  Date: 2022     Subjective/24-hour events:     No new issues overnight. Assessment:   CKD 5 now ESRD, HD dependent  Hypertension  DM2 with hyperglycemia  Chronic systolic and diastolic CHF, EF 22% with grade 2 DD by echo 2018, now 55 to 60% by echo 2020  Cocaine abuse - UDS positive on admission  Homelessness - patient living in shelter prior to admission    Plan:   HD today per MWF scheduled. Medically stable for discharge once outpatient dialysis set up. Case discussed with:  [x]Patient  []Family  [x]Nursing  [x]Case Management  DVT Prophylaxis:  []Lovenox  [x]Hep SQ  []SCDs  []Coumadin   []On Heparin gtt    Objective:   VS: Visit Vitals  /72   Pulse 72   Temp 98.1 °F (36.7 °C)   Resp 17   Ht 6' (1.829 m)   Wt 94.4 kg (208 lb 3.2 oz)   SpO2 96%   BMI 28.24 kg/m²      Tmax/24hrs: Temp (24hrs), Av.3 °F (36.8 °C), Min:98.1 °F (36.7 °C), Max:98.5 °F (36.9 °C)    Intake/Output Summary (Last 24 hours) at 2022 0919  Last data filed at 2022 0758  Gross per 24 hour   Intake 300 ml   Output 895 ml   Net -595 ml       General:  In NAD. Nontoxic-appearing. Cardiovascular:  RRR. Pulmonary:  Lungs clear bilaterally, no wheezes. GI:  Abdomen soft, NTTP. Extremities:  Warm, no edema or ischemia. Neuro:  Awake and alert.   Moves extremities spontaneously, motor grossly nonfocal.    Labs:    Recent Results (from the past 24 hour(s))   GLUCOSE, POC    Collection Time: 22 11:49 AM   Result Value Ref Range    Glucose (POC) 204 (H) 70 - 110 mg/dL   GLUCOSE, POC    Collection Time: 22  4:16 PM   Result Value Ref Range    Glucose (POC) 164 (H) 70 - 110 mg/dL   GLUCOSE, POC    Collection Time: 22  9:38 PM   Result Value Ref Range    Glucose (POC) 143 (H) 70 - 110 mg/dL   GLUCOSE, POC    Collection Time: 22  7:33 AM Result Value Ref Range    Glucose (POC) 219 (H) 70 - 110 mg/dL       Signed By: Shilpa Subramanian MD     August 24, 2022

## 2022-08-25 LAB
ANION GAP SERPL CALC-SCNC: 8 MMOL/L (ref 3–18)
BASOPHILS # BLD: 0.1 K/UL (ref 0–0.1)
BASOPHILS NFR BLD: 1 % (ref 0–2)
BUN SERPL-MCNC: 56 MG/DL (ref 7–18)
BUN/CREAT SERPL: 12 (ref 12–20)
CALCIUM SERPL-MCNC: 9.3 MG/DL (ref 8.5–10.1)
CHLORIDE SERPL-SCNC: 100 MMOL/L (ref 100–111)
CO2 SERPL-SCNC: 26 MMOL/L (ref 21–32)
CREAT SERPL-MCNC: 4.67 MG/DL (ref 0.6–1.3)
DIFFERENTIAL METHOD BLD: ABNORMAL
EOSINOPHIL # BLD: 0.4 K/UL (ref 0–0.4)
EOSINOPHIL NFR BLD: 5 % (ref 0–5)
ERYTHROCYTE [DISTWIDTH] IN BLOOD BY AUTOMATED COUNT: 14 % (ref 11.6–14.5)
GLUCOSE BLD STRIP.AUTO-MCNC: 145 MG/DL (ref 70–110)
GLUCOSE BLD STRIP.AUTO-MCNC: 151 MG/DL (ref 70–110)
GLUCOSE BLD STRIP.AUTO-MCNC: 174 MG/DL (ref 70–110)
GLUCOSE BLD STRIP.AUTO-MCNC: 198 MG/DL (ref 70–110)
GLUCOSE SERPL-MCNC: 149 MG/DL (ref 74–99)
HCT VFR BLD AUTO: 33.9 % (ref 36–48)
HGB BLD-MCNC: 11.4 G/DL (ref 13–16)
IMM GRANULOCYTES # BLD AUTO: 0.1 K/UL (ref 0–0.04)
IMM GRANULOCYTES NFR BLD AUTO: 1 % (ref 0–0.5)
LYMPHOCYTES # BLD: 2.5 K/UL (ref 0.9–3.6)
LYMPHOCYTES NFR BLD: 29 % (ref 21–52)
MCH RBC QN AUTO: 30.4 PG (ref 24–34)
MCHC RBC AUTO-ENTMCNC: 33.6 G/DL (ref 31–37)
MCV RBC AUTO: 90.4 FL (ref 78–100)
MONOCYTES # BLD: 1.1 K/UL (ref 0.05–1.2)
MONOCYTES NFR BLD: 13 % (ref 3–10)
NEUTS SEG # BLD: 4.5 K/UL (ref 1.8–8)
NEUTS SEG NFR BLD: 51 % (ref 40–73)
NRBC # BLD: 0 K/UL (ref 0–0.01)
NRBC BLD-RTO: 0 PER 100 WBC
PHOSPHATE SERPL-MCNC: 4.6 MG/DL (ref 2.5–4.9)
PLATELET # BLD AUTO: 287 K/UL (ref 135–420)
PMV BLD AUTO: 10 FL (ref 9.2–11.8)
POTASSIUM SERPL-SCNC: 4 MMOL/L (ref 3.5–5.5)
RBC # BLD AUTO: 3.75 M/UL (ref 4.35–5.65)
SODIUM SERPL-SCNC: 134 MMOL/L (ref 136–145)
WBC # BLD AUTO: 8.7 K/UL (ref 4.6–13.2)

## 2022-08-25 PROCEDURE — 85025 COMPLETE CBC W/AUTO DIFF WBC: CPT

## 2022-08-25 PROCEDURE — 74011636637 HC RX REV CODE- 636/637: Performed by: FAMILY MEDICINE

## 2022-08-25 PROCEDURE — 74011250636 HC RX REV CODE- 250/636: Performed by: INTERNAL MEDICINE

## 2022-08-25 PROCEDURE — 74011000250 HC RX REV CODE- 250: Performed by: INTERNAL MEDICINE

## 2022-08-25 PROCEDURE — 74011250637 HC RX REV CODE- 250/637: Performed by: INTERNAL MEDICINE

## 2022-08-25 PROCEDURE — 74011250637 HC RX REV CODE- 250/637: Performed by: HOSPITALIST

## 2022-08-25 PROCEDURE — 99232 SBSQ HOSP IP/OBS MODERATE 35: CPT | Performed by: HOSPITALIST

## 2022-08-25 PROCEDURE — 82962 GLUCOSE BLOOD TEST: CPT

## 2022-08-25 PROCEDURE — 2709999900 HC NON-CHARGEABLE SUPPLY

## 2022-08-25 PROCEDURE — 36415 COLL VENOUS BLD VENIPUNCTURE: CPT

## 2022-08-25 PROCEDURE — 84100 ASSAY OF PHOSPHORUS: CPT

## 2022-08-25 PROCEDURE — 80048 BASIC METABOLIC PNL TOTAL CA: CPT

## 2022-08-25 PROCEDURE — 65270000046 HC RM TELEMETRY

## 2022-08-25 RX ORDER — GUAIFENESIN 100 MG/5ML
81 LIQUID (ML) ORAL DAILY
Status: DISCONTINUED | OUTPATIENT
Start: 2022-08-25 | End: 2022-08-28 | Stop reason: HOSPADM

## 2022-08-25 RX ADMIN — TAMSULOSIN HYDROCHLORIDE 0.4 MG: 0.4 CAPSULE ORAL at 08:52

## 2022-08-25 RX ADMIN — SODIUM CHLORIDE, PRESERVATIVE FREE 10 ML: 5 INJECTION INTRAVENOUS at 06:17

## 2022-08-25 RX ADMIN — Medication 3 UNITS: at 22:23

## 2022-08-25 RX ADMIN — ASPIRIN 81 MG CHEWABLE TABLET 81 MG: 81 TABLET CHEWABLE at 12:30

## 2022-08-25 RX ADMIN — NEPHROCAP 1 CAPSULE: 1 CAP ORAL at 08:53

## 2022-08-25 RX ADMIN — HEPARIN SODIUM 5000 UNITS: 5000 INJECTION INTRAVENOUS; SUBCUTANEOUS at 06:17

## 2022-08-25 RX ADMIN — LOSARTAN POTASSIUM 100 MG: 50 TABLET, FILM COATED ORAL at 08:52

## 2022-08-25 RX ADMIN — Medication 3 UNITS: at 08:52

## 2022-08-25 RX ADMIN — HEPARIN SODIUM 5000 UNITS: 5000 INJECTION INTRAVENOUS; SUBCUTANEOUS at 15:33

## 2022-08-25 RX ADMIN — Medication 3 UNITS: at 11:30

## 2022-08-25 RX ADMIN — HEPARIN SODIUM 5000 UNITS: 5000 INJECTION INTRAVENOUS; SUBCUTANEOUS at 22:17

## 2022-08-25 RX ADMIN — SODIUM CHLORIDE, PRESERVATIVE FREE 10 ML: 5 INJECTION INTRAVENOUS at 22:25

## 2022-08-25 RX ADMIN — SODIUM CHLORIDE, PRESERVATIVE FREE 10 ML: 5 INJECTION INTRAVENOUS at 14:43

## 2022-08-25 RX ADMIN — Medication 10 UNITS: at 22:22

## 2022-08-25 NOTE — PROGRESS NOTES
[facility] Hospitalists  Progress Note    Patient: Laurence Chun Age: 75 y.o. : 1955 MR#: [id number] SSN: [id number]  Date: 2022     Subjective/24-hour events:     Patient sitting in the chair, feels fine. No new complaints. Assessment:   CKD 5 now ESRD, HD dependent  Hypertension  DM2 with hyperglycemia  Chronic systolic and diastolic CHF, EF 57% with grade 2 DD by echo 2018, now 55 to 60% by echo 2020  Cocaine abuse - UDS positive on admission  Homelessness - patient living in shelter prior to admission    Plan:   HD per nephrology  Continue Lantus and SSI  Continue Cozaar and monitor blood pressure  Continue Flomax  Medically stable for discharge once outpatient dialysis set up. Disposition: Home once outpatient HD set up    Discussed with case management, Medicaid application pending    Case discussed with:  [x]Patient  []Family  [x]Nursing  [x]Case Management  DVT Prophylaxis:  []Lovenox  [x]Hep SQ  []SCDs  []Coumadin   []On Heparin gtt    Objective:   VS: Visit Vitals  /76 (BP 1 Location: Right upper arm, BP Patient Position: Sitting)   Pulse 81   Temp 98.5 °F (36.9 °C)   Resp 18   Ht 6' (1.829 m)   Wt 94.4 kg (208 lb 3.2 oz)   SpO2 98%   BMI 28.24 kg/m²      Tmax/24hrs: Temp (24hrs), Av.3 °F (36.8 °C), Min:97.5 °F (36.4 °C), Max:98.7 °F (37.1 °C)    Intake/Output Summary (Last 24 hours) at 2022 1156  Last data filed at 2022 1211  Gross per 24 hour   Intake --   Output 1500 ml   Net -1500 ml       General:  In NAD. Nontoxic-appearing. Cardiovascular:  RRR. Pulmonary:  Lungs clear bilaterally, no wheezes. GI:  Abdomen soft, NTTP. Extremities:  Warm, no edema or ischemia. Neuro:  Awake and alert.   Moves extremities spontaneously, motor grossly nonfocal.    Labs:    Recent Results (from the past 24 hour(s))   GLUCOSE, POC    Collection Time: 22 12:54 PM   Result Value Ref Range    Glucose (POC) 117 (H) 70 - 110 mg/dL GLUCOSE, POC    Collection Time: 08/24/22  4:16 PM   Result Value Ref Range    Glucose (POC) 157 (H) 70 - 110 mg/dL   GLUCOSE, POC    Collection Time: 08/24/22 10:05 PM   Result Value Ref Range    Glucose (POC) 167 (H) 70 - 110 mg/dL   CBC WITH AUTOMATED DIFF    Collection Time: 08/25/22  3:00 AM   Result Value Ref Range    WBC 8.7 4.6 - 13.2 K/uL    RBC 3.75 (L) 4.35 - 5.65 M/uL    HGB 11.4 (L) 13.0 - 16.0 g/dL    HCT 33.9 (L) 36.0 - 48.0 %    MCV 90.4 78.0 - 100.0 FL    MCH 30.4 24.0 - 34.0 PG    MCHC 33.6 31.0 - 37.0 g/dL    RDW 14.0 11.6 - 14.5 %    PLATELET 368 404 - 003 K/uL    MPV 10.0 9.2 - 11.8 FL    NRBC 0.0 0  WBC    ABSOLUTE NRBC 0.00 0.00 - 0.01 K/uL    NEUTROPHILS 51 40 - 73 %    LYMPHOCYTES 29 21 - 52 %    MONOCYTES 13 (H) 3 - 10 %    EOSINOPHILS 5 0 - 5 %    BASOPHILS 1 0 - 2 %    IMMATURE GRANULOCYTES 1 (H) 0.0 - 0.5 %    ABS. NEUTROPHILS 4.5 1.8 - 8.0 K/UL    ABS. LYMPHOCYTES 2.5 0.9 - 3.6 K/UL    ABS. MONOCYTES 1.1 0.05 - 1.2 K/UL    ABS. EOSINOPHILS 0.4 0.0 - 0.4 K/UL    ABS. BASOPHILS 0.1 0.0 - 0.1 K/UL    ABS. IMM.  GRANS. 0.1 (H) 0.00 - 0.04 K/UL    DF AUTOMATED     METABOLIC PANEL, BASIC    Collection Time: 08/25/22  3:00 AM   Result Value Ref Range    Sodium 134 (L) 136 - 145 mmol/L    Potassium 4.0 3.5 - 5.5 mmol/L    Chloride 100 100 - 111 mmol/L    CO2 26 21 - 32 mmol/L    Anion gap 8 3.0 - 18 mmol/L    Glucose 149 (H) 74 - 99 mg/dL    BUN 56 (H) 7.0 - 18 MG/DL    Creatinine 4.67 (H) 0.6 - 1.3 MG/DL    BUN/Creatinine ratio 12 12 - 20      GFR est AA 15 (L) >60 ml/min/1.73m2    GFR est non-AA 13 (L) >60 ml/min/1.73m2    Calcium 9.3 8.5 - 10.1 MG/DL   PHOSPHORUS    Collection Time: 08/25/22  3:00 AM   Result Value Ref Range    Phosphorus 4.6 2.5 - 4.9 MG/DL   GLUCOSE, POC    Collection Time: 08/25/22  7:56 AM   Result Value Ref Range    Glucose (POC) 174 (H) 70 - 110 mg/dL       Signed By: Nava Singh MD     August 25, 2022

## 2022-08-25 NOTE — PROGRESS NOTES
Remained stable, still waiting for Financial Status clearance. Discussed with the patient & discharge planner . H e is accepted by [facility] q TTS at 10.30 AM. No need for dialysis today, lab report reviewed.

## 2022-08-25 NOTE — PROGRESS NOTES
Physician Progress Note      PATIENT:               Riley Yates  CSN #:                  [id number]  :                       1955  ADMIT DATE:       2022 7:33 AM  DISCH DATE:  RESPONDING  PROVIDER #:        Nataly Loco MD          QUERY TEXT:    Pt admitted with  abdominal pain,rt flank pain  however found to have worsening renal function now almost as end-stage renal disease, renal and vascular consulted for starting patient on dialysis. . Pt noted to have Hepatitis C virus Ab +. If possible, please document in progress notes and discharge summary if you are evaluating and /or treating any of the following: The medical record reflects the following:    Risk Factors: 75 y.o. male who has been seen on consultation for advanced renal failure. .He has history of type 2 diabetes mellitus,hypertension, systolic dysfunction and advanced renal failure from underlying type 2 diabetes mellitus    Clinical Indicators: MD DAVILA CKD 5 now ESRD, HD dependent HypertensionDM2 with hyperglycemia Chronic systolic and diastolic CHF, EF 18%  Card. EF now normal at 55%. He had no valvular heart disease. He had a small circumferential pericardial effusion without hemodynamic compromise. This is likely uremic in nature. I suspect this will improve once hemodialysis is initiated  -22 0257  Hepatitis C virus Ab <0.80 Index >11.00 High  Hep C virus Ab Interp. NEG   Positive Abnormal    Treatment: Labs monitored  Options provided:  -- Abdominal Pain due to Hepatitis C virus Ab +  -- Other - I will add my own diagnosis  -- Disagree - Not applicable / Not valid  -- Disagree - Clinically unable to determine / Unknown  -- Refer to Clinical Documentation Reviewer    PROVIDER RESPONSE TEXT:    Abdominal pain not related to Hep C Ab positivity    Query created by:  Arti Stafford on 2022 2:15 PM      Electronically signed by:  Nataly Loco MD 2022 9:22 PM

## 2022-08-25 NOTE — PROGRESS NOTES
Problem: Falls - Risk of  Goal: *Absence of Falls  Description: Document Alondra Luciano Fall Risk and appropriate interventions in the flowsheet. Outcome: Progressing Towards Goal  Note: Fall Risk Interventions:  Mobility Interventions: Patient to call before getting OOB         Medication Interventions: Patient to call before getting OOB, Teach patient to arise slowly    Elimination Interventions: Call light in reach    History of Falls Interventions: Door open when patient unattended         Problem: Patient Education: Go to Patient Education Activity  Goal: Patient/Family Education  Outcome: Progressing Towards Goal     Problem: Diabetes Self-Management  Goal: *Disease process and treatment process  Description: Define diabetes and identify own type of diabetes; list 3 options for treating diabetes.   Outcome: Progressing Towards Goal     Problem: Patient Education: Go to Patient Education Activity  Goal: Patient/Family Education  Outcome: Progressing Towards Goal     Problem: Chronic Renal Failure  Goal: *Fluid and electrolytes stabilized  Outcome: Progressing Towards Goal     Problem: Patient Education: Go to Patient Education Activity  Goal: Patient/Family Education  Outcome: Progressing Towards Goal

## 2022-08-25 NOTE — DIABETES MGMT
Diabetes/ Glycemic Control Plan of Care    Patient with history of T2DM was seen in ED waiting area on 8/16/2022 with report of abdominal/flak pain and provided diabetes education prior to planned released. Then he was seen by nephrology for worsening renal function and was admitted with ESRD. Placement of internal jugular tunneled dialysis catheter and initiated dialysis    Home diabetes medication:  Patient reported NPH insulin and he has syringes. He takes 26 units daily every morning and  12 units daily every evening. Glycemic assessment: Fasting BG this morning within target range after lantus insulin dose was increased from 5 units to 10 units daily yesterday. POC BG 8/24/2022: 219 (07:33)    POC BG 8/25/2022: 174, 151    Recommendations:   21) cont on correctional lispro and lantus insulin 10 units daily at bedtime. 2.) resume NPH insulin (home) at disch: 26 units every morning and 12 units every evening. Discussed with patient that he is currently on daily lantus insulin while in house. It is a long acting insulin. Patient verbalized understanding when to resume NPH insulin at home. DX:   1. End stage renal disease (HCC)        2. Elevated serum creatinine        3. Hyperglycemia        4. Pericardial effusion        5. ESRD on dialysis ([facility] [address]  INVASIVE VASCULAR PROCEDURE    INVASIVE VASCULAR PROCEDURE         Fasting/ Morning blood glucose:   Lab Results   Component Value Date/Time    Glucose 149 (H) 08/25/2022 03:00 AM    Glucose (POC) 151 (H) 08/25/2022 11:57 AM     IV Fluids containing dextrose: none    Steroids:  None    Blood glucose values: Within target range (70-180mg/dL):  NO.     Current insulin orders:   Correctional lispro insulin.  Very resistant dose  Basal lantus insulin 10 units daily at bedtime    Total Daily Dose previous 24 hours: 22 units of insulin  Lantus: 10 units  Lispro: 12 units    Current A1c:   Lab Results   Component Value Date/Time    Hemoglobin A1c 6.8 (H) 08/17/2022 02:57 AM      equivalent  to ave Blood Glucose of 148 mg/dl for 2-3 months prior to admission    Adequate glycemic control PTA: YES    Nutrition/Diet:   Active Orders   Diet    ADULT DIET Regular; 4 carb choices (60 gm/meal); Low Sodium (2 gm); Low Potassium (Less than 3000 mg/day); Low Phosphorus (Less than 1000 mg)      Meal Intake:  Patient Vitals for the past 168 hrs:   % Diet Eaten   08/25/22 0852 76 - 100%   08/23/22 1230 76 - 100%   08/23/22 0804 76 - 100%   08/22/22 1824 76 - 100%   08/22/22 1302 51 - 75%   08/22/22 0919 76 - 100%   08/19/22 1219 76 - 100%       Supplement Intake:  No data found. Home diabetes medications:  Patient reported differently on 8/15/2022 that he's only taking NPH insulin twice daily at home:  26 units daily every morning  12 units daily every evening  Key Antihyperglycemic Medications               insulin glargine (LANTUS) 100 unit/mL injection 12 Units by SubCUTAneous route daily. insulin lispro (HUMALOG) 100 unit/mL injection For Blood Sugar (mg/dL) of:   Less than 150 =  0 units  150 -199 =  3 units  200 -249 =  6 units  250 -299 =  9 units  300 -349 =  12 units  350 and above =  15 units, call MD.            Plan/Goals:   Blood glucose will be within target of 70 - 180 mg/dl within 72 hours  Reinforce dietary and medication compliance at home.           Education:  [x] Refer to Diabetes Education Record: 8/16/2022                       [] Education not indicated at this time     Austin Hoang RN CCM

## 2022-08-25 NOTE — PROGRESS NOTES
Discharge planning    Spoke with Madai([phone]) with [facility] for update on status for financial clearance. Per Ayala Fermin, patient is eligible for medicaid. The patient will have to start at [facility] on Tuesday, August 20, 2022 with 10:30 am chairtime. Ayala Fermin will fax eligibility letter to writer to present to patient. Waiting for letter to be faxed.      EMMA Rojas, RN  Pager # [phone]  Care Manager

## 2022-08-26 LAB
GLUCOSE BLD STRIP.AUTO-MCNC: 111 MG/DL (ref 70–110)
GLUCOSE BLD STRIP.AUTO-MCNC: 162 MG/DL (ref 70–110)
GLUCOSE BLD STRIP.AUTO-MCNC: 173 MG/DL (ref 70–110)
GLUCOSE BLD STRIP.AUTO-MCNC: 264 MG/DL (ref 70–110)

## 2022-08-26 PROCEDURE — 74011636637 HC RX REV CODE- 636/637: Performed by: FAMILY MEDICINE

## 2022-08-26 PROCEDURE — 65270000046 HC RM TELEMETRY

## 2022-08-26 PROCEDURE — 74011250637 HC RX REV CODE- 250/637: Performed by: INTERNAL MEDICINE

## 2022-08-26 PROCEDURE — 74011000250 HC RX REV CODE- 250: Performed by: INTERNAL MEDICINE

## 2022-08-26 PROCEDURE — 74011250637 HC RX REV CODE- 250/637: Performed by: HOSPITALIST

## 2022-08-26 PROCEDURE — 99232 SBSQ HOSP IP/OBS MODERATE 35: CPT | Performed by: HOSPITALIST

## 2022-08-26 PROCEDURE — 74011250636 HC RX REV CODE- 250/636: Performed by: INTERNAL MEDICINE

## 2022-08-26 PROCEDURE — 82962 GLUCOSE BLOOD TEST: CPT

## 2022-08-26 RX ADMIN — NEPHROCAP 1 CAPSULE: 1 CAP ORAL at 09:01

## 2022-08-26 RX ADMIN — SODIUM CHLORIDE, PRESERVATIVE FREE 10 ML: 5 INJECTION INTRAVENOUS at 05:21

## 2022-08-26 RX ADMIN — Medication 3 UNITS: at 08:48

## 2022-08-26 RX ADMIN — TAMSULOSIN HYDROCHLORIDE 0.4 MG: 0.4 CAPSULE ORAL at 09:01

## 2022-08-26 RX ADMIN — HEPARIN SODIUM 5000 UNITS: 5000 INJECTION INTRAVENOUS; SUBCUTANEOUS at 14:03

## 2022-08-26 RX ADMIN — ASPIRIN 81 MG CHEWABLE TABLET 81 MG: 81 TABLET CHEWABLE at 09:01

## 2022-08-26 RX ADMIN — HEPARIN SODIUM 5000 UNITS: 5000 INJECTION INTRAVENOUS; SUBCUTANEOUS at 21:46

## 2022-08-26 RX ADMIN — SODIUM CHLORIDE, PRESERVATIVE FREE 10 ML: 5 INJECTION INTRAVENOUS at 21:48

## 2022-08-26 RX ADMIN — Medication 10 UNITS: at 22:00

## 2022-08-26 RX ADMIN — HEPARIN SODIUM 5000 UNITS: 5000 INJECTION INTRAVENOUS; SUBCUTANEOUS at 05:16

## 2022-08-26 RX ADMIN — Medication 9 UNITS: at 22:00

## 2022-08-26 NOTE — ROUTINE PROCESS
Bedside and Verbal shift change report given to Joslyn Devine RN (oncoming nurse) by EMIR Espino RN (offgoing nurse). Report included the following information SBAR, Kardex, MAR, and Recent Results.

## 2022-08-26 NOTE — PROGRESS NOTES
[facility] Hospitalists  Progress Note    Patient: Kelly Max Age: 75 y.o. : 1955 MR#: [id number] SSN: [id number]  Date: 2022     Subjective/24-hour events:     Patient sitting in the chair, feels fine. No new complaints. Assessment:   CKD 5 now ESRD, HD dependent  Hypertension  DM2 with hyperglycemia  Chronic systolic and diastolic CHF, EF 93% with grade 2 DD by echo 2018, now 55 to 60% by echo 2020  Cocaine abuse - UDS positive on admission  Homelessness - patient living in shelter prior to admission    Plan:   HD per nephrology  Continue Lantus and SSI  Continue Cozaar and monitor blood pressure  Continue Flomax  Medically stable for discharge once outpatient dialysis set up. Disposition: Home tomorrow, patient will be following with dialysis center at [address] [place] [address] on Tuesday  Discussed with case management and agreement with the discharge plan for tomorrow  Discussed with nephrologist, agree with discharge    Discussed with with patient about discharge plan for tomorrow    Case discussed with:  [x]Patient  []Family  [x]Nursing  [x]Case Management  DVT Prophylaxis:  []Lovenox  [x]Hep SQ  []SCDs  []Coumadin   []On Heparin gtt    Objective:   VS: Visit Vitals  BP (!) 159/85   Pulse 69   Temp 98.6 °F (37 °C)   Resp 20   Ht 6' (1.829 m)   Wt 94.4 kg (208 lb 3.2 oz)   SpO2 98%   BMI 28.24 kg/m²      Tmax/24hrs: Temp (24hrs), Av.6 °F (37 °C), Min:98.3 °F (36.8 °C), Max:98.9 °F (37.2 °C)    Intake/Output Summary (Last 24 hours) at 2022 1655  Last data filed at 2022 0936  Gross per 24 hour   Intake --   Output 1575 ml   Net -1575 ml       General:  In NAD. Nontoxic-appearing. Cardiovascular:  RRR. Pulmonary:  Lungs clear bilaterally, no wheezes. GI:  Abdomen soft, NTTP. Extremities:  Warm, no edema or ischemia.   Neuro: Alert awake oriented 3, moves all extremities    Labs:    Recent Results (from the past 24 hour(s))   GLUCOSE, POC    Collection Time: 08/25/22 10:21 PM   Result Value Ref Range    Glucose (POC) 198 (H) 70 - 110 mg/dL   GLUCOSE, POC    Collection Time: 08/26/22  7:52 AM   Result Value Ref Range    Glucose (POC) 162 (H) 70 - 110 mg/dL   GLUCOSE, POC    Collection Time: 08/26/22 11:41 AM   Result Value Ref Range    Glucose (POC) 111 (H) 70 - 110 mg/dL   GLUCOSE, POC    Collection Time: 08/26/22  4:41 PM   Result Value Ref Range    Glucose (POC) 173 (H) 70 - 110 mg/dL       Signed By: Bebe Sparks MD     August 26, 2022

## 2022-08-26 NOTE — DIABETES MGMT
Diabetes/ Glycemic Control Plan of Care    Patient with history of T2DM was seen in ED waiting area on 8/16/2022 with report of abdominal/flak pain and provided diabetes education prior to planned released. Then he was seen by nephrology for worsening renal function and was admitted with ESRD. Placement of internal jugular tunneled dialysis catheter and initiated dialysis    Home diabetes medication:  Patient reported NPH insulin and he has syringes. He takes 26 units daily every morning and  12 units daily every evening. Glycemic assessment: One elevated BG yesterday. POC BG 8/25/2022: 198    POC BG 8/26/2022: 162, 111    Recommendations:   21) cont on correctional lispro and lantus insulin 10 units daily at bedtime while in house. 2.) resume NPH insulin (home) at disch: 26 units every morning and 12 units every evening. Discussed with patient that he is currently on daily lantus insulin while in house. It is a long acting insulin. Patient verbalized understanding when to resume NPH insulin at home. DX:   1. End stage renal disease (HCC)        2. Elevated serum creatinine        3. Hyperglycemia        4. Pericardial effusion        5. ESRD on dialysis ([address]  INVASIVE VASCULAR PROCEDURE    INVASIVE VASCULAR PROCEDURE         Fasting/ Morning blood glucose:   Lab Results   Component Value Date/Time    Glucose 149 (H) 08/25/2022 03:00 AM    Glucose (POC) 111 (H) 08/26/2022 11:41 AM     IV Fluids containing dextrose: none    Steroids:  None    Blood glucose values: Within target range (70-180mg/dL):  NO.     Current insulin orders:   Correctional lispro insulin.  Very resistant dose  Basal lantus insulin 10 units daily at bedtime    Total Daily Dose previous 24 hours: 19 units of insulin  Lantus: 10 units  Lispro: 9 units    Current A1c:   Lab Results   Component Value Date/Time    Hemoglobin A1c 6.8 (H) 08/17/2022 02:57 AM      equivalent  to ave Blood Glucose of 148 mg/dl for 2-3 months prior to admission    Adequate glycemic control PTA: YES    Nutrition/Diet:   Active Orders   Diet    ADULT DIET Regular; 4 carb choices (60 gm/meal); Low Sodium (2 gm); Low Potassium (Less than 3000 mg/day); Low Phosphorus (Less than 1000 mg)      Meal Intake:  Patient Vitals for the past 168 hrs:   % Diet Eaten   08/25/22 1522 76 - 100%   08/25/22 0852 76 - 100%   08/23/22 1230 76 - 100%   08/23/22 0804 76 - 100%   08/22/22 1824 76 - 100%   08/22/22 1302 51 - 75%   08/22/22 0919 76 - 100%       Supplement Intake:  No data found. Home diabetes medications:  Patient reported differently on 8/15/2022 that he's only taking NPH insulin twice daily at home:  26 units daily every morning  12 units daily every evening  Key Antihyperglycemic Medications               insulin glargine (LANTUS) 100 unit/mL injection 12 Units by SubCUTAneous route daily. insulin lispro (HUMALOG) 100 unit/mL injection For Blood Sugar (mg/dL) of:   Less than 150 =  0 units  150 -199 =  3 units  200 -249 =  6 units  250 -299 =  9 units  300 -349 =  12 units  350 and above =  15 units, call MD.            Plan/Goals:   Blood glucose will be within target of 70 - 180 mg/dl within 72 hours  Reinforce dietary and medication compliance at home.           Education:  [x] Refer to Diabetes Education Record: 8/16/2022                       [] Education not indicated at this time     Nicolás Baugh RN CCM

## 2022-08-26 NOTE — PROGRESS NOTES
Doing good, no SOB, HD catheter site is clean, no drainage,no Erythema. Hemodynamically stable, no need for HD today. HD tomorrow AM & then DC & start HD as OP on 8/30/22. He will need Indigent meds at discharge. Discussed with .

## 2022-08-26 NOTE — PROGRESS NOTES
Discharge planning    Met with patient at bedside to discuss discharge plan. Presented patient with original copy of [facility] with dialysis information. Patient will start dialysis at [facility], [address], [facility] [address] on Tuesday, August 30, 2022 at 10:30 am. Patient states that he is in walking distant to dialysis from unit. Patient lives in shelter and must be discharged by 4pm and needs assist with transportation to [address], [address] at discharge.      MADIE CameronN, RN  Pager # [phone]  Care Manager

## 2022-08-27 LAB
ANION GAP SERPL CALC-SCNC: 8 MMOL/L (ref 3–18)
BASOPHILS # BLD: 0.1 K/UL (ref 0–0.1)
BASOPHILS NFR BLD: 1 % (ref 0–2)
BUN SERPL-MCNC: 86 MG/DL (ref 7–18)
BUN/CREAT SERPL: 19 (ref 12–20)
CALCIUM SERPL-MCNC: 9.4 MG/DL (ref 8.5–10.1)
CHLORIDE SERPL-SCNC: 106 MMOL/L (ref 100–111)
CO2 SERPL-SCNC: 23 MMOL/L (ref 21–32)
CREAT SERPL-MCNC: 4.47 MG/DL (ref 0.6–1.3)
DIFFERENTIAL METHOD BLD: ABNORMAL
EOSINOPHIL # BLD: 0.5 K/UL (ref 0–0.4)
EOSINOPHIL NFR BLD: 7 % (ref 0–5)
ERYTHROCYTE [DISTWIDTH] IN BLOOD BY AUTOMATED COUNT: 13.8 % (ref 11.6–14.5)
GLUCOSE BLD STRIP.AUTO-MCNC: 118 MG/DL (ref 70–110)
GLUCOSE BLD STRIP.AUTO-MCNC: 127 MG/DL (ref 70–110)
GLUCOSE BLD STRIP.AUTO-MCNC: 190 MG/DL (ref 70–110)
GLUCOSE SERPL-MCNC: 59 MG/DL (ref 74–99)
HCT VFR BLD AUTO: 34 % (ref 36–48)
HGB BLD-MCNC: 11.5 G/DL (ref 13–16)
IMM GRANULOCYTES # BLD AUTO: 0.1 K/UL (ref 0–0.04)
IMM GRANULOCYTES NFR BLD AUTO: 2 % (ref 0–0.5)
LYMPHOCYTES # BLD: 2.7 K/UL (ref 0.9–3.6)
LYMPHOCYTES NFR BLD: 37 % (ref 21–52)
MCH RBC QN AUTO: 30.2 PG (ref 24–34)
MCHC RBC AUTO-ENTMCNC: 33.8 G/DL (ref 31–37)
MCV RBC AUTO: 89.2 FL (ref 78–100)
MONOCYTES # BLD: 1 K/UL (ref 0.05–1.2)
MONOCYTES NFR BLD: 14 % (ref 3–10)
NEUTS SEG # BLD: 2.9 K/UL (ref 1.8–8)
NEUTS SEG NFR BLD: 39 % (ref 40–73)
NRBC # BLD: 0 K/UL (ref 0–0.01)
NRBC BLD-RTO: 0 PER 100 WBC
PHOSPHATE SERPL-MCNC: 4.4 MG/DL (ref 2.5–4.9)
PLATELET # BLD AUTO: 284 K/UL (ref 135–420)
PMV BLD AUTO: 10.4 FL (ref 9.2–11.8)
POTASSIUM SERPL-SCNC: 3.9 MMOL/L (ref 3.5–5.5)
RBC # BLD AUTO: 3.81 M/UL (ref 4.35–5.65)
SODIUM SERPL-SCNC: 137 MMOL/L (ref 136–145)
WBC # BLD AUTO: 7.4 K/UL (ref 4.6–13.2)

## 2022-08-27 PROCEDURE — 74011636637 HC RX REV CODE- 636/637: Performed by: FAMILY MEDICINE

## 2022-08-27 PROCEDURE — 74011250637 HC RX REV CODE- 250/637: Performed by: HOSPITALIST

## 2022-08-27 PROCEDURE — 80048 BASIC METABOLIC PNL TOTAL CA: CPT

## 2022-08-27 PROCEDURE — 99232 SBSQ HOSP IP/OBS MODERATE 35: CPT | Performed by: HOSPITALIST

## 2022-08-27 PROCEDURE — 74011250637 HC RX REV CODE- 250/637: Performed by: INTERNAL MEDICINE

## 2022-08-27 PROCEDURE — 82962 GLUCOSE BLOOD TEST: CPT

## 2022-08-27 PROCEDURE — 65270000046 HC RM TELEMETRY

## 2022-08-27 PROCEDURE — 74011250636 HC RX REV CODE- 250/636: Performed by: INTERNAL MEDICINE

## 2022-08-27 PROCEDURE — 36415 COLL VENOUS BLD VENIPUNCTURE: CPT

## 2022-08-27 PROCEDURE — 84100 ASSAY OF PHOSPHORUS: CPT

## 2022-08-27 PROCEDURE — 85025 COMPLETE CBC W/AUTO DIFF WBC: CPT

## 2022-08-27 PROCEDURE — 90935 HEMODIALYSIS ONE EVALUATION: CPT

## 2022-08-27 PROCEDURE — 74011000250 HC RX REV CODE- 250: Performed by: INTERNAL MEDICINE

## 2022-08-27 RX ORDER — LOSARTAN POTASSIUM 100 MG/1
100 TABLET ORAL DAILY
Qty: 30 TABLET | Refills: 0 | Status: SHIPPED | OUTPATIENT
Start: 2022-08-28 | End: 2022-08-28 | Stop reason: SDUPTHER

## 2022-08-27 RX ORDER — TAMSULOSIN HYDROCHLORIDE 0.4 MG/1
0.4 CAPSULE ORAL DAILY
Qty: 30 CAPSULE | Refills: 0 | Status: SHIPPED | OUTPATIENT
Start: 2022-08-28 | End: 2022-08-28 | Stop reason: SDUPTHER

## 2022-08-27 RX ORDER — GUAIFENESIN 100 MG/5ML
81 LIQUID (ML) ORAL DAILY
Qty: 30 TABLET | Refills: 0 | Status: SHIPPED | OUTPATIENT
Start: 2022-08-28 | End: 2022-08-28 | Stop reason: SDUPTHER

## 2022-08-27 RX ORDER — INSULIN GLARGINE 100 [IU]/ML
10 INJECTION, SOLUTION SUBCUTANEOUS
Qty: 1 PEN | Refills: 0 | Status: SHIPPED | OUTPATIENT
Start: 2022-08-27 | End: 2022-08-28 | Stop reason: SDUPTHER

## 2022-08-27 RX ADMIN — HEPARIN SODIUM 5000 UNITS: 5000 INJECTION INTRAVENOUS; SUBCUTANEOUS at 06:03

## 2022-08-27 RX ADMIN — SODIUM CHLORIDE, PRESERVATIVE FREE 10 ML: 5 INJECTION INTRAVENOUS at 22:23

## 2022-08-27 RX ADMIN — NEPHROCAP 1 CAPSULE: 1 CAP ORAL at 09:14

## 2022-08-27 RX ADMIN — ASPIRIN 81 MG CHEWABLE TABLET 81 MG: 81 TABLET CHEWABLE at 09:14

## 2022-08-27 RX ADMIN — TAMSULOSIN HYDROCHLORIDE 0.4 MG: 0.4 CAPSULE ORAL at 09:14

## 2022-08-27 RX ADMIN — Medication 10 UNITS: at 22:30

## 2022-08-27 RX ADMIN — Medication 3 UNITS: at 17:01

## 2022-08-27 RX ADMIN — LOSARTAN POTASSIUM 100 MG: 50 TABLET, FILM COATED ORAL at 09:14

## 2022-08-27 RX ADMIN — HEPARIN SODIUM 5000 UNITS: 5000 INJECTION INTRAVENOUS; SUBCUTANEOUS at 22:20

## 2022-08-27 RX ADMIN — SODIUM CHLORIDE, PRESERVATIVE FREE 10 ML: 5 INJECTION INTRAVENOUS at 06:05

## 2022-08-27 NOTE — PROGRESS NOTES
spoke with the patient and informed him that we can give him a lfyt to the pharmacy and to the shelter.  inquired if he has anyone that can  his medications. The patient indicated that he does not have anyone that can  his medication because he just got out of retirement and has no one to contact.       HONG Galloway

## 2022-08-27 NOTE — PROGRESS NOTES
was given permission by case management director, Orest Lanes, to do indigent medication for the patient.  discussed the indigent medications with Dr. David Ho.  faxed over the indigent medications to [facility].   The indigent medication that was faxed was insulin glagine (lantus) injection 10 units, insulin lispro (humalog) injection, losartan (cozaar) tablet 100 mg, bcomplex-vitamin c-folic acid (Nephrocap) cap, and tamsulosin(flomax) capsule 0.4mg.      HONG Gleason

## 2022-08-27 NOTE — PROGRESS NOTES
informed Dr. Chao Cuevas that the patient's medication would not be ready for 45 minutes and the patient would not make it to the shelter by [facility].  Dr. Chao Cuevas indicated that the patient will discharge tomorrow.       HONG Delvalle

## 2022-08-27 NOTE — PROGRESS NOTES
[facility] Hospitalists  Progress Note    Patient: Kelly Max Age: 75 y.o. : 1955 MR#: [id number] SSN: [id number]  Date: 2022     Subjective/24-hour events:     Patient sitting in the chair, feels fine. No new complaints. Assessment:   CKD 5 now ESRD, HD dependent  Hypertension  DM2 with hyperglycemia  Chronic systolic and diastolic CHF, EF 45% with grade 2 DD by echo 2018, now 55 to 60% by echo 2020  Cocaine abuse - UDS positive on admission  Homelessness - patient living in shelter prior to admission    Plan:   HD per nephrology  Continue Lantus and SSI  Continue Cozaar and monitor blood pressure  Continue Flomax      Disposition: Home   Today after dialysis      Addendum   called me that patient medicines could not be arranged so discharge tomorrow morning. Case discussed with:  [x]Patient  []Family  [x]Nursing  [x]Case Management  DVT Prophylaxis:  []Lovenox  [x]Hep SQ  []SCDs  []Coumadin   []On Heparin gtt    Objective:   VS: Visit Vitals  BP 98/62   Pulse 96   Temp 97.9 °F (36.6 °C)   Resp 18   Ht 6' (1.829 m)   Wt 94.4 kg (208 lb 3.2 oz)   SpO2 96%   BMI 28.24 kg/m²      Tmax/24hrs: Temp (24hrs), Av.9 °F (36.6 °C), Min:97.8 °F (36.6 °C), Max:98.3 °F (36.8 °C)    Intake/Output Summary (Last 24 hours) at 2022 1825  Last data filed at 2022 1425  Gross per 24 hour   Intake --   Output 1950 ml   Net -1950 ml       General:  In NAD. Nontoxic-appearing. Cardiovascular:  RRR. Pulmonary:  Lungs clear bilaterally, no wheezes. GI:  Abdomen soft, NTTP. Extremities:  Warm, no edema or ischemia.   Neuro: Alert awake oriented 3, moves all extremities    Labs:    Recent Results (from the past 24 hour(s))   GLUCOSE, POC    Collection Time: 22  9:28 PM   Result Value Ref Range    Glucose (POC) 264 (H) 70 - 110 mg/dL   CBC WITH AUTOMATED DIFF    Collection Time: 22  1:53 AM   Result Value Ref Range    WBC 7.4 4.6 - 13.2 K/uL RBC 3.81 (L) 4.35 - 5.65 M/uL    HGB 11.5 (L) 13.0 - 16.0 g/dL    HCT 34.0 (L) 36.0 - 48.0 %    MCV 89.2 78.0 - 100.0 FL    MCH 30.2 24.0 - 34.0 PG    MCHC 33.8 31.0 - 37.0 g/dL    RDW 13.8 11.6 - 14.5 %    PLATELET 065 781 - 159 K/uL    MPV 10.4 9.2 - 11.8 FL    NRBC 0.0 0  WBC    ABSOLUTE NRBC 0.00 0.00 - 0.01 K/uL    NEUTROPHILS 39 (L) 40 - 73 %    LYMPHOCYTES 37 21 - 52 %    MONOCYTES 14 (H) 3 - 10 %    EOSINOPHILS 7 (H) 0 - 5 %    BASOPHILS 1 0 - 2 %    IMMATURE GRANULOCYTES 2 (H) 0.0 - 0.5 %    ABS. NEUTROPHILS 2.9 1.8 - 8.0 K/UL    ABS. LYMPHOCYTES 2.7 0.9 - 3.6 K/UL    ABS. MONOCYTES 1.0 0.05 - 1.2 K/UL    ABS. EOSINOPHILS 0.5 (H) 0.0 - 0.4 K/UL    ABS. BASOPHILS 0.1 0.0 - 0.1 K/UL    ABS. IMM.  GRANS. 0.1 (H) 0.00 - 0.04 K/UL    DF AUTOMATED     METABOLIC PANEL, BASIC    Collection Time: 08/27/22  1:53 AM   Result Value Ref Range    Sodium 137 136 - 145 mmol/L    Potassium 3.9 3.5 - 5.5 mmol/L    Chloride 106 100 - 111 mmol/L    CO2 23 21 - 32 mmol/L    Anion gap 8 3.0 - 18 mmol/L    Glucose 59 (L) 74 - 99 mg/dL    BUN 86 (H) 7.0 - 18 MG/DL    Creatinine 4.47 (H) 0.6 - 1.3 MG/DL    BUN/Creatinine ratio 19 12 - 20      GFR est AA 16 (L) >60 ml/min/1.73m2    GFR est non-AA 13 (L) >60 ml/min/1.73m2    Calcium 9.4 8.5 - 10.1 MG/DL   PHOSPHORUS    Collection Time: 08/27/22  1:53 AM   Result Value Ref Range    Phosphorus 4.4 2.5 - 4.9 MG/DL   GLUCOSE, POC    Collection Time: 08/27/22  8:05 AM   Result Value Ref Range    Glucose (POC) 118 (H) 70 - 110 mg/dL   GLUCOSE, POC    Collection Time: 08/27/22  4:19 PM   Result Value Ref Range    Glucose (POC) 190 (H) 70 - 110 mg/dL       Signed By: Leanne Hudson MD     August 27, 2022

## 2022-08-27 NOTE — DIALYSIS
[place]        ACUTE HEMODIALYSIS FLOW SHEET      HEMODIALYSIS ORDERS: Physician: Tyler Gibbons     Dialyzer: revaclear   Duration: 3 hr  BFR: 300   DFR: 600   Dialysate:  Temp 36-37*C  K+   3    Ca+  2.2 Na 138 Bicarb 35   Weight: 94.4 kg   Patient Chart [x]     Unable to Obtain []   Dry weight/UF Goal: 1500 Access CVl  Needle Gauge     Heparin [x]  Bolus      1200Units    [x] Hourly       500Units    [x]None      Catheter locking solution heparin   Pre BP:   172/97    Pulse:     67       Respirations: 16  Temperature:   97.8   Labs: Pre        Post:        [x] N/A   Additional Orders(medications, blood products, hypotension management) [x] N/A     [x] [place] Consent Verified     CATHETER ACCESS: []N/A   [x]Right   []Left   []IJ     []Fem   [x]chest wall   [] First use X-ray verified     [x]Tunnel                [] Non Tunneled   [x]No S/S infection  []Redness  []Drainage []Cultured []Swelling []Pain   [x]Medical Aseptic Prep Utilized   []Dressing Changed  [] Biopatch  Date:       []Clotted   [x]Patent   Flows: [x]Good  []Poor  []Reversed   If access problem,  notified: []Yes    [x]N/A  Date:                       GENERAL ASSESSMENT:      LUNGS:  Rate  SaO2% [] N/A    [x] Clear  [] Coarse  [] Crackles  [] Wheezing        [] Diminished     Location : []RLL   []LLL    []RUL  []AZUCENA     Cough: []Productive  []Dry  [x]N/A   Respirations:  [x]Easy  []Labored     Therapy:   [x]RA  []NC  l/min    Mask: []NRB []Venti       O2%                  []Ventilator  []Intubated  [] Trach  [] BiPaP     CARDIAC: [x]Regular      [] Irregular   [] Pericardial Rub  [] JVD        []  Monitored  [] Bedside  [] Remotely monitored [] N/A  Rhythm:      EDEMA: [] None  [x]Generalized  [] Pitting [] 1    [] 2    [] 3    [] 4                 [] Facial  [] Pedal  []  UE  [] LE     SKIN:   [x] Warm  [] Hot     [] Cold   [x] Dry     [] Pale   [] Diaphoretic                  [] Flushed  [] Jaundiced  [] Cyanotic  [] Rash  [] Weeping     LOC:    [x] Alert [x]Oriented:    [x] Person     [x] Place  [x]Time               [] Confused  [] Lethargic  [] Medicated  [] Non-responsive     GI / ABDOMEN:  [] Flat    [] Distended    [x] Soft    [] Firm   []  Obese                             [] Diarrhea  [x] Bowel Sounds  [] Nausea  [] Vomiting       / URINE ASSESSMENT:[] Voiding   [x] Oliguria  [] Anuria   []  Light     [] Incontinent    []  Incontinent Brief      []  Bathroom Privileges       PAIN: [x] 0 []1  []2   []3   []4   []5   []6   []7   []8   []9   []10              Scale 0-10  Action/Follow Up:      MOBILITY:  [] Amb    [] Amb/Assist    [x] Bed    [] Wheelchair  [] Stretcher      All Vitals and Treatment Details on Attached [facility] [place]: [facility]          Room # 466/01      [] 1st Time Acute  [] Stat  [x] Routine  [] Urgent     [x] Acute Room  []  Bedside  [] ICU/CCU  [] ER   Isolation Precautions:   There are currently no Active Isolations      Special Considerations:         [] Blood Consent Verified [x]N/A     ALLERGIES:   Allergies   Allergen Reactions    Tylenol [Acetaminophen] Other (comments)     Pt was advised not to take Tylenol per Dr. Leslie Smalls Status:Full Code        Hepatitis Status:                        Lab Results   Component Value Date/Time    Hepatitis A, IgM NEGATIVE  08/09/2014 08:40 AM    Hepatitis B surface Ag <0.10 08/17/2022 02:57 AM    Hepatitis B surface Ab 289.10 08/17/2022 02:57 AM    Hepatitis B core, IgM NEGATIVE  08/09/2014 08:40 AM    Hep B Core Ab, total Negative 08/18/2022 02:52 AM    Hepatitis C virus Ab >11.00 (H) 08/17/2022 02:57 AM                     Current Labs:   Lab Results   Component Value Date/Time    Sodium 137 08/27/2022 01:53 AM    Potassium 3.9 08/27/2022 01:53 AM    Chloride 106 08/27/2022 01:53 AM    CO2 23 08/27/2022 01:53 AM    Anion gap 8 08/27/2022 01:53 AM    Glucose 59 (L) 08/27/2022 01:53 AM    BUN 86 (H) 08/27/2022 01:53 AM    Creatinine 4.47 (H) 08/27/2022 01:53 AM    BUN/Creatinine ratio 19 08/27/2022 01:53 AM    GFR est AA 16 (L) 08/27/2022 01:53 AM    GFR est non-AA 13 (L) 08/27/2022 01:53 AM    Calcium 9.4 08/27/2022 01:53 AM      Lab Results   Component Value Date/Time    WBC 7.4 08/27/2022 01:53 AM    HGB 11.5 (L) 08/27/2022 01:53 AM    HCT 34.0 (L) 08/27/2022 01:53 AM    PLATELET 559 67/80/5271 01:53 AM    MCV 89.2 08/27/2022 01:53 AM                                                                                     DIET: DIET ADULT  DIET ADULT ORAL NUTRITION SUPPLEMENT       PRIMARY NURSE REPORT: First initial/Last name/Title      Pre Dialysis: Andrea Talavera RN    Time: 21       EDUCATION:    [x] Patient [] Other         Knowledge Basis: []None [x]Minimal [] Substantial   Barriers to learning  [x]N/A   [] Access Care     [] S&S of infection     [] Fluid Management     []K+     [x]Procedural    []Albumin     [] Medications     [] Tx Options     [] Transplant     [] Diet     [] Other   Teaching Tools:  [x] Explain  [] Demo  [] Handouts [] Video  Patient response:   [x] Verbalized understanding  [] Teach back  [] Return demonstration [] Requires follow up   Inappropriate due to            [x] Time Out/Safety Check  [x]Extracorporeal Circuit Tested for integrity       [address] - Before each treatment:     Machine Number:                   [facility]                                   [] Unit Machine #  with centralized RO                                  [x] Portable Machine #1/RO serial # [id number]                                  [] Portable Machine #2/RO serial # [id number]                                  [] Portable Machine #4/RO serial # [id number]                                                     [facility]                                  [] Portable Machine #11/RO serial # [id number]                                   [] Portable Machine #12/RO serial # [id number]                                  [] Portable Machine #13/RO serial #  [id number] Alarm Test:  Pass time 1030               [x] RO/Machine Log Complete      Temp    36*-37*             Dialysate: pH  7.4 Conductivity: Meter   14     HD Machine   14                  TCD: 14  Dialyzer Lot # [id number]          Blood Tubing Lot # [id number]          Saline Lot #  [id number]     CHLORINE TESTING-Before each treatment and every 4 hours    Total Chlorine: [x] less than 0.1 ppm  Time: 1045 4 Hr/2nd Check Time:    (if greater than 0.1 ppm from Primary then every 30 minutes from Secondary)     TREATMENT INITIATION - with Dialysis Precautions:   [x] All Connections Secured                 [x] Saline Line Double Clamped   [x] Venous Parameters Set                  [x] Arterial Parameters Set    [x] Prime Given 250ml                          [x]Air Foam Detector Engaged      Treatment Initiation Note:Pt in stable condition. CVL accessed and treatment initiated without complication. Dr Negra Varela at bedside. Post Assessment:   Dialyzer Cleared: [] Good [x] Fair  [] Poor  Blood processed:  50 L  UF Removed  1500 Ml  POst BP:   146/91       Pulse: 77        Respirations: 16  Temperature: 97.8 Lungs:     [x] Clear      [] Course         [] Crackles    [] Wheezing         [] Diminished   Post Tx Vascular Access:   AVF/AVG: Bleeding stopped   Art  min. Alpesh. Min   N/A Cardiac:   [x] Regular   [] Irregular   [] Monitor  [] N/A      Rhythm:       Catheter:   Locking solution: Heparin 1:1000   Art. 1.6  Alpesh. 1.6      Skin:   Pain:    [x] Warm  [x] Dry [] Diaphoretic    [] Flushed    [] Pale [] Cyanotic [x]0  []1  []2   []3  []4   []5   []6   []7   []8   []9   []10     Post Treatment Note:   HD well tolerated. 1.5L UF removed. NAD noted during or post treatment.  Face and access remained visible throughout treatment     POST TREATMENT PRIMARY NURSE HANDOFF REPORT:     First initial/Last name/Title         Post Dialysis: Steve Quintero RN Time:  1430     Abbreviations: AVG-arterial venous graft, AVF-arterial venous fistula, IJ-Internal Jugular, Subcl-Subclavian, Fem-Femoral, Tx-treatment, AP/HR-apical heart rate, DFR-dialysate flow rate, BFR-blood flow rate, AP-arterial pressure, -venous pressure, UF-ultrafiltrate, TMP-transmembrane pressure, Alpesh-Venous, Art-Arterial, RO-Reverse Osmosis

## 2022-08-27 NOTE — PROGRESS NOTES
was informed by RN Konstantin Ling that [address] said the indigent form is .  contacted supervisor Nyasia Carrasco, regarding  form.  is working on getting updated indigent form.       HONG Collins

## 2022-08-27 NOTE — PROGRESS NOTES
Progress Note    Cindy Mo  75 y.o. Admit Date: 8/16/2022  Active Problems:    Type 2 diabetes mellitus with insulin therapy ([facility] (7/6/2015) POA: Yes      Homelessness (10/19/2015) POA: Yes      CHF (congestive heart failure) ([facility] (11/30/2015) POA: Unknown      Systolic CH[facility], chronic ([facility] (7/10/2016) POA: Yes      Elevated serum creatinine (8/10/2016) POA: Unknown      Hypertension (1/1/2000) POA: Yes      ESRD (end stage renal disease) ([facility] (8/16/2022) POA: Unknown      Substance abuse ([facility] (8/16/2022) POA: Unknown      Anemia (8/16/2022) POA: Unknown      End stage renal disease ([facility] (8/16/2022) POA: Unknown      Pericardial effusion (8/16/2022) POA: Unknown      Hyperglycemia (8/16/2022) POA: Unknown      CKD (chronic kidney disease) stage 5, GFR less than 15 ml/min ([facility] (8/16/2022) POA: Unknown            Subjective:     Patient seen during Dialysis, no new complain, no SOB, will go home post Dialysis today. A comprehensive review of systems was negative except for that written in the History of Present Illness.     Objective:     Visit Vitals  BP (!) 153/92   Pulse 66   Temp 97.8 °F (36.6 °C) (Temporal)   Resp 18   Ht 6' (1.829 m)   Wt 94.4 kg (208 lb 3.2 oz)   SpO2 96%   BMI 28.24 kg/m²         Intake/Output Summary (Last 24 hours) at 8/27/2022 1146  Last data filed at 8/26/2022 1953  Gross per 24 hour   Intake --   Output 450 ml   Net -450 ml       Current Facility-Administered Medications   Medication Dose Route Frequency Provider Last Rate Last Admin    aspirin chewable tablet 81 mg  81 mg Oral DAILY Alisha Whipple MD   81 mg at 08/27/22 0914    insulin glargine (LANTUS) injection 10 Units  10 Units SubCUTAneous QHS Dionna Becerra MD   10 Units at 08/26/22 2200    losartan (COZAAR) tablet 100 mg  100 mg Oral DAILY Tien Domínguez MD   100 mg at 08/27/22 0914    nitroglycerin (NITROBID) 2 % ointment 0.5 Inch  0.5 Inch Topical Q6H PRN Daryle Axe, DO   0.5 Inch at 08/24/22 0630    hydrALAZINE (APRESOLINE) 20 mg/mL injection 10 mg  10 mg IntraVENous Q6H PRN Lovely Foss E DO   10 mg at 08/23/22 2114    B complex-vitaminC-folic acid (NEPHROCAP) cap  1 Capsule Oral DAILY Dwayne Wynn MD   1 Capsule at 08/27/22 0914    insulin lispro (HUMALOG) injection   SubCUTAneous AC&HS Kailyn Angulo MD   9 Units at 08/26/22 2200    glucose chewable tablet 16 g  4 Tablet Oral PRN Angela Noble MD        glucagon (GLUCAGEN) injection 1 mg  1 mg IntraMUSCular PRN Angela Noble MD        dextrose 10% infusion 0-250 mL  0-250 mL IntraVENous PRN Angela Noble MD        [facility] capsule 0.4 mg  0.4 mg Oral DAILY Angela Noble MD   0.4 mg at 08/27/22 0914    sodium chloride (NS) flush 5-40 mL  5-40 mL IntraVENous Q8H Angela Noble MD   10 mL at 08/27/22 7999    sodium chloride (NS) flush 5-40 mL  5-40 mL IntraVENous PRN Angela Noble MD        acetaminophen (TYLENOL) tablet 650 mg  650 mg Oral Q6H PRN Angela Noble MD   650 mg at 08/20/22 1020    Or    acetaminophen (TYLENOL) suppository 650 mg  650 mg Rectal Q6H PRN Angela Noble MD        polyethylene glycol (MIRALAX) packet 17 g  17 g Oral DAILY PRN Angela Noble MD        promethazine (PHENERGAN) tablet 12.5 mg  12.5 mg Oral Q6H PRN Angela Noble MD        Or    ondansetron [facility]) injection 4 mg  4 mg IntraVENous Q6H PRN Angela Noble MD        heparin (porcine) injection 5,000 Units  5,000 Units SubCUTAneous Q8H Angela Noble MD   5,000 Units at 08/27/22 0603        Physical Exam:     Physical Exam:   General:  Alert, cooperative, no distress, appears stated age. Eyes:  Conjunctivae/corneas clear. PERRL, EOMs intact. Mouth/Throat: Lips, mucosa, and tongue normal. Teeth and gums normal.   Neck: Supple, symmetrical, trachea midline, no adenopathy, thyroid: no enlargement/tenderness/nodules, no carotid bruit and no JVD. Lungs:   Clear to auscultation bilaterally.    Heart:  Regular rate and rhythm, S1, S2 normal, no murmur, click, rub or gallop. Abdomen:   Soft, non-tender. Bowel sounds normal. No masses,  No organomegaly. Extremities: Extremities normal, atraumatic, no cyanosis or edema,TDC in Right IJV & tolerating blood flow 400                         Data Review:    CBC w/Diff    Recent Labs     08/27/22  0153 08/25/22  0300   WBC 7.4 8.7   RBC 3.81* 3.75*   HGB 11.5* 11.4*   HCT 34.0* 33.9*   MCV 89.2 90.4   MCH 30.2 30.4   MCHC 33.8 33.6   RDW 13.8 14.0    Recent Labs     08/27/22  0153 08/25/22  0300   MONOS 14* 13*   EOS 7* 5   BASOS 1 1   RDW 13.8 14.0        Comprehensive Metabolic Profile    Recent Labs     08/27/22  0153 08/25/22  0300    134*   K 3.9 4.0    100   CO2 23 26   BUN 86* 56*   CREA 4.47* 4.67*    Recent Labs     08/27/22  0153 08/25/22  0300   CA 9.4 9.3   PHOS 4.4 4.6                        Impression:       Active Hospital Problems    Diagnosis Date Noted    ESRD (end stage renal disease) ([facility] 08/16/2022    Substance abuse ([facility] 08/16/2022    Anemia 08/16/2022    End stage renal disease ([facility] 08/16/2022    Pericardial effusion 08/16/2022    Hyperglycemia 08/16/2022    CKD (chronic kidney disease) stage 5, GFR less than 15 ml/min ([facility]) 08/16/2022    Elevated serum creatinine 53/88/3402    Systolic CHF, chronic ([facility] 07/10/2016    [facility]congestive heart failure) [facility] 11/30/2015    Homelessness 10/19/2015    Type 2 diabetes mellitus with insulin therapy ([facility] 07/06/2015    Hypertension 01/01/2000            Plan:     UF 2 kg, on 3 K, 2.5 ca bath, no need for any Epogen  or Hectorol. OK to DC home with Indigent medicine  post dialysis & start Dialysis on 8/30/22 as OP. Catheter care advice given to patient. I will follow him at OP dialysis unit      Geoff Wilson MD

## 2022-08-27 NOTE — PROGRESS NOTES
was notified by Dr. Erick Manrique that the patinet will need indigent medications and he will be sending the medications over to [place].       HONG Welch

## 2022-08-27 NOTE — DISCHARGE SUMMARY
Discharge Summary    Patient: Candy Aguila MRN: [id number]  CSN: [id number]    YOB: 1955  Age: 75 y.o. Sex: male    DOA: 8/16/2022 LOS:  LOS: 12 days        Disposition: Home    Discharge Date: 8/28/2022    Admission Diagnosis: End stage renal disease ([facility] [N18.6]  Elevated serum creatinine [R79.89]  Hyperglycemia [R73.9]  Pericardial effusion [I31.3]  CKD (chronic kidney disease) stage 5, GFR less than 15 ml/min ([facility]) [N18.5]  CHF (congestive heart failure) ([facility] [I50.9]    Discharge Diagnosis:    CKD 5 now ESRD, HD dependent  Hypertension  DM2 with hyperglycemia  Chronic systolic and diastolic CHF  Cocaine abuse - UDS positive on admission  Homelessness     Discharge Condition: Stable      PHYSICAL EXAM  Visit Vitals  /77 (BP 1 Location: Right upper arm, BP Patient Position: Sitting)   Pulse 65   Temp 98.5 °F (36.9 °C)   Resp 18   Ht 6' (1.829 m)   Wt 94.4 kg (208 lb 3.2 oz)   SpO2 96%   BMI 28.24 kg/m²           General: Alert, cooperative, no acute distress    HEENT: PERRLA, EOMI. Anicteric sclerae. Lungs:  CTA Bilaterally. No Wheezing/Rales. Heart:             Regular rate and Rhythm. Abdomen: Soft, Non distended, Non tender. + Bowel sounds. Extremities: No edema. Psych:   Good insight. Not anxious or agitated. Neurologic:  AA, oriented X 3. Moves all ext   HD catheter site clean. Hospital Course:   Candy Aguila is a 75 y.o. male who  was recently released from correctional facility being admitted with his initial complaint of abdominal pain however found to have worsening renal function now almost as end-stage renal disease, renal and vascular consulted for starting patient on dialysis. Patient underwent tunneled dialysis cath placement by vascular surgery. Patient was started on hemodialysis by nephrology. Patient tolerated dialysis well.   The patient hypertensive medications were also adjusted in the hospital.  Patient's insulin was also adjusted during the hospital course. Due to his insurance issues patient could not be discharged and outpatient hemodialysis could not be arranged. Eventually  was able to arrange outpatient hemodialysis. Patient was discharged home in stable condition. Case management will be arranging indigent meds that patient will be picking up tomorrow from outpatient pharmacy. Discussed with the patient about discharge plan, follow-up appointments, new medications and side effects, need of compliance with hemodialysis. Patient complete understood and agreed with my plan. I also discussed with the patient about healthy lifestyle, complete cessation of illicit drug use. Procedures: Tunneled dialysis cath placement by vascular surgery    Consults:   Dr. Alannah Sharma, nephrology  Dr. Alana Best, cardiology    Imaging studies:   08/16/22    ECHO ADULT COMPLETE 08/17/2022 8/17/2022    Interpretation Summary  Formatting of this result is different from the original.      Left Ventricle: Normal left ventricular systolic function with a visually estimated EF of 55 - 60%. Left ventricle size is normal. Increased wall thickness. Findings consistent with mild concentric hypertrophy. Normal wall motion. Diastolic dysfunction present with normal LV EF. Right Ventricle: Right ventricle is mildly dilated. Normal wall thickness. Mitral Valve: Mildly thickened leaflet. Mild regurgitation. Right Atrium: Right atrium is dilated. Pericardium: Small (<1 cm) circumferential pericardial effusion present. No indication of cardiac tamponade. Signed by: Tulio Bernardo MD on 8/17/2022 12:13 PM       Discharge Medications:     Current Discharge Medication List        START taking these medications    Details   aspirin 81 mg chewable tablet Take 1 Tablet by mouth daily for 30 days.   Qty: 30 Tablet, Refills: 0      insulin glargine (Lantus Solostar U-100 Insulin) 100 unit/mL (3 mL) inpn 10 Units by SubCUTAneous route nightly. Qty: 1 Pen, Refills: 0      b complex-vitamin c-folic acid (NEPHROCAPS) 1 mg capsule Take 1 Capsule by mouth daily for 30 days. Qty: 30 Capsule, Refills: 0           CONTINUE these medications which have CHANGED    Details   losartan (COZAAR) 100 mg tablet Take 1 Tablet by mouth daily for 30 days. Qty: 30 Tablet, Refills: 0      tamsulosin (Flomax) 0.4 mg capsule Take 1 Capsule by mouth daily for 30 days.   Qty: 30 Capsule, Refills: 0           STOP taking these medications       famotidine (PEPCID) 20 mg tablet Comments:   Reason for Stopping:         carvediloL (COREG) 25 mg tablet Comments:   Reason for Stopping:         minoxidiL (LONITEN) 2.5 mg tablet Comments:   Reason for Stopping:         simvastatin (ZOCOR) 20 mg tablet Comments:   Reason for Stopping:         allopurinoL (ZYLOPRIM) 100 mg tablet Comments:   Reason for Stopping:         calcitRIOL (ROCALTROL) 0.25 mcg capsule Comments:   Reason for Stopping:         amLODIPine (NORVASC) 10 mg tablet Comments:   Reason for Stopping:         furosemide (LASIX) 20 mg tablet Comments:   Reason for Stopping:         amLODIPine-atorvastatin (CADUET) 10-80 mg per tablet Comments:   Reason for Stopping:         Lancets misc Comments:   Reason for Stopping:         aspirin (ASPIRIN) 325 mg tablet Comments:   Reason for Stopping:         hydrALAZINE (APRESOLINE) 25 mg tablet Comments:   Reason for Stopping:         Blood-Glucose Meter monitoring kit Comments:   Reason for Stopping:         glucose blood VI test strips (GENSTRIP TEST STRIP) strip Comments:   Reason for Stopping:         albuterol (PROVENTIL HFA, VENTOLIN HFA, PROAIR HFA) 90 mcg/actuation inhaler Comments:   Reason for Stopping:         acetaminophen (TYLENOL) 325 mg tablet Comments:   Reason for Stopping:         naproxen (NAPROSYN) 500 mg tablet Comments:   Reason for Stopping:         cholecalciferol, VITAMIN D3, (VITAMIN D3) 5,000 unit tab tablet Comments:   Reason for Stopping: cetirizine (ZYRTEC) 10 mg tablet Comments:   Reason for Stopping:         alum-mag hydroxide-simeth (MYLANTA) 200-200-20 mg/5 mL susp Comments:   Reason for Stopping:         omeprazole (PRILOSEC) 20 mg capsule Comments:   Reason for Stopping:         docusate sodium (COLACE) 100 mg capsule Comments:   Reason for Stopping:         bisacodyL (DULCOLAX) 5 mg EC tablet Comments:   Reason for Stopping:         simethicone (MYLICON) 80 mg chewable tablet Comments:   Reason for Stopping:         pravastatin (PRAVACHOL) 20 mg tablet Comments:   Reason for Stopping:         carvedilol (COREG) 12.5 mg tablet Comments:   Reason for Stopping:         budesonide-formoterol (SYMBICORT) 80-4.5 mcg/actuation HFAA Comments:   Reason for Stopping:         lisinopril (PRINIVIL, ZESTRIL) 20 mg tablet Comments:   Reason for Stopping:         multivitamin, tx-iron-ca-min (THERA-M W/ IRON) 9 mg iron-400 mcg tab tablet Comments:   Reason for Stopping:         thiamine (B-1) 100 mg tablet Comments:   Reason for Stopping:         insulin glargine (LANTUS) 100 unit/mL injection Comments:   Reason for Stopping:         insulin lispro (HUMALOG) 100 unit/mL injection Comments:   Reason for Stopping:         isosorbide mononitrate ER (IMDUR) 30 mg tablet Comments:   Reason for Stopping:         Insulin Syringe-Needle U-100 (INSULIN SYRINGE) 1/2 mL 30 gauge x 5/16 syrg Comments:   Reason for Stopping: It is important that you take the medication exactly as they are prescribed. Keep your medication in the bottles provided by the pharmacist and keep a list of the medication names, dosages, and times to be taken in your wallet. Do not take other medications without consulting your doctor.      DIET:  Diabetic Diet and Renal Diet    ACTIVITY: Activity as tolerated    ADDITIONAL INFORMATION: If you experience any of the following symptoms but not limited to Fever, chills, nausea, vomiting, diarrhea, change in mentation, falling, bleeding, shortness of breath, chest pain, please call your primary care physician or return to the emergency room if you cannot get hold of your doctor:     FOLLOW UP CARE:   in 5-7 days. Please call and set up an appointment. Dr. Jay Santiago in 2 week  HD at [address] on Tuesday, Thursday and Saturday    Minutes spent on discharge: 40 minutes spent coordinating this discharge (review instructions/follow-up, prescriptions, preparing report for sign off)    Julian Taylor MD  8/28/2022 3:47 PM     Disclaimer: Sections of this note are dictated using utilizing voice recognition software. Minor typographical errors may be present. If questions arise, please do not hesitate to contact me or call our department.

## 2022-08-27 NOTE — PROGRESS NOTES
spoke with Yane Finney at [address] and they confirmed they got the fax. Yane Finney said she will give it to her pharmacists so it can be filled.       HONG Lugo

## 2022-08-28 VITALS
BODY MASS INDEX: 28.2 KG/M2 | SYSTOLIC BLOOD PRESSURE: 117 MMHG | OXYGEN SATURATION: 96 % | HEART RATE: 65 BPM | RESPIRATION RATE: 18 BRPM | DIASTOLIC BLOOD PRESSURE: 77 MMHG | WEIGHT: 208.2 LBS | HEIGHT: 72 IN | TEMPERATURE: 98.5 F

## 2022-08-28 LAB
GLUCOSE BLD STRIP.AUTO-MCNC: 143 MG/DL (ref 70–110)
GLUCOSE BLD STRIP.AUTO-MCNC: 173 MG/DL (ref 70–110)

## 2022-08-28 PROCEDURE — 82962 GLUCOSE BLOOD TEST: CPT

## 2022-08-28 PROCEDURE — 99239 HOSP IP/OBS DSCHRG MGMT >30: CPT | Performed by: HOSPITALIST

## 2022-08-28 PROCEDURE — 74011636637 HC RX REV CODE- 636/637: Performed by: HOSPITALIST

## 2022-08-28 PROCEDURE — 74011636637 HC RX REV CODE- 636/637: Performed by: FAMILY MEDICINE

## 2022-08-28 PROCEDURE — 74011250637 HC RX REV CODE- 250/637: Performed by: HOSPITALIST

## 2022-08-28 PROCEDURE — 74011250637 HC RX REV CODE- 250/637: Performed by: INTERNAL MEDICINE

## 2022-08-28 PROCEDURE — 74011000250 HC RX REV CODE- 250: Performed by: INTERNAL MEDICINE

## 2022-08-28 PROCEDURE — 74011250636 HC RX REV CODE- 250/636: Performed by: INTERNAL MEDICINE

## 2022-08-28 RX ORDER — GUAIFENESIN 100 MG/5ML
81 LIQUID (ML) ORAL DAILY
Qty: 30 TABLET | Refills: 0 | Status: SHIPPED | OUTPATIENT
Start: 2022-08-28 | End: 2022-09-27

## 2022-08-28 RX ORDER — LOSARTAN POTASSIUM 100 MG/1
100 TABLET ORAL DAILY
Qty: 30 TABLET | Refills: 0 | Status: SHIPPED | OUTPATIENT
Start: 2022-08-28 | End: 2022-09-27

## 2022-08-28 RX ORDER — INSULIN GLARGINE 100 [IU]/ML
10 INJECTION, SOLUTION SUBCUTANEOUS
Qty: 1 PEN | Refills: 0 | OUTPATIENT
Start: 2022-08-28 | End: 2022-10-05

## 2022-08-28 RX ORDER — INSULIN GLARGINE 100 [IU]/ML
10 INJECTION, SOLUTION SUBCUTANEOUS ONCE
Status: COMPLETED | OUTPATIENT
Start: 2022-08-28 | End: 2022-08-28

## 2022-08-28 RX ORDER — TAMSULOSIN HYDROCHLORIDE 0.4 MG/1
0.4 CAPSULE ORAL DAILY
Qty: 30 CAPSULE | Refills: 0 | Status: SHIPPED | OUTPATIENT
Start: 2022-08-28 | End: 2022-09-27

## 2022-08-28 RX ADMIN — LOSARTAN POTASSIUM 100 MG: 50 TABLET, FILM COATED ORAL at 09:05

## 2022-08-28 RX ADMIN — TAMSULOSIN HYDROCHLORIDE 0.4 MG: 0.4 CAPSULE ORAL at 09:05

## 2022-08-28 RX ADMIN — Medication 3 UNITS: at 08:48

## 2022-08-28 RX ADMIN — HEPARIN SODIUM 5000 UNITS: 5000 INJECTION INTRAVENOUS; SUBCUTANEOUS at 13:18

## 2022-08-28 RX ADMIN — HEPARIN SODIUM 5000 UNITS: 5000 INJECTION INTRAVENOUS; SUBCUTANEOUS at 05:41

## 2022-08-28 RX ADMIN — SODIUM CHLORIDE, PRESERVATIVE FREE 10 ML: 5 INJECTION INTRAVENOUS at 05:44

## 2022-08-28 RX ADMIN — Medication 10 UNITS: at 12:19

## 2022-08-28 RX ADMIN — ASPIRIN 81 MG CHEWABLE TABLET 81 MG: 81 TABLET CHEWABLE at 09:05

## 2022-08-28 RX ADMIN — NEPHROCAP 1 CAPSULE: 1 CAP ORAL at 09:05

## 2022-08-28 NOTE — PROGRESS NOTES
informed PB Marion that the patient will discharge today and he will catch the bus to the pharmacy and shelter.  provided PB Marion with bus ticket for the patient.       HONG Moore

## 2022-08-28 NOTE — PROGRESS NOTES
was informed by Dr. Christofer Dc that the patient will discharge today and come back tomorrow to the hospital to  his medications.       HONG Mora

## 2022-08-28 NOTE — PROGRESS NOTES
Problem: Falls - Risk of  Goal: *Absence of Falls  Description: Document Leafy Santiago Fall Risk and appropriate interventions in the flowsheet.   8/28/2022 0711 by Wil Rhoades RN  Outcome: Progressing Towards Goal  Note: Fall Risk Interventions:  Mobility Interventions: Assess mobility with egress test         Medication Interventions: Teach patient to arise slowly    Elimination Interventions: Call light in reach, Urinal in reach    History of Falls Interventions: Door open when patient unattended      8/28/2022 0710 by Wil Rhoades RN  Outcome: Progressing Towards Goal  Note: Fall Risk Interventions:  Mobility Interventions: Assess mobility with egress test         Medication Interventions: Teach patient to arise slowly    Elimination Interventions: Call light in reach, Urinal in reach    History of Falls Interventions: Door open when patient unattended         Problem: Patient Education: Go to Patient Education Activity  Goal: Patient/Family Education  Outcome: Progressing Towards Goal

## 2022-08-28 NOTE — PROGRESS NOTES
informed Dr. Neris Bell that the pharmacy reported that the wrong indigent forms were faxed over.  notified Dr. Neris Bell that she refaxed ingident forms and  will be reaching out to pharmacy to confirmed correct form.  notified Dr. Neris Bell that the patient will be provided with a bus ticket to catch the bus to the pharmacy and the shelter. Dr. Neris Bell voiced an understanding.       HONG Escamilla

## 2022-08-28 NOTE — PROGRESS NOTES
spoke with Sonny at [address]  and she said the coverage has been terminated for medications.  informed Yovana that she would follow up with her.       HONG Banda

## 2022-08-28 NOTE — PROGRESS NOTES
informed case management director, Fouzia Tan, that [facility] said the coverage has been terminated. [facility] indicated that the person that handles the contract is not in today and she will have to follow up tomorrow.         HONG Story

## 2022-08-28 NOTE — PROGRESS NOTES
Problem: Falls - Risk of  Goal: *Absence of Falls  Description: Document Farzaneh Blood Fall Risk and appropriate interventions in the flowsheet. Outcome: Progressing Towards Goal  Note: Fall Risk Interventions:  Mobility Interventions: Communicate number of staff needed for ambulation/transfer, Patient to call before getting OOB, PT Consult for mobility concerns         Medication Interventions: Evaluate medications/consider consulting pharmacy, Patient to call before getting OOB, Teach patient to arise slowly    Elimination Interventions: Call light in reach, Patient to call for help with toileting needs, Toileting schedule/hourly rounds, Urinal in reach    History of Falls Interventions: Consult care management for discharge planning, Door open when patient unattended, Evaluate medications/consider consulting pharmacy         Problem: Patient Education: Go to Patient Education Activity  Goal: Patient/Family Education  Outcome: Progressing Towards Goal     Problem: Diabetes Self-Management  Goal: *Disease process and treatment process  Description: Define diabetes and identify own type of diabetes; list 3 options for treating diabetes. Outcome: Progressing Towards Goal  Goal: *Incorporating nutritional management into lifestyle  Description: Describe effect of type, amount and timing of food on blood glucose; list 3 methods for planning meals. Outcome: Progressing Towards Goal  Goal: *Incorporating physical activity into lifestyle  Description: State effect of exercise on blood glucose levels. Outcome: Progressing Towards Goal  Goal: *Developing strategies to promote health/change behavior  Description: Define the ABC's of diabetes; identify appropriate screenings, schedule and personal plan for screenings. Outcome: Progressing Towards Goal  Goal: *Using medications safely  Description: State effect of diabetes medications on diabetes; name diabetes medication taking, action and side effects.   Outcome: Progressing Towards Goal  Goal: *Monitoring blood glucose, interpreting and using results  Description: Identify recommended blood glucose targets  and personal targets. Outcome: Progressing Towards Goal  Goal: *Prevention, detection, treatment of acute complications  Description: List symptoms of hyper- and hypoglycemia; describe how to treat low blood sugar and actions for lowering  high blood glucose level. Outcome: Progressing Towards Goal  Goal: *Prevention, detection and treatment of chronic complications  Description: Define the natural course of diabetes and describe the relationship of blood glucose levels to long term complications of diabetes.   Outcome: Progressing Towards Goal  Goal: *Developing strategies to address psychosocial issues  Description: Describe feelings about living with diabetes; identify support needed and support network  Outcome: Progressing Towards Goal  Goal: *Insulin pump training  Outcome: Progressing Towards Goal  Goal: *Sick day guidelines  Outcome: Progressing Towards Goal  Goal: *Patient Specific Goal (EDIT GOAL, INSERT TEXT)  Outcome: Progressing Towards Goal     Problem: Patient Education: Go to Patient Education Activity  Goal: Patient/Family Education  Outcome: Progressing Towards Goal     Problem: Chronic Renal Failure  Goal: *Fluid and electrolytes stabilized  Outcome: Progressing Towards Goal     Problem: Patient Education: Go to Patient Education Activity  Goal: Patient/Family Education  Outcome: Progressing Towards Goal     Problem: Nutrition Deficit  Goal: *Optimize nutritional status  Outcome: Progressing Towards Goal     Problem: Pain  Goal: *Control of Pain  Outcome: Progressing Towards Goal  Goal: *PALLIATIVE CARE:  Alleviation of Pain  Outcome: Progressing Towards Goal     Problem: Patient Education: Go to Patient Education Activity  Goal: Patient/Family Education  Outcome: Progressing Towards Goal

## 2022-08-28 NOTE — PROGRESS NOTES
Problem: Falls - Risk of  Goal: *Absence of Falls  Description: Document Serena Krishna Fall Risk and appropriate interventions in the flowsheet.   8/28/2022 1232 by Emmie Gee  Outcome: Resolved/Met  Note: Fall Risk Interventions:  Mobility Interventions: Communicate number of staff needed for ambulation/transfer, Patient to call before getting OOB, PT Consult for mobility concerns         Medication Interventions: Evaluate medications/consider consulting pharmacy, Patient to call before getting OOB, Teach patient to arise slowly    Elimination Interventions: Call light in reach, Patient to call for help with toileting needs, Toileting schedule/hourly rounds, Urinal in reach    History of Falls Interventions: Consult care management for discharge planning, Door open when patient unattended, Evaluate medications/consider consulting pharmacy      8/28/2022 1032 by Emmie Gee  Outcome: Progressing Towards Goal  Note: Fall Risk Interventions:  Mobility Interventions: Communicate number of staff needed for ambulation/transfer, Patient to call before getting OOB, PT Consult for mobility concerns         Medication Interventions: Evaluate medications/consider consulting pharmacy, Patient to call before getting OOB, Teach patient to arise slowly    Elimination Interventions: Call light in reach, Patient to call for help with toileting needs, Toileting schedule/hourly rounds, Urinal in reach    History of Falls Interventions: Consult care management for discharge planning, Door open when patient unattended, Evaluate medications/consider consulting pharmacy         Problem: Patient Education: Go to Patient Education Activity  Goal: Patient/Family Education  8/28/2022 1232 by Emmie Gee  Outcome: Resolved/Met  8/28/2022 1032 by Emmie Gee  Outcome: Progressing Towards Goal     Problem: Diabetes Self-Management  Goal: *Disease process and treatment process  Description: Define diabetes and identify own type of diabetes; list 3 options for treating diabetes. 8/28/2022 1232 by Levon Young  Outcome: Resolved/Met  8/28/2022 1032 by Levon Young  Outcome: Progressing Towards Goal  Goal: *Incorporating nutritional management into lifestyle  Description: Describe effect of type, amount and timing of food on blood glucose; list 3 methods for planning meals. 8/28/2022 1232 by Levon Young  Outcome: Resolved/Met  8/28/2022 1032 by Levon Young  Outcome: Progressing Towards Goal  Goal: *Incorporating physical activity into lifestyle  Description: State effect of exercise on blood glucose levels. 8/28/2022 1232 by Levon Young  Outcome: Resolved/Met  8/28/2022 1032 by Levon Young  Outcome: Progressing Towards Goal  Goal: *Developing strategies to promote health/change behavior  Description: Define the ABC's of diabetes; identify appropriate screenings, schedule and personal plan for screenings. 8/28/2022 1232 by Levon Young  Outcome: Resolved/Met  8/28/2022 1032 by Levon Young  Outcome: Progressing Towards Goal  Goal: *Using medications safely  Description: State effect of diabetes medications on diabetes; name diabetes medication taking, action and side effects. 8/28/2022 1232 by Levon Young  Outcome: Resolved/Met  8/28/2022 1032 by Levon Young  Outcome: Progressing Towards Goal  Goal: *Monitoring blood glucose, interpreting and using results  Description: Identify recommended blood glucose targets  and personal targets. 8/28/2022 1232 by Levon Young  Outcome: Resolved/Met  8/28/2022 1032 by Levon Young  Outcome: Progressing Towards Goal  Goal: *Prevention, detection, treatment of acute complications  Description: List symptoms of hyper- and hypoglycemia; describe how to treat low blood sugar and actions for lowering  high blood glucose level.   8/28/2022 1232 by Levon Young  Outcome: Resolved/Met  8/28/2022 1032 by Levon Young  Outcome: Progressing Towards Goal  Goal: *Prevention, detection and treatment of chronic complications  Description: Define the natural course of diabetes and describe the relationship of blood glucose levels to long term complications of diabetes.   8/28/2022 1232 by Julián García  Outcome: Resolved/Met  8/28/2022 1032 by Julián García  Outcome: Progressing Towards Goal  Goal: *Developing strategies to address psychosocial issues  Description: Describe feelings about living with diabetes; identify support needed and support network  8/28/2022 1232 by Julián García  Outcome: Resolved/Met  8/28/2022 1032 by Julián García  Outcome: Progressing Towards Goal  Goal: *Insulin pump training  8/28/2022 1232 by Julián García  Outcome: Resolved/Met  8/28/2022 1032 by Julián García  Outcome: Progressing Towards Goal  Goal: *Sick day guidelines  8/28/2022 1232 by Julián García  Outcome: Resolved/Met  8/28/2022 1032 by Julián García  Outcome: Progressing Towards Goal  Goal: *Patient Specific Goal (EDIT GOAL, INSERT TEXT)  8/28/2022 1232 by Julián García  Outcome: Resolved/Met  8/28/2022 1032 by Julián García  Outcome: Progressing Towards Goal     Problem: Patient Education: Go to Patient Education Activity  Goal: Patient/Family Education  8/28/2022 1232 by Julián García  Outcome: Resolved/Met  8/28/2022 1032 by Julián García  Outcome: Progressing Towards Goal     Problem: Chronic Renal Failure  Goal: *Fluid and electrolytes stabilized  8/28/2022 1232 by Julián García  Outcome: Resolved/Met  8/28/2022 1032 by Julián García  Outcome: Progressing Towards Goal     Problem: Patient Education: Go to Patient Education Activity  Goal: Patient/Family Education  8/28/2022 1232 by Julián García  Outcome: Resolved/Met  8/28/2022 1032 by Julián García  Outcome: Progressing Towards Goal     Problem: Nutrition Deficit  Goal: *Optimize nutritional status  8/28/2022 1232 by Julián García  Outcome: Resolved/Met  8/28/2022 1032 by Julián García  Outcome: Progressing Towards Goal     Problem: Pain  Goal: *Control of Pain  8/28/2022 1232 by Julián García  Outcome: Resolved/Met  8/28/2022 1032 by Sonya Minor Delia  Outcome: Progressing Towards Goal  Goal: *PALLIATIVE CARE:  Alleviation of Pain  8/28/2022 1232 by Rebecca Bermeo  Outcome: Resolved/Met  8/28/2022 1032 by Rebecca Bermeo  Outcome: Progressing Towards Goal     Problem: Patient Education: Go to Patient Education Activity  Goal: Patient/Family Education  8/28/2022 1232 by Rebecca Bermeo  Outcome: Resolved/Met  8/28/2022 1032 by Rebecca Bermeo  Outcome: Progressing Towards Goal

## 2022-08-28 NOTE — PROGRESS NOTES
spoke with Sonny at [address] on [facility] regarding the pittman for medications. Yovana indicated $149.30.         Author HONG Brock

## 2022-08-28 NOTE — PROGRESS NOTES
Bedside and Verbal shift change report given to Henna Carrasquillo RN (oncoming nurse) by Rommel Freeman RN (offgoing nurse). Report given with SBAR, Kardex, Intake/Output, MAR and Recent Results.

## 2022-08-28 NOTE — ROUTINE PROCESS
Bedside and Verbal shift change report given to [facility] (oncoming nurse) by Walter Cat (offgoing nurse). Report included the following information SBAR, Kardex, Intake/Output, MAR, and Recent Results. Wound Prevention Checklist    Patient: Gauri Joy (58 y.o. male)  Date: 8/28/2022  Diagnosis: End stage renal disease ([facility] [N18.6]  Elevated serum creatinine [R79.89]  Hyperglycemia [R73.9]  Pericardial effusion [I31.3]  CKD (chronic kidney disease) stage 5, GFR less than 15 ml/min ([facility]) [N18.5]  CHF (congestive heart failure) ([facility] [address] [I50.9] <principal problem not specified>    Precautions:         []  Heel prevention boots placed on patient    [x]  Patient turned q2h during shift    []  Lift team ordered    [x]  [facility]ty bed    []  Each Wound is documented during shift (Stage, Color, drainage, odor, measurements, and dressings)    [x]  Dual skin checks done at bedside during shift report with Bethany Welch     2392 - Two bus passes provided to patient for discharge today and return to the hospital tomorrow morning to  medications from the pharmacy. Verbal order to given bedtime lantus 10 units after lunch and discharge patient. I have reviewed discharge instructions with the patient. The patient verbalized understanding. Discharge medications reviewed with patient and appropriate educational materials and side effects teaching were provided.

## 2022-08-28 NOTE — PROGRESS NOTES
informed the patient that he will be discharge today and be provided a bus ticket.  informed the patient that he can use the bus ticket to catch the bus to the pharmacy and the shelter. The patient indicated that he knows how to catch the bus to pharmacy and shelter. The patient confirmed he has the address for the shelter.       HONG Gutierrez

## 2022-09-04 NOTE — PROGRESS NOTES
HISTORY OF PRESENT ILLNESS  Michael Vazquez is a 71 y.o. male. CHF   The history is provided by the patient. This is a chronic problem. The problem occurs daily. The problem has not changed since onset. Associated symptoms include shortness of breath. Pertinent negatives include no chest pain, no abdominal pain and no headaches. Review of Systems   Constitutional: Negative for chills, diaphoresis, fever and weight loss. HENT: Negative for ear pain and hearing loss. Eyes: Negative for blurred vision. Respiratory: Positive for shortness of breath. Negative for cough, hemoptysis, sputum production, wheezing and stridor. Cardiovascular: Negative for chest pain, palpitations, orthopnea, claudication, leg swelling and PND. Gastrointestinal: Negative for abdominal pain, heartburn, nausea and vomiting. Musculoskeletal: Negative for myalgias and neck pain. Skin: Negative for rash. Neurological: Negative for dizziness, tingling, tremors, focal weakness, loss of consciousness, weakness and headaches. Psychiatric/Behavioral: Negative for depression and suicidal ideas. Family History   Problem Relation Age of Onset    Liver Disease Father     Diabetes Mother     Cancer Maternal Grandmother     No Known Problems Sister     Diabetes Brother     No Known Problems Brother     No Known Problems Sister     No Known Problems Sister        Past Medical History:   Diagnosis Date    Cardiac LV ejection fraction 30-35% 7/15/15    Cardiomyopathy ([address] 7/15/15    35% per ECHO    Cocaine use 8/9/14    + UDS, persistent use    Diabetes ([address] 8/9/2014    8.1 hgbA1C 8/9/14    [facility]     Hepatitis C infection 8/20/14    Genotype 1A    History of cocaine abuse 8/10/2016    History of heroin abuse 8/10/2016    Homelessness 9/7/2015    Hypertension 2000       No past surgical history on file.     Social History     Tobacco Use    Smoking status: Former Smoker     Packs/day: 0.30     Types: Cigarettes     Last attempt to quit: 2016     Years since quittin.7    Smokeless tobacco: Never Used   Substance Use Topics    Alcohol use: Yes     Alcohol/week: 0.0 oz     Comment: started heavy drinking at teen till in Aug 2014 but has gone into remission since then       No Known Allergies    Outpatient Medications Marked as Taking for the 19 encounter (Office Visit) with Amish Dooley MD   Medication Sig Dispense Refill    cetirizine (ZYRTEC) 10 mg tablet Take  by mouth.  alum-mag hydroxide-simeth (MAALOX ADVANCED) 200-200-20 mg/5 mL susp Take 30 mL by mouth every four (4) hours as needed.  omeprazole (PRILOSEC) 20 mg capsule Take 20 mg by mouth daily.  docusate sodium (COLACE) 100 mg capsule Take 100 mg by mouth two (2) times a day.  bisacodyl (DULCOLAX, BISACODYL,) 5 mg EC tablet Take 5 mg by mouth daily as needed for Constipation.  simethicone (MYLICON) 80 mg chewable tablet Take 80 mg by mouth two (2) times a day. Give 2 tablets      tamsulosin (FLOMAX) 0.4 mg capsule Take 0.4 mg by mouth daily.  amLODIPine (NORVASC) 10 mg tablet Take 10 mg by mouth daily.  pravastatin (PRAVACHOL) 20 mg tablet Take 20 mg by mouth nightly.  carvedilol (COREG) 12.5 mg tablet Take 1 Tab by mouth two (2) times daily (with meals). 60 Tab 4    furosemide (LASIX) 20 mg tablet Take 1 Tab by mouth daily. 30 Tab 4    Lancets misc Use as directed for blood glucose monitoring. 200 Each 1    lisinopril (PRINIVIL, ZESTRIL) 20 mg tablet Take 1 Tab by mouth daily. 30 Tab 1    multivitamin, tx-iron-ca-min (THERA-M W/ IRON) 9 mg iron-400 mcg tab tablet Take 1 Tab by mouth daily. 30 Tab 1    thiamine (B-1) 100 mg tablet Take 1 Tab by mouth daily. 30 Tab 1    aspirin (ASPIRIN) 325 mg tablet Take 1 Tab by mouth daily. 30 Tab 1    insulin glargine (LANTUS) 100 unit/mL injection 12 Units by SubCUTAneous route daily.  1 Vial 3    insulin lispro (HUMALOG) 100 unit/mL injection For Blood Sugar (mg/dL) of:   Less than 150 =  0 units  150 -199 =  3 units  200 -249 =  6 units  250 -299 =  9 units  300 -349 =  12 units  350 and above =  15 units, call MD. 1 Vial 3    Blood-Glucose Meter monitoring kit Check blood sugar qAC and qHS. 1 Kit 0    glucose blood VI test strips (GENSTRIP TEST STRIP) strip Check blood sugar qAC and qHS. 200 Strip 1    Insulin Syringe-Needle U-100 (INSULIN SYRINGE) 1/2 mL 30 gauge x 5/16 syrg For insulin use. 100 Syringe 2        Visit Vitals  BP (!) 164/92   Pulse 66   Ht 5' 11\" (1.803 m)   Wt 90.3 kg (199 lb)   BMI 27.75 kg/m²     Physical Exam   Constitutional: He is oriented to person, place, and time. He appears well-developed and well-nourished. No distress. HENT:   Head: Atraumatic. Mouth/Throat: No oropharyngeal exudate. Eyes: Conjunctivae are normal. No scleral icterus. Neck: Neck supple. No JVD present. Cardiovascular: Normal rate and regular rhythm. Exam reveals no gallop. No murmur heard. Pulmonary/Chest: Effort normal and breath sounds normal. No stridor. He has no wheezes. He has no rales. Abdominal: Soft. There is no tenderness. There is no rebound. Musculoskeletal: Normal range of motion. He exhibits edema (1+ ble edema). He exhibits no tenderness. Neurological: He is alert and oriented to person, place, and time. He exhibits normal muscle tone. Skin: Skin is warm. He is not diaphoretic. Psychiatric: He has a normal mood and affect. His behavior is normal.     Echo 04/2018:  SUMMARY:  Left ventricle: The ventricle was dilated. Systolic function was severely   reduced by visual assessment. Ejection  fraction was estimated to be 25 %. There was moderate diffuse hypokinesis. Wall thickness was mildly increased. Features were consistent with a pseudonormal left ventricular filling   pattern, with concomitant abnormal relaxation and  increased filling pressure (grade 2 diastolic dysfunction).     Left atrium: The atrium was moderately dilated. Atrial septum: Contrast injection was performed. There was no right-to-left   shunt, with provocative maneuvers to  increase right atrial pressure. Mitral valve: There was moderate regurgitation. The effective orifice of   mitral regurgitation by proximal isovelocity  surface area was 0.1 cm-sq. The volume of mitral regurgitation by proximal   isovelocity surface area was 18 ml. ASSESSMENT and PLAN    ICD-10-CM ICD-9-CM    1. Dilated cardiomyopathy (HCC) I42.0 425.4 AMB POC EKG ROUTINE W/ 12 LEADS, INTER & REP   2. Cerebrovascular accident (CVA), unspecified mechanism ([facility] I63.9 434.91    3. Systolic CHF, chronic (HCC) I50.22 428.22      428.0     stage C NYHA class II/III   4. Essential hypertension I10 401.9    5. Type 2 diabetes mellitus with insulin therapy ([facility] E11.9 250.00     Z79.4 V58.67      Orders Placed This Encounter    AMB POC EKG ROUTINE W/ 12 LEADS, INTER & REP     Order Specific Question:   Reason for Exam:     Answer:   Cardiomyopathy       cardiovascular risk and specific lipid/LDL goals reviewed. Patient is a 58 yr old male with known non ischemic cardiomyopathy/ chronic systolic CHF stage NYHA class II/III seen for f/u. Has mild BLE edema--will increase lasix to 40mg daily. Will need repeat echo to assess LV function.   No syncope or dizziness PAST MEDICAL HISTORY:  Chronic CHF (congestive heart failure) (EF=15)    CKD (chronic kidney disease) stage 3, GFR 30-59 ml/min (baseline Creat=1.8)    Dyslipidemia     H/O testicular cancer 1988    History of pacemaker     HTN (hypertension)     S/P implantation of automatic cardioverter/defibrillator (AICD)

## 2022-10-05 ENCOUNTER — APPOINTMENT (OUTPATIENT)
Dept: GENERAL RADIOLOGY | Age: 67
End: 2022-10-05
Attending: EMERGENCY MEDICINE
Payer: MEDICAID

## 2022-10-05 ENCOUNTER — HOSPITAL ENCOUNTER (EMERGENCY)
Age: 67
Discharge: HOME | End: 2022-10-05
Attending: EMERGENCY MEDICINE
Payer: MEDICAID

## 2022-10-05 VITALS
HEIGHT: 72 IN | TEMPERATURE: 98 F | DIASTOLIC BLOOD PRESSURE: 96 MMHG | RESPIRATION RATE: 20 BRPM | OXYGEN SATURATION: 98 % | BODY MASS INDEX: 26.41 KG/M2 | WEIGHT: 195 LBS | SYSTOLIC BLOOD PRESSURE: 138 MMHG | HEART RATE: 63 BPM

## 2022-10-05 DIAGNOSIS — Z76.0 MEDICATION REFILL: ICD-10-CM

## 2022-10-05 DIAGNOSIS — R73.9 HYPERGLYCEMIA: Primary | ICD-10-CM

## 2022-10-05 DIAGNOSIS — Z49.01 ENCOUNTER FOR DIALYSIS CATHETER CARE (HCC): ICD-10-CM

## 2022-10-05 LAB
ALBUMIN SERPL-MCNC: 3.5 G/DL (ref 3.4–5)
ALBUMIN/GLOB SERPL: 0.8 {RATIO} (ref 0.8–1.7)
ALP SERPL-CCNC: 151 U/L (ref 45–117)
ALT SERPL-CCNC: 21 U/L (ref 16–61)
ANION GAP SERPL CALC-SCNC: 9 MMOL/L (ref 3–18)
AST SERPL-CCNC: 15 U/L (ref 10–38)
BASOPHILS # BLD: 0 K/UL (ref 0–0.1)
BASOPHILS NFR BLD: 1 % (ref 0–2)
BILIRUB SERPL-MCNC: 0.4 MG/DL (ref 0.2–1)
BUN SERPL-MCNC: 61 MG/DL (ref 7–18)
BUN/CREAT SERPL: 12 (ref 12–20)
CALCIUM SERPL-MCNC: 8.9 MG/DL (ref 8.5–10.1)
CHLORIDE SERPL-SCNC: 101 MMOL/L (ref 100–111)
CO2 SERPL-SCNC: 26 MMOL/L (ref 21–32)
CREAT SERPL-MCNC: 5.27 MG/DL (ref 0.6–1.3)
DIFFERENTIAL METHOD BLD: ABNORMAL
EOSINOPHIL # BLD: 0.2 K/UL (ref 0–0.4)
EOSINOPHIL NFR BLD: 4 % (ref 0–5)
ERYTHROCYTE [DISTWIDTH] IN BLOOD BY AUTOMATED COUNT: 12.8 % (ref 11.6–14.5)
GLOBULIN SER CALC-MCNC: 4.5 G/DL (ref 2–4)
GLUCOSE SERPL-MCNC: 302 MG/DL (ref 74–99)
HCT VFR BLD AUTO: 35.9 % (ref 36–48)
HGB BLD-MCNC: 12.1 G/DL (ref 13–16)
IMM GRANULOCYTES # BLD AUTO: 0.1 K/UL (ref 0–0.04)
IMM GRANULOCYTES NFR BLD AUTO: 1 % (ref 0–0.5)
LYMPHOCYTES # BLD: 2.1 K/UL (ref 0.9–3.6)
LYMPHOCYTES NFR BLD: 32 % (ref 21–52)
MCH RBC QN AUTO: 29.5 PG (ref 24–34)
MCHC RBC AUTO-ENTMCNC: 33.7 G/DL (ref 31–37)
MCV RBC AUTO: 87.6 FL (ref 78–100)
MONOCYTES # BLD: 0.8 K/UL (ref 0.05–1.2)
MONOCYTES NFR BLD: 12 % (ref 3–10)
NEUTS SEG # BLD: 3.5 K/UL (ref 1.8–8)
NEUTS SEG NFR BLD: 52 % (ref 40–73)
NRBC # BLD: 0 K/UL (ref 0–0.01)
NRBC BLD-RTO: 0 PER 100 WBC
PLATELET # BLD AUTO: 239 K/UL (ref 135–420)
PMV BLD AUTO: 10.3 FL (ref 9.2–11.8)
POTASSIUM SERPL-SCNC: 4.6 MMOL/L (ref 3.5–5.5)
PROT SERPL-MCNC: 8 G/DL (ref 6.4–8.2)
RBC # BLD AUTO: 4.1 M/UL (ref 4.35–5.65)
SODIUM SERPL-SCNC: 136 MMOL/L (ref 136–145)
WBC # BLD AUTO: 6.8 K/UL (ref 4.6–13.2)

## 2022-10-05 PROCEDURE — 99284 EMERGENCY DEPT VISIT MOD MDM: CPT

## 2022-10-05 PROCEDURE — 80053 COMPREHEN METABOLIC PANEL: CPT

## 2022-10-05 PROCEDURE — 71045 X-RAY EXAM CHEST 1 VIEW: CPT

## 2022-10-05 PROCEDURE — 85025 COMPLETE CBC W/AUTO DIFF WBC: CPT

## 2022-10-05 RX ORDER — LOSARTAN POTASSIUM 100 MG/1
100 TABLET ORAL DAILY
Qty: 30 TABLET | Refills: 0 | Status: SHIPPED | OUTPATIENT
Start: 2022-10-05 | End: 2022-11-04

## 2022-10-05 RX ORDER — INSULIN GLARGINE 100 [IU]/ML
10 INJECTION, SOLUTION SUBCUTANEOUS
Qty: 3 PEN | Refills: 0 | Status: SHIPPED | OUTPATIENT
Start: 2022-10-05 | End: 2022-10-05 | Stop reason: ALTCHOICE

## 2022-10-05 RX ORDER — PEN NEEDLE, DIABETIC 30 GX3/16"
NEEDLE, DISPOSABLE MISCELLANEOUS
Qty: 1 EACH | Refills: 11 | Status: SHIPPED | OUTPATIENT
Start: 2022-10-05

## 2022-10-05 RX ORDER — TAMSULOSIN HYDROCHLORIDE 0.4 MG/1
0.4 CAPSULE ORAL DAILY
Qty: 30 CAPSULE | Refills: 0 | Status: SHIPPED | OUTPATIENT
Start: 2022-10-05 | End: 2022-11-04

## 2022-10-05 RX ORDER — RENO CAPS 100; 1.5; 1.7; 20; 10; 1; 150; 5; 6 MG/1; MG/1; MG/1; MG/1; MG/1; MG/1; UG/1; MG/1; UG/1
1 CAPSULE ORAL DAILY
Qty: 30 CAPSULE | Refills: 0 | Status: SHIPPED | OUTPATIENT
Start: 2022-10-05 | End: 2022-11-04

## 2022-10-05 RX ORDER — INSULIN GLARGINE 100 [IU]/ML
INJECTION, SOLUTION SUBCUTANEOUS
Qty: 1 ML | Refills: 0 | Status: SHIPPED | OUTPATIENT
Start: 2022-10-05

## 2022-10-05 RX ORDER — GUAIFENESIN 100 MG/5ML
81 LIQUID (ML) ORAL DAILY
Qty: 30 TABLET | Refills: 0 | Status: SHIPPED | OUTPATIENT
Start: 2022-10-05 | End: 2022-11-04

## 2022-10-05 NOTE — PROGRESS NOTES
Patient provided Medication Assistance through [facility]. Patient assisted with Insulin Glargine Lantus 100 unit/ml vial, Flomax 0.4 mg (30), Losartan 100mg (30), insulin disposable needles.

## 2022-10-05 NOTE — ED PROVIDER NOTES
EMERGENCY DEPARTMENT HISTORY AND PHYSICAL EXAM      Date: 10/5/2022  Patient Name: Saurabh Castanon      History of Presenting Illness     Chief Complaint   Patient presents with    Vascular Access Problem       Location/Duration/Severity/Modifying factors   Chief Complaint   Patient presents with    Vascular Access Problem       HPI:  Saurabh Castanon is a 75 y.o. male with history as listed presents with a concern of issues with his dialysis catheter also running out of his medications. Patient states that since he left the hospital he has not been able to get his meds refilled. Also states that he is having issues with a feeling of pulling and tugging in his dialysis catheter. Also states that during runs of dialysis he is having issues with the machine stopping. Location: Right upper chest wall  Duration: Since discharge  Quality: Pulling tugging  Severity: Mild  Modifying Factors: None  Associated Symptoms:see ROS      There are no other complaints, changes, or physical findings at this time. PCP: Piotr Goins MD    Current Outpatient Medications   Medication Sig Dispense Refill    b complex-vitamin c-folic acid (Watson Caps) 1 mg capsule Take 1 Capsule by mouth daily for 30 days. 30 Capsule 0    losartan (COZAAR) 100 mg tablet Take 1 Tablet by mouth daily for 30 days. 30 Tablet 0    aspirin 81 mg chewable tablet Take 1 Tablet by mouth daily for 30 days. 30 Tablet 0    tamsulosin (Flomax) 0.4 mg capsule Take 1 Capsule by mouth daily for 30 days.  30 Capsule 0    Insulin Needles, Disposable, 31 gauge x 5/16\" ndle UAD 1 Each 11    insulin glargine (Lantus U-100 Insulin) 100 unit/mL injection Inject 10 mL SQ QHS 1 mL 0       Past History     Past Medical History:  Past Medical History:   Diagnosis Date    Cardiac LV ejection fraction 30-35% 7/15/15    Cardiomyopathy ([address] 7/15/15    35% per ECHO    Cocaine use 8/9/14    + UDS, persistent use    Diabetes ([facility] 8/9/2014    8.1 hgbA1C 8/9/14    Heart failure ([facility]     Hepatitis C infection 14    Genotype 1A    History of cocaine abuse ([facility] 8/10/2016    History of heroin abuse ([facility] 8/10/2016    Homelessness 2015    Hypertension 2000       Past Surgical History:  No past surgical history on file. Family History:  Family History   Problem Relation Age of Onset    Liver Disease Father     Diabetes Mother     Cancer Maternal Grandmother     No Known Problems Sister     Diabetes Brother     No Known Problems Brother     No Known Problems Sister     No Known Problems Sister        Social History:  Social History     Tobacco Use    Smoking status: Former     Packs/day: 0.30     Types: Cigarettes     Quit date: 2016     Years since quittin.2    Smokeless tobacco: Never   Substance Use Topics    Alcohol use: Not Currently     Alcohol/week: 0.0 standard drinks     Comment: started heavy drinking at teen till in Aug 2014 but has gone into remission since then    Drug use: No     Types: Cocaine, Marijuana, Heroin     Comment: 2 x heroin experimental, smoked Cocaine with last 7/27/15       Allergies: Allergies   Allergen Reactions    Tylenol [Acetaminophen] Other (comments)     Pt was advised not to take Tylenol per          Review of Systems     Review of Systems   Constitutional:  Negative for fatigue and fever. HENT:  Negative for sore throat and trouble swallowing. Eyes:  Negative for photophobia and visual disturbance. Respiratory:  Negative for cough and shortness of breath. Cardiovascular:  Negative for chest pain and palpitations. Gastrointestinal:  Negative for abdominal pain and diarrhea. Genitourinary:  Negative for dysuria and flank pain. Musculoskeletal:  Negative for neck pain and neck stiffness. Skin:  Negative for pallor and rash. Neurological:  Negative for weakness and numbness. Physical Exam     Physical Exam  Constitutional:       General: He is not in acute distress. Appearance: Normal appearance.  He is not ill-appearing. HENT:      Head: Normocephalic and atraumatic. Right Ear: External ear normal.      Left Ear: External ear normal.      Nose: No congestion. Mouth/Throat:      Pharynx: No oropharyngeal exudate or posterior oropharyngeal erythema. Eyes:      General:         Right eye: No discharge. Left eye: No discharge. Cardiovascular:      Heart sounds: No murmur heard. No friction rub. Comments: Right upper chest wall dialysis catheter in place. Sutured to the anterior chest wall. No evidence of abscess. Appears clean dry and intact  Pulmonary:      Effort: No respiratory distress. Breath sounds: No stridor. Abdominal:      General: There is no distension. Palpations: There is no mass. Musculoskeletal:         General: No swelling or tenderness. Cervical back: No rigidity or tenderness. Skin:     Coloration: Skin is not jaundiced or pale. Neurological:      General: No focal deficit present. Mental Status: He is alert and oriented to person, place, and time. Cranial Nerves: No cranial nerve deficit. Sensory: No sensory deficit. Lab and Diagnostic Study Results     Labs -  Recent Results (from the past 24 hour(s))   CBC WITH AUTOMATED DIFF    Collection Time: 10/05/22 12:33 PM   Result Value Ref Range    WBC 6.8 4.6 - 13.2 K/uL    RBC 4.10 (L) 4.35 - 5.65 M/uL    HGB 12.1 (L) 13.0 - 16.0 g/dL    HCT 35.9 (L) 36.0 - 48.0 %    MCV 87.6 78.0 - 100.0 FL    MCH 29.5 24.0 - 34.0 PG    MCHC 33.7 31.0 - 37.0 g/dL    RDW 12.8 11.6 - 14.5 %    PLATELET 227 525 - 615 K/uL    MPV 10.3 9.2 - 11.8 FL    NRBC 0.0 0  WBC    ABSOLUTE NRBC 0.00 0.00 - 0.01 K/uL    NEUTROPHILS 52 40 - 73 %    LYMPHOCYTES 32 21 - 52 %    MONOCYTES 12 (H) 3 - 10 %    EOSINOPHILS 4 0 - 5 %    BASOPHILS 1 0 - 2 %    IMMATURE GRANULOCYTES 1 (H) 0.0 - 0.5 %    ABS. NEUTROPHILS 3.5 1.8 - 8.0 K/UL    ABS. LYMPHOCYTES 2.1 0.9 - 3.6 K/UL    ABS.  MONOCYTES 0.8 0.05 - 1.2 K/UL ABS. EOSINOPHILS 0.2 0.0 - 0.4 K/UL    ABS. BASOPHILS 0.0 0.0 - 0.1 K/UL    ABS. IMM. GRANS. 0.1 (H) 0.00 - 0.04 K/UL    DF AUTOMATED     METABOLIC PANEL, COMPREHENSIVE    Collection Time: 10/05/22 12:33 PM   Result Value Ref Range    Sodium 136 136 - 145 mmol/L    Potassium 4.6 3.5 - 5.5 mmol/L    Chloride 101 100 - 111 mmol/L    CO2 26 21 - 32 mmol/L    Anion gap 9 3.0 - 18 mmol/L    Glucose 302 (H) 74 - 99 mg/dL    BUN 61 (H) 7.0 - 18 MG/DL    Creatinine 5.27 (H) 0.6 - 1.3 MG/DL    BUN/Creatinine ratio 12 12 - 20      eGFR 11 (L) >60 ml/min/1.73m2    Calcium 8.9 8.5 - 10.1 MG/DL    Bilirubin, total 0.4 0.2 - 1.0 MG/DL    ALT (SGPT) 21 16 - 61 U/L    AST (SGOT) 15 10 - 38 U/L    Alk. phosphatase 151 (H) 45 - 117 U/L    Protein, total 8.0 6.4 - 8.2 g/dL    Albumin 3.5 3.4 - 5.0 g/dL    Globulin 4.5 (H) 2.0 - 4.0 g/dL    A-G Ratio 0.8 0.8 - 1.7           Radiologic Studies -   XR CHEST PORT   Final Result   1. Essentially unchanged dialysis catheter position. 2.  Borderline cardiac silhouette enlargement, improved from prior. Procedures and Critical Care       Performed by: Barbra Nolasco MD    Procedures         Barbra Nolasco MD    Medical Decision Making and ED Course   - I am the first and primary provider for this patient AND AM THE PRIMARY PROVIDER OF RECORD. - I reviewed the vital signs, available nursing notes, past medical history, past surgical history, family history and social history. - Initial assessment performed. The patients presenting problems have been discussed, and the staff are in agreement with the care plan formulated and outlined with them. I have encouraged them to ask questions as they arise throughout their visit. Vital Signs-Reviewed the patient's vital signs.     Patient Vitals for the past 12 hrs:   Temp Pulse Resp BP SpO2   10/05/22 1211 98 °F (36.7 °C) -- -- -- --   10/05/22 1055 -- -- -- -- 98 %   10/05/22 1054 -- 63 20 (!) 138/96 --         Provider Notes (Medical Decision Making):     MDM  Number of Diagnoses or Management Options  Diagnosis management comments: Patient presenting 1 because he is out of his medications but also because he is having issues with his dialysis catheter and a feeling of pulling and tugging. Also having issues with his dialysis catheter stopping the dialysis. Have consulted case management for medication issues and no sent medications to the pharmacy here. Have also paged vascular surgery for further evaluation and treatment           ED Course:       ED Course as of 10/05/22 1412   Wed Oct 05, 2022   1219 Discussed with CHARLIE gilbert who will evalaute the patient [RS]   [phone] Refilled patients home meds [RS]   9105 Seen by vascular surgery. Patient to have outpatient catheter exchange per vascular. [RS]      ED Course User Index  [RS] Cedrick Baca MD     ------------------------------------------------------------------------------------------------------------  2:12 PM  Have been refilled. The patient's questions have been answered. Outpatient vascular surgery follow-up. Care management consult to help refill the patient's medications. Consultations:       Consultations:       Disposition         Discharged      Diagnosis     Clinical Impression:   1. Hyperglycemia    2. Encounter for dialysis catheter care ([facility]    3.  Medication refill        Attestations:    King Chelsey MD

## 2022-10-05 NOTE — PROGRESS NOTES
Patient has transitional care follow up with Dr. Kade Benites at [facility] on 10/17/2022 arrive at 8:30 am.    Call made to outpatient pharmacy, Phuc Ramos will have Cydney Devine return my call.

## 2022-10-05 NOTE — ED TRIAGE NOTES
Zuri Hickey reports having problems with r. Side upper chest perm cath over past 2 months. Client states technical told him to have evaluated due client having to be manipulated to make machine work(move his neck in certain direction.) Client able to be dialyzed but process taking longer than normal. Having pain at site. NAD. AXOX4. No truama noted to site.

## 2022-10-05 NOTE — PROGRESS NOTES
Received discharge prescriptions for patient at outpatient pharmacy. Patient insurance coverage is terminated.

## 2022-10-06 ENCOUNTER — DOCUMENTATION ONLY (OUTPATIENT)
Dept: VASCULAR SURGERY | Age: 67
End: 2022-10-06

## 2022-10-06 NOTE — PROGRESS NOTES
Spoke to Cope at the cath lab to add on patient for tomorrow. Dr. Milagros Bryant will do the CVC exchange tomorrow schedule at 10am. Spoke to Isreal Valderrama caregiver and will inform patient to be at the cath lab at [address] [place] [address] with nothing to eat starting midnight tonight. Tried to call [employer] to inform them of the plan but no answer. Will fax information to the facility. ,

## 2022-10-07 ENCOUNTER — TELEPHONE (OUTPATIENT)
Dept: VASCULAR SURGERY | Age: 67
End: 2022-10-07

## 2022-10-07 ENCOUNTER — HOSPITAL ENCOUNTER (OUTPATIENT)
Age: 67
Setting detail: OUTPATIENT SURGERY
Discharge: HOME | End: 2022-10-07
Attending: SURGERY | Admitting: SURGERY
Payer: MEDICAID

## 2022-10-07 ENCOUNTER — HOSPITAL ENCOUNTER (EMERGENCY)
Age: 67
End: 2022-10-07
Attending: EMERGENCY MEDICINE

## 2022-10-07 ENCOUNTER — DOCUMENTATION ONLY (OUTPATIENT)
Dept: VASCULAR SURGERY | Age: 67
End: 2022-10-07

## 2022-10-07 VITALS
RESPIRATION RATE: 18 BRPM | OXYGEN SATURATION: 100 % | HEART RATE: 78 BPM | SYSTOLIC BLOOD PRESSURE: 163 MMHG | TEMPERATURE: 98.1 F | DIASTOLIC BLOOD PRESSURE: 121 MMHG

## 2022-10-07 VITALS
HEIGHT: 72 IN | SYSTOLIC BLOOD PRESSURE: 185 MMHG | HEART RATE: 98 BPM | WEIGHT: 190 LBS | BODY MASS INDEX: 25.73 KG/M2 | DIASTOLIC BLOOD PRESSURE: 83 MMHG | OXYGEN SATURATION: 100 % | RESPIRATION RATE: 18 BRPM

## 2022-10-07 DIAGNOSIS — N18.6 ESRD (END STAGE RENAL DISEASE) (HCC): ICD-10-CM

## 2022-10-07 DIAGNOSIS — T82.898A ARTERIOVENOUS FISTULA OCCLUSION, INITIAL ENCOUNTER (HCC): ICD-10-CM

## 2022-10-07 LAB
ALBUMIN SERPL-MCNC: 3.4 G/DL (ref 3.4–5)
ALBUMIN/GLOB SERPL: 0.8 {RATIO} (ref 0.8–1.7)
ALP SERPL-CCNC: 148 U/L (ref 45–117)
ALT SERPL-CCNC: 21 U/L (ref 16–61)
ANION GAP SERPL CALC-SCNC: 7 MMOL/L (ref 3–18)
AST SERPL-CCNC: 26 U/L (ref 10–38)
BASOPHILS # BLD: 0.1 K/UL (ref 0–0.1)
BASOPHILS NFR BLD: 1 % (ref 0–2)
BILIRUB DIRECT SERPL-MCNC: <0.1 MG/DL (ref 0–0.2)
BILIRUB SERPL-MCNC: 0.5 MG/DL (ref 0.2–1)
BUN SERPL-MCNC: 66 MG/DL (ref 7–18)
BUN/CREAT SERPL: 14 (ref 12–20)
CALCIUM SERPL-MCNC: 9.1 MG/DL (ref 8.5–10.1)
CHLORIDE SERPL-SCNC: 102 MMOL/L (ref 100–111)
CO2 SERPL-SCNC: 25 MMOL/L (ref 21–32)
CREAT SERPL-MCNC: 4.62 MG/DL (ref 0.6–1.3)
DIFFERENTIAL METHOD BLD: ABNORMAL
EOSINOPHIL # BLD: 0.4 K/UL (ref 0–0.4)
EOSINOPHIL NFR BLD: 6 % (ref 0–5)
ERYTHROCYTE [DISTWIDTH] IN BLOOD BY AUTOMATED COUNT: 12.6 % (ref 11.6–14.5)
GLOBULIN SER CALC-MCNC: 4.3 G/DL (ref 2–4)
GLUCOSE SERPL-MCNC: 221 MG/DL (ref 74–99)
HCT VFR BLD AUTO: 33.6 % (ref 36–48)
HGB BLD-MCNC: 11.4 G/DL (ref 13–16)
IMM GRANULOCYTES # BLD AUTO: 0.1 K/UL (ref 0–0.04)
IMM GRANULOCYTES NFR BLD AUTO: 1 % (ref 0–0.5)
INR PPP: 0.9 (ref 0.8–1.2)
LYMPHOCYTES # BLD: 2.1 K/UL (ref 0.9–3.6)
LYMPHOCYTES NFR BLD: 32 % (ref 21–52)
MCH RBC QN AUTO: 30.1 PG (ref 24–34)
MCHC RBC AUTO-ENTMCNC: 33.9 G/DL (ref 31–37)
MCV RBC AUTO: 88.7 FL (ref 78–100)
MONOCYTES # BLD: 0.8 K/UL (ref 0.05–1.2)
MONOCYTES NFR BLD: 12 % (ref 3–10)
NEUTS SEG # BLD: 3.2 K/UL (ref 1.8–8)
NEUTS SEG NFR BLD: 49 % (ref 40–73)
NRBC # BLD: 0 K/UL (ref 0–0.01)
NRBC BLD-RTO: 0 PER 100 WBC
PLATELET # BLD AUTO: 225 K/UL (ref 135–420)
PMV BLD AUTO: 10.8 FL (ref 9.2–11.8)
POTASSIUM SERPL-SCNC: 4.9 MMOL/L (ref 3.5–5.5)
PROT SERPL-MCNC: 7.7 G/DL (ref 6.4–8.2)
PROTHROMBIN TIME: 12.6 SEC (ref 11.5–15.2)
RBC # BLD AUTO: 3.79 M/UL (ref 4.35–5.65)
SODIUM SERPL-SCNC: 134 MMOL/L (ref 136–145)
WBC # BLD AUTO: 6.6 K/UL (ref 4.6–13.2)

## 2022-10-07 PROCEDURE — 80048 BASIC METABOLIC PNL TOTAL CA: CPT

## 2022-10-07 PROCEDURE — 74011250636 HC RX REV CODE- 250/636: Performed by: SURGERY

## 2022-10-07 PROCEDURE — 85610 PROTHROMBIN TIME: CPT

## 2022-10-07 PROCEDURE — 36561 INSERT TUNNELED CV CATH: CPT | Performed by: SURGERY

## 2022-10-07 PROCEDURE — 85025 COMPLETE CBC W/AUTO DIFF WBC: CPT

## 2022-10-07 PROCEDURE — 77030002986 HC SUT PROL J&J -A: Performed by: SURGERY

## 2022-10-07 PROCEDURE — 36581 REPLACE TUNNELED CV CATH: CPT | Performed by: SURGERY

## 2022-10-07 PROCEDURE — 74011000250 HC RX REV CODE- 250: Performed by: SURGERY

## 2022-10-07 PROCEDURE — 80076 HEPATIC FUNCTION PANEL: CPT

## 2022-10-07 PROCEDURE — C1750 CATH, HEMODIALYSIS,LONG-TERM: HCPCS | Performed by: SURGERY

## 2022-10-07 PROCEDURE — 77030039266 HC ADH SKN EXOFIN S2SG -A: Performed by: SURGERY

## 2022-10-07 PROCEDURE — C1769 GUIDE WIRE: HCPCS | Performed by: SURGERY

## 2022-10-07 PROCEDURE — 77030022017 HC DRSG HEMO QCLOT ZMED -A: Performed by: SURGERY

## 2022-10-07 RX ORDER — LIDOCAINE HYDROCHLORIDE 10 MG/ML
INJECTION, SOLUTION EPIDURAL; INFILTRATION; INTRACAUDAL; PERINEURAL AS NEEDED
Status: DISCONTINUED | OUTPATIENT
Start: 2022-10-07 | End: 2022-10-07 | Stop reason: HOSPADM

## 2022-10-07 RX ORDER — HEPARIN SODIUM 200 [USP'U]/100ML
INJECTION, SOLUTION INTRAVENOUS
Status: COMPLETED | OUTPATIENT
Start: 2022-10-07 | End: 2022-10-07

## 2022-10-07 RX ORDER — HYDRALAZINE HYDROCHLORIDE 20 MG/ML
INJECTION INTRAMUSCULAR; INTRAVENOUS AS NEEDED
Status: DISCONTINUED | OUTPATIENT
Start: 2022-10-07 | End: 2022-10-07 | Stop reason: HOSPADM

## 2022-10-07 RX ORDER — CEFAZOLIN SODIUM 1 G/3ML
INJECTION, POWDER, FOR SOLUTION INTRAMUSCULAR; INTRAVENOUS AS NEEDED
Status: DISCONTINUED | OUTPATIENT
Start: 2022-10-07 | End: 2022-10-07 | Stop reason: HOSPADM

## 2022-10-07 RX ORDER — HEPARIN SODIUM 1000 [USP'U]/ML
INJECTION, SOLUTION INTRAVENOUS; SUBCUTANEOUS AS NEEDED
Status: DISCONTINUED | OUTPATIENT
Start: 2022-10-07 | End: 2022-10-07 | Stop reason: HOSPADM

## 2022-10-07 NOTE — OP NOTES
OPERATIVE NOTE    DATE OF PROCEDURE: 10/7/2022    SURGEON: Delford Homans, MD    ASSISTANT(S): None    PREOPERATIVE DIAGNOSIS: End-stage renal disease, malfunctioning right internal jugular vein tunneled dialysis catheter  POSTOPERATIVE DIAGNOSIS: same    PROCEDURE PERFORMED: Exchange of right internal jugular vein tunneled hemodialysis catheter over wire, under fluoroscopic guidance    ANESTHESIA: Local    EBL: Minimal    FLUIDS: 20 mL crystalloids    IMPLANTS: 19 cm Palindrome catheter in the right internal jugular vein    COMPLICATIONS: None    FINDINGS: Catheter tip the junction of the SVC and the right atrium after insertion. Good flow through the catheter. OPERATIVE INDICATIONS: The patient has end-stage renal disease, and is on hemodialysis through a right internal jugular vein tunneled hemodialysis catheter, that is pulled out a few centimeters, and was malfunctioning. DESCRIPTION OF PROCEDURE: Informed consent was obtained from the patient. The patient was brought to the angiographic suite and placed supine on the table. The right side of the neck, chest including the catheter were cleaned and draped in a sterile fashion. He received 2 g Ancef intravenously as preoperative antibiotic prophylaxis. A timeout was performed. An Amplatz Super Stiff wire was inserted through one of the catheter ports. The skin sutures anchoring the catheter were removed. After administering local anesthesia - 1% lidocaine, at the exit site, the catheter cuff was dissected with blunt dissection. The cuff was well incorporated. There was no evidence of infection. The old catheter was removed over the wire, and new 19 cm Palindrome catheter was inserted over the wire. All steps were performed under fluoroscopic guidance. The wire and the catheter stiffeners were removed, and all ports of the catheter were aspirated. There was good backflow.   Both ports were flushed with heparinized saline and hep-locked with full-strength heparin. The catheter was anchored to the skin at the exit site with 2-0 Prolene sutures. Dry sterile dressing was placed. The patient tolerated the procedure well and was hemodynamically stable. He received 20 mg intravenous hydralazine for blood pressure control. He was transferred to the recovery room and no immediate complications to the procedure were noted.       Dr. Lawanda Ornelas MD

## 2022-10-07 NOTE — DISCHARGE INSTRUCTIONS
Tunneled Catheter: What to Expect at [place] [address]. had a procedure to give you a tunneled catheter. The catheter is a soft, flexible tube that runs under your skin, usually from a vein in your chest or neck to a large vein near your heart. You may have it for weeks, months, or longer. You will now be able to get medicine, blood, nutrients, or other fluids with more comfort. You will not be poked with a needle every time. You can use the catheter right away. You will be shown how to use it and how to care for it. Your doctor will tell you how to care for the incision at the insertion site. (It's usually on the neck.) It may have stitches, strips of tape, or a gauze dressing. Your doctor will tell you when the stitches will be removed. The strips of tape will fall off in 3 to 5 days. The gauze dressing can be removed after 2 days. Your doctor will tell you how to care for the incision on your chest where the catheter is. It will likely have a clear or gauze dressing on it. A clear dressing usually needs to be changed about 2 days after the procedure and then once a week. A gauze dressing needs to be changed 2 or 3 times a week. Also, change the dressing right away if it becomes wet, loose, or dirty. There may be a small ring, or cuff, under the skin on the catheter. This helps hold the catheter in place. This care sheet gives you a general idea about how long it will take for you to recover. But each person recovers at a different pace. Follow the steps below to feel better as quickly as possible. How can you care for yourself at home? Activity    Talk to your doctor about what activities you can do. You may not be able to do sports or exercises that use the upper body, such as tennis or weight lifting. Avoid arm and upper body movements that may pull on the catheter. These movements include heavy weight lifting and vigorous use of your arms.      You will probably need to take 1 day off from work and will be able to return to normal activities shortly after. This depends on the type of work you do, why you have the catheter, and how you feel. Ask your doctor when you can drive again. Pay special attention when pulling your seat belt across your chest so it doesn't pull out the catheter. It's okay if the seat belt lays over the catheter. You may shower 24 to 48 hours after surgery, if your doctor okays it. Cover the area and catheter so they don't get wet. Pat the cut (incision) dry. Don't go swimming. Medicines    Your doctor will tell you if and when you can restart your medicines. He or she will also give you instructions about taking any new medicines. If you take aspirin or some other blood thinner, ask your doctor if and when to start taking it again. Make sure that you understand exactly what your doctor wants you to do. Take pain medicines exactly as directed. If the doctor gave you a prescription medicine for pain, take it as prescribed. If you are not taking a prescription pain medicine, ask your doctor if you can take an over-the-counter medicine. Do not take two or more pain medicines at the same time unless the doctor told you to. Many pain medicines have acetaminophen, which is Tylenol. Too much acetaminophen (Tylenol) can be harmful. If you think your pain medicine is making you sick to your stomach: Take your medicine after meals (unless your doctor has told you not to). Ask your doctor for a different pain medicine. Incision care    Your doctor will tell you how to care for the incision at the insertion site. (It's usually on your neck.) It may have stitches, strips of tape, or a gauze dressing. Your doctor will tell you when the stitches will be removed. The strips of tape will fall off in 3 to 5 days. The gauze dressing can be removed after 2 days. Your doctor will tell you how to care for the incision on your chest where the catheter is.  It will likely have a clear or gauze dressing on it. A clear dressing usually needs to be changed about 2 days after the procedure and then once a week. A gauze dressing needs to be changed 2 or 3 times a week. Also, change the dressing right away if it becomes wet, loose, or dirty. Other instructions    Go to all appointments to flush the line. This keeps it open. A nurse or other health professional will flush the line. Do not wear jewelry, such as necklaces, that can catch on the catheter. If the catheter breaks, follow the instructions your doctor gave you. If you have no instructions, clamp or tie off the catheter. Then, see a doctor as soon as possible. To help prevent infection, take a shower instead of a bath. Do not go swimming with the catheter. Try to keep the area dry. When you shower, cover the area with waterproof material, such as plastic wrap. Never touch the open end of the catheter if the cap is off. Never use scissors, knives, pins, or other sharp objects near the catheter or other tubing. If your catheter has a clamp, keep it clamped when you are not using it. Fasten or tape the catheter to your body to prevent pulling or dangling. Avoid clothing that rubs or pulls on your catheter. Avoid bending or crimping your catheter. Always wash your hands before you touch your catheter. Wear loose clothing over the catheter for the first 10 to 14 days. When getting dressed, be careful not to pull on the catheter. Follow-up care is a key part of your treatment and safety. Be sure to make and go to all appointments, and call your doctor if you are having problems. It's also a good idea to know your test results and keep a list of the medicines you take. When should you call for help? Call 911 anytime you think you may need emergency care. For example, call if:    You passed out (lost consciousness). You have severe trouble breathing.      You have sudden chest pain and shortness of breath, or you cough up blood. You have a fast or uneven pulse. Call your doctor now or seek immediate medical care if:    You have signs of infection, such as: Increased pain, swelling, warmth, or redness. Red streaks leading from the area. Pus or blood draining from the area. A fever. You have swelling in your face, chest, neck, or arm on the side where the catheter is. You have signs of a blood clot, such as bulging veins near the catheter. Your catheter is leaking, cracked, or clogged. You feel resistance when you inject medicine or fluids into your catheter. Your catheter is out of place. This may happen after severe coughing or vomiting, or if you pull on the catheter. You have chest pain or shortness of breath. Watch closely for changes in your health, and be sure to contact your doctor if:    You have any concerns about your catheter. Where can you learn more? Go to http://www.dang.com/  Enter D346 in the search box to learn more about \"Tunneled Catheter: What to Expect at Home. \"  Current as of: July 1, 2021               Content Version: 13.2  © 1587-2716 [employer]. Care instructions adapted under license by [employer] (which disclaims liability or warranty for this information). If you have questions about a medical condition or this instruction, always ask your healthcare professional. [address] any warranty or liability for your use of this information.       DISCHARGE SUMMARY from Nurse    PATIENT INSTRUCTIONS:    After general anesthesia or intravenous sedation, for 24 hours or while taking prescription Narcotics:  Limit your activities  Do not drive and operate hazardous machinery  Do not make important personal or business decisions  Do  not drink alcoholic beverages  If you have not urinated within 8 hours after discharge, please contact your surgeon on call.    Report the following to your surgeon:  Excessive pain, swelling, redness or odor of or around the surgical area  Temperature over 100.5  Nausea and vomiting lasting longer than 4 hours or if unable to take medications  Any signs of decreased circulation or nerve impairment to extremity: change in color, persistent  numbness, tingling, coldness or increase pain  Any questions    What to do at Home:  Recommended activity: Activity as tolerated and no driving for today. *  Please give a list of your current medications to your Primary Care Provider. *  Please update this list whenever your medications are discontinued, doses are      changed, or new medications (including over-the-counter products) are added. *  Please carry medication information at all times in case of emergency situations. These are general instructions for a healthy lifestyle:    No smoking/ No tobacco products/ Avoid exposure to second hand smoke  Surgeon General's Warning:  Quitting smoking now greatly reduces serious risk to your health. Obesity, smoking, and sedentary lifestyle greatly increases your risk for illness    A healthy diet, regular physical exercise & weight monitoring are important for maintaining a healthy lifestyle    You may be retaining fluid if you have a history of heart failure or if you experience any of the following symptoms:  Weight gain of 3 pounds or more overnight or 5 pounds in a week, increased swelling in our hands or feet or shortness of breath while lying flat in bed. Please call your doctor as soon as you notice any of these symptoms; do not wait until your next office visit. The discharge information has been reviewed with the patient. The patient verbalized understanding.   Discharge medications reviewed with the patient and appropriate educational materials and side effects teaching were provided.   ___________________________________________________________________________________________________________________________________

## 2022-10-07 NOTE — Clinical Note
TRANSFER - OUT REPORT:     Verbal report given to: Tiffani Perry RN. Report consisted of patient's Situation, Background, Assessment and   Recommendations(SBAR). Opportunity for questions and clarification was provided. Patient transported with a Cardiac Cath Tech / Patient Care Tech. Patient transported to: holding area.

## 2022-10-07 NOTE — Clinical Note
Contrast Dose Calculator:   Patient's age: 75.   Patient's sex: Male. Patient weight (kg) = 86. Creatinine level (mg/dL) = 4.6. Creatinine clearance (mL/min): 18.7. Max Contrast dose per Creatinine Cl calculator = 42.07 mL.

## 2022-10-07 NOTE — ROUTINE PROCESS
Patient ambulated from waiting area without difficulty, placed on monitor 4. A&O, no c/o. ID, NPO status, allergies, home meds verified. PIV x1 inserted, blood sent to lab. Consent ready for signature.

## 2022-10-07 NOTE — TELEPHONE ENCOUNTER
Spoke to April from [facility] to let the facility know that patient no showed for his procedure today. She will let the charge nurse know. auscultated w/ stethoscope

## 2022-10-07 NOTE — TELEPHONE ENCOUNTER
Spoke to [facility] where the patient lives. She stated that patient left at 730am and is aware of his procedure today. Patient is not yet back. She will call me once he gets back. Will call facility to give them an update.

## 2022-10-07 NOTE — Clinical Note
TRANSFER - IN REPORT:     Verbal report received from: Bell Looney RN. Report consisted of patient's Situation, Background, Assessment and   Recommendations(SBAR). Opportunity for questions and clarification was provided. Assessment completed upon patient's arrival to unit and care assumed. Patient transported with a Cardiac Cath Tech / Patient Care Tech.

## 2022-10-07 NOTE — PROGRESS NOTES
Got information from Christiano Dennison, practice manager that patient was at the waiting since 730am and approached that  to ask what time he was going to be seen for the procedure. CHARLIE Galeano informed patient to go to the emergency room. Contacted Dr. Sada Elias and stated that before he left he asked about the patient and stated that he no showed. Informed him that patient was there and waiting. Called Dr. Lacie Garcia if she can to a CVC exchange since patient has not dialyze since Wednesday. She was ok to do it. Called cath lab and spoke to [facility] and stated that there was no team to do assist and asked she the provider can do it while that patient is at the ED bedside. She stated that patient was there at 730am and was not in the schedule, they were able to put him in the schedule but the patient left and when he got back, patient did not let the front know that he was back. Informed Dr. Lacie Garcia and did not want to do it. Patient needed better treatment. Informed Christiano Dennison to call [facility] and she spoke to her and have a team assist. She was suppose to call back.

## 2022-10-07 NOTE — ED TRIAGE NOTES
76 yo M to ED for dialysis catheter issue.  Reports he was at [facility] and told to come here

## 2022-10-07 NOTE — ROUTINE PROCESS
AVS Discharge instructions reviewed with patient, all questions answered. Patient verbalized understanding, copy given. Procedural site within normal limits, PIV removed. No hematoma or bleeding noted from procedural or IV site. Patient denied complaints.  notified that pt had no ride home, when paged at d/c time no return call was received. Pt stated he lives nearby and would prefer to walk home rather than wait for a ride. He did not receive any sedation.  Escorted pt to hospital entrance in stable condition

## 2022-10-07 NOTE — ROUTINE PROCESS
TRANSFER - IN REPORT:    Verbal report received from Irene Shi (name) on Quin Esparza  being received from [address] [place] [address] ([facility]) for routine post - op      Report consisted of patients Situation, Background, Assessment and   Recommendations(SBAR). Information from the following report(s) SBAR, Procedure Summary, and MAR was reviewed with the receiving nurse. Opportunity for questions and clarification was provided. Assessment completed upon patients arrival to unit and care assumed.

## 2022-11-07 ENCOUNTER — HOSPITAL ENCOUNTER (EMERGENCY)
Age: 67
Discharge: HOME | End: 2022-11-07
Attending: EMERGENCY MEDICINE
Payer: COMMERCIAL

## 2022-11-07 VITALS
TEMPERATURE: 98.1 F | HEART RATE: 69 BPM | WEIGHT: 190 LBS | DIASTOLIC BLOOD PRESSURE: 86 MMHG | BODY MASS INDEX: 25.73 KG/M2 | SYSTOLIC BLOOD PRESSURE: 156 MMHG | OXYGEN SATURATION: 97 % | HEIGHT: 72 IN | RESPIRATION RATE: 18 BRPM

## 2022-11-07 DIAGNOSIS — E11.9 TYPE 2 DIABETES MELLITUS WITH INSULIN THERAPY (HCC): Primary | ICD-10-CM

## 2022-11-07 DIAGNOSIS — Z79.4 TYPE 2 DIABETES MELLITUS WITH INSULIN THERAPY (HCC): Primary | ICD-10-CM

## 2022-11-07 DIAGNOSIS — I10 HYPERTENSION, UNSPECIFIED TYPE: ICD-10-CM

## 2022-11-07 LAB
ALBUMIN SERPL-MCNC: 3.3 G/DL (ref 3.4–5)
ALBUMIN/GLOB SERPL: 0.8 {RATIO} (ref 0.8–1.7)
ALP SERPL-CCNC: 150 U/L (ref 45–117)
ALT SERPL-CCNC: 18 U/L (ref 16–61)
ANION GAP SERPL CALC-SCNC: 5 MMOL/L (ref 3–18)
AST SERPL-CCNC: 17 U/L (ref 10–38)
BASOPHILS # BLD: 0.1 K/UL (ref 0–0.1)
BASOPHILS NFR BLD: 1 % (ref 0–2)
BILIRUB SERPL-MCNC: 0.3 MG/DL (ref 0.2–1)
BUN SERPL-MCNC: 35 MG/DL (ref 7–18)
BUN/CREAT SERPL: 8 (ref 12–20)
CALCIUM SERPL-MCNC: 8.6 MG/DL (ref 8.5–10.1)
CHLORIDE SERPL-SCNC: 102 MMOL/L (ref 100–111)
CO2 SERPL-SCNC: 29 MMOL/L (ref 21–32)
CREAT SERPL-MCNC: 4.59 MG/DL (ref 0.6–1.3)
DIFFERENTIAL METHOD BLD: ABNORMAL
EOSINOPHIL # BLD: 0.6 K/UL (ref 0–0.4)
EOSINOPHIL NFR BLD: 8 % (ref 0–5)
ERYTHROCYTE [DISTWIDTH] IN BLOOD BY AUTOMATED COUNT: 12.8 % (ref 11.6–14.5)
GLOBULIN SER CALC-MCNC: 4.1 G/DL (ref 2–4)
GLUCOSE SERPL-MCNC: 323 MG/DL (ref 74–99)
HCT VFR BLD AUTO: 32.1 % (ref 36–48)
HGB BLD-MCNC: 11.1 G/DL (ref 13–16)
IMM GRANULOCYTES # BLD AUTO: 0 K/UL (ref 0–0.04)
IMM GRANULOCYTES NFR BLD AUTO: 0 % (ref 0–0.5)
LYMPHOCYTES # BLD: 2.8 K/UL (ref 0.9–3.6)
LYMPHOCYTES NFR BLD: 33 % (ref 21–52)
MCH RBC QN AUTO: 30.9 PG (ref 24–34)
MCHC RBC AUTO-ENTMCNC: 34.6 G/DL (ref 31–37)
MCV RBC AUTO: 89.4 FL (ref 78–100)
MONOCYTES # BLD: 0.8 K/UL (ref 0.05–1.2)
MONOCYTES NFR BLD: 10 % (ref 3–10)
NEUTS SEG # BLD: 4.1 K/UL (ref 1.8–8)
NEUTS SEG NFR BLD: 48 % (ref 40–73)
NRBC # BLD: 0 K/UL (ref 0–0.01)
NRBC BLD-RTO: 0 PER 100 WBC
PLATELET # BLD AUTO: 239 K/UL (ref 135–420)
PMV BLD AUTO: 10.4 FL (ref 9.2–11.8)
POTASSIUM SERPL-SCNC: 4.3 MMOL/L (ref 3.5–5.5)
PROT SERPL-MCNC: 7.4 G/DL (ref 6.4–8.2)
RBC # BLD AUTO: 3.59 M/UL (ref 4.35–5.65)
SODIUM SERPL-SCNC: 136 MMOL/L (ref 136–145)
WBC # BLD AUTO: 8.4 K/UL (ref 4.6–13.2)

## 2022-11-07 PROCEDURE — 85025 COMPLETE CBC W/AUTO DIFF WBC: CPT

## 2022-11-07 PROCEDURE — 80053 COMPREHEN METABOLIC PANEL: CPT

## 2022-11-07 PROCEDURE — 99283 EMERGENCY DEPT VISIT LOW MDM: CPT

## 2022-11-07 RX ORDER — LOSARTAN POTASSIUM 100 MG/1
100 TABLET ORAL DAILY
Qty: 30 TABLET | Refills: 0 | Status: SHIPPED | OUTPATIENT
Start: 2022-11-07 | End: 2022-12-07

## 2022-11-07 RX ORDER — LANCETS 28 GAUGE
EACH MISCELLANEOUS
Qty: 100 LANCET | Refills: 0 | Status: SHIPPED | OUTPATIENT
Start: 2022-11-07

## 2022-11-07 RX ORDER — GUAIFENESIN 100 MG/5ML
81 LIQUID (ML) ORAL DAILY
Qty: 30 TABLET | Refills: 0 | Status: SHIPPED | OUTPATIENT
Start: 2022-11-07 | End: 2022-12-07

## 2022-11-07 RX ORDER — RENO CAPS 100; 1.5; 1.7; 20; 10; 1; 150; 5; 6 MG/1; MG/1; MG/1; MG/1; MG/1; MG/1; UG/1; MG/1; UG/1
1 CAPSULE ORAL DAILY
Qty: 30 CAPSULE | Refills: 0 | Status: SHIPPED | OUTPATIENT
Start: 2022-11-07 | End: 2022-12-07

## 2022-11-07 RX ORDER — TAMSULOSIN HYDROCHLORIDE 0.4 MG/1
0.4 CAPSULE ORAL DAILY
Qty: 30 CAPSULE | Refills: 0 | Status: SHIPPED | OUTPATIENT
Start: 2022-11-07 | End: 2022-12-07

## 2022-11-07 NOTE — ED PROVIDER NOTES
[facility]  Emergency Department Treatment Report        Patient: Genesis Keenan Age: 75 y.o. Sex: male    YOB: 1955 Admit Date: 11/7/2022 PCP: Damián Romo MD   MRN: [id number]  CSN: [id number]     Room: [place] [address] Time Dictated: 4:47 PM      Chief Complaint   Chief Complaint   Patient presents with    Hypertension       History of Present Illness   75 y.o. male history of diabetes, hypertension, CKD on dialysis presents for high blood pressure and being out of meds. Patient ran out of meds this morning but was able to take final doses. Went to urgent care to get refill of medications. Told blood pressure was 200 and blood sugar was 300s had to go to emergency room. Patient denies any symptoms. States he feels at his baseline level of health. Review of Systems   Review of Systems   Constitutional:  Negative for fever and malaise/fatigue. HENT:  Negative for sinus pain and sore throat. Eyes:  Negative for blurred vision and photophobia. Respiratory:  Negative for cough and shortness of breath. Cardiovascular:  Negative for chest pain and leg swelling. Gastrointestinal:  Negative for abdominal pain and vomiting. Genitourinary:  Negative for dysuria and urgency. Musculoskeletal:  Negative for back pain and falls. Skin:  Negative for itching and rash. Neurological:  Negative for weakness and headaches. Past Medical/Surgical History     Past Medical History:   Diagnosis Date    Cardiac LV ejection fraction 30-35% 07/15/2015    Cardiomyopathy ([facility] 07/15/2015    35% per ECHO    Chronic kidney disease     Cocaine use 08/09/2014    + UDS, persistent use    Diabetes ([facility] 08/09/2014    8.1 hgbA1C 8/9/14    Heart failure ([facility]     Hepatitis C infection 08/20/2014    Genotype 1A    History of cocaine abuse ([facility] 08/10/2016    History of heroin abuse ([facility] 08/10/2016    Homelessness 09/07/2015    Hypertension 2000      No past surgical history on file.     Social History Social History     Socioeconomic History    Marital status: SINGLE     Spouse name: Not on file    Number of children: Not on file    Years of education: Not on file    Highest education level: Not on file   Occupational History    Not on file   Tobacco Use    Smoking status: Former     Packs/day: 0.30     Types: Cigarettes     Quit date: 2016     Years since quittin.3    Smokeless tobacco: Never   Substance and Sexual Activity    Alcohol use: Not Currently     Alcohol/week: 0.0 standard drinks     Comment: started heavy drinking at teen till in Aug 2014 but has gone into remission since then    Drug use: No     Types: Cocaine, Marijuana, Heroin     Comment: 2 x heroin experimental, smoked Cocaine with last 7/27/15    Sexual activity: Never     Partners: Female   Other Topics Concern     Service No    Blood Transfusions No    Caffeine Concern Yes    Occupational Exposure No    Hobby Hazards No    Sleep Concern No    Stress Concern No    Weight Concern No    Special Diet No    Back Care No    Exercise No    Bike Helmet No    Seat Belt Yes    Self-Exams Not Asked   Social History Narrative    Not on file     Social Determinants of Health     Financial Resource Strain: Not on file   Food Insecurity: Not on file   Transportation Needs: Not on file   Physical Activity: Not on file   Stress: Not on file   Social Connections: Not on file   Intimate Partner Violence: Not on file   Housing Stability: Not on file        Family History     Family History   Problem Relation Age of Onset    Liver Disease Father     Diabetes Mother     Cancer Maternal Grandmother     No Known Problems Sister     Diabetes Brother     No Known Problems Brother     No Known Problems Sister     No Known Problems Sister         Current Medications     Prior to Admission Medications   Prescriptions Last Dose Informant Patient Reported? Taking?    Insulin Needles, Disposable, 31 gauge x \" ndle   No No   Sig: UAD   Syringe-Needle, Safety,Disp Un 3 mL 25 gauge x 5/8\" syrg   No No   Sig: UAD   insulin glargine (Lantus U-100 Insulin) 100 unit/mL injection   No No   Sig: Inject 10 mL SQ QHS      Facility-Administered Medications: None        Allergies     Allergies   Allergen Reactions    Tylenol [Acetaminophen] Other (comments)     Pt was advised not to take Tylenol per        Physical Exam     ED Triage Vitals [11/07/22 1406]   ED Encounter Vitals Group      BP (!) 156/86      Pulse (Heart Rate) 69      Resp Rate 18      Temp 98.1 °F (36.7 °C)      Temp src       O2 Sat (%) 97 %      Weight 191 lb      Height 6'        Physical Exam  Vitals and nursing note reviewed. Constitutional:       Appearance: Normal appearance. He is normal weight. HENT:      Head: Normocephalic and atraumatic. Mouth/Throat:      Mouth: Mucous membranes are moist.      Pharynx: Oropharynx is clear. Eyes:      Extraocular Movements: Extraocular movements intact. Conjunctiva/sclera: Conjunctivae normal.   Cardiovascular:      Rate and Rhythm: Normal rate and regular rhythm. Pulmonary:      Effort: Pulmonary effort is normal.      Breath sounds: Normal breath sounds. Abdominal:      General: Abdomen is flat. Bowel sounds are normal.      Palpations: Abdomen is soft. Skin:     General: Skin is warm and dry. Neurological:      General: No focal deficit present. Mental Status: He is alert and oriented to person, place, and time. Mental status is at baseline. Psychiatric:         Mood and Affect: Mood normal.         Behavior: Behavior normal.         Thought Content: Thought content normal.         Judgment: Judgment normal.      Impression and Management Plan   69-year-old male here for refill of medications with reported high systolic pressure and high blood sugar at urgent care.     DDX hypertensive emergency, ACS, dissection, AAA, PE  Diagnostic Studies   Lab:   Recent Results (from the past 12 hour(s))   CBC WITH AUTOMATED DIFF Collection Time: 11/07/22  3:00 PM   Result Value Ref Range    WBC 8.4 4.6 - 13.2 K/uL    RBC 3.59 (L) 4.35 - 5.65 M/uL    HGB 11.1 (L) 13.0 - 16.0 g/dL    HCT 32.1 (L) 36.0 - 48.0 %    MCV 89.4 78.0 - 100.0 FL    MCH 30.9 24.0 - 34.0 PG    MCHC 34.6 31.0 - 37.0 g/dL    RDW 12.8 11.6 - 14.5 %    PLATELET 102 427 - 802 K/uL    MPV 10.4 9.2 - 11.8 FL    NRBC 0.0 0  WBC    ABSOLUTE NRBC 0.00 0.00 - 0.01 K/uL    NEUTROPHILS 48 40 - 73 %    LYMPHOCYTES 33 21 - 52 %    MONOCYTES 10 3 - 10 %    EOSINOPHILS 8 (H) 0 - 5 %    BASOPHILS 1 0 - 2 %    IMMATURE GRANULOCYTES 0 0.0 - 0.5 %    ABS. NEUTROPHILS 4.1 1.8 - 8.0 K/UL    ABS. LYMPHOCYTES 2.8 0.9 - 3.6 K/UL    ABS. MONOCYTES 0.8 0.05 - 1.2 K/UL    ABS. EOSINOPHILS 0.6 (H) 0.0 - 0.4 K/UL    ABS. BASOPHILS 0.1 0.0 - 0.1 K/UL    ABS. IMM. GRANS. 0.0 0.00 - 0.04 K/UL    DF AUTOMATED     METABOLIC PANEL, COMPREHENSIVE    Collection Time: 11/07/22  3:00 PM   Result Value Ref Range    Sodium 136 136 - 145 mmol/L    Potassium 4.3 3.5 - 5.5 mmol/L    Chloride 102 100 - 111 mmol/L    CO2 29 21 - 32 mmol/L    Anion gap 5 3.0 - 18 mmol/L    Glucose 323 (H) 74 - 99 mg/dL    BUN 35 (H) 7.0 - 18 MG/DL    Creatinine 4.59 (H) 0.6 - 1.3 MG/DL    BUN/Creatinine ratio 8 (L) 12 - 20      eGFR 13 (L) >60 ml/min/1.73m2    Calcium 8.6 8.5 - 10.1 MG/DL    Bilirubin, total 0.3 0.2 - 1.0 MG/DL    ALT (SGPT) 18 16 - 61 U/L    AST (SGOT) 17 10 - 38 U/L    Alk. phosphatase 150 (H) 45 - 117 U/L    Protein, total 7.4 6.4 - 8.2 g/dL    Albumin 3.3 (L) 3.4 - 5.0 g/dL    Globulin 4.1 (H) 2.0 - 4.0 g/dL    A-G Ratio 0.8 0.8 - 1.7       Labs Reviewed   CBC WITH AUTOMATED DIFF - Abnormal; Notable for the following components:       Result Value    RBC 3.59 (*)     HGB 11.1 (*)     HCT 32.1 (*)     EOSINOPHILS 8 (*)     ABS.  EOSINOPHILS 0.6 (*)     All other components within normal limits   METABOLIC PANEL, COMPREHENSIVE - Abnormal; Notable for the following components:    Glucose 323 (*)     BUN 35 (*) Creatinine 4.59 (*)     BUN/Creatinine ratio 8 (*)     eGFR 13 (*)     Alk. phosphatase 150 (*)     Albumin 3.3 (*)     Globulin 4.1 (*)     All other components within normal limits       Imaging:    No results found. ED Course/MDM   I reviewed the patients vitals signs which showed hypertensive to 156 but otherwise reassuring      Patient with no complaints. Reassuring physical exam.  Baseline labs checked with no evidence of endorgan damage acute on known CKD. Provided prescriptions for all of patient's medications. Counseled patient on appropriate follow-up with a PCM for further medication prescriptions. Counseled patient on findings, treatment, follow-up, return precautions. Patient stated understanding. Patient stable for discharge. Procedures      Final Diagnosis       ICD-10-CM ICD-9-CM   1. Type 2 diabetes mellitus with insulin therapy (HCC)  E11.9 250.00    Z79.4 V58.67   2. Hypertension, unspecified type  I10 401.9       Disposition   Discharge      The patient was personally evaluated by myself and Dr. Di Lau who agrees with the above assessment and plan. Adis Schofield MD  [facility]  November 7, 2022    My signature above authenticates this document and my orders, the final    diagnosis (es), discharge prescription (s), and instructions in the Epic    record. If you have any questions please contact [phone]. Nursing notes have been reviewed by the physician/ advanced practice    Clinician. Dragon medical dictation software was used for portions of this report. Unintended voice recognition errors may occur. I reviewed with the resident the medical history and the resident's findings on the physical examination. I discussed with the resident the patient's diagnosis and concur with the plan.

## 2022-11-07 NOTE — ED TRIAGE NOTES
Patient arrives to ED via EMS from a community clinic for high blood pressure.  Patient reports he is on dialysis and was there for a physical and was out of blood pressure medication

## 2022-11-30 ENCOUNTER — OFFICE VISIT (OUTPATIENT)
Dept: VASCULAR SURGERY | Age: 67
End: 2022-11-30

## 2022-11-30 VITALS
SYSTOLIC BLOOD PRESSURE: 110 MMHG | DIASTOLIC BLOOD PRESSURE: 80 MMHG | WEIGHT: 192 LBS | HEIGHT: 72 IN | BODY MASS INDEX: 26.01 KG/M2 | OXYGEN SATURATION: 99 % | HEART RATE: 75 BPM

## 2022-11-30 DIAGNOSIS — N18.6 ESRD (END STAGE RENAL DISEASE) (HCC): Primary | ICD-10-CM

## 2022-11-30 DIAGNOSIS — N18.4 CKD (CHRONIC KIDNEY DISEASE) STAGE 4, GFR 15-29 ML/MIN (HCC): ICD-10-CM

## 2022-11-30 RX ORDER — AMLODIPINE BESYLATE 10 MG/1
10 TABLET ORAL DAILY
COMMUNITY
Start: 2022-11-22

## 2022-11-30 NOTE — PROGRESS NOTES
11/30/22        Ana Collins        History and Physical    Ana Collins is a 75 y.o. male seen for establishing permanent hemodialysis access. Patient initiated dialysis in August 2022 and since has a tunneled hemodialysis catheter in place. Patient had bilateral upper extremity vein mapping performed and is left hand dominant. Cephalic veins in the forearm are adequate for creating an autologous right arm brachiocephalic arteriovenous fistula. Patient has had prior cocaine use with positive urine drug screen. Patient has assured that was the last time he has used cocaine and there is a zero possibility of a positive drug screen. Plan for creation of right arm brachiocephalic AV fistula  Will obtain UDS on the day of surgery      Physical Exam:    Visit Vitals  /80   Pulse 75   Ht 6' (1.829 m)   Wt 192 lb (87.1 kg)   SpO2 99%   BMI 26.04 kg/m²      HEENT-PERRLA exactly movements intact, no scleral icterus, mucous membranes pink and moist  Neck-no JVD, no carotid bruits  Heart-regular rate and rhythm no murmurs, rubs or gallops  Chest-clear to auscultation bilaterally no wheezes, rhonchi or rubs  Abdomen-soft, nontender, no palpable organomegaly or masses, no palpable pulsatile masses  Extremities-no clubbing, cyanosis or edema. No wounds or gangrenous changes to the toes or feet. Skin is intact. Feet are pink warm and well-perfused bilaterally  Pulses-carotid, radial, brachial, femoral 2+ bilaterally.  Bilateral doppler signals dorsalis pedis, posterior tibial       Past Medical History:   Diagnosis Date    Cardiac LV ejection fraction 30-35% 07/15/2015    Cardiomyopathy ([facility] 07/15/2015    35% per ECHO    Chronic kidney disease     Cocaine use 08/09/2014    + UDS, persistent use    Diabetes ([facility] 08/09/2014    8.1 hgbA1C 8/9/14    Heart failure ([facility]     Hepatitis C infection 08/20/2014    Genotype 1A    History of cocaine abuse ([facility] 08/10/2016    History of heroin abuse ([facility] 08/10/2016 Homelessness 09/07/2015    Hypertension 2000     No past surgical history on file. Patient Active Problem List   Diagnosis Code    Stage 3 chronic renal impairment associated with type 2 diabetes mellitus ([facility]) E11.22, N18.30    Hepatitis C infection B19.20    Type 2 diabetes mellitus with insulin therapy ([facility] E11.9, Z79.4    Homelessness Z59.00    CHF (congestive heart failure) ([facility]) I50.9    Cardiomyopathy ([facility] I42.9    COPD, mild ([facility] J44.9    Insulin dependent type 2 diabetes mellitus, controlled ([facility]) E11.9, Z79.4    NSTEMI (non-ST elevated myocardial infarction) ([facility] L12.0    Systolic CHF, chronic ([facility]) I50.22    Chronic renal insufficiency N18.9    History of cocaine abuse ([facility] F14.11    History of heroin abuse ([facility]) F11.11    Decreased GFR R94.4    Elevated HDL E78.89    Hypertriglyceridemia E78.1    Elevated serum creatinine R79.89    Elevated alkaline phosphatase level R74.8    TIA (transient ischemic attack) G45.9    CVA (cerebral vascular accident) ([facility] I63.9    Cardiac LV ejection fraction 30-35% R94.30    Cocaine use F14.90    Hypertension I10    ESRD (end stage renal disease) ([facility]) N18.6    Substance abuse ([facility]) F19.10    Anemia D64.9    End stage renal disease ([facility]) N18.6    Pericardial effusion I31.39    Hyperglycemia R73.9    CKD (chronic kidney disease) stage 5, GFR less than 15 ml/min ([facility]) N18.5     Current Outpatient Medications   Medication Sig Dispense Refill    amLODIPine (NORVASC) 10 mg tablet Take 10 mg by mouth daily. losartan (COZAAR) 100 mg tablet Take 1 Tablet by mouth daily for 30 days. 30 Tablet 0    aspirin 81 mg chewable tablet Take 1 Tablet by mouth daily for 30 days. 30 Tablet 0    tamsulosin (Flomax) 0.4 mg capsule Take 1 Capsule by mouth daily for 30 days. 30 Capsule 0    b complex-vitamin c-folic acid (Satish Caps) 1 mg capsule Take 1 Capsule by mouth daily for 30 days.  30 Capsule 0    lancets 28 gauge misc Use as directed 100 Lancet 0    Insulin Needles, Disposable, 31 gauge x \" ndle UAD 1 Each 11    insulin glargine (Lantus U-100 Insulin) 100 unit/mL injection Inject 10 mL SQ QHS 1 mL 0    Syringe-Needle, Safety,Disp Un 3 mL 25 gauge x \" syrg UAD 1 Pen Needle 0     Allergies   Allergen Reactions    Tylenol [Acetaminophen] Other (comments)     Pt was advised not to take Tylenol per Dr. Liana Aggarwal History     Socioeconomic History    Marital status: SINGLE     Spouse name: Not on file    Number of children: Not on file    Years of education: Not on file    Highest education level: Not on file   Occupational History    Not on file   Tobacco Use    Smoking status: Former     Packs/day: 0.30     Types: Cigarettes     Quit date: 2016     Years since quittin.4    Smokeless tobacco: Never   Substance and Sexual Activity    Alcohol use: Not Currently     Alcohol/week: 0.0 standard drinks     Comment: started heavy drinking at teen till in Aug 2014 but has gone into remission since then    Drug use: No     Types: Cocaine, Marijuana, Heroin     Comment: 2 x heroin experimental, smoked Cocaine with last 7/27/15    Sexual activity: Never     Partners: Female   Other Topics Concern     Service No    Blood Transfusions No    Caffeine Concern Yes    Occupational Exposure No    Hobby Hazards No    Sleep Concern No    Stress Concern No    Weight Concern No    Special Diet No    Back Care No    Exercise No    Bike Helmet No    Seat Belt Yes    Self-Exams Not Asked   Social History Narrative    Not on file     Social Determinants of Health     Financial Resource Strain: Not on file   Food Insecurity: Not on file   Transportation Needs: Not on file   Physical Activity: Not on file   Stress: Not on file   Social Connections: Not on file   Intimate Partner Violence: Not on file   Housing Stability: Not on file     Family History   Problem Relation Age of Onset    Liver Disease Father     Diabetes Mother     Cancer Maternal Grandmother     No Known Problems Sister     Diabetes Brother     No Known Problems Brother     No Known Problems Sister     No Known Problems Sister          We reviewed the plan with the patient and the patient understands.         Jose Antonio Gallo MD

## 2022-11-30 NOTE — PROGRESS NOTES
1. Have you been to the ER, urgent care clinic since your last visit? No Hospitalized since your last visit? No    2. Have you seen or consulted any other health care providers outside of the [address] [place] [address] since your last visit? Include any pap smears or colon screening.  No

## 2022-12-29 NOTE — PERIOP NOTES
PRE-SURGICAL INSTRUCTIONS        Patient's Name:  Zaida Monahan      Today's Date:  12/29/2022  Surgery Date:  1/13/2023                Do NOT eat or drink anything, including candy, gum, or ice chips after midnight on 1/12/2023, unless you have specific instructions from your surgeon or anesthesia provider to do so. You may brush your teeth before coming to the hospital.  No smoking 24 hours prior to the day of surgery. No alcohol 24 hours prior to the day of surgery. No recreational drugs for one week prior to the day of surgery. Leave all valuables, including money/purse, at home. Remove all jewelry, nail polish, acrylic nails, and makeup (including mascara); no lotions powders, deodorant, or perfume/cologne/after shave on the skin. Follow instruction for Hibiclens washes and CHG wipes from surgeon's office. Glasses/contact lenses and dentures may be worn to the hospital.  They will be removed prior to surgery. Call your doctor if symptoms of a cold or illness develop within 24-48 hours prior to your surgery. 11.  If you are having an outpatient procedure, please make arrangements for a responsible ADULT TO [address] [place] [address] and stay with you for 24 hours after your surgery. 12. ONE VISITOR in the hospital at this time for outpatient procedures. Exceptions may be made for surgical admissions, per nursing unit guidelines      Special Instructions:      Bring list of CURRENT medications. Bring covid vaccination card  Bring any pertinent legal medical records. Take these medications the morning of surgery with a sip of water:  Blood Pressure medication as directed by physician  Follow physician instructions about insulin. Follow physician instructions about stopping anticoagulants. On the day of surgery, come in the main entrance of [facility]. Let the  at the desk know you are there for surgery.   A staff member will come escort you to the surgical area on the second floor. If you have any questions or concerns, please do not hesitate to call:     (Prior to the day of surgery) PAT department:  [phone]   (Day of surgery) Pre-Op department:  [phone]    These surgical instructions were reviewed with patient during the PAT phone call.

## 2023-01-11 ENCOUNTER — TELEPHONE (OUTPATIENT)
Dept: VASCULAR SURGERY | Age: 68
End: 2023-01-11

## 2023-01-11 NOTE — TELEPHONE ENCOUNTER
Called and left message. Reminding patient about his procedure on Friday, 01/13/2023 with Dr. Alfredo Salas

## 2023-01-12 ENCOUNTER — TELEPHONE (OUTPATIENT)
Dept: VASCULAR SURGERY | Age: 68
End: 2023-01-12

## 2023-01-12 RX ORDER — SODIUM CHLORIDE, SODIUM LACTATE, POTASSIUM CHLORIDE, CALCIUM CHLORIDE 600; 310; 30; 20 MG/100ML; MG/100ML; MG/100ML; MG/100ML
25 INJECTION, SOLUTION INTRAVENOUS CONTINUOUS
Status: CANCELLED | OUTPATIENT
Start: 2023-01-12 | End: 2023-01-13

## 2023-01-12 NOTE — TELEPHONE ENCOUNTER
Called and spoke to patient today, Thursday, 01/12/2023 at 805am to remind patient about his procedure tomorrow, 01/13/2023 at 8am with a check in time of 6am [facility]. Patient is on Aspirin and instructed to continue taking,also instructed to take his heart and blood pressure medication with a sip of water in the AM, NPO MN, he set up transportation to drop him off and pick him up. Patient confirmed procedure.

## 2023-01-12 NOTE — TELEPHONE ENCOUNTER
Patient called back and stated that he is not able to go to [facility] for drug test. Informed him that time change and new check in will be at 8am and they will do drug test and labs prior to procedure. Patient agreed and confirmed.

## 2023-01-13 ENCOUNTER — HOSPITAL ENCOUNTER (OUTPATIENT)
Age: 68
Setting detail: OUTPATIENT SURGERY
Discharge: HOME | End: 2023-01-13
Attending: STUDENT IN AN ORGANIZED HEALTH CARE EDUCATION/TRAINING PROGRAM | Admitting: STUDENT IN AN ORGANIZED HEALTH CARE EDUCATION/TRAINING PROGRAM
Payer: COMMERCIAL

## 2023-01-13 VITALS
TEMPERATURE: 98.5 F | WEIGHT: 191 LBS | OXYGEN SATURATION: 100 % | HEART RATE: 79 BPM | HEIGHT: 72 IN | BODY MASS INDEX: 25.87 KG/M2 | SYSTOLIC BLOOD PRESSURE: 174 MMHG | RESPIRATION RATE: 20 BRPM | DIASTOLIC BLOOD PRESSURE: 86 MMHG

## 2023-01-13 LAB
AMPHET UR QL SCN: NEGATIVE
ANION GAP SERPL CALC-SCNC: 9 MMOL/L (ref 3–18)
BARBITURATES UR QL SCN: NEGATIVE
BENZODIAZ UR QL: NEGATIVE
BUN SERPL-MCNC: 44 MG/DL (ref 7–18)
BUN/CREAT SERPL: 10 (ref 12–20)
CALCIUM SERPL-MCNC: 8.7 MG/DL (ref 8.5–10.1)
CANNABINOIDS UR QL SCN: NEGATIVE
CHLORIDE SERPL-SCNC: 103 MMOL/L (ref 100–111)
CO2 SERPL-SCNC: 27 MMOL/L (ref 21–32)
COCAINE UR QL SCN: NEGATIVE
CREAT SERPL-MCNC: 4.24 MG/DL (ref 0.6–1.3)
GLUCOSE BLD STRIP.AUTO-MCNC: 109 MG/DL (ref 70–110)
GLUCOSE SERPL-MCNC: 118 MG/DL (ref 74–99)
HBA1C MFR BLD: 9.5 % (ref 4.2–5.6)
HDSCOM,HDSCOM: NORMAL
METHADONE UR QL: NEGATIVE
OPIATES UR QL: NEGATIVE
PCP UR QL: NEGATIVE
POTASSIUM SERPL-SCNC: 4.1 MMOL/L (ref 3.5–5.5)
SODIUM SERPL-SCNC: 139 MMOL/L (ref 136–145)

## 2023-01-13 PROCEDURE — 74011250636 HC RX REV CODE- 250/636: Performed by: NURSE ANESTHETIST, CERTIFIED REGISTERED

## 2023-01-13 PROCEDURE — 74011250637 HC RX REV CODE- 250/637: Performed by: NURSE ANESTHETIST, CERTIFIED REGISTERED

## 2023-01-13 PROCEDURE — 83036 HEMOGLOBIN GLYCOSYLATED A1C: CPT

## 2023-01-13 PROCEDURE — 80307 DRUG TEST PRSMV CHEM ANLYZR: CPT

## 2023-01-13 PROCEDURE — 82962 GLUCOSE BLOOD TEST: CPT

## 2023-01-13 PROCEDURE — 80048 BASIC METABOLIC PNL TOTAL CA: CPT

## 2023-01-13 RX ORDER — LIDOCAINE HYDROCHLORIDE 10 MG/ML
0.1 INJECTION, SOLUTION EPIDURAL; INFILTRATION; INTRACAUDAL; PERINEURAL AS NEEDED
Status: DISCONTINUED | OUTPATIENT
Start: 2023-01-13 | End: 2023-01-13 | Stop reason: HOSPADM

## 2023-01-13 RX ORDER — SODIUM CHLORIDE 9 MG/ML
25 INJECTION, SOLUTION INTRAVENOUS CONTINUOUS
Status: DISCONTINUED | OUTPATIENT
Start: 2023-01-13 | End: 2023-01-13 | Stop reason: HOSPADM

## 2023-01-13 RX ORDER — FAMOTIDINE 20 MG/1
20 TABLET, FILM COATED ORAL ONCE
Status: COMPLETED | OUTPATIENT
Start: 2023-01-13 | End: 2023-01-13

## 2023-01-13 RX ORDER — INSULIN LISPRO 100 [IU]/ML
INJECTION, SOLUTION INTRAVENOUS; SUBCUTANEOUS ONCE
Status: DISCONTINUED | OUTPATIENT
Start: 2023-01-13 | End: 2023-01-13 | Stop reason: HOSPADM

## 2023-01-13 RX ADMIN — FAMOTIDINE 20 MG: 20 TABLET, FILM COATED ORAL at 08:34

## 2023-01-13 RX ADMIN — SODIUM CHLORIDE 25 ML/HR: 9 INJECTION, SOLUTION INTRAVENOUS at 08:34

## 2023-01-13 NOTE — H&P
Vascular Surgery      Patient: Leslie Alonzo MRN: [id number]  CSN: [id number]      YOB: 1955    Age: 75 y.o. Sex: male      DOA: 2023       HPI:     Leslie Alonzo is a 75 y.o. male who presents for right arm brachiocephalic arteriovenous fistula creation. Patient is left hand dominant. Patient has prior substance abuse and drug screen has been negative today.      Past Medical History:   Diagnosis Date    Brain lesion 2018    MRI from 2018 shows an irregular lesion within the fourth ventricle and foramen of Magendie    Cardiac LV ejection fraction 30-35% 07/15/2015    Cardiomyopathy ([facility] 07/15/2015    35% per ECHO    Chronic kidney disease     Cocaine use 2014    + UDS, persistent use    Diabetes ([facility] 2014    8.1 hgbA1C 14    Heart failure ([facility]     Hepatitis C infection 2014    Genotype 1A    History of cocaine abuse ([facility] 08/10/2016    History of heroin abuse ([facility] 08/10/2016    Homelessness 2015    Hypertension 2000    Stroke ([facility] 2018    Cerebrovascular accident (CVA), unspecified mechanism       Past Surgical History:   Procedure Laterality Date    HX VASCULAR ACCESS Right 2022    [facility]    HX VASCULAR ACCESS Right 10/2022    [facility]       Family History   Problem Relation Age of Onset    Liver Disease Father     Diabetes Mother     Cancer Maternal Grandmother     No Known Problems Sister     Diabetes Brother     No Known Problems Brother     No Known Problems Sister     No Known Problems Sister        Social History     Socioeconomic History    Marital status: SINGLE   Tobacco Use    Smoking status: Former     Packs/day: 0.30     Types: Cigarettes     Quit date: 2016     Years since quittin.5    Smokeless tobacco: Never   Vaping Use    Vaping Use: Never used   Substance and Sexual Activity    Alcohol use: Not Currently     Alcohol/week: 0.0 standard drinks     Comment: started heavy drinking at teen till in Aug 2014 but has gone into remission since then    Drug use: Not Currently     Types: Cocaine, Marijuana, Heroin     Comment: 2 x heroin experimental, smoked Cocaine with last 7/27/15    Sexual activity: Never     Partners: Female   Other Topics Concern     Service No    Blood Transfusions No    Caffeine Concern Yes    Occupational Exposure No    Hobby Hazards No    Sleep Concern No    Stress Concern No    Weight Concern No    Special Diet No    Back Care No    Exercise No    Bike Helmet No    Seat Belt Yes       Prior to Admission medications    Medication Sig Start Date End Date Taking? Authorizing Provider   amLODIPine (NORVASC) 10 mg tablet Take 10 mg by mouth daily. 11/22/22  Yes Provider, Historical   lancets 28 gauge misc Use as directed 11/7/22  Yes Erendira Yusuf MD   Insulin Needles, Disposable, 31 gauge x 5/16\" ndle UAD 10/5/22  Yes Tigre Osorio MD   insulin glargine (Lantus U-100 Insulin) 100 unit/mL injection Inject 10 mL SQ QHS  Patient taking differently: Inject 25 units SQ QHS 10/5/22  Yes Tigre Osorio MD   Syringe-Needle, Safety,Disp Un 3 mL 25 gauge x 5/8\" syrg UAD 10/5/22  Yes Tigre Osorio MD       Allergies   Allergen Reactions    Tylenol [Acetaminophen] Other (comments)     Pt was advised not to take Tylenol per        Review of Systems  Review of Systems - Negative except HPI      Physical Exam:      Visit Vitals  BP (!) 174/86   Pulse 79   Temp 98.5 °F (36.9 °C)   Resp 20   Ht 6' (1.829 m)   Wt 191 lb (86.6 kg)   SpO2 100%   BMI 25.90 kg/m²       Physical Exam:  Pertinent items are noted in HPI.     Data Review:  CBC:   Lab Results   Component Value Date/Time    WBC 8.4 11/07/2022 03:00 PM    RBC 3.59 (L) 11/07/2022 03:00 PM    HGB 11.1 (L) 11/07/2022 03:00 PM    HCT 32.1 (L) 11/07/2022 03:00 PM    PLATELET 977 95/48/4931 03:00 PM      BMP:   Lab Results   Component Value Date/Time    Glucose 118 (H) 01/13/2023 08:20 AM    Sodium 139 01/13/2023 08:20 AM    Potassium 4.1 01/13/2023 08:20 AM Chloride 103 01/13/2023 08:20 AM    CO2 27 01/13/2023 08:20 AM    BUN 44 (H) 01/13/2023 08:20 AM    Creatinine 4.24 (H) 01/13/2023 08:20 AM    Calcium 8.7 01/13/2023 08:20 AM         Assessment/Plan     66yo M here for right arm brachiocephalic arteriovenous fistula creation     Active Problems:    * No active hospital problems.  Mily Turcios MD  January 13, 2023

## 2023-01-16 ENCOUNTER — DOCUMENTATION ONLY (OUTPATIENT)
Dept: VASCULAR SURGERY | Age: 68
End: 2023-01-16

## 2023-01-16 NOTE — PROGRESS NOTES
Called and left message to the shelter for patient to call back. Patients procedure with Dr. Penny Al was rescheduled to 01/25/2023 at 8am with a check in time of 6:00am at [address] [facility] or Cath lab, [facility], can take heart and blood pressure medication with a sip of water the morning of the procedure. Continue taking Aspirin. Patient instructed to have someone drop off and  for the procedure.

## 2023-01-17 ENCOUNTER — DOCUMENTATION ONLY (OUTPATIENT)
Dept: VASCULAR SURGERY | Age: 68
End: 2023-01-17

## 2023-01-17 NOTE — PROGRESS NOTES
Spoke to patient today, 01/17/2023 at [address] [place] [address] about procedure scheduled on 01/25/2023 check in at 8:00am at [facility], Main Entrance, with a check in time of 6am. NPO [place], can take heart and blood pressure medication with a sip of water the morning of the procedure. Patient continue taking aspirin. Patient instructed to have someone drop off and  for the procedure. Patient confirmed and repeated instructions.

## 2023-01-19 NOTE — PERIOP NOTES
PRE-SURGICAL INSTRUCTIONS        Patient's Name:  Jordi Chavez      Today's Date:  1/19/2023            Covid Testing Date and Time:    Surgery Date:  1/25/2023                Do NOT eat or drink anything, including candy, gum, or ice chips after midnight on 01/25/2023, unless you have specific instructions from your surgeon or anesthesia provider to do so. You may brush your teeth before coming to the hospital.  No smoking 24 hours prior to the day of surgery. No alcohol 24 hours prior to the day of surgery. No recreational drugs for one week prior to the day of surgery. Leave all valuables, including money/purse, at home. Remove all jewelry, nail polish, acrylic nails, and makeup (including mascara); no lotions powders, deodorant, or perfume/cologne/after shave on the skin. Follow instruction for Hibiclens washes and CHG wipes from surgeon's office. Glasses/contact lenses and dentures may be worn to the hospital.  They will be removed prior to surgery. Call your doctor if symptoms of a cold or illness develop within 24-48 hours prior to your surgery. 11.  If you are having an outpatient procedure, please make arrangements for a responsible ADULT TO [address] [place] [address] and stay with you for 24 hours after your surgery. 12. ONE VISITOR in the hospital at this time for outpatient procedures. Exceptions may be made for surgical admissions, per nursing unit guidelines      Special Instructions:      Bring list of CURRENT medications. Bring any pertinent legal medical records. Take these medications the morning of surgery with a sip of water as instructed by office. Follow physician instructions about insulin. On the day of surgery, come in the main entrance of [facility]. Let the  at the desk know you are there for surgery. A staff member will come escort you to the surgical area on the second floor.     If you have any questions or concerns, please do not hesitate to call:     (Prior to the day of surgery) [facility] department:  [phone]   (Day of surgery) Pre-Op department:  [phone]    These surgical instructions were reviewed with Ruthann Del Valle during the [facility] phone call.

## 2023-01-24 ENCOUNTER — TELEPHONE (OUTPATIENT)
Dept: VASCULAR SURGERY | Age: 68
End: 2023-01-24

## 2023-01-24 ENCOUNTER — ANESTHESIA EVENT (OUTPATIENT)
Dept: CARDIOTHORACIC SURGERY | Age: 68
End: 2023-01-24
Payer: COMMERCIAL

## 2023-01-24 NOTE — TELEPHONE ENCOUNTER
Called and spoke to patient  at 910am about procedure scheduled on 01/25/2023, check in at 6:00am  at [facility], Main at  [facility] [place], can take heart and blood pressure medication with a sip of water the morning of the procedure. Patient is on aspirin and can continue taking. Patient instructed to have someone drop off and  for the procedure. Patient confirmed and repeated instructions.

## 2023-01-25 ENCOUNTER — ANESTHESIA (OUTPATIENT)
Dept: CARDIOTHORACIC SURGERY | Age: 68
End: 2023-01-25
Payer: COMMERCIAL

## 2023-01-25 ENCOUNTER — HOSPITAL ENCOUNTER (OUTPATIENT)
Age: 68
Setting detail: OBSERVATION
Discharge: HOME | End: 2023-01-26
Attending: STUDENT IN AN ORGANIZED HEALTH CARE EDUCATION/TRAINING PROGRAM | Admitting: STUDENT IN AN ORGANIZED HEALTH CARE EDUCATION/TRAINING PROGRAM
Payer: COMMERCIAL

## 2023-01-25 PROBLEM — Z99.2 ESRD (END STAGE RENAL DISEASE) ON DIALYSIS (HCC): Status: ACTIVE | Noted: 2023-01-25

## 2023-01-25 PROBLEM — N18.6 ESRD (END STAGE RENAL DISEASE) ON DIALYSIS (HCC): Status: ACTIVE | Noted: 2023-01-25

## 2023-01-25 LAB
AMPHET UR QL SCN: NEGATIVE
ANION GAP SERPL CALC-SCNC: 5 MMOL/L (ref 3–18)
BARBITURATES UR QL SCN: NEGATIVE
BENZODIAZ UR QL: NEGATIVE
BUN SERPL-MCNC: 24 MG/DL (ref 7–18)
BUN/CREAT SERPL: 5 (ref 12–20)
CALCIUM SERPL-MCNC: 10.2 MG/DL (ref 8.5–10.1)
CANNABINOIDS UR QL SCN: NEGATIVE
CHLORIDE SERPL-SCNC: 104 MMOL/L (ref 100–111)
CO2 SERPL-SCNC: 30 MMOL/L (ref 21–32)
COCAINE UR QL SCN: NEGATIVE
CREAT SERPL-MCNC: 4.45 MG/DL (ref 0.6–1.3)
GLUCOSE BLD STRIP.AUTO-MCNC: 154 MG/DL (ref 70–110)
GLUCOSE BLD STRIP.AUTO-MCNC: 224 MG/DL (ref 70–110)
GLUCOSE BLD STRIP.AUTO-MCNC: 71 MG/DL (ref 70–110)
GLUCOSE BLD STRIP.AUTO-MCNC: 74 MG/DL (ref 70–110)
GLUCOSE SERPL-MCNC: 76 MG/DL (ref 74–99)
HDSCOM,HDSCOM: NORMAL
METHADONE UR QL: NEGATIVE
OPIATES UR QL: NEGATIVE
PCP UR QL: NEGATIVE
POTASSIUM SERPL-SCNC: 4.5 MMOL/L (ref 3.5–5.5)
SODIUM SERPL-SCNC: 139 MMOL/L (ref 136–145)

## 2023-01-25 PROCEDURE — 76210000006 HC OR PH I REC 0.5 TO 1 HR: Performed by: STUDENT IN AN ORGANIZED HEALTH CARE EDUCATION/TRAINING PROGRAM

## 2023-01-25 PROCEDURE — 77030040922 HC BLNKT HYPOTHRM STRY -A: Performed by: STUDENT IN AN ORGANIZED HEALTH CARE EDUCATION/TRAINING PROGRAM

## 2023-01-25 PROCEDURE — 77030038692 HC WND DEB SYS IRMX -B: Performed by: STUDENT IN AN ORGANIZED HEALTH CARE EDUCATION/TRAINING PROGRAM

## 2023-01-25 PROCEDURE — 74011000250 HC RX REV CODE- 250: Performed by: STUDENT IN AN ORGANIZED HEALTH CARE EDUCATION/TRAINING PROGRAM

## 2023-01-25 PROCEDURE — 80048 BASIC METABOLIC PNL TOTAL CA: CPT

## 2023-01-25 PROCEDURE — 76010000110 HC CV SURG 3 TO 3.5 HR: Performed by: STUDENT IN AN ORGANIZED HEALTH CARE EDUCATION/TRAINING PROGRAM

## 2023-01-25 PROCEDURE — 74011250636 HC RX REV CODE- 250/636: Performed by: STUDENT IN AN ORGANIZED HEALTH CARE EDUCATION/TRAINING PROGRAM

## 2023-01-25 PROCEDURE — 74011250636 HC RX REV CODE- 250/636: Performed by: ANESTHESIOLOGY

## 2023-01-25 PROCEDURE — 77030008462 HC STPLR SKN PROX J&J -A: Performed by: STUDENT IN AN ORGANIZED HEALTH CARE EDUCATION/TRAINING PROGRAM

## 2023-01-25 PROCEDURE — 77030002987 HC SUT PROL J&J -B: Performed by: STUDENT IN AN ORGANIZED HEALTH CARE EDUCATION/TRAINING PROGRAM

## 2023-01-25 PROCEDURE — 82962 GLUCOSE BLOOD TEST: CPT

## 2023-01-25 PROCEDURE — 77030002996 HC SUT SLK J&J -A: Performed by: STUDENT IN AN ORGANIZED HEALTH CARE EDUCATION/TRAINING PROGRAM

## 2023-01-25 PROCEDURE — 74011250637 HC RX REV CODE- 250/637: Performed by: NURSE ANESTHETIST, CERTIFIED REGISTERED

## 2023-01-25 PROCEDURE — 77030028271 HC SRGFL HEMSTAT MTRX KT J&J -C: Performed by: STUDENT IN AN ORGANIZED HEALTH CARE EDUCATION/TRAINING PROGRAM

## 2023-01-25 PROCEDURE — G0378 HOSPITAL OBSERVATION PER HR: HCPCS

## 2023-01-25 PROCEDURE — 74011000250 HC RX REV CODE- 250

## 2023-01-25 PROCEDURE — 77030031139 HC SUT VCRL2 J&J -A: Performed by: STUDENT IN AN ORGANIZED HEALTH CARE EDUCATION/TRAINING PROGRAM

## 2023-01-25 PROCEDURE — 76060000037 HC ANESTHESIA 3 TO 3.5 HR: Performed by: STUDENT IN AN ORGANIZED HEALTH CARE EDUCATION/TRAINING PROGRAM

## 2023-01-25 PROCEDURE — 74011250637 HC RX REV CODE- 250/637: Performed by: STUDENT IN AN ORGANIZED HEALTH CARE EDUCATION/TRAINING PROGRAM

## 2023-01-25 PROCEDURE — 77030016441 HC APPL CLP LIG1 J&J -B: Performed by: STUDENT IN AN ORGANIZED HEALTH CARE EDUCATION/TRAINING PROGRAM

## 2023-01-25 PROCEDURE — 2709999900 HC NON-CHARGEABLE SUPPLY: Performed by: STUDENT IN AN ORGANIZED HEALTH CARE EDUCATION/TRAINING PROGRAM

## 2023-01-25 PROCEDURE — 80307 DRUG TEST PRSMV CHEM ANLYZR: CPT

## 2023-01-25 PROCEDURE — 74011000250 HC RX REV CODE- 250: Performed by: ANESTHESIOLOGY

## 2023-01-25 PROCEDURE — 77030002986 HC SUT PROL J&J -A: Performed by: STUDENT IN AN ORGANIZED HEALTH CARE EDUCATION/TRAINING PROGRAM

## 2023-01-25 PROCEDURE — 77030002933 HC SUT MCRYL J&J -A: Performed by: STUDENT IN AN ORGANIZED HEALTH CARE EDUCATION/TRAINING PROGRAM

## 2023-01-25 PROCEDURE — 74011636637 HC RX REV CODE- 636/637: Performed by: STUDENT IN AN ORGANIZED HEALTH CARE EDUCATION/TRAINING PROGRAM

## 2023-01-25 RX ORDER — FAMOTIDINE 20 MG/1
20 TABLET, FILM COATED ORAL ONCE
Status: COMPLETED | OUTPATIENT
Start: 2023-01-25 | End: 2023-01-25

## 2023-01-25 RX ORDER — IBUPROFEN 200 MG
16 TABLET ORAL AS NEEDED
Status: DISCONTINUED | OUTPATIENT
Start: 2023-01-25 | End: 2023-01-26 | Stop reason: HOSPADM

## 2023-01-25 RX ORDER — HEPARIN SODIUM 1000 [USP'U]/ML
INJECTION, SOLUTION INTRAVENOUS; SUBCUTANEOUS AS NEEDED
Status: DISCONTINUED | OUTPATIENT
Start: 2023-01-25 | End: 2023-01-25 | Stop reason: HOSPADM

## 2023-01-25 RX ORDER — ONDANSETRON 2 MG/ML
INJECTION INTRAMUSCULAR; INTRAVENOUS AS NEEDED
Status: DISCONTINUED | OUTPATIENT
Start: 2023-01-25 | End: 2023-01-25 | Stop reason: HOSPADM

## 2023-01-25 RX ORDER — DOXERCALCIFEROL 4 UG/2ML
0.5 INJECTION INTRAVENOUS
Status: DISCONTINUED | OUTPATIENT
Start: 2023-01-26 | End: 2023-01-26 | Stop reason: HOSPADM

## 2023-01-25 RX ORDER — HEPARIN SODIUM 200 [USP'U]/100ML
INJECTION, SOLUTION INTRAVENOUS
Status: COMPLETED | OUTPATIENT
Start: 2023-01-25 | End: 2023-01-25

## 2023-01-25 RX ORDER — HEPARIN SODIUM 200 [USP'U]/100ML
INJECTION, SOLUTION INTRAVENOUS
Status: DISPENSED
Start: 2023-01-25 | End: 2023-01-25

## 2023-01-25 RX ORDER — LIDOCAINE HYDROCHLORIDE 20 MG/ML
INJECTION, SOLUTION EPIDURAL; INFILTRATION; INTRACAUDAL; PERINEURAL AS NEEDED
Status: DISCONTINUED | OUTPATIENT
Start: 2023-01-25 | End: 2023-01-25 | Stop reason: HOSPADM

## 2023-01-25 RX ORDER — FENTANYL CITRATE 50 UG/ML
INJECTION, SOLUTION INTRAMUSCULAR; INTRAVENOUS AS NEEDED
Status: DISCONTINUED | OUTPATIENT
Start: 2023-01-25 | End: 2023-01-25

## 2023-01-25 RX ORDER — FENTANYL CITRATE 50 UG/ML
INJECTION, SOLUTION INTRAMUSCULAR; INTRAVENOUS AS NEEDED
Status: DISCONTINUED | OUTPATIENT
Start: 2023-01-25 | End: 2023-01-25 | Stop reason: HOSPADM

## 2023-01-25 RX ORDER — INSULIN LISPRO 100 [IU]/ML
INJECTION, SOLUTION INTRAVENOUS; SUBCUTANEOUS ONCE
Status: DISCONTINUED | OUTPATIENT
Start: 2023-01-25 | End: 2023-01-25 | Stop reason: HOSPADM

## 2023-01-25 RX ORDER — INSULIN LISPRO 100 [IU]/ML
INJECTION, SOLUTION INTRAVENOUS; SUBCUTANEOUS
Status: DISCONTINUED | OUTPATIENT
Start: 2023-01-25 | End: 2023-01-26 | Stop reason: HOSPADM

## 2023-01-25 RX ORDER — LIDOCAINE HYDROCHLORIDE 10 MG/ML
0.1 INJECTION, SOLUTION EPIDURAL; INFILTRATION; INTRACAUDAL; PERINEURAL AS NEEDED
Status: DISCONTINUED | OUTPATIENT
Start: 2023-01-25 | End: 2023-01-25 | Stop reason: HOSPADM

## 2023-01-25 RX ORDER — SODIUM CHLORIDE 9 MG/ML
25 INJECTION, SOLUTION INTRAVENOUS CONTINUOUS
Status: DISCONTINUED | OUTPATIENT
Start: 2023-01-25 | End: 2023-01-25 | Stop reason: HOSPADM

## 2023-01-25 RX ORDER — INSULIN GLARGINE 100 [IU]/ML
10 INJECTION, SOLUTION SUBCUTANEOUS
Status: DISCONTINUED | OUTPATIENT
Start: 2023-01-25 | End: 2023-01-26 | Stop reason: HOSPADM

## 2023-01-25 RX ORDER — PROPOFOL 10 MG/ML
INJECTION, EMULSION INTRAVENOUS AS NEEDED
Status: DISCONTINUED | OUTPATIENT
Start: 2023-01-25 | End: 2023-01-25 | Stop reason: HOSPADM

## 2023-01-25 RX ORDER — PROTAMINE SULFATE 10 MG/ML
INJECTION, SOLUTION INTRAVENOUS AS NEEDED
Status: DISCONTINUED | OUTPATIENT
Start: 2023-01-25 | End: 2023-01-25 | Stop reason: HOSPADM

## 2023-01-25 RX ORDER — DEXTROSE MONOHYDRATE 100 MG/ML
0-250 INJECTION, SOLUTION INTRAVENOUS AS NEEDED
Status: DISCONTINUED | OUTPATIENT
Start: 2023-01-25 | End: 2023-01-26 | Stop reason: HOSPADM

## 2023-01-25 RX ORDER — AMLODIPINE BESYLATE 10 MG/1
10 TABLET ORAL DAILY
Status: DISCONTINUED | OUTPATIENT
Start: 2023-01-26 | End: 2023-01-26 | Stop reason: HOSPADM

## 2023-01-25 RX ORDER — LIDOCAINE HYDROCHLORIDE 10 MG/ML
INJECTION, SOLUTION EPIDURAL; INFILTRATION; INTRACAUDAL; PERINEURAL
Status: DISPENSED
Start: 2023-01-25 | End: 2023-01-25

## 2023-01-25 RX ORDER — OXYCODONE HYDROCHLORIDE 5 MG/1
5 TABLET ORAL
Status: DISCONTINUED | OUTPATIENT
Start: 2023-01-25 | End: 2023-01-26 | Stop reason: HOSPADM

## 2023-01-25 RX ORDER — DEXTROSE MONOHYDRATE 100 MG/ML
INJECTION, SOLUTION INTRAVENOUS
Status: COMPLETED
Start: 2023-01-25 | End: 2023-01-25

## 2023-01-25 RX ORDER — MIDAZOLAM HYDROCHLORIDE 1 MG/ML
INJECTION, SOLUTION INTRAMUSCULAR; INTRAVENOUS AS NEEDED
Status: DISCONTINUED | OUTPATIENT
Start: 2023-01-25 | End: 2023-01-25 | Stop reason: HOSPADM

## 2023-01-25 RX ADMIN — PROPOFOL 170 MG: 10 INJECTION, EMULSION INTRAVENOUS at 09:00

## 2023-01-25 RX ADMIN — Medication 4 UNITS: at 22:05

## 2023-01-25 RX ADMIN — HEPARIN SODIUM 6000 UNITS: 1000 INJECTION, SOLUTION INTRAVENOUS; SUBCUTANEOUS at 09:48

## 2023-01-25 RX ADMIN — FENTANYL CITRATE 25 MCG: 50 INJECTION INTRAMUSCULAR; INTRAVENOUS at 09:53

## 2023-01-25 RX ADMIN — FENTANYL CITRATE 25 MCG: 50 INJECTION INTRAMUSCULAR; INTRAVENOUS at 11:25

## 2023-01-25 RX ADMIN — Medication 10 UNITS: at 22:05

## 2023-01-25 RX ADMIN — MIDAZOLAM HYDROCHLORIDE 2 MG: 2 INJECTION, SOLUTION INTRAMUSCULAR; INTRAVENOUS at 08:52

## 2023-01-25 RX ADMIN — FENTANYL CITRATE 25 MCG: 50 INJECTION INTRAMUSCULAR; INTRAVENOUS at 09:43

## 2023-01-25 RX ADMIN — FENTANYL CITRATE 25 MCG: 50 INJECTION INTRAMUSCULAR; INTRAVENOUS at 09:18

## 2023-01-25 RX ADMIN — LIDOCAINE HYDROCHLORIDE 80 MG: 20 INJECTION, SOLUTION EPIDURAL; INFILTRATION; INTRACAUDAL; PERINEURAL at 08:59

## 2023-01-25 RX ADMIN — DEXTROSE MONOHYDRATE 250 ML: 100 INJECTION, SOLUTION INTRAVENOUS at 07:55

## 2023-01-25 RX ADMIN — FENTANYL CITRATE 25 MCG: 50 INJECTION INTRAMUSCULAR; INTRAVENOUS at 10:48

## 2023-01-25 RX ADMIN — FENTANYL CITRATE 25 MCG: 50 INJECTION INTRAMUSCULAR; INTRAVENOUS at 10:10

## 2023-01-25 RX ADMIN — FAMOTIDINE 20 MG: 20 TABLET, FILM COATED ORAL at 07:58

## 2023-01-25 RX ADMIN — ONDANSETRON 4 MG: 2 INJECTION INTRAMUSCULAR; INTRAVENOUS at 10:10

## 2023-01-25 RX ADMIN — OXYCODONE HYDROCHLORIDE 5 MG: 5 TABLET ORAL at 13:51

## 2023-01-25 RX ADMIN — FENTANYL CITRATE 25 MCG: 50 INJECTION INTRAMUSCULAR; INTRAVENOUS at 11:04

## 2023-01-25 RX ADMIN — PROTAMINE SULFATE 20 MG: 10 INJECTION, SOLUTION INTRAVENOUS at 10:55

## 2023-01-25 RX ADMIN — FENTANYL CITRATE 25 MCG: 50 INJECTION INTRAMUSCULAR; INTRAVENOUS at 11:11

## 2023-01-25 RX ADMIN — CEFAZOLIN SODIUM 2 G: 1 INJECTION, POWDER, FOR SOLUTION INTRAMUSCULAR; INTRAVENOUS at 09:16

## 2023-01-25 RX ADMIN — PROPOFOL 30 MG: 10 INJECTION, EMULSION INTRAVENOUS at 08:59

## 2023-01-25 NOTE — H&P
Vascular Surgery      Patient: Ruthann Del Valle MRN: [id number]  CSN: [id number]      YOB: 1955    Age: 75 y.o. Sex: male      DOA: 2023       HPI:     Ruthann Del Valle is a 75 y.o. male who presents for right arm brachiocephalic arteriovenous fistula creation. Patient is left hand dominant. Patient has prior substance abuse and drug screen has been negative today.     Past Medical History:   Diagnosis Date    Brain lesion 2018    MRI from 2018 shows an irregular lesion within the fourth ventricle and foramen of Magendie    Cardiac LV ejection fraction 30-35% 07/15/2015    Cardiomyopathy ([facility] 07/15/2015    35% per ECHO    Chronic kidney disease     Dialysis [facility] [address], ([phone])    Cocaine use 2014    + UDS, persistent use    Diabetes ([facility] 2014    8.1 hgbA1C 14    Heart failure ([facility]     Hepatitis C infection 2014    Genotype 1A    History of cocaine abuse ([facility] 08/10/2016    [facility] 08/10/2016    Homelessness 2015    Hypertension 2000    Stroke ([facility] 2018    Cerebrovascular accident (CVA), unspecified mechanism       Past Surgical History:   Procedure Laterality Date    HX VASCULAR ACCESS Right 2022    [facility]    HX VASCULAR ACCESS Right 10/2022    [facility]       Family History   Problem Relation Age of Onset    Liver Disease Father     Diabetes Mother     Cancer Maternal Grandmother     No Known Problems Sister     Diabetes Brother     No Known Problems Brother     No Known Problems Sister     No Known Problems Sister        Social History     Socioeconomic History    Marital status: SINGLE   Tobacco Use    Smoking status: Former     Packs/day: 0.30     Types: Cigarettes     Quit date: 2016     Years since quittin.5    Smokeless tobacco: Never   Vaping Use    Vaping Use: Never used   Substance and Sexual Activity    Alcohol use: Not Currently     Alcohol/week: 0.0 standard drinks     Comment: started heavy drinking at teen till in Aug 2014 but has gone into remission since then    Drug use: Not Currently     Types: Cocaine, Marijuana, Heroin     Comment: 2 x heroin experimental, smoked Cocaine with last 7/27/15    Sexual activity: Never     Partners: Female   Other Topics Concern     Service No    Blood Transfusions No    Caffeine Concern Yes    Occupational Exposure No    Hobby Hazards No    Sleep Concern No    Stress Concern No    Weight Concern No    Special Diet No    Back Care No    Exercise No    Bike Helmet No    Seat Belt Yes       Prior to Admission medications    Medication Sig Start Date End Date Taking? Authorizing Provider   amLODIPine (NORVASC) 10 mg tablet Take 10 mg by mouth daily. 11/22/22  Yes Provider, Historical   insulin glargine (Lantus U-100 Insulin) 100 unit/mL injection Inject 10 mL SQ QHS  Patient taking differently: Inject 25 units SQ QHS 10/5/22  Yes Aston Guzman MD   lancets 28 gauge misc Use as directed 11/7/22   Precious Liriano MD   Insulin Needles, Disposable, 31 gauge x 5/16\" ndle UAD 10/5/22   Aston Guzman MD   Syringe-Needle, Safety,Disp Un 3 mL 25 gauge x 5/8\" syrg UAD 10/5/22   Aston Guzman MD       Allergies   Allergen Reactions    Tylenol [Acetaminophen] Other (comments)     Pt was advised not to take Tylenol per Dr.       Review of Systems  Review of Systems - Negative except HPI      Physical Exam:      Visit Vitals  BP (!) 150/84   Pulse 88   Temp 97.8 °F (36.6 °C)   Resp 20   Ht 6' (1.829 m)   Wt 191 lb (86.6 kg)   SpO2 97%   BMI 25.90 kg/m²       Physical Exam:  Pertinent items are noted in HPI.     Data Review:  CBC:   Lab Results   Component Value Date/Time    WBC 8.4 11/07/2022 03:00 PM    RBC 3.59 (L) 11/07/2022 03:00 PM    HGB 11.1 (L) 11/07/2022 03:00 PM    HCT 32.1 (L) 11/07/2022 03:00 PM    PLATELET 093 01/62/7349 03:00 PM      BMP:   Lab Results   Component Value Date/Time    Glucose 76 01/25/2023 07:39 AM    Sodium 139 01/25/2023 07:39 AM Potassium 4.5 01/25/2023 07:39 AM    Chloride 104 01/25/2023 07:39 AM    CO2 30 01/25/2023 07:39 AM    BUN 24 (H) 01/25/2023 07:39 AM    Creatinine 4.45 (H) 01/25/2023 07:39 AM    Calcium 10.2 (H) 01/25/2023 07:39 AM         Assessment/Plan     64yo M here for right arm brachiocephalic arteriovenous fistula creation possible AVG    Active Problems:    * No active hospital problems.  Becca Han MD  January 25, 2023

## 2023-01-25 NOTE — ANESTHESIA POSTPROCEDURE EVALUATION
Procedure(s):  RIGHT UPPER EXTREMITY BRACHICEPHALIC ARTERIO VENOUS FISTULA CREATION. MAC    Anesthesia Post Evaluation      Multimodal analgesia: multimodal analgesia used between 6 hours prior to anesthesia start to PACU discharge  Patient location during evaluation: bedside  Patient participation: complete - patient participated  Level of consciousness: awake  Pain management: adequate  Airway patency: patent  Anesthetic complications: no  Cardiovascular status: stable  Respiratory status: acceptable  Hydration status: acceptable  Post anesthesia nausea and vomiting:  controlled      INITIAL Post-op Vital signs:   Vitals Value Taken Time   /73 01/25/23 1305   Temp 36.8 °C (98.2 °F) 01/25/23 1209   Pulse 72 01/25/23 1621   Resp 17 01/25/23 1621   SpO2 89 % 01/25/23 1621   Vitals shown include unvalidated device data.

## 2023-01-25 NOTE — ROUTINE PROCESS
[phone] patient arrived via bed, alert x4  BLOOD SUGAR IN PACU 154  Has not voided  Bedside shift report given to Dung Wu with kardex and sbar

## 2023-01-25 NOTE — OP NOTES
OPERATIVE NOTE    DATE OF PROCEDURE: 1/25/23  SURGEON: Prabhu Rinaldi MD  ASSISTANT(S): Su Tena SA  PREOPERATIVE DIAGNOSIS: ESRD on HD  POSTOPERATIVE DIAGNOSIS: same  PROCEDURE PERFORMED: Right arm brachiocephalic arteriovenous fistula creation  ANESTHESIA: general anesthesia  EBL: less than 50   BLOOD ADMINISTERED: None  IMPLANTS/DRAINS/TUBES: None  COMPLICATIONS: None  FINDINGS: Uncomplicated right arm fistula creation      DESCRIPTION OF PROCEDURE:      The patient was brought to the operating room after the risks and the benefits of the procedure were explained and an informed consent was obtained. Patient was placed in supine position with right arm abducted. General anesthesia was administered. Procedure began with making a 5cm transverse incision with a #10 blade just proximal to the antecubital crease. Incision was carried through the dermis. The right arm cephalic vein was identified and circumferentially dissected for 4cm. The branches were ligated with a 4-0 silk tie to facilitate mobilization of the vein. Next the bicipital aponeurosis was identified and systemic heparin was administered. The aponeurosis was sharply incised and the brachial sheath was identified. Next, the brachial artery was circumferentially dissected and vessel loops were placed. Next the distal cephalic vein was amputated and the stump suture ligated with a 3-0 silk suture. Next the cephalic vein was dilated and flushed with heparinized saline. The clamps were next placed on the brachial artery to obtain proximal and distal control. A #15 blade was used to make an arteriotomy which was extended to 6mm with Tang scissors. Next, the cephalic vein was beveled and an end to side cephalic vein to brachial artery anastomosis was performed in a heel to toe technique using 6-0 prolene suture. Prior to completion of the anastomosis, antegrade and retrograde flushing maneuvers were performed.      Suture line was hemostatic, there was a palpable radial pulse at the end of the procedure with and without fistula compression. Protamine was administered, wound bed was hemostatic. Wound was closed in layers of 3-0 vicryl and 4-0 monocryl. Sterile dressings were applied and patient was extubated. Patient tolerated the procedure well and was transported to the PACU in stable condition.        Dr. Claudine Escamilla MD

## 2023-01-25 NOTE — PERIOP NOTES
TRANSFER - OUT REPORT:    Verbal report given to Kayla(name) on Jarred Kaufman  being transferred to 2 Surgical(unit) for routine post - op       Report consisted of patients Situation, Background, Assessment and   Recommendations(SBAR). Information from the following report(s) SBAR, Procedure Summary, Intake/Output, MAR, Recent Results, Cardiac Rhythm NSR, and Alarm Parameters  was reviewed with the receiving nurse. Lines:   Peripheral IV 01/25/23 Left;Posterior Forearm (Active)   Site Assessment Clean, dry, & intact 01/25/23 0747   Phlebitis Assessment 0 01/25/23 0747   Infiltration Assessment 0 01/25/23 0747   Dressing Status Clean, dry, & intact 01/25/23 0747   Dressing Type Tape;Transparent 01/25/23 0747   Hub Color/Line Status Pink 01/25/23 0747   Alcohol Cap Used No 01/25/23 0747        Opportunity for questions and clarification was provided.       Patient transported with:   [employer]

## 2023-01-25 NOTE — ANESTHESIA PREPROCEDURE EVALUATION
Relevant Problems   No relevant active problems       Anesthetic History   No history of anesthetic complications            Review of Systems / Medical History  Patient summary reviewed, nursing notes reviewed and pertinent labs reviewed    Pulmonary    COPD: moderate               Neuro/Psych       CVA  TIA     Cardiovascular    Hypertension      CHF    Past MI, CAD and hyperlipidemia    Exercise tolerance: >4 METS     GI/Hepatic/Renal         Renal disease: ESRD  Liver disease     Endo/Other    Diabetes: poorly controlled, type 2, using insulin         Other Findings              Physical Exam    Airway  Mallampati: II  TM Distance: 4 - 6 cm  Neck ROM: normal range of motion   Mouth opening: Normal     Cardiovascular  Regular rate and rhythm,  S1 and S2 normal,  no murmur, click, rub, or gallop  Rhythm: regular  Rate: normal         Dental    Dentition: Edentulous     Pulmonary  Breath sounds clear to auscultation               Abdominal  GI exam deferred       Other Findings            Anesthetic Plan    ASA: 3  Anesthesia type: MAC    Monitoring Plan: Continuous noninvasive hemodynamic monitoring      Induction: Intravenous  Anesthetic plan and risks discussed with: Patient

## 2023-01-26 VITALS
HEIGHT: 72 IN | RESPIRATION RATE: 18 BRPM | SYSTOLIC BLOOD PRESSURE: 148 MMHG | DIASTOLIC BLOOD PRESSURE: 79 MMHG | TEMPERATURE: 98.6 F | HEART RATE: 86 BPM | BODY MASS INDEX: 26.64 KG/M2 | OXYGEN SATURATION: 95 % | WEIGHT: 196.65 LBS

## 2023-01-26 PROBLEM — I77.0 AVF (ARTERIOVENOUS FISTULA) (HCC): Status: ACTIVE | Noted: 2023-01-26

## 2023-01-26 LAB
ANION GAP SERPL CALC-SCNC: 4 MMOL/L (ref 3–18)
BASOPHILS # BLD: 0.1 K/UL (ref 0–0.1)
BASOPHILS NFR BLD: 1 % (ref 0–2)
BUN SERPL-MCNC: 39 MG/DL (ref 7–18)
BUN/CREAT SERPL: 7 (ref 12–20)
CALCIUM SERPL-MCNC: 8.5 MG/DL (ref 8.5–10.1)
CHLORIDE SERPL-SCNC: 104 MMOL/L (ref 100–111)
CO2 SERPL-SCNC: 27 MMOL/L (ref 21–32)
CREAT SERPL-MCNC: 5.54 MG/DL (ref 0.6–1.3)
DIFFERENTIAL METHOD BLD: ABNORMAL
EOSINOPHIL # BLD: 0.3 K/UL (ref 0–0.4)
EOSINOPHIL NFR BLD: 2 % (ref 0–5)
ERYTHROCYTE [DISTWIDTH] IN BLOOD BY AUTOMATED COUNT: 13.1 % (ref 11.6–14.5)
GLUCOSE BLD STRIP.AUTO-MCNC: 79 MG/DL (ref 70–110)
GLUCOSE SERPL-MCNC: 106 MG/DL (ref 74–99)
HCT VFR BLD AUTO: 30.7 % (ref 36–48)
HGB BLD-MCNC: 10.1 G/DL (ref 13–16)
IMM GRANULOCYTES # BLD AUTO: 0 K/UL (ref 0–0.04)
IMM GRANULOCYTES NFR BLD AUTO: 0 % (ref 0–0.5)
LYMPHOCYTES # BLD: 1.9 K/UL (ref 0.9–3.6)
LYMPHOCYTES NFR BLD: 18 % (ref 21–52)
MCH RBC QN AUTO: 30.4 PG (ref 24–34)
MCHC RBC AUTO-ENTMCNC: 32.9 G/DL (ref 31–37)
MCV RBC AUTO: 92.5 FL (ref 78–100)
MONOCYTES # BLD: 1.2 K/UL (ref 0.05–1.2)
MONOCYTES NFR BLD: 11 % (ref 3–10)
NEUTS SEG # BLD: 7.1 K/UL (ref 1.8–8)
NEUTS SEG NFR BLD: 68 % (ref 40–73)
NRBC # BLD: 0 K/UL (ref 0–0.01)
NRBC BLD-RTO: 0 PER 100 WBC
PHOSPHATE SERPL-MCNC: 3.1 MG/DL (ref 2.5–4.9)
PLATELET # BLD AUTO: 259 K/UL (ref 135–420)
PMV BLD AUTO: 9.6 FL (ref 9.2–11.8)
POTASSIUM SERPL-SCNC: 4.2 MMOL/L (ref 3.5–5.5)
RBC # BLD AUTO: 3.32 M/UL (ref 4.35–5.65)
SODIUM SERPL-SCNC: 135 MMOL/L (ref 136–145)
WBC # BLD AUTO: 10.6 K/UL (ref 4.6–13.2)

## 2023-01-26 PROCEDURE — 90935 HEMODIALYSIS ONE EVALUATION: CPT

## 2023-01-26 PROCEDURE — G0378 HOSPITAL OBSERVATION PER HR: HCPCS

## 2023-01-26 PROCEDURE — 85025 COMPLETE CBC W/AUTO DIFF WBC: CPT

## 2023-01-26 PROCEDURE — 82962 GLUCOSE BLOOD TEST: CPT

## 2023-01-26 PROCEDURE — 84100 ASSAY OF PHOSPHORUS: CPT

## 2023-01-26 PROCEDURE — 36415 COLL VENOUS BLD VENIPUNCTURE: CPT

## 2023-01-26 PROCEDURE — 80048 BASIC METABOLIC PNL TOTAL CA: CPT

## 2023-01-26 PROCEDURE — 74011250636 HC RX REV CODE- 250/636: Performed by: INTERNAL MEDICINE

## 2023-01-26 PROCEDURE — 74011250637 HC RX REV CODE- 250/637: Performed by: STUDENT IN AN ORGANIZED HEALTH CARE EDUCATION/TRAINING PROGRAM

## 2023-01-26 RX ORDER — HEPARIN SODIUM 1000 [USP'U]/ML
6800 INJECTION, SOLUTION INTRAVENOUS; SUBCUTANEOUS
Status: DISCONTINUED | OUTPATIENT
Start: 2023-01-26 | End: 2023-01-26 | Stop reason: HOSPADM

## 2023-01-26 RX ADMIN — OXYCODONE HYDROCHLORIDE 5 MG: 5 TABLET ORAL at 13:16

## 2023-01-26 RX ADMIN — DOXERCALCIFEROL 0.5 MCG: 4 INJECTION, SOLUTION INTRAVENOUS at 10:49

## 2023-01-26 RX ADMIN — OXYCODONE HYDROCHLORIDE 5 MG: 5 TABLET ORAL at 03:42

## 2023-01-26 NOTE — DISCHARGE INSTRUCTIONS
DISCHARGE SUMMARY from Nurse    PATIENT INSTRUCTIONS:    After general anesthesia or intravenous sedation, for 24 hours or while taking prescription Narcotics:  Limit your activities  Do not drive and operate hazardous machinery  Do not make important personal or business decisions  Do  not drink alcoholic beverages  If you have not urinated within 8 hours after discharge, please contact your surgeon on call. Report the following to your surgeon:  Excessive pain, swelling, redness or odor of or around the surgical area  Temperature over 100.5  Nausea and vomiting lasting longer than 4 hours or if unable to take medications  Any signs of decreased circulation or nerve impairment to extremity: change in color, persistent  numbness, tingling, coldness or increase pain  Any questions    What to do at Home:  Recommended activity: Activity as tolerated, rest as needed     If you experience any of the following symptoms Fever greater than 100.4, nausea /vomiting chills, swelling or pin at the catheter site please follow up with Primary Care Provider. *  Please give a list of your current medications to your Primary Care Provider. *  Please update this list whenever your medications are discontinued, doses are      changed, or new medications (including over-the-counter products) are added. *  Please carry medication information at all times in case of emergency situations. These are general instructions for a healthy lifestyle:    No smoking/ No tobacco products/ Avoid exposure to second hand smoke  Surgeon General's Warning:  Quitting smoking now greatly reduces serious risk to your health.     Obesity, smoking, and sedentary lifestyle greatly increases your risk for illness    A healthy diet, regular physical exercise & weight monitoring are important for maintaining a healthy lifestyle    You may be retaining fluid if you have a history of heart failure or if you experience any of the following symptoms: Weight gain of 3 pounds or more overnight or 5 pounds in a week, increased swelling in our hands or feet or shortness of breath while lying flat in bed. Please call your doctor as soon as you notice any of these symptoms; do not wait until your next office visit. The discharge information has been reviewed with the patient. The patient verbalized understanding. Discharge medications reviewed with the patient and appropriate educational materials and side effects teaching were provided.   ___Patient armband removed and shredded+  ________________________________________________________________________________________________________________________________

## 2023-01-26 NOTE — PROGRESS NOTES
Received pre HD report from ORA Ferguson RN. Pt in bed, A+O x4, no s/s of distress noted. Accessed right side CVC per protocol, Pt has new AVF right upper arm, +thrill/bruit. Tx initiated at 9704. CVC flowing with ease. For hemodynamic stability UF goal 2500 ml. Offered assistance with repositioning every 2 hours. Vascular access visible at all times during treatment, line connections intact at all times. Tx completed at 1255, tolerated well 2L removed. De-accessed per protocol. Heparin indwell 1.6ml in arterial, and 1.6ml in venous catheter. Unit nurse given report.                      ACUTE HEMODIALYSIS FLOW SHEET      HEMODIALYSIS ORDERS: Physician:  Wiliam Jonas     Dialyzer: Revaclear   Duration: 3   BFR: 400  DFR: 600   Dialysate:  Temp 36-37*C   K+  2    Ca+ 2.0   Na 138  Bicarb 35   Wt Readings from Last 1 Encounters:        Patient Chart [x]   Unable to Obtain []  Dry weight/UF Goal:2500   Heparin [x]  Bolus  1500  Units    [x] Hourly  500  Units    []None       Pre BP  129/75    Pulse: 81 Respirations: 18 Temp: 99.1 [x] oral  [] Ax  [] Esoph   Labs: []  Pre  []  Post:   [x] N/A   Additional Orders (medications, blood products, hypotension management): [] Yes   [x] No       [x]   [facility] Consent Verified       CATHETER ACCESS:  []N/A   [x]Right   []Left   []IJ   []Fem  [x]Chest wall  []TransHepatic   [] First use X-ray verified     [x]Tunnel    [] Non Tunneled   [x]No S/S infection  []Redness  []Drainage []Cultured []Swelling []Pain   [x]Medical Aseptic Prep Utilized   [x]Dressing Changed  [x] Biopatch  Date: 1/26/23   []Clotted   [x]Patent   Flows: [x]Good  []Poor  []Reversed   If access problem,  notified: []Yes    [x]N/A          GENERAL ASSESSMENT:    LUNGS:  Resp Rate 18   [] Clear  [] Coarse  [] Crackles  [] Wheezing  [x] Diminished                                                           [] RLL   [] LLL  [] RUL   [] AZUCENA            Respirations:  [x]Easy  []Labored  []N/A  Cough:  []Productive []Dry  [x]N/A               Therapy:  [x]RA   [] Ventilated   [] Intubated   [] Trach            O2 Device:  [] NC   [] NRB  [] Trach Mask  [] BiPaP  Flow:   l/min                                                    CARDIAC: [x] Regular      [] Irregular   [] Rhythm:          [] Monitored   [] Bedside   [] Remotely monitored       EDEMA: [] None   [x]Generalized  [] Pitting [] 1+   [] 2 +   [] 3+    [] 4+        SKIN:   [] Hot     [] Cold    [x] Warm   [x] Dry    [] Diaphoretic                 [] Flushed  [] Jaundiced  [] Cyanotic  [] Pale      LOC:    [x] Alert      [x]Oriented:    [x] Person     [x] Place   [x]Time               [] Confused  [] Lethargic  [] Medicated  [] Non-responsive  [] Non-Verbal     GI / ABDOMEN:                     [] Flat    [] Distended    [x] Soft    [] Firm   []  Obese                   [] Diarrhea   [] FMS [] Bowel Sounds  [] Nausea  [] Vomiting                   [] NGT  [] OGT  [] PEG  [] Tube Feedings @     mL/hr     / URINE ASSESSMENT:                   [] Voiding    [] Oliguria  [x] Anuria                     []  Light   [] Incontinent  []  Incontinent Brief   []  PureWick     PAIN:  [x] 0 []1  []2   []3   []4   []5   []6   []7   []8   []9   []10                MOBILITY:  [x] Bed    [] Stretcher      All Vitals and Treatment Details on Attached [address]: [facility]          Room # 2215/01    [] Routine         [x] 1st Time Acute/Chronic   [] Urgent      [] Stat              [x] Acute Room   []  Bedside    [] ICU/CCU     [] ER     Isolation Precautions:  [x] Dialysis    There are currently no Active Isolations     ALLERGIES:     Allergies   Allergen Reactions    Tylenol [Acetaminophen] Other (comments)     Pt was advised not to take Tylenol per Dr. Denver Bruch Status:  Prior     Hepatitis Status      Lab Results   Component Value Date/Time    Hepatitis A, IgM NEGATIVE 08/09/2014 08:40 AM    Hepatitis B surface Ag <0.10 08/17/2022 02:57 AM    Hepatitis B surface Ab 289.10 08/17/2022 02:57 AM    Hepatitis B core, IgM NEGATIVE 08/09/2014 08:40 AM    Hep B Core Ab, total Negative 08/18/2022 02:52 AM    Hepatitis C virus Ab >11.00 (H) 08/17/2022 02:57 AM        Current Labs:      Lab Results   Component Value Date/Time    WBC 10.6 01/26/2023 04:26 AM    HGB 10.1 (L) 01/26/2023 04:26 AM    HCT 30.7 (L) 01/26/2023 04:26 AM    PLATELET 217 47/08/9436 04:26 AM    MCV 92.5 01/26/2023 04:26 AM     Lab Results   Component Value Date/Time    Sodium 135 (L) 01/26/2023 04:26 AM    Potassium 4.2 01/26/2023 04:26 AM    Chloride 104 01/26/2023 04:26 AM    CO2 27 01/26/2023 04:26 AM    Anion gap 4 01/26/2023 04:26 AM    Glucose 106 (H) 01/26/2023 04:26 AM    BUN 39 (H) 01/26/2023 04:26 AM    Creatinine 5.54 (H) 01/26/2023 04:26 AM    BUN/Creatinine ratio 7 (L) 01/26/2023 04:26 AM    GFR est AA 16 (L) 08/27/2022 01:53 AM    GFR est non-AA 13 (L) 08/27/2022 01:53 AM    Calcium 8.5 01/26/2023 04:26 AM          DIET:  DIET ADULT     PRIMARY NURSE REPORT:   Pre Dialysis: ORA Leon RN    Time: 6350    EDUCATION:    [x] Patient           Knowledge Basis: []None []Minimal [x] Substantial [] Unknown  Barriers to learning  [x]None  [] Intubated/Trached/Ventilated  [] Sedated/Paralyzed   [x] Access Care     [] S&S of infection  [] Fluid Management  [x] K+   [x] Procedural    [] Medications   [] Tx Options   [] Transplant   [] Diet      Teaching Tools:  [x] Explain  [] Demo  [] Handouts [] Video  Patient response: [x] Verbalized understanding   [] Requires follow up        [x] Time Out/Safety Check    [x] Extracorporeal Circuit Tested for integrity       RO/HEMODIALYSIS MACHINE SAFETY CHECKS - Before each treatment:        [facility]                                    [x] Unit Machine # 7 with centralized RO                                  [] Portable Machine #1/RO serial # [id number]                                  [] Portable Machine #2/RO serial # [id number]                                  [] Portable Machine #4/RO serial # [id number]                                  [] Portable Machine #3/RO serial # [id number]                                                                                                       Alarm Test:  Pass time 6392         [x] RO/Machine Log Complete    Machine Temp    36-37*C             Dialysate: pH  7.4    Conductivity: Meter 14.0    HD Machine  14    TCD: 13.7  Dialyzer Lot # [id number]    Blood Tubing Lot # [id number]    Saline Lot #[id number]     CHLORINE TESTING-Before each treatment and every 4 hours    Total Chlorine: [x] less than 0.1 ppm  Initial Time Check: 0730     4 Hr/2nd Check Time: 1130   (if greater than 0.1 ppm from Primary then every 30 minutes from Secondary)     TREATMENT INITIATION - with Dialysis Precautions:   [x] All Connections Secured              [x] Saline Line Double Clamped   [x] Venous Parameters Set               [x] Arterial Parameters Set    [x] Prime Given 250ml NSS              [x]Air Foam Detector Engaged                   Medication    Dose    Volume Route      Time       [facility] Nurse    Hectorol  0.5 mcg  0.25 mL HD  1049 Mickie Caicedo RN    Heparin Bolus  1500 units  1.5mL HD  0939   Mickie Caicedo RN    Heparin Main.   500 units/hr  HD  Pramod 285, RN       Post Assessment  Dialyzer Cleared:   [] Good  [x] Fair  [] Poor  Blood processed:  2000 mL  UF Removed:  66.7 Ml    Post BP: 148/79  Pulse: 86  Respirations: 18   Temp: 98.6  [x] oral  [] Ax  [] Esophageal   Lungs: [] Clear                [x] No change from initial assessment   Post Tx Vascular Access: [] N/A       Cardiac:  [x] Regular   [] Irregular   Rhythm:  [] Monitored   [] Not Monitored    CVC Catheter: [] N/A  Locking solution: Heparin 1:1000 U  Arterial port 1.6 ml   Venous port 1.6 ml   Edema:  [] None  [x] Generalized                     Skin:[x] Warm  [x] Dry [] Diaphoretic               [] Flushed  [] Pale [] Cyanotic Pain:  [x]0  []1-2  []3-4  []5-6   []7-8  []9-10 Post Treatment Note:   See above note             POST TREATMENT PRIMARY NURSE HANDOFF REPORT:   Post Dialysis: Lakshmi Russo RN        Time:  1         Abbreviations: AVG-arterial venous graft, AVF-arterial venous fistula, IJ-Internal Jugular, Subcl-Subclavian, Fem-Femoral, Tx-treatment, AP/HR-apical heart rate, VSS- Vital Signs Stable, CVC- Central Venous Catheter, DFR-dialysate flow rate, BFR-blood flow rate, AP-arterial pressure, -venous pressure, UF-ultrafiltrate, TMP-transmembrane pressure, Alpesh-Venous, Art-Arterial, RO-Reverse Osmosis

## 2023-01-26 NOTE — PROGRESS NOTES
ESRD patient is admitted for observation after creation of AVF, on his Right arm,  Has underlying Hypertension & DM. He is homeless, will arrange HD in AM tomorrow. May be discharged once cleared by Vascular. Dialysis order given to Dialysis nurse.

## 2023-01-26 NOTE — DISCHARGE SUMMARY
Physician Discharge Summary     Patient ID:  Jordi Chavez  [id number]  59 y.o.  1955    Admit date: 1/25/2023    Discharge date: 1/26/2023      Admitting Physician: Herrera Tierney MD     Discharge Physician: Herrera Tierney MD     Admission Diagnoses: ESRD (end stage renal disease) ([facility] [N18.6]  ESRD (end stage renal disease) on dialysis [facility]) [N18.6, Z99.2]    Discharge Diagnoses: There are no discharge diagnoses documented for the most recent discharge. Procedures for this admission: Procedure(s):  RIGHT UPPER EXTREMITY BRACHICEPHALIC ARTERIO VENOUS FISTULA CREATION    Discharged Condition: stable    Hospital Course: Patient tolerated the procedure well. Patient is postop day #1 from his right arm brachiocephalic atrial venous fistula creation. Patient had dialysis performed this morning without any issues via his right IJ catheter. Patient is awake alert and answers all questions appropriate. States he feels fine. Right arm with no issues, states that he was able to brush his teeth with the use of his right hand with no issues with strength or dexterity. Patient tolerated the diet this morning without any nausea and vomiting, and is tolerating dialysis without any issues thus far. Patient states he feels well and is ready for discharge. Patient states that he has an arrangement at an local shelter and has to be there by 4 PM before closes. Patient denies any right arm pain, mild discomfort but not requesting any pain medicine at this time.     Consults: Nephrology (Dr. Mary Herrera)    Significant Diagnostic Studies: angiography:    DATE OF PROCEDURE: 1/25/23  SURGEON: Herrera Tierney MD  ASSISTANT(S): Pravin Foote SA  PREOPERATIVE DIAGNOSIS: ESRD on HD  POSTOPERATIVE DIAGNOSIS: same  PROCEDURE PERFORMED: Right arm brachiocephalic arteriovenous fistula creation  ANESTHESIA: general anesthesia  EBL: less than 50   BLOOD ADMINISTERED: None  IMPLANTS/DRAINS/TUBES: None  COMPLICATIONS: None  FINDINGS: Uncomplicated right arm fistula creation          Treatments: dialysis: Hemodialysis    Discharge Exam: Constitutional: Oriented x3. Mild right arm incisional discomfort but no pain. Denies any decrease strength or dexterity in his right hand. Tolerating dialysis his via  IJ catheter. HEENT: atraumatic, normocephalic, wearing a mask. Eyes:   Cunjunctivae clear, no scleral icterus  Neck:   No JVD present. No adenopathy. No carotid bruits or thrills noted bilaterally. Cardiovascular:  Normal rate, regular rhythm, normal heart sounds. No murmur heard. Pulmonary/Chest: Effort normal . No wheezes, rales or rhonchi noted. Extremities: Normal range of motion. Palpable pedal pulses. No calf tenderness to palpation bilaterally. Neurovascularly intact bilaterally. Right upper extremity incision clean dry and intact, Dermabond in place. Palpable right radial pulse. Neurovascular intact to both soft and deep sensation bilaterally. Neurological:  he  is alert and oriented x3 . Gait normal. Motor & sensory grossly intact      Disposition: home    Patient Instructions:   Medicine reconciliation performed, patient to continue with meds as prior to hospitalization. Patient offered narcotic pain med for discomfort but declines and states he will take Tylenol only. Patient is presently homeless but has arrangements to be staying and local shelter for the next 7 days. Patient has a friend who is picking him up and delivering him to the shelter. Reference discharge instructions as provided by nursing for diet, labs, medications, activity, wound care and any outpatient referrals. Follow-up with Dr. Jean Speaker in vascular office in 2 weeks for repeat wound check. Continue with dialysis as ordered per nephrology. Patient encouraged to call if any questions or issues. Patient states he understands above, is more than willing to proceed as planned.     Signed:  Jimmie Issa PA-C  1/26/2023  3:14 PM

## 2023-01-26 NOTE — ROUTINE PROCESS
Bedside shift repoer received from Dav Wiggins with sbar  Instructed on using IS  To rdu via bed  Returned from rdu  Discharged to shelter retired

## 2023-01-26 NOTE — PROGRESS NOTES
Consult Note    Patient: Soledad Pak               Sex: male          DOA: 1/25/2023         YOB: 1955      Age:  75 y.o.        LOS:  LOS: 0 days              HPI:     Soledad Pak is a 75 y.o. male who has been seen on consultation for ESRD. ESRD patient is admitted for observation after creation of AVF, on his Right arm,  Has underlying Hypertension & DM. He is homeless, will arrange HD in AM tomorrow. May be discharged once cleared by Vascular. Dialysis order given to Dialysis nurse.   Has ESRD,Hypertension, Type 2 DM,Hep C,still makes urine,gets Dialysis through HD catheter    Past Medical History:   Diagnosis Date    Brain lesion 04/2018    MRI from April 2018 shows an irregular lesion within the fourth ventricle and foramen of Magendie    Cardiac LV ejection fraction 30-35% 07/15/2015    Cardiomyopathy ([facility] 07/15/2015    35% per ECHO    Chronic kidney disease     Dialysis [facility], ([phone])    Cocaine use 08/09/2014    + UDS, persistent use    Diabetes ([facility] [address] [address] 08/09/2014    8.1 hgbA1C 8/9/14    Heart failure ([facility]     Hepatitis C infection 08/20/2014    Genotype 1A    History of cocaine abuse ([facility] 08/10/2016    History of heroin abuse ([facility] 08/10/2016    Homelessness 09/07/2015    Hypertension 2000    Stroke ([facility] 04/2018    Cerebrovascular accident (CVA), unspecified mechanism       Past Surgical History:   Procedure Laterality Date    HX VASCULAR ACCESS Right 08/2022    [facility] [place], operated by [facility]    HX VASCULAR ACCESS Right 10/2022    [facility]       Family History   Problem Relation Age of Onset    Liver Disease Father     Diabetes Mother     Cancer Maternal Grandmother     No Known Problems Sister     Diabetes Brother     No Known Problems Brother     No Known Problems Sister     No Known Problems Sister        Social History     Socioeconomic History    Marital status: SINGLE   Tobacco Use    Smoking status: Former     Packs/day: 0.30     Types: Cigarettes     Quit date: 7/4/2016 Years since quittin.5    Smokeless tobacco: Never   Vaping Use    Vaping Use: Never used   Substance and Sexual Activity    Alcohol use: Not Currently     Alcohol/week: 0.0 standard drinks     Comment: started heavy drinking at teen till in Aug 2014 but has gone into remission since then    Drug use: Not Currently     Types: Cocaine, Marijuana, Heroin     Comment: 2 x heroin experimental, smoked Cocaine with last 7/27/15    Sexual activity: Never     Partners: Female   Other Topics Concern     Service No    Blood Transfusions No    Caffeine Concern Yes    Occupational Exposure No    Hobby Hazards No    Sleep Concern No    Stress Concern No    Weight Concern No    Special Diet No    Back Care No    Exercise No    Bike Helmet No    Seat Belt Yes       Prior to Admission medications    Medication Sig Start Date End Date Taking? Authorizing Provider   amLODIPine (NORVASC) 10 mg tablet Take 10 mg by mouth daily. 22  Yes Provider, Historical   insulin glargine (Lantus U-100 Insulin) 100 unit/mL injection Inject 10 mL SQ QHS  Patient taking differently: Inject 25 units SQ QHS 10/5/22  Yes Chirag Lyons MD   lancets 28 gauge misc Use as directed 22   Kota Baeza MD   Insulin Needles, Disposable, 31 gauge x 5/16\" ndle UAD 10/5/22   Chirag Lyons MD   Syringe-Needle, Safety,Disp Un 3 mL 25 gauge x 5/8\" syrg UAD 10/5/22   Chirag Lyons MD       Allergies   Allergen Reactions    Tylenol [Acetaminophen] Other (comments)     Pt was advised not to take Tylenol per        Review of Systems  A comprehensive review of systems was negative except for that written in the History of Present Illness. Physical Exam:      Visit Vitals  BP (!) 148/79   Pulse 86   Temp 98.6 °F (37 °C) (Oral)   Resp 18   Ht 6' (1.829 m)   Wt 89.2 kg (196 lb 10.4 oz)   SpO2 95%   BMI 26.67 kg/m²       Physical Exam:  Physical Exam:   General:  Alert, cooperative, no distress, appears stated age.    Eyes: Conjunctivae/corneas clear. PERRL, EOMs intact. Fundi benign   Ears:  Normal TMs and external ear canals both ears. Nose: Nares normal. Septum midline. Mucosa normal. No drainage or sinus tenderness. Mouth/Throat: Lips, mucosa, and tongue normal. Teeth and gums normal.   Neck: Supple, symmetrical, trachea midline, no adenopathy, thyroid: no enlargement/tenderness/nodules, no carotid bruit and no JVD. Back:   Symmetric, no curvature. ROM normal. No CVA tenderness. Lungs:   Clear to auscultation bilaterally. Heart:  Regular rate and rhythm, S1, S2 normal, no murmur, click, rub or gallop. Abdomen:   Soft, non-tender. Bowel sounds normal. No masses,  No organomegaly. Extremities: Extremities normal, atraumatic, no cyanosis or edema. Has TDC , New AVF on right arm has good thrill & bruit   Pulses: 2+ and symmetric all extremities.    Skin: Skin color, texture, turgor normal. No rashes or lesions               Labs Reviewed:  BMP:   Lab Results   Component Value Date/Time     (L) 01/26/2023 04:26 AM    K 4.2 01/26/2023 04:26 AM     01/26/2023 04:26 AM    CO2 27 01/26/2023 04:26 AM    AGAP 4 01/26/2023 04:26 AM     (H) 01/26/2023 04:26 AM    BUN 39 (H) 01/26/2023 04:26 AM    CREA 5.54 (H) 01/26/2023 04:26 AM     CMP:   Lab Results   Component Value Date/Time     (L) 01/26/2023 04:26 AM    K 4.2 01/26/2023 04:26 AM     01/26/2023 04:26 AM    CO2 27 01/26/2023 04:26 AM    AGAP 4 01/26/2023 04:26 AM     (H) 01/26/2023 04:26 AM    BUN 39 (H) 01/26/2023 04:26 AM    CREA 5.54 (H) 01/26/2023 04:26 AM    CA 8.5 01/26/2023 04:26 AM    PHOS 3.1 01/26/2023 04:26 AM     CBC:   Lab Results   Component Value Date/Time    WBC 10.6 01/26/2023 04:26 AM    HGB 10.1 (L) 01/26/2023 04:26 AM    HCT 30.7 (L) 01/26/2023 04:26 AM     01/26/2023 04:26 AM     COAGS: No results found for: APTT, PTP, INR, INREXT, INREXT    Assessment/Plan     Active Problems:    ESRD (end stage renal disease) on dialysis [facility]) (1/25/2023)      [facility]arter[facility] ([facility] (1/26/2023)        Stable, AVF is patent, OK to DC home post dialysis ,continue OP Dialysis.

## 2023-01-26 NOTE — PROGRESS NOTES
Discharge teaching completed at bedside with patient. Opportunity provided for clarifying questions. No questions. IV removed.  ID removed and shredded Discharged ambulatory;

## 2023-01-26 NOTE — PROGRESS NOTES

## 2023-01-26 NOTE — PROGRESS NOTES
Progress Note    Cindy Clay  75 y.o. Admit Date: 1/25/2023  Active Problems:    ESRD (end stage renal disease) on dialysis [facility]) (1/25/2023) POA: Unknown      AVF (arteriovenous fistula) ([facility] (1/26/2023) POA: Unknown            Subjective:     Patient is on Dialysis as Planned, no new complain       A comprehensive review of systems was negative except for that written in the History of Present Illness. Objective:     Visit Vitals  BP (!) 148/79   Pulse 86   Temp 98.6 °F (37 °C) (Oral)   Resp 18   Ht 6' (1.829 m)   Wt 89.2 kg (196 lb 10.4 oz)   SpO2 95%   BMI 26.67 kg/m²         Intake/Output Summary (Last 24 hours) at 1/26/2023 1419  Last data filed at 1/26/2023 1255  Gross per 24 hour   Intake 100 ml   Output 2300 ml   Net -2200 ml       Current Facility-Administered Medications   Medication Dose Route Frequency Provider Last Rate Last Admin    heparin (porcine) 1,000 unit/mL injection 6,800 Units  6,800 Units Hemodialysis DIALYSIS PRN Tien Domínguez MD        amLODIPine (NORVASC) tablet 10 mg  10 mg Oral DAILY Becky Gamboa MD        insulin glargine (LANTUS) injection 10 Units  10 Units SubCUTAneous QHS Becky Gamboa MD   10 Units at 01/25/23 2205    insulin lispro (HUMALOG) injection   SubCUTAneous AC&HS Becky Gamboa MD   4 Units at 01/25/23 2205    glucose chewable tablet 16 g  16 g Oral PRN Becky Gamboa MD        glucagon (GLUCAGEN) injection 1 mg  1 mg IntraMUSCular PRN Becky Gamboa MD        dextrose 10% infusion 0-250 mL  0-250 mL IntraVENous PRN Becky Gamboa MD        oxyCODONE IR (ROXICODONE) tablet 5 mg  5 mg Oral Q4H PRN Becky Gamboa MD   5 mg at 01/26/23 1316    doxercalciferoL (HECTOROL) 4 mcg/2 mL injection 0.5 mcg  0.5 mcg IntraVENous DIALYSIS Fady Anthony MD   0.5 mcg at 01/26/23 1049     Current Outpatient Medications   Medication Sig Dispense Refill    amLODIPine (NORVASC) 10 mg tablet Take 10 mg by mouth daily.       insulin glargine (Lantus U-100 Insulin) 100 unit/mL injection Inject 10 mL SQ QHS (Patient taking differently: Inject 25 units SQ QHS) 1 mL 0    lancets 28 gauge misc Use as directed 100 Lancet 0    Insulin Needles, Disposable, 31 gauge x 5/16\" ndle UAD 1 Each 11    Syringe-Needle, Safety,Disp Un 3 mL 25 gauge x 5/8\" syrg UAD 1 Pen Needle 0        Physical Exam:     Physical Exam:   General:  Alert, cooperative, no distress, appears stated age. Eyes:  Conjunctivae/corneas clear. PERRL, EOMs intact. Mouth/Throat: Lips, mucosa, and tongue normal. Teeth and gums normal.   Neck: Supple, symmetrical, trachea midline, no adenopathy, thyroid: no enlargement/tenderness/nodules, no carotid bruit and no JVD. Lungs:   Clear to auscultation bilaterally. Heart:  Regular rate and rhythm, S1, S2 normal, no murmur, click, rub or gallop. Abdomen:   Soft, non-tender. Bowel sounds normal. No masses,  No organomegaly. Extremities: Extremities normal, atraumatic, no cyanosis or edema,AVF on right arm has good thrill & bruit,HD catheter functioning well.    Skin: Skin color, texture, turgor normal. No rashes or lesions                 Data Review:    CBC w/Diff    Recent Labs     01/26/23 0426   WBC 10.6   RBC 3.32*   HGB 10.1*   HCT 30.7*   MCV 92.5   MCH 30.4   MCHC 32.9   RDW 13.1    Recent Labs     01/26/23 0426   MONOS 11*   EOS 2   BASOS 1   RDW 13.1        Comprehensive Metabolic Profile    Recent Labs     01/26/23 0426 01/25/23  0739   * 139   K 4.2 4.5    104   CO2 27 30   BUN 39* 24*   CREA 5.54* 4.45*    Recent Labs     01/26/23 0426 01/25/23  0739   CA 8.5 10.2*   PHOS 3.1  --                         Impression:       Active Hospital Problems    Diagnosis Date Noted    AVF (arteriovenous fistula) ([facility] 01/26/2023    ESRD (end stage renal disease) on dialysis ([facility] 01/25/2023      Tolerated HD well      Plan:   [facility], ok to DC home from [place], continue OP dialysis      Fabiano Sharma MD

## 2023-02-13 ENCOUNTER — OFFICE VISIT (OUTPATIENT)
Age: 68
End: 2023-02-13
Payer: COMMERCIAL

## 2023-02-13 VITALS
WEIGHT: 191 LBS | OXYGEN SATURATION: 100 % | BODY MASS INDEX: 25.87 KG/M2 | HEART RATE: 78 BPM | HEIGHT: 72 IN | SYSTOLIC BLOOD PRESSURE: 110 MMHG | DIASTOLIC BLOOD PRESSURE: 60 MMHG

## 2023-02-13 DIAGNOSIS — N18.6 END STAGE RENAL DISEASE (HCC): Primary | ICD-10-CM

## 2023-02-13 PROCEDURE — 3078F DIAST BP <80 MM HG: CPT | Performed by: STUDENT IN AN ORGANIZED HEALTH CARE EDUCATION/TRAINING PROGRAM

## 2023-02-13 PROCEDURE — 1123F ACP DISCUSS/DSCN MKR DOCD: CPT | Performed by: STUDENT IN AN ORGANIZED HEALTH CARE EDUCATION/TRAINING PROGRAM

## 2023-02-13 PROCEDURE — 3074F SYST BP LT 130 MM HG: CPT | Performed by: STUDENT IN AN ORGANIZED HEALTH CARE EDUCATION/TRAINING PROGRAM

## 2023-02-13 PROCEDURE — 99214 OFFICE O/P EST MOD 30 MIN: CPT | Performed by: STUDENT IN AN ORGANIZED HEALTH CARE EDUCATION/TRAINING PROGRAM

## 2023-02-13 NOTE — PROGRESS NOTES
History and Physical    Adrienne East is a 75 y.o. male is 3 weeks s/p right arm brachiocephalic fistula creation. Patient currently being dialysed through [facility]. Right arm incision well healed with excellent thrill in the fistula. Physical Exam:    /60   Pulse 78   Ht 6' (1.829 m)   Wt 191 lb (86.6 kg)   SpO2 100%   BMI 25.90 kg/m²      Impression and Plan:  Adrienne East is a 75 y.o. male with right arm brachiocephalic fistula creation    - RTC in 3 weeks with right arm dialysis duplex  - Plan to cannulate in 3 weeks if fistula well matured. We reviewed the plan with the patient and the patient understands. No follow-up provider specified. Brice Johnson MD            No past medical history on file. No past surgical history on file.   Patient Active Problem List   Diagnosis    Elevated serum creatinine    Systolic CHF, chronic (HCC)    Chronic renal insufficiency    Hypertension    Stage 3 chronic renal impairment associated with type 2 diabetes mellitus (HCC)    Hepatitis C infection    Cocaine use    Cardiac LV ejection fraction 30-35%    Homelessness    Hypertriglyceridemia    Elevated HDL    Decreased GFR    NSTEMI (non-ST elevated myocardial infarction) (HCC)    History of heroin abuse (HCC)    Elevated alkaline phosphatase level    History of cocaine abuse (HCC)    TIA (transient ischemic attack)    Type 2 diabetes mellitus with insulin therapy (HCC)    COPD, mild (HCC)    CHF (congestive heart failure) (HCC)    Cardiomyopathy (HCC)    Insulin dependent type 2 diabetes mellitus, controlled ([address]    CVA (cerebral vascular accident) ([address]    ESRD (end stage renal disease) ([address]    Substance abuse ([address]    Anemia    End stage renal disease (HCC)    Pericardial effusion    Hyperglycemia    CKD (chronic kidney disease) stage 5, GFR less than 15 ml/min (HCC)    CKD (chronic kidney disease) stage 4, GFR 15-29 ml/min (HCC)    ESRD (end stage renal disease) on dialysis ([address]    AVF (arteriovenous fistula) (HCC)     No current outpatient medications on file.     No current facility-administered medications for this visit.     Allergies   Allergen Reactions    Acetaminophen Other (See Comments)     Pt was advised not to take Tylenol per      Social History     Socioeconomic History    Marital status: Single     Spouse name: Not on file    Number of children: Not on file    Years of education: Not on file    Highest education level: Not on file   Occupational History    Not on file   Tobacco Use    Smoking status: Not on file    Smokeless tobacco: Not on file   Substance and Sexual Activity    Alcohol use: Not on file    Drug use: Not on file    Sexual activity: Not on file   Other Topics Concern    Not on file   Social History Narrative    Not on file     Social Determinants of Health     Financial Resource Strain: Not on file   Food Insecurity: Not on file   Transportation Needs: Not on file   Physical Activity: Not on file   Stress: Not on file   Social Connections: Not on file   Intimate Partner Violence: Not on file   Housing Stability: Not on file     No family history on file.

## 2023-03-06 ENCOUNTER — OFFICE VISIT (OUTPATIENT)
Age: 68
End: 2023-03-06
Payer: COMMERCIAL

## 2023-03-06 VITALS
HEART RATE: 77 BPM | HEIGHT: 72 IN | DIASTOLIC BLOOD PRESSURE: 80 MMHG | WEIGHT: 191 LBS | OXYGEN SATURATION: 99 % | SYSTOLIC BLOOD PRESSURE: 152 MMHG | BODY MASS INDEX: 25.87 KG/M2

## 2023-03-06 DIAGNOSIS — N18.6 ESRD (END STAGE RENAL DISEASE) (HCC): Primary | ICD-10-CM

## 2023-03-06 PROCEDURE — 1123F ACP DISCUSS/DSCN MKR DOCD: CPT | Performed by: STUDENT IN AN ORGANIZED HEALTH CARE EDUCATION/TRAINING PROGRAM

## 2023-03-06 PROCEDURE — 99214 OFFICE O/P EST MOD 30 MIN: CPT | Performed by: STUDENT IN AN ORGANIZED HEALTH CARE EDUCATION/TRAINING PROGRAM

## 2023-03-06 PROCEDURE — 3077F SYST BP >= 140 MM HG: CPT | Performed by: STUDENT IN AN ORGANIZED HEALTH CARE EDUCATION/TRAINING PROGRAM

## 2023-03-06 PROCEDURE — 3079F DIAST BP 80-89 MM HG: CPT | Performed by: STUDENT IN AN ORGANIZED HEALTH CARE EDUCATION/TRAINING PROGRAM

## 2023-03-06 NOTE — PROGRESS NOTES
History and Physical    Patricia Snow is a 75 y.o. male s/p right arm brachiocephalic arteriovenous on 1/25/23. Patient tolerated the procedure well. Patient presents today and right arm fistula has matured well with size ranging between 6-8mm and depth is less than 6mm. Recommend cannulation and plan for catheter removal after 3 successful access. Physical Exam:    BP (!) 152/80   Pulse 77   Ht 6' (1.829 m)   Wt 191 lb (86.6 kg)   SpO2 99%   BMI 25.90 kg/m²      Impression and Plan:  Patricia Snow is a 75 y.o. male with esrd on HD s/p right arm brachiocephalic arteriovenous fistula creation    - Right arm fistula ready for cannulation  - Plan for [facility] removal in the office after 3 successful fistula cannulations    We reviewed the plan with the patient and the patient understands. No follow-up provider specified. Margarita Conklin MD            No past medical history on file. No past surgical history on file.   Patient Active Problem List   Diagnosis    Elevated serum creatinine    Systolic CHF, chronic (HCC)    Chronic renal insufficiency    Hypertension    Stage 3 chronic renal impairment associated with type 2 diabetes mellitus (HCC)    Hepatitis C infection    Cocaine use    Cardiac LV ejection fraction 30-35%    Homelessness    Hypertriglyceridemia    Elevated HDL    Decreased GFR    NSTEMI (non-ST elevated myocardial infarction) (HCC)    History of heroin abuse (HCC)    Elevated alkaline phosphatase level    History of cocaine abuse (HCC)    TIA (transient ischemic attack)    Type 2 diabetes mellitus with insulin therapy (HCC)    COPD, mild (HCC)    CHF (congestive heart failure) (HCC)    Cardiomyopathy (HCC)    Insulin dependent type 2 diabetes mellitus, controlled ([address]    CVA (cerebral vascular accident) ([address]    ESRD (end stage renal disease) ([address]    Substance abuse ([address]    Anemia    End stage renal disease ([address]    Pericardial effusion    Hyperglycemia    CKD (chronic kidney disease) stage 5, GFR less than 15 ml/min ([facility])    CKD (chronic kidney disease) stage 4, GFR 15-29 ml/min ([facility])    ESRD (end stage renal disease) on dialysis ([facility]    AVF (arteriovenous fistula) ([facility])     Current Outpatient Medications   Medication Sig Dispense Refill    amLODIPine (NORVASC) 10 MG tablet Take 10 mg by mouth daily      insulin glargine (LANTUS) 100 UNIT/ML injection vial Inject 10 mL SQ QHS       No current facility-administered medications for this visit. Allergies   Allergen Reactions    Acetaminophen Other (See Comments)     Pt was advised not to take Tylenol per Dr. Kristen Thomas History     Socioeconomic History    Marital status: Single     Spouse name: Not on file    Number of children: Not on file    Years of education: Not on file    Highest education level: Not on file   Occupational History    Not on file   Tobacco Use    Smoking status: Not on file    Smokeless tobacco: Not on file   Substance and Sexual Activity    Alcohol use: Not on file    Drug use: Not on file    Sexual activity: Not on file   Other Topics Concern    Not on file   Social History Narrative    Not on file     Social Determinants of Health     Financial Resource Strain: Not on file   Food Insecurity: Not on file   Transportation Needs: Not on file   Physical Activity: Not on file   Stress: Not on file   Social Connections: Not on file   Intimate Partner Violence: Not on file   Housing Stability: Not on file     No family history on file.

## 2023-03-06 NOTE — PROGRESS NOTES
1. Have you been to the ER, urgent care clinic since your last visit? No Hospitalized since your last visit? No    2. Have you seen or consulted any other health care providers outside of the [address] [place] [address] since your last visit? Include any pap smears or colon screening.  No

## 2023-03-27 ENCOUNTER — PROCEDURE VISIT (OUTPATIENT)
Age: 68
End: 2023-03-27

## 2023-03-27 VITALS
OXYGEN SATURATION: 100 % | SYSTOLIC BLOOD PRESSURE: 138 MMHG | WEIGHT: 191 LBS | DIASTOLIC BLOOD PRESSURE: 70 MMHG | HEIGHT: 72 IN | BODY MASS INDEX: 25.87 KG/M2 | HEART RATE: 73 BPM

## 2023-03-27 DIAGNOSIS — N18.6 ESRD (END STAGE RENAL DISEASE) ON DIALYSIS (HCC): Primary | ICD-10-CM

## 2023-03-27 DIAGNOSIS — Z99.2 ESRD (END STAGE RENAL DISEASE) ON DIALYSIS (HCC): Primary | ICD-10-CM

## 2023-03-27 PROCEDURE — 99024 POSTOP FOLLOW-UP VISIT: CPT | Performed by: PHYSICIAN ASSISTANT

## 2023-03-27 RX ORDER — ASCORBIC ACID, THIAMINE MONONITRATE,RIBOFLAVIN, NIACINAMIDE, PYRIDOXINE HYDROCHLORIDE, FOLIC ACID, CYANOCOBALAMIN, BIOTIN, CALCIUM PANTOTHENATE, 100; 1.5; 1.7; 20; 10; 1; 6000; 150000; 5 MG/1; MG/1; MG/1; MG/1; MG/1; MG/1; UG/1; UG/1; MG/1
1 CAPSULE, LIQUID FILLED ORAL DAILY
COMMUNITY
Start: 2023-03-15

## 2023-03-27 RX ORDER — ASPIRIN 81 MG
81 TABLET,CHEWABLE ORAL DAILY
COMMUNITY
Start: 2023-03-15

## 2023-03-27 ASSESSMENT — ENCOUNTER SYMPTOMS
SHORTNESS OF BREATH: 0
BACK PAIN: 0
NAUSEA: 0
ABDOMINAL PAIN: 0
VOMITING: 0
COUGH: 0
CHEST TIGHTNESS: 0

## 2023-03-27 NOTE — PROGRESS NOTES
1. Have you been to the ER, urgent care clinic since your last visit? No Hospitalized since your last visit? No    2. Have you seen or consulted any other health care providers outside of the [address] [place] [address] since your last visit? Include any pap smears or colon screening.  No

## 2023-03-27 NOTE — PROGRESS NOTES
[facility] Follow Up  VASCULAR SPECIALISTS  Beckie Caldwell PA-C      HPI: Mr. Sariah Louise is s/p creation of right arm brachiocephalic AVF on 0/63/0480. He states 2 weeks ago the HD nurse infiltrated his AVF so the decision was made to use the catheter for HD. AVF was accessed successfully the last two times he has received HD. He has some mild bruising in his RUE but AVF with strong thrill and bruit. Discussed with plan for 1 more week of successful HD via RUE AVF before removing catheter. Receives HD on TTS. IMAGING:     none    History reviewed. No pertinent past medical history. History reviewed. No pertinent surgical history. Allergies   Allergen Reactions    Acetaminophen Other (See Comments)     Pt was advised not to take Tylenol per .     Current Outpatient Medications on File Prior to Visit   Medication Sig Dispense Refill    ASPIRIN LOW DOSE 81 MG chewable tablet Take 81 mg by mouth daily      B Complex-C-Folic Acid (RENAL) 1 MG CAPS Take 1 capsule by mouth daily      amLODIPine (NORVASC) 10 MG tablet Take 10 mg by mouth daily      insulin glargine (LANTUS) 100 UNIT/ML injection vial Inject 10 mL SQ QHS       No current facility-administered medications on file prior to visit. Social History     Tobacco Use    Smoking status: Not on file    Smokeless tobacco: Not on file   Substance Use Topics    Alcohol use: Not on file     History reviewed. No pertinent family history. Review of Systems   Constitutional:  Negative for fatigue. Respiratory:  Negative for cough, chest tightness and shortness of breath. Cardiovascular:  Negative for chest pain and leg swelling. Gastrointestinal:  Negative for abdominal pain, nausea and vomiting. Musculoskeletal:  Negative for back pain.         /70   Pulse 73   Ht 6' (1.829 m)   Wt 191 lb (86.6 kg)   SpO2 100%   BMI 25.90 kg/m²       PHYSICAL EXAMINATION:  GEN: alert, oriented, affect appropriate  HEENT:   PERRLA, EOMI  NECK:

## 2023-04-23 ENCOUNTER — APPOINTMENT (OUTPATIENT)
Facility: HOSPITAL | Age: 68
End: 2023-04-23
Payer: COMMERCIAL

## 2023-04-23 ENCOUNTER — HOSPITAL ENCOUNTER (EMERGENCY)
Facility: HOSPITAL | Age: 68
Discharge: HOME | End: 2023-04-23
Attending: EMERGENCY MEDICINE
Payer: COMMERCIAL

## 2023-04-23 VITALS
HEART RATE: 78 BPM | SYSTOLIC BLOOD PRESSURE: 127 MMHG | DIASTOLIC BLOOD PRESSURE: 66 MMHG | TEMPERATURE: 97.2 F | RESPIRATION RATE: 17 BRPM | OXYGEN SATURATION: 98 %

## 2023-04-23 DIAGNOSIS — B07.0 PLANTAR WART: Primary | ICD-10-CM

## 2023-04-23 PROCEDURE — 73630 X-RAY EXAM OF FOOT: CPT

## 2023-04-23 PROCEDURE — 99283 EMERGENCY DEPT VISIT LOW MDM: CPT

## 2023-04-23 ASSESSMENT — ENCOUNTER SYMPTOMS
SORE THROAT: 0
ANAL BLEEDING: 0
ABDOMINAL DISTENTION: 0
NAUSEA: 0
SHORTNESS OF BREATH: 0
CONSTIPATION: 0
BACK PAIN: 0
ABDOMINAL PAIN: 0
DIARRHEA: 0
BLOOD IN STOOL: 0

## 2023-06-05 ENCOUNTER — PROCEDURE VISIT (OUTPATIENT)
Age: 68
End: 2023-06-05
Payer: COMMERCIAL

## 2023-06-05 VITALS
WEIGHT: 190.92 LBS | OXYGEN SATURATION: 99 % | HEART RATE: 77 BPM | DIASTOLIC BLOOD PRESSURE: 80 MMHG | SYSTOLIC BLOOD PRESSURE: 126 MMHG | BODY MASS INDEX: 25.86 KG/M2 | HEIGHT: 72 IN

## 2023-06-05 DIAGNOSIS — N18.6 ESRD (END STAGE RENAL DISEASE) ON DIALYSIS (HCC): Primary | ICD-10-CM

## 2023-06-05 DIAGNOSIS — Z99.2 ESRD (END STAGE RENAL DISEASE) ON DIALYSIS (HCC): Primary | ICD-10-CM

## 2023-06-05 PROCEDURE — 99212 OFFICE O/P EST SF 10 MIN: CPT | Performed by: SURGERY

## 2023-06-05 PROCEDURE — 3074F SYST BP LT 130 MM HG: CPT | Performed by: SURGERY

## 2023-06-05 PROCEDURE — 1123F ACP DISCUSS/DSCN MKR DOCD: CPT | Performed by: SURGERY

## 2023-06-05 PROCEDURE — 3079F DIAST BP 80-89 MM HG: CPT | Performed by: SURGERY

## 2023-06-05 ASSESSMENT — PATIENT HEALTH QUESTIONNAIRE - PHQ9
SUM OF ALL RESPONSES TO PHQ QUESTIONS 1-9: 0
1. LITTLE INTEREST OR PLEASURE IN DOING THINGS: 0
SUM OF ALL RESPONSES TO PHQ9 QUESTIONS 1 & 2: 0
SUM OF ALL RESPONSES TO PHQ QUESTIONS 1-9: 0
2. FEELING DOWN, DEPRESSED OR HOPELESS: 0
SUM OF ALL RESPONSES TO PHQ QUESTIONS 1-9: 0
SUM OF ALL RESPONSES TO PHQ QUESTIONS 1-9: 0

## 2023-06-05 NOTE — PROGRESS NOTES
1. Have you been to the ER, urgent care clinic since your last visit? [facility]        Hospitalized since your last visit? No     2. Have you seen or consulted any other health care providers outside of the [address] [place] [address] since your last visit? Include any pap smears or colon screening.   No

## 2023-06-05 NOTE — PROGRESS NOTES
Azul Starkey (: 1955) is a 70 y.o. male here for evaluation of the following chief complaint(s):  Vascular Access Problem (Cath removal )       ASSESSMENT/PLAN:  Below is the assessment and plan developed based on review of pertinent history, physical exam, labs, studies, and medications. 1. ESRD (end stage renal disease) on dialysis ([address]  Right IJ tunneled dialysis catheter removed in clinic today without difficulty and sterile dressing applied. Patient instructed to remove dressing later tonight. He is okay to shower and get the area wet. He can keep a Band-Aid on it until it heals. No regularly scheduled follow-up needed unless his AV fistula begins to malfunction then follow-up as needed. Patient understands and agrees with this plan. Return if symptoms worsen or fail to improve. SUBJECTIVE/OBJECTIVE:  HPI  Mr. Christina Hathaway is s/p creation of right arm brachiocephalic AVF on . It is functioning well. Receives HD on TTS. He is here for [facility] removal.       Allergies   Allergen Reactions    Acetaminophen Other (See Comments)     Pt was advised not to take Tylenol per .         Current Outpatient Medications:     ASPIRIN LOW DOSE 81 MG chewable tablet, Take 1 tablet by mouth daily, Disp: , Rfl:     B Complex-C-Folic Acid (RENAL) 1 MG CAPS, Take 1 capsule by mouth daily, Disp: , Rfl:     amLODIPine (NORVASC) 10 MG tablet, Take 1 tablet by mouth daily, Disp: , Rfl:     insulin glargine (LANTUS) 100 UNIT/ML injection vial, Inject 10 mL SQ QHS, Disp: , Rfl:      History reviewed. No pertinent past medical history. History reviewed. No pertinent surgical history. History reviewed. No pertinent family history. Social History     Tobacco Use   Smoking Status Never   Smokeless Tobacco Never         Review of Systems  No chest pain, no shortness of breath, positive joint pain, no fever.       Physical Exam  General: Well-appearing male in no acute distress   HEENT: EOMI, no scleral

## 2023-11-05 NOTE — PROGRESS NOTES
[facility]      CARDIOLOGY PROGRESS NOTE  RECS:       1. Possible acute CVA with dysarthria- symptoms resolved. 2. Chronic combined systolic and diastolic heart failure-stage C class II NYHA- appears compensated no clinical evidence fluid overload. 3. Non ischemic Cardiomyopathy with EF 25%- on recent echo possible related to cocaine and alcohol use. In 7/2016  Patient had cardiac cath which revealed non critical CAD. Continue low dose Coreg. Not a candidate for AICD due to active polysubstance abuse  4. Hypertension-slightly elevated today. Added  Imdur and hydralazine. Continue Norvasc, bb  and Lisinopril   5. Polysubstance abuse- have discussion with patient and patient was advised to quit cocaine and alcohol use. 6. CKD- creatinine improving . 7. Uncontrolled diabetes with Hgb A1c 14- w/u and medications per medical team  8. Brain mass on MRI- w/u per Neurology and Medical team          Patient education included a review of the importance of compliance in the treatment regimen    Ok to d/c home from cardiac stand point follow in office with Dr. Haja Hassan 1-2 weeks         Echo 4/18  Left ventricle: The ventricle was dilated. Systolic function was severely   reduced by visual assessment. Ejection  fraction was estimated to be 25 %. There was moderate diffuse hypokinesis. Wall thickness was mildly increased. Features were consistent with a pseudonormal left ventricular filling   pattern, with concomitant abnormal relaxation and  increased filling pressure (grade 2 diastolic dysfunction). Left atrium: The atrium was moderately dilated. Atrial septum: Contrast injection was performed. There was no right-to-left   shunt, with provocative maneuvers to  increase right atrial pressure. Mitral valve: There was moderate regurgitation. The effective orifice of   mitral regurgitation by proximal isovelocity  surface area was 0.1 cm-sq.  The volume of mitral regurgitation by proximal isovelocity surface area was 18 ml.        NUC stress test 07/2015  CONCLUSIONS  1. Nondiagnostic exercise stress test from an electrocardiographic  standpoint. 2. Abnormal perfusion study. 3. Perfusion imaging shows no evidence of significant ongoing ischemia or  prior infarction. 4. Gated SPECT imaging shows severely dilated left ventricle with severely  diminished LV function with estimated ejection fraction of 27%. No obvious  regional wall motion abnormalities noted. This is consistent with dilated  cardiomyopathy. 5. This is a moderate-to-high risk finding. ASSESSMENT:  Hospital Problems  Date Reviewed: 12/2/2015          Codes Class Noted POA    TIA (transient ischemic attack) ICD-10-CM: G45.9  ICD-9-CM: 435.9  4/11/2018 Unknown        CVA (cerebral vascular accident) [facility]) ICD-10-CM: I63.9  ICD-9-CM: 434.91  4/11/2018 Unknown        Cardiac LV ejection fraction 30-35% ICD-10-CM: R94.30  ICD-9-CM: 785.9  7/15/2015 Unknown        Cocaine use ICD-10-CM: F14.90  ICD-9-CM: 305.60  8/9/2014 Unknown    Overview Signed 4/12/2018 12:01 PM by Mirza Francois, JOSE MANUEL     + UDS, persistent use             Hypertension ICD-10-CM: I10  ICD-9-CM: 401.9  1/1/2000 Unknown                SUBJECTIVE:    No CP  No SOB  Dysarthria resolved     OBJECTIVE:    VS:   Visit Vitals    BP (!) 177/92 (BP 1 Location: Left arm, BP Patient Position: At rest)    Pulse 71    Temp 98.1 °F (36.7 °C)    Resp 18    Ht 5' 11\" (1.803 m)    Wt 69.4 kg (153 lb 1.6 oz)    SpO2 100%    BMI 21.35 kg/m2         Intake/Output Summary (Last 24 hours) at 04/13/18 1107  Last data filed at 04/13/18 0720   Gross per 24 hour   Intake             2329 ml   Output              960 ml   Net             1369 ml     TELE: normal sinus rhythm    General: alert, well developed, pleasant and in no apparent distress  HENT: Normocephalic, atraumatic. Normal external eye.   Neck :  no bruit, no JVD  Cardiac:  regular rate and rhythm, S1, S2 normal, no murmur, click, rub or gallop  Lungs: clear to auscultation bilaterally  Abdomen: Soft, nontender, no masses  Extremities:  No c/c/e, peripheral pulses present      Labs: Results:       Chemistry Recent Labs      04/13/18   0307 04/12/18   0244  04/10/18   2333   GLU  171*  68*  634*   NA  136  137  130*   K  3.7  4.1  4.5   CL  102  103  94*   CO2  28  31  30   BUN  18  20*  27*   CREA  1.75*  1.97*  2.67*   CA  8.0*  8.7  8.1*   AGAP  6  3  6   BUCR  10*  10*  10*   AP  107  118*  131*   TP  5.7*  6.1*  6.6   ALB  2.0*  2.2*  2.5*   GLOB  3.7  3.9  4.1*   AGRAT  0.5*  0.6*  0.6*      CBC w/Diff Recent Labs      04/13/18 0307 04/12/18   0244  04/10/18   2333   WBC  13.5*  12.9  8.1   RBC  4.00*  4.13*  3.93*   HGB  11.9*  12.1*  11.6*   HCT  34.3*  35.7*  34.0*   PLT  301  316  306   GRANS  70  72  56   LYMPH  21  19*  30   EOS  2  2  4      Cardiac Enzymes Recent Labs      04/11/18   1640  04/11/18   0920   CPK  209  294   CKND1  1.9  1.9      Coagulation Recent Labs      04/13/18 0307 04/12/18   0244  04/10/18   2333   PTP  10.8*  11.6  11.1*   INR  0.8  0.9  0.8   APTT   --    --   27.5       Lipid Panel Lab Results   Component Value Date/Time    Cholesterol, total 139 04/12/2018 02:44 AM    HDL Cholesterol 65 (H) 04/12/2018 02:44 AM    LDL, calculated 47.6 04/12/2018 02:44 AM    VLDL, calculated 26.4 04/12/2018 02:44 AM    Triglyceride 132 04/12/2018 02:44 AM    CHOL/HDL Ratio 2.1 04/12/2018 02:44 AM      BNP No results for input(s): BNPP in the last 72 hours.    Liver Enzymes Recent Labs      04/13/18 0307   TP  5.7*   ALB  2.0*   AP  107   SGOT  33      Thyroid Studies Lab Results   Component Value Date/Time    TSH 2.11 04/12/2018 02:44 AM          Mychal Wood MD Started first trimester

## 2024-04-23 ENCOUNTER — APPOINTMENT (OUTPATIENT)
Facility: HOSPITAL | Age: 69
End: 2024-04-23
Payer: MEDICARE

## 2024-04-23 ENCOUNTER — HOSPITAL ENCOUNTER (EMERGENCY)
Facility: HOSPITAL | Age: 69
Discharge: HOME | End: 2024-04-23
Attending: EMERGENCY MEDICINE
Payer: MEDICARE

## 2024-04-23 VITALS
BODY MASS INDEX: 26.14 KG/M2 | HEIGHT: 72 IN | TEMPERATURE: 97.6 F | WEIGHT: 193 LBS | HEART RATE: 81 BPM | RESPIRATION RATE: 18 BRPM | OXYGEN SATURATION: 96 % | DIASTOLIC BLOOD PRESSURE: 97 MMHG | SYSTOLIC BLOOD PRESSURE: 159 MMHG

## 2024-04-23 DIAGNOSIS — J81.0 ACUTE PULMONARY EDEMA (HCC): ICD-10-CM

## 2024-04-23 DIAGNOSIS — N18.6 ESRD (END STAGE RENAL DISEASE) (HCC): ICD-10-CM

## 2024-04-23 DIAGNOSIS — J96.01 ACUTE RESPIRATORY FAILURE WITH HYPOXIA (HCC): Primary | ICD-10-CM

## 2024-04-23 PROBLEM — E87.70 VOLUME OVERLOAD: Status: ACTIVE | Noted: 2024-04-23

## 2024-04-23 LAB
ALBUMIN SERPL-MCNC: 3.4 G/DL (ref 3.4–5)
ALBUMIN/GLOB SERPL: 0.8 (ref 0.8–1.7)
ALP SERPL-CCNC: 126 U/L (ref 45–117)
ALT SERPL-CCNC: 28 U/L (ref 16–61)
ANION GAP SERPL CALC-SCNC: 6 MMOL/L (ref 3–18)
AST SERPL-CCNC: 24 U/L (ref 10–38)
BASOPHILS # BLD: 0.1 K/UL (ref 0–0.1)
BASOPHILS NFR BLD: 1 % (ref 0–2)
BILIRUB SERPL-MCNC: 0.8 MG/DL (ref 0.2–1)
BUN SERPL-MCNC: 53 MG/DL (ref 7–18)
BUN/CREAT SERPL: 10 (ref 12–20)
CALCIUM SERPL-MCNC: 9 MG/DL (ref 8.5–10.1)
CHLORIDE SERPL-SCNC: 111 MMOL/L (ref 100–111)
CO2 SERPL-SCNC: 24 MMOL/L (ref 21–32)
CREAT SERPL-MCNC: 5.29 MG/DL (ref 0.6–1.3)
DIFFERENTIAL METHOD BLD: ABNORMAL
EKG ATRIAL RATE: 276 BPM
EKG DIAGNOSIS: NORMAL
EKG P AXIS: 245 DEGREES
EKG Q-T INTERVAL: 296 MS
EKG QRS DURATION: 148 MS
EKG QTC CALCULATION (BAZETT): 448 MS
EKG R AXIS: -56 DEGREES
EKG T AXIS: 38 DEGREES
EKG VENTRICULAR RATE: 138 BPM
EOSINOPHIL # BLD: 0.3 K/UL (ref 0–0.4)
EOSINOPHIL NFR BLD: 2 % (ref 0–5)
ERYTHROCYTE [DISTWIDTH] IN BLOOD BY AUTOMATED COUNT: 12.8 % (ref 11.6–14.5)
GLOBULIN SER CALC-MCNC: 4.1 G/DL (ref 2–4)
GLUCOSE SERPL-MCNC: 188 MG/DL (ref 74–99)
HBV SURFACE AB SER QL IA: POSITIVE
HBV SURFACE AB SERPL IA-ACNC: 121.19 MIU/ML
HBV SURFACE AG SER QL: <0.1 INDEX
HBV SURFACE AG SER QL: NEGATIVE
HCT VFR BLD AUTO: 37.2 % (ref 36–48)
HEP BS AB COMMENT: NORMAL
HGB BLD-MCNC: 12.4 G/DL (ref 13–16)
IMM GRANULOCYTES # BLD AUTO: 0.1 K/UL (ref 0–0.04)
IMM GRANULOCYTES NFR BLD AUTO: 1 % (ref 0–0.5)
LACTATE SERPL-SCNC: 1.5 MMOL/L (ref 0.4–2)
LYMPHOCYTES # BLD: 1.3 K/UL (ref 0.9–3.6)
LYMPHOCYTES NFR BLD: 10 % (ref 21–52)
MAGNESIUM SERPL-MCNC: 1.8 MG/DL (ref 1.6–2.6)
MCH RBC QN AUTO: 31.6 PG (ref 24–34)
MCHC RBC AUTO-ENTMCNC: 33.3 G/DL (ref 31–37)
MCV RBC AUTO: 94.9 FL (ref 78–100)
MONOCYTES # BLD: 1.1 K/UL (ref 0.05–1.2)
MONOCYTES NFR BLD: 9 % (ref 3–10)
NEUTS SEG # BLD: 9.7 K/UL (ref 1.8–8)
NEUTS SEG NFR BLD: 78 % (ref 40–73)
NRBC # BLD: 0 K/UL (ref 0–0.01)
NRBC BLD-RTO: 0 PER 100 WBC
NT PRO BNP: 6263 PG/ML (ref 0–900)
PLATELET # BLD AUTO: 227 K/UL (ref 135–420)
PMV BLD AUTO: 10.3 FL (ref 9.2–11.8)
POTASSIUM SERPL-SCNC: 3.6 MMOL/L (ref 3.5–5.5)
PROT SERPL-MCNC: 7.5 G/DL (ref 6.4–8.2)
RBC # BLD AUTO: 3.92 M/UL (ref 4.35–5.65)
SODIUM SERPL-SCNC: 141 MMOL/L (ref 136–145)
TROPONIN I SERPL HS-MCNC: 123 NG/L (ref 0–78)
TROPONIN I SERPL HS-MCNC: 135 NG/L (ref 0–78)
WBC # BLD AUTO: 12.6 K/UL (ref 4.6–13.2)

## 2024-04-23 PROCEDURE — 86706 HEP B SURFACE ANTIBODY: CPT

## 2024-04-23 PROCEDURE — 83735 ASSAY OF MAGNESIUM: CPT

## 2024-04-23 PROCEDURE — 83880 ASSAY OF NATRIURETIC PEPTIDE: CPT

## 2024-04-23 PROCEDURE — 93005 ELECTROCARDIOGRAM TRACING: CPT | Performed by: EMERGENCY MEDICINE

## 2024-04-23 PROCEDURE — 96374 THER/PROPH/DIAG INJ IV PUSH: CPT

## 2024-04-23 PROCEDURE — 93010 ELECTROCARDIOGRAM REPORT: CPT | Performed by: INTERNAL MEDICINE

## 2024-04-23 PROCEDURE — G0257 UNSCHED DIALYSIS ESRD PT HOS: HCPCS

## 2024-04-23 PROCEDURE — 80053 COMPREHEN METABOLIC PANEL: CPT

## 2024-04-23 PROCEDURE — 6370000000 HC RX 637 (ALT 250 FOR IP): Performed by: EMERGENCY MEDICINE

## 2024-04-23 PROCEDURE — 90935 HEMODIALYSIS ONE EVALUATION: CPT

## 2024-04-23 PROCEDURE — 4500000002 HC ER NO CHARGE

## 2024-04-23 PROCEDURE — 94660 CPAP INITIATION&MGMT: CPT

## 2024-04-23 PROCEDURE — 94761 N-INVAS EAR/PLS OXIMETRY MLT: CPT

## 2024-04-23 PROCEDURE — 84484 ASSAY OF TROPONIN QUANT: CPT

## 2024-04-23 PROCEDURE — 87340 HEPATITIS B SURFACE AG IA: CPT

## 2024-04-23 PROCEDURE — 2700000000 HC OXYGEN THERAPY PER DAY

## 2024-04-23 PROCEDURE — 71045 X-RAY EXAM CHEST 1 VIEW: CPT

## 2024-04-23 PROCEDURE — 83605 ASSAY OF LACTIC ACID: CPT

## 2024-04-23 PROCEDURE — 85025 COMPLETE CBC W/AUTO DIFF WBC: CPT

## 2024-04-23 PROCEDURE — 2500000003 HC RX 250 WO HCPCS: Performed by: EMERGENCY MEDICINE

## 2024-04-23 RX ORDER — HEPARIN SODIUM 1000 [USP'U]/ML
INJECTION, SOLUTION INTRAVENOUS; SUBCUTANEOUS
Status: DISCONTINUED
Start: 2024-04-23 | End: 2024-04-23 | Stop reason: HOSPADM

## 2024-04-23 RX ORDER — DILTIAZEM HYDROCHLORIDE 5 MG/ML
20 INJECTION INTRAVENOUS ONCE
Status: COMPLETED | OUTPATIENT
Start: 2024-04-23 | End: 2024-04-23

## 2024-04-23 RX ADMIN — NITROGLYCERIN 0.5 INCH: 20 OINTMENT TOPICAL at 06:33

## 2024-04-23 RX ADMIN — DILTIAZEM HYDROCHLORIDE 20 MG: 5 INJECTION, SOLUTION INTRAVENOUS at 06:35

## 2024-04-23 ASSESSMENT — LIFESTYLE VARIABLES
HOW MANY STANDARD DRINKS CONTAINING ALCOHOL DO YOU HAVE ON A TYPICAL DAY: PATIENT DOES NOT DRINK
HOW OFTEN DO YOU HAVE A DRINK CONTAINING ALCOHOL: NEVER

## 2024-04-23 NOTE — ED NOTES
Pt returned from dialysis. NAD.     MD made aware that pt has returned. Per MD, pt needs to trial off O2 to determine dispo.

## 2024-04-23 NOTE — ED PROVIDER NOTES
EMERGENCY DEPARTMENT HISTORY AND PHYSICAL EXAM      Date: 4/23/2024  Patient Name: Simone Lamas      History of Presenting Illness     Chief Complaint   Patient presents with    Shortness of Breath       Location/Duration/Severity/Modifying factors   Chief Complaint   Patient presents with    Shortness of Breath       HPI:  Simone Lamas is a 69 y.o. male with PMH significant for end-stage renal disease on dialysis presents with acute onset shortness of breath while laying flat in bed this morning with cough productive of \"phlegm \". Pt  denies chest pain or pressure, symptoms of chest pain, shortness of breath yesterday.  Due for dialysis today.  His nephrologist is Dr. Bray.  Denies known history of cardiac disease.    PCP: Cornell Leo MD    Current Facility-Administered Medications   Medication Dose Route Frequency Provider Last Rate Last Admin    heparin (porcine) 1000 UNIT/ML injection              Current Outpatient Medications   Medication Sig Dispense Refill    ASPIRIN LOW DOSE 81 MG chewable tablet Take 1 tablet by mouth daily      B Complex-C-Folic Acid (RENAL) 1 MG CAPS Take 1 capsule by mouth daily      amLODIPine (NORVASC) 10 MG tablet Take 1 tablet by mouth daily      insulin glargine (LANTUS) 100 UNIT/ML injection vial Inject 10 mL SQ QHS         Past History     Past Medical History:  No past medical history on file.    Past Surgical History:  No past surgical history on file.    Family History:  No family history on file.    Social History:  Social History     Tobacco Use    Smoking status: Never    Smokeless tobacco: Never       Allergies:  Allergies   Allergen Reactions    Acetaminophen Other (See Comments)     Pt was advised not to take Tylenol per Dr.         Physical Exam     General: Patient is awake and alert, resting comfortably in no acute distress.  Cardiovascular: Tachycardic rate, regular rhythm, no murmurs.  Respiratory: Mildly dyspneic at rest and with speech.

## 2024-04-23 NOTE — ED NOTES
Assumed care of pt Pt to ED reports SOB onset this am pt on HD tues, Thursday, sat. Pt due for Hd today. Pt arrived on 2L NC SPO2 96%. Pt not on home O2 . Pt on cardiac monitor . Allergies verified pt medicated per Mar nitro to right chest

## 2024-04-23 NOTE — PROGRESS NOTES
04/23/24 0859   Oxygen Therapy/Pulse Ox   O2 Therapy Oxygen   O2 Device Nasal cannula   O2 Flow Rate (L/min) 2 L/min   Pulse 86   Respirations 23   SpO2 99 %       Patient taken off BIPAP and placed on 2 lpm. No respiratory distress or issues noted. Dr Philippe notified.

## 2024-04-23 NOTE — ED NOTES
Received call from Enma at HD. Pt is scheduled to receive dialysis today, however cannot go while on bipap.     Will discuss with RT and MD to see if pt can be transitioned to NRB or NC.

## 2024-04-23 NOTE — FLOWSHEET NOTE
04/23/24 1535   Departure Condition   Departure Condition Stable   Mobility at Departure Ambulatory   Patient Teaching Discharge instructions reviewed;Follow-up care reviewed;Patient verbalized understanding   Departure Mode By self  (non-emergent PD transporting back to shelter)   Discharged to Shelter

## 2024-04-23 NOTE — DIALYSIS
HD Care plan  Time: 3.5  hrs  Dialysate:  3 K+   2.5 Ca++  [place]  Net UF: 3 L  Access: Aseptically care for ANILA AVF  Hemodynamic stability: Maintain BP WNL     Pre Dialysis:  Pt received from ER Nurse Eileen Shi , pt on a bed, A+O X 4, no s/s of acute distress noted on oxygen 2 L via NC SPO2 99%. ANILA AVF assessed, no abnormalities noted, bruit and thrill strong, AVF accessed using 15 G needles without any difficulty. Good flows achieved from both needles.  Intra Dialysis:  Time out / safety process performed per policy, Tx initiated at 1000.    AVF/AVG flowing with ease. For hemodynamic stability UF goal set at 3000 ml as tolerated.  Pt offered assistance with repositioning every 2 hours/prn    Vascular access visible and line connections remained intact throughout entire duration of treatment.   Vital signs checked every 15 mins.     Post Dialysis:  Tx completed at 1330,   Tolerated well , 3 L  removed. De-accessed per protocol.    Clot time 6 minutes for arterial, and 8 minutes for venous.   Dry dressings applied.  Bruit/Thrill present above and below dressings.  Post Dialysis report given to ER Nurse Eileen.

## 2024-04-23 NOTE — CONSULTS
Consult Note    Patient: Simone Lamas               Sex: male          DOA: 4/23/2024         YOB: 1955      Age:  69 y.o.        LOS:  LOS: 0 days              HPI:     Simone Lamas is a 69 y.o. male who has been seen on consultation at the request of the emergency room physician.  Patient has ESRD and gets dialysis under my care every Tuesday Thursday Saturday morning at [facility] dialysis unit.  He is a homeless person and lives in a CT cell to her house.  Most of the time he used to come to the dialysis unit by walking about 4 miles from his place to the dialysis unit.  Recently after discussion with me family has agreed to come to the dialysis unit by CT transportation system.  This morning when he woke up he was fine but certainly feel acute shortness of breath without chest pain but does complain that she had also little faint going on for last 2 days.  Was last dialyzed on Saturday and it complete his whole treatment he is a compliant patient does not miss any dialysis treatment.  Still makes urine.  No recent history of any substance abuse.  Denies any fever any chills any palpitation.  On arrival in the emergency room his oxygen saturation was 90% on room air but subsequently after putting 2 L of oxygen saturation improved to 94 to 95%.  I was notified about this patient and subsequently seen the patient in the emergency room and evaluated.  Patient was mild short of breath with oxygen 2 L.  Withdrawal [.  By exam definitely he has signs of volume overload.  His history includes history of hypertension type 2 diabetes mellitus,ESRD & homelessness    No past medical history on file.    No past surgical history on file.    No family history on file.    Social History     Socioeconomic History    Marital status: Single   Tobacco Use    Smoking status: Never    Smokeless tobacco: Never       Prior to Admission medications    Medication Sig Start Date End Date Taking? Authorizing

## 2024-04-23 NOTE — ED TRIAGE NOTES
EMS reports Pt called d/t dyspnea that started today. SpO2 was 90% at RA. He was placed on 2L O2 via NC and SpO2 increased to 94%. Pt was hypertensive en route to the ED. Pt last had dialysis 3 days ago and is due to have it today.

## 2024-04-23 NOTE — PROGRESS NOTES
completed the initial Spiritual Assessment of the patient, and offered Pastoral Care support to the  patient who is currently on the By-Pap machine  and resting ., There is no advance directive on file. Patient seen in bed 8 where he will be admitted to the hospital from.. Patient does not have any Amish/cultural needs that will affect patient’s preferences in health care. Chaplains will continue to follow and will provide pastoral care on an as needed/requested basis.    Chaplain Jim Mckinnon  Board Certified   Spiritual Care Department  [phone]

## 2024-04-23 NOTE — ED NOTES
Received bedside report from Dianne DEUTSCH.    Pt currently resting in bed, awake and alert. Bipap on. Pt denies SOB and CP at this time. VSS. Call bell within reach.

## 2024-07-18 ENCOUNTER — HOSPITAL ENCOUNTER (INPATIENT)
Facility: HOSPITAL | Age: 69
LOS: 4 days | Discharge: HOME | DRG: 286 | End: 2024-07-22
Attending: EMERGENCY MEDICINE | Admitting: STUDENT IN AN ORGANIZED HEALTH CARE EDUCATION/TRAINING PROGRAM
Payer: MEDICARE

## 2024-07-18 ENCOUNTER — APPOINTMENT (OUTPATIENT)
Facility: HOSPITAL | Age: 69
DRG: 286 | End: 2024-07-18
Payer: MEDICARE

## 2024-07-18 DIAGNOSIS — Z99.2 ESRD ON DIALYSIS (HCC): ICD-10-CM

## 2024-07-18 DIAGNOSIS — R07.9 ACUTE CHEST PAIN: Primary | ICD-10-CM

## 2024-07-18 DIAGNOSIS — I21.4 NSTEMI (NON-ST ELEVATED MYOCARDIAL INFARCTION) (HCC): ICD-10-CM

## 2024-07-18 DIAGNOSIS — R07.9 CHEST PAIN, UNSPECIFIED TYPE: ICD-10-CM

## 2024-07-18 DIAGNOSIS — N18.6 ESRD ON DIALYSIS (HCC): ICD-10-CM

## 2024-07-18 PROBLEM — I20.0 UNSTABLE ANGINA (HCC): Status: ACTIVE | Noted: 2024-07-18

## 2024-07-18 PROBLEM — E08.29: Status: ACTIVE | Noted: 2024-07-18

## 2024-07-18 LAB
ALBUMIN SERPL-MCNC: 3.6 G/DL (ref 3.4–5)
ALBUMIN/GLOB SERPL: 0.8 (ref 0.8–1.7)
ALP SERPL-CCNC: 107 U/L (ref 45–117)
ALT SERPL-CCNC: 24 U/L (ref 16–61)
ANION GAP SERPL CALC-SCNC: 5 MMOL/L (ref 3–18)
AST SERPL-CCNC: 23 U/L (ref 10–38)
BASOPHILS # BLD: 0.1 K/UL (ref 0–0.1)
BASOPHILS NFR BLD: 1 % (ref 0–2)
BILIRUB SERPL-MCNC: 0.7 MG/DL (ref 0.2–1)
BUN SERPL-MCNC: 17 MG/DL (ref 7–18)
BUN/CREAT SERPL: 6 (ref 12–20)
CALCIUM SERPL-MCNC: 9.6 MG/DL (ref 8.5–10.1)
CHLORIDE SERPL-SCNC: 102 MMOL/L (ref 100–111)
CO2 SERPL-SCNC: 32 MMOL/L (ref 21–32)
CREAT SERPL-MCNC: 2.89 MG/DL (ref 0.6–1.3)
DIFFERENTIAL METHOD BLD: ABNORMAL
EKG ATRIAL RATE: 84 BPM
EKG DIAGNOSIS: NORMAL
EKG P AXIS: 46 DEGREES
EKG P-R INTERVAL: 148 MS
EKG Q-T INTERVAL: 434 MS
EKG QRS DURATION: 92 MS
EKG QTC CALCULATION (BAZETT): 512 MS
EKG R AXIS: -54 DEGREES
EKG T AXIS: 92 DEGREES
EKG VENTRICULAR RATE: 84 BPM
EOSINOPHIL # BLD: 0.3 K/UL (ref 0–0.4)
EOSINOPHIL NFR BLD: 4 % (ref 0–5)
ERYTHROCYTE [DISTWIDTH] IN BLOOD BY AUTOMATED COUNT: 13.2 % (ref 11.6–14.5)
GLOBULIN SER CALC-MCNC: 4.3 G/DL (ref 2–4)
GLUCOSE BLD STRIP.AUTO-MCNC: 141 MG/DL (ref 70–110)
GLUCOSE BLD STRIP.AUTO-MCNC: 285 MG/DL (ref 70–110)
GLUCOSE SERPL-MCNC: 132 MG/DL (ref 74–99)
HCT VFR BLD AUTO: 41.1 % (ref 36–48)
HGB BLD-MCNC: 13.7 G/DL (ref 13–16)
IMM GRANULOCYTES # BLD AUTO: 0.1 K/UL (ref 0–0.04)
IMM GRANULOCYTES NFR BLD AUTO: 1 % (ref 0–0.5)
LYMPHOCYTES # BLD: 2.2 K/UL (ref 0.9–3.6)
LYMPHOCYTES NFR BLD: 27 % (ref 21–52)
MCH RBC QN AUTO: 31.4 PG (ref 24–34)
MCHC RBC AUTO-ENTMCNC: 33.3 G/DL (ref 31–37)
MCV RBC AUTO: 94.1 FL (ref 78–100)
MONOCYTES # BLD: 0.8 K/UL (ref 0.05–1.2)
MONOCYTES NFR BLD: 10 % (ref 3–10)
NEUTS SEG # BLD: 4.7 K/UL (ref 1.8–8)
NEUTS SEG NFR BLD: 58 % (ref 40–73)
NRBC # BLD: 0 K/UL (ref 0–0.01)
NRBC BLD-RTO: 0 PER 100 WBC
PLATELET # BLD AUTO: 227 K/UL (ref 135–420)
PMV BLD AUTO: 10.2 FL (ref 9.2–11.8)
POTASSIUM SERPL-SCNC: 3.9 MMOL/L (ref 3.5–5.5)
PROT SERPL-MCNC: 7.9 G/DL (ref 6.4–8.2)
RBC # BLD AUTO: 4.37 M/UL (ref 4.35–5.65)
SODIUM SERPL-SCNC: 139 MMOL/L (ref 136–145)
TROPONIN I SERPL HS-MCNC: 80 NG/L (ref 0–78)
WBC # BLD AUTO: 8.1 K/UL (ref 4.6–13.2)

## 2024-07-18 PROCEDURE — 99285 EMERGENCY DEPT VISIT HI MDM: CPT

## 2024-07-18 PROCEDURE — 93005 ELECTROCARDIOGRAM TRACING: CPT | Performed by: EMERGENCY MEDICINE

## 2024-07-18 PROCEDURE — 93005 ELECTROCARDIOGRAM TRACING: CPT

## 2024-07-18 PROCEDURE — 93010 ELECTROCARDIOGRAM REPORT: CPT | Performed by: INTERNAL MEDICINE

## 2024-07-18 PROCEDURE — 1100000003 HC PRIVATE W/ TELEMETRY

## 2024-07-18 PROCEDURE — 84484 ASSAY OF TROPONIN QUANT: CPT

## 2024-07-18 PROCEDURE — 6360000002 HC RX W HCPCS: Performed by: STUDENT IN AN ORGANIZED HEALTH CARE EDUCATION/TRAINING PROGRAM

## 2024-07-18 PROCEDURE — 85025 COMPLETE CBC W/AUTO DIFF WBC: CPT

## 2024-07-18 PROCEDURE — 6370000000 HC RX 637 (ALT 250 FOR IP): Performed by: STUDENT IN AN ORGANIZED HEALTH CARE EDUCATION/TRAINING PROGRAM

## 2024-07-18 PROCEDURE — 71045 X-RAY EXAM CHEST 1 VIEW: CPT

## 2024-07-18 PROCEDURE — 94761 N-INVAS EAR/PLS OXIMETRY MLT: CPT

## 2024-07-18 PROCEDURE — 80053 COMPREHEN METABOLIC PANEL: CPT

## 2024-07-18 PROCEDURE — 99223 1ST HOSP IP/OBS HIGH 75: CPT | Performed by: STUDENT IN AN ORGANIZED HEALTH CARE EDUCATION/TRAINING PROGRAM

## 2024-07-18 PROCEDURE — 82962 GLUCOSE BLOOD TEST: CPT

## 2024-07-18 PROCEDURE — 2580000003 HC RX 258: Performed by: STUDENT IN AN ORGANIZED HEALTH CARE EDUCATION/TRAINING PROGRAM

## 2024-07-18 RX ORDER — INSULIN LISPRO 100 [IU]/ML
0-4 INJECTION, SOLUTION INTRAVENOUS; SUBCUTANEOUS NIGHTLY
Status: DISCONTINUED | OUTPATIENT
Start: 2024-07-18 | End: 2024-07-22 | Stop reason: HOSPADM

## 2024-07-18 RX ORDER — TAMSULOSIN HYDROCHLORIDE 0.4 MG/1
0.4 CAPSULE ORAL DAILY
COMMUNITY

## 2024-07-18 RX ORDER — POLYETHYLENE GLYCOL 3350 17 G/17G
17 POWDER, FOR SOLUTION ORAL DAILY PRN
Status: DISCONTINUED | OUTPATIENT
Start: 2024-07-18 | End: 2024-07-22 | Stop reason: HOSPADM

## 2024-07-18 RX ORDER — TAMSULOSIN HYDROCHLORIDE 0.4 MG/1
0.4 CAPSULE ORAL DAILY
Status: DISCONTINUED | OUTPATIENT
Start: 2024-07-18 | End: 2024-07-22 | Stop reason: HOSPADM

## 2024-07-18 RX ORDER — ATORVASTATIN CALCIUM 40 MG/1
40 TABLET, FILM COATED ORAL NIGHTLY
Status: DISCONTINUED | OUTPATIENT
Start: 2024-07-18 | End: 2024-07-22 | Stop reason: HOSPADM

## 2024-07-18 RX ORDER — FUROSEMIDE 80 MG
80 TABLET ORAL WEEKLY
COMMUNITY

## 2024-07-18 RX ORDER — AMLODIPINE BESYLATE 10 MG/1
10 TABLET ORAL DAILY
Status: DISCONTINUED | OUTPATIENT
Start: 2024-07-18 | End: 2024-07-22 | Stop reason: HOSPADM

## 2024-07-18 RX ORDER — SODIUM CHLORIDE 9 MG/ML
INJECTION, SOLUTION INTRAVENOUS PRN
Status: DISCONTINUED | OUTPATIENT
Start: 2024-07-18 | End: 2024-07-22 | Stop reason: HOSPADM

## 2024-07-18 RX ORDER — LOSARTAN POTASSIUM 50 MG/1
100 TABLET ORAL DAILY
Status: DISCONTINUED | OUTPATIENT
Start: 2024-07-18 | End: 2024-07-22 | Stop reason: HOSPADM

## 2024-07-18 RX ORDER — SODIUM CHLORIDE 0.9 % (FLUSH) 0.9 %
5-40 SYRINGE (ML) INJECTION PRN
Status: DISCONTINUED | OUTPATIENT
Start: 2024-07-18 | End: 2024-07-22 | Stop reason: HOSPADM

## 2024-07-18 RX ORDER — ENOXAPARIN SODIUM 100 MG/ML
30 INJECTION SUBCUTANEOUS DAILY
Status: DISCONTINUED | OUTPATIENT
Start: 2024-07-18 | End: 2024-07-19 | Stop reason: CLARIF

## 2024-07-18 RX ORDER — ONDANSETRON 2 MG/ML
4 INJECTION INTRAMUSCULAR; INTRAVENOUS EVERY 6 HOURS PRN
Status: DISCONTINUED | OUTPATIENT
Start: 2024-07-18 | End: 2024-07-22 | Stop reason: HOSPADM

## 2024-07-18 RX ORDER — ASPIRIN 81 MG/1
81 TABLET, CHEWABLE ORAL DAILY
Status: DISCONTINUED | OUTPATIENT
Start: 2024-07-19 | End: 2024-07-18

## 2024-07-18 RX ORDER — CARVEDILOL 3.12 MG/1
3.12 TABLET ORAL 2 TIMES DAILY WITH MEALS
Status: DISCONTINUED | OUTPATIENT
Start: 2024-07-18 | End: 2024-07-22 | Stop reason: HOSPADM

## 2024-07-18 RX ORDER — LOSARTAN POTASSIUM 100 MG/1
100 TABLET ORAL DAILY
Status: ON HOLD | COMMUNITY
End: 2024-07-22

## 2024-07-18 RX ORDER — SODIUM CHLORIDE 0.9 % (FLUSH) 0.9 %
5-40 SYRINGE (ML) INJECTION EVERY 12 HOURS SCHEDULED
Status: DISCONTINUED | OUTPATIENT
Start: 2024-07-18 | End: 2024-07-22 | Stop reason: HOSPADM

## 2024-07-18 RX ORDER — INSULIN LISPRO 100 [IU]/ML
0-4 INJECTION, SOLUTION INTRAVENOUS; SUBCUTANEOUS
Status: DISCONTINUED | OUTPATIENT
Start: 2024-07-18 | End: 2024-07-22 | Stop reason: HOSPADM

## 2024-07-18 RX ORDER — ONDANSETRON 4 MG/1
4 TABLET, ORALLY DISINTEGRATING ORAL EVERY 8 HOURS PRN
Status: DISCONTINUED | OUTPATIENT
Start: 2024-07-18 | End: 2024-07-22 | Stop reason: HOSPADM

## 2024-07-18 RX ORDER — ASPIRIN 81 MG/1
81 TABLET, CHEWABLE ORAL DAILY
Status: DISCONTINUED | OUTPATIENT
Start: 2024-07-18 | End: 2024-07-22 | Stop reason: HOSPADM

## 2024-07-18 RX ADMIN — LOSARTAN POTASSIUM 100 MG: 50 TABLET, FILM COATED ORAL at 18:31

## 2024-07-18 RX ADMIN — SODIUM CHLORIDE, PRESERVATIVE FREE 10 ML: 5 INJECTION INTRAVENOUS at 20:58

## 2024-07-18 RX ADMIN — ATORVASTATIN CALCIUM 40 MG: 40 TABLET, FILM COATED ORAL at 20:57

## 2024-07-18 RX ADMIN — ASPIRIN 81 MG CHEWABLE TABLET 81 MG: 81 TABLET CHEWABLE at 18:30

## 2024-07-18 RX ADMIN — INSULIN LISPRO 2 UNITS: 100 INJECTION, SOLUTION INTRAVENOUS; SUBCUTANEOUS at 18:39

## 2024-07-18 RX ADMIN — AMLODIPINE BESYLATE 10 MG: 10 TABLET ORAL at 18:30

## 2024-07-18 RX ADMIN — ENOXAPARIN SODIUM 30 MG: 100 INJECTION SUBCUTANEOUS at 15:50

## 2024-07-18 RX ADMIN — TAMSULOSIN HYDROCHLORIDE 0.4 MG: 0.4 CAPSULE ORAL at 18:30

## 2024-07-18 RX ADMIN — CARVEDILOL 3.12 MG: 3.12 TABLET, FILM COATED ORAL at 15:49

## 2024-07-18 NOTE — CONSULTS
[facility] - Consult Note    Cardiology consultation request from ER MD for evaluation and management/treatment of chest pain    Date of  Admission: 7/18/2024 10:50 AM   Primary Care Physician:  Liang Aguilar APRN - NP    Attending Cardiologist: Dr. Martinez        Assessment:     Patient Active Problem List    Diagnosis Date Noted    Acute chest pain 07/18/2024    Diabetes due to undrl condition w oth diabetic kidney comp ([facility]) 07/18/2024    Unstable angina ([facility]) 07/18/2024    Volume overload 04/23/2024    AVF (arteriovenous fistula) ([facility]) 01/26/2023    ESRD (end stage renal disease) on dialysis ([facility]) 01/25/2023    CKD (chronic kidney disease) stage 4, GFR 15-29 ml/min ([facility]) 11/30/2022    ESRD (end stage renal disease) ([facility]) 08/16/2022    Substance abuse ([facility]) 08/16/2022    Anemia 08/16/2022    End stage renal disease ([facility]) 08/16/2022    Pericardial effusion 08/16/2022    Hyperglycemia 08/16/2022    CKD (chronic kidney disease) stage 5, GFR less than 15 ml/min ([facility]) 08/16/2022    TIA (transient ischemic attack) 04/11/2018    CVA (cerebral vascular accident) ([facility]) 04/11/2018    Elevated serum creatinine 08/10/2016    Hypertriglyceridemia 08/10/2016    Elevated HDL 08/10/2016    Decreased GFR 08/10/2016    History of heroin abuse ([facility]) 08/10/2016    Elevated alkaline phosphatase level 08/10/2016    History of cocaine abuse ([facility]) 08/10/2016    Systolic CHF, chronic ([facility]) 07/10/2016    Chronic renal insufficiency 07/10/2016    NSTEMI (non-ST elevated myocardial infarction) ([facility]) 07/08/2016    COPD, mild ([facility]) 12/02/2015    Insulin dependent type 2 diabetes mellitus, controlled ([facility]) 12/02/2015    CHF (congestive heart failure) ([facility]) 11/30/2015    Cardiomyopathy ([facility]) 11/30/2015    Homelessness 10/19/2015    Cardiac LV ejection fraction 30-35% 07/15/2015    Type 2 diabetes mellitus with insulin therapy ([facility]) 07/06/2015    Stage 3 chronic renal impairment associated with type 2 diabetes mellitus ([facility])

## 2024-07-18 NOTE — ED NOTES
Pt placed in gown and in position of most comfort with call light in reach. Bed in lowest position with rails up for safety. Provider at bedside for further assessment.

## 2024-07-18 NOTE — ED NOTES
Pt given 2 units of insulin per insulin protocol, pt tolerated medication well, no adverse reactions noted

## 2024-07-18 NOTE — ED PROVIDER NOTES
study, these patients were admitted to the hospital. (11.6% retrospective, 16.6% prospective)   Scores ?7: 50-65% risk of adverse cardiac event. In the HEART Score study, these patients were candidates for early invasive measures. (65.2% retrospective, 50.1% prospective)   A MACE (Major Adverse Cardiac Event) was defined as all-cause mortality, myocardial infarction, or coronary revascularization.    Original/Primary Reference  Delicia Morton Kelder JC. Chest pain in the emergency room: value of the HEART score. Neth Heart J. 2008;16(6):191-6.   Validation  Savage Goodwin, Amy PRAKASH, et al. A prospective validation of the HEART score for chest pain patients at the emergency department. Int J Cardiol. 2013;168(3):2153-8.   Savage Goodwin, Amy PRAKASH, et al. Chest pain in the emergency room: a multicenter validation of the HEART Score. Crit Pathw Cardiol. 2010;9(3):164-9.  Sylvain SHAUNA, Ruslan RF, Ruma DHALIWAL, et al. The HEART Pathway Randomized Controlled Trial One Year Outcomes. Acad Emerg Med. 2018;      Discussed case with nephrology and says the patient's been complaining of chest pain around dialysis and recommends the patient get further cardiac testing.  Patient's heart score is 7 and will discuss case with the hospitalist. Carlyle Martinez DO 1:20 PM    Patient was given the following medications:  Medications - No data to display    CONSULTS: (Who and What was discussed)  IP consult to cardiology  IP consult to nephrology    Chronic Conditions: ESRD on HD    Social Determinants affecting Dx or Tx: None    Records Reviewed (source and summary of external notes): Nursing Notes, Old Medical Records, Previous Radiology Studies, and Previous Laboratory Studies    Procedures        Diagnosis     Clinical Impression:   1. Acute chest pain    2. ESRD on dialysis (HCC)    3. Chest pain, unspecified type    4. NSTEMI (non-ST elevated myocardial infarction) ([facility])        Disposition: Admit     No follow-up

## 2024-07-18 NOTE — ED TRIAGE NOTES
Pt arrived via EMS c/o CP from dialysis center. Pt states he gets CP after his last five treatments. H/x CHF, pt ambulatory to stretcher.

## 2024-07-18 NOTE — H&P
10 mL SQ QHS      Facility-Administered Medications: None       REVIEW OF SYSTEMS:     [] Unable to obtain  ROS due to  []mental status change  []sedated   []intubated   [x]Total of 12 systems reviewed as follows:  Constitutional: negative fever, negative chills, negative weight loss  Eyes:   negative visual changes  ENT:   negative sore throat, tongue or lip swelling  Respiratory:  negative cough, negative dyspnea  Cards:   negative for chest pain, palpitations, lower extremity edema  GI:   negative for nausea, vomiting, diarrhea, and abdominal pain  Genitourinary: negative for frequency, dysuria  Integument:  negative for rash and pruritus  Hematologic:  negative for easy bruising and gum/nose bleeding  Musculoskel: negative for myalgias,  back pain and muscle weakness  Neurological:  negative for headaches, dizziness, vertigo  Behavl/Psych: negative for feelings of anxiety, depression     Pertinent Positives include: chest pain     Objective:     PHYSICAL EXAM:   Objective:  General Appearance:  Comfortable.    Vital signs: (most recent): Blood pressure (!) 164/79, pulse 72, temperature 97.7 °F (36.5 °C), temperature source Oral, resp. rate 11, height 1.829 m (6'), weight 87.5 kg (193 lb), SpO2 99 %.    Output: Producing urine.    HEENT: Normal HEENT exam.    Lungs:  Normal effort and normal respiratory rate.  Breath sounds clear to auscultation.    Heart: Normal rate.  Regular rhythm.    Abdomen: Abdomen is soft and flat.  Bowel sounds are normal.   There is no abdominal tenderness.     Extremities: Normal range of motion.    Pulses: Distal pulses are intact.    Neurological: Patient is alert and oriented to person, place and time.    Skin:  Warm and dry.        LAB DATA REVIEWED:    No components found for: \"GLPOC\"  Recent Labs     07/18/24  1108      K 3.9      CO2 32   BUN 17   WBC 8.1   HGB 13.7   HCT 41.1            IMAGING RESULTS:  XR CHEST 1 VIEW    Result Date: 7/18/2024  1.  Mild

## 2024-07-19 ENCOUNTER — APPOINTMENT (OUTPATIENT)
Facility: HOSPITAL | Age: 69
DRG: 286 | End: 2024-07-19
Payer: MEDICARE

## 2024-07-19 LAB
ALBUMIN SERPL-MCNC: 2.9 G/DL (ref 3.4–5)
ALBUMIN/GLOB SERPL: 0.7 (ref 0.8–1.7)
ALP SERPL-CCNC: 85 U/L (ref 45–117)
ALT SERPL-CCNC: 19 U/L (ref 16–61)
ANION GAP SERPL CALC-SCNC: 6 MMOL/L (ref 3–18)
AST SERPL-CCNC: 14 U/L (ref 10–38)
BILIRUB SERPL-MCNC: 0.5 MG/DL (ref 0.2–1)
BUN SERPL-MCNC: 30 MG/DL (ref 7–18)
BUN/CREAT SERPL: 7 (ref 12–20)
CALCIUM SERPL-MCNC: 9 MG/DL (ref 8.5–10.1)
CALCIUM SERPL-MCNC: 9.1 MG/DL (ref 8.5–10.1)
CHLORIDE SERPL-SCNC: 102 MMOL/L (ref 100–111)
CHOLEST SERPL-MCNC: 195 MG/DL
CO2 SERPL-SCNC: 30 MMOL/L (ref 21–32)
CREAT SERPL-MCNC: 4.13 MG/DL (ref 0.6–1.3)
ECHO AO ASC DIAM: 3.3 CM
ECHO AO ASCENDING AORTA INDEX: 1.57 CM/M2
ECHO AO ROOT DIAM: 3.8 CM
ECHO AO ROOT INDEX: 1.81 CM/M2
ECHO AV AREA PEAK VELOCITY: 2.6 CM2
ECHO AV AREA/BSA PEAK VELOCITY: 1.2 CM2/M2
ECHO AV PEAK GRADIENT: 6 MMHG
ECHO AV PEAK VELOCITY: 1.2 M/S
ECHO AV VELOCITY RATIO: 0.83
ECHO BSA: 2.11 M2
ECHO BSA: 2.11 M2
ECHO LA VOL A-L A4C: 104 ML (ref 18–58)
ECHO LA VOL MOD A4C: 95 ML (ref 18–58)
ECHO LA VOLUME INDEX A-L A4C: 50 ML/M2 (ref 16–34)
ECHO LA VOLUME INDEX MOD A4C: 45 ML/M2 (ref 16–34)
ECHO LV E' LATERAL VELOCITY: 6 CM/S
ECHO LV E' SEPTAL VELOCITY: 6 CM/S
ECHO LV EDV A2C: 117 ML
ECHO LV EDV A4C: 176 ML
ECHO LV EDV BP: 147 ML (ref 67–155)
ECHO LV EDV INDEX A4C: 84 ML/M2
ECHO LV EDV INDEX BP: 70 ML/M2
ECHO LV EDV NDEX A2C: 56 ML/M2
ECHO LV EJECTION FRACTION A2C: 49 %
ECHO LV EJECTION FRACTION A4C: 45 %
ECHO LV EJECTION FRACTION BIPLANE: 45 % (ref 55–100)
ECHO LV ESV A2C: 60 ML
ECHO LV ESV A4C: 97 ML
ECHO LV ESV BP: 81 ML (ref 22–58)
ECHO LV ESV INDEX A2C: 29 ML/M2
ECHO LV ESV INDEX A4C: 46 ML/M2
ECHO LV ESV INDEX BP: 39 ML/M2
ECHO LV FRACTIONAL SHORTENING: 18 % (ref 28–44)
ECHO LV INTERNAL DIMENSION DIASTOLE INDEX: 2.95 CM/M2
ECHO LV INTERNAL DIMENSION DIASTOLIC: 6.2 CM (ref 4.2–5.9)
ECHO LV INTERNAL DIMENSION SYSTOLIC INDEX: 2.43 CM/M2
ECHO LV INTERNAL DIMENSION SYSTOLIC: 5.1 CM
ECHO LV IVSD: 1.2 CM (ref 0.6–1)
ECHO LV MASS 2D: 331.5 G (ref 88–224)
ECHO LV MASS INDEX 2D: 157.9 G/M2 (ref 49–115)
ECHO LV POSTERIOR WALL DIASTOLIC: 1.2 CM (ref 0.6–1)
ECHO LV RELATIVE WALL THICKNESS RATIO: 0.39
ECHO LVOT AREA: 3.1 CM2
ECHO LVOT DIAM: 2 CM
ECHO LVOT MEAN GRADIENT: 2 MMHG
ECHO LVOT PEAK GRADIENT: 4 MMHG
ECHO LVOT PEAK VELOCITY: 1 M/S
ECHO LVOT STROKE VOLUME INDEX: 29.9 ML/M2
ECHO LVOT SV: 62.8 ML
ECHO LVOT VTI: 20 CM
ECHO MV A VELOCITY: 0.53 M/S
ECHO MV AREA VTI: 2 CM2
ECHO MV E DECELERATION TIME (DT): 224.3 MS
ECHO MV E VELOCITY: 1.05 M/S
ECHO MV E/A RATIO: 1.98
ECHO MV E/E' LATERAL: 17.5
ECHO MV E/E' RATIO (AVERAGED): 17.5
ECHO MV E/E' SEPTAL: 17.5
ECHO MV LVOT VTI INDEX: 1.56
ECHO MV MAX VELOCITY: 1.5 M/S
ECHO MV MEAN GRADIENT: 2 MMHG
ECHO MV MEAN VELOCITY: 0.6 M/S
ECHO MV PEAK GRADIENT: 9 MMHG
ECHO MV REGURGITANT PEAK GRADIENT: 149 MMHG
ECHO MV REGURGITANT PEAK VELOCITY: 6.1 M/S
ECHO MV REGURGITANT VTIA: 226.8 CM
ECHO MV VTI: 31.1 CM
ECHO PULMONARY ARTERY END DIASTOLIC PRESSURE: 5 MMHG
ECHO PV MAX VELOCITY: 0.7 M/S
ECHO PV PEAK GRADIENT: 2 MMHG
ECHO PV REGURGITANT MAX VELOCITY: 1.1 M/S
ECHO RV BASAL DIMENSION: 4.7 CM
ECHO RV FREE WALL PEAK S': 10 CM/S
ECHO RV LONGITUDINAL DIMENSION: 8 CM
ECHO RV MID DIMENSION: 4 CM
ECHO RV TAPSE: 2.3 CM (ref 1.7–?)
EKG ATRIAL RATE: 74 BPM
EKG DIAGNOSIS: NORMAL
EKG P AXIS: 50 DEGREES
EKG P-R INTERVAL: 142 MS
EKG Q-T INTERVAL: 436 MS
EKG QRS DURATION: 90 MS
EKG QTC CALCULATION (BAZETT): 483 MS
EKG R AXIS: -54 DEGREES
EKG T AXIS: 116 DEGREES
EKG VENTRICULAR RATE: 74 BPM
ERYTHROCYTE [DISTWIDTH] IN BLOOD BY AUTOMATED COUNT: 13.2 % (ref 11.6–14.5)
GLOBULIN SER CALC-MCNC: 4 G/DL (ref 2–4)
GLUCOSE BLD STRIP.AUTO-MCNC: 164 MG/DL (ref 70–110)
GLUCOSE BLD STRIP.AUTO-MCNC: 166 MG/DL (ref 70–110)
GLUCOSE BLD STRIP.AUTO-MCNC: 191 MG/DL (ref 70–110)
GLUCOSE BLD STRIP.AUTO-MCNC: 239 MG/DL (ref 70–110)
GLUCOSE SERPL-MCNC: 206 MG/DL (ref 74–99)
HCT VFR BLD AUTO: 37.1 % (ref 36–48)
HDLC SERPL-MCNC: 35 MG/DL (ref 40–60)
HDLC SERPL: 5.6 (ref 0–5)
HGB BLD-MCNC: 12.4 G/DL (ref 13–16)
LDLC SERPL CALC-MCNC: 99.6 MG/DL (ref 0–100)
LIPID PANEL: ABNORMAL
MCH RBC QN AUTO: 31.8 PG (ref 24–34)
MCHC RBC AUTO-ENTMCNC: 33.4 G/DL (ref 31–37)
MCV RBC AUTO: 95.1 FL (ref 78–100)
NRBC # BLD: 0 K/UL (ref 0–0.01)
NRBC BLD-RTO: 0 PER 100 WBC
NUC STRESS EJECTION FRACTION: 30 %
PHOSPHATE SERPL-MCNC: 2.4 MG/DL (ref 2.5–4.9)
PLATELET # BLD AUTO: 219 K/UL (ref 135–420)
PMV BLD AUTO: 10.8 FL (ref 9.2–11.8)
POTASSIUM SERPL-SCNC: 3.5 MMOL/L (ref 3.5–5.5)
PROT SERPL-MCNC: 6.9 G/DL (ref 6.4–8.2)
PTH-INTACT SERPL-MCNC: 360.8 PG/ML (ref 18.4–88)
RBC # BLD AUTO: 3.9 M/UL (ref 4.35–5.65)
SODIUM SERPL-SCNC: 138 MMOL/L (ref 136–145)
STRESS BASELINE DIAS BP: 94 MMHG
STRESS BASELINE HR: 65 BPM
STRESS BASELINE SYS BP: 171 MMHG
STRESS ESTIMATED WORKLOAD: 1 METS
STRESS PEAK DIAS BP: 87 MMHG
STRESS PEAK SYS BP: 175 MMHG
STRESS PERCENT HR ACHIEVED: 57 %
STRESS POST PEAK HR: 85 BPM
STRESS RATE PRESSURE PRODUCT: NORMAL BPM*MMHG
STRESS TARGET HR: 150 BPM
TID: 1.04
TRIGL SERPL-MCNC: 302 MG/DL
VLDLC SERPL CALC-MCNC: 60.4 MG/DL
WBC # BLD AUTO: 6.4 K/UL (ref 4.6–13.2)

## 2024-07-19 PROCEDURE — 93016 CV STRESS TEST SUPVJ ONLY: CPT | Performed by: INTERNAL MEDICINE

## 2024-07-19 PROCEDURE — 6360000002 HC RX W HCPCS

## 2024-07-19 PROCEDURE — 93010 ELECTROCARDIOGRAM REPORT: CPT | Performed by: INTERNAL MEDICINE

## 2024-07-19 PROCEDURE — 5A1D70Z PERFORMANCE OF URINARY FILTRATION, INTERMITTENT, LESS THAN 6 HOURS PER DAY: ICD-10-PCS | Performed by: STUDENT IN AN ORGANIZED HEALTH CARE EDUCATION/TRAINING PROGRAM

## 2024-07-19 PROCEDURE — 1100000003 HC PRIVATE W/ TELEMETRY

## 2024-07-19 PROCEDURE — 93306 TTE W/DOPPLER COMPLETE: CPT | Performed by: INTERNAL MEDICINE

## 2024-07-19 PROCEDURE — 2580000003 HC RX 258

## 2024-07-19 PROCEDURE — 36415 COLL VENOUS BLD VENIPUNCTURE: CPT

## 2024-07-19 PROCEDURE — 84100 ASSAY OF PHOSPHORUS: CPT

## 2024-07-19 PROCEDURE — 85027 COMPLETE CBC AUTOMATED: CPT

## 2024-07-19 PROCEDURE — 80053 COMPREHEN METABOLIC PANEL: CPT

## 2024-07-19 PROCEDURE — 6370000000 HC RX 637 (ALT 250 FOR IP): Performed by: STUDENT IN AN ORGANIZED HEALTH CARE EDUCATION/TRAINING PROGRAM

## 2024-07-19 PROCEDURE — 93306 TTE W/DOPPLER COMPLETE: CPT

## 2024-07-19 PROCEDURE — 2580000003 HC RX 258: Performed by: STUDENT IN AN ORGANIZED HEALTH CARE EDUCATION/TRAINING PROGRAM

## 2024-07-19 PROCEDURE — 93018 CV STRESS TEST I&R ONLY: CPT | Performed by: INTERNAL MEDICINE

## 2024-07-19 PROCEDURE — 83970 ASSAY OF PARATHORMONE: CPT

## 2024-07-19 PROCEDURE — 78452 HT MUSCLE IMAGE SPECT MULT: CPT | Performed by: INTERNAL MEDICINE

## 2024-07-19 PROCEDURE — 80061 LIPID PANEL: CPT

## 2024-07-19 PROCEDURE — 93017 CV STRESS TEST TRACING ONLY: CPT

## 2024-07-19 PROCEDURE — 82962 GLUCOSE BLOOD TEST: CPT

## 2024-07-19 PROCEDURE — 3430000000 HC RX DIAGNOSTIC RADIOPHARMACEUTICAL

## 2024-07-19 PROCEDURE — 6360000002 HC RX W HCPCS: Performed by: FAMILY MEDICINE

## 2024-07-19 PROCEDURE — A9502 TC99M TETROFOSMIN: HCPCS

## 2024-07-19 PROCEDURE — 99232 SBSQ HOSP IP/OBS MODERATE 35: CPT | Performed by: FAMILY MEDICINE

## 2024-07-19 PROCEDURE — 94761 N-INVAS EAR/PLS OXIMETRY MLT: CPT

## 2024-07-19 RX ORDER — REGADENOSON 0.08 MG/ML
0.4 INJECTION, SOLUTION INTRAVENOUS
Status: COMPLETED | OUTPATIENT
Start: 2024-07-19 | End: 2024-07-19

## 2024-07-19 RX ORDER — 0.9 % SODIUM CHLORIDE 0.9 %
250 INTRAVENOUS SOLUTION INTRAVENOUS
Status: COMPLETED | OUTPATIENT
Start: 2024-07-19 | End: 2024-07-19

## 2024-07-19 RX ORDER — HEPARIN SODIUM 5000 [USP'U]/ML
5000 INJECTION, SOLUTION INTRAVENOUS; SUBCUTANEOUS EVERY 8 HOURS SCHEDULED
Status: DISCONTINUED | OUTPATIENT
Start: 2024-07-19 | End: 2024-07-22 | Stop reason: HOSPADM

## 2024-07-19 RX ADMIN — REGADENOSON 0.4 MG: 0.08 INJECTION, SOLUTION INTRAVENOUS at 09:50

## 2024-07-19 RX ADMIN — SODIUM CHLORIDE 250 ML: 9 INJECTION, SOLUTION INTRAVENOUS at 09:54

## 2024-07-19 RX ADMIN — INSULIN LISPRO 1 UNITS: 100 INJECTION, SOLUTION INTRAVENOUS; SUBCUTANEOUS at 16:43

## 2024-07-19 RX ADMIN — AMLODIPINE BESYLATE 10 MG: 10 TABLET ORAL at 16:23

## 2024-07-19 RX ADMIN — SODIUM CHLORIDE, PRESERVATIVE FREE 10 ML: 5 INJECTION INTRAVENOUS at 21:56

## 2024-07-19 RX ADMIN — TAMSULOSIN HYDROCHLORIDE 0.4 MG: 0.4 CAPSULE ORAL at 16:36

## 2024-07-19 RX ADMIN — LOSARTAN POTASSIUM 100 MG: 50 TABLET, FILM COATED ORAL at 16:36

## 2024-07-19 RX ADMIN — ATORVASTATIN CALCIUM 40 MG: 40 TABLET, FILM COATED ORAL at 20:11

## 2024-07-19 RX ADMIN — TETROFOSMIN 33 MILLICURIE: 1.38 INJECTION, POWDER, LYOPHILIZED, FOR SOLUTION INTRAVENOUS at 09:50

## 2024-07-19 RX ADMIN — CARVEDILOL 3.12 MG: 3.12 TABLET, FILM COATED ORAL at 16:43

## 2024-07-19 RX ADMIN — TETROFOSMIN 11 MILLICURIE: 1.38 INJECTION, POWDER, LYOPHILIZED, FOR SOLUTION INTRAVENOUS at 07:50

## 2024-07-19 RX ADMIN — HEPARIN SODIUM 5000 UNITS: 5000 INJECTION INTRAVENOUS; SUBCUTANEOUS at 16:37

## 2024-07-19 RX ADMIN — ASPIRIN 81 MG CHEWABLE TABLET 81 MG: 81 TABLET CHEWABLE at 16:25

## 2024-07-19 RX ADMIN — HEPARIN SODIUM 5000 UNITS: 5000 INJECTION INTRAVENOUS; SUBCUTANEOUS at 21:56

## 2024-07-19 NOTE — CARE COORDINATION
07/19/24 1433   Service Assessment   Patient Orientation Alert and Oriented;Person;Place;Situation;Self   Cognition Alert   History Provided By Patient   Primary Caregiver Self   Accompanied By/Relationship Patient was currently alone at bedside   Support Systems Family Members   Patient's Healthcare Decision Maker is: Legal Next of Kin   PCP Verified by CM No   Prior Functional Level Independent in ADLs/IADLs   Current Functional Level Independent in ADLs/IADLs   Can patient return to prior living arrangement Yes   Ability to make needs known: Good   Family able to assist with home care needs: Yes   Would you like for me to discuss the discharge plan with any other family members/significant others, and if so, who? Yes   Financial Resources Medicare   Community Resources Transportation   CM/SW Referral Other (see comment)  (Discharge Planning)   Social/Functional History   Lives With Other (comment)  (Shelter)   Type of Home   (Shelter Building)   Home Layout Multi-level   Home Access Level entry   Bathroom Shower/Tub Walk-in shower   Bathroom Toilet Standard   Bathroom Equipment Grab bars in shower   Bathroom Accessibility Accessible   Home Equipment None   Receives Help From Other (comment)  (Staff at the Shelter)   ADL Assistance Independent   Homemaking Assistance Independent   Homemaking Responsibilities Yes   Transfer Assistance Independent   Active  No   Patient's  Info Public Transportation and Medicare   Mode of Transportation Bus   Education Not Applicable   Occupation On disability   Type of Occupation Not Applicable   Discharge Planning   Type of Residence Shelter   Living Arrangements Alone   Current Services Prior To Admission Transportation   Potential Assistance Needed Transportation   DME Ordered? No   Potential Assistance Purchasing Medications No   Type of Home Care Services None   Patient expects to be discharged to: Shelter   Follow Up Appointment: Best Day/Time    (TBD)

## 2024-07-19 NOTE — CARE COORDINATION
07/19/24 1138   /Social Work Whiteboard Notes   /Social Work Whiteboard RED 7/19/20 CM attempted x1, pt not in room      Will reattepmt to see for assessment if time permits    Nydia Malave BSN RN  Case Management

## 2024-07-19 NOTE — ED NOTES
TRANSFER - OUT REPORT:    Verbal report given to GET Figueroa on Simone Lamas  being transferred to  for routine progression of patient care       Report consisted of patient's Situation, Background, Assessment and   Recommendations(SBAR).     Information from the following report(s) ED Encounter Summary, ED SBAR, MAR, and Recent Results was reviewed with the receiving nurse.    [place] Fall Assessment:    Presents to emergency department  because of falls (Syncope, seizure, or loss of consciousness): No  Age > 70: Yes  Altered Mental Status, Intoxication with alcohol or substance confusion (Disorientation, impaired judgment, poor safety awaremess, or inability to follow instructions): No  Impaired Mobility: Ambulates or transfers with assistive devices or assistance; Unable to ambulate or transer.: No             Lines:   Peripheral IV 07/18/24 Left;Proximal;Anterior Forearm (Active)   Site Assessment Clean, dry & intact 07/18/24 1109   Line Status Blood return noted 07/18/24 1109   Phlebitis Assessment No symptoms 07/18/24 1109   Infiltration Assessment 0 07/18/24 1109   Alcohol Cap Used No 07/18/24 1109   Dressing Status Clean, dry & intact 07/18/24 1109   Dressing Type Transparent 07/18/24 1109        Opportunity for questions and clarification was provided.

## 2024-07-20 PROBLEM — Z99.2 ESRD ON DIALYSIS (HCC): Status: ACTIVE | Noted: 2023-01-25

## 2024-07-20 PROBLEM — N18.6 ESRD ON DIALYSIS (HCC): Status: ACTIVE | Noted: 2023-01-25

## 2024-07-20 LAB
ANION GAP SERPL CALC-SCNC: 7 MMOL/L (ref 3–18)
BASOPHILS # BLD: 0.1 K/UL (ref 0–0.1)
BASOPHILS NFR BLD: 1 % (ref 0–2)
BUN SERPL-MCNC: 37 MG/DL (ref 7–18)
BUN/CREAT SERPL: 8 (ref 12–20)
CALCIUM SERPL-MCNC: 8.7 MG/DL (ref 8.5–10.1)
CHLORIDE SERPL-SCNC: 106 MMOL/L (ref 100–111)
CO2 SERPL-SCNC: 27 MMOL/L (ref 21–32)
CREAT SERPL-MCNC: 4.77 MG/DL (ref 0.6–1.3)
DIFFERENTIAL METHOD BLD: ABNORMAL
EOSINOPHIL # BLD: 0.6 K/UL (ref 0–0.4)
EOSINOPHIL NFR BLD: 7 % (ref 0–5)
ERYTHROCYTE [DISTWIDTH] IN BLOOD BY AUTOMATED COUNT: 13.1 % (ref 11.6–14.5)
GLUCOSE BLD STRIP.AUTO-MCNC: 183 MG/DL (ref 70–110)
GLUCOSE BLD STRIP.AUTO-MCNC: 235 MG/DL (ref 70–110)
GLUCOSE BLD STRIP.AUTO-MCNC: 253 MG/DL (ref 70–110)
GLUCOSE BLD STRIP.AUTO-MCNC: 262 MG/DL (ref 70–110)
GLUCOSE SERPL-MCNC: 183 MG/DL (ref 74–99)
HCT VFR BLD AUTO: 35.8 % (ref 36–48)
HGB BLD-MCNC: 12.2 G/DL (ref 13–16)
IMM GRANULOCYTES # BLD AUTO: 0.1 K/UL (ref 0–0.04)
IMM GRANULOCYTES NFR BLD AUTO: 1 % (ref 0–0.5)
LYMPHOCYTES # BLD: 2.3 K/UL (ref 0.9–3.6)
LYMPHOCYTES NFR BLD: 28 % (ref 21–52)
MCH RBC QN AUTO: 32 PG (ref 24–34)
MCHC RBC AUTO-ENTMCNC: 34.1 G/DL (ref 31–37)
MCV RBC AUTO: 94 FL (ref 78–100)
MONOCYTES # BLD: 0.7 K/UL (ref 0.05–1.2)
MONOCYTES NFR BLD: 8 % (ref 3–10)
NEUTS SEG # BLD: 4.3 K/UL (ref 1.8–8)
NEUTS SEG NFR BLD: 55 % (ref 40–73)
NRBC # BLD: 0 K/UL (ref 0–0.01)
NRBC BLD-RTO: 0 PER 100 WBC
PHOSPHATE SERPL-MCNC: 2.7 MG/DL (ref 2.5–4.9)
PLATELET # BLD AUTO: 229 K/UL (ref 135–420)
PMV BLD AUTO: 10.7 FL (ref 9.2–11.8)
POTASSIUM SERPL-SCNC: 3.9 MMOL/L (ref 3.5–5.5)
RBC # BLD AUTO: 3.81 M/UL (ref 4.35–5.65)
SODIUM SERPL-SCNC: 140 MMOL/L (ref 136–145)
WBC # BLD AUTO: 8 K/UL (ref 4.6–13.2)

## 2024-07-20 PROCEDURE — 36415 COLL VENOUS BLD VENIPUNCTURE: CPT

## 2024-07-20 PROCEDURE — 6370000000 HC RX 637 (ALT 250 FOR IP): Performed by: STUDENT IN AN ORGANIZED HEALTH CARE EDUCATION/TRAINING PROGRAM

## 2024-07-20 PROCEDURE — 94761 N-INVAS EAR/PLS OXIMETRY MLT: CPT

## 2024-07-20 PROCEDURE — 1100000003 HC PRIVATE W/ TELEMETRY

## 2024-07-20 PROCEDURE — 85025 COMPLETE CBC W/AUTO DIFF WBC: CPT

## 2024-07-20 PROCEDURE — 80048 BASIC METABOLIC PNL TOTAL CA: CPT

## 2024-07-20 PROCEDURE — 84100 ASSAY OF PHOSPHORUS: CPT

## 2024-07-20 PROCEDURE — 82962 GLUCOSE BLOOD TEST: CPT

## 2024-07-20 PROCEDURE — 90935 HEMODIALYSIS ONE EVALUATION: CPT

## 2024-07-20 PROCEDURE — 6360000002 HC RX W HCPCS: Performed by: FAMILY MEDICINE

## 2024-07-20 PROCEDURE — 2580000003 HC RX 258: Performed by: STUDENT IN AN ORGANIZED HEALTH CARE EDUCATION/TRAINING PROGRAM

## 2024-07-20 RX ADMIN — INSULIN LISPRO 2 UNITS: 100 INJECTION, SOLUTION INTRAVENOUS; SUBCUTANEOUS at 16:01

## 2024-07-20 RX ADMIN — ATORVASTATIN CALCIUM 40 MG: 40 TABLET, FILM COATED ORAL at 20:20

## 2024-07-20 RX ADMIN — HEPARIN SODIUM 5000 UNITS: 5000 INJECTION INTRAVENOUS; SUBCUTANEOUS at 20:59

## 2024-07-20 RX ADMIN — TAMSULOSIN HYDROCHLORIDE 0.4 MG: 0.4 CAPSULE ORAL at 08:27

## 2024-07-20 RX ADMIN — HEPARIN SODIUM 5000 UNITS: 5000 INJECTION INTRAVENOUS; SUBCUTANEOUS at 06:30

## 2024-07-20 RX ADMIN — CARVEDILOL 3.12 MG: 3.12 TABLET, FILM COATED ORAL at 16:02

## 2024-07-20 RX ADMIN — AMLODIPINE BESYLATE 10 MG: 10 TABLET ORAL at 16:02

## 2024-07-20 RX ADMIN — SODIUM CHLORIDE, PRESERVATIVE FREE 10 ML: 5 INJECTION INTRAVENOUS at 20:27

## 2024-07-20 RX ADMIN — LOSARTAN POTASSIUM 100 MG: 50 TABLET, FILM COATED ORAL at 16:02

## 2024-07-20 RX ADMIN — SODIUM CHLORIDE, PRESERVATIVE FREE 10 ML: 5 INJECTION INTRAVENOUS at 08:30

## 2024-07-20 RX ADMIN — ASPIRIN 81 MG CHEWABLE TABLET 81 MG: 81 TABLET CHEWABLE at 08:27

## 2024-07-20 RX ADMIN — HEPARIN SODIUM 5000 UNITS: 5000 INJECTION INTRAVENOUS; SUBCUTANEOUS at 16:02

## 2024-07-21 LAB
ANION GAP SERPL CALC-SCNC: 10 MMOL/L (ref 3–18)
ANION GAP SERPL CALC-SCNC: 8 MMOL/L (ref 3–18)
BUN SERPL-MCNC: 37 MG/DL (ref 7–18)
BUN SERPL-MCNC: 43 MG/DL (ref 7–18)
BUN/CREAT SERPL: 8 (ref 12–20)
BUN/CREAT SERPL: 9 (ref 12–20)
CALCIUM SERPL-MCNC: 8.3 MG/DL (ref 8.5–10.1)
CALCIUM SERPL-MCNC: 8.8 MG/DL (ref 8.5–10.1)
CHLORIDE SERPL-SCNC: 101 MMOL/L (ref 100–111)
CHLORIDE SERPL-SCNC: 102 MMOL/L (ref 100–111)
CO2 SERPL-SCNC: 25 MMOL/L (ref 21–32)
CO2 SERPL-SCNC: 26 MMOL/L (ref 21–32)
CREAT SERPL-MCNC: 4.68 MG/DL (ref 0.6–1.3)
CREAT SERPL-MCNC: 4.96 MG/DL (ref 0.6–1.3)
GLUCOSE BLD STRIP.AUTO-MCNC: 204 MG/DL (ref 70–110)
GLUCOSE BLD STRIP.AUTO-MCNC: 221 MG/DL (ref 70–110)
GLUCOSE BLD STRIP.AUTO-MCNC: 227 MG/DL (ref 70–110)
GLUCOSE BLD STRIP.AUTO-MCNC: 268 MG/DL (ref 70–110)
GLUCOSE SERPL-MCNC: 266 MG/DL (ref 74–99)
GLUCOSE SERPL-MCNC: 304 MG/DL (ref 74–99)
POTASSIUM SERPL-SCNC: 4 MMOL/L (ref 3.5–5.5)
POTASSIUM SERPL-SCNC: 4.2 MMOL/L (ref 3.5–5.5)
SODIUM SERPL-SCNC: 135 MMOL/L (ref 136–145)
SODIUM SERPL-SCNC: 137 MMOL/L (ref 136–145)

## 2024-07-21 PROCEDURE — 2580000003 HC RX 258: Performed by: STUDENT IN AN ORGANIZED HEALTH CARE EDUCATION/TRAINING PROGRAM

## 2024-07-21 PROCEDURE — 6370000000 HC RX 637 (ALT 250 FOR IP): Performed by: FAMILY MEDICINE

## 2024-07-21 PROCEDURE — 80048 BASIC METABOLIC PNL TOTAL CA: CPT

## 2024-07-21 PROCEDURE — 6360000002 HC RX W HCPCS: Performed by: FAMILY MEDICINE

## 2024-07-21 PROCEDURE — 6370000000 HC RX 637 (ALT 250 FOR IP): Performed by: STUDENT IN AN ORGANIZED HEALTH CARE EDUCATION/TRAINING PROGRAM

## 2024-07-21 PROCEDURE — 82962 GLUCOSE BLOOD TEST: CPT

## 2024-07-21 PROCEDURE — 1100000003 HC PRIVATE W/ TELEMETRY

## 2024-07-21 PROCEDURE — 36415 COLL VENOUS BLD VENIPUNCTURE: CPT

## 2024-07-21 PROCEDURE — 94761 N-INVAS EAR/PLS OXIMETRY MLT: CPT

## 2024-07-21 RX ORDER — DEXTROSE MONOHYDRATE 100 MG/ML
INJECTION, SOLUTION INTRAVENOUS CONTINUOUS PRN
Status: DISCONTINUED | OUTPATIENT
Start: 2024-07-21 | End: 2024-07-22 | Stop reason: HOSPADM

## 2024-07-21 RX ORDER — GLUCAGON 1 MG
1 KIT INJECTION PRN
Status: DISCONTINUED | OUTPATIENT
Start: 2024-07-21 | End: 2024-07-22 | Stop reason: HOSPADM

## 2024-07-21 RX ORDER — INSULIN GLARGINE 100 [IU]/ML
7 INJECTION, SOLUTION SUBCUTANEOUS NIGHTLY
Status: DISCONTINUED | OUTPATIENT
Start: 2024-07-21 | End: 2024-07-22 | Stop reason: HOSPADM

## 2024-07-21 RX ADMIN — TAMSULOSIN HYDROCHLORIDE 0.4 MG: 0.4 CAPSULE ORAL at 08:09

## 2024-07-21 RX ADMIN — CARVEDILOL 3.12 MG: 3.12 TABLET, FILM COATED ORAL at 08:08

## 2024-07-21 RX ADMIN — CARVEDILOL 3.12 MG: 3.12 TABLET, FILM COATED ORAL at 17:33

## 2024-07-21 RX ADMIN — INSULIN LISPRO 1 UNITS: 100 INJECTION, SOLUTION INTRAVENOUS; SUBCUTANEOUS at 08:10

## 2024-07-21 RX ADMIN — SODIUM CHLORIDE, PRESERVATIVE FREE 10 ML: 5 INJECTION INTRAVENOUS at 13:15

## 2024-07-21 RX ADMIN — INSULIN GLARGINE 7 UNITS: 100 INJECTION, SOLUTION SUBCUTANEOUS at 10:46

## 2024-07-21 RX ADMIN — HEPARIN SODIUM 5000 UNITS: 5000 INJECTION INTRAVENOUS; SUBCUTANEOUS at 21:52

## 2024-07-21 RX ADMIN — ASPIRIN 81 MG CHEWABLE TABLET 81 MG: 81 TABLET CHEWABLE at 08:08

## 2024-07-21 RX ADMIN — SODIUM CHLORIDE, PRESERVATIVE FREE 10 ML: 5 INJECTION INTRAVENOUS at 20:32

## 2024-07-21 RX ADMIN — LOSARTAN POTASSIUM 100 MG: 50 TABLET, FILM COATED ORAL at 08:09

## 2024-07-21 RX ADMIN — HEPARIN SODIUM 5000 UNITS: 5000 INJECTION INTRAVENOUS; SUBCUTANEOUS at 13:14

## 2024-07-21 RX ADMIN — INSULIN LISPRO 1 UNITS: 100 INJECTION, SOLUTION INTRAVENOUS; SUBCUTANEOUS at 17:32

## 2024-07-21 RX ADMIN — ATORVASTATIN CALCIUM 40 MG: 40 TABLET, FILM COATED ORAL at 20:30

## 2024-07-21 RX ADMIN — INSULIN LISPRO 1 UNITS: 100 INJECTION, SOLUTION INTRAVENOUS; SUBCUTANEOUS at 13:14

## 2024-07-21 RX ADMIN — HEPARIN SODIUM 5000 UNITS: 5000 INJECTION INTRAVENOUS; SUBCUTANEOUS at 06:17

## 2024-07-21 RX ADMIN — AMLODIPINE BESYLATE 10 MG: 10 TABLET ORAL at 08:09

## 2024-07-22 VITALS
SYSTOLIC BLOOD PRESSURE: 166 MMHG | RESPIRATION RATE: 16 BRPM | HEART RATE: 66 BPM | OXYGEN SATURATION: 100 % | DIASTOLIC BLOOD PRESSURE: 84 MMHG | BODY MASS INDEX: 26.41 KG/M2 | WEIGHT: 195 LBS | TEMPERATURE: 97.5 F | HEIGHT: 72 IN

## 2024-07-22 LAB
ANION GAP SERPL CALC-SCNC: 7 MMOL/L (ref 3–18)
ANION GAP SERPL CALC-SCNC: 9 MMOL/L (ref 3–18)
BASOPHILS # BLD: 0.1 K/UL (ref 0–0.1)
BASOPHILS NFR BLD: 1 % (ref 0–2)
BUN SERPL-MCNC: 46 MG/DL (ref 7–18)
BUN SERPL-MCNC: 48 MG/DL (ref 7–18)
BUN/CREAT SERPL: 9 (ref 12–20)
BUN/CREAT SERPL: 9 (ref 12–20)
CALCIUM SERPL-MCNC: 8.5 MG/DL (ref 8.5–10.1)
CALCIUM SERPL-MCNC: 8.6 MG/DL (ref 8.5–10.1)
CHLORIDE SERPL-SCNC: 104 MMOL/L (ref 100–111)
CHLORIDE SERPL-SCNC: 106 MMOL/L (ref 100–111)
CO2 SERPL-SCNC: 24 MMOL/L (ref 21–32)
CO2 SERPL-SCNC: 24 MMOL/L (ref 21–32)
CREAT SERPL-MCNC: 5.11 MG/DL (ref 0.6–1.3)
CREAT SERPL-MCNC: 5.47 MG/DL (ref 0.6–1.3)
DIFFERENTIAL METHOD BLD: ABNORMAL
ECHO BSA: 2.12 M2
EOSINOPHIL # BLD: 0.5 K/UL (ref 0–0.4)
EOSINOPHIL NFR BLD: 6 % (ref 0–5)
ERYTHROCYTE [DISTWIDTH] IN BLOOD BY AUTOMATED COUNT: 13.1 % (ref 11.6–14.5)
GLUCOSE BLD STRIP.AUTO-MCNC: 189 MG/DL (ref 70–110)
GLUCOSE BLD STRIP.AUTO-MCNC: 214 MG/DL (ref 70–110)
GLUCOSE SERPL-MCNC: 248 MG/DL (ref 74–99)
GLUCOSE SERPL-MCNC: 298 MG/DL (ref 74–99)
HCT VFR BLD AUTO: 36.7 % (ref 36–48)
HGB BLD-MCNC: 12.4 G/DL (ref 13–16)
IMM GRANULOCYTES # BLD AUTO: 0.1 K/UL (ref 0–0.04)
IMM GRANULOCYTES NFR BLD AUTO: 1 % (ref 0–0.5)
LYMPHOCYTES # BLD: 2.1 K/UL (ref 0.9–3.6)
LYMPHOCYTES NFR BLD: 26 % (ref 21–52)
MCH RBC QN AUTO: 31.6 PG (ref 24–34)
MCHC RBC AUTO-ENTMCNC: 33.8 G/DL (ref 31–37)
MCV RBC AUTO: 93.6 FL (ref 78–100)
MONOCYTES # BLD: 0.8 K/UL (ref 0.05–1.2)
MONOCYTES NFR BLD: 9 % (ref 3–10)
NEUTS SEG # BLD: 4.7 K/UL (ref 1.8–8)
NEUTS SEG NFR BLD: 58 % (ref 40–73)
NRBC # BLD: 0 K/UL (ref 0–0.01)
NRBC BLD-RTO: 0 PER 100 WBC
PHOSPHATE SERPL-MCNC: 3.7 MG/DL (ref 2.5–4.9)
PLATELET # BLD AUTO: 246 K/UL (ref 135–420)
PMV BLD AUTO: 10.8 FL (ref 9.2–11.8)
POTASSIUM SERPL-SCNC: 3.8 MMOL/L (ref 3.5–5.5)
POTASSIUM SERPL-SCNC: 4.4 MMOL/L (ref 3.5–5.5)
RBC # BLD AUTO: 3.92 M/UL (ref 4.35–5.65)
SODIUM SERPL-SCNC: 137 MMOL/L (ref 136–145)
SODIUM SERPL-SCNC: 137 MMOL/L (ref 136–145)
WBC # BLD AUTO: 8.2 K/UL (ref 4.6–13.2)

## 2024-07-22 PROCEDURE — 84100 ASSAY OF PHOSPHORUS: CPT

## 2024-07-22 PROCEDURE — 36415 COLL VENOUS BLD VENIPUNCTURE: CPT

## 2024-07-22 PROCEDURE — 85025 COMPLETE CBC W/AUTO DIFF WBC: CPT

## 2024-07-22 PROCEDURE — 82962 GLUCOSE BLOOD TEST: CPT

## 2024-07-22 PROCEDURE — C1894 INTRO/SHEATH, NON-LASER: HCPCS | Performed by: INTERNAL MEDICINE

## 2024-07-22 PROCEDURE — 99153 MOD SED SAME PHYS/QHP EA: CPT | Performed by: INTERNAL MEDICINE

## 2024-07-22 PROCEDURE — 93458 L HRT ARTERY/VENTRICLE ANGIO: CPT | Performed by: INTERNAL MEDICINE

## 2024-07-22 PROCEDURE — 2500000003 HC RX 250 WO HCPCS: Performed by: INTERNAL MEDICINE

## 2024-07-22 PROCEDURE — C1713 ANCHOR/SCREW BN/BN,TIS/BN: HCPCS | Performed by: INTERNAL MEDICINE

## 2024-07-22 PROCEDURE — 6370000000 HC RX 637 (ALT 250 FOR IP): Performed by: STUDENT IN AN ORGANIZED HEALTH CARE EDUCATION/TRAINING PROGRAM

## 2024-07-22 PROCEDURE — 2709999900 HC NON-CHARGEABLE SUPPLY: Performed by: INTERNAL MEDICINE

## 2024-07-22 PROCEDURE — 76000 FLUOROSCOPY <1 HR PHYS/QHP: CPT | Performed by: INTERNAL MEDICINE

## 2024-07-22 PROCEDURE — 80048 BASIC METABOLIC PNL TOTAL CA: CPT

## 2024-07-22 PROCEDURE — 6360000004 HC RX CONTRAST MEDICATION: Performed by: INTERNAL MEDICINE

## 2024-07-22 PROCEDURE — 6360000002 HC RX W HCPCS: Performed by: FAMILY MEDICINE

## 2024-07-22 PROCEDURE — 99152 MOD SED SAME PHYS/QHP 5/>YRS: CPT | Performed by: INTERNAL MEDICINE

## 2024-07-22 PROCEDURE — C1760 CLOSURE DEV, VASC: HCPCS | Performed by: INTERNAL MEDICINE

## 2024-07-22 PROCEDURE — 2580000003 HC RX 258: Performed by: STUDENT IN AN ORGANIZED HEALTH CARE EDUCATION/TRAINING PROGRAM

## 2024-07-22 PROCEDURE — 6360000002 HC RX W HCPCS: Performed by: INTERNAL MEDICINE

## 2024-07-22 PROCEDURE — B2151ZZ FLUOROSCOPY OF LEFT HEART USING LOW OSMOLAR CONTRAST: ICD-10-PCS | Performed by: INTERNAL MEDICINE

## 2024-07-22 PROCEDURE — B2111ZZ FLUOROSCOPY OF MULTIPLE CORONARY ARTERIES USING LOW OSMOLAR CONTRAST: ICD-10-PCS | Performed by: INTERNAL MEDICINE

## 2024-07-22 PROCEDURE — 4A023N7 MEASUREMENT OF CARDIAC SAMPLING AND PRESSURE, LEFT HEART, PERCUTANEOUS APPROACH: ICD-10-PCS | Performed by: INTERNAL MEDICINE

## 2024-07-22 RX ORDER — FENTANYL CITRATE 50 UG/ML
INJECTION, SOLUTION INTRAMUSCULAR; INTRAVENOUS PRN
Status: DISCONTINUED | OUTPATIENT
Start: 2024-07-22 | End: 2024-07-22 | Stop reason: HOSPADM

## 2024-07-22 RX ORDER — CARVEDILOL 3.12 MG/1
3.12 TABLET ORAL 2 TIMES DAILY WITH MEALS
Qty: 60 TABLET | Refills: 3 | Status: SHIPPED | OUTPATIENT
Start: 2024-07-22

## 2024-07-22 RX ORDER — LOSARTAN POTASSIUM 100 MG/1
100 TABLET ORAL DAILY
Qty: 30 TABLET | Refills: 0 | Status: SHIPPED | OUTPATIENT
Start: 2024-07-22

## 2024-07-22 RX ORDER — MIDAZOLAM HYDROCHLORIDE 1 MG/ML
INJECTION INTRAMUSCULAR; INTRAVENOUS PRN
Status: DISCONTINUED | OUTPATIENT
Start: 2024-07-22 | End: 2024-07-22 | Stop reason: HOSPADM

## 2024-07-22 RX ORDER — ATORVASTATIN CALCIUM 40 MG/1
40 TABLET, FILM COATED ORAL NIGHTLY
Qty: 30 TABLET | Refills: 3 | Status: SHIPPED | OUTPATIENT
Start: 2024-07-22

## 2024-07-22 RX ADMIN — SODIUM CHLORIDE, PRESERVATIVE FREE 10 ML: 5 INJECTION INTRAVENOUS at 08:10

## 2024-07-22 RX ADMIN — LOSARTAN POTASSIUM 100 MG: 50 TABLET, FILM COATED ORAL at 08:10

## 2024-07-22 RX ADMIN — CARVEDILOL 3.12 MG: 3.12 TABLET, FILM COATED ORAL at 08:10

## 2024-07-22 RX ADMIN — HEPARIN SODIUM 5000 UNITS: 5000 INJECTION INTRAVENOUS; SUBCUTANEOUS at 15:20

## 2024-07-22 RX ADMIN — TAMSULOSIN HYDROCHLORIDE 0.4 MG: 0.4 CAPSULE ORAL at 08:10

## 2024-07-22 RX ADMIN — ASPIRIN 81 MG CHEWABLE TABLET 81 MG: 81 TABLET CHEWABLE at 08:10

## 2024-07-22 RX ADMIN — AMLODIPINE BESYLATE 10 MG: 10 TABLET ORAL at 08:10

## 2024-07-22 NOTE — CARE COORDINATION
07/22/24 1444   IMM Letter   IMM Letter given to Patient/Family/Significant other/Guardian/POA/by: Elizabeth Kelley RN CM, Copy to Patient, Original on Hard Chart.   IMM Letter date given: 07/22/24   IMM Letter time given: [id number]

## 2024-07-22 NOTE — CARE COORDINATION
07/22/24 1620   /Social Work Whiteboard Notes   /Social Work Whiteboard GREEN [id number] Patient is medically ready for discharge. DCP:[employer] [place] [employer] via [employer] ride. Patient states he already has transportation set up for his HD chairs T,TH SAT.No further CM needs identified. CM will remain avaliable should a need arise.

## 2024-07-22 NOTE — PROGRESS NOTES
[facility]  -  Progress Note      Patient: Simone Lamas MRN: [id number]  SSN: [id number]    YOB: 1955  Age: 69 y.o.  Sex: male      Admit Date: 7/18/2024    Assessment:     - Chest pain with minimally elevated troponin levels 123 > 135 > 80.  Nuclear stress test 7/19 with a medium to large sized fixed inferior defect, depressed ejection fraction, EF 30%.  High risk findings based on depressed ejection fraction  - Chronic systolic heart failure with recovered EF: EF 45% by echocardiogram this admission- Echo 8/2022 with EF 55-60%, LVH, normal wall motion, DD with normal LVEF,  - pAF:  He had an EKG done 4/23/24 that showed Aflutter.  Not on AC.  No recurrence  - ESRD HD T, Th, S   - CVA: on aspirin and statin  - HTN: on losartan and amlodipine. Still elevated  - HLD: on statin  - DM     Primary cardiologist: None on file, initial consult. Dr. Martinez    Plan:     Will proceed with coronary angiography with diminished LV function, high risk findings noted on nuclear scan 7/19/2024 with EF 30% and large inferior fixed defect.  All questions answered.  He agrees with the plan.    Subjective:     No new complaints.     Objective:      Patient Vitals for the past 8 hrs:   Temp Pulse Resp BP SpO2   07/22/24 0833 -- 60 17 -- 100 %   07/22/24 0832 -- -- -- (!) 152/88 100 %   07/22/24 0825 97.7 °F (36.5 °C) -- -- -- --   07/22/24 0415 97.8 °F (36.6 °C) 61 16 (!) 145/80 97 %         Patient Vitals for the past 96 hrs:   Weight   07/22/24 0825 88.5 kg (195 lb)   07/20/24 1347 88.6 kg (195 lb 5.2 oz)   07/20/24 1005 91.4 kg (201 lb 8 oz)   07/19/24 1120 87.5 kg (193 lb)   07/19/24 0756 87.5 kg (193 lb)   07/19/24 0630 89.7 kg (197 lb 12 oz)   07/18/24 1056 87.5 kg (193 lb)         Intake/Output Summary (Last 24 hours) at 7/22/2024 0941  Last data filed at 7/22/2024 0833  Gross per 24 hour   Intake 0 ml   Output 950 ml   Net -950 ml       Physical Exam:  General:  alert, appears stated age, and 
    Pharmacist Review and Automatic Dose Adjustment of Prophylactic Enoxaparin    *Review reason for admission/hospital problem list*    The reviewing pharmacist has made an adjustment to the ordered enoxaparin dose or converted to UFH per the approved [facility] protocol and table as identified below.        Simone Lamas is a 69 y.o. male.     Recent Labs     07/18/24  1108 07/19/24  0134   CREATININE 2.89* 4.13*       Estimated Creatinine Clearance: 18 mL/min (A) (based on SCr of 4.13 mg/dL (H)).    Height:   Ht Readings from Last 1 Encounters:   07/19/24 1.829 m (6')     Weight:  Wt Readings from Last 1 Encounters:   07/19/24 87.5 kg (193 lb)           Plan: Based upon the patient's weight and renal function, the ordered enoxaparin dose of 30 mg daily has been changed/converted to Heparin 5000 units SUBQ TID for HD pt.      Thank you,  DAVION FORREST, [facility]  
    Pharmacist Review and Automatic Dose Adjustment of Prophylactic Enoxaparin    *Review reason for admission/hospital problem list*    The reviewing pharmacist has made an adjustment to the ordered enoxaparin dose or converted to UFH per the approved [facility] protocol and table as identified below.        Simone Lamas is a 69 y.o. male.     Recent Labs     07/18/24  1108   CREATININE 2.89*       Estimated Creatinine Clearance: 26 mL/min (A) (based on SCr of 2.89 mg/dL (H)).    Height:   Ht Readings from Last 1 Encounters:   07/18/24 1.829 m (6')     Weight:  Wt Readings from Last 1 Encounters:   07/18/24 87.5 kg (193 lb)           Plan: Based upon the patient's weight and renal function, the ordered enoxaparin dose of 40 mg daily has been changed/converted to 30 mg daily      Thank you,  Niels [facility]    
 conducted an initial consultation and Spiritual Assessment for Simone Lamas, who is a 69 y.o.,male. Patient's Primary Language is: English.   According to the patient's EMR [employer] Affiliation is: Religious.     The reason the Patient came to the hospital is:   Patient Active Problem List    Diagnosis Date Noted    Acute chest pain 07/18/2024    Diabetes due to undrl condition w oth diabetic kidney comp ([facility]) 07/18/2024    Unstable angina ([facility]) 07/18/2024    Volume overload 04/23/2024    AVF (arteriovenous fistula) ([facility]) 01/26/2023    ESRD (end stage renal disease) on dialysis ([facility]) 01/25/2023    CKD (chronic kidney disease) stage 4, GFR 15-29 ml/min ([facility]) 11/30/2022    ESRD (end stage renal disease) ([facility]) 08/16/2022    Substance abuse ([facility]) 08/16/2022    Anemia 08/16/2022    End stage renal disease ([facility]) 08/16/2022    Pericardial effusion 08/16/2022    Hyperglycemia 08/16/2022    CKD (chronic kidney disease) stage 5, GFR less than 15 ml/min ([facility]) 08/16/2022    TIA (transient ischemic attack) 04/11/2018    CVA (cerebral vascular accident) ([facility]) 04/11/2018    Elevated serum creatinine 08/10/2016    Hypertriglyceridemia 08/10/2016    Elevated HDL 08/10/2016    Decreased GFR 08/10/2016    History of heroin abuse ([facility]) 08/10/2016    Elevated alkaline phosphatase level 08/10/2016    History of cocaine abuse ([facility]) 08/10/2016    Systolic CHF, chronic ([facility]) 07/10/2016    Chronic renal insufficiency 07/10/2016    NSTEMI (non-ST elevated myocardial infarction) ([facility]) 07/08/2016    COPD, mild ([facility]) 12/02/2015    Insulin dependent type 2 diabetes mellitus, controlled ([facility]) 12/02/2015    CHF (congestive heart failure) ([facility]) 11/30/2015    Cardiomyopathy ([facility]) 11/30/2015    Homelessness 10/19/2015    Cardiac LV ejection fraction 30-35% 07/15/2015    Type 2 diabetes mellitus with insulin therapy ([facility]) 07/06/2015    Stage 3 chronic renal impairment associated with type 2 diabetes mellitus ([facility]) 11/04/2014    Hepatitis C 
0752 TRANSFER - IN REPORT:    Verbal report received from Francesca Lamas  being received from [address] [place] [address] for ordered procedure      Report consisted of patient's Situation, Background, Assessment and   Recommendations(SBAR).     Information from the following report(s) Nurse Handoff Report, Intake/Output, MAR, Recent Results, Med Rec Status, and Neuro Assessment was reviewed with the receiving nurse.    Opportunity for questions and clarification was provided.      Assessment completed upon patient's arrival to unit and care assumed.              0920 TRANSFER - OUT REPORT:    Verbal report given to Gemini Lamas  being transferred to cath lab for ordered procedure       Report consisted of patient's Situation, Background, Assessment and   Recommendations(SBAR).     Information from the following report(s) Nurse Handoff Report, Intake/Output, MAR, Recent Results, Med Rec Status, Cardiac Rhythm NSR, Pre Procedure Checklist, and Procedure Verification was reviewed with the receiving nurse.           Lines:   Peripheral IV 07/18/24 Left;Proximal;Anterior Forearm (Active)   Site Assessment Clean, dry & intact 07/22/24 0416   Line Status Capped;Flushed 07/22/24 0416   Line Care Cap changed;Connections checked and tightened 07/22/24 0416   Phlebitis Assessment No symptoms 07/22/24 0416   Infiltration Assessment 0 07/22/24 0416   Alcohol Cap Used No 07/22/24 0416   Dressing Status Clean, dry & intact 07/22/24 0416   Dressing Type Transparent 07/22/24 0416        Opportunity for questions and clarification was provided.             1025 TRANSFER - IN REPORT:    Verbal report received from Hasmukh Lamas  being received from cath lab for routine post-op      Report consisted of patient's Situation, Background, Assessment and   Recommendations(SBAR).     Information from the following report(s) Nurse Handoff Report, Surgery Report, Intake/Output, MAR, Recent Results, Med Rec Status, Cardiac Rhythm NSR, 
4 Eyes Skin Assessment     NAME:  Simone Lamas  YOB: 1955  MEDICAL RECORD NUMBER:  [id number]    The patient is being assessed for  Admission    I agree that at least one RN has performed a thorough Head to Toe Skin Assessment on the patient. ALL assessment sites listed below have been assessed.      Areas assessed by both nurses:    Head, Face, Ears, Shoulders, Back, Chest, Arms, Elbows, Hands, Sacrum. Buttock, Coccyx, Ischium, Legs. Feet and Heels, and Under Medical Devices         Does the Patient have a Wound? No noted wound(s)       Guerrero Prevention initiated by RN: Yes  Wound Care Orders initiated by RN: Yes    Pressure Injury (Stage 3,4, Unstageable, DTI, NWPT, and Complex wounds) if present, place Wound referral order by RN under : Yes    New Ostomies, if present place, Ostomy referral order under : No     Nurse 1 eSignature: Electronically signed by Carolina Mae RN on 7/18/24 at 10:13 PM EDT    **SHARE this note so that the co-signing nurse can place an eSignature**    Nurse 2 eSignature: Electronically signed by Guillermo Fonseca RN on 7/18/24 at 10:20 PM EDT    
[facility] - Progress Note    Admit Date: 7/18/2024      Assessment:     Patient Active Problem List    Diagnosis Date Noted    Acute chest pain 07/18/2024    Diabetes due to undrl condition w oth diabetic kidney comp ([facility]) 07/18/2024    Unstable angina ([facility]) 07/18/2024    Volume overload 04/23/2024    AVF (arteriovenous fistula) ([facility]) 01/26/2023    ESRD on dialysis ([facility]) 01/25/2023    CKD (chronic kidney disease) stage 4, GFR 15-29 ml/min ([facility]) 11/30/2022    ESRD (end stage renal disease) ([facility]) 08/16/2022    Substance abuse ([facility]) 08/16/2022    Anemia 08/16/2022    End stage renal disease ([facility]) 08/16/2022    Pericardial effusion 08/16/2022    Hyperglycemia 08/16/2022    CKD (chronic kidney disease) stage 5, GFR less than 15 ml/min ([facility]) 08/16/2022    TIA (transient ischemic attack) 04/11/2018    CVA (cerebral vascular accident) ([facility]) 04/11/2018    Elevated serum creatinine 08/10/2016    Hypertriglyceridemia 08/10/2016    Elevated HDL 08/10/2016    Decreased GFR 08/10/2016    History of heroin abuse ([facility]) 08/10/2016    Elevated alkaline phosphatase level 08/10/2016    History of cocaine abuse ([facility]) 08/10/2016    Systolic CHF, chronic ([facility]) 07/10/2016    Chronic renal insufficiency 07/10/2016    NSTEMI (non-ST elevated myocardial infarction) ([facility]) 07/08/2016    COPD, mild ([facility]) 12/02/2015    Insulin dependent type 2 diabetes mellitus, controlled ([facility]) 12/02/2015    CHF (congestive heart failure) ([facility]) 11/30/2015    Cardiomyopathy ([facility]) 11/30/2015    Homelessness 10/19/2015    Cardiac LV ejection fraction 30-35% 07/15/2015    Type 2 diabetes mellitus with insulin therapy ([facility]) 07/06/2015    Stage 3 chronic renal impairment associated with type 2 diabetes mellitus ([facility]) 11/04/2014    Hepatitis C infection 11/04/2014    Cocaine use 08/09/2014    Hypertension 01/01/2000     - Chest pain with minimally elevated troponin levels 123 > 135 > 80.  Nuclear stress test 7/19 with a medium to large sized fixed 
[facility] - Progress Note    Admit Date: 7/18/2024      Assessment:     Patient Active Problem List    Diagnosis Date Noted    Acute chest pain 07/18/2024    Diabetes due to undrl condition w oth diabetic kidney comp ([facility]) 07/18/2024    Unstable angina ([facility]) 07/18/2024    Volume overload 04/23/2024    AVF (arteriovenous fistula) ([facility]) 01/26/2023    ESRD on dialysis ([facility]) 01/25/2023    CKD (chronic kidney disease) stage 4, GFR 15-29 ml/min ([facility]) 11/30/2022    ESRD (end stage renal disease) ([facility]) 08/16/2022    Substance abuse ([facility]) 08/16/2022    Anemia 08/16/2022    End stage renal disease ([facility]) 08/16/2022    Pericardial effusion 08/16/2022    Hyperglycemia 08/16/2022    CKD (chronic kidney disease) stage 5, GFR less than 15 ml/min ([facility]) 08/16/2022    TIA (transient ischemic attack) 04/11/2018    CVA (cerebral vascular accident) ([facility]) 04/11/2018    Elevated serum creatinine 08/10/2016    Hypertriglyceridemia 08/10/2016    Elevated HDL 08/10/2016    Decreased GFR 08/10/2016    History of heroin abuse ([facility]) 08/10/2016    Elevated alkaline phosphatase level 08/10/2016    History of cocaine abuse ([facility]) 08/10/2016    Systolic CHF, chronic ([facility]) 07/10/2016    Chronic renal insufficiency 07/10/2016    NSTEMI (non-ST elevated myocardial infarction) ([facility]) 07/08/2016    COPD, mild ([facility]) 12/02/2015    Insulin dependent type 2 diabetes mellitus, controlled ([facility]) 12/02/2015    CHF (congestive heart failure) ([facility]) 11/30/2015    Cardiomyopathy ([facility]) 11/30/2015    Homelessness 10/19/2015    Cardiac LV ejection fraction 30-35% 07/15/2015    Type 2 diabetes mellitus with insulin therapy ([facility]) 07/06/2015    Stage 3 chronic renal impairment associated with type 2 diabetes mellitus ([facility]) 11/04/2014    Hepatitis C infection 11/04/2014    Cocaine use 08/09/2014    Hypertension 01/01/2000     - Chest pain with minimally elevated troponin levels 123 > 135 > 80.  Nuclear stress test 7/19 with a medium to large sized fixed 
[facility] - Progress Note    Admit Date: 7/18/2024  Attending Cardiologist: Dr. Martinez    Assessment:     Patient Active Problem List    Diagnosis Date Noted    Acute chest pain 07/18/2024    Diabetes due to undrl condition w oth diabetic kidney comp ([facility]) 07/18/2024    Unstable angina ([facility]) 07/18/2024    Volume overload 04/23/2024    AVF (arteriovenous fistula) ([facility]) 01/26/2023    ESRD (end stage renal disease) on dialysis ([facility]) 01/25/2023    CKD (chronic kidney disease) stage 4, GFR 15-29 ml/min ([facility]) 11/30/2022    ESRD (end stage renal disease) ([facility]) 08/16/2022    Substance abuse ([facility]) 08/16/2022    Anemia 08/16/2022    End stage renal disease ([facility]) 08/16/2022    Pericardial effusion 08/16/2022    Hyperglycemia 08/16/2022    CKD (chronic kidney disease) stage 5, GFR less than 15 ml/min ([facility]) 08/16/2022    TIA (transient ischemic attack) 04/11/2018    CVA (cerebral vascular accident) ([facility]) 04/11/2018    Elevated serum creatinine 08/10/2016    Hypertriglyceridemia 08/10/2016    Elevated HDL 08/10/2016    Decreased GFR 08/10/2016    History of heroin abuse ([facility]) 08/10/2016    Elevated alkaline phosphatase level 08/10/2016    History of cocaine abuse ([facility]) 08/10/2016    Systolic CHF, chronic ([facility]) 07/10/2016    Chronic renal insufficiency 07/10/2016    NSTEMI (non-ST elevated myocardial infarction) ([facility]) 07/08/2016    COPD, mild ([facility]) 12/02/2015    Insulin dependent type 2 diabetes mellitus, controlled ([facility]) 12/02/2015    CHF (congestive heart failure) ([facility]) 11/30/2015    Cardiomyopathy ([facility]) 11/30/2015    Homelessness 10/19/2015    Cardiac LV ejection fraction 30-35% 07/15/2015    Type 2 diabetes mellitus with insulin therapy ([facility]) 07/06/2015    Stage 3 chronic renal impairment associated with type 2 diabetes mellitus ([facility]) 11/04/2014    Hepatitis C infection 11/04/2014    Cocaine use 08/09/2014    Hypertension 01/01/2000       - Chest pain with minimally elevated troponin levels 123 > 135 > 80. EKG 
Consult Note    Patient: Simone Lamas               Sex: male          DOA: 7/18/2024         YOB: 1955      Age:  69 y.o.        LOS:  LOS: 0 days              HPI:     Simone Lamas is a 69 y.o. male who has been seen on consultation at the request of the emergency room physician.,  He has history of ESRD and a homeless patient comes for dialysis every Tuesday Thursday Saturday under my care.  I have seen him this morning during dialysis during that time he was doing fine without any complaint but was complaining for the last week or so patient having intermittent chest pain.  Pain mostly in the precordial area without any radiation.  It comes and goes and bothers him.  Pain was not reproducible.  No shortness of breath..  He gets [facility] every Tuesday Thursday Saturday [facility]  He has history of hypertension type 2 diabetes mellitus COPD. ESRD and possible history of substance abuse in the past.  Does not smoke anymore used to smoke in the past    No past medical history on file.    No past surgical history on file.    No family history on file.    Social History     Socioeconomic History    Marital status: Single   Tobacco Use    Smoking status: Never    Smokeless tobacco: Never       Prior to Admission medications    Medication Sig Start Date End Date Taking? Authorizing Provider   ASPIRIN LOW DOSE 81 MG chewable tablet Take 1 tablet by mouth daily 3/15/23   ProviderEctor MD B Complex-C-Folic Acid (RENAL) 1 MG CAPS Take 1 capsule by mouth daily 3/15/23   ProviderEctor MD   amLODIPine (NORVASC) 10 MG tablet Take 1 tablet by mouth daily 11/22/22   Automatic Reconciliation, Ar   insulin glargine (LANTUS) 100 UNIT/ML injection vial Inject 10 mL SQ QHS 10/5/22   Automatic Reconciliation, Ar       Allergies   Allergen Reactions    Acetaminophen Other (See Comments)     Pt was advised not to take Tylenol per  
[facility]  Progress Note    Patient: Simone Lamas Age: 69 y.o. : 1955 MR#: [id number] SSN: [id number]      Subjective/24-hour events:     HD completed yesterday per usual schedule.  No CP or SOB acutely.    Assessment:   Chest pain  Elevated troponin   ESRD, HD dependent  Hypertension  DM 2  HLD  PAF/flutter, not on AC therapy  Homelessness   lives in a shelter  Hx CVA  Hx chronic systolic CHF, last EFT 55-60% 2022    Plan:   C tomorrow per cardiology.  Blood sugars remaining a bit high overall.  Add low-dose Lantus to start this morning and monitor.  Continue current medical management as ordered otherwise.  Further plan pending results of cath.    Anticipated discharge:  [facility]    Case discussed with:  [x]Patient  []Family  [x] Nursing  []Case Management  DVT Prophylaxis:  []Lovenox  [x]Hep SQ  []SCDs  []Coumadin   []On Heparin gtt []PO anticoagulant    Objective:   VS: BP (!) 161/80   Pulse 62   Temp 98.5 °F (36.9 °C) (Oral)   Resp 16   Ht 1.829 m (6')   Wt 88.6 kg (195 lb 5.2 oz)   SpO2 97%   BMI 26.49 kg/m²      Tmax/24hrs: Temp (24hrs), Av.3 °F (36.8 °C), Min:97.9 °F (36.6 °C), Max:98.5 °F (36.9 °C)    Intake/Output Summary (Last 24 hours) at 2024 0810  Last data filed at 2024 1347  Gross per 24 hour   Intake 3200 ml   Output 3000 ml   Net 200 ml       Gen:  In NAD.  Lungs: Clear, no wheezes  Effort nonlabored.  CV: RRR.  Abdomen: Soft, NTTP.  Extremities: Warm, no pitting edema or ischemia.  Neuro:  Awake and alert, moves extremities spontaneously.    Current Facility-Administered Medications   Medication Dose Route Frequency    perflutren lipid microspheres (DEFINITY) injection 2 mL  2 mL IntraVENous Once    heparin (porcine) injection 5,000 Units  5,000 Units SubCUTAneous 3 times per day    sodium chloride flush 0.9 % injection 5-40 mL  5-40 mL IntraVENous 2 times per day    sodium chloride flush 0.9 % injection 5-40 mL  5-40 
[facility]  Progress Note    Patient: Simone Lamas Age: 69 y.o. : 1955 MR#: [id number] SSN: [id number]      Subjective/24-hour events:     On HD currently.  No new issues overnight.    Assessment:   Chest pain  Elevated troponin   ESRD, HD dependent  Hypertension  DM 2  HLD  PAF/flutter, not on AC therapy  Homelessness   lives in a shelter  Hx CVA  Hx chronic systolic CHF, last EFT 55-60% 2022    Plan:   [facility] Monday.  Continue current medical management in interim.  Mobilize as tolerated.  Follow.    Anticipated discharge:  /[facility]    Case discussed with:  [x]Patient  []Family  [x] Nursing  [x]Case Management  DVT Prophylaxis:  []Lovenox  [x]Hep SQ  []SCDs  []Coumadin   []On Heparin gtt []PO anticoagulant    Objective:   VS: BP (!) 158/88   Pulse 72   Temp 98.1 °F (36.7 °C) (Oral)   Resp 16   Ht 1.829 m (6')   Wt 87.5 kg (193 lb)   SpO2 97%   BMI 26.18 kg/m²      Tmax/24hrs: Temp (24hrs), Av °F (36.7 °C), Min:97.5 °F (36.4 °C), Max:98.5 °F (36.9 °C)  No intake or output data in the 24 hours ending 24 0759    Gen:  In NAD.  Lungs: Clear, no wheezes  Effort nonlabored.  CV: RRR.  Abdomen: Soft, NTTP.  Extremities: Warm, no pitting edema or ischemia.  Neuro:  Awake and alert, moves extremities spontaneously.    Current Facility-Administered Medications   Medication Dose Route Frequency    perflutren lipid microspheres (DEFINITY) injection 2 mL  2 mL IntraVENous Once    heparin (porcine) injection 5,000 Units  5,000 Units SubCUTAneous 3 times per day    sodium chloride flush 0.9 % injection 5-40 mL  5-40 mL IntraVENous 2 times per day    sodium chloride flush 0.9 % injection 5-40 mL  5-40 mL IntraVENous PRN    0.9 % sodium chloride infusion   IntraVENous PRN    ondansetron (ZOFRAN-ODT) disintegrating tablet 4 mg  4 mg Oral Q8H PRN    Or    ondansetron (ZOFRAN) injection 4 mg  4 mg IntraVENous Q6H PRN    polyethylene glycol (GLYCOLAX) packet 17 g  
[facility] Hospitalist Group  Progress Note    Patient: Simone Lamas Age: 69 y.o. : 1955 MR#: [id number] SSN: [id number]      Subjective/24-hour events:     Seen post stress test.  Remains without chest pain, no shortness of breath reported.    Assessment:   Chest pain  ESRD, HD dependent  Hypertension  DM 2  Homelessness   lives in a shelter    Plan:   NST results noted and discussed with cardiology.  Patient will need left heart cath.  Procedure could potentially be done as outpatient but disposition and transportation is an issue although patient is fairly compliant with medical treatments..  Will keep in hospital until Monday for LHC to be performed at that time.  Assistance of Dr. Martinez appreciated.  Medical management to continue as ordered in interim.    Anticipated discharge [facility]/shelter    Case discussed with:  [x]Patient  []Family  [x] Nursing  []Case Management  DVT Prophylaxis:  []Lovenox  []Hep SQ  []SCDs  []Coumadin   []On Heparin gtt []PO anticoagulant    Objective:   VS: BP (!) 175/85 Pulse 71   Temp 98.5 °F (36.9 °C) (Oral)   Resp 18   Ht 1.829 m (6')   Wt 87.5 kg (193 lb)   SpO2 97%   BMI 26.18 kg/m²      Gen:  In NAD.  Lungs: Clear, no wheezes  Effort nonlabored.  CV: RRR.  Abdomen: Soft, NTTP.  Extremities: Warm, no pitting edema or ischemia.  Neuro:  Awake and alert, moves extremities spontaneously.    Current Facility-Administered Medications   Medication Dose Route Frequency    perflutren lipid microspheres (DEFINITY) injection 2 mL  2 mL IntraVENous Once    heparin (porcine) injection 5,000 Units  5,000 Units SubCUTAneous 3 times per day    sodium chloride flush 0.9 % injection 5-40 mL  5-40 mL IntraVENous 2 times per day    sodium chloride flush 0.9 % injection 5-40 mL  5-40 mL IntraVENous PRN    0.9 % sodium chloride infusion   IntraVENous PRN    ondansetron (ZOFRAN-ODT) disintegrating tablet 4 mg  4 mg Oral Q8H PRN    Or    ondansetron (ZOFRAN) 
Progress Note    Simone Lamas  69 y.o.      Admit Date: 7/18/2024  Patient Active Problem List   Diagnosis    Elevated serum creatinine    Systolic CHF, chronic (HCC)    Chronic renal insufficiency    Hypertension    Stage 3 chronic renal impairment associated with type 2 diabetes mellitus (HCC)    Hepatitis C infection    Cocaine use    Cardiac LV ejection fraction 30-35%    Homelessness    Hypertriglyceridemia    Elevated HDL    Decreased GFR    NSTEMI (non-ST elevated myocardial infarction) (HCC)    History of heroin abuse (HCC)    Elevated alkaline phosphatase level    History of cocaine abuse (HCC)    TIA (transient ischemic attack)    Type 2 diabetes mellitus with insulin therapy (HCC)    COPD, mild (HCC)    CHF (congestive heart failure) (HCC)    Cardiomyopathy (HCC)    Insulin dependent type 2 diabetes mellitus, controlled (HCC)    CVA (cerebral vascular accident) (HCC)    ESRD (end stage renal disease) (HCC)    Substance abuse (HCC)    Anemia    End stage renal disease (HCC)    Pericardial effusion    Hyperglycemia    CKD (chronic kidney disease) stage 5, GFR less than 15 ml/min (HCC)    CKD (chronic kidney disease) stage 4, GFR 15-29 ml/min (HCC)    ESRD (end stage renal disease) on dialysis (HCC)    AVF (arteriovenous fistula) (HCC)    Volume overload    Acute chest pain    Diabetes due to undrl condition w oth diabetic kidney comp (HCC)    Unstable angina (HCC)           Subjective:     Patient Feels good, no chest Pain. Back after cardiac Nuclear test,No SOB      A comprehensive review of systems was negative except for that written in the History of Present Illness.    Objective:     BP (!) 171/94   Pulse 66   Temp 98 °F (36.7 °C) (Oral)   Resp 17   Ht 1.829 m (6')   Wt 87.5 kg (193 lb)   SpO2 96%   BMI 26.18 kg/m²       Intake/Output Summary (Last 24 hours) at 7/19/2024 1204  Last data filed at 7/19/2024 0703  Gross per 24 hour   Intake --   Output 700 ml   Net -700 ml       Current 
Progress Note    Simone Lamas  69 y.o.      Admit Date: 7/18/2024  Patient Active Problem List   Diagnosis    Elevated serum creatinine    Systolic CHF, chronic (HCC)    Chronic renal insufficiency    Hypertension    Stage 3 chronic renal impairment associated with type 2 diabetes mellitus (HCC)    Hepatitis C infection    Cocaine use    Cardiac LV ejection fraction 30-35%    Homelessness    Hypertriglyceridemia    Elevated HDL    Decreased GFR    NSTEMI (non-ST elevated myocardial infarction) (HCC)    History of heroin abuse (HCC)    Elevated alkaline phosphatase level    History of cocaine abuse (HCC)    TIA (transient ischemic attack)    Type 2 diabetes mellitus with insulin therapy (HCC)    COPD, mild (HCC)    CHF (congestive heart failure) (HCC)    Cardiomyopathy (HCC)    Insulin dependent type 2 diabetes mellitus, controlled (HCC)    CVA (cerebral vascular accident) (HCC)    ESRD (end stage renal disease) (HCC)    Substance abuse (HCC)    Anemia    End stage renal disease (HCC)    Pericardial effusion    Hyperglycemia    CKD (chronic kidney disease) stage 5, GFR less than 15 ml/min (HCC)    CKD (chronic kidney disease) stage 4, GFR 15-29 ml/min (HCC)    ESRD on dialysis (HCC)    AVF (arteriovenous fistula) (HCC)    Volume overload    Acute chest pain    Diabetes due to undrl condition w oth diabetic kidney comp (HCC)    Unstable angina (HCC)           Subjective:     Patient feels good.  No shortness of breath and no chest pain.  Was dialyzed yesterday.  Waiting for cardiac cath tomorrow.       A comprehensive review of systems was negative except for that written in the History of Present Illness.    Objective:     BP (!) 161/80   Pulse 62   Temp 98.5 °F (36.9 °C) (Oral)   Resp 16   Ht 1.829 m (6')   Wt 88.6 kg (195 lb 5.2 oz)   SpO2 97%   BMI 26.49 kg/m²       Intake/Output Summary (Last 24 hours) at 7/21/2024 1240  Last data filed at 7/20/2024 1347  Gross per 24 hour   Intake 3200 ml   Output 3000 
Progress Note    Simone Lamas  69 y.o.      Admit Date: 7/18/2024  Patient Active Problem List   Diagnosis    Elevated serum creatinine    Systolic CHF, chronic (HCC)    Chronic renal insufficiency    Hypertension    Stage 3 chronic renal impairment associated with type 2 diabetes mellitus (HCC)    Hepatitis C infection    Cocaine use    Cardiac LV ejection fraction 30-35%    Homelessness    Hypertriglyceridemia    Elevated HDL    Decreased GFR    NSTEMI (non-ST elevated myocardial infarction) (HCC)    History of heroin abuse (HCC)    Elevated alkaline phosphatase level    History of cocaine abuse (HCC)    TIA (transient ischemic attack)    Type 2 diabetes mellitus with insulin therapy (HCC)    COPD, mild (HCC)    CHF (congestive heart failure) (HCC)    Cardiomyopathy (HCC)    Insulin dependent type 2 diabetes mellitus, controlled (HCC)    CVA (cerebral vascular accident) (HCC)    ESRD (end stage renal disease) (HCC)    Substance abuse (HCC)    Anemia    End stage renal disease (HCC)    Pericardial effusion    Hyperglycemia    CKD (chronic kidney disease) stage 5, GFR less than 15 ml/min (HCC)    CKD (chronic kidney disease) stage 4, GFR 15-29 ml/min (HCC)    ESRD on dialysis (HCC)    AVF (arteriovenous fistula) (HCC)    Volume overload    Acute chest pain    Diabetes due to undrl condition w oth diabetic kidney comp (HCC)    Unstable angina (HCC)           Subjective:     Patient was seen earlier and during cardiac cath.  Cath report reviewed.  Dominant right coronary widely patent left coronary artery widely patent, LAD widely patent circumflex also widely patent.  Left ventricular end-diastolic pressure at 12 mmHg.  Global hypokinesis with ejection fraction around 25%..       A comprehensive review of systems was negative except for that written in the History of Present Illness.    Objective:     BP (!) 166/84   Pulse 66   Temp 97.5 °F (36.4 °C) (Oral)   Resp 16   Ht 1.829 m (6')   Wt 88.5 kg (195 lb)   
Pt is alert and oriented. Pt is being discharged. IV and tele box removed.Discharge instructions gone over and answered all questions. All belongings gathered. Case management assisting in pt transported home. Pending time. Transport to take pt down to main entrance and to pharmacy.  
Received pre HD report from  YOON Leggett, GET.        Pt in bed, A+O x4, no s/s of distress noted.  Heparin 1500 units given prior to start of treatment.    Accessed AVF right upper arm w/15G needle w/o difficulty.    Tx initiated at 1012.      AVF flowing with ease.  For hemodynamic stability UF goal 3000 ml and Heparin maintenance 500 units/hr for 2.5 hours.      Offered assistance with repositioning every 2 hours.  Vascular access visible at all times throughout entire duration of treatment and line connections remain intact throughout entire duration of treatment.     Tx completed at 1343, tolerated well 2.5L removed.  De-accessed per protocol.    Clot time 5 minutes for arterial, and 5 minutes for venous.      Patient returned to unit in no acute distress.  Unit nurse YOON Leggett, GET given report.   
TRANSFER - IN REPORT:    Verbal report received from GET [place] on Simone Lamas  being received from ED for routine progression of patient care      Report consisted of patient's Situation, Background, Assessment and   Recommendations(SBAR).     Information from the following report(s) Nurse Handoff Report, ED Encounter Summary, ED SBAR, Intake/Output, and MAR was reviewed with the receiving nurse.    Opportunity for questions and clarification was provided.      Assessment completed upon patient's arrival to unit and care assumed.     
Units  0-4 Units SubCUTAneous Nightly Tomás, Akankshacornelius Trimble, DO            Physical Exam:     Physical Exam:   General:  Alert, cooperative, no distress, appears stated age.   Eyes:  Conjunctivae/corneas clear. PERRL, EOMs intact.    Mouth/Throat: Lips, mucosa, and tongue normal. Teeth and gums normal.   Neck: Supple, symmetrical, trachea midline, no adenopathy, thyroid: no enlargement/tenderness/nodules, no carotid bruit and no JVD.   Lungs:   Clear to auscultation bilaterally.   Heart:  Regular rate and rhythm, S1, S2 normal, no murmur, click, rub or gallop.   Abdomen:   Soft, non-tender. Bowel sounds normal. No masses,  No organomegaly.   Extremities: Extremities normal, atraumatic, no cyanosis or edema, tolerating blood flow of 350 to the right elbow AV fistula                         Data Review:    Labs: Results:   Chemistry Recent Labs     07/18/24  1108 07/19/24  0134 07/20/24  0210   GLUCOSE 132* 206* 183*    138 140   K 3.9 3.5 3.9    102 106   CO2 32 30 27   BUN 17 30* 37*   CREATININE 2.89* 4.13* 4.77*   GLOB 4.3* 4.0  --    ALT 24 19  --    AST 23 14  --       CBC w/Diff Recent Labs     07/18/24  1108 07/19/24  0134 07/20/24  0210   WBC 8.1 6.4 8.0   RBC 4.37 3.90* 3.81*   HGB 13.7 12.4* 12.2*   HCT 41.1 37.1 35.8*    219 229          Imaging:     Procedures/imaging: see electronic medical records for all procedures, Xrays and details which were not copied into this note but were reviewed prior to   taking my decision.  Impression:       Active Hospital Problems    Diagnosis Date Noted    Acute chest pain 07/18/2024    Diabetes due to undrl condition w oth diabetic kidney comp ([facility]) 07/18/2024    Unstable angina ([facility]) 07/18/2024    ESRD on dialysis ([facility]) 01/25/2023    ESRD (end stage renal disease) ([facility]) 08/16/2022    Homelessness 10/19/2015    Hypertension 01/01/2000            Plan:       Renal wise remained stable tolerating fluid removal blood pressure will be treated with the

## 2024-07-22 NOTE — DISCHARGE INSTRUCTIONS
talk to your doctor about when to restart these medications.  You may be discharged on a blood-thinning medicine like aspirin, clopidogrel (Plavix), or ticagrelor (Brilinta) post procedure. It is very important you take these medications exactly as directed in order to keep the coronary artery open and reduce your risk of a heart attack. Be safe with medications. Call your doctor if you think there is a problem with your medication.   If you take Metformin, you can resume taking it in 48 hours from procedure.    Call 911 if you have these symptoms:  Heart Attack:  Chest discomfort: Most heart attacks involve discomfort in the center of the chest that lasts more than a few minutes or that goes away and comes back.  Discomfort in other areas of the upper body. Symptoms can include pain or discomfort in one or both arms, the back, jaw, or stomach.   Shortness of breath with or without chest discomfort.   Don't wait to call 911. Minutes matter. Calling 911 is almost always the fastest way to get lifesaving treatment.   Stroke  FAST  F- Face looks uneven   A- arms unable to move or move unevenly  S- Speech slurred or non-existent  T- Time- Call 911 as soon as signs and symptoms begin. DO NOT go back to bed or wait to see if they resolve. Time is brain!    Moderate Sedation Post Op Care  For a minor procedure or surgery, you will be given a sedative to help you relax. This drug will make you sleepy. It is usually given intravenously.   If you had local anesthesia, you may feel some pain and discomfort as it wears off. If you have pain do not be afraid to say so. Pain medication works better if you take it before the pain gets bad.   Side effects from the sedation include:  Drowsiness  Nausea and vomiting, which should not last long.  Do not make any important decisions or do anything that requires attention to detail for 24 hours.  No driving for 24 hours after the procedure or operate any machinery that could be

## 2024-07-22 NOTE — CARE COORDINATION
Requested Case Management specialist to assist with transportation to [facility].   Address is [address], [address]   [place], [place] - [address]    and phone number is [phone]  Patient will require [employer] Cab transport.   He must arrive by 1700 in order to catch the van that takes him to the over night shelter.  Pt requires Cab If stretcher, reason: N/A  Patient is currently requiring oxygen No   Height:6'   Weight: 195#  Pt is on isolation: No Isolation is for: N/A  Is the pt ready now? YES  Requested time: Next Available  PCS Faxed: No  Insurance verified on face sheet: Yes  Auth needed for transport: Yes  CM completed PCS/ Envelope and placed on chart.  No

## 2024-07-22 NOTE — PLAN OF CARE
Problem: Discharge Planning  Goal: Discharge to home or other facility with appropriate resources  7/19/2024 1121 by Ramandeep Leggett, RN  Outcome: Progressing  7/18/2024 2212 by Carolina Mae, RN  Outcome: Progressing     Problem: Neurosensory - Adult  Goal: Achieves stable or improved neurological status  7/18/2024 2212 by Carolina Mae, RN  Outcome: Progressing     
  Problem: Discharge Planning  Goal: Discharge to home or other facility with appropriate resources  7/22/2024 1511 by Odalys Wang RN  Outcome: Adequate for Discharge  7/22/2024 1511 by Odalsy Wang RN  Outcome: Adequate for Discharge  7/22/2024 0835 by Odalys Wang RN  Outcome: Progressing     Problem: ABCDS Injury Assessment  Goal: Absence of physical injury  7/22/2024 1511 by Odalys Wang RN  Outcome: Adequate for Discharge  7/22/2024 1511 by Odalys Wang RN  Outcome: Adequate for Discharge  7/22/2024 0835 by Odalys Wang RN  Outcome: Progressing     Problem: Neurosensory - Adult  Goal: Achieves stable or improved neurological status  7/22/2024 1511 by Odalys Wang RN  Outcome: Adequate for Discharge  7/22/2024 1511 by Odalys Wang RN  Outcome: Adequate for Discharge  7/22/2024 0835 by Odalys Wang RN  Outcome: Progressing  Goal: Absence of seizures  7/22/2024 1511 by Odalys Wang RN  Outcome: Adequate for Discharge  7/22/2024 1511 by Odalys Wang RN  Outcome: Adequate for Discharge  7/22/2024 0835 by Odalys Wang RN  Outcome: Progressing  Goal: Remains free of injury related to seizures activity  7/22/2024 1511 by Odalys Wang RN  Outcome: Adequate for Discharge  7/22/2024 1511 by Odalys Wang RN  Outcome: Adequate for Discharge  7/22/2024 0835 by Odalys Wang RN  Outcome: Progressing  Goal: Achieves maximal functionality and self care  7/22/2024 1511 by Odalys Wang RN  Outcome: Adequate for Discharge  7/22/2024 1511 by Odayls Wang RN  Outcome: Adequate for Discharge  7/22/2024 0835 by Odalys Wang RN  Outcome: Progressing     Problem: Respiratory - Adult  Goal: Achieves optimal ventilation and oxygenation  7/22/2024 1511 by Odalys Wang RN  Outcome: Adequate for Discharge  7/22/2024 1511 by Odalys Wang RN  Outcome: Adequate for Discharge  7/22/2024 0835 by Felipe 
  Problem: Discharge Planning  Goal: Discharge to home or other facility with appropriate resources  7/22/2024 1511 by Odalys Wang RN  Outcome: Adequate for Discharge  7/22/2024 1511 by Odalys Wang RN  Outcome: Adequate for Discharge  7/22/2024 0835 by Odalys Wang RN  Outcome: Progressing     Problem: ABCDS Injury Assessment  Goal: Absence of physical injury  7/22/2024 1511 by Odalys Wang RN  Outcome: Adequate for Discharge  7/22/2024 1511 by Odalys Wang RN  Outcome: Adequate for Discharge  7/22/2024 0835 by Odalys Wang RN  Outcome: Progressing     Problem: Neurosensory - Adult  Goal: Achieves stable or improved neurological status  7/22/2024 1511 by Odalys Wang RN  Outcome: Adequate for Discharge  7/22/2024 1511 by Odalys Wang RN  Outcome: Adequate for Discharge  7/22/2024 0835 by Odalys Wang RN  Outcome: Progressing  Goal: Absence of seizures  7/22/2024 1511 by Odalys Wang RN  Outcome: Adequate for Discharge  7/22/2024 1511 by Odalys Wnag RN  Outcome: Adequate for Discharge  7/22/2024 0835 by Odalys Wang RN  Outcome: Progressing  Goal: Remains free of injury related to seizures activity  7/22/2024 1511 by Odalys Wang RN  Outcome: Adequate for Discharge  7/22/2024 1511 by Odalys Wang RN  Outcome: Adequate for Discharge  7/22/2024 0835 by Odalys Wang RN  Outcome: Progressing  Goal: Achieves maximal functionality and self care  7/22/2024 1511 by Odalys Wang RN  Outcome: Adequate for Discharge  7/22/2024 1511 by Odalys Wang RN  Outcome: Adequate for Discharge  7/22/2024 0835 by Odalys Wang RN  Outcome: Progressing     Problem: Respiratory - Adult  Goal: Achieves optimal ventilation and oxygenation  7/22/2024 1511 by Odalys Wang RN  Outcome: Adequate for Discharge  7/22/2024 1511 by Odalys Wang RN  Outcome: Adequate for Discharge  7/22/2024 0835 by Felipe 
  Problem: Discharge Planning  Goal: Discharge to home or other facility with appropriate resources  Outcome: Progressing     Problem: Neurosensory - Adult  Goal: Achieves stable or improved neurological status     Problem: Cardiovascular - Adult  Goal: Maintains optimal cardiac output and hemodynamic stability  Outcome: Progressing     Problem: Musculoskeletal - Adult  Goal: Return mobility to safest level of function     
  Problem: Discharge Planning  Goal: Discharge to home or other facility with appropriate resources  Outcome: Progressing  Flowsheets (Taken 7/18/2024 2115)  Discharge to home or other facility with appropriate resources: Identify barriers to discharge with patient and caregiver     Problem: ABCDS Injury Assessment  Goal: Absence of physical injury  Outcome: Progressing     Problem: Neurosensory - Adult  Goal: Achieves stable or improved neurological status  Outcome: Progressing  Goal: Absence of seizures  Outcome: Progressing  Goal: Remains free of injury related to seizures activity  Outcome: Progressing  Goal: Achieves maximal functionality and self care  Outcome: Progressing     Problem: Respiratory - Adult  Goal: Achieves optimal ventilation and oxygenation  Outcome: Progressing     Problem: Cardiovascular - Adult  Goal: Maintains optimal cardiac output and hemodynamic stability  Outcome: Progressing  Goal: Absence of cardiac dysrhythmias or at baseline  Outcome: Progressing     Problem: Skin/Tissue Integrity - Adult  Goal: Skin integrity remains intact  Outcome: Progressing  Goal: Incisions, wounds, or drain sites healing without S/S of infection  Outcome: Progressing  Goal: Oral mucous membranes remain intact  Outcome: Progressing     Problem: Musculoskeletal - Adult  Goal: Return mobility to safest level of function  Outcome: Progressing  Goal: Maintain proper alignment of affected body part  Outcome: Progressing  Goal: Return ADL status to a safe level of function  Outcome: Progressing     Problem: Gastrointestinal - Adult  Goal: Minimal or absence of nausea and vomiting  Outcome: Progressing  Goal: Maintains or returns to baseline bowel function  Outcome: Progressing  Goal: Maintains adequate nutritional intake  Outcome: Progressing  Goal: Establish and maintain optimal ostomy function  Outcome: Progressing     Problem: Genitourinary - Adult  Goal: Absence of urinary retention  Outcome: Progressing  Goal: 
INTERVENTION:  HEMODYNAMIC STABILIZATION  MAINTAIN BP WNL WHILE ON HD.    INTERVENTION:  FLUID MANAGEMENT  WILL ATTEMPT 3000 ML TOTAL FLUID REMOVAL AS TOLERATED.    INTERVENTION:  METABOLIC/ELECTROLYTE MANAGEMENT  3.0 POTASSIUM 2.5 CALCIUM DIALYSATE USED WITH HD TODAY.    INTERVENTION:  HEMODIALYSIS ACCESS SITE MANAGEMENT  RIGHT UPPER ARM AVF ACCESSED WITH 15G NEEDLE USING ASEPTIC TECHNIQUE.    GOAL:  SIGNS AND SYMPTOMS OF LISTED POTENTIAL PROBLEMS WILL BE ABSENT OR MANAGEABLE.    OUTCOME:  PROGRESSING.    HD PLANNED FOR 3.5 HOURS TODAY.        Problem: Chronic Conditions and Co-morbidities  Goal: Patient's chronic conditions and co-morbidity symptoms are monitored and maintained or improved  7/20/2024 1046 by Nikole Archibald RN  Outcome: Progressing  Flowsheets (Taken 7/20/2024 1046)  Care Plan - Patient's Chronic Conditions and Co-Morbidity Symptoms are Monitored and Maintained or Improved:   Monitor and assess patient's chronic conditions and comorbid symptoms for stability, deterioration, or improvement   Collaborate with multidisciplinary team to address chronic and comorbid conditions and prevent exacerbation or deterioration   Update acute care plan with appropriate goals if chronic or comorbid symptoms are exacerbated and prevent overall improvement and discharge     
Absence of infection during hospitalization  Outcome: Progressing  Goal: Absence of fever/infection during anticipated neutropenic period  Outcome: Progressing     Problem: Metabolic/Fluid and Electrolytes - Adult  Goal: Electrolytes maintained within normal limits  Outcome: Progressing  Goal: Hemodynamic stability and optimal renal function maintained  Outcome: Progressing  Goal: Glucose maintained within prescribed range  Outcome: Progressing     Problem: Hematologic - Adult  Goal: Maintains hematologic stability  Outcome: Progressing     Problem: Safety - Adult  Goal: Free from fall injury  Outcome: Progressing     Problem: Skin/Tissue Integrity  Goal: Absence of new skin breakdown  Description: 1.  Monitor for areas of redness and/or skin breakdown  2.  Assess vascular access sites hourly  3.  Every 4-6 hours minimum:  Change oxygen saturation probe site  4.  Every 4-6 hours:  If on nasal continuous positive airway pressure, respiratory therapy assess nares and determine need for appliance change or resting period.  Outcome: Progressing     Problem: Chronic Conditions and Co-morbidities  Goal: Patient's chronic conditions and co-morbidity symptoms are monitored and maintained or improved  Outcome: Progressing     Problem: Pain  Goal: Verbalizes/displays adequate comfort level or baseline comfort level  Outcome: Progressing     
Progressing  Flowsheets (Taken 7/19/2024 0735 by Ramandeep Leggett RN)  Skin Integrity Remains Intact: Monitor for areas of redness and/or skin breakdown  Goal: Incisions, wounds, or drain sites healing without S/S of infection  Outcome: Progressing  Goal: Oral mucous membranes remain intact  Outcome: Progressing     Problem: Musculoskeletal - Adult  Goal: Return mobility to safest level of function  Outcome: Progressing  Flowsheets (Taken 7/19/2024 0735 by Ramandeep Leggett RN)  Return Mobility to Safest Level of Function: Assess patient stability and activity tolerance for standing, transferring and ambulating with or without assistive devices  Goal: Maintain proper alignment of affected body part  Outcome: Progressing  Flowsheets (Taken 7/19/2024 0735 by Ramandeep Leggett RN)  Maintain proper alignment of affected body part: Support and protect limb and body alignment per provider's orders  Goal: Return ADL status to a safe level of function  Outcome: Progressing  Flowsheets (Taken 7/19/2024 0735 by Ramandeep Leggett RN)  Return ADL Status to a Safe Level of Function: Administer medication as ordered     Problem: Gastrointestinal - Adult  Goal: Minimal or absence of nausea and vomiting  Outcome: Progressing  Flowsheets (Taken 7/19/2024 0735 by Ramandeep Leggett RN)  Minimal or absence of nausea and vomiting: Administer IV fluids as ordered to ensure adequate hydration  Goal: Maintains or returns to baseline bowel function  Outcome: Progressing  Flowsheets (Taken 7/19/2024 0735 by Ramandeep Leggett RN)  Maintains or returns to baseline bowel function: Assess bowel function  Goal: Maintains adequate nutritional intake  Outcome: Progressing  Flowsheets (Taken 7/19/2024 0735 by Ramandeep Leggett RN)  Maintains adequate nutritional intake: Monitor percentage of each meal consumed  Goal: Establish and maintain optimal ostomy function  Outcome: Progressing     Problem: 
RN  Outcome: Progressing     Problem: Genitourinary - Adult  Goal: Absence of urinary retention  7/22/2024 1511 by Odalys Wang RN  Outcome: Adequate for Discharge  7/22/2024 0835 by Odalys Wang RN  Outcome: Progressing  Goal: Urinary catheter remains patent  7/22/2024 1511 by Odalys Wang RN  Outcome: Adequate for Discharge  7/22/2024 0835 by Odalys Wang RN  Outcome: Progressing     Problem: Infection - Adult  Goal: Absence of infection at discharge  7/22/2024 1511 by Odalys Wang RN  Outcome: Adequate for Discharge  7/22/2024 0835 by Odalys Wang RN  Outcome: Progressing  Goal: Absence of infection during hospitalization  7/22/2024 1511 by Odalys Wang RN  Outcome: Adequate for Discharge  7/22/2024 0835 by Odalys Wang RN  Outcome: Progressing  Goal: Absence of fever/infection during anticipated neutropenic period  7/22/2024 1511 by Odalys Wang RN  Outcome: Adequate for Discharge  7/22/2024 0835 by Odalys Wang RN  Outcome: Progressing     Problem: Metabolic/Fluid and Electrolytes - Adult  Goal: Electrolytes maintained within normal limits  7/22/2024 1511 by Odalys Wang RN  Outcome: Adequate for Discharge  7/22/2024 0835 by Odalys Wang RN  Outcome: Progressing  Goal: Hemodynamic stability and optimal renal function maintained  7/22/2024 1511 by Odalys Wang RN  Outcome: Adequate for Discharge  7/22/2024 0835 by Odalys Wang RN  Outcome: Progressing  Goal: Glucose maintained within prescribed range  7/22/2024 1511 by Odalys Wang RN  Outcome: Adequate for Discharge  7/22/2024 0835 by Odalys Wang RN  Outcome: Progressing     Problem: Hematologic - Adult  Goal: Maintains hematologic stability  7/22/2024 1511 by Odalys Wang RN  Outcome: Adequate for Discharge  7/22/2024 0835 by Odalys Wang RN  Outcome: Progressing     Problem: Safety - Adult  Goal: Free from fall injury  7/22/2024 1511

## 2024-09-21 ENCOUNTER — HOSPITAL ENCOUNTER (INPATIENT)
Facility: HOSPITAL | Age: 69
LOS: 2 days | Discharge: HOME | DRG: 194 | End: 2024-09-23
Attending: EMERGENCY MEDICINE | Admitting: HEALTH CARE PROVIDER
Payer: COMMERCIAL

## 2024-09-21 ENCOUNTER — APPOINTMENT (OUTPATIENT)
Facility: HOSPITAL | Age: 69
DRG: 194 | End: 2024-09-21
Payer: COMMERCIAL

## 2024-09-21 DIAGNOSIS — N18.6 ESRD ON HEMODIALYSIS (HCC): ICD-10-CM

## 2024-09-21 DIAGNOSIS — I50.33 ACUTE ON CHRONIC DIASTOLIC CONGESTIVE HEART FAILURE (HCC): ICD-10-CM

## 2024-09-21 DIAGNOSIS — J96.01 ACUTE RESPIRATORY FAILURE WITH HYPOXIA: Primary | ICD-10-CM

## 2024-09-21 DIAGNOSIS — E87.6 HYPOKALEMIA: ICD-10-CM

## 2024-09-21 DIAGNOSIS — I50.1 PULMONARY EDEMA WITH CONGESTIVE HEART FAILURE (HCC): ICD-10-CM

## 2024-09-21 DIAGNOSIS — Z99.2 ESRD ON HEMODIALYSIS (HCC): ICD-10-CM

## 2024-09-21 PROBLEM — J81.1 PULMONARY EDEMA: Status: ACTIVE | Noted: 2024-09-21

## 2024-09-21 LAB
ALBUMIN SERPL-MCNC: 3.5 G/DL (ref 3.4–5)
ALBUMIN/GLOB SERPL: 0.9 (ref 0.8–1.7)
ALP SERPL-CCNC: 120 U/L (ref 45–117)
ALT SERPL-CCNC: 27 U/L (ref 16–61)
ANION GAP SERPL CALC-SCNC: 6 MMOL/L (ref 3–18)
ANION GAP SERPL CALC-SCNC: 9 MMOL/L (ref 3–18)
ARTERIAL PATENCY WRIST A: POSITIVE
AST SERPL-CCNC: 24 U/L (ref 10–38)
BASE DEFICIT BLD-SCNC: 3.2 MMOL/L
BASOPHILS # BLD: 0.1 K/UL (ref 0–0.1)
BASOPHILS NFR BLD: 1 % (ref 0–2)
BDY SITE: ABNORMAL
BILIRUB SERPL-MCNC: 0.6 MG/DL (ref 0.2–1)
BUN SERPL-MCNC: 27 MG/DL (ref 7–18)
BUN SERPL-MCNC: 44 MG/DL (ref 7–18)
BUN/CREAT SERPL: 8 (ref 12–20)
BUN/CREAT SERPL: 9 (ref 12–20)
CALCIUM SERPL-MCNC: 9.3 MG/DL (ref 8.5–10.1)
CALCIUM SERPL-MCNC: 9.4 MG/DL (ref 8.5–10.1)
CHLORIDE SERPL-SCNC: 105 MMOL/L (ref 100–111)
CHLORIDE SERPL-SCNC: 105 MMOL/L (ref 100–111)
CO2 SERPL-SCNC: 21 MMOL/L (ref 21–32)
CO2 SERPL-SCNC: 28 MMOL/L (ref 21–32)
CREAT SERPL-MCNC: 3.33 MG/DL (ref 0.6–1.3)
CREAT SERPL-MCNC: 4.67 MG/DL (ref 0.6–1.3)
DIFFERENTIAL METHOD BLD: ABNORMAL
EKG ATRIAL RATE: 78 BPM
EKG DIAGNOSIS: NORMAL
EKG P AXIS: 64 DEGREES
EKG P-R INTERVAL: 162 MS
EKG Q-T INTERVAL: 412 MS
EKG QRS DURATION: 94 MS
EKG QTC CALCULATION (BAZETT): 469 MS
EKG R AXIS: -35 DEGREES
EKG T AXIS: 122 DEGREES
EKG VENTRICULAR RATE: 78 BPM
EOSINOPHIL # BLD: 0.5 K/UL (ref 0–0.4)
EOSINOPHIL NFR BLD: 5 % (ref 0–5)
ERYTHROCYTE [DISTWIDTH] IN BLOOD BY AUTOMATED COUNT: 12.7 % (ref 11.6–14.5)
EST. AVERAGE GLUCOSE BLD GHB EST-MCNC: 177 MG/DL
GAS FLOW.O2 O2 DELIVERY SYS: ABNORMAL
GAS FLOW.O2 SETTING OXYMISER: 837 BPM
GLOBULIN SER CALC-MCNC: 4.1 G/DL (ref 2–4)
GLUCOSE BLD STRIP.AUTO-MCNC: 168 MG/DL (ref 70–110)
GLUCOSE BLD STRIP.AUTO-MCNC: 212 MG/DL (ref 70–110)
GLUCOSE BLD STRIP.AUTO-MCNC: 212 MG/DL (ref 70–110)
GLUCOSE BLD STRIP.AUTO-MCNC: 279 MG/DL (ref 70–110)
GLUCOSE BLD STRIP.AUTO-MCNC: 321 MG/DL (ref 70–110)
GLUCOSE SERPL-MCNC: 186 MG/DL (ref 74–99)
GLUCOSE SERPL-MCNC: 328 MG/DL (ref 74–99)
HBA1C MFR BLD: 7.8 % (ref 4.2–5.6)
HCO3 BLD-SCNC: 20.8 MMOL/L (ref 21–28)
HCT VFR BLD AUTO: 34.9 % (ref 36–48)
HGB BLD-MCNC: 12 G/DL (ref 13–16)
IMM GRANULOCYTES # BLD AUTO: 0.1 K/UL (ref 0–0.04)
IMM GRANULOCYTES NFR BLD AUTO: 1 % (ref 0–0.5)
INR PPP: 1 (ref 0.9–1.1)
LYMPHOCYTES # BLD: 2.1 K/UL (ref 0.9–3.6)
LYMPHOCYTES NFR BLD: 19 % (ref 21–52)
MAGNESIUM SERPL-MCNC: 1.8 MG/DL (ref 1.6–2.6)
MAGNESIUM SERPL-MCNC: 2 MG/DL (ref 1.6–2.6)
MCH RBC QN AUTO: 30.8 PG (ref 24–34)
MCHC RBC AUTO-ENTMCNC: 34.4 G/DL (ref 31–37)
MCV RBC AUTO: 89.5 FL (ref 78–100)
MONOCYTES # BLD: 0.5 K/UL (ref 0.05–1.2)
MONOCYTES NFR BLD: 5 % (ref 3–10)
NEUTS SEG # BLD: 7.6 K/UL (ref 1.8–8)
NEUTS SEG NFR BLD: 69 % (ref 40–73)
NRBC # BLD: 0 K/UL (ref 0–0.01)
NRBC BLD-RTO: 0 PER 100 WBC
NT PRO BNP: 3887 PG/ML (ref 0–900)
O2/TOTAL GAS SETTING VFR VENT: 100 %
PCO2 BLD: 32.7 MMHG (ref 35–48)
PEEP RESPIRATORY: 7 CMH2O
PH BLD: 7.41 (ref 7.35–7.45)
PHOSPHATE SERPL-MCNC: 3.3 MG/DL (ref 2.5–4.9)
PLATELET # BLD AUTO: 316 K/UL (ref 135–420)
PMV BLD AUTO: 10 FL (ref 9.2–11.8)
PO2 BLD: 118 MMHG (ref 83–108)
POTASSIUM SERPL-SCNC: 3.1 MMOL/L (ref 3.5–5.5)
POTASSIUM SERPL-SCNC: 3.3 MMOL/L (ref 3.5–5.5)
PROT SERPL-MCNC: 7.6 G/DL (ref 6.4–8.2)
PROTHROMBIN TIME: 12.9 SEC (ref 11.9–14.9)
RBC # BLD AUTO: 3.9 M/UL (ref 4.35–5.65)
RESPIRATORY RATE, POC: 37 (ref 5–40)
SAO2 % BLD: 98.7 % (ref 92–97)
SERVICE CMNT-IMP: ABNORMAL
SODIUM SERPL-SCNC: 135 MMOL/L (ref 136–145)
SODIUM SERPL-SCNC: 139 MMOL/L (ref 136–145)
SPECIMEN TYPE: ABNORMAL
TROPONIN I SERPL HS-MCNC: 80 NG/L (ref 0–78)
TROPONIN I SERPL HS-MCNC: 88 NG/L (ref 0–78)
VENTILATION MODE VENT: ABNORMAL
WBC # BLD AUTO: 10.9 K/UL (ref 4.6–13.2)

## 2024-09-21 PROCEDURE — 83735 ASSAY OF MAGNESIUM: CPT

## 2024-09-21 PROCEDURE — 5A1D70Z PERFORMANCE OF URINARY FILTRATION, INTERMITTENT, LESS THAN 6 HOURS PER DAY: ICD-10-PCS | Performed by: INTERNAL MEDICINE

## 2024-09-21 PROCEDURE — 71045 X-RAY EXAM CHEST 1 VIEW: CPT

## 2024-09-21 PROCEDURE — 6360000002 HC RX W HCPCS: Performed by: EMERGENCY MEDICINE

## 2024-09-21 PROCEDURE — 80053 COMPREHEN METABOLIC PANEL: CPT

## 2024-09-21 PROCEDURE — 6360000002 HC RX W HCPCS: Performed by: HEALTH CARE PROVIDER

## 2024-09-21 PROCEDURE — 5A09357 ASSISTANCE WITH RESPIRATORY VENTILATION, LESS THAN 24 CONSECUTIVE HOURS, CONTINUOUS POSITIVE AIRWAY PRESSURE: ICD-10-PCS | Performed by: HEALTH CARE PROVIDER

## 2024-09-21 PROCEDURE — 94660 CPAP INITIATION&MGMT: CPT

## 2024-09-21 PROCEDURE — 99291 CRITICAL CARE FIRST HOUR: CPT

## 2024-09-21 PROCEDURE — 85025 COMPLETE CBC W/AUTO DIFF WBC: CPT

## 2024-09-21 PROCEDURE — 94761 N-INVAS EAR/PLS OXIMETRY MLT: CPT

## 2024-09-21 PROCEDURE — 93005 ELECTROCARDIOGRAM TRACING: CPT | Performed by: EMERGENCY MEDICINE

## 2024-09-21 PROCEDURE — 84484 ASSAY OF TROPONIN QUANT: CPT

## 2024-09-21 PROCEDURE — 84100 ASSAY OF PHOSPHORUS: CPT

## 2024-09-21 PROCEDURE — 85610 PROTHROMBIN TIME: CPT

## 2024-09-21 PROCEDURE — 90935 HEMODIALYSIS ONE EVALUATION: CPT

## 2024-09-21 PROCEDURE — 2140000001 HC CVICU INTERMEDIATE R&B

## 2024-09-21 PROCEDURE — 94762 N-INVAS EAR/PLS OXIMTRY CONT: CPT

## 2024-09-21 PROCEDURE — 93010 ELECTROCARDIOGRAM REPORT: CPT | Performed by: INTERNAL MEDICINE

## 2024-09-21 PROCEDURE — 83880 ASSAY OF NATRIURETIC PEPTIDE: CPT

## 2024-09-21 PROCEDURE — 6370000000 HC RX 637 (ALT 250 FOR IP): Performed by: HEALTH CARE PROVIDER

## 2024-09-21 PROCEDURE — 82962 GLUCOSE BLOOD TEST: CPT

## 2024-09-21 PROCEDURE — 36600 WITHDRAWAL OF ARTERIAL BLOOD: CPT

## 2024-09-21 PROCEDURE — 82803 BLOOD GASES ANY COMBINATION: CPT

## 2024-09-21 PROCEDURE — 2580000003 HC RX 258: Performed by: HEALTH CARE PROVIDER

## 2024-09-21 PROCEDURE — 96374 THER/PROPH/DIAG INJ IV PUSH: CPT

## 2024-09-21 PROCEDURE — 83036 HEMOGLOBIN GLYCOSYLATED A1C: CPT

## 2024-09-21 PROCEDURE — 99222 1ST HOSP IP/OBS MODERATE 55: CPT | Performed by: HEALTH CARE PROVIDER

## 2024-09-21 PROCEDURE — 2700000000 HC OXYGEN THERAPY PER DAY

## 2024-09-21 RX ORDER — ATORVASTATIN CALCIUM 40 MG/1
40 TABLET, FILM COATED ORAL NIGHTLY
Status: DISCONTINUED | OUTPATIENT
Start: 2024-09-21 | End: 2024-09-23 | Stop reason: HOSPADM

## 2024-09-21 RX ORDER — CARVEDILOL 3.12 MG/1
3.12 TABLET ORAL 2 TIMES DAILY WITH MEALS
Status: DISCONTINUED | OUTPATIENT
Start: 2024-09-21 | End: 2024-09-22

## 2024-09-21 RX ORDER — HEPARIN SODIUM 5000 [USP'U]/ML
5000 INJECTION, SOLUTION INTRAVENOUS; SUBCUTANEOUS EVERY 8 HOURS SCHEDULED
Status: DISCONTINUED | OUTPATIENT
Start: 2024-09-21 | End: 2024-09-23 | Stop reason: HOSPADM

## 2024-09-21 RX ORDER — SODIUM CHLORIDE 0.9 % (FLUSH) 0.9 %
5-40 SYRINGE (ML) INJECTION PRN
Status: DISCONTINUED | OUTPATIENT
Start: 2024-09-21 | End: 2024-09-23 | Stop reason: HOSPADM

## 2024-09-21 RX ORDER — ONDANSETRON 4 MG/1
4 TABLET, ORALLY DISINTEGRATING ORAL EVERY 8 HOURS PRN
Status: DISCONTINUED | OUTPATIENT
Start: 2024-09-21 | End: 2024-09-23 | Stop reason: HOSPADM

## 2024-09-21 RX ORDER — ONDANSETRON 2 MG/ML
4 INJECTION INTRAMUSCULAR; INTRAVENOUS EVERY 6 HOURS PRN
Status: DISCONTINUED | OUTPATIENT
Start: 2024-09-21 | End: 2024-09-23 | Stop reason: HOSPADM

## 2024-09-21 RX ORDER — SODIUM CHLORIDE 0.9 % (FLUSH) 0.9 %
5-40 SYRINGE (ML) INJECTION EVERY 12 HOURS SCHEDULED
Status: DISCONTINUED | OUTPATIENT
Start: 2024-09-21 | End: 2024-09-23 | Stop reason: HOSPADM

## 2024-09-21 RX ORDER — ASPIRIN 81 MG/1
81 TABLET, CHEWABLE ORAL DAILY
Status: DISCONTINUED | OUTPATIENT
Start: 2024-09-21 | End: 2024-09-23 | Stop reason: HOSPADM

## 2024-09-21 RX ORDER — INSULIN GLARGINE 100 [IU]/ML
15 INJECTION, SOLUTION SUBCUTANEOUS NIGHTLY
Status: DISCONTINUED | OUTPATIENT
Start: 2024-09-21 | End: 2024-09-23 | Stop reason: HOSPADM

## 2024-09-21 RX ORDER — POLYETHYLENE GLYCOL 3350 17 G/17G
17 POWDER, FOR SOLUTION ORAL DAILY PRN
Status: DISCONTINUED | OUTPATIENT
Start: 2024-09-21 | End: 2024-09-23 | Stop reason: HOSPADM

## 2024-09-21 RX ORDER — TAMSULOSIN HYDROCHLORIDE 0.4 MG/1
0.4 CAPSULE ORAL DAILY
Status: DISCONTINUED | OUTPATIENT
Start: 2024-09-21 | End: 2024-09-23 | Stop reason: HOSPADM

## 2024-09-21 RX ORDER — SODIUM CHLORIDE 9 MG/ML
INJECTION, SOLUTION INTRAVENOUS PRN
Status: DISCONTINUED | OUTPATIENT
Start: 2024-09-21 | End: 2024-09-23 | Stop reason: HOSPADM

## 2024-09-21 RX ORDER — DEXTROSE MONOHYDRATE 100 MG/ML
INJECTION, SOLUTION INTRAVENOUS CONTINUOUS PRN
Status: DISCONTINUED | OUTPATIENT
Start: 2024-09-21 | End: 2024-09-23 | Stop reason: HOSPADM

## 2024-09-21 RX ORDER — INSULIN LISPRO 100 [IU]/ML
0-8 INJECTION, SOLUTION INTRAVENOUS; SUBCUTANEOUS EVERY 4 HOURS
Status: DISCONTINUED | OUTPATIENT
Start: 2024-09-21 | End: 2024-09-23 | Stop reason: HOSPADM

## 2024-09-21 RX ORDER — AMLODIPINE BESYLATE 10 MG/1
10 TABLET ORAL DAILY
Status: DISCONTINUED | OUTPATIENT
Start: 2024-09-21 | End: 2024-09-23 | Stop reason: HOSPADM

## 2024-09-21 RX ORDER — FUROSEMIDE 10 MG/ML
80 INJECTION INTRAMUSCULAR; INTRAVENOUS ONCE
Status: COMPLETED | OUTPATIENT
Start: 2024-09-21 | End: 2024-09-21

## 2024-09-21 RX ORDER — LOSARTAN POTASSIUM 50 MG/1
100 TABLET ORAL DAILY
Status: DISCONTINUED | OUTPATIENT
Start: 2024-09-21 | End: 2024-09-23 | Stop reason: HOSPADM

## 2024-09-21 RX ORDER — GLUCAGON 1 MG
1 KIT INJECTION PRN
Status: DISCONTINUED | OUTPATIENT
Start: 2024-09-21 | End: 2024-09-23 | Stop reason: HOSPADM

## 2024-09-21 RX ADMIN — LOSARTAN POTASSIUM 100 MG: 50 TABLET, FILM COATED ORAL at 08:48

## 2024-09-21 RX ADMIN — HEPARIN SODIUM 5000 UNITS: 5000 INJECTION INTRAVENOUS; SUBCUTANEOUS at 14:27

## 2024-09-21 RX ADMIN — ASPIRIN 81 MG CHEWABLE TABLET 81 MG: 81 TABLET CHEWABLE at 08:47

## 2024-09-21 RX ADMIN — HEPARIN SODIUM 5000 UNITS: 5000 INJECTION INTRAVENOUS; SUBCUTANEOUS at 21:48

## 2024-09-21 RX ADMIN — AMLODIPINE BESYLATE 10 MG: 10 TABLET ORAL at 08:47

## 2024-09-21 RX ADMIN — INSULIN LISPRO 6 UNITS: 100 INJECTION, SOLUTION INTRAVENOUS; SUBCUTANEOUS at 21:49

## 2024-09-21 RX ADMIN — SODIUM CHLORIDE, PRESERVATIVE FREE 10 ML: 5 INJECTION INTRAVENOUS at 21:53

## 2024-09-21 RX ADMIN — CARVEDILOL 3.12 MG: 3.12 TABLET, FILM COATED ORAL at 11:08

## 2024-09-21 RX ADMIN — INSULIN LISPRO 2 UNITS: 100 INJECTION, SOLUTION INTRAVENOUS; SUBCUTANEOUS at 17:08

## 2024-09-21 RX ADMIN — ATORVASTATIN CALCIUM 40 MG: 40 TABLET, FILM COATED ORAL at 21:48

## 2024-09-21 RX ADMIN — SODIUM CHLORIDE, PRESERVATIVE FREE 10 ML: 5 INJECTION INTRAVENOUS at 08:47

## 2024-09-21 RX ADMIN — INSULIN LISPRO 4 UNITS: 100 INJECTION, SOLUTION INTRAVENOUS; SUBCUTANEOUS at 03:50

## 2024-09-21 RX ADMIN — INSULIN LISPRO 2 UNITS: 100 INJECTION, SOLUTION INTRAVENOUS; SUBCUTANEOUS at 12:04

## 2024-09-21 RX ADMIN — FUROSEMIDE 80 MG: 10 INJECTION, SOLUTION INTRAMUSCULAR; INTRAVENOUS at 02:42

## 2024-09-21 RX ADMIN — INSULIN GLARGINE 15 UNITS: 100 INJECTION, SOLUTION SUBCUTANEOUS at 21:48

## 2024-09-21 RX ADMIN — HEPARIN SODIUM 5000 UNITS: 5000 INJECTION INTRAVENOUS; SUBCUTANEOUS at 06:55

## 2024-09-21 RX ADMIN — CARVEDILOL 3.12 MG: 3.12 TABLET, FILM COATED ORAL at 17:08

## 2024-09-21 RX ADMIN — TAMSULOSIN HYDROCHLORIDE 0.4 MG: 0.4 CAPSULE ORAL at 08:48

## 2024-09-21 ASSESSMENT — PAIN - FUNCTIONAL ASSESSMENT: PAIN_FUNCTIONAL_ASSESSMENT: NONE - DENIES PAIN

## 2024-09-22 ENCOUNTER — APPOINTMENT (OUTPATIENT)
Facility: HOSPITAL | Age: 69
DRG: 194 | End: 2024-09-22
Payer: COMMERCIAL

## 2024-09-22 LAB
ALBUMIN SERPL-MCNC: 2.9 G/DL (ref 3.4–5)
ALBUMIN/GLOB SERPL: 0.7 (ref 0.8–1.7)
ALP SERPL-CCNC: 92 U/L (ref 45–117)
ALT SERPL-CCNC: 24 U/L (ref 16–61)
ANION GAP SERPL CALC-SCNC: 7 MMOL/L (ref 3–18)
AST SERPL-CCNC: 18 U/L (ref 10–38)
BASOPHILS # BLD: 0.1 K/UL (ref 0–0.1)
BASOPHILS NFR BLD: 1 % (ref 0–2)
BILIRUB SERPL-MCNC: 0.4 MG/DL (ref 0.2–1)
BUN SERPL-MCNC: 39 MG/DL (ref 7–18)
BUN/CREAT SERPL: 8 (ref 12–20)
CALCIUM SERPL-MCNC: 8.7 MG/DL (ref 8.5–10.1)
CALCIUM SERPL-MCNC: 8.7 MG/DL (ref 8.5–10.1)
CHLORIDE SERPL-SCNC: 109 MMOL/L (ref 100–111)
CO2 SERPL-SCNC: 25 MMOL/L (ref 21–32)
CREAT SERPL-MCNC: 4.88 MG/DL (ref 0.6–1.3)
DIFFERENTIAL METHOD BLD: ABNORMAL
EOSINOPHIL # BLD: 0.3 K/UL (ref 0–0.4)
EOSINOPHIL NFR BLD: 4 % (ref 0–5)
ERYTHROCYTE [DISTWIDTH] IN BLOOD BY AUTOMATED COUNT: 12.9 % (ref 11.6–14.5)
GLOBULIN SER CALC-MCNC: 3.9 G/DL (ref 2–4)
GLUCOSE BLD STRIP.AUTO-MCNC: 109 MG/DL (ref 70–110)
GLUCOSE BLD STRIP.AUTO-MCNC: 191 MG/DL (ref 70–110)
GLUCOSE BLD STRIP.AUTO-MCNC: 297 MG/DL (ref 70–110)
GLUCOSE BLD STRIP.AUTO-MCNC: 318 MG/DL (ref 70–110)
GLUCOSE BLD STRIP.AUTO-MCNC: 88 MG/DL (ref 70–110)
GLUCOSE BLD STRIP.AUTO-MCNC: 97 MG/DL (ref 70–110)
GLUCOSE SERPL-MCNC: 125 MG/DL (ref 74–99)
HCT VFR BLD AUTO: 32.8 % (ref 36–48)
HGB BLD-MCNC: 11.2 G/DL (ref 13–16)
IMM GRANULOCYTES # BLD AUTO: 0.1 K/UL (ref 0–0.04)
IMM GRANULOCYTES NFR BLD AUTO: 1 % (ref 0–0.5)
LYMPHOCYTES # BLD: 1.7 K/UL (ref 0.9–3.6)
LYMPHOCYTES NFR BLD: 23 % (ref 21–52)
MAGNESIUM SERPL-MCNC: 2 MG/DL (ref 1.6–2.6)
MCH RBC QN AUTO: 31.3 PG (ref 24–34)
MCHC RBC AUTO-ENTMCNC: 34.1 G/DL (ref 31–37)
MCV RBC AUTO: 91.6 FL (ref 78–100)
MONOCYTES # BLD: 0.7 K/UL (ref 0.05–1.2)
MONOCYTES NFR BLD: 9 % (ref 3–10)
NEUTS SEG # BLD: 4.6 K/UL (ref 1.8–8)
NEUTS SEG NFR BLD: 62 % (ref 40–73)
NRBC # BLD: 0 K/UL (ref 0–0.01)
NRBC BLD-RTO: 0 PER 100 WBC
PHOSPHATE SERPL-MCNC: 4.2 MG/DL (ref 2.5–4.9)
PLATELET # BLD AUTO: 300 K/UL (ref 135–420)
PMV BLD AUTO: 10.3 FL (ref 9.2–11.8)
POTASSIUM SERPL-SCNC: 3.3 MMOL/L (ref 3.5–5.5)
PROT SERPL-MCNC: 6.8 G/DL (ref 6.4–8.2)
PTH-INTACT SERPL-MCNC: 398.1 PG/ML (ref 18.4–88)
RBC # BLD AUTO: 3.58 M/UL (ref 4.35–5.65)
SODIUM SERPL-SCNC: 141 MMOL/L (ref 136–145)
WBC # BLD AUTO: 7.4 K/UL (ref 4.6–13.2)

## 2024-09-22 PROCEDURE — 71045 X-RAY EXAM CHEST 1 VIEW: CPT

## 2024-09-22 PROCEDURE — 99232 SBSQ HOSP IP/OBS MODERATE 35: CPT | Performed by: STUDENT IN AN ORGANIZED HEALTH CARE EDUCATION/TRAINING PROGRAM

## 2024-09-22 PROCEDURE — 83970 ASSAY OF PARATHORMONE: CPT

## 2024-09-22 PROCEDURE — 82962 GLUCOSE BLOOD TEST: CPT

## 2024-09-22 PROCEDURE — 6360000002 HC RX W HCPCS: Performed by: HEALTH CARE PROVIDER

## 2024-09-22 PROCEDURE — 84100 ASSAY OF PHOSPHORUS: CPT

## 2024-09-22 PROCEDURE — 83735 ASSAY OF MAGNESIUM: CPT

## 2024-09-22 PROCEDURE — 2700000000 HC OXYGEN THERAPY PER DAY

## 2024-09-22 PROCEDURE — 94761 N-INVAS EAR/PLS OXIMETRY MLT: CPT

## 2024-09-22 PROCEDURE — 36415 COLL VENOUS BLD VENIPUNCTURE: CPT

## 2024-09-22 PROCEDURE — 1100000003 HC PRIVATE W/ TELEMETRY

## 2024-09-22 PROCEDURE — 2580000003 HC RX 258: Performed by: HEALTH CARE PROVIDER

## 2024-09-22 PROCEDURE — 85025 COMPLETE CBC W/AUTO DIFF WBC: CPT

## 2024-09-22 PROCEDURE — 6370000000 HC RX 637 (ALT 250 FOR IP): Performed by: HEALTH CARE PROVIDER

## 2024-09-22 PROCEDURE — 80053 COMPREHEN METABOLIC PANEL: CPT

## 2024-09-22 PROCEDURE — 6370000000 HC RX 637 (ALT 250 FOR IP): Performed by: INTERNAL MEDICINE

## 2024-09-22 RX ORDER — CARVEDILOL 6.25 MG/1
12.5 TABLET ORAL 2 TIMES DAILY WITH MEALS
Status: DISCONTINUED | OUTPATIENT
Start: 2024-09-22 | End: 2024-09-23 | Stop reason: HOSPADM

## 2024-09-22 RX ADMIN — CARVEDILOL 12.5 MG: 6.25 TABLET, FILM COATED ORAL at 17:04

## 2024-09-22 RX ADMIN — HEPARIN SODIUM 5000 UNITS: 5000 INJECTION INTRAVENOUS; SUBCUTANEOUS at 23:50

## 2024-09-22 RX ADMIN — HEPARIN SODIUM 5000 UNITS: 5000 INJECTION INTRAVENOUS; SUBCUTANEOUS at 05:15

## 2024-09-22 RX ADMIN — SODIUM CHLORIDE, PRESERVATIVE FREE 10 ML: 5 INJECTION INTRAVENOUS at 20:25

## 2024-09-22 RX ADMIN — INSULIN LISPRO 6 UNITS: 100 INJECTION, SOLUTION INTRAVENOUS; SUBCUTANEOUS at 20:22

## 2024-09-22 RX ADMIN — HEPARIN SODIUM 5000 UNITS: 5000 INJECTION INTRAVENOUS; SUBCUTANEOUS at 13:39

## 2024-09-22 RX ADMIN — SODIUM CHLORIDE, PRESERVATIVE FREE 10 ML: 5 INJECTION INTRAVENOUS at 08:57

## 2024-09-22 RX ADMIN — INSULIN GLARGINE 15 UNITS: 100 INJECTION, SOLUTION SUBCUTANEOUS at 20:22

## 2024-09-22 RX ADMIN — CARVEDILOL 3.12 MG: 3.12 TABLET, FILM COATED ORAL at 08:56

## 2024-09-22 RX ADMIN — ASPIRIN 81 MG CHEWABLE TABLET 81 MG: 81 TABLET CHEWABLE at 08:56

## 2024-09-22 RX ADMIN — LOSARTAN POTASSIUM 100 MG: 50 TABLET, FILM COATED ORAL at 08:57

## 2024-09-22 RX ADMIN — INSULIN LISPRO 4 UNITS: 100 INJECTION, SOLUTION INTRAVENOUS; SUBCUTANEOUS at 12:34

## 2024-09-22 RX ADMIN — ATORVASTATIN CALCIUM 40 MG: 40 TABLET, FILM COATED ORAL at 20:22

## 2024-09-22 RX ADMIN — TAMSULOSIN HYDROCHLORIDE 0.4 MG: 0.4 CAPSULE ORAL at 08:56

## 2024-09-22 ASSESSMENT — PAIN SCALES - GENERAL: PAINLEVEL_OUTOF10: 0

## 2024-09-23 VITALS
RESPIRATION RATE: 18 BRPM | BODY MASS INDEX: 25.47 KG/M2 | HEIGHT: 72 IN | HEART RATE: 69 BPM | TEMPERATURE: 98.4 F | DIASTOLIC BLOOD PRESSURE: 84 MMHG | SYSTOLIC BLOOD PRESSURE: 162 MMHG | WEIGHT: 188.05 LBS | OXYGEN SATURATION: 97 %

## 2024-09-23 PROBLEM — J81.1 PULMONARY EDEMA: Status: RESOLVED | Noted: 2024-09-21 | Resolved: 2024-09-23

## 2024-09-23 PROBLEM — J96.01 ACUTE RESPIRATORY FAILURE WITH HYPOXIA: Status: RESOLVED | Noted: 2024-09-21 | Resolved: 2024-09-23

## 2024-09-23 LAB
ANION GAP SERPL CALC-SCNC: 7 MMOL/L (ref 3–18)
BASOPHILS # BLD: 0.1 K/UL (ref 0–0.1)
BASOPHILS NFR BLD: 1 % (ref 0–2)
BUN SERPL-MCNC: 48 MG/DL (ref 7–18)
BUN/CREAT SERPL: 9 (ref 12–20)
CALCIUM SERPL-MCNC: 8.8 MG/DL (ref 8.5–10.1)
CHLORIDE SERPL-SCNC: 109 MMOL/L (ref 100–111)
CO2 SERPL-SCNC: 25 MMOL/L (ref 21–32)
CREAT SERPL-MCNC: 5.16 MG/DL (ref 0.6–1.3)
DIFFERENTIAL METHOD BLD: ABNORMAL
EOSINOPHIL # BLD: 0.5 K/UL (ref 0–0.4)
EOSINOPHIL NFR BLD: 6 % (ref 0–5)
ERYTHROCYTE [DISTWIDTH] IN BLOOD BY AUTOMATED COUNT: 12.9 % (ref 11.6–14.5)
GLUCOSE BLD STRIP.AUTO-MCNC: 89 MG/DL (ref 70–110)
GLUCOSE BLD STRIP.AUTO-MCNC: 95 MG/DL (ref 70–110)
GLUCOSE BLD STRIP.AUTO-MCNC: 98 MG/DL (ref 70–110)
GLUCOSE SERPL-MCNC: 67 MG/DL (ref 74–99)
HCT VFR BLD AUTO: 32.8 % (ref 36–48)
HGB BLD-MCNC: 11 G/DL (ref 13–16)
IMM GRANULOCYTES # BLD AUTO: 0.1 K/UL (ref 0–0.04)
IMM GRANULOCYTES NFR BLD AUTO: 1 % (ref 0–0.5)
LYMPHOCYTES # BLD: 2.1 K/UL (ref 0.9–3.6)
LYMPHOCYTES NFR BLD: 23 % (ref 21–52)
MAGNESIUM SERPL-MCNC: 2 MG/DL (ref 1.6–2.6)
MCH RBC QN AUTO: 30.7 PG (ref 24–34)
MCHC RBC AUTO-ENTMCNC: 33.5 G/DL (ref 31–37)
MCV RBC AUTO: 91.6 FL (ref 78–100)
MONOCYTES # BLD: 0.9 K/UL (ref 0.05–1.2)
MONOCYTES NFR BLD: 10 % (ref 3–10)
NEUTS SEG # BLD: 5.6 K/UL (ref 1.8–8)
NEUTS SEG NFR BLD: 60 % (ref 40–73)
NRBC # BLD: 0 K/UL (ref 0–0.01)
NRBC BLD-RTO: 0 PER 100 WBC
PHOSPHATE SERPL-MCNC: 4.1 MG/DL (ref 2.5–4.9)
PLATELET # BLD AUTO: 307 K/UL (ref 135–420)
PMV BLD AUTO: 10.1 FL (ref 9.2–11.8)
POTASSIUM SERPL-SCNC: 3.2 MMOL/L (ref 3.5–5.5)
RBC # BLD AUTO: 3.58 M/UL (ref 4.35–5.65)
SODIUM SERPL-SCNC: 141 MMOL/L (ref 136–145)
WBC # BLD AUTO: 9.2 K/UL (ref 4.6–13.2)

## 2024-09-23 PROCEDURE — 90935 HEMODIALYSIS ONE EVALUATION: CPT

## 2024-09-23 PROCEDURE — 6370000000 HC RX 637 (ALT 250 FOR IP): Performed by: INTERNAL MEDICINE

## 2024-09-23 PROCEDURE — 82962 GLUCOSE BLOOD TEST: CPT

## 2024-09-23 PROCEDURE — 99239 HOSP IP/OBS DSCHRG MGMT >30: CPT | Performed by: HOSPITALIST

## 2024-09-23 PROCEDURE — 94761 N-INVAS EAR/PLS OXIMETRY MLT: CPT

## 2024-09-23 PROCEDURE — 6370000000 HC RX 637 (ALT 250 FOR IP): Performed by: HEALTH CARE PROVIDER

## 2024-09-23 PROCEDURE — 2580000003 HC RX 258: Performed by: HEALTH CARE PROVIDER

## 2024-09-23 PROCEDURE — 6360000002 HC RX W HCPCS: Performed by: HEALTH CARE PROVIDER

## 2024-09-23 PROCEDURE — 85025 COMPLETE CBC W/AUTO DIFF WBC: CPT

## 2024-09-23 PROCEDURE — 36415 COLL VENOUS BLD VENIPUNCTURE: CPT

## 2024-09-23 PROCEDURE — 80048 BASIC METABOLIC PNL TOTAL CA: CPT

## 2024-09-23 PROCEDURE — 83735 ASSAY OF MAGNESIUM: CPT

## 2024-09-23 PROCEDURE — 84100 ASSAY OF PHOSPHORUS: CPT

## 2024-09-23 RX ORDER — CARVEDILOL 12.5 MG/1
12.5 TABLET ORAL 2 TIMES DAILY WITH MEALS
Qty: 60 TABLET | Refills: 3 | Status: SHIPPED | OUTPATIENT
Start: 2024-09-23

## 2024-09-23 RX ADMIN — LOSARTAN POTASSIUM 100 MG: 50 TABLET, FILM COATED ORAL at 08:44

## 2024-09-23 RX ADMIN — SODIUM CHLORIDE, PRESERVATIVE FREE 10 ML: 5 INJECTION INTRAVENOUS at 08:44

## 2024-09-23 RX ADMIN — TAMSULOSIN HYDROCHLORIDE 0.4 MG: 0.4 CAPSULE ORAL at 08:44

## 2024-09-23 RX ADMIN — CARVEDILOL 12.5 MG: 6.25 TABLET, FILM COATED ORAL at 15:07

## 2024-09-23 RX ADMIN — CARVEDILOL 12.5 MG: 6.25 TABLET, FILM COATED ORAL at 08:44

## 2024-09-23 RX ADMIN — ASPIRIN 81 MG CHEWABLE TABLET 81 MG: 81 TABLET CHEWABLE at 08:44

## 2024-09-23 RX ADMIN — HEPARIN SODIUM 5000 UNITS: 5000 INJECTION INTRAVENOUS; SUBCUTANEOUS at 14:49

## 2024-09-23 RX ADMIN — HEPARIN SODIUM 5000 UNITS: 5000 INJECTION INTRAVENOUS; SUBCUTANEOUS at 05:48

## 2024-09-23 ASSESSMENT — PAIN SCALES - GENERAL
PAINLEVEL_OUTOF10: 0
PAINLEVEL_OUTOF10: 0

## 2025-04-10 ENCOUNTER — APPOINTMENT (OUTPATIENT)
Facility: HOSPITAL | Age: 70
DRG: 182 | End: 2025-04-10
Attending: EMERGENCY MEDICINE
Payer: COMMERCIAL

## 2025-04-10 ENCOUNTER — HOSPITAL ENCOUNTER (INPATIENT)
Facility: HOSPITAL | Age: 70
LOS: 3 days | Discharge: HOME | DRG: 182 | End: 2025-04-13
Attending: EMERGENCY MEDICINE | Admitting: FAMILY MEDICINE
Payer: COMMERCIAL

## 2025-04-10 ENCOUNTER — APPOINTMENT (OUTPATIENT)
Facility: HOSPITAL | Age: 70
DRG: 182 | End: 2025-04-10
Payer: COMMERCIAL

## 2025-04-10 DIAGNOSIS — Z99.2 ENCOUNTER FOR HEMODIALYSIS FOR ESRD (HCC): ICD-10-CM

## 2025-04-10 DIAGNOSIS — N18.6 ESRD (END STAGE RENAL DISEASE) (HCC): ICD-10-CM

## 2025-04-10 DIAGNOSIS — R07.9 CHEST PAIN, UNSPECIFIED TYPE: ICD-10-CM

## 2025-04-10 DIAGNOSIS — T82.898A AV FISTULA OCCLUSION, INITIAL ENCOUNTER: ICD-10-CM

## 2025-04-10 DIAGNOSIS — N18.6 ENCOUNTER FOR HEMODIALYSIS FOR ESRD (HCC): ICD-10-CM

## 2025-04-10 DIAGNOSIS — E87.1 HYPONATREMIA: Primary | ICD-10-CM

## 2025-04-10 LAB
ALBUMIN SERPL-MCNC: 3.5 G/DL (ref 3.4–5)
ALBUMIN/GLOB SERPL: 1 (ref 0.8–1.7)
ALP SERPL-CCNC: 75 U/L (ref 45–117)
ALT SERPL-CCNC: 25 U/L (ref 16–61)
ANION GAP SERPL CALC-SCNC: 8 MMOL/L (ref 3–18)
AST SERPL-CCNC: 27 U/L (ref 10–38)
BASOPHILS # BLD: 0.08 K/UL (ref 0–0.1)
BASOPHILS NFR BLD: 0.8 % (ref 0–2)
BILIRUB SERPL-MCNC: 0.5 MG/DL (ref 0.2–1)
BUN SERPL-MCNC: 44 MG/DL (ref 7–18)
BUN/CREAT SERPL: 7 (ref 12–20)
CALCIUM SERPL-MCNC: 9.5 MG/DL (ref 8.5–10.1)
CHLORIDE SERPL-SCNC: 90 MMOL/L (ref 100–111)
CO2 SERPL-SCNC: 28 MMOL/L (ref 21–32)
CREAT SERPL-MCNC: 6.1 MG/DL (ref 0.6–1.3)
DIFFERENTIAL METHOD BLD: ABNORMAL
EOSINOPHIL # BLD: 0.36 K/UL (ref 0–0.4)
EOSINOPHIL NFR BLD: 3.6 % (ref 0–5)
ERYTHROCYTE [DISTWIDTH] IN BLOOD BY AUTOMATED COUNT: 13.2 % (ref 11.6–14.5)
GLOBULIN SER CALC-MCNC: 3.6 G/DL (ref 2–4)
GLUCOSE BLD STRIP.AUTO-MCNC: 188 MG/DL (ref 70–110)
GLUCOSE SERPL-MCNC: 142 MG/DL (ref 74–99)
HCT VFR BLD AUTO: 38 % (ref 36–48)
HGB BLD-MCNC: 12.5 G/DL (ref 13–16)
IMM GRANULOCYTES # BLD AUTO: 0.06 K/UL (ref 0–0.04)
IMM GRANULOCYTES NFR BLD AUTO: 0.6 % (ref 0–0.5)
INR PPP: 1 (ref 0.9–1.1)
LYMPHOCYTES # BLD: 1.58 K/UL (ref 0.9–3.6)
LYMPHOCYTES NFR BLD: 15.7 % (ref 21–52)
MCH RBC QN AUTO: 32.4 PG (ref 24–34)
MCHC RBC AUTO-ENTMCNC: 32.9 G/DL (ref 31–37)
MCV RBC AUTO: 98.4 FL (ref 78–100)
MONOCYTES # BLD: 0.75 K/UL (ref 0.05–1.2)
MONOCYTES NFR BLD: 7.5 % (ref 3–10)
NEUTS SEG # BLD: 7.22 K/UL (ref 1.8–8)
NEUTS SEG NFR BLD: 71.8 % (ref 40–73)
NRBC # BLD: 0 K/UL (ref 0–0.01)
NRBC BLD-RTO: 0 PER 100 WBC
PLATELET # BLD AUTO: 243 K/UL (ref 135–420)
PMV BLD AUTO: 10.6 FL (ref 9.2–11.8)
POTASSIUM SERPL-SCNC: 3.5 MMOL/L (ref 3.5–5.5)
PROT SERPL-MCNC: 7.1 G/DL (ref 6.4–8.2)
PROTHROMBIN TIME: 13.1 SEC (ref 11.9–14.9)
RBC # BLD AUTO: 3.86 M/UL (ref 4.35–5.65)
SODIUM SERPL-SCNC: 126 MMOL/L (ref 136–145)
VAS RIGHT ARM GRAFT VOLUME FLOW: 0 ML/MIN
VAS RIGHT ARTERIAL PROX ANASTOMOSIS AVF EDV: 30.7 CM/S
VAS RIGHT ARTERIAL PROX ANASTOMOSIS AVF PSV: 189.8 CM/S
VAS RIGHT AVF AVG DIAMETER 1: 0.57 CM
VAS RIGHT AVF AVG DIAMETER 2: 0.95 CM
VAS RIGHT AVF AVG DIAMETER 3: 0.91 CM
VAS RIGHT AVF AVG DIST ANAST VOL FLOW: 0 ML/MIN
VAS RIGHT AVF AVG DIST OUTFLOW VOL FLOW: 0 ML/MIN
VAS RIGHT AVF AVG GRAFT NAME: NORMAL
VAS RIGHT AVF AVG INFLOW VOL FLOW: 164.8 ML/MIN
VAS RIGHT AVF AVG MID OUTFLOW VOL FLOW: 220.5 ML/MIN
VAS RIGHT AVF AVG OUTFLOW VESSEL NAME: NORMAL
VAS RIGHT AVF AVG PROX ANAST VOL FLOW: 418.5 ML/MIN
VAS RIGHT AVF AVG PROX OUTFLOW VOL FLOW: 112.3 ML/MIN
VAS RIGHT AVG AVF DEPTH 1: 0.51 CM
VAS RIGHT AVG AVF DEPTH 2: 0.23 CM
VAS RIGHT AVG AVF DEPTH 3: 1.06 CM
VAS RIGHT DIST OUTFLOW AVF EDV: 0 CM/S
VAS RIGHT DIST OUTFLOW AVF PSV: 0 CM/S
VAS RIGHT INFLOW ARTERY AVF EDV: 9.8 CM/S
VAS RIGHT INFLOW ARTERY AVF PSV: 66.9 CM/S
VAS RIGHT MID OUTFLOW AVF EDV: 9.7 CM/S
VAS RIGHT MID OUTFLOW AVF PSV: 96.4 CM/S
VAS RIGHT OUTFLOW VESSEL AVF EDV: 0 CM/S
VAS RIGHT OUTFLOW VESSEL AVF PSV: 0 CM/S
VAS RIGHT PROX OUTFLOW AVF EDV: 6.3 CM/S
VAS RIGHT PROX OUTFLOW AVF PSV: 51.9 CM/S
VAS RIGHT VENOUS DIST ANASTOMOSIS AVF EDV: 0 CM/S
VAS RIGHT VENOUS DIST ANASTOMOSIS AVF PSV: 0 CM/S
WBC # BLD AUTO: 10.1 K/UL (ref 4.6–13.2)

## 2025-04-10 PROCEDURE — 5A1D70Z PERFORMANCE OF URINARY FILTRATION, INTERMITTENT, LESS THAN 6 HOURS PER DAY: ICD-10-PCS | Performed by: INTERNAL MEDICINE

## 2025-04-10 PROCEDURE — 2500000003 HC RX 250 WO HCPCS: Performed by: FAMILY MEDICINE

## 2025-04-10 PROCEDURE — 93990 DOPPLER FLOW TESTING: CPT | Performed by: INTERNAL MEDICINE

## 2025-04-10 PROCEDURE — 6360000002 HC RX W HCPCS: Performed by: FAMILY MEDICINE

## 2025-04-10 PROCEDURE — C1876 STENT, NON-COA/NON-COV W/DEL: HCPCS

## 2025-04-10 PROCEDURE — 87340 HEPATITIS B SURFACE AG IA: CPT

## 2025-04-10 PROCEDURE — 82962 GLUCOSE BLOOD TEST: CPT

## 2025-04-10 PROCEDURE — 85025 COMPLETE CBC W/AUTO DIFF WBC: CPT

## 2025-04-10 PROCEDURE — 36903 INTRO CATH DIALYSIS CIRCUIT: CPT

## 2025-04-10 PROCEDURE — C1894 INTRO/SHEATH, NON-LASER: HCPCS

## 2025-04-10 PROCEDURE — 86706 HEP B SURFACE ANTIBODY: CPT

## 2025-04-10 PROCEDURE — 7100000010 HC PHASE II RECOVERY - FIRST 15 MIN

## 2025-04-10 PROCEDURE — 76000 FLUOROSCOPY <1 HR PHYS/QHP: CPT

## 2025-04-10 PROCEDURE — 1100000000 HC RM PRIVATE

## 2025-04-10 PROCEDURE — C1725 CATH, TRANSLUMIN NON-LASER: HCPCS

## 2025-04-10 PROCEDURE — 36905 THRMBC/NFS DIALYSIS CIRCUIT: CPT

## 2025-04-10 PROCEDURE — 7100000011 HC PHASE II RECOVERY - ADDTL 15 MIN

## 2025-04-10 PROCEDURE — 6360000004 HC RX CONTRAST MEDICATION

## 2025-04-10 PROCEDURE — 80053 COMPREHEN METABOLIC PANEL: CPT

## 2025-04-10 PROCEDURE — 99223 1ST HOSP IP/OBS HIGH 75: CPT | Performed by: FAMILY MEDICINE

## 2025-04-10 PROCEDURE — 93990 DOPPLER FLOW TESTING: CPT

## 2025-04-10 PROCEDURE — 90935 HEMODIALYSIS ONE EVALUATION: CPT

## 2025-04-10 PROCEDURE — 6370000000 HC RX 637 (ALT 250 FOR IP): Performed by: FAMILY MEDICINE

## 2025-04-10 PROCEDURE — 93005 ELECTROCARDIOGRAM TRACING: CPT | Performed by: EMERGENCY MEDICINE

## 2025-04-10 PROCEDURE — 2709999900 HC NON-CHARGEABLE SUPPLY

## 2025-04-10 PROCEDURE — 6360000002 HC RX W HCPCS

## 2025-04-10 PROCEDURE — C1757 CATH, THROMBECTOMY/EMBOLECT: HCPCS

## 2025-04-10 PROCEDURE — 71045 X-RAY EXAM CHEST 1 VIEW: CPT

## 2025-04-10 PROCEDURE — 99285 EMERGENCY DEPT VISIT HI MDM: CPT

## 2025-04-10 PROCEDURE — C1769 GUIDE WIRE: HCPCS

## 2025-04-10 PROCEDURE — 36901 INTRO CATH DIALYSIS CIRCUIT: CPT

## 2025-04-10 PROCEDURE — 94761 N-INVAS EAR/PLS OXIMETRY MLT: CPT

## 2025-04-10 PROCEDURE — 85610 PROTHROMBIN TIME: CPT

## 2025-04-10 DEVICE — SELF-EXPANDING STENT SYSTEM
Type: IMPLANTABLE DEVICE | Status: FUNCTIONAL
Brand: EPIC™ VASCULAR

## 2025-04-10 RX ORDER — SODIUM CHLORIDE 0.9 % (FLUSH) 0.9 %
5-40 SYRINGE (ML) INJECTION PRN
Status: DISCONTINUED | OUTPATIENT
Start: 2025-04-10 | End: 2025-04-13 | Stop reason: HOSPADM

## 2025-04-10 RX ORDER — DEXTROSE MONOHYDRATE 100 MG/ML
INJECTION, SOLUTION INTRAVENOUS CONTINUOUS PRN
Status: DISCONTINUED | OUTPATIENT
Start: 2025-04-10 | End: 2025-04-13 | Stop reason: HOSPADM

## 2025-04-10 RX ORDER — LOSARTAN POTASSIUM 50 MG/1
100 TABLET ORAL DAILY
Status: DISCONTINUED | OUTPATIENT
Start: 2025-04-10 | End: 2025-04-13 | Stop reason: HOSPADM

## 2025-04-10 RX ORDER — 0.9 % SODIUM CHLORIDE 0.9 %
100 INTRAVENOUS SOLUTION INTRAVENOUS PRN
Status: DISCONTINUED | OUTPATIENT
Start: 2025-04-10 | End: 2025-04-13 | Stop reason: HOSPADM

## 2025-04-10 RX ORDER — HEPARIN SODIUM 5000 [USP'U]/ML
5000 INJECTION, SOLUTION INTRAVENOUS; SUBCUTANEOUS EVERY 8 HOURS SCHEDULED
Status: DISCONTINUED | OUTPATIENT
Start: 2025-04-10 | End: 2025-04-13 | Stop reason: HOSPADM

## 2025-04-10 RX ORDER — INSULIN LISPRO 100 [IU]/ML
0-8 INJECTION, SOLUTION INTRAVENOUS; SUBCUTANEOUS
Status: DISCONTINUED | OUTPATIENT
Start: 2025-04-10 | End: 2025-04-13 | Stop reason: HOSPADM

## 2025-04-10 RX ORDER — ASPIRIN 81 MG/1
81 TABLET, CHEWABLE ORAL DAILY
Status: DISCONTINUED | OUTPATIENT
Start: 2025-04-10 | End: 2025-04-13 | Stop reason: HOSPADM

## 2025-04-10 RX ORDER — OXYCODONE HYDROCHLORIDE 5 MG/1
5 TABLET ORAL EVERY 6 HOURS PRN
Refills: 0 | Status: DISCONTINUED | OUTPATIENT
Start: 2025-04-10 | End: 2025-04-13 | Stop reason: HOSPADM

## 2025-04-10 RX ORDER — POTASSIUM CHLORIDE 1500 MG/1
40 TABLET, EXTENDED RELEASE ORAL PRN
Status: DISCONTINUED | OUTPATIENT
Start: 2025-04-10 | End: 2025-04-10

## 2025-04-10 RX ORDER — SODIUM CHLORIDE 0.9 % (FLUSH) 0.9 %
5-40 SYRINGE (ML) INJECTION EVERY 12 HOURS SCHEDULED
Status: DISCONTINUED | OUTPATIENT
Start: 2025-04-10 | End: 2025-04-13 | Stop reason: HOSPADM

## 2025-04-10 RX ORDER — HEPARIN SODIUM 1000 [USP'U]/ML
INJECTION, SOLUTION INTRAVENOUS; SUBCUTANEOUS PRN
Status: DISCONTINUED | OUTPATIENT
Start: 2025-04-10 | End: 2025-04-10 | Stop reason: HOSPADM

## 2025-04-10 RX ORDER — INSULIN GLARGINE 100 [IU]/ML
INJECTION, SOLUTION SUBCUTANEOUS
Status: ON HOLD | COMMUNITY
Start: 2025-03-13 | End: 2025-04-13 | Stop reason: HOSPADM

## 2025-04-10 RX ORDER — MIDAZOLAM HYDROCHLORIDE 1 MG/ML
INJECTION, SOLUTION INTRAMUSCULAR; INTRAVENOUS PRN
Status: DISCONTINUED | OUTPATIENT
Start: 2025-04-10 | End: 2025-04-10 | Stop reason: HOSPADM

## 2025-04-10 RX ORDER — ATORVASTATIN CALCIUM 40 MG/1
40 TABLET, FILM COATED ORAL NIGHTLY
Status: DISCONTINUED | OUTPATIENT
Start: 2025-04-10 | End: 2025-04-13 | Stop reason: HOSPADM

## 2025-04-10 RX ORDER — POTASSIUM CHLORIDE 7.45 MG/ML
10 INJECTION INTRAVENOUS PRN
Status: DISCONTINUED | OUTPATIENT
Start: 2025-04-10 | End: 2025-04-10

## 2025-04-10 RX ORDER — CARVEDILOL 12.5 MG/1
12.5 TABLET ORAL 2 TIMES DAILY WITH MEALS
Status: DISCONTINUED | OUTPATIENT
Start: 2025-04-10 | End: 2025-04-13 | Stop reason: HOSPADM

## 2025-04-10 RX ORDER — SODIUM CHLORIDE 9 MG/ML
INJECTION, SOLUTION INTRAVENOUS PRN
Status: DISCONTINUED | OUTPATIENT
Start: 2025-04-10 | End: 2025-04-13 | Stop reason: HOSPADM

## 2025-04-10 RX ORDER — ONDANSETRON 4 MG/1
4 TABLET, ORALLY DISINTEGRATING ORAL EVERY 8 HOURS PRN
Status: DISCONTINUED | OUTPATIENT
Start: 2025-04-10 | End: 2025-04-13 | Stop reason: HOSPADM

## 2025-04-10 RX ORDER — FENTANYL CITRATE 50 UG/ML
INJECTION, SOLUTION INTRAMUSCULAR; INTRAVENOUS PRN
Status: DISCONTINUED | OUTPATIENT
Start: 2025-04-10 | End: 2025-04-10 | Stop reason: HOSPADM

## 2025-04-10 RX ORDER — MAGNESIUM SULFATE IN WATER 40 MG/ML
2000 INJECTION, SOLUTION INTRAVENOUS PRN
Status: DISCONTINUED | OUTPATIENT
Start: 2025-04-10 | End: 2025-04-10

## 2025-04-10 RX ORDER — IODIXANOL 320 MG/ML
INJECTION, SOLUTION INTRAVASCULAR PRN
Status: DISCONTINUED | OUTPATIENT
Start: 2025-04-10 | End: 2025-04-10 | Stop reason: HOSPADM

## 2025-04-10 RX ORDER — POLYETHYLENE GLYCOL 3350 17 G/17G
17 POWDER, FOR SOLUTION ORAL DAILY PRN
Status: DISCONTINUED | OUTPATIENT
Start: 2025-04-10 | End: 2025-04-13 | Stop reason: HOSPADM

## 2025-04-10 RX ORDER — ONDANSETRON 2 MG/ML
4 INJECTION INTRAMUSCULAR; INTRAVENOUS EVERY 6 HOURS PRN
Status: DISCONTINUED | OUTPATIENT
Start: 2025-04-10 | End: 2025-04-13 | Stop reason: HOSPADM

## 2025-04-10 RX ADMIN — ASPIRIN 81 MG CHEWABLE TABLET 81 MG: 81 TABLET CHEWABLE at 20:24

## 2025-04-10 RX ADMIN — SODIUM CHLORIDE, PRESERVATIVE FREE 10 ML: 5 INJECTION INTRAVENOUS at 20:25

## 2025-04-10 RX ADMIN — ATORVASTATIN CALCIUM 40 MG: 40 TABLET, FILM COATED ORAL at 20:24

## 2025-04-10 RX ADMIN — CARVEDILOL 12.5 MG: 12.5 TABLET, FILM COATED ORAL at 20:23

## 2025-04-10 RX ADMIN — HEPARIN SODIUM 5000 UNITS: 5000 INJECTION INTRAVENOUS; SUBCUTANEOUS at 21:03

## 2025-04-10 RX ADMIN — LOSARTAN POTASSIUM 100 MG: 50 TABLET, FILM COATED ORAL at 20:24

## 2025-04-10 RX ADMIN — OXYCODONE HYDROCHLORIDE 5 MG: 5 TABLET ORAL at 20:25

## 2025-04-10 RX ADMIN — INSULIN LISPRO 2 UNITS: 100 INJECTION, SOLUTION INTRAVENOUS; SUBCUTANEOUS at 21:02

## 2025-04-10 ASSESSMENT — PAIN DESCRIPTION - FREQUENCY: FREQUENCY: CONTINUOUS

## 2025-04-10 ASSESSMENT — PAIN - FUNCTIONAL ASSESSMENT
PAIN_FUNCTIONAL_ASSESSMENT: 0-10
PAIN_FUNCTIONAL_ASSESSMENT: ACTIVITIES ARE NOT PREVENTED

## 2025-04-10 ASSESSMENT — PAIN DESCRIPTION - PAIN TYPE: TYPE: SURGICAL PAIN

## 2025-04-10 ASSESSMENT — PAIN SCALES - GENERAL
PAINLEVEL_OUTOF10: 0
PAINLEVEL_OUTOF10: 8
PAINLEVEL_OUTOF10: 0
PAINLEVEL_OUTOF10: 7

## 2025-04-10 ASSESSMENT — PAIN DESCRIPTION - ONSET: ONSET: ON-GOING

## 2025-04-10 ASSESSMENT — PAIN DESCRIPTION - LOCATION
LOCATION: SHOULDER
LOCATION: ARM

## 2025-04-10 ASSESSMENT — PAIN DESCRIPTION - DESCRIPTORS: DESCRIPTORS: GNAWING

## 2025-04-10 ASSESSMENT — PAIN DESCRIPTION - ORIENTATION: ORIENTATION: RIGHT

## 2025-04-10 ASSESSMENT — PAIN SCALES - WONG BAKER: WONGBAKER_NUMERICALRESPONSE: NO HURT

## 2025-04-10 NOTE — H&P
Vascular Surgery History & Physical    Subjective:     Simone Lamas is a 70 y.o.  male with malfunctioning AVF     Past Medical History:   Diagnosis Date    CHF (congestive heart failure) (HCC)     Diabetes mellitus (HCC)     End stage renal disease (HCC)     Hypertension     TIA (transient ischemic attack)       Past Surgical History:   Procedure Laterality Date    CARDIAC PROCEDURE N/A 7/22/2024    Left heart cath performed by Abhijeet Martinez MD at [facility] CARDIAC CATH LAB    CARDIAC PROCEDURE N/A 7/22/2024    Coronary angiography performed by Abhijeet Martinez MD at [facility] CARDIAC CATH LAB     No family history on file.   Social History     Tobacco Use    Smoking status: Never    Smokeless tobacco: Never   Substance Use Topics    Alcohol use: Not on file       Prior to Admission medications    Medication Sig Start Date End Date Taking? Authorizing Provider   LANTUS SOLOSTAR 100 UNIT/ML injection pen INJECT 35 UNITS SUBCUTANEOUSLY AT BEDTIME 3/13/25  Yes Ector Schwarz MD   carvedilol (COREG) 12.5 MG tablet Take 1 tablet by mouth 2 times daily (with meals) 9/23/24   Rafael Rader MD   atorvastatin (LIPITOR) 40 MG tablet Take 1 tablet by mouth nightly 7/22/24   Willie King MD   losartan (COZAAR) 100 MG tablet Take 1 tablet by mouth daily 7/22/24   Willie King MD   furosemide (LASIX) 80 MG tablet Take 1 tablet by mouth once a week    Ector Schwarz MD   tamsulosin (FLOMAX) 0.4 MG capsule Take 1 capsule by mouth daily    Ector Schwarz MD   ASPIRIN LOW DOSE 81 MG chewable tablet Take 1 tablet by mouth daily 3/15/23   Ector Schwarz MD   B Complex-C-Folic Acid (RENAL) 1 MG CAPS Take 1 capsule by mouth daily 3/15/23   Ector Schwarz MD   insulin glargine (LANTUS) 100 UNIT/ML injection vial 25 Units  Patient not taking: Reported on 4/10/2025 10/5/22   Automatic Reconciliation, Ar     Allergies   Allergen Reactions    Acetaminophen Other (See Comments)

## 2025-04-10 NOTE — ED PROVIDER NOTES
Study Results     LABS:  Recent Results (from the past 12 hours)   CMP    Collection Time: 04/10/25  7:53 AM   Result Value Ref Range    Sodium 126 (L) 136 - 145 mmol/L    Potassium 3.5 3.5 - 5.5 mmol/L    Chloride 90 (L) 100 - 111 mmol/L    CO2 28 21 - 32 mmol/L    Anion Gap 8 3.0 - 18 mmol/L    Glucose 142 (H) 74 - 99 mg/dL    BUN 44 (H) 7.0 - 18 MG/DL    Creatinine 6.10 (H) 0.6 - 1.3 MG/DL    BUN/Creatinine Ratio 7 (L) 12 - 20      Est, Glom Filt Rate 9 (L) >60 ml/min/1.73m2    Calcium 9.5 8.5 - 10.1 MG/DL    Total Bilirubin 0.5 0.2 - 1.0 MG/DL    ALT 25 16 - 61 U/L    AST 27 10 - 38 U/L    Alk Phosphatase 75 45 - 117 U/L    Total Protein 7.1 6.4 - 8.2 g/dL    Albumin 3.5 3.4 - 5.0 g/dL    Globulin 3.6 2.0 - 4.0 g/dL    Albumin/Globulin Ratio 1.0 0.8 - 1.7     CBC with Auto Differential    Collection Time: 04/10/25  7:53 AM   Result Value Ref Range    WBC 10.1 4.6 - 13.2 K/uL    RBC 3.86 (L) 4.35 - 5.65 M/uL    Hemoglobin 12.5 (L) 13.0 - 16.0 g/dL    Hematocrit 38.0 36.0 - 48.0 %    MCV 98.4 78.0 - 100.0 FL    MCH 32.4 24.0 - 34.0 PG    MCHC 32.9 31.0 - 37.0 g/dL    RDW 13.2 11.6 - 14.5 %    Platelets 243 135 - 420 K/uL    MPV 10.6 9.2 - 11.8 FL    Nucleated RBCs 0.0 0  WBC    nRBC 0.00 0.00 - 0.01 K/uL    Neutrophils % 71.8 40.0 - 73.0 %    Lymphocytes % 15.7 (L) 21.0 - 52.0 %    Monocytes % 7.5 3.0 - 10.0 %    Eosinophils % 3.6 0.0 - 5.0 %    Basophils % 0.8 0.0 - 2.0 %    Immature Granulocytes % 0.6 (H) 0.0 - 0.5 %    Neutrophils Absolute 7.22 1.80 - 8.00 K/UL    Lymphocytes Absolute 1.58 0.90 - 3.60 K/UL    Monocytes Absolute 0.75 0.05 - 1.20 K/UL    Eosinophils Absolute 0.36 0.00 - 0.40 K/UL    Basophils Absolute 0.08 0.00 - 0.10 K/UL    Immature Granulocytes Absolute 0.06 (H) 0.00 - 0.04 K/UL    Differential Type AUTOMATED     EKG 12 Lead (SOB)    Collection Time: 04/10/25  8:10 AM   Result Value Ref Range    Ventricular Rate 72 BPM    Atrial Rate 72 BPM    P-R Interval 152 ms    QRS Duration 154 ms

## 2025-04-10 NOTE — H&P
NAME: Simone Lamas   :  1955   MRN:  [id number]     Date:  4/10/2025     Patient PCP: Liang Aguilar APRN - NP  ________________________________________________________________________    My assessment of this patient's clinical condition and my plan of care is as follows.    Assessment / Plan:  Dialysis access problem s/p thrombectomy  ESRD, HD dependent  Hypertension  DM2  Hyperlipidemia  COPD without acute exacerbation  CAD    Admit to medical bed.  Vascular surgery intervention for management of difficulty with dialysis access.  D/W Dr. Jesus by phone, assistance w/management appreciated.  Hemodialysis per nephrology.  Plan to dialyze this afternoon per d/w nephrology.  Will continue to monitor performance of current access.  Resume home meds as appropriate.  Supportive care otherwise.  Anticipate discharge once cleared from nephrology and vascular surgery perspective.       Code Status: Full  DVT Prophylaxis: Heparin          Subjective:   CHIEF COMPLAINT: Poorly functioning HD access    HISTORY OF PRESENT ILLNESS:     Simone Lamas is a 70 y.o.   male who presented to the emergency department this morning after being unable to be fully dialyzed at his outpatient dialysis center due to poorly functioning access.  He is without any other complaints acutely.      Past Medical History:   Diagnosis Date    CHF (congestive heart failure) (HCC)     Diabetes mellitus (HCC)     End stage renal disease (HCC)     Hypertension     TIA (transient ischemic attack)         Past Surgical History:   Procedure Laterality Date    CARDIAC PROCEDURE N/A 2024    Left heart cath performed by Abhijeet Martinez MD at [facility] CARDIAC CATH LAB    CARDIAC PROCEDURE N/A 2024    Coronary angiography performed by Abhijeet Martinez MD at [facility] CARDIAC CATH LAB       Social History     Tobacco Use    Smoking status: Never    Smokeless tobacco: Never   Substance Use Topics    Alcohol use: Not on file        No

## 2025-04-10 NOTE — ED NOTES
TRANSFER - OUT REPORT:    Verbal report given to GET Heck on Smione Lamas  being transferred to 4N for routine progression of patient care       Report consisted of patient's Situation, Background, Assessment and   Recommendations(SBAR).     Information from the following report(s) Nurse Handoff Report, Index, ED Encounter Summary, ED SBAR, Adult Overview, Intake/Output, MAR, Recent Results, Neuro Assessment, and Event Log was reviewed with the receiving nurse.           Lines:   20G Right AC    Opportunity for questions and clarification was provided.

## 2025-04-10 NOTE — PLAN OF CARE
INTERVENTION:  HEMODYNAMIC STABILIZATION  MAINTAIN BP WNL WHILE ON HD.     INTERVENTION:  FLUID MANAGEMENT  WILL ATTEMPT 2500 ML TOTAL FLUID REMOVAL AS TOLERATED.     INTERVENTION:  METABOLIC/ELECTROLYTE MANAGEMENT  3.0 POTASSIUM 2.5 CALCIUM DIALYSATE USED WITH HD TODAY.     INTERVENTION:  HEMODIALYSIS ACCESS SITE MANAGEMENT  RUE AVF ACCESSED WITH 15G NEEDLES USING ASEPTIC TECHNIQUE.     GOAL:  SIGNS AND SYMPTOMS OF LISTED POTENTIAL PROBLEMS WILL BE ABSENT OR MANAGEABLE.     OUTCOME:  PROGRESSING.     HD PLANNED FOR 3.5 HOURS TODAY.

## 2025-04-10 NOTE — ED TRIAGE NOTES
Pt arrives via EMS for c/o vascular access being clogged after completing partial tx at dialysis. Pt was able to get 430ml removed during dialysis.

## 2025-04-10 NOTE — PRE SEDATION
Sedation Plan  ASA: class 3 - patient with severe systemic disease     Mallampati class: II - soft palate, uvula, fauces visible.    Sedation plan: minimal sedation          Immediate reassessment prior to sedation:  Patient's status reviewed and vital signs assessed; acceptable to perform procedure and proceed to administer sedation as planned.

## 2025-04-11 LAB
ALBUMIN SERPL-MCNC: 3 G/DL (ref 3.4–5)
ALBUMIN/GLOB SERPL: 0.9 (ref 0.8–1.7)
ALP SERPL-CCNC: 68 U/L (ref 45–117)
ALT SERPL-CCNC: 17 U/L (ref 16–61)
ANION GAP SERPL CALC-SCNC: 10 MMOL/L (ref 3–18)
ANION GAP SERPL CALC-SCNC: 9 MMOL/L (ref 3–18)
AST SERPL-CCNC: 17 U/L (ref 10–38)
BASOPHILS # BLD: 0.06 K/UL (ref 0–0.1)
BASOPHILS NFR BLD: 0.5 % (ref 0–2)
BILIRUB SERPL-MCNC: 0.9 MG/DL (ref 0.2–1)
BUN SERPL-MCNC: 40 MG/DL (ref 7–18)
BUN SERPL-MCNC: 60 MG/DL (ref 7–18)
BUN/CREAT SERPL: 8 (ref 12–20)
BUN/CREAT SERPL: 9 (ref 12–20)
CALCIUM SERPL-MCNC: 8.9 MG/DL (ref 8.5–10.1)
CALCIUM SERPL-MCNC: 9.5 MG/DL (ref 8.5–10.1)
CHLORIDE SERPL-SCNC: 102 MMOL/L (ref 100–111)
CHLORIDE SERPL-SCNC: 108 MMOL/L (ref 100–111)
CO2 SERPL-SCNC: 21 MMOL/L (ref 21–32)
CO2 SERPL-SCNC: 24 MMOL/L (ref 21–32)
CREAT SERPL-MCNC: 5.25 MG/DL (ref 0.6–1.3)
CREAT SERPL-MCNC: 6.61 MG/DL (ref 0.6–1.3)
DIFFERENTIAL METHOD BLD: ABNORMAL
EKG ATRIAL RATE: 72 BPM
EKG DIAGNOSIS: NORMAL
EKG P-R INTERVAL: 152 MS
EKG Q-T INTERVAL: 390 MS
EKG QRS DURATION: 154 MS
EKG QTC CALCULATION (BAZETT): 427 MS
EKG R AXIS: 237 DEGREES
EKG T AXIS: 55 DEGREES
EKG VENTRICULAR RATE: 72 BPM
EOSINOPHIL # BLD: 0.14 K/UL (ref 0–0.4)
EOSINOPHIL NFR BLD: 1.2 % (ref 0–5)
ERYTHROCYTE [DISTWIDTH] IN BLOOD BY AUTOMATED COUNT: 13.1 % (ref 11.6–14.5)
ERYTHROCYTE [DISTWIDTH] IN BLOOD BY AUTOMATED COUNT: 13.2 % (ref 11.6–14.5)
EST. AVERAGE GLUCOSE BLD GHB EST-MCNC: 166 MG/DL
GLOBULIN SER CALC-MCNC: 3.3 G/DL (ref 2–4)
GLUCOSE BLD STRIP.AUTO-MCNC: 175 MG/DL (ref 70–110)
GLUCOSE BLD STRIP.AUTO-MCNC: 181 MG/DL (ref 70–110)
GLUCOSE BLD STRIP.AUTO-MCNC: 186 MG/DL (ref 70–110)
GLUCOSE BLD STRIP.AUTO-MCNC: 271 MG/DL (ref 70–110)
GLUCOSE SERPL-MCNC: 156 MG/DL (ref 74–99)
GLUCOSE SERPL-MCNC: 274 MG/DL (ref 74–99)
HBA1C MFR BLD: 7.4 % (ref 4.2–5.6)
HBV SURFACE AG SER QL: NEGATIVE
HCT VFR BLD AUTO: 33.1 % (ref 36–48)
HCT VFR BLD AUTO: 36.1 % (ref 36–48)
HGB BLD-MCNC: 10.7 G/DL (ref 13–16)
HGB BLD-MCNC: 11.7 G/DL (ref 13–16)
IMM GRANULOCYTES # BLD AUTO: 0.05 K/UL (ref 0–0.04)
IMM GRANULOCYTES NFR BLD AUTO: 0.4 % (ref 0–0.5)
LYMPHOCYTES # BLD: 1.59 K/UL (ref 0.9–3.6)
LYMPHOCYTES NFR BLD: 13.9 % (ref 21–52)
MCH RBC QN AUTO: 31.8 PG (ref 24–34)
MCH RBC QN AUTO: 32.3 PG (ref 24–34)
MCHC RBC AUTO-ENTMCNC: 32.3 G/DL (ref 31–37)
MCHC RBC AUTO-ENTMCNC: 32.4 G/DL (ref 31–37)
MCV RBC AUTO: 98.5 FL (ref 78–100)
MCV RBC AUTO: 99.7 FL (ref 78–100)
MONOCYTES # BLD: 1.05 K/UL (ref 0.05–1.2)
MONOCYTES NFR BLD: 9.2 % (ref 3–10)
NEUTS SEG # BLD: 8.55 K/UL (ref 1.8–8)
NEUTS SEG NFR BLD: 74.8 % (ref 40–73)
NRBC # BLD: 0 K/UL (ref 0–0.01)
NRBC # BLD: 0 K/UL (ref 0–0.01)
NRBC BLD-RTO: 0 PER 100 WBC
NRBC BLD-RTO: 0 PER 100 WBC
PHOSPHATE SERPL-MCNC: 2.9 MG/DL (ref 2.5–4.9)
PLATELET # BLD AUTO: 171 K/UL (ref 135–420)
PLATELET # BLD AUTO: 188 K/UL (ref 135–420)
PMV BLD AUTO: 11.1 FL (ref 9.2–11.8)
PMV BLD AUTO: 11.1 FL (ref 9.2–11.8)
POTASSIUM SERPL-SCNC: 3.6 MMOL/L (ref 3.5–5.5)
POTASSIUM SERPL-SCNC: 3.9 MMOL/L (ref 3.5–5.5)
PROT SERPL-MCNC: 6.3 G/DL (ref 6.4–8.2)
RBC # BLD AUTO: 3.36 M/UL (ref 4.35–5.65)
RBC # BLD AUTO: 3.62 M/UL (ref 4.35–5.65)
SODIUM SERPL-SCNC: 136 MMOL/L (ref 136–145)
SODIUM SERPL-SCNC: 138 MMOL/L (ref 136–145)
WBC # BLD AUTO: 10.2 K/UL (ref 4.6–13.2)
WBC # BLD AUTO: 11.4 K/UL (ref 4.6–13.2)

## 2025-04-11 PROCEDURE — 6370000000 HC RX 637 (ALT 250 FOR IP): Performed by: FAMILY MEDICINE

## 2025-04-11 PROCEDURE — 83036 HEMOGLOBIN GLYCOSYLATED A1C: CPT

## 2025-04-11 PROCEDURE — 2500000003 HC RX 250 WO HCPCS: Performed by: FAMILY MEDICINE

## 2025-04-11 PROCEDURE — 36415 COLL VENOUS BLD VENIPUNCTURE: CPT

## 2025-04-11 PROCEDURE — 90935 HEMODIALYSIS ONE EVALUATION: CPT

## 2025-04-11 PROCEDURE — 03CY3ZZ EXTIRPATION OF MATTER FROM UPPER ARTERY, PERCUTANEOUS APPROACH: ICD-10-PCS

## 2025-04-11 PROCEDURE — 85027 COMPLETE CBC AUTOMATED: CPT

## 2025-04-11 PROCEDURE — 057D3DZ DILATION OF RIGHT CEPHALIC VEIN WITH INTRALUMINAL DEVICE, PERCUTANEOUS APPROACH: ICD-10-PCS

## 2025-04-11 PROCEDURE — 6370000000 HC RX 637 (ALT 250 FOR IP): Performed by: HOSPITALIST

## 2025-04-11 PROCEDURE — 85025 COMPLETE CBC W/AUTO DIFF WBC: CPT

## 2025-04-11 PROCEDURE — 93010 ELECTROCARDIOGRAM REPORT: CPT | Performed by: INTERNAL MEDICINE

## 2025-04-11 PROCEDURE — 99232 SBSQ HOSP IP/OBS MODERATE 35: CPT | Performed by: HOSPITALIST

## 2025-04-11 PROCEDURE — 6360000002 HC RX W HCPCS: Performed by: FAMILY MEDICINE

## 2025-04-11 PROCEDURE — 80053 COMPREHEN METABOLIC PANEL: CPT

## 2025-04-11 PROCEDURE — 84100 ASSAY OF PHOSPHORUS: CPT

## 2025-04-11 PROCEDURE — B51W1ZZ FLUOROSCOPY OF DIALYSIS SHUNT/FISTULA USING LOW OSMOLAR CONTRAST: ICD-10-PCS

## 2025-04-11 PROCEDURE — 1100000000 HC RM PRIVATE

## 2025-04-11 PROCEDURE — 82962 GLUCOSE BLOOD TEST: CPT

## 2025-04-11 PROCEDURE — 83970 ASSAY OF PARATHORMONE: CPT

## 2025-04-11 RX ORDER — AMLODIPINE BESYLATE 5 MG/1
5 TABLET ORAL DAILY
Status: DISCONTINUED | OUTPATIENT
Start: 2025-04-11 | End: 2025-04-13 | Stop reason: HOSPADM

## 2025-04-11 RX ADMIN — SODIUM CHLORIDE, PRESERVATIVE FREE 10 ML: 5 INJECTION INTRAVENOUS at 17:01

## 2025-04-11 RX ADMIN — ATORVASTATIN CALCIUM 40 MG: 40 TABLET, FILM COATED ORAL at 20:03

## 2025-04-11 RX ADMIN — CARVEDILOL 12.5 MG: 12.5 TABLET, FILM COATED ORAL at 17:02

## 2025-04-11 RX ADMIN — HEPARIN SODIUM 5000 UNITS: 5000 INJECTION INTRAVENOUS; SUBCUTANEOUS at 06:25

## 2025-04-11 RX ADMIN — ASPIRIN 81 MG CHEWABLE TABLET 81 MG: 81 TABLET CHEWABLE at 13:47

## 2025-04-11 RX ADMIN — SODIUM CHLORIDE, PRESERVATIVE FREE 10 ML: 5 INJECTION INTRAVENOUS at 20:03

## 2025-04-11 RX ADMIN — OXYCODONE HYDROCHLORIDE 5 MG: 5 TABLET ORAL at 20:08

## 2025-04-11 RX ADMIN — INSULIN LISPRO 2 UNITS: 100 INJECTION, SOLUTION INTRAVENOUS; SUBCUTANEOUS at 12:44

## 2025-04-11 RX ADMIN — INSULIN LISPRO 2 UNITS: 100 INJECTION, SOLUTION INTRAVENOUS; SUBCUTANEOUS at 20:05

## 2025-04-11 RX ADMIN — AMLODIPINE BESYLATE 5 MG: 5 TABLET ORAL at 17:02

## 2025-04-11 RX ADMIN — HEPARIN SODIUM 5000 UNITS: 5000 INJECTION INTRAVENOUS; SUBCUTANEOUS at 20:03

## 2025-04-11 RX ADMIN — INSULIN LISPRO 4 UNITS: 100 INJECTION, SOLUTION INTRAVENOUS; SUBCUTANEOUS at 17:02

## 2025-04-11 RX ADMIN — LOSARTAN POTASSIUM 100 MG: 50 TABLET, FILM COATED ORAL at 13:47

## 2025-04-11 RX ADMIN — OXYCODONE HYDROCHLORIDE 5 MG: 5 TABLET ORAL at 03:39

## 2025-04-11 RX ADMIN — OXYCODONE HYDROCHLORIDE 5 MG: 5 TABLET ORAL at 12:44

## 2025-04-11 RX ADMIN — HEPARIN SODIUM 5000 UNITS: 5000 INJECTION INTRAVENOUS; SUBCUTANEOUS at 13:46

## 2025-04-11 ASSESSMENT — PAIN DESCRIPTION - PAIN TYPE
TYPE: SURGICAL PAIN
TYPE: SURGICAL PAIN
TYPE: CHRONIC PAIN

## 2025-04-11 ASSESSMENT — PAIN SCALES - GENERAL
PAINLEVEL_OUTOF10: 0
PAINLEVEL_OUTOF10: 7
PAINLEVEL_OUTOF10: 0
PAINLEVEL_OUTOF10: 6
PAINLEVEL_OUTOF10: 7

## 2025-04-11 ASSESSMENT — PAIN DESCRIPTION - ONSET
ONSET: GRADUAL
ONSET: ON-GOING
ONSET: ON-GOING

## 2025-04-11 ASSESSMENT — PAIN DESCRIPTION - DESCRIPTORS
DESCRIPTORS: THROBBING
DESCRIPTORS: GNAWING
DESCRIPTORS: ACHING

## 2025-04-11 ASSESSMENT — PAIN DESCRIPTION - ORIENTATION
ORIENTATION: RIGHT

## 2025-04-11 ASSESSMENT — PAIN - FUNCTIONAL ASSESSMENT
PAIN_FUNCTIONAL_ASSESSMENT: ACTIVITIES ARE NOT PREVENTED

## 2025-04-11 ASSESSMENT — PAIN DESCRIPTION - LOCATION
LOCATION: ARM
LOCATION: SHOULDER
LOCATION: ARM

## 2025-04-11 ASSESSMENT — PAIN DESCRIPTION - DIRECTION: RADIATING_TOWARDS: DENIES

## 2025-04-11 ASSESSMENT — PAIN DESCRIPTION - FREQUENCY
FREQUENCY: INTERMITTENT
FREQUENCY: CONTINUOUS
FREQUENCY: CONTINUOUS

## 2025-04-11 NOTE — CARE COORDINATION
04/11/25 1500   Service Assessment   Patient Orientation Alert and Oriented   Cognition Alert   History Provided By Patient   Primary Caregiver Self   Accompanied By/Relationship no one at bedside   Support Systems Family Members   Patient's Healthcare Decision Maker is: Patient Declined (Legal Next of Kin Remains as Decision Maker)   PCP Verified by CM Yes   Last Visit to PCP Within last 6 months   Prior Functional Level Independent in ADLs/IADLs   Current Functional Level Independent in ADLs/IADLs   Can patient return to prior living arrangement Yes   Ability to make needs known: Good   Family able to assist with home care needs: Yes   Would you like for me to discuss the discharge plan with any other family members/significant others, and if so, who? No   Financial Resources Medicare;Medicaid   Community Resources None   CM/SW Referral Other (see comment)  (not applicable)   Social/Functional History   Lives With Alone   Type of Home House   Home Layout One level   Home Access Stairs to enter with rails   Entrance Stairs - Number of Steps 4   Entrance Stairs - Rails Both   Bathroom Shower/Tub Tub/Shower unit   Bathroom Toilet Standard   Bathroom Equipment Grab bars in shower   Bathroom Accessibility Accessible   Home Equipment None   Receives Help From Family   Prior Level of Assist for ADLs Independent   Prior Level of Assist for Homemaking Independent   Homemaking Responsibilities Yes   Ambulation Assistance Independent   Prior Level of Assist for Transfers Independent   Active  No   Patient's  Info Public Transportation and Medicaid   Mode of Transportation Walk   Education   (not applicable)   Occupation On disability   Type of Occupation   (not applicable)   Discharge Planning   Type of Residence House   Living Arrangements Alone   Current Services Prior To Admission None   Potential Assistance Needed N/A   DME Ordered? No   Potential Assistance Purchasing Medications No   Type of Home Care

## 2025-04-11 NOTE — PLAN OF CARE
INTERVENTION:  HEMODYNAMIC STABILIZATION  MAINTAIN BP WNL WHILE ON HD.    INTERVENTION:  FLUID MANAGEMENT  WILL ATTEMPT 2500 ML TOTAL FLUID REMOVAL AS TOLERATED.    INTERVENTION:  METABOLIC/ELECTROLYTE MANAGEMENT  3.0 POTASSIUM 2.5 CALCIUM DIALYSATE USED WITH HD TODAY.    INTERVENTION:  HEMODIALYSIS ACCESS SITE MANAGEMENT  RIGHT UPPER AVF ACCESSED WITH 15G NEEDLE USING ASEPTIC TECHNIQUE.    GOAL:  SIGNS AND SYMPTOMS OF LISTED POTENTIAL PROBLEMS WILL BE ABSENT OR MANAGEABLE.    OUTCOME:  PROGRESSING.    HD PLANNED FOR 3.5 HOURS TODAY.      Problem: Chronic Conditions and Co-morbidities  Goal: Patient's chronic conditions and co-morbidity symptoms are monitored and maintained or improved  4/11/2025 0903 by Radha Bates LPN  Outcome: Progressing

## 2025-04-11 NOTE — NURSE NAVIGATOR
HF Navigator attempted education this day; however, pt unable to be seen due to:    [  ] lethargy/confusion  [ x ] off floor for testing --TDC insertion . Followed by dialysis  [  ] RN care  [  ] Pain   [  ] Other    Will follow up next day as indicated.    Negin Chavez RN  4/11/2025

## 2025-04-11 NOTE — PLAN OF CARE
Problem: Chronic Conditions and Co-morbidities  Goal: Patient's chronic conditions and co-morbidity symptoms are monitored and maintained or improved  4/11/2025 1334 by Milana Luo RN  Outcome: Progressing  Flowsheets (Taken 4/11/2025 1334)  Care Plan - Patient's Chronic Conditions and Co-Morbidity Symptoms are Monitored and Maintained or Improved: Update acute care plan with appropriate goals if chronic or comorbid symptoms are exacerbated and prevent overall improvement and discharge     Problem: Pain  Goal: Verbalizes/displays adequate comfort level or baseline comfort level  Outcome: Progressing  Flowsheets (Taken 4/11/2025 0730)  Verbalizes/displays adequate comfort level or baseline comfort level:   Encourage patient to monitor pain and request assistance   Assess pain using appropriate pain scale   Administer analgesics based on type and severity of pain and evaluate response   Implement non-pharmacological measures as appropriate and evaluate response     Problem: Cardiovascular - Adult  Goal: Maintains optimal cardiac output and hemodynamic stability  Outcome: Progressing  Flowsheets  Taken 4/11/2025 1334  Maintains optimal cardiac output and hemodynamic stability: Monitor blood pressure and heart rate  Taken 4/11/2025 0751  Maintains optimal cardiac output and hemodynamic stability: Monitor blood pressure and heart rate     Problem: Cardiovascular - Adult  Goal: Absence of cardiac dysrhythmias or at baseline  Outcome: Progressing  Flowsheets  Taken 4/11/2025 1334  Absence of cardiac dysrhythmias or at baseline: Monitor cardiac rate and rhythm  Taken 4/11/2025 0751  Absence of cardiac dysrhythmias or at baseline: Monitor cardiac rate and rhythm     Problem: Skin/Tissue Integrity - Adult  Goal: Skin integrity remains intact  Description: 1.  Monitor for areas of redness and/or skin breakdown  2.  Assess vascular access sites hourly  3.  Every 4-6 hours minimum:  Change oxygen saturation probe site  4.

## 2025-04-11 NOTE — OP NOTE
[address]  [address]                            OPERATIVE REPORT      PATIENT NAME: THANG FUNES                : 1955  MED REC NO: [id number]                       ROOM: 463  ACCOUNT NO: [id number]                       ADMIT DATE: 04/10/2025  PROVIDER: George Jesus MD    DATE OF SERVICE:  04/10/2025    PREOPERATIVE DIAGNOSES:  End-stage renal disease.    POSTOPERATIVE DIAGNOSES:  End-stage renal disease.    PROCEDURES PERFORMED:       1. Percutaneous thrombectomy of right AV fistula.     2. Rectification of venous stenosis with angioplasty and stent placement.    SURGEON:  George Jesus MD    ASSISTANT:  None.    ANESTHESIA:  IV sedation, local anesthesia.    ESTIMATED BLOOD LOSS:  Minimal.    SPECIMENS REMOVED:  None.    INTRAOPERATIVE FINDINGS:  See below.     COMPLICATIONS:  None.    IMPLANTS:  Stent.    INDICATIONS:  The patient is a middle-aged male with end-stage renal disease and a thrombosed AV fistula.  Decision was made to take him to the cath lab for angiographic evaluation and rectification.    DESCRIPTION OF PROCEDURE:  The patient was taken to the cath lab.  Once a suitable level of anesthesia was induced, he was prepped and draped in typical sterile fashion.  Percutaneous access to AV fistula was achieved and a 6-Turkish sheath placed.  Angiogram was performed.  It showed a widely patent arterial anastomosis with a thrombosed graft from its midpoint distally.  4 mg total in tPA were infused.  The area was crossed and push thrombectomy performed.  There was an area of stenosis present in the cephalic arch, which was treated with an 8 mm angioplasty balloon.  The stenosis exceeded 80% and had significant residual despite multiple attempts of angioplasty.  A 10 mm self-expanding stent was subsequently placed across the stenosis with good technical results.  Multiple attempts at thrombectomy were unsuccessful.  A

## 2025-04-11 NOTE — PLAN OF CARE
Problem: Chronic Conditions and Co-morbidities  Goal: Patient's chronic conditions and co-morbidity symptoms are monitored and maintained or improved  4/10/2025 2155 by Carolina Mae RN  Outcome: Progressing  Flowsheets (Taken 4/10/2025 1917)  Care Plan - Patient's Chronic Conditions and Co-Morbidity Symptoms are Monitored and Maintained or Improved: Monitor and assess patient's chronic conditions and comorbid symptoms for stability, deterioration, or improvement  4/10/2025 1130 by Cathryn Gorman, RN  Reactivated     Problem: Pain  Goal: Verbalizes/displays adequate comfort level or baseline comfort level  Outcome: Progressing     Problem: ABCDS Injury Assessment  Goal: Absence of physical injury  Outcome: Progressing     Problem: Neurosensory - Adult  Goal: Achieves stable or improved neurological status  Outcome: Progressing  Goal: Absence of seizures  Outcome: Progressing  Goal: Remains free of injury related to seizures activity  Outcome: Progressing  Goal: Achieves maximal functionality and self care  Outcome: Progressing     Problem: Respiratory - Adult  Goal: Achieves optimal ventilation and oxygenation  Outcome: Progressing     Problem: Cardiovascular - Adult  Goal: Maintains optimal cardiac output and hemodynamic stability  Outcome: Progressing  Goal: Absence of cardiac dysrhythmias or at baseline  Outcome: Progressing     Problem: Skin/Tissue Integrity - Adult  Goal: Skin integrity remains intact  Description: 1.  Monitor for areas of redness and/or skin breakdown  2.  Assess vascular access sites hourly  3.  Every 4-6 hours minimum:  Change oxygen saturation probe site  4.  Every 4-6 hours:  If on nasal continuous positive airway pressure, respiratory therapy assess nares and determine need for appliance change or resting period  Outcome: Progressing  Goal: Incisions, wounds, or drain sites healing without S/S of infection  Outcome: Progressing  Goal: Oral mucous membranes remain

## 2025-04-12 LAB
ANION GAP SERPL CALC-SCNC: 9 MMOL/L (ref 3–18)
BASOPHILS # BLD: 0.05 K/UL (ref 0–0.1)
BASOPHILS NFR BLD: 0.5 % (ref 0–2)
BUN SERPL-MCNC: 51 MG/DL (ref 7–18)
BUN/CREAT SERPL: 7 (ref 12–20)
CALCIUM SERPL-MCNC: 8.8 MG/DL (ref 8.5–10.1)
CALCIUM SERPL-MCNC: 9.7 MG/DL (ref 8.5–10.1)
CHLORIDE SERPL-SCNC: 103 MMOL/L (ref 100–111)
CO2 SERPL-SCNC: 24 MMOL/L (ref 21–32)
CREAT SERPL-MCNC: 6.88 MG/DL (ref 0.6–1.3)
DIFFERENTIAL METHOD BLD: ABNORMAL
EOSINOPHIL # BLD: 0.25 K/UL (ref 0–0.4)
EOSINOPHIL NFR BLD: 2.4 % (ref 0–5)
ERYTHROCYTE [DISTWIDTH] IN BLOOD BY AUTOMATED COUNT: 13 % (ref 11.6–14.5)
GLUCOSE BLD STRIP.AUTO-MCNC: 144 MG/DL (ref 70–110)
GLUCOSE BLD STRIP.AUTO-MCNC: 185 MG/DL (ref 70–110)
GLUCOSE BLD STRIP.AUTO-MCNC: 261 MG/DL (ref 70–110)
GLUCOSE BLD STRIP.AUTO-MCNC: 274 MG/DL (ref 70–110)
GLUCOSE SERPL-MCNC: 194 MG/DL (ref 74–99)
HBV SURFACE AB SER QL IA: POSITIVE
HBV SURFACE AB SERPL IA-ACNC: 65.9 MIU/ML
HCT VFR BLD AUTO: 33.4 % (ref 36–48)
HEP BS AB COMMENT: NORMAL
HGB BLD-MCNC: 11 G/DL (ref 13–16)
IMM GRANULOCYTES # BLD AUTO: 0.12 K/UL (ref 0–0.04)
IMM GRANULOCYTES NFR BLD AUTO: 1.1 % (ref 0–0.5)
LYMPHOCYTES # BLD: 1.5 K/UL (ref 0.9–3.6)
LYMPHOCYTES NFR BLD: 14.2 % (ref 21–52)
MCH RBC QN AUTO: 32.2 PG (ref 24–34)
MCHC RBC AUTO-ENTMCNC: 32.9 G/DL (ref 31–37)
MCV RBC AUTO: 97.7 FL (ref 78–100)
MONOCYTES # BLD: 1.12 K/UL (ref 0.05–1.2)
MONOCYTES NFR BLD: 10.6 % (ref 3–10)
NEUTS SEG # BLD: 7.49 K/UL (ref 1.8–8)
NEUTS SEG NFR BLD: 71.2 % (ref 40–73)
NRBC # BLD: 0 K/UL (ref 0–0.01)
NRBC BLD-RTO: 0 PER 100 WBC
PHOSPHATE SERPL-MCNC: 3 MG/DL (ref 2.5–4.9)
PLATELET # BLD AUTO: 193 K/UL (ref 135–420)
PMV BLD AUTO: 11.1 FL (ref 9.2–11.8)
POTASSIUM SERPL-SCNC: 3.7 MMOL/L (ref 3.5–5.5)
PTH-INTACT SERPL-MCNC: 192.3 PG/ML (ref 18.4–88)
RBC # BLD AUTO: 3.42 M/UL (ref 4.35–5.65)
SODIUM SERPL-SCNC: 136 MMOL/L (ref 136–145)
TROPONIN I SERPL HS-MCNC: 70 NG/L (ref 0–78)
WBC # BLD AUTO: 10.5 K/UL (ref 4.6–13.2)

## 2025-04-12 PROCEDURE — 2500000003 HC RX 250 WO HCPCS: Performed by: FAMILY MEDICINE

## 2025-04-12 PROCEDURE — 6360000002 HC RX W HCPCS: Performed by: FAMILY MEDICINE

## 2025-04-12 PROCEDURE — 1100000000 HC RM PRIVATE

## 2025-04-12 PROCEDURE — 80048 BASIC METABOLIC PNL TOTAL CA: CPT

## 2025-04-12 PROCEDURE — 90935 HEMODIALYSIS ONE EVALUATION: CPT

## 2025-04-12 PROCEDURE — 6370000000 HC RX 637 (ALT 250 FOR IP): Performed by: HOSPITALIST

## 2025-04-12 PROCEDURE — 84484 ASSAY OF TROPONIN QUANT: CPT

## 2025-04-12 PROCEDURE — 36415 COLL VENOUS BLD VENIPUNCTURE: CPT

## 2025-04-12 PROCEDURE — 99232 SBSQ HOSP IP/OBS MODERATE 35: CPT | Performed by: HOSPITALIST

## 2025-04-12 PROCEDURE — 85025 COMPLETE CBC W/AUTO DIFF WBC: CPT

## 2025-04-12 PROCEDURE — 6370000000 HC RX 637 (ALT 250 FOR IP): Performed by: FAMILY MEDICINE

## 2025-04-12 PROCEDURE — 84100 ASSAY OF PHOSPHORUS: CPT

## 2025-04-12 PROCEDURE — 82962 GLUCOSE BLOOD TEST: CPT

## 2025-04-12 RX ADMIN — HEPARIN SODIUM 5000 UNITS: 5000 INJECTION INTRAVENOUS; SUBCUTANEOUS at 04:42

## 2025-04-12 RX ADMIN — AMLODIPINE BESYLATE 5 MG: 5 TABLET ORAL at 13:37

## 2025-04-12 RX ADMIN — INSULIN LISPRO 4 UNITS: 100 INJECTION, SOLUTION INTRAVENOUS; SUBCUTANEOUS at 16:53

## 2025-04-12 RX ADMIN — LOSARTAN POTASSIUM 100 MG: 50 TABLET, FILM COATED ORAL at 13:38

## 2025-04-12 RX ADMIN — ATORVASTATIN CALCIUM 40 MG: 40 TABLET, FILM COATED ORAL at 21:16

## 2025-04-12 RX ADMIN — ASPIRIN 81 MG CHEWABLE TABLET 81 MG: 81 TABLET CHEWABLE at 08:10

## 2025-04-12 RX ADMIN — INSULIN LISPRO 2 UNITS: 100 INJECTION, SOLUTION INTRAVENOUS; SUBCUTANEOUS at 08:14

## 2025-04-12 RX ADMIN — HEPARIN SODIUM 5000 UNITS: 5000 INJECTION INTRAVENOUS; SUBCUTANEOUS at 21:16

## 2025-04-12 RX ADMIN — CARVEDILOL 12.5 MG: 12.5 TABLET, FILM COATED ORAL at 16:53

## 2025-04-12 RX ADMIN — SODIUM CHLORIDE, PRESERVATIVE FREE 10 ML: 5 INJECTION INTRAVENOUS at 21:16

## 2025-04-12 RX ADMIN — OXYCODONE HYDROCHLORIDE 5 MG: 5 TABLET ORAL at 04:41

## 2025-04-12 RX ADMIN — SODIUM CHLORIDE, PRESERVATIVE FREE 5 ML: 5 INJECTION INTRAVENOUS at 08:10

## 2025-04-12 RX ADMIN — OXYCODONE HYDROCHLORIDE 5 MG: 5 TABLET ORAL at 21:26

## 2025-04-12 RX ADMIN — INSULIN LISPRO 4 UNITS: 100 INJECTION, SOLUTION INTRAVENOUS; SUBCUTANEOUS at 21:15

## 2025-04-12 RX ADMIN — HEPARIN SODIUM 5000 UNITS: 5000 INJECTION INTRAVENOUS; SUBCUTANEOUS at 13:38

## 2025-04-12 ASSESSMENT — PAIN SCALES - GENERAL
PAINLEVEL_OUTOF10: 0
PAINLEVEL_OUTOF10: 3
PAINLEVEL_OUTOF10: 0
PAINLEVEL_OUTOF10: 6
PAINLEVEL_OUTOF10: 0
PAINLEVEL_OUTOF10: 0
PAINLEVEL_OUTOF10: 6
PAINLEVEL_OUTOF10: 3
PAINLEVEL_OUTOF10: 0
PAINLEVEL_OUTOF10: 3
PAINLEVEL_OUTOF10: 0
PAINLEVEL_OUTOF10: 7

## 2025-04-12 ASSESSMENT — PAIN DESCRIPTION - ORIENTATION
ORIENTATION: RIGHT
ORIENTATION: RIGHT
ORIENTATION: RIGHT;UPPER
ORIENTATION: RIGHT

## 2025-04-12 ASSESSMENT — PAIN DESCRIPTION - LOCATION
LOCATION: ARM
LOCATION: SHOULDER
LOCATION: SHOULDER
LOCATION: CHEST
LOCATION: ARM

## 2025-04-12 ASSESSMENT — PAIN DESCRIPTION - FREQUENCY
FREQUENCY: CONTINUOUS
FREQUENCY: CONTINUOUS
FREQUENCY: INTERMITTENT

## 2025-04-12 ASSESSMENT — PAIN DESCRIPTION - DESCRIPTORS
DESCRIPTORS: THROBBING
DESCRIPTORS: ACHING

## 2025-04-12 ASSESSMENT — PAIN DESCRIPTION - PAIN TYPE
TYPE: CHRONIC PAIN
TYPE: SURGICAL PAIN
TYPE: CHRONIC PAIN

## 2025-04-12 ASSESSMENT — PAIN - FUNCTIONAL ASSESSMENT
PAIN_FUNCTIONAL_ASSESSMENT: ACTIVITIES ARE NOT PREVENTED

## 2025-04-12 ASSESSMENT — PAIN DESCRIPTION - ONSET
ONSET: ON-GOING
ONSET: GRADUAL
ONSET: ON-GOING

## 2025-04-12 NOTE — PLAN OF CARE
Problem: Chronic Conditions and Co-morbidities  Goal: Patient's chronic conditions and co-morbidity symptoms are monitored and maintained or improved  4/12/2025 1046 by Erickson Goncalves, RN  Outcome: Progressing  Flowsheets (Taken 4/11/2025 1334 by Milana Luo, RN)  Care Plan - Patient's Chronic Conditions and Co-Morbidity Symptoms are Monitored and Maintained or Improved: Update acute care plan with appropriate goals if chronic or comorbid symptoms are exacerbated and prevent overall improvement and discharge  4/12/2025 1015 by Maricruz Pedro, RN  Outcome: Progressing     Problem: Pain  Goal: Verbalizes/displays adequate comfort level or baseline comfort level  4/12/2025 1046 by Erickson Goncalves, RN  Outcome: Progressing  Flowsheets (Taken 4/12/2025 1046)  Verbalizes/displays adequate comfort level or baseline comfort level: Encourage patient to monitor pain and request assistance  4/11/2025 2155 by Becka Salazar RN  Flowsheets (Taken 4/11/2025 2155)  Verbalizes/displays adequate comfort level or baseline comfort level:   Encourage patient to monitor pain and request assistance   Assess pain using appropriate pain scale  Note: Give pain meds as needed and reassess pain per protocol     Problem: ABCDS Injury Assessment  Goal: Absence of physical injury  Outcome: Progressing  Flowsheets (Taken 4/12/2025 1046)  Absence of Physical Injury: Implement safety measures based on patient assessment  Note: Keep call light and personal effects in close reach of patient     Problem: Neurosensory - Adult  Goal: Achieves stable or improved neurological status  Outcome: Progressing  Flowsheets (Taken 4/11/2025 2010 by Becka Salazar RN)  Achieves stable or improved neurological status: Assess for and report changes in neurological status  Goal: Absence of seizures  Outcome: Progressing  Goal: Remains free of injury related to seizures activity  Outcome: Progressing  Goal: Achieves maximal functionality

## 2025-04-12 NOTE — PLAN OF CARE
Problem: Pain  Goal: Verbalizes/displays adequate comfort level or baseline comfort level  4/11/2025 2155 by Becka Salazar RN  Flowsheets (Taken 4/11/2025 2155)  Verbalizes/displays adequate comfort level or baseline comfort level:   Encourage patient to monitor pain and request assistance   Assess pain using appropriate pain scale  Note: Give pain meds as needed and reassess pain per protocol  4/11/2025 1334 by Milana Luo RN  Outcome: Progressing  Flowsheets (Taken 4/11/2025 0730)  Verbalizes/displays adequate comfort level or baseline comfort level:   Encourage patient to monitor pain and request assistance   Assess pain using appropriate pain scale   Administer analgesics based on type and severity of pain and evaluate response   Implement non-pharmacological measures as appropriate and evaluate response     Problem: Infection - Adult  Goal: Absence of infection during hospitalization  4/11/2025 2155 by Becka Salazar RN  Flowsheets (Taken 4/11/2025 2155)  Absence of infection during hospitalization:   Assess and monitor for signs and symptoms of infection   Monitor lab/diagnostic results  Note: Monitor labs and give and educate patient the importance of getting antibiotic on time  4/11/2025 1334 by Milana Luo RN  Outcome: Progressing  Flowsheets  Taken 4/11/2025 1334  Absence of infection during hospitalization:   Assess and monitor for signs and symptoms of infection   Monitor lab/diagnostic results   Administer medications as ordered  Taken 4/11/2025 0751  Absence of infection during hospitalization: Assess and monitor for signs and symptoms of infection     Problem: Safety - Adult  Goal: Free from fall injury  4/11/2025 2155 by Becka Salazar RN  Flowsheets (Taken 4/11/2025 2155)  Free From Fall Injury: Instruct family/caregiver on patient safety  Note: Encourage patient to use call bell for assistance to prevent fall or injury  4/11/2025 1334 by Milana Luo RN  Outcome:

## 2025-04-12 NOTE — PLAN OF CARE
INTERVENTION:  HEMODYNAMIC STABILIZATION  MAINTAIN BP WNL WHILE ON HD.    INTERVENTION:  FLUID MANAGEMENT  WILL ATTEMPT 3000 ML TOTAL FLUID REMOVAL AS TOLERATED.    INTERVENTION:  METABOLIC/ELECTROLYTE MANAGEMENT  3.0 POTASSIUM 2.5 CALCIUM DIALYSATE USED WITH HD TODAY.    INTERVENTION:  HEMODIALYSIS ACCESS SITE MANAGEMENT  RIGHT UPPER ARM AVF ACCESSED WITH 15G NEEDLE USING ASEPTIC TECHNIQUE.    GOAL:  SIGNS AND SYMPTOMS OF LISTED POTENTIAL PROBLEMS WILL BE ABSENT OR MANAGEABLE.    OUTCOME:  PROGRESSING.    HD PLANNED FOR 3.5 HOURS TODAY.  Problem: Chronic Conditions and Co-morbidities  Goal: Patient's chronic conditions and co-morbidity symptoms are monitored and maintained or improved  Outcome: Progressing

## 2025-04-13 ENCOUNTER — APPOINTMENT (OUTPATIENT)
Facility: HOSPITAL | Age: 70
DRG: 182 | End: 2025-04-13
Attending: HOSPITALIST
Payer: COMMERCIAL

## 2025-04-13 VITALS
HEIGHT: 72 IN | WEIGHT: 176 LBS | RESPIRATION RATE: 20 BRPM | BODY MASS INDEX: 23.84 KG/M2 | HEART RATE: 72 BPM | OXYGEN SATURATION: 98 % | SYSTOLIC BLOOD PRESSURE: 105 MMHG | DIASTOLIC BLOOD PRESSURE: 63 MMHG | TEMPERATURE: 98.7 F

## 2025-04-13 LAB
ECHO AO ASC DIAM: 3.5 CM
ECHO AO ASCENDING AORTA INDEX: 1.73 CM/M2
ECHO AO ROOT DIAM: 3.1 CM
ECHO AO ROOT INDEX: 1.53 CM/M2
ECHO BSA: 2.01 M2
ECHO LA DIAMETER INDEX: 2.03 CM/M2
ECHO LA DIAMETER: 4.1 CM
ECHO LA TO AORTIC ROOT RATIO: 1.32
ECHO LA VOL A-L A2C: 69 ML (ref 18–58)
ECHO LA VOL A-L A4C: 63 ML (ref 18–58)
ECHO LA VOL BP: 64 ML (ref 18–58)
ECHO LA VOL MOD A2C: 65 ML (ref 18–58)
ECHO LA VOL MOD A4C: 59 ML (ref 18–58)
ECHO LA VOL/BSA BIPLANE: 32 ML/M2 (ref 16–34)
ECHO LA VOLUME AREA LENGTH: 68 ML
ECHO LA VOLUME INDEX A-L A2C: 34 ML/M2 (ref 16–34)
ECHO LA VOLUME INDEX A-L A4C: 31 ML/M2 (ref 16–34)
ECHO LA VOLUME INDEX AREA LENGTH: 34 ML/M2 (ref 16–34)
ECHO LA VOLUME INDEX MOD A2C: 32 ML/M2 (ref 16–34)
ECHO LA VOLUME INDEX MOD A4C: 29 ML/M2 (ref 16–34)
ECHO LV E' LATERAL VELOCITY: 4.73 CM/S
ECHO LV EF PHYSICIAN: 40 %
ECHO LV FRACTIONAL SHORTENING: 22 % (ref 28–44)
ECHO LV INTERNAL DIMENSION DIASTOLE INDEX: 2.97 CM/M2
ECHO LV INTERNAL DIMENSION DIASTOLIC: 6 CM (ref 4.2–5.9)
ECHO LV INTERNAL DIMENSION SYSTOLIC INDEX: 2.33 CM/M2
ECHO LV INTERNAL DIMENSION SYSTOLIC: 4.7 CM
ECHO LV IVSD: 1.4 CM (ref 0.6–1)
ECHO LV MASS 2D: 387.9 G (ref 88–224)
ECHO LV MASS INDEX 2D: 192 G/M2 (ref 49–115)
ECHO LV POSTERIOR WALL DIASTOLIC: 1.4 CM (ref 0.6–1)
ECHO LV RELATIVE WALL THICKNESS RATIO: 0.47
ECHO MV A VELOCITY: 0.69 M/S
ECHO MV E DECELERATION TIME (DT): 222.3 MS
ECHO MV E VELOCITY: 0.69 M/S
ECHO MV E/A RATIO: 1
ECHO MV E/E' LATERAL: 14.59
ECHO RV FREE WALL PEAK S': 12.7 CM/S
ECHO RV TAPSE: 1.2 CM (ref 1.7–?)
GLUCOSE BLD STRIP.AUTO-MCNC: 211 MG/DL (ref 70–110)
GLUCOSE BLD STRIP.AUTO-MCNC: 269 MG/DL (ref 70–110)

## 2025-04-13 PROCEDURE — 97161 PT EVAL LOW COMPLEX 20 MIN: CPT

## 2025-04-13 PROCEDURE — 99239 HOSP IP/OBS DSCHRG MGMT >30: CPT | Performed by: HOSPITALIST

## 2025-04-13 PROCEDURE — 93306 TTE W/DOPPLER COMPLETE: CPT | Performed by: INTERNAL MEDICINE

## 2025-04-13 PROCEDURE — 6370000000 HC RX 637 (ALT 250 FOR IP): Performed by: FAMILY MEDICINE

## 2025-04-13 PROCEDURE — 82962 GLUCOSE BLOOD TEST: CPT

## 2025-04-13 PROCEDURE — 2500000003 HC RX 250 WO HCPCS: Performed by: FAMILY MEDICINE

## 2025-04-13 PROCEDURE — 6370000000 HC RX 637 (ALT 250 FOR IP): Performed by: HOSPITALIST

## 2025-04-13 PROCEDURE — 6360000002 HC RX W HCPCS: Performed by: FAMILY MEDICINE

## 2025-04-13 PROCEDURE — 93306 TTE W/DOPPLER COMPLETE: CPT

## 2025-04-13 RX ADMIN — OXYCODONE HYDROCHLORIDE 5 MG: 5 TABLET ORAL at 12:22

## 2025-04-13 RX ADMIN — HEPARIN SODIUM 5000 UNITS: 5000 INJECTION INTRAVENOUS; SUBCUTANEOUS at 06:55

## 2025-04-13 RX ADMIN — LOSARTAN POTASSIUM 100 MG: 50 TABLET, FILM COATED ORAL at 09:15

## 2025-04-13 RX ADMIN — ASPIRIN 81 MG CHEWABLE TABLET 81 MG: 81 TABLET CHEWABLE at 09:15

## 2025-04-13 RX ADMIN — AMLODIPINE BESYLATE 5 MG: 5 TABLET ORAL at 09:15

## 2025-04-13 RX ADMIN — INSULIN LISPRO 4 UNITS: 100 INJECTION, SOLUTION INTRAVENOUS; SUBCUTANEOUS at 12:16

## 2025-04-13 RX ADMIN — SODIUM CHLORIDE, PRESERVATIVE FREE 10 ML: 5 INJECTION INTRAVENOUS at 09:20

## 2025-04-13 ASSESSMENT — PAIN DESCRIPTION - ORIENTATION: ORIENTATION: RIGHT

## 2025-04-13 ASSESSMENT — PAIN SCALES - GENERAL
PAINLEVEL_OUTOF10: 0
PAINLEVEL_OUTOF10: 6

## 2025-04-13 ASSESSMENT — PAIN DESCRIPTION - DESCRIPTORS: DESCRIPTORS: ACHING

## 2025-04-13 ASSESSMENT — PAIN DESCRIPTION - PAIN TYPE: TYPE: ACUTE PAIN

## 2025-04-13 ASSESSMENT — PAIN DESCRIPTION - FREQUENCY: FREQUENCY: INTERMITTENT

## 2025-04-13 ASSESSMENT — PAIN DESCRIPTION - LOCATION: LOCATION: SHOULDER;ARM

## 2025-04-13 ASSESSMENT — PAIN DESCRIPTION - ONSET: ONSET: GRADUAL

## 2025-04-13 ASSESSMENT — PAIN - FUNCTIONAL ASSESSMENT: PAIN_FUNCTIONAL_ASSESSMENT: ACTIVITIES ARE NOT PREVENTED

## 2025-04-13 NOTE — CARE COORDINATION
Discharge order noted.       CM spoke with patient.   Patient said he has no one to take him home today for discharge.   Patient verified home address as correct on facesheet, and patient said he has keys to get into his home.   Nurse will need to let CM know when Lyft is needed to transport patient home today for discharge.   No other CM needs for patient at this time.           Sofia Schneider RN Case Manager  [phone]

## 2025-04-13 NOTE — PLAN OF CARE
Problem: Chronic Conditions and Co-morbidities  Goal: Patient's chronic conditions and co-morbidity symptoms are monitored and maintained or improved  4/12/2025 2244 by Mikel Adam RN  Outcome: Progressing  Flowsheets (Taken 4/12/2025 2244)  Care Plan - Patient's Chronic Conditions and Co-Morbidity Symptoms are Monitored and Maintained or Improved: Monitor and assess patient's chronic conditions and comorbid symptoms for stability, deterioration, or improvement  4/12/2025 1046 by Erickson Goncalves, RN  Outcome: Progressing  Flowsheets (Taken 4/11/2025 1334 by Milana uLo, RN)  Care Plan - Patient's Chronic Conditions and Co-Morbidity Symptoms are Monitored and Maintained or Improved: Update acute care plan with appropriate goals if chronic or comorbid symptoms are exacerbated and prevent overall improvement and discharge  4/12/2025 1015 by Maricruz Pedro, RN  Outcome: Progressing     Problem: Pain  Goal: Verbalizes/displays adequate comfort level or baseline comfort level  4/12/2025 2244 by Mikel Adam RN  Outcome: Progressing  Flowsheets (Taken 4/12/2025 2244)  Verbalizes/displays adequate comfort level or baseline comfort level:   Encourage patient to monitor pain and request assistance   Assess pain using appropriate pain scale   Administer analgesics based on type and severity of pain and evaluate response   Implement non-pharmacological measures as appropriate and evaluate response   Consider cultural and social influences on pain and pain management   Notify Licensed Independent Practitioner if interventions unsuccessful or patient reports new pain  4/12/2025 1046 by Erickson Goncalves, RN  Outcome: Progressing  Flowsheets (Taken 4/12/2025 1046)  Verbalizes/displays adequate comfort level or baseline comfort level: Encourage patient to monitor pain and request assistance     Problem: ABCDS Injury Assessment  Goal: Absence of physical injury  4/12/2025 2244 by Mikel Adam, RN  Outcome:

## 2025-04-13 NOTE — PLAN OF CARE
PHYSICAL THERAPY EVALUATION/DISCHARGE    Patient: Simone Lamas (70 y.o. male)  Date: 4/13/2025  Primary Diagnosis: Hyponatremia [E87.1]  ESRD (end stage renal disease) (HCC) [N18.6]  Encounter for hemodialysis for ESRD (HCC) [N18.6, Z99.2]  AV fistula occlusion, initial encounter [T82.257S]  Procedure(s) (LRB):  Fistulogram right/POSS INTERVENTION (N/A) 3 Days Post-Op   Precautions: General Precautions (no BP right UE)  PLOF: independent with mobility without an assistive device, lives alone, several steps to enter home     ASSESSMENT AND RECOMMENDATIONS:  Pt is sitting up and agreeable to PT evaluation. Pt was independent with bed mobility and transfers without an assistive device.  Pt ambulated 200 feet independently with out an assistive device.  Pt was left sitting up in the recliner with needs in reach.    Patient does not require further skilled physical therapy intervention at this level of care.    Further Equipment Recommendations for Discharge: None    AMPAC: AM-[facility] Mobility Raw Score : 24      At this time and based on an AM-PAC score, no further PT is recommended upon discharge.  Recommend patient returns to prior setting with prior services.    This AMPA score should be considered in conjunction with interdisciplinary team recommendations to determine the most appropriate discharge setting. Patient's social support, diagnosis, medical stability, and prior level of function should also be taken into consideration.     SUBJECTIVE:   Patient stated “I'm doing good.”    OBJECTIVE DATA SUMMARY:     Past Medical History:   Diagnosis Date    CHF (congestive heart failure) (HCC)     Diabetes mellitus (HCC)     End stage renal disease (HCC)     Hypertension     TIA (transient ischemic attack)      Past Surgical History:   Procedure Laterality Date    CARDIAC PROCEDURE N/A 7/22/2024    Left heart cath performed by Abhijeet Martinez MD at [facility] CARDIAC CATH LAB    CARDIAC PROCEDURE N/A 7/22/2024    Coronary

## 2025-04-13 NOTE — CARE COORDINATION
D/C order noted for today. Orders reviewed. No needs identified at this time. [facility] ordered to transport patient home today for discharge. CM remains available if needed.           Sofia Schneider, [phone]

## 2025-04-13 NOTE — CARE COORDINATION
Yamilex DEUTSCH let CM know that patient is ready for Lyft.     Lyft ordered:    Og Márquez  3 minutes  [id number]    Lyft information given to Yamilex DEUTSCH, 3 minutes, Zi Márquez.           Sofia Schneider RN Case Manager  [phone]

## 2025-04-13 NOTE — CARE COORDINATION
04/13/25 1429   IMM Letter   IMM Letter given to Patient/Family/Significant other/Guardian/POA/by: Sofia Schneider   IMM Letter date given: 04/13/25   IMM Letter time given: 1416     Patient waived the 4 hour right to appeal the discharge.         Sofia Schneider RN Case Manager  [phone]

## 2025-04-13 NOTE — DISCHARGE SUMMARY
Hospitalist Discharge Summary      Patient: Simone Lamas MRN: [id number]  [facility]: [id number]    YOB: 1955  Age: 70 y.o.  Sex: male    DOA: 4/10/2025 LOS:  LOS: 3 days   Discharge Date:     Admission Diagnoses: Hyponatremia [E87.1]  ESRD (end stage renal disease) (HCC) [N18.6]  Encounter for hemodialysis for ESRD (HCC) [N18.6, Z99.2]  AV fistula occlusion, initial encounter [T82.908O]    Discharge Diagnoses:    AV fistula occlusion status post thrombectomy  ESRD on HD  History of hypertension  Cardiomyopathy with an EF 40-45%, not new    Discharge Condition: Fair    Discharge To: Home    CODE STATUS: Full Code         PHYSICAL EXAM  Visit Vitals  /63   Pulse 72   Temp 98.7 °F (37.1 °C) (Oral)   Resp 20   Ht 1.829 m (6')   Wt 79.8 kg (176 lb)   SpO2 98%   BMI 23.87 kg/m²       General: Alert, cooperative, no acute distress    Lungs:  CTA Bilaterally. No Wheezing/Rhonchi/Rales.  Heart:  Regular rate and Rhythm.  Abdomen: Soft, Non distended, Non tender. + Bowel sounds.  Extremities: No edema/ cyanosis/ clubbing  Neurologic:  AA oriented X 3. Moves all extremities.                                HPI:  Simone Lamas is a 70 y.o.   male who presented to the emergency department this morning after being unable to be fully dialyzed at his outpatient dialysis center due to poorly functioning access.  He is without any other complaints acutely.      Hospital Course:     Patient admitted to the hospital secondary to nonfunctioning AV fistula.  Vascular surgery consulted, patient underwent thrombectomy, fistula working.  Patient underwent hemodialysis per schedule.  Nephrology on board.  During dialysis patient complained of chest pain, dialysis stopped.  Serial cardiac enzymes were noted to be negative.  Echocardiogram reviewed, EF 40-45%, no significant change from previous.  Patient is at baseline is being discharged home is advised to closely follow-up with PCP as outpatient and continue 
No

## 2025-04-13 NOTE — PROGRESS NOTES
4 Eyes Skin Assessment     NAME:  Simone Lamas  YOB: 1955  MEDICAL RECORD NUMBER:  [id number]    The patient is being assessed for  Admission    I agree that at least one RN has performed a thorough Head to Toe Skin Assessment on the patient. ALL assessment sites listed below have been assessed.      Areas assessed by both nurses:    Head, Face, Ears, Shoulders, Back, Chest, Arms, Elbows, Hands, Sacrum. Buttock, Coccyx, Ischium, Legs. Feet and Heels, and Under Medical Devices         Does the Patient have a Wound? No noted wound(s)       Guerrero Prevention initiated by RN: Yes  Wound Care Orders initiated by RN: No    Pressure Injury (Stage 3,4, Unstageable, DTI, NWPT, and Complex wounds) if present, place Wound referral order by RN under : No    New Ostomies, if present place, Ostomy referral order under : No     Nurse 1 eSignature: Electronically signed by Milana Luo RN on 4/10/25 at 7:26 PM EDT    **SHARE this note so that the co-signing nurse can place an eSignature**    Nurse 2 eSignature: Electronically signed by Carolina Mae RN on 4/10/25 at 7:39 PM EDT   
@0914 call received from Nephrologist requesting evaluation of patient possible clotted AVF.    @0921 arrived to ER bed 15, upon evaluation of patient SARINA AVF has +bruit/thrill.  Nephrologist notified and instructed to access patient AVF.    @0933 accessed patient SARINA AVF w/2 15G needles successfully.  Nephrologist notified and telephone order to provide HD for patient.    @0922 patient taken by this writer to Dialysis suite for HD treatment.  
Advance Care Planning   Healthcare Decision Maker:    Primary Decision Maker: ,Julia - Other Relative - [phone]    Today we documented Decision Maker(s) consistent with Legal Next of Kin hierarchy.     Spiritual Health History and Assessment/Progress Note  [facility]    Spiritual/Emotional Needs, Advance Care Planning,  ,  ,      Name: Simone Lamas MRN: [id number]    Age: 70 y.o.     Sex: male   Language: English   Worship: Orthodox   AV fistula occlusion, initial encounter     Date: 4/12/2025            Total Time Calculated: (P) 7 min              Spiritual Assessment began in [address] MEDICAL            Encounter Overview/Reason: Spiritual/Emotional Needs, Advance Care Planning  Service Provided For: (P) Patient    Laura, Belief, Meaning:   Patient has beliefs or practices that help with coping during difficult times  Family/Friends No family/friends present      Importance and Influence:  Patient has spiritual/personal beliefs that influence decisions regarding their health  Family/Friends No family/friends present    Community:  Patient feels well-supported. Support system includes: Extended family  Family/Friends No family/friends present    Assessment and Plan of Care:     Patient Interventions include: Facilitated expression of thoughts and feelings  Family/Friends Interventions include: No family/friends present    Patient Plan of Care: No spiritual needs identified for follow-up  Family/Friends Plan of Care: No family/friends present    Electronically signed by TIERNEY Mena on 4/12/2025 at 1:05 PM        
Cath holding summary:    1345: Verbal report given to Carole Lamas being transferred to Cath Lab for ordered procedure. Report consisted of patient's Situation, Background, Assessment and Recommendations (SBAR). Information from the following report(s) Nurse Handoff Report, Adult Overview, Intake/Output, MAR, Recent Results, Med Rec Status, Cardiac Rhythm Sinus, Pre Procedure Checklist, and Procedure Verification was reviewed with the receiving nurse. Opportunity for questions and clarification was provided.    1531: Verbal report received from Destini Lamas being received from Cath Lab for routine post-op. Report consisted of patient's Situation, Background, Assessment and Recommendations (SBAR). Information from the following report(s) Nurse Handoff Report, Surgery Report, Intake/Output, MAR, Recent Results, Med Rec Status, Cardiac Rhythm Sinus , and Event Log was reviewed with the receiving nurse. Pt A&O x4, no c/o pain. Opportunity for questions and clarification provided.  Procedure: Fistulogram  Intervention: N/A  If yes, antiplatelet administered: N/A  Site: Right, Arm  Pain: 0    1605: Verbal report given to Abi Lamas  being transferred to ED for routine progression of patient care. Report consisted of patient's Situation, Background, Assessment and Recommendations (SBAR). Information from the following report(s) Nurse Handoff Report, Surgery Report, Intake/Output, MAR, Recent Results, Med Rec Status, and Cardiac Rhythm Sinus Rhythm  was reviewed with the receiving nurse. Opportunity for questions and clarification was provided. Patient transported with: Tech    
Consult Note    Patient: Simone Lamas               Sex: male          DOA: 4/10/2025         YOB: 1955      Age:  70 y.o.        LOS:  LOS: 0 days              HPI:     Simone Lamas is a 70 y.o. male who has been seen on consultation at the request of the emergency room physician for the complication of AV fistula and need for dialysis.,  He has ESRD secondary to hypertension with underlying systolic dysfunction and coronary artery disease.  Patient gets dialysis under my care every Tuesday Thursday Saturday.  Last week he missed 1 dialysis treatment due to his noncompliance..  Today he came for dialysis and the dialysis unit nurses had problem with cannulation of his AV fistula that take 2-3 times every time they cannulated clots were coming out.  I was notified and they advised that the nurses to send the patient to the emergency room..  Getting permission I notified the ER physician and advised him to get in touch with the vascular surgeon for possible fistulogram and intervention.  Subsequently came and seen the patient and found that the fistula is open by the clinical exam with a good thrill and bruit.  Based on the finding we asked the dialysis nurse to come down and cannulate the possible which they did successfully and taken the patient off stay for dialysis.  After half an hour again the same problem happened with a high venous and arterial pressure dialysis cath clotted and the dialysis was postponed and the vascular surgeon was notified and decided to take the patient to the operating room for fistulogram if he has a thrombectomy and other intervention.  Patient was feeling good without any shortness of breath or any chest pain or any palpitation , still he makes some urine    Past Medical History:   Diagnosis Date    CHF (congestive heart failure) (HCC)     Diabetes mellitus (HCC)     End stage renal disease (HCC)     Hypertension     TIA (transient ischemic attack)        Past 
HD Care plan  Time: 3.5 hrs  Dialysate: 3K+  2.5Ca++  [place]  Net UF: 2.5L  Access: Aseptic care for RUE AVF  Hemodynamic stability: Maintain BP WNL     Pre Dialysis:  Patient received from YOON Pulliam RN. Patient arrived on a bed, A+O X 4, on RA,  no s/s of acute distress noted. RUE AVF assessed, no abnormalities noted, bruit and thrill strong. AVF accessed using 15G needles by YOSHI Archibald without any difficulty. Good flow achieved from both needles.    Intra Dialysis:  Time out / safety process performed per policy.   Tx initiated at 0955.     Pt offered assistance with repositioning every 2 hours/prn    Vascular access visible and line connections remained intact throughout entire duration of treatment.   Vital signs checked every 15 mins.    Shortly after initiation of treatment patient with high venous pressures of 280 noted, BFR lowered to 350.    @1015- High venous pressures noted, BFR lowered to 300,     @1018 new venous needle placed, no improvement in venous pressure.  1028- BFR lowered to  250 d/t continued high venous pressure >280    @1030 venous chamber clotted, unable to return blood, Dr. Bray notified. Patient will be seen by vascular. Total run time 35 minutes.       Post Dialysis:  Tx ended at 1030,   Tolerated well, 0 L  removed. De-accessed per protocol.    Clot time 5 minutes for arterial, and 5 minutes for venous.   Dry dressings applied. Bruit/Thrill present above and below dressings. Patient sent to vascular from dialysis.       
[facility]  Progress Note    Patient: Simone Lamas Age: 70 y.o. : 1955 MR#: [id number] SSN: [id number]  Date/Time: 2025     Subjective:     Patient seen evaluated, lying in bed, no acute distress.    70-year-old male presented to the emergency department since he was unable to be fully dialyzed due to poor functioning access.  Patient has a known history of ESRD on HD.  Vascular surgery consulted, patient admitted to the hospital further evaluation        Assessment/Plan:     AV fistula occlusion status post thrombectomy per vascular surgery. Patient tolerated procedure well.  ESRD on HD-continue hemodialysis per schedule.  Per nephrology patient can be discharged after hemodialysis in AM.  History of hypertension-continue Coreg, add Norvasc  DVT prophylaxis-heparin  Full code            Anticipated Discharge and Disposition:    -Home    Miguel Angel Foley MD  25      Case discussed with:  [x]Patient  []Family  []Nursing  []Case Management  DVT Prophylaxis:  []Lovenox  [x]Hep SQ  []SCDs  []Coumadin   []On Heparin gtt    Objective:   VS:   Vitals:    25 1244   BP:    Pulse:    Resp: 18   Temp:    SpO2:         Intake/Output Summary (Last 24 hours) at 2025 1539  Last data filed at 2025 1406  Gross per 24 hour   Intake 980 ml   Output 3000 ml   Net -2020 ml       General:  Alert, cooperative, no acute distress    Pulmonary:  CTA Bilaterally. No Wheezing/Rhonchi/Rales.  Cardiovascular: Regular rate and Rhythm.  GI:  Soft, Non distended, Non tender. + Bowel sounds.  Extremities:  No edema, cyanosis, clubbing. No calf tenderness.   Neurologic: Alert and oriented X 3. No acute neuro deficits.  Additional:    Medications:   Current Facility-Administered Medications   Medication Dose Route Frequency    sodium chloride 0.9 % bolus 100 mL  100 mL IntraVENous PRN    sodium chloride 0.9 % bolus 100 mL  100 mL IntraVENous PRN    atorvastatin 
Progress Note    Simone Lamas  70 y.o.      Admit Date: 4/10/2025  Patient Active Problem List   Diagnosis    Elevated serum creatinine    Systolic CHF, chronic (HCC)    Chronic renal insufficiency    Hypertension    Stage 3 chronic renal impairment associated with type 2 diabetes mellitus    Hepatitis C infection    Cocaine use    Cardiac LV ejection fraction 30-35%    Homelessness    Hypertriglyceridemia    Elevated HDL    Decreased GFR    NSTEMI (non-ST elevated myocardial infarction) (HCC)    History of heroin abuse    Elevated alkaline phosphatase level    History of cocaine abuse    TIA (transient ischemic attack)    Type 2 diabetes mellitus with insulin therapy (HCC)    COPD, mild (HCC)    CHF (congestive heart failure) (HCC)    Cardiomyopathy    Insulin dependent type 2 diabetes mellitus, controlled (HCC)    CVA (cerebral vascular accident) (HCC)    ESRD (end stage renal disease) (HCC)    Substance abuse    Anemia    End stage renal disease (HCC)    Pericardial effusion    Hyperglycemia    CKD (chronic kidney disease) stage 5, GFR less than 15 ml/min (HCC)    CKD (chronic kidney disease) stage 4, GFR 15-29 ml/min (HCC)    ESRD (end stage renal disease) on dialysis (HCC)    AVF (arteriovenous fistula)    Volume overload    Chest pain    Diabetes due to undrl condition w oth diabetic kidney comp (HCC)    Unstable angina (HCC)    AV fistula occlusion, initial encounter           Subjective:     Patient feels tired and complaining of swelling of his right arm.  Patient had chest pain during the last half an hour after dialysis yesterday and the dialysis catheter..  Further workup done.  Troponin which is within normal limit Limited echocardiogram was done which shows ejection fraction to his baseline of 40 to 45%.  No more chest pain or shortness of breath but pain in mild swelling of his right arm status post thrombectomy and stent placement on right arm AV fistula      A comprehensive review of systems was 
Progress Note    Simone Lamas  70 y.o.      Admit Date: 4/10/2025  Patient Active Problem List   Diagnosis    Elevated serum creatinine    Systolic CHF, chronic (HCC)    Chronic renal insufficiency    Hypertension    Stage 3 chronic renal impairment associated with type 2 diabetes mellitus    Hepatitis C infection    Cocaine use    Cardiac LV ejection fraction 30-35%    Homelessness    Hypertriglyceridemia    Elevated HDL    Decreased GFR    NSTEMI (non-ST elevated myocardial infarction) (HCC)    History of heroin abuse    Elevated alkaline phosphatase level    History of cocaine abuse    TIA (transient ischemic attack)    Type 2 diabetes mellitus with insulin therapy (HCC)    COPD, mild (HCC)    CHF (congestive heart failure) (HCC)    Cardiomyopathy    Insulin dependent type 2 diabetes mellitus, controlled (HCC)    CVA (cerebral vascular accident) (HCC)    ESRD (end stage renal disease) (HCC)    Substance abuse    Anemia    End stage renal disease (HCC)    Pericardial effusion    Hyperglycemia    CKD (chronic kidney disease) stage 5, GFR less than 15 ml/min (HCC)    CKD (chronic kidney disease) stage 4, GFR 15-29 ml/min (HCC)    ESRD (end stage renal disease) on dialysis ([facility])    AVF (arteriovenous fistula)    Volume overload    Acute chest pain    Diabetes due to undrl condition w oth diabetic kidney comp (HCC)    Unstable angina (HCC)    AV fistula occlusion, initial encounter           Subjective:     Patient feels good no shortness of breath.  Starting dialysis.  Seen during dialysis.  Dialysis nurse cannulated arterial port without any problem venous port little difficulty.   Using heparin.  Since starting heparin and the blood flow 400 mL venous pressure is 200 which is quite appropriate      A comprehensive review of systems was negative except for that written in the History of Present Illness.    Objective:     /81   Pulse 63   Temp 98.4 °F (36.9 °C) (Oral)   Resp 20   Ht 1.849 m (6' 0.8\")   Wt 
Progress Note    Simone Lamas  70 y.o.      Admit Date: 4/10/2025  Patient Active Problem List   Diagnosis    Elevated serum creatinine    Systolic CHF, chronic (HCC)    Chronic renal insufficiency    Hypertension    Stage 3 chronic renal impairment associated with type 2 diabetes mellitus    Hepatitis C infection    Cocaine use    Cardiac LV ejection fraction 30-35%    Homelessness    Hypertriglyceridemia    Elevated HDL    Decreased GFR    NSTEMI (non-ST elevated myocardial infarction) (HCC)    History of heroin abuse    Elevated alkaline phosphatase level    History of cocaine abuse    TIA (transient ischemic attack)    Type 2 diabetes mellitus with insulin therapy (HCC)    COPD, mild (HCC)    CHF (congestive heart failure) (HCC)    Cardiomyopathy    Insulin dependent type 2 diabetes mellitus, controlled (HCC)    CVA (cerebral vascular accident) (HCC)    ESRD (end stage renal disease) (HCC)    Substance abuse    Anemia    End stage renal disease (HCC)    Pericardial effusion    Hyperglycemia    CKD (chronic kidney disease) stage 5, GFR less than 15 ml/min (HCC)    CKD (chronic kidney disease) stage 4, GFR 15-29 ml/min (HCC)    ESRD (end stage renal disease) on dialysis ([facility])    AVF (arteriovenous fistula)    Volume overload    Acute chest pain    Diabetes due to undrl condition w oth diabetic kidney comp (HCC)    Unstable angina (HCC)    AV fistula occlusion, initial encounter           Subjective:     Patient feels okay.  No shortness of breath.  Underwent thrombectomy of his AV fistula with stent placement.  Vascular surgery's note reviewed.  So far tolerating blood flow 350 through the thrombectomized AV fistula but having high venous pressure.  Not using any heparin today..   He gets dialysis every Tuesday Thursday Saturday.  He did not get any new dialysis yesterday.  Has underlying coronary artery disease with systolic dysfunction      A comprehensive review of systems was negative except for that written 
Received pre HD report from  , GIOVANA Luo RN.        Pt arrived with newly revised AVF. Aseptic technique performed. Assessed positive thrill and buit. Accessed Successfully cannulated access, AVF R upper arm w/15G needle w/o difficulty. Pt placed on machine, venous pressure elevated reduced BFR from 400 with 270mmHg to 350 with 280mmHg @ 1136.  Pt in bed, A+O x4, no s/s of distress noted.  RA.    Time out/ safety process performed per policy.     Tx initiated at 0856.      AVF flowing with ease.  For hemodynamic stability UF goal 2500 ml.        Offered assistance with repositioning every 2 hours.  Vascular access visible at all times throughout entire duration of treatment and line connections remain intact throughout entire duration of treatment.     Tx completed at 1156, tolerated well 2L removed.  De-accessed per protocol.    Clot time 5 minutes for arterial, and 5 minutes for venous.      Unit nurse , GIOVANA Luo RN given report.     Assessment validated by BRAYDON Norwood RN  
Received pre HD report from NOE Scanlon RN.      0924: Received Pt in bed, A+O x4, no s/s of distress noted.  On room air. Right arm avf , with hematoma noted, from procedure yesterday. Patient complaint of tolerable pain noted.     Accessed AVF Right upper arm w/15G needle w/o difficulty.    Tx initiated at 0940.      AVF/ AVG flowing with ease.  For hemodynamic stability UF goal 3000 ml in 3.5 hours.  of 90 with BFR of 200ml/min noted at start of treatment then increased gradually to 400ml/min with  noted 210-230.    Offered assistance with repositioning every 2 hours.  Vascular access visible at all times throughout entire duration of treatment and line connections remain intact throughout entire duration of treatment.   1228: Patient complain of chest pain noted, no shortness of breathing, [place] of 68bpm noted. UF paused and BFR lowered to 300 ml/min then informed Dr. Bray thru phone and ordered to stop Hd and inform Hospitalist.     1235: HD terminated as ordered. Relayed to primary nurse chest pain noted and to inform Hospitalist.     Tx completed terminated at 1235 as Dr. Bray ordered, tolerated well 2.5L removed.  De-accessed per protocol.    Clot time 5 minutes for arterial, and 5 minutes for venous. Patient verbalized chest pain got better.     Unit nurse NOE Lee RN given report. No s/s of distress.  
Thrombectomy of AVF with stent placement done with moderate difficulty and no obvious complication.     Patient to be addmited for dialysis to confirm adequacy.  If dialysis not successful will need open trombectomy/tunnled line.   
Vascular department called for patient to perform AVF duplex.  Patient is currently in dialysis at 10 am with 2 hours and 14 minutes left. Duplex exam will need an additional 2 hours to allow AVF to rest and remove bandages.    
Was looking forward to discharging patient today postdialysis however patient complained of chest pain during dialysis and dialysis was stopped short.  Will continue to observe today and anticipate discharge in a.m.  
Range    WBC 11.4 4.6 - 13.2 K/uL    RBC 3.36 (L) 4.35 - 5.65 M/uL    Hemoglobin 10.7 (L) 13.0 - 16.0 g/dL    Hematocrit 33.1 (L) 36.0 - 48.0 %    MCV 98.5 78.0 - 100.0 FL    MCH 31.8 24.0 - 34.0 PG    MCHC 32.3 31.0 - 37.0 g/dL    RDW 13.1 11.6 - 14.5 %    Platelets 188 135 - 420 K/uL    MPV 11.1 9.2 - 11.8 FL    Nucleated RBCs 0.0 0  WBC    nRBC 0.00 0.00 - 0.01 K/uL    Neutrophils % 74.8 (H) 40.0 - 73.0 %    Lymphocytes % 13.9 (L) 21.0 - 52.0 %    Monocytes % 9.2 3.0 - 10.0 %    Eosinophils % 1.2 0.0 - 5.0 %    Basophils % 0.5 0.0 - 2.0 %    Immature Granulocytes % 0.4 0.0 - 0.5 %    Neutrophils Absolute 8.55 (H) 1.80 - 8.00 K/UL    Lymphocytes Absolute 1.59 0.90 - 3.60 K/UL    Monocytes Absolute 1.05 0.05 - 1.20 K/UL    Eosinophils Absolute 0.14 0.00 - 0.40 K/UL    Basophils Absolute 0.06 0.00 - 0.10 K/UL    Immature Granulocytes Absolute 0.05 (H) 0.00 - 0.04 K/UL    Differential Type AUTOMATED     Phosphorus    Collection Time: 04/11/25  2:39 AM   Result Value Ref Range    Phosphorus 2.9 2.5 - 4.9 MG/DL   PTH, Intact    Collection Time: 04/11/25  2:39 AM   Result Value Ref Range    Calcium 9.7 8.5 - 10.1 MG/DL    Pth Intact PENDING pg/mL   Hemoglobin A1c    Collection Time: 04/11/25  2:39 AM   Result Value Ref Range    Hemoglobin A1C 7.4 (H) 4.2 - 5.6 %    Estimated Avg Glucose 166 mg/dL   Comprehensive Metabolic Panel    Collection Time: 04/11/25  2:39 AM   Result Value Ref Range    Sodium 138 136 - 145 mmol/L    Potassium 3.6 3.5 - 5.5 mmol/L    Chloride 108 100 - 111 mmol/L    CO2 21 21 - 32 mmol/L    Anion Gap 9 3.0 - 18 mmol/L    Glucose 156 (H) 74 - 99 mg/dL    BUN 60 (H) 7.0 - 18 MG/DL    Creatinine 6.61 (H) 0.6 - 1.3 MG/DL    BUN/Creatinine Ratio 9 (L) 12 - 20      Est, Glom Filt Rate 8 (L) >60 ml/min/1.73m2    Calcium 9.5 8.5 - 10.1 MG/DL    Total Bilirubin 0.9 0.2 - 1.0 MG/DL    ALT 17 16 - 61 U/L    AST 17 10 - 38 U/L    Alk Phosphatase 68 45 - 117 U/L    Total Protein 6.3 (L) 6.4 - 8.2 g/dL 
no

## 2025-04-15 ENCOUNTER — APPOINTMENT (OUTPATIENT)
Facility: HOSPITAL | Age: 70
DRG: 182 | End: 2025-04-15
Attending: EMERGENCY MEDICINE
Payer: COMMERCIAL

## 2025-04-15 ENCOUNTER — HOSPITAL ENCOUNTER (INPATIENT)
Facility: HOSPITAL | Age: 70
LOS: 4 days | Discharge: HOME | DRG: 182 | End: 2025-04-19
Attending: EMERGENCY MEDICINE | Admitting: STUDENT IN AN ORGANIZED HEALTH CARE EDUCATION/TRAINING PROGRAM
Payer: COMMERCIAL

## 2025-04-15 DIAGNOSIS — I77.0 A-V FISTULA: ICD-10-CM

## 2025-04-15 DIAGNOSIS — T82.9XXD COMPLICATION OF ARTERIOVENOUS DIALYSIS FISTULA, SUBSEQUENT ENCOUNTER: ICD-10-CM

## 2025-04-15 DIAGNOSIS — N18.6 ESRD (END STAGE RENAL DISEASE) (HCC): ICD-10-CM

## 2025-04-15 DIAGNOSIS — T82.898A ARTERIOVENOUS FISTULA OCCLUSION, INITIAL ENCOUNTER: ICD-10-CM

## 2025-04-15 DIAGNOSIS — R60.0 EDEMA OF RIGHT UPPER EXTREMITY: ICD-10-CM

## 2025-04-15 DIAGNOSIS — R60.9 EDEMA: Primary | ICD-10-CM

## 2025-04-15 DIAGNOSIS — T82.9XXA COMPLICATION OF ARTERIOVENOUS DIALYSIS FISTULA, INITIAL ENCOUNTER: ICD-10-CM

## 2025-04-15 LAB
ALBUMIN SERPL-MCNC: 2.8 G/DL (ref 3.4–5)
ALBUMIN/GLOB SERPL: 0.7 (ref 0.8–1.7)
ALP SERPL-CCNC: 58 U/L (ref 45–117)
ALT SERPL-CCNC: 18 U/L (ref 16–61)
ANION GAP SERPL CALC-SCNC: 10 MMOL/L (ref 3–18)
AST SERPL-CCNC: 13 U/L (ref 10–38)
BASOPHILS # BLD: 0.06 K/UL (ref 0–0.1)
BASOPHILS NFR BLD: 0.9 % (ref 0–2)
BILIRUB SERPL-MCNC: 0.4 MG/DL (ref 0.2–1)
BUN SERPL-MCNC: 73 MG/DL (ref 7–18)
BUN/CREAT SERPL: 7 (ref 12–20)
CALCIUM SERPL-MCNC: 8.5 MG/DL (ref 8.5–10.1)
CHLORIDE SERPL-SCNC: 104 MMOL/L (ref 100–111)
CO2 SERPL-SCNC: 24 MMOL/L (ref 21–32)
CREAT SERPL-MCNC: 9.85 MG/DL (ref 0.6–1.3)
D DIMER PPP FEU-MCNC: 9.28 UG/ML(FEU)
DIFFERENTIAL METHOD BLD: ABNORMAL
EOSINOPHIL # BLD: 0.5 K/UL (ref 0–0.4)
EOSINOPHIL NFR BLD: 7.2 % (ref 0–5)
ERYTHROCYTE [DISTWIDTH] IN BLOOD BY AUTOMATED COUNT: 13.1 % (ref 11.6–14.5)
GLOBULIN SER CALC-MCNC: 3.9 G/DL (ref 2–4)
GLUCOSE BLD STRIP.AUTO-MCNC: 184 MG/DL (ref 70–110)
GLUCOSE BLD STRIP.AUTO-MCNC: 92 MG/DL (ref 70–110)
GLUCOSE SERPL-MCNC: 123 MG/DL (ref 74–99)
HCT VFR BLD AUTO: 28.7 % (ref 36–48)
HGB BLD-MCNC: 9.5 G/DL (ref 13–16)
IMM GRANULOCYTES # BLD AUTO: 0.05 K/UL (ref 0–0.04)
IMM GRANULOCYTES NFR BLD AUTO: 0.7 % (ref 0–0.5)
LYMPHOCYTES # BLD: 1.42 K/UL (ref 0.9–3.6)
LYMPHOCYTES NFR BLD: 20.5 % (ref 21–52)
MCH RBC QN AUTO: 31.9 PG (ref 24–34)
MCHC RBC AUTO-ENTMCNC: 33.1 G/DL (ref 31–37)
MCV RBC AUTO: 96.3 FL (ref 78–100)
MONOCYTES # BLD: 0.73 K/UL (ref 0.05–1.2)
MONOCYTES NFR BLD: 10.6 % (ref 3–10)
NEUTS SEG # BLD: 4.15 K/UL (ref 1.8–8)
NEUTS SEG NFR BLD: 60.1 % (ref 40–73)
NRBC # BLD: 0 K/UL (ref 0–0.01)
NRBC BLD-RTO: 0 PER 100 WBC
PLATELET # BLD AUTO: 218 K/UL (ref 135–420)
PMV BLD AUTO: 11 FL (ref 9.2–11.8)
POTASSIUM SERPL-SCNC: 4.1 MMOL/L (ref 3.5–5.5)
PROT SERPL-MCNC: 6.7 G/DL (ref 6.4–8.2)
RBC # BLD AUTO: 2.98 M/UL (ref 4.35–5.65)
SODIUM SERPL-SCNC: 138 MMOL/L (ref 136–145)
WBC # BLD AUTO: 6.9 K/UL (ref 4.6–13.2)

## 2025-04-15 PROCEDURE — 1100000000 HC RM PRIVATE

## 2025-04-15 PROCEDURE — 80053 COMPREHEN METABOLIC PANEL: CPT

## 2025-04-15 PROCEDURE — 93971 EXTREMITY STUDY: CPT

## 2025-04-15 PROCEDURE — 6370000000 HC RX 637 (ALT 250 FOR IP): Performed by: EMERGENCY MEDICINE

## 2025-04-15 PROCEDURE — 99285 EMERGENCY DEPT VISIT HI MDM: CPT

## 2025-04-15 PROCEDURE — 93990 DOPPLER FLOW TESTING: CPT

## 2025-04-15 PROCEDURE — 82962 GLUCOSE BLOOD TEST: CPT

## 2025-04-15 PROCEDURE — 99223 1ST HOSP IP/OBS HIGH 75: CPT | Performed by: STUDENT IN AN ORGANIZED HEALTH CARE EDUCATION/TRAINING PROGRAM

## 2025-04-15 PROCEDURE — 6370000000 HC RX 637 (ALT 250 FOR IP): Performed by: STUDENT IN AN ORGANIZED HEALTH CARE EDUCATION/TRAINING PROGRAM

## 2025-04-15 PROCEDURE — 2500000003 HC RX 250 WO HCPCS: Performed by: STUDENT IN AN ORGANIZED HEALTH CARE EDUCATION/TRAINING PROGRAM

## 2025-04-15 PROCEDURE — 85025 COMPLETE CBC W/AUTO DIFF WBC: CPT

## 2025-04-15 PROCEDURE — 85379 FIBRIN DEGRADATION QUANT: CPT

## 2025-04-15 RX ORDER — POLYETHYLENE GLYCOL 3350 17 G/17G
17 POWDER, FOR SOLUTION ORAL DAILY PRN
Status: DISCONTINUED | OUTPATIENT
Start: 2025-04-15 | End: 2025-04-20 | Stop reason: HOSPADM

## 2025-04-15 RX ORDER — INSULIN GLARGINE 100 [IU]/ML
15 INJECTION, SOLUTION SUBCUTANEOUS NIGHTLY
Status: DISCONTINUED | OUTPATIENT
Start: 2025-04-15 | End: 2025-04-16

## 2025-04-15 RX ORDER — SODIUM CHLORIDE 0.9 % (FLUSH) 0.9 %
5-40 SYRINGE (ML) INJECTION EVERY 12 HOURS SCHEDULED
Status: DISCONTINUED | OUTPATIENT
Start: 2025-04-15 | End: 2025-04-20 | Stop reason: HOSPADM

## 2025-04-15 RX ORDER — ASPIRIN 81 MG/1
81 TABLET, CHEWABLE ORAL DAILY
Status: DISCONTINUED | OUTPATIENT
Start: 2025-04-15 | End: 2025-04-20 | Stop reason: HOSPADM

## 2025-04-15 RX ORDER — ONDANSETRON 2 MG/ML
4 INJECTION INTRAMUSCULAR; INTRAVENOUS EVERY 6 HOURS PRN
Status: DISCONTINUED | OUTPATIENT
Start: 2025-04-15 | End: 2025-04-20 | Stop reason: HOSPADM

## 2025-04-15 RX ORDER — SODIUM CHLORIDE 9 MG/ML
INJECTION, SOLUTION INTRAVENOUS PRN
Status: DISCONTINUED | OUTPATIENT
Start: 2025-04-15 | End: 2025-04-20 | Stop reason: HOSPADM

## 2025-04-15 RX ORDER — TAMSULOSIN HYDROCHLORIDE 0.4 MG/1
0.4 CAPSULE ORAL DAILY
Status: DISCONTINUED | OUTPATIENT
Start: 2025-04-15 | End: 2025-04-20 | Stop reason: HOSPADM

## 2025-04-15 RX ORDER — OXYCODONE HYDROCHLORIDE 5 MG/1
5 TABLET ORAL
Refills: 0 | Status: COMPLETED | OUTPATIENT
Start: 2025-04-15 | End: 2025-04-15

## 2025-04-15 RX ORDER — ACETAMINOPHEN 650 MG/1
650 SUPPOSITORY RECTAL EVERY 6 HOURS PRN
Status: DISCONTINUED | OUTPATIENT
Start: 2025-04-15 | End: 2025-04-20 | Stop reason: HOSPADM

## 2025-04-15 RX ORDER — CARVEDILOL 12.5 MG/1
12.5 TABLET ORAL 2 TIMES DAILY WITH MEALS
Status: DISCONTINUED | OUTPATIENT
Start: 2025-04-15 | End: 2025-04-17

## 2025-04-15 RX ORDER — FUROSEMIDE 80 MG/1
80 TABLET ORAL WEEKLY
COMMUNITY

## 2025-04-15 RX ORDER — ACETAMINOPHEN 325 MG/1
650 TABLET ORAL EVERY 6 HOURS PRN
Status: DISCONTINUED | OUTPATIENT
Start: 2025-04-15 | End: 2025-04-20 | Stop reason: HOSPADM

## 2025-04-15 RX ORDER — ATORVASTATIN CALCIUM 40 MG/1
40 TABLET, FILM COATED ORAL NIGHTLY
Status: DISCONTINUED | OUTPATIENT
Start: 2025-04-15 | End: 2025-04-20 | Stop reason: HOSPADM

## 2025-04-15 RX ORDER — LOSARTAN POTASSIUM 50 MG/1
100 TABLET ORAL DAILY
Status: DISCONTINUED | OUTPATIENT
Start: 2025-04-15 | End: 2025-04-17

## 2025-04-15 RX ORDER — SODIUM CHLORIDE 0.9 % (FLUSH) 0.9 %
5-40 SYRINGE (ML) INJECTION PRN
Status: DISCONTINUED | OUTPATIENT
Start: 2025-04-15 | End: 2025-04-20 | Stop reason: HOSPADM

## 2025-04-15 RX ORDER — OXYCODONE HYDROCHLORIDE 5 MG/1
5 TABLET ORAL EVERY 4 HOURS PRN
Refills: 0 | Status: DISCONTINUED | OUTPATIENT
Start: 2025-04-15 | End: 2025-04-20 | Stop reason: HOSPADM

## 2025-04-15 RX ORDER — FUROSEMIDE 40 MG/1
80 TABLET ORAL WEEKLY
Status: DISCONTINUED | OUTPATIENT
Start: 2025-04-20 | End: 2025-04-20 | Stop reason: HOSPADM

## 2025-04-15 RX ORDER — ONDANSETRON 4 MG/1
4 TABLET, ORALLY DISINTEGRATING ORAL EVERY 8 HOURS PRN
Status: DISCONTINUED | OUTPATIENT
Start: 2025-04-15 | End: 2025-04-20 | Stop reason: HOSPADM

## 2025-04-15 RX ORDER — LIDOCAINE 4 G/G
1 PATCH TOPICAL DAILY
Status: DISCONTINUED | OUTPATIENT
Start: 2025-04-15 | End: 2025-04-20 | Stop reason: HOSPADM

## 2025-04-15 RX ORDER — DEXTROSE MONOHYDRATE 100 MG/ML
INJECTION, SOLUTION INTRAVENOUS CONTINUOUS PRN
Status: DISCONTINUED | OUTPATIENT
Start: 2025-04-15 | End: 2025-04-20 | Stop reason: HOSPADM

## 2025-04-15 RX ORDER — INSULIN LISPRO 100 [IU]/ML
0-4 INJECTION, SOLUTION INTRAVENOUS; SUBCUTANEOUS
Status: DISCONTINUED | OUTPATIENT
Start: 2025-04-15 | End: 2025-04-20 | Stop reason: HOSPADM

## 2025-04-15 RX ADMIN — OXYCODONE HYDROCHLORIDE 5 MG: 5 TABLET ORAL at 20:38

## 2025-04-15 RX ADMIN — ATORVASTATIN CALCIUM 40 MG: 40 TABLET, FILM COATED ORAL at 20:38

## 2025-04-15 RX ADMIN — SODIUM CHLORIDE, PRESERVATIVE FREE 10 ML: 5 INJECTION INTRAVENOUS at 21:30

## 2025-04-15 RX ADMIN — CARVEDILOL 12.5 MG: 12.5 TABLET, FILM COATED ORAL at 16:36

## 2025-04-15 RX ADMIN — INSULIN LISPRO 1 UNITS: 100 INJECTION, SOLUTION INTRAVENOUS; SUBCUTANEOUS at 21:30

## 2025-04-15 RX ADMIN — OXYCODONE HYDROCHLORIDE 5 MG: 5 TABLET ORAL at 05:48

## 2025-04-15 RX ADMIN — TAMSULOSIN HYDROCHLORIDE 0.4 MG: 0.4 CAPSULE ORAL at 14:05

## 2025-04-15 ASSESSMENT — PAIN DESCRIPTION - LOCATION: LOCATION: SHOULDER

## 2025-04-15 ASSESSMENT — PAIN SCALES - GENERAL
PAINLEVEL_OUTOF10: 0
PAINLEVEL_OUTOF10: 7
PAINLEVEL_OUTOF10: 0
PAINLEVEL_OUTOF10: 4

## 2025-04-15 ASSESSMENT — PAIN DESCRIPTION - ONSET: ONSET: ON-GOING

## 2025-04-15 ASSESSMENT — PAIN - FUNCTIONAL ASSESSMENT: PAIN_FUNCTIONAL_ASSESSMENT: ACTIVITIES ARE NOT PREVENTED

## 2025-04-15 ASSESSMENT — PAIN DESCRIPTION - PAIN TYPE: TYPE: ACUTE PAIN

## 2025-04-15 ASSESSMENT — PAIN DESCRIPTION - FREQUENCY: FREQUENCY: INTERMITTENT

## 2025-04-15 ASSESSMENT — PAIN DESCRIPTION - DESCRIPTORS: DESCRIPTORS: THROBBING

## 2025-04-15 ASSESSMENT — PAIN DESCRIPTION - ORIENTATION: ORIENTATION: RIGHT

## 2025-04-15 NOTE — CONSULTS
Patient Name: Simone Lamas  ID Number: [id number]  Date of Examination: April 15th 2025    HPI:    The patient is a 70 year old male who presents with dialysis access issues.  The patient had a right arm brachiocephalic AV fistula placed in January of 2023.  Last week he underwent percutaneous thrombectomy and angioplasty of the AV fistula with placement of a 10 mm stent.  Attempts at dialysis after stent placement were only partially successful due to pain int the chest and swelling in the right arm.  This was three days ago.  Yesterday, he came to the ER with swelling in the right arm.     PMH:  Renal failure about 3 years  Hypertension about 5 years  Diabetes about 12 years  Hyperlipidemia about 5 years  COPD about 3 years  Transient ischemic attack about 10 years  Congestive heart failure for about 7 years    PSH:    Placement of a right trans-jugular tunneled dialysis catheter on 08/17/2022 by Dr. Jesus  Exchange of right trans-jugular tunneled dialysis catheter on 10/07/2022 by dr. Martinez  Creation of a right arm brachiocephalic AV fistula on 01/25/2023 by Dr. Sandoval  Percutaneous thrombectomy of the AV graft on April 11th 2025 with a 10 mm elf-expanding stent placed by Dr. Jesus.    Rx:  Aspirin 81 mg po QD  Lipitor 40 mg po QD HS  Coreg 12.5 mg po BID  Losartan 100 mg po QD  Renal caps 1 mg po QD  Tamsulosin 0.4 mg po QD  Insulin Lantus 25 unit SQ QD HS    Allergies:  Patient was told not to take Tylenol because of his kidney disease, but has never has a reaction to it.    Family Medical History:  Diabetes in his mother and brother    Social History:  First drink at age 12.  Patient drank about 6 pack/day in his teens.  This increased to one pint/day of hard liquor/day in his twenties.  This kept increasing until he stopped sometimes in this 50's.  It had increased to more than 2 pints/day by the time he stopped drinking in his fifties. Since then, he's become a rare social drinker.  First cigarette age

## 2025-04-15 NOTE — CARE COORDINATION
04/15/25 1151   Service Assessment   Patient Orientation Alert and Oriented   Cognition Alert   History Provided By Patient   Primary Caregiver Self   Accompanied By/Relationship no one at bedside   Support Systems Family Members   Patient's Healthcare Decision Maker is: Patient Declined (Legal Next of Kin Remains as Decision Maker)   PCP Verified by CM Yes   Last Visit to PCP Within last 6 months   Prior Functional Level Independent in ADLs/IADLs   Current Functional Level Independent in ADLs/IADLs   Can patient return to prior living arrangement Yes   Ability to make needs known: Good   Family able to assist with home care needs: Yes   Would you like for me to discuss the discharge plan with any other family members/significant others, and if so, who? No   Financial Resources Medicare;Medicaid   Community Resources None   CM/SW Referral Other (see comment)  (not applicable)   Social/Functional History   Lives With Alone   Type of Home House   Home Layout One level   Home Access Stairs to enter with rails   Entrance Stairs - Number of Steps 4   Entrance Stairs - Rails Both   Bathroom Shower/Tub Tub/Shower unit   Bathroom Toilet Standard   Bathroom Equipment Grab bars in shower   Bathroom Accessibility Accessible   Home Equipment None   Receives Help From Family   Prior Level of Assist for ADLs Independent   Prior Level of Assist for Homemaking Independent   Homemaking Responsibilities Yes   Ambulation Assistance Independent   Prior Level of Assist for Transfers Independent   Active  No   Patient's  Info Public Transportation and Medicaid   Mode of Transportation Walk   Education   (not applicable)   Occupation On disability   Type of Occupation   (not applicable)   Discharge Planning   Type of Residence House   Living Arrangements Alone   Current Services Prior To Admission None   Potential Assistance Needed N/A   DME Ordered? No   Potential Assistance Purchasing Medications No   Type of Home Care

## 2025-04-15 NOTE — ED NOTES
EMERGENCY DEPARTMENT HISTORY AND PHYSICAL EXAM      Date: 4/15/2025  Patient Name: Simone Lamas      History of Presenting Illness     Chief Complaint   Patient presents with    Vascular Access Problem       Location/Duration/Severity/Modifying factors   Chief Complaint   Patient presents with    Vascular Access Problem       HPI:  Simone Lamas is a 70 y.o. male with a PMH significant for ESRD, CHF, HTN, DM, and hypercholesterolemia presents with right arm swelling. On 4/10 he had a thrombectomy done for an AV fistula occlusion in his right arm. Since then, he has had swelling in that same arm. He maintained compliance with his dialysis after the procedure. He goes to dialysis Tuesdays, Thursdays, and Saturdays. He noticed the swelling getting worse when he went shopping on Sunday. However, the swelling went back down overnight after lying down.He has associated pain in his right arm around the areas of the sutures. Pt  denies fever, chest pain, SOB.     PCP: Liang Aguilar, WENDY - NP    Current Facility-Administered Medications   Medication Dose Route Frequency Provider Last Rate Last Admin    oxyCODONE (ROXICODONE) immediate release tablet 5 mg  5 mg Oral NOW Enmanuel Philippe DO         Current Outpatient Medications   Medication Sig Dispense Refill    carvedilol (COREG) 12.5 MG tablet Take 1 tablet by mouth 2 times daily (with meals) 60 tablet 3    atorvastatin (LIPITOR) 40 MG tablet Take 1 tablet by mouth nightly 30 tablet 3    losartan (COZAAR) 100 MG tablet Take 1 tablet by mouth daily 30 tablet 0    tamsulosin (FLOMAX) 0.4 MG capsule Take 1 capsule by mouth daily      ASPIRIN LOW DOSE 81 MG chewable tablet Take 1 tablet by mouth daily      B Complex-C-Folic Acid (RENAL) 1 MG CAPS Take 1 capsule by mouth daily      insulin glargine (LANTUS) 100 UNIT/ML injection vial 25 Units         Past History     Past Medical History:  Past Medical History:   Diagnosis Date    CHF (congestive heart failure) (HCC)

## 2025-04-15 NOTE — CARE COORDINATION
04/15/25 1154   Readmission Assessment   Number of Days since last admission? 1-7 days   Previous Disposition Home Alone   Who is being Interviewed Patient   What was the patient's/caregiver's perception as to why they think they needed to return back to the hospital? Other (Comment)  (Patient stated,\" same thing. Dialysis catheter.\")   Did you visit your Primary Care Physician after you left the hospital, before you returned this time? No   Why weren't you able to visit your PCP? Other (Comment)  (returned before appointment)   Did you see a specialist, such as Cardiac, Pulmonary, Orthopedic Physician, etc. after you left the hospital? No   Who advised the patient to return to the hospital? Self-referral   Does the patient report anything that got in the way of taking their medications? No   In our efforts to provide the best possible care to you and others like you, can you think of anything that we could have done to help you after you left the hospital the first time, so that you might not have needed to return so soon? Other (Comment)  (Patient stated, \"no.\")     Jenelle GUY, RN   Case Management   [phone]

## 2025-04-15 NOTE — H&P
by Abhijeet Martinez MD at [facility] CARDIAC CATH LAB    INVASIVE VASCULAR N/A 4/10/2025    Fistulogram right/POSS INTERVENTION performed by George Jesus MD at [facility] CARDIAC CATH LAB       SocialHx:  Social History     Socioeconomic History    Marital status: Single   Tobacco Use    Smoking status: Never    Smokeless tobacco: Never     Social Drivers of Health     Food Insecurity: No Food Insecurity (4/15/2025)    Hunger Vital Sign     Worried About Running Out of Food in the Last Year: Never true     Ran Out of Food in the Last Year: Never true   Transportation Needs: No Transportation Needs (4/15/2025)    PRAPARE - Transportation     Lack of Transportation (Medical): No     Lack of Transportation (Non-Medical): No   Housing Stability: High Risk (4/15/2025)    Housing Stability Vital Sign     Unable to Pay for Housing in the Last Year: No     Number of Times Moved in the Last Year: 1     Homeless in the Last Year: Yes       FamilyHx:  No family history on file.    Home Medications:  Prior to Admission medications    Medication Sig Start Date End Date Taking? Authorizing Provider   furosemide (LASIX) 80 MG tablet Take 1 tablet by mouth once a week Every Sunday   Yes Ector Schwarz MD   carvedilol (COREG) 12.5 MG tablet Take 1 tablet by mouth 2 times daily (with meals) 9/23/24  Yes Rafael Rader MD   atorvastatin (LIPITOR) 40 MG tablet Take 1 tablet by mouth nightly 7/22/24  Yes Willie King MD   losartan (COZAAR) 100 MG tablet Take 1 tablet by mouth daily 7/22/24  Yes Willie King MD   tamsulosin (FLOMAX) 0.4 MG capsule Take 1 capsule by mouth daily   Yes ProviderEctor MD   ASPIRIN LOW DOSE 81 MG chewable tablet Take 1 tablet by mouth daily 3/15/23  Yes Ector Schwarz MD   B Complex-C-Folic Acid (RENAL) 1 MG CAPS Take 1 capsule by mouth daily 3/15/23  Yes Ector Schwarz MD   insulin glargine (LANTUS) 100 UNIT/ML injection vial 25 Units 10/5/22  Yes Automatic

## 2025-04-15 NOTE — ED TRIAGE NOTES
Pt brought in by EMS home C/O R arm swelling 1x day by dialysis fistula. Recent clots removed from fistula.

## 2025-04-15 NOTE — ED NOTES
..TRANSFER - OUT REPORT:    Verbal report given to [facility]  on Simone Lamas  being transferred to [address] [place] [address] for routine progression of patient care       Report consisted of patient's Situation, Background, Assessment and   Recommendations(SBAR).     Information from the following report(s) ED SBAR was reviewed with the receiving nurse.    Aron Fall Assessment:    Presents to emergency department  because of falls (Syncope, seizure, or loss of consciousness): No  Age > 70: Yes  Altered Mental Status, Intoxication with alcohol or substance confusion (Disorientation, impaired judgment, poor safety awaremess, or inability to follow instructions): No  Impaired Mobility: Ambulates or transfers with assistive devices or assistance; Unable to ambulate or transer.: No  Nursing Judgement: No          Lines:   Peripheral IV 04/15/25 Left Forearm (Active)        Opportunity for questions and clarification was provided.      Patient transported with:  Tech

## 2025-04-16 ENCOUNTER — APPOINTMENT (OUTPATIENT)
Facility: HOSPITAL | Age: 70
DRG: 182 | End: 2025-04-16
Payer: COMMERCIAL

## 2025-04-16 LAB
ANION GAP SERPL CALC-SCNC: 8 MMOL/L (ref 3–18)
BASOPHILS # BLD: 0.05 K/UL (ref 0–0.1)
BASOPHILS NFR BLD: 0.7 % (ref 0–2)
BUN SERPL-MCNC: 82 MG/DL (ref 7–18)
BUN/CREAT SERPL: 8 (ref 12–20)
CALCIUM SERPL-MCNC: 8.9 MG/DL (ref 8.5–10.1)
CHLORIDE SERPL-SCNC: 102 MMOL/L (ref 100–111)
CO2 SERPL-SCNC: 26 MMOL/L (ref 21–32)
CREAT SERPL-MCNC: 9.81 MG/DL (ref 0.6–1.3)
DIFFERENTIAL METHOD BLD: ABNORMAL
EOSINOPHIL # BLD: 0.5 K/UL (ref 0–0.4)
EOSINOPHIL NFR BLD: 7 % (ref 0–5)
ERYTHROCYTE [DISTWIDTH] IN BLOOD BY AUTOMATED COUNT: 12.6 % (ref 11.6–14.5)
GLUCOSE BLD STRIP.AUTO-MCNC: 159 MG/DL (ref 70–110)
GLUCOSE BLD STRIP.AUTO-MCNC: 170 MG/DL (ref 70–110)
GLUCOSE BLD STRIP.AUTO-MCNC: 183 MG/DL (ref 70–110)
GLUCOSE BLD STRIP.AUTO-MCNC: 325 MG/DL (ref 70–110)
GLUCOSE SERPL-MCNC: 156 MG/DL (ref 74–99)
HCT VFR BLD AUTO: 29.8 % (ref 36–48)
HGB BLD-MCNC: 9.8 G/DL (ref 13–16)
IMM GRANULOCYTES # BLD AUTO: 0.05 K/UL (ref 0–0.04)
IMM GRANULOCYTES NFR BLD AUTO: 0.7 % (ref 0–0.5)
LYMPHOCYTES # BLD: 1.5 K/UL (ref 0.9–3.6)
LYMPHOCYTES NFR BLD: 21.1 % (ref 21–52)
MCH RBC QN AUTO: 31.8 PG (ref 24–34)
MCHC RBC AUTO-ENTMCNC: 32.9 G/DL (ref 31–37)
MCV RBC AUTO: 96.8 FL (ref 78–100)
MONOCYTES # BLD: 0.87 K/UL (ref 0.05–1.2)
MONOCYTES NFR BLD: 12.2 % (ref 3–10)
NEUTS SEG # BLD: 4.15 K/UL (ref 1.8–8)
NEUTS SEG NFR BLD: 58.3 % (ref 40–73)
NRBC # BLD: 0 K/UL (ref 0–0.01)
NRBC BLD-RTO: 0 PER 100 WBC
PLATELET # BLD AUTO: 228 K/UL (ref 135–420)
PMV BLD AUTO: 10.3 FL (ref 9.2–11.8)
POTASSIUM SERPL-SCNC: 3.8 MMOL/L (ref 3.5–5.5)
RBC # BLD AUTO: 3.08 M/UL (ref 4.35–5.65)
SODIUM SERPL-SCNC: 136 MMOL/L (ref 136–145)
WBC # BLD AUTO: 7.1 K/UL (ref 4.6–13.2)

## 2025-04-16 PROCEDURE — 82962 GLUCOSE BLOOD TEST: CPT

## 2025-04-16 PROCEDURE — 85025 COMPLETE CBC W/AUTO DIFF WBC: CPT

## 2025-04-16 PROCEDURE — 1100000000 HC RM PRIVATE

## 2025-04-16 PROCEDURE — 94761 N-INVAS EAR/PLS OXIMETRY MLT: CPT

## 2025-04-16 PROCEDURE — 36415 COLL VENOUS BLD VENIPUNCTURE: CPT

## 2025-04-16 PROCEDURE — 2500000003 HC RX 250 WO HCPCS: Performed by: STUDENT IN AN ORGANIZED HEALTH CARE EDUCATION/TRAINING PROGRAM

## 2025-04-16 PROCEDURE — 80048 BASIC METABOLIC PNL TOTAL CA: CPT

## 2025-04-16 PROCEDURE — 71045 X-RAY EXAM CHEST 1 VIEW: CPT

## 2025-04-16 PROCEDURE — 99233 SBSQ HOSP IP/OBS HIGH 50: CPT | Performed by: STUDENT IN AN ORGANIZED HEALTH CARE EDUCATION/TRAINING PROGRAM

## 2025-04-16 PROCEDURE — 97166 OT EVAL MOD COMPLEX 45 MIN: CPT

## 2025-04-16 PROCEDURE — 6370000000 HC RX 637 (ALT 250 FOR IP): Performed by: STUDENT IN AN ORGANIZED HEALTH CARE EDUCATION/TRAINING PROGRAM

## 2025-04-16 RX ORDER — INSULIN GLARGINE 100 [IU]/ML
10 INJECTION, SOLUTION SUBCUTANEOUS NIGHTLY
Status: DISCONTINUED | OUTPATIENT
Start: 2025-04-16 | End: 2025-04-20 | Stop reason: HOSPADM

## 2025-04-16 RX ORDER — HYDRALAZINE HYDROCHLORIDE 20 MG/ML
10 INJECTION INTRAMUSCULAR; INTRAVENOUS EVERY 6 HOURS PRN
Status: DISCONTINUED | OUTPATIENT
Start: 2025-04-16 | End: 2025-04-20 | Stop reason: HOSPADM

## 2025-04-16 RX ADMIN — CARVEDILOL 12.5 MG: 12.5 TABLET, FILM COATED ORAL at 18:04

## 2025-04-16 RX ADMIN — OXYCODONE HYDROCHLORIDE 5 MG: 5 TABLET ORAL at 03:33

## 2025-04-16 RX ADMIN — OXYCODONE HYDROCHLORIDE 5 MG: 5 TABLET ORAL at 21:17

## 2025-04-16 RX ADMIN — ATORVASTATIN CALCIUM 40 MG: 40 TABLET, FILM COATED ORAL at 21:06

## 2025-04-16 RX ADMIN — OXYCODONE HYDROCHLORIDE 5 MG: 5 TABLET ORAL at 11:16

## 2025-04-16 RX ADMIN — SODIUM CHLORIDE, PRESERVATIVE FREE 10 ML: 5 INJECTION INTRAVENOUS at 21:24

## 2025-04-16 RX ADMIN — SODIUM CHLORIDE, PRESERVATIVE FREE 10 ML: 5 INJECTION INTRAVENOUS at 08:59

## 2025-04-16 RX ADMIN — INSULIN GLARGINE 10 UNITS: 100 INJECTION, SOLUTION SUBCUTANEOUS at 21:06

## 2025-04-16 RX ADMIN — TAMSULOSIN HYDROCHLORIDE 0.4 MG: 0.4 CAPSULE ORAL at 08:59

## 2025-04-16 RX ADMIN — INSULIN LISPRO 3 UNITS: 100 INJECTION, SOLUTION INTRAVENOUS; SUBCUTANEOUS at 21:20

## 2025-04-16 RX ADMIN — CARVEDILOL 12.5 MG: 12.5 TABLET, FILM COATED ORAL at 08:59

## 2025-04-16 ASSESSMENT — PAIN DESCRIPTION - FREQUENCY
FREQUENCY: INTERMITTENT

## 2025-04-16 ASSESSMENT — PAIN DESCRIPTION - LOCATION
LOCATION: ARM;SHOULDER
LOCATION: ARM
LOCATION: ARM;SHOULDER
LOCATION: SHOULDER;ARM
LOCATION: ARM;SHOULDER

## 2025-04-16 ASSESSMENT — PAIN DESCRIPTION - ORIENTATION
ORIENTATION: RIGHT

## 2025-04-16 ASSESSMENT — PAIN - FUNCTIONAL ASSESSMENT
PAIN_FUNCTIONAL_ASSESSMENT: PREVENTS OR INTERFERES SOME ACTIVE ACTIVITIES AND ADLS
PAIN_FUNCTIONAL_ASSESSMENT: ACTIVITIES ARE NOT PREVENTED
PAIN_FUNCTIONAL_ASSESSMENT: PREVENTS OR INTERFERES SOME ACTIVE ACTIVITIES AND ADLS
PAIN_FUNCTIONAL_ASSESSMENT: ACTIVITIES ARE NOT PREVENTED

## 2025-04-16 ASSESSMENT — PAIN DESCRIPTION - ONSET
ONSET: ON-GOING

## 2025-04-16 ASSESSMENT — PAIN SCALES - GENERAL
PAINLEVEL_OUTOF10: 0
PAINLEVEL_OUTOF10: 7
PAINLEVEL_OUTOF10: 6
PAINLEVEL_OUTOF10: 3
PAINLEVEL_OUTOF10: 7
PAINLEVEL_OUTOF10: 0
PAINLEVEL_OUTOF10: 4
PAINLEVEL_OUTOF10: 0

## 2025-04-16 ASSESSMENT — PAIN DESCRIPTION - PAIN TYPE
TYPE: ACUTE PAIN

## 2025-04-16 ASSESSMENT — PAIN DESCRIPTION - DESCRIPTORS
DESCRIPTORS: ACHING
DESCRIPTORS: THROBBING
DESCRIPTORS: ACHING

## 2025-04-16 ASSESSMENT — PAIN SCALES - WONG BAKER: WONGBAKER_NUMERICALRESPONSE: NO HURT

## 2025-04-16 NOTE — NURSE NAVIGATOR
Met with patient provided education on living with heart failure, reinforcing low sodium diet, fluid restriction, heart failure zones, will continue to follow.   HF Navigator discussed cardiac university information with pt:    [   x ] Pt does plan on attnding via   [   ] inperson          [x   ] zoom  [    ] Pt does not wish to attend    Negin Chavez RN  4/16/2025     Negin Chavez RN  4/16/2025

## 2025-04-17 ENCOUNTER — APPOINTMENT (OUTPATIENT)
Facility: HOSPITAL | Age: 70
DRG: 182 | End: 2025-04-17
Attending: SURGERY
Payer: COMMERCIAL

## 2025-04-17 PROBLEM — T82.590A DIALYSIS AV FISTULA MALFUNCTION: Status: ACTIVE | Noted: 2025-04-15

## 2025-04-17 PROBLEM — I50.23 SYSTOLIC DYSFUNCTION WITH ACUTE ON CHRONIC HEART FAILURE (HCC): Status: ACTIVE | Noted: 2025-04-17

## 2025-04-17 LAB
ANION GAP SERPL CALC-SCNC: 9 MMOL/L (ref 3–18)
APPEARANCE UR: CLEAR
BACTERIA URNS QL MICRO: ABNORMAL /HPF
BASOPHILS # BLD: 0.06 K/UL (ref 0–0.1)
BASOPHILS NFR BLD: 0.8 % (ref 0–2)
BILIRUB UR QL: NEGATIVE
BUN SERPL-MCNC: 87 MG/DL (ref 7–18)
BUN/CREAT SERPL: 9 (ref 12–20)
CALCIUM SERPL-MCNC: 8.9 MG/DL (ref 8.5–10.1)
CHLORIDE SERPL-SCNC: 103 MMOL/L (ref 100–111)
CO2 SERPL-SCNC: 23 MMOL/L (ref 21–32)
COLOR UR: ABNORMAL
CREAT SERPL-MCNC: 9.64 MG/DL (ref 0.6–1.3)
DIFFERENTIAL METHOD BLD: ABNORMAL
ECHO BSA: 2.04 M2
ECHO BSA: 2.04 M2
EKG ATRIAL RATE: 78 BPM
EKG DIAGNOSIS: NORMAL
EKG P AXIS: 74 DEGREES
EKG P-R INTERVAL: 156 MS
EKG Q-T INTERVAL: 460 MS
EKG QRS DURATION: 162 MS
EKG QTC CALCULATION (BAZETT): 524 MS
EKG R AXIS: -59 DEGREES
EKG T AXIS: 72 DEGREES
EKG VENTRICULAR RATE: 78 BPM
EOSINOPHIL # BLD: 0.36 K/UL (ref 0–0.4)
EOSINOPHIL NFR BLD: 4.9 % (ref 0–5)
EPITH CASTS URNS QL MICRO: ABNORMAL /LPF (ref 0–5)
ERYTHROCYTE [DISTWIDTH] IN BLOOD BY AUTOMATED COUNT: 12.8 % (ref 11.6–14.5)
GLUCOSE BLD STRIP.AUTO-MCNC: 169 MG/DL (ref 70–110)
GLUCOSE BLD STRIP.AUTO-MCNC: 170 MG/DL (ref 70–110)
GLUCOSE BLD STRIP.AUTO-MCNC: 242 MG/DL (ref 70–110)
GLUCOSE BLD STRIP.AUTO-MCNC: 349 MG/DL (ref 70–110)
GLUCOSE SERPL-MCNC: 154 MG/DL (ref 74–99)
GLUCOSE UR STRIP.AUTO-MCNC: 500 MG/DL
GRAN CASTS URNS QL MICRO: ABNORMAL /LPF
HCT VFR BLD AUTO: 31.7 % (ref 36–48)
HGB BLD-MCNC: 10.3 G/DL (ref 13–16)
HGB UR QL STRIP: ABNORMAL
IMM GRANULOCYTES # BLD AUTO: 0.05 K/UL (ref 0–0.04)
IMM GRANULOCYTES NFR BLD AUTO: 0.7 % (ref 0–0.5)
KETONES UR QL STRIP.AUTO: NEGATIVE MG/DL
LEUKOCYTE ESTERASE UR QL STRIP.AUTO: ABNORMAL
LYMPHOCYTES # BLD: 1.51 K/UL (ref 0.9–3.6)
LYMPHOCYTES NFR BLD: 20.5 % (ref 21–52)
Lab: 0.82 CM
Lab: 0.92 CM
MCH RBC QN AUTO: 31.7 PG (ref 24–34)
MCHC RBC AUTO-ENTMCNC: 32.5 G/DL (ref 31–37)
MCV RBC AUTO: 97.5 FL (ref 78–100)
MONOCYTES # BLD: 0.89 K/UL (ref 0.05–1.2)
MONOCYTES NFR BLD: 12.1 % (ref 3–10)
MUCOUS THREADS URNS QL MICRO: ABNORMAL /LPF
NEUTS SEG # BLD: 4.5 K/UL (ref 1.8–8)
NEUTS SEG NFR BLD: 61 % (ref 40–73)
NITRITE UR QL STRIP.AUTO: NEGATIVE
NRBC # BLD: 0 K/UL (ref 0–0.01)
NRBC BLD-RTO: 0 PER 100 WBC
PH UR STRIP: 7 (ref 5–8)
PLATELET # BLD AUTO: 246 K/UL (ref 135–420)
PMV BLD AUTO: 10.5 FL (ref 9.2–11.8)
POTASSIUM SERPL-SCNC: 4.5 MMOL/L (ref 3.5–5.5)
PROT UR STRIP-MCNC: 300 MG/DL
RBC # BLD AUTO: 3.25 M/UL (ref 4.35–5.65)
RBC #/AREA URNS HPF: ABNORMAL /HPF (ref 0–5)
SODIUM SERPL-SCNC: 135 MMOL/L (ref 136–145)
SP GR UR REFRACTOMETRY: 1.02 (ref 1–1.03)
UROBILINOGEN UR QL STRIP.AUTO: 1 EU/DL (ref 0.2–1)
VAS LEFT AX A MID PSV: 62.1 CM/S
VAS LEFT AXILLARY ARTERY AP DIAMETER: 0.72 CM
VAS LEFT AXILLARY VEIN DIAMETER: 0.59 CM
VAS LEFT BASILIC VEIN LOWER ARM MID DIAM: 0.25 CM
VAS LEFT BASILIC VEIN LOWER ARM PROX DIAM: 0.25 CM
VAS LEFT BASILIC VEIN UPPER ARM CONFLUENCE DIAM: 0.25 CM
VAS LEFT BASILIC VEIN UPPER ARM DIST DIAM: 0.33 CM
VAS LEFT BASILIC VEIN UPPER ARM MID DIAM: 0.36 CM
VAS LEFT BASILIC VEIN UPPER ARM PROX DIAM: 0.37 CM
VAS LEFT BASILIC VEIN WRIST DIAM: 0.12 CM
VAS LEFT BASLIC VEIN LOWER ARM DIST DIAM: 0.15 CM
VAS LEFT BRACHIAL A DIST EDV: 0.9 CM/S
VAS LEFT BRACHIAL A DIST PSV: 47.6 CM/S
VAS LEFT BRACHIAL A MID EDV: 2 CM/S
VAS LEFT BRACHIAL A MID PSV: 56.9 CM/S
VAS LEFT BRACHIAL A PROX EDV: 2 CM/S
VAS LEFT BRACHIAL A PROX PSV: 56.9 CM/S
VAS LEFT BRACHIAL DIST AP DIAM: 0.65 CM
VAS LEFT BRACHIAL MID AP DIAM: 0.51 CM
VAS LEFT BRACHIAL PROX AP DIAM: 0.56 CM
VAS LEFT BRACHIAL VEIN 1 DIAM: 0.66 CM
VAS LEFT BRACHIAL VEIN 2 DIAM: 0.4 CM
VAS LEFT CEPHALIC VEIN LOWER ARM DIST DIAM: 0.2 CM
VAS LEFT CEPHALIC VEIN LOWER ARM MID DIAM: 0.2 CM
VAS LEFT CEPHALIC VEIN LOWER ARM PROX DIAM: 0.26 CM
VAS LEFT CEPHALIC VEIN UPPER ARM CONFLUENCE DIAM: 0.18 CM
VAS LEFT CEPHALIC VEIN UPPER ARM DIST DIAM: 0.23 CM
VAS LEFT CEPHALIC VEIN UPPER ARM MID DIAM: 0.21 CM
VAS LEFT CEPHALIC VEIN UPPER ARM PROX DIAM: 0.26 CM
VAS LEFT CEPHALIC VEIN WRIST DIAM: 0.13 CM
VAS LEFT RADIAL A DIST PSV: 98.4 CM/S
VAS LEFT RADIAL A MID PSV: 93.5 CM/S
VAS LEFT RADIAL A PROX PSV: 101.6 CM/S
VAS LEFT RADIAL DIST AP DIAM: 0.32 CM
VAS LEFT RADIAL MID AP DIAM: 0.31 CM
VAS LEFT RADIAL PROX AP DIAM: 0.31 CM
VAS LEFT SUBCLAVIAN AP MID DIAM: 0.99 CM
VAS LEFT SUBCLAVIAN DIST EDV: 3.1 CM/S
VAS LEFT SUBCLAVIAN DIST PSV: 59.6 CM/S
VAS LEFT SUBCLAVIAN MID EDV: 1.5 CM/S
VAS LEFT SUBCLAVIAN MID PSV: 51.5 CM/S
VAS LEFT SUBCLAVIAN PROX EDV: 3.1 CM/S
VAS LEFT SUBCLAVIAN PROX PSV: 70.9 CM/S
VAS LEFT SUBCLAVIAN VEIN DIAMETER: 1.02 CM
VAS LEFT ULNAR A DIST PSV: 29.6 CM/S
VAS LEFT ULNAR A MID PSV: 54.8 CM/S
VAS LEFT ULNAR A PROX PSV: 55.2 CM/S
VAS LEFT ULNAR DIST AP DIAM: 0.26 CM
VAS LEFT ULNAR MID AP DIAM: 0.23 CM
VAS LEFT ULNAR PROX AP DIAM: 0.25 CM
VAS RIGHT ARTERIAL PROX ANASTOMOSIS AVF EDV: 92 CM/S
VAS RIGHT ARTERIAL PROX ANASTOMOSIS AVF PSV: 273.9 CM/S
VAS RIGHT AVF AVG GRAFT NAME: NORMAL
VAS RIGHT AVF AVG INFLOW VOL FLOW: 972 ML/MIN
VAS RIGHT AVF AVG MID OUTFLOW VOL FLOW: 537.7 ML/MIN
VAS RIGHT AVF AVG OUTFLOW VESSEL NAME: NORMAL
VAS RIGHT AVF AVG PROX ANAST VOL FLOW: 1017 ML/MIN
VAS RIGHT AVF AVG PROX OUTFLOW VOL FLOW: 1962 ML/MIN
VAS RIGHT INFLOW ARTERY AVF EDV: 54 CM/S
VAS RIGHT INFLOW ARTERY AVF PSV: 209.3 CM/S
VAS RIGHT MID OUTFLOW AVF EDV: 27.8 CM/S
VAS RIGHT MID OUTFLOW AVF PSV: 81.1 CM/S
VAS RIGHT PROX OUTFLOW AVF EDV: 86.4 CM/S
VAS RIGHT PROX OUTFLOW AVF PSV: 347.2 CM/S
VAS RIGHT SUBCLAVIAN PROX EDV: 1.5 CM/S
WBC # BLD AUTO: 7.4 K/UL (ref 4.6–13.2)
WBC URNS QL MICRO: ABNORMAL /HPF (ref 0–5)

## 2025-04-17 PROCEDURE — 6360000004 HC RX CONTRAST MEDICATION: Performed by: SURGERY

## 2025-04-17 PROCEDURE — 76937 US GUIDE VASCULAR ACCESS: CPT | Performed by: SURGERY

## 2025-04-17 PROCEDURE — 93971 EXTREMITY STUDY: CPT

## 2025-04-17 PROCEDURE — 93010 ELECTROCARDIOGRAM REPORT: CPT | Performed by: INTERNAL MEDICINE

## 2025-04-17 PROCEDURE — 80048 BASIC METABOLIC PNL TOTAL CA: CPT

## 2025-04-17 PROCEDURE — 6360000002 HC RX W HCPCS: Performed by: STUDENT IN AN ORGANIZED HEALTH CARE EDUCATION/TRAINING PROGRAM

## 2025-04-17 PROCEDURE — 6370000000 HC RX 637 (ALT 250 FOR IP): Performed by: INTERNAL MEDICINE

## 2025-04-17 PROCEDURE — 7100000011 HC PHASE II RECOVERY - ADDTL 15 MIN: Performed by: SURGERY

## 2025-04-17 PROCEDURE — 36905 THRMBC/NFS DIALYSIS CIRCUIT: CPT | Performed by: SURGERY

## 2025-04-17 PROCEDURE — 2709999900 HC NON-CHARGEABLE SUPPLY: Performed by: SURGERY

## 2025-04-17 PROCEDURE — 05C73ZZ EXTIRPATION OF MATTER FROM RIGHT AXILLARY VEIN, PERCUTANEOUS APPROACH: ICD-10-PCS | Performed by: SURGERY

## 2025-04-17 PROCEDURE — 85025 COMPLETE CBC W/AUTO DIFF WBC: CPT

## 2025-04-17 PROCEDURE — C1769 GUIDE WIRE: HCPCS | Performed by: SURGERY

## 2025-04-17 PROCEDURE — 05C53ZZ EXTIRPATION OF MATTER FROM RIGHT SUBCLAVIAN VEIN, PERCUTANEOUS APPROACH: ICD-10-PCS | Performed by: SURGERY

## 2025-04-17 PROCEDURE — 6360000002 HC RX W HCPCS: Performed by: SURGERY

## 2025-04-17 PROCEDURE — 93990 DOPPLER FLOW TESTING: CPT | Performed by: SURGERY

## 2025-04-17 PROCEDURE — 93005 ELECTROCARDIOGRAM TRACING: CPT | Performed by: INTERNAL MEDICINE

## 2025-04-17 PROCEDURE — 05753D1 DILATION OF RIGHT SUBCLAVIAN VEIN WITH INTRALUMINAL DEVICE, USING DRUG-COATED BALLOON, PERCUTANEOUS APPROACH: ICD-10-PCS | Performed by: SURGERY

## 2025-04-17 PROCEDURE — 5A1D70Z PERFORMANCE OF URINARY FILTRATION, INTERMITTENT, LESS THAN 6 HOURS PER DAY: ICD-10-PCS | Performed by: INTERNAL MEDICINE

## 2025-04-17 PROCEDURE — 90935 HEMODIALYSIS ONE EVALUATION: CPT

## 2025-04-17 PROCEDURE — 93971 EXTREMITY STUDY: CPT | Performed by: SURGERY

## 2025-04-17 PROCEDURE — 6370000000 HC RX 637 (ALT 250 FOR IP): Performed by: STUDENT IN AN ORGANIZED HEALTH CARE EDUCATION/TRAINING PROGRAM

## 2025-04-17 PROCEDURE — 05CY3ZZ EXTIRPATION OF MATTER FROM UPPER VEIN, PERCUTANEOUS APPROACH: ICD-10-PCS | Performed by: SURGERY

## 2025-04-17 PROCEDURE — C1894 INTRO/SHEATH, NON-LASER: HCPCS | Performed by: SURGERY

## 2025-04-17 PROCEDURE — 2500000003 HC RX 250 WO HCPCS: Performed by: STUDENT IN AN ORGANIZED HEALTH CARE EDUCATION/TRAINING PROGRAM

## 2025-04-17 PROCEDURE — B51W1ZZ FLUOROSCOPY OF DIALYSIS SHUNT/FISTULA USING LOW OSMOLAR CONTRAST: ICD-10-PCS | Performed by: SURGERY

## 2025-04-17 PROCEDURE — C1725 CATH, TRANSLUMIN NON-LASER: HCPCS | Performed by: SURGERY

## 2025-04-17 PROCEDURE — C1757 CATH, THROMBECTOMY/EMBOLECT: HCPCS | Performed by: SURGERY

## 2025-04-17 PROCEDURE — 82962 GLUCOSE BLOOD TEST: CPT

## 2025-04-17 PROCEDURE — 81001 URINALYSIS AUTO W/SCOPE: CPT

## 2025-04-17 PROCEDURE — 1100000000 HC RM PRIVATE

## 2025-04-17 PROCEDURE — 76000 FLUOROSCOPY <1 HR PHYS/QHP: CPT | Performed by: SURGERY

## 2025-04-17 PROCEDURE — 99232 SBSQ HOSP IP/OBS MODERATE 35: CPT | Performed by: HOSPITALIST

## 2025-04-17 PROCEDURE — 7100000010 HC PHASE II RECOVERY - FIRST 15 MIN: Performed by: SURGERY

## 2025-04-17 PROCEDURE — 36415 COLL VENOUS BLD VENIPUNCTURE: CPT

## 2025-04-17 RX ORDER — IODIXANOL 320 MG/ML
INJECTION, SOLUTION INTRAVASCULAR PRN
Status: DISCONTINUED | OUTPATIENT
Start: 2025-04-17 | End: 2025-04-17 | Stop reason: HOSPADM

## 2025-04-17 RX ORDER — ASPIRIN 81 MG/1
81 TABLET, CHEWABLE ORAL ONCE
Status: COMPLETED | OUTPATIENT
Start: 2025-04-17 | End: 2025-04-17

## 2025-04-17 RX ORDER — NITROGLYCERIN 0.4 MG/1
0.4 TABLET SUBLINGUAL EVERY 5 MIN PRN
Status: DISCONTINUED | OUTPATIENT
Start: 2025-04-17 | End: 2025-04-20 | Stop reason: HOSPADM

## 2025-04-17 RX ORDER — MIDAZOLAM HYDROCHLORIDE 1 MG/ML
INJECTION, SOLUTION INTRAMUSCULAR; INTRAVENOUS PRN
Status: DISCONTINUED | OUTPATIENT
Start: 2025-04-17 | End: 2025-04-17 | Stop reason: HOSPADM

## 2025-04-17 RX ORDER — HYDRALAZINE HYDROCHLORIDE 20 MG/ML
INJECTION INTRAMUSCULAR; INTRAVENOUS PRN
Status: DISCONTINUED | OUTPATIENT
Start: 2025-04-17 | End: 2025-04-17 | Stop reason: HOSPADM

## 2025-04-17 RX ORDER — LOSARTAN POTASSIUM 50 MG/1
100 TABLET ORAL DAILY
Status: DISCONTINUED | OUTPATIENT
Start: 2025-04-17 | End: 2025-04-20 | Stop reason: HOSPADM

## 2025-04-17 RX ORDER — FENTANYL CITRATE 50 UG/ML
INJECTION, SOLUTION INTRAMUSCULAR; INTRAVENOUS PRN
Status: DISCONTINUED | OUTPATIENT
Start: 2025-04-17 | End: 2025-04-17 | Stop reason: HOSPADM

## 2025-04-17 RX ORDER — CARVEDILOL 12.5 MG/1
12.5 TABLET ORAL 2 TIMES DAILY WITH MEALS
Status: DISCONTINUED | OUTPATIENT
Start: 2025-04-18 | End: 2025-04-20 | Stop reason: HOSPADM

## 2025-04-17 RX ORDER — HEPARIN SODIUM 1000 [USP'U]/ML
INJECTION, SOLUTION INTRAVENOUS; SUBCUTANEOUS PRN
Status: DISCONTINUED | OUTPATIENT
Start: 2025-04-17 | End: 2025-04-17 | Stop reason: HOSPADM

## 2025-04-17 RX ORDER — ISOSORBIDE MONONITRATE 30 MG/1
30 TABLET, EXTENDED RELEASE ORAL DAILY
Status: DISCONTINUED | OUTPATIENT
Start: 2025-04-17 | End: 2025-04-20 | Stop reason: HOSPADM

## 2025-04-17 RX ADMIN — INSULIN LISPRO 1 UNITS: 100 INJECTION, SOLUTION INTRAVENOUS; SUBCUTANEOUS at 11:57

## 2025-04-17 RX ADMIN — SODIUM CHLORIDE, PRESERVATIVE FREE 10 ML: 5 INJECTION INTRAVENOUS at 21:25

## 2025-04-17 RX ADMIN — INSULIN LISPRO 3 UNITS: 100 INJECTION, SOLUTION INTRAVENOUS; SUBCUTANEOUS at 21:22

## 2025-04-17 RX ADMIN — CARVEDILOL 12.5 MG: 12.5 TABLET, FILM COATED ORAL at 16:50

## 2025-04-17 RX ADMIN — NITROGLYCERIN 0.4 MG: 0.4 TABLET SUBLINGUAL at 14:00

## 2025-04-17 RX ADMIN — ASPIRIN 81 MG: 81 TABLET, CHEWABLE ORAL at 14:00

## 2025-04-17 RX ADMIN — HYDRALAZINE HYDROCHLORIDE 10 MG: 20 INJECTION INTRAMUSCULAR; INTRAVENOUS at 16:54

## 2025-04-17 RX ADMIN — INSULIN GLARGINE 10 UNITS: 100 INJECTION, SOLUTION SUBCUTANEOUS at 21:22

## 2025-04-17 RX ADMIN — LOSARTAN POTASSIUM 100 MG: 50 TABLET, FILM COATED ORAL at 18:16

## 2025-04-17 RX ADMIN — SODIUM CHLORIDE, PRESERVATIVE FREE 10 ML: 5 INJECTION INTRAVENOUS at 10:56

## 2025-04-17 RX ADMIN — ISOSORBIDE MONONITRATE 30 MG: 30 TABLET, EXTENDED RELEASE ORAL at 18:16

## 2025-04-17 RX ADMIN — OXYCODONE HYDROCHLORIDE 5 MG: 5 TABLET ORAL at 06:16

## 2025-04-17 RX ADMIN — TAMSULOSIN HYDROCHLORIDE 0.4 MG: 0.4 CAPSULE ORAL at 10:49

## 2025-04-17 RX ADMIN — ATORVASTATIN CALCIUM 40 MG: 40 TABLET, FILM COATED ORAL at 21:21

## 2025-04-17 ASSESSMENT — PAIN SCALES - GENERAL
PAINLEVEL_OUTOF10: 7
PAINLEVEL_OUTOF10: 0
PAINLEVEL_OUTOF10: 4
PAINLEVEL_OUTOF10: 7
PAINLEVEL_OUTOF10: 0

## 2025-04-17 ASSESSMENT — PAIN DESCRIPTION - FREQUENCY
FREQUENCY: CONTINUOUS
FREQUENCY: INTERMITTENT

## 2025-04-17 ASSESSMENT — PAIN DESCRIPTION - LOCATION
LOCATION: ARM;SHOULDER
LOCATION: ARM;SHOULDER
LOCATION: CHEST

## 2025-04-17 ASSESSMENT — PAIN - FUNCTIONAL ASSESSMENT
PAIN_FUNCTIONAL_ASSESSMENT: ACTIVITIES ARE NOT PREVENTED
PAIN_FUNCTIONAL_ASSESSMENT: PREVENTS OR INTERFERES SOME ACTIVE ACTIVITIES AND ADLS

## 2025-04-17 ASSESSMENT — PAIN DESCRIPTION - ORIENTATION
ORIENTATION: RIGHT

## 2025-04-17 ASSESSMENT — PAIN DESCRIPTION - DESCRIPTORS
DESCRIPTORS: ACHING
DESCRIPTORS: THROBBING
DESCRIPTORS: ACHING

## 2025-04-17 ASSESSMENT — PAIN DESCRIPTION - PAIN TYPE
TYPE: ACUTE PAIN

## 2025-04-17 ASSESSMENT — PAIN SCALES - WONG BAKER: WONGBAKER_NUMERICALRESPONSE: NO HURT

## 2025-04-17 ASSESSMENT — PAIN DESCRIPTION - ONSET: ONSET: ON-GOING

## 2025-04-17 NOTE — DIALYSIS
HD Care plan  Time: 3.5 hrs  Dialysate: 2 K+  2.5 Ca++  Bath  Net UF: 2.5 L as tolerated  Access: Aseptic care for ANILA AVF  Hemodynamic stability: Maintain BP WNL     Pre Dialysis:  Patient received from [facility]. Patient arrived on a bed, A+O X 4, on RA,  no s/s of acute distress noted. ANILA AVF assessed, Pt had fistulogram this morning, access arm is edematous+2 and tender, bruit and thrill strong. AVF accessed using 15G needles without any difficulty  above the surgical site. Good flow achieved from both needles.    Intra Dialysis:  Time out / safety process performed per policy. Tx initiated at 1230.    AVF flowing with ease. For hemodynamic stability UF goal set at 2500 ml as tolerated.  Pt offered assistance with repositioning every 2 hours/prn    Vascular access visible and line connections remained intact throughout entire duration of treatment.   Vital signs checked every 15 mins.  BP very high, Nephrologist updated, no changes made on current plan of care  1400: patient complained of chest pain mainly on the right side, put on oxygen 2L via NC, SPO2 %,pt denies any SOB, Dr Bray updated, Aspirin 81 mg table PO and Nitroglycerin SL 0.4 mg tablet given Start per his order, STAT EKG also done by Primary nurse per order. Patient calmed down and slept.  1500: pt awake, agitated,removed oxygen, and wanted to be taken off the machine saying \"No one cares\" Pt reassured and calmed down but refused to talk and express how he felt, when this RN inquired about his current chest pain status.  1530: Dr Bray in attendance pat still agitated and remains mute.     Post Dialysis:  Tx completed at 1600,   Tolerated well, 2.5  L  removed. De-accessed per protocol.    Clot time 6 minutes for arterial, and 6 minutes for venous.   Dry dressings applied. Bruit/Thrill present above and below dressings., pt much better now talking and more calm off oxygen, C/o being sore on the rt side of the chest   Post Dialysis report

## 2025-04-17 NOTE — ED PROVIDER NOTES
[address]  EMERGENCY DEPARTMENT ENCOUNTER      Pt Name: Simone Lamas  MRN: [id number]  Birthdate 1955  Date of evaluation: 4/15/2025  Provider: Maximus Flaherty MD  8:27 PM    CHIEF COMPLAINT       Chief Complaint   Patient presents with    Vascular Access Problem         HISTORY OF PRESENT ILLNESS    Simone Lamas is a 70 y.o. male who presents to the emergency department     71 yo male with history of ESRD, CHF, DM, HTN here with worsening swelling of his RUE after \"walking around\" a lot yesterday.  History complicated as he recently had his AV fistula in the right upper arm clotted and surgery for thrombectomy having just been discharged on Sunday.  He notes pain and some pins and needles in the RUE hand.  Otherwise feels well, denies chest pain or shortness of breath currently, no abd pain, no diarrhea or vomiting, no fevers, no cough or vision changes.            Nursing Notes were reviewed.    REVIEW OF SYSTEMS       Review of Systems    Except as noted above the remainder of the review of systems was reviewed and negative.       PAST MEDICAL HISTORY     Past Medical History:   Diagnosis Date    CHF (congestive heart failure) (HCC)     Diabetes mellitus (HCC)     End stage renal disease (HCC)     Hypertension     TIA (transient ischemic attack)          SURGICAL HISTORY       Past Surgical History:   Procedure Laterality Date    CARDIAC PROCEDURE N/A 7/22/2024    Left heart cath performed by Abhijeet Martinez MD at [facility] CARDIAC CATH LAB    CARDIAC PROCEDURE N/A 7/22/2024    Coronary angiography performed by Abhijeet Martinez MD at [facility] CARDIAC CATH LAB    INVASIVE VASCULAR N/A 4/10/2025    Fistulogram right/POSS INTERVENTION performed by George Jesus MD at [facility] CARDIAC CATH LAB         CURRENT MEDICATIONS       Current Discharge Medication List        CONTINUE these medications which have NOT CHANGED    Details   furosemide (LASIX) 80 MG tablet Take 1 tablet by mouth once a week Every Sunday

## 2025-04-17 NOTE — ADT AUTH CERT
[place] [address] [address] [facility] [address]   NPI:  [id number]   TID: [id number]   Auth number:  [id number]       MEMBER ID [id number]     PLEASE RE REVIEW PT ONLY HAS PART B MEDICARE

## 2025-04-17 NOTE — BRIEF OP NOTE
Brief Postoperative Note      Patient: Simone Lamas  YOB: 1955  MRN: [id number]    Date of Procedure: 4/17/2025    Pre-Op Diagnosis Codes:      * ESRD (end stage renal disease) ([facility]) [N18.6]     * Arteriovenous fistula occlusion, initial encounter [T82.898A]  Recurrent RUE edema with brachial cephalic AVF malfunction    Post-Op Diagnosis: Same       PROCEDURES:   U./s guided access of RUE AVF  Fistulogram and central venogram  PTA of the right axillary, subclavian, and innominate vein with a 10 x 80 Athletis balloon  PTA with attempted antegrade thrombectomy of the R subclavian vein with a 9 x 20 PTA balloon  Angiojet Zelante thrombolysis and percutaneous thrombectomy of the R subclavian and innominate veins    Surgeon(s):  Rubén Carranza MD    Assistant:  * No surgical staff found *    Anesthesia: IV Sedation and local    Estimated Blood Loss (mL): 5mL    Complications: None    Specimens:   * No specimens in log *    Implants:  * No implants in log *      Drains: * No LDAs found *    Findings:  Infection Present At Time Of Surgery (PATOS) (choose all levels that have infection present):  No infection present  Other Findings: Patent RUE brachial cephalic AVF with a patent outflow (axillary vein) stent. Occlusion of the axillosubclavian vein junction just proximal (central) to the stent with multiple collaterals which reconstituted the distal R innominate vein and SVC. The occlusion was traversed easily with a wire and catheter. After PTA and percutaneous thrombectomy, there was still quite a bit of residual fibrosis within the R subclavian-innominate vein, however a flow channel was present and there was a thrill over the fistula. Previously the fistula was completely pulsatile.     Disposition: to recovery in stable condition    PLAN: Attempt HD again today via the LUE AVF. If it is successful, I will plan for short-interval serial PTA of the central vein stenosis on a scheduled basis. I

## 2025-04-17 NOTE — PRE SEDATION
Sedation Plan  ASA: class 4 - patient with severe systemic disease that is a constant threat to life     Mallampati class: IV - only hard palate visible.    Sedation plan: minimal sedation, moderate (conscious sedation) and local anesthesia    Risks, benefits, and alternatives discussed with patient.  Use of blood products discussed with patient who consented to blood products.       Immediate reassessment prior to sedation:  Patient's status reviewed and vital signs assessed; acceptable to perform procedure and proceed to administer sedation as planned.

## 2025-04-18 LAB
ANION GAP SERPL CALC-SCNC: 4 MMOL/L (ref 3–18)
ANION GAP SERPL CALC-SCNC: 5 MMOL/L (ref 3–18)
BASOPHILS # BLD: 0.06 K/UL (ref 0–0.1)
BASOPHILS NFR BLD: 0.7 % (ref 0–2)
BUN SERPL-MCNC: 51 MG/DL (ref 7–18)
BUN SERPL-MCNC: 62 MG/DL (ref 7–18)
BUN/CREAT SERPL: 8 (ref 12–20)
BUN/CREAT SERPL: 8 (ref 12–20)
CALCIUM SERPL-MCNC: 9.3 MG/DL (ref 8.5–10.1)
CALCIUM SERPL-MCNC: 9.3 MG/DL (ref 8.5–10.1)
CHLORIDE SERPL-SCNC: 101 MMOL/L (ref 100–111)
CHLORIDE SERPL-SCNC: 98 MMOL/L (ref 100–111)
CO2 SERPL-SCNC: 29 MMOL/L (ref 21–32)
CO2 SERPL-SCNC: 30 MMOL/L (ref 21–32)
CREAT SERPL-MCNC: 6.4 MG/DL (ref 0.6–1.3)
CREAT SERPL-MCNC: 7.81 MG/DL (ref 0.6–1.3)
DIFFERENTIAL METHOD BLD: ABNORMAL
EOSINOPHIL # BLD: 0.19 K/UL (ref 0–0.4)
EOSINOPHIL NFR BLD: 2.1 % (ref 0–5)
ERYTHROCYTE [DISTWIDTH] IN BLOOD BY AUTOMATED COUNT: 12.8 % (ref 11.6–14.5)
GLUCOSE BLD STRIP.AUTO-MCNC: 184 MG/DL (ref 70–110)
GLUCOSE BLD STRIP.AUTO-MCNC: 189 MG/DL (ref 70–110)
GLUCOSE BLD STRIP.AUTO-MCNC: 274 MG/DL (ref 70–110)
GLUCOSE BLD STRIP.AUTO-MCNC: 280 MG/DL (ref 70–110)
GLUCOSE BLD STRIP.AUTO-MCNC: 299 MG/DL (ref 70–110)
GLUCOSE BLD STRIP.AUTO-MCNC: 303 MG/DL (ref 70–110)
GLUCOSE BLD STRIP.AUTO-MCNC: 310 MG/DL (ref 70–110)
GLUCOSE SERPL-MCNC: 165 MG/DL (ref 74–99)
GLUCOSE SERPL-MCNC: 239 MG/DL (ref 74–99)
HCT VFR BLD AUTO: 31.3 % (ref 36–48)
HGB BLD-MCNC: 10.3 G/DL (ref 13–16)
IMM GRANULOCYTES # BLD AUTO: 0.1 K/UL (ref 0–0.04)
IMM GRANULOCYTES NFR BLD AUTO: 1.1 % (ref 0–0.5)
LYMPHOCYTES # BLD: 1.08 K/UL (ref 0.9–3.6)
LYMPHOCYTES NFR BLD: 11.9 % (ref 21–52)
MCH RBC QN AUTO: 31.9 PG (ref 24–34)
MCHC RBC AUTO-ENTMCNC: 32.9 G/DL (ref 31–37)
MCV RBC AUTO: 96.9 FL (ref 78–100)
MONOCYTES # BLD: 1.39 K/UL (ref 0.05–1.2)
MONOCYTES NFR BLD: 15.4 % (ref 3–10)
NEUTS SEG # BLD: 6.22 K/UL (ref 1.8–8)
NEUTS SEG NFR BLD: 68.8 % (ref 40–73)
NRBC # BLD: 0 K/UL (ref 0–0.01)
NRBC BLD-RTO: 0 PER 100 WBC
PHOSPHATE SERPL-MCNC: 3.6 MG/DL (ref 2.5–4.9)
PLATELET # BLD AUTO: 258 K/UL (ref 135–420)
PMV BLD AUTO: 10.5 FL (ref 9.2–11.8)
POTASSIUM SERPL-SCNC: 4.1 MMOL/L (ref 3.5–5.5)
POTASSIUM SERPL-SCNC: 4.2 MMOL/L (ref 3.5–5.5)
RBC # BLD AUTO: 3.23 M/UL (ref 4.35–5.65)
SODIUM SERPL-SCNC: 132 MMOL/L (ref 136–145)
SODIUM SERPL-SCNC: 135 MMOL/L (ref 136–145)
WBC # BLD AUTO: 9 K/UL (ref 4.6–13.2)

## 2025-04-18 PROCEDURE — 99232 SBSQ HOSP IP/OBS MODERATE 35: CPT | Performed by: STUDENT IN AN ORGANIZED HEALTH CARE EDUCATION/TRAINING PROGRAM

## 2025-04-18 PROCEDURE — 6370000000 HC RX 637 (ALT 250 FOR IP): Performed by: INTERNAL MEDICINE

## 2025-04-18 PROCEDURE — 99232 SBSQ HOSP IP/OBS MODERATE 35: CPT | Performed by: SURGERY

## 2025-04-18 PROCEDURE — 85025 COMPLETE CBC W/AUTO DIFF WBC: CPT

## 2025-04-18 PROCEDURE — 94760 N-INVAS EAR/PLS OXIMETRY 1: CPT

## 2025-04-18 PROCEDURE — 84100 ASSAY OF PHOSPHORUS: CPT

## 2025-04-18 PROCEDURE — 1100000000 HC RM PRIVATE

## 2025-04-18 PROCEDURE — 80048 BASIC METABOLIC PNL TOTAL CA: CPT

## 2025-04-18 PROCEDURE — 82962 GLUCOSE BLOOD TEST: CPT

## 2025-04-18 PROCEDURE — 2500000003 HC RX 250 WO HCPCS: Performed by: STUDENT IN AN ORGANIZED HEALTH CARE EDUCATION/TRAINING PROGRAM

## 2025-04-18 PROCEDURE — 6370000000 HC RX 637 (ALT 250 FOR IP): Performed by: STUDENT IN AN ORGANIZED HEALTH CARE EDUCATION/TRAINING PROGRAM

## 2025-04-18 PROCEDURE — 36415 COLL VENOUS BLD VENIPUNCTURE: CPT

## 2025-04-18 RX ADMIN — SODIUM CHLORIDE, PRESERVATIVE FREE 10 ML: 5 INJECTION INTRAVENOUS at 08:38

## 2025-04-18 RX ADMIN — OXYCODONE HYDROCHLORIDE 5 MG: 5 TABLET ORAL at 08:37

## 2025-04-18 RX ADMIN — CARVEDILOL 12.5 MG: 12.5 TABLET, FILM COATED ORAL at 08:37

## 2025-04-18 RX ADMIN — INSULIN LISPRO 2 UNITS: 100 INJECTION, SOLUTION INTRAVENOUS; SUBCUTANEOUS at 21:27

## 2025-04-18 RX ADMIN — CARVEDILOL 12.5 MG: 12.5 TABLET, FILM COATED ORAL at 17:12

## 2025-04-18 RX ADMIN — SODIUM CHLORIDE, PRESERVATIVE FREE 10 ML: 5 INJECTION INTRAVENOUS at 21:29

## 2025-04-18 RX ADMIN — INSULIN LISPRO 2 UNITS: 100 INJECTION, SOLUTION INTRAVENOUS; SUBCUTANEOUS at 12:31

## 2025-04-18 RX ADMIN — ATORVASTATIN CALCIUM 40 MG: 40 TABLET, FILM COATED ORAL at 21:26

## 2025-04-18 RX ADMIN — INSULIN LISPRO 1 UNITS: 100 INJECTION, SOLUTION INTRAVENOUS; SUBCUTANEOUS at 07:10

## 2025-04-18 RX ADMIN — INSULIN GLARGINE 10 UNITS: 100 INJECTION, SOLUTION SUBCUTANEOUS at 21:27

## 2025-04-18 RX ADMIN — APIXABAN 5 MG: 5 TABLET, FILM COATED ORAL at 17:12

## 2025-04-18 RX ADMIN — TAMSULOSIN HYDROCHLORIDE 0.4 MG: 0.4 CAPSULE ORAL at 08:37

## 2025-04-18 RX ADMIN — INSULIN LISPRO 2 UNITS: 100 INJECTION, SOLUTION INTRAVENOUS; SUBCUTANEOUS at 17:12

## 2025-04-18 RX ADMIN — ISOSORBIDE MONONITRATE 30 MG: 30 TABLET, EXTENDED RELEASE ORAL at 08:37

## 2025-04-18 RX ADMIN — LOSARTAN POTASSIUM 100 MG: 50 TABLET, FILM COATED ORAL at 08:37

## 2025-04-18 ASSESSMENT — PAIN SCALES - GENERAL
PAINLEVEL_OUTOF10: 2
PAINLEVEL_OUTOF10: 0
PAINLEVEL_OUTOF10: 7
PAINLEVEL_OUTOF10: 0

## 2025-04-18 ASSESSMENT — PAIN DESCRIPTION - ORIENTATION: ORIENTATION: RIGHT

## 2025-04-18 ASSESSMENT — PAIN DESCRIPTION - DESCRIPTORS: DESCRIPTORS: THROBBING

## 2025-04-18 ASSESSMENT — PAIN DESCRIPTION - LOCATION: LOCATION: SHOULDER

## 2025-04-18 ASSESSMENT — PAIN - FUNCTIONAL ASSESSMENT: PAIN_FUNCTIONAL_ASSESSMENT: ACTIVITIES ARE NOT PREVENTED

## 2025-04-18 NOTE — NURSE NAVIGATOR
Met with patient provided education on living with heart failure, reinforcing low sodium diet, fluid restriction, heart failure zones, will continue to follow.  ]HF Navigator discussed cardiac university information with pt:    [  x  ] Pt does plan on attnding via   [  x ] inperson    -will try      [   ] zoom  [    ] Pt does not wish to attend    Negin Chavez RN  4/18/2025     Negin Chavez RN  4/18/2025

## 2025-04-19 ENCOUNTER — HOSPITAL ENCOUNTER (INPATIENT)
Facility: HOSPITAL | Age: 70
Discharge: HOME | DRG: 182 | End: 2025-04-22
Payer: COMMERCIAL

## 2025-04-19 VITALS
BODY MASS INDEX: 24.52 KG/M2 | OXYGEN SATURATION: 99 % | HEIGHT: 72 IN | WEIGHT: 181 LBS | DIASTOLIC BLOOD PRESSURE: 91 MMHG | HEART RATE: 70 BPM | TEMPERATURE: 98 F | SYSTOLIC BLOOD PRESSURE: 162 MMHG | RESPIRATION RATE: 18 BRPM

## 2025-04-19 LAB
BASOPHILS # BLD: 0.05 K/UL (ref 0–0.1)
BASOPHILS NFR BLD: 0.6 % (ref 0–2)
DIFFERENTIAL METHOD BLD: ABNORMAL
EOSINOPHIL # BLD: 0.44 K/UL (ref 0–0.4)
EOSINOPHIL NFR BLD: 4.9 % (ref 0–5)
ERYTHROCYTE [DISTWIDTH] IN BLOOD BY AUTOMATED COUNT: 12.7 % (ref 11.6–14.5)
GLUCOSE BLD STRIP.AUTO-MCNC: 117 MG/DL (ref 70–110)
GLUCOSE BLD STRIP.AUTO-MCNC: 171 MG/DL (ref 70–110)
HCT VFR BLD AUTO: 28.1 % (ref 36–48)
HGB BLD-MCNC: 9.1 G/DL (ref 13–16)
IMM GRANULOCYTES # BLD AUTO: 0.09 K/UL (ref 0–0.04)
IMM GRANULOCYTES NFR BLD AUTO: 1 % (ref 0–0.5)
LYMPHOCYTES # BLD: 1.6 K/UL (ref 0.9–3.6)
LYMPHOCYTES NFR BLD: 17.9 % (ref 21–52)
MCH RBC QN AUTO: 31.3 PG (ref 24–34)
MCHC RBC AUTO-ENTMCNC: 32.4 G/DL (ref 31–37)
MCV RBC AUTO: 96.6 FL (ref 78–100)
MONOCYTES # BLD: 0.97 K/UL (ref 0.05–1.2)
MONOCYTES NFR BLD: 10.8 % (ref 3–10)
NEUTS SEG # BLD: 5.8 K/UL (ref 1.8–8)
NEUTS SEG NFR BLD: 64.8 % (ref 40–73)
NRBC # BLD: 0 K/UL (ref 0–0.01)
NRBC BLD-RTO: 0 PER 100 WBC
PHOSPHATE SERPL-MCNC: 5.7 MG/DL (ref 2.5–4.9)
PLATELET # BLD AUTO: 262 K/UL (ref 135–420)
PMV BLD AUTO: 10.3 FL (ref 9.2–11.8)
RBC # BLD AUTO: 2.91 M/UL (ref 4.35–5.65)
WBC # BLD AUTO: 9 K/UL (ref 4.6–13.2)

## 2025-04-19 PROCEDURE — 1100000000 HC RM PRIVATE

## 2025-04-19 PROCEDURE — 76770 US EXAM ABDO BACK WALL COMP: CPT

## 2025-04-19 PROCEDURE — 94760 N-INVAS EAR/PLS OXIMETRY 1: CPT

## 2025-04-19 PROCEDURE — 6370000000 HC RX 637 (ALT 250 FOR IP): Performed by: STUDENT IN AN ORGANIZED HEALTH CARE EDUCATION/TRAINING PROGRAM

## 2025-04-19 PROCEDURE — 2500000003 HC RX 250 WO HCPCS: Performed by: STUDENT IN AN ORGANIZED HEALTH CARE EDUCATION/TRAINING PROGRAM

## 2025-04-19 PROCEDURE — 82962 GLUCOSE BLOOD TEST: CPT

## 2025-04-19 PROCEDURE — 85025 COMPLETE CBC W/AUTO DIFF WBC: CPT

## 2025-04-19 PROCEDURE — 36415 COLL VENOUS BLD VENIPUNCTURE: CPT

## 2025-04-19 PROCEDURE — 84100 ASSAY OF PHOSPHORUS: CPT

## 2025-04-19 PROCEDURE — 90935 HEMODIALYSIS ONE EVALUATION: CPT

## 2025-04-19 PROCEDURE — 6370000000 HC RX 637 (ALT 250 FOR IP): Performed by: INTERNAL MEDICINE

## 2025-04-19 PROCEDURE — 99239 HOSP IP/OBS DSCHRG MGMT >30: CPT | Performed by: STUDENT IN AN ORGANIZED HEALTH CARE EDUCATION/TRAINING PROGRAM

## 2025-04-19 RX ORDER — ISOSORBIDE MONONITRATE 30 MG/1
30 TABLET, EXTENDED RELEASE ORAL DAILY
Qty: 30 TABLET | Refills: 3 | Status: SHIPPED | OUTPATIENT
Start: 2025-04-20

## 2025-04-19 RX ADMIN — OXYCODONE HYDROCHLORIDE 5 MG: 5 TABLET ORAL at 09:06

## 2025-04-19 RX ADMIN — SODIUM CHLORIDE, PRESERVATIVE FREE 10 ML: 5 INJECTION INTRAVENOUS at 08:52

## 2025-04-19 RX ADMIN — OXYCODONE HYDROCHLORIDE 5 MG: 5 TABLET ORAL at 00:23

## 2025-04-19 RX ADMIN — ISOSORBIDE MONONITRATE 30 MG: 30 TABLET, EXTENDED RELEASE ORAL at 08:52

## 2025-04-19 RX ADMIN — CARVEDILOL 12.5 MG: 12.5 TABLET, FILM COATED ORAL at 20:03

## 2025-04-19 RX ADMIN — TAMSULOSIN HYDROCHLORIDE 0.4 MG: 0.4 CAPSULE ORAL at 08:52

## 2025-04-19 RX ADMIN — APIXABAN 5 MG: 5 TABLET, FILM COATED ORAL at 20:03

## 2025-04-19 RX ADMIN — APIXABAN 5 MG: 5 TABLET, FILM COATED ORAL at 08:52

## 2025-04-19 ASSESSMENT — PAIN DESCRIPTION - FREQUENCY: FREQUENCY: CONTINUOUS

## 2025-04-19 ASSESSMENT — PAIN SCALES - GENERAL
PAINLEVEL_OUTOF10: 0
PAINLEVEL_OUTOF10: 7
PAINLEVEL_OUTOF10: 0
PAINLEVEL_OUTOF10: 0
PAINLEVEL_OUTOF10: 7
PAINLEVEL_OUTOF10: 0
PAINLEVEL_OUTOF10: 4

## 2025-04-19 ASSESSMENT — PAIN DESCRIPTION - ONSET: ONSET: ON-GOING

## 2025-04-19 ASSESSMENT — PAIN DESCRIPTION - PAIN TYPE: TYPE: SURGICAL PAIN

## 2025-04-19 ASSESSMENT — PAIN DESCRIPTION - ORIENTATION
ORIENTATION: RIGHT
ORIENTATION: RIGHT

## 2025-04-19 ASSESSMENT — PAIN DESCRIPTION - DESCRIPTORS
DESCRIPTORS: ACHING;THROBBING
DESCRIPTORS: THROBBING;ACHING

## 2025-04-19 ASSESSMENT — PAIN DESCRIPTION - LOCATION
LOCATION: SHOULDER
LOCATION: SHOULDER

## 2025-04-19 ASSESSMENT — PAIN - FUNCTIONAL ASSESSMENT
PAIN_FUNCTIONAL_ASSESSMENT: PREVENTS OR INTERFERES WITH ALL ACTIVE AND SOME PASSIVE ACTIVITIES
PAIN_FUNCTIONAL_ASSESSMENT: ACTIVITIES ARE NOT PREVENTED

## 2025-04-19 NOTE — CARE COORDINATION
D/C order noted for today. Orders reviewed. Artis requested for patient after dialysis. remains available if needed.    JANNETTE MenchacaN, RN  Case Management   [phone]

## 2025-04-19 NOTE — DIALYSIS
HD Care plan  Time: 3.5 hrs  Dialysate: 2 K+  2.5 Ca++  Bath  Net UF: 2.5 L as tolerated  Access: Aseptic care for ANILA AVF  Hemodynamic stability: Maintain BP WNL     Pre Dialysis:  Patient received from [facility]. Patient arrived on a bed, A+O X 4, on RA,  no s/s of acute distress noted. ANILA AVF assessed, access arm still  edematous+1 and tender, bruit and thrill strong. AVF accessed using 15G needles without any difficulty. Good flow achieved from both needles.     Intra Dialysis:  Time out / safety process performed per policy. Tx initiated at 1500.    AVF flowing with ease. For hemodynamic stability UF goal set at 2500 ml as tolerated.  Pt offered assistance with repositioning every 2 hours/prn    Vascular access visible and line connections remained intact throughout entire duration of treatment.   Vital signs checked every 15 mins.       Post Dialysis:  Tx completed at 1830,   Tolerated well, 2.5  L  removed. De-accessed per protocol.    Clot time 8 minutes for arterial, and 6 minutes for venous.   Dry dressings applied. Bruit/Thrill present above and below dressings.,   Post dialysis report given to  [facility]

## 2025-04-19 NOTE — DISCHARGE SUMMARY
by mouth daily  Qty: 30 tablet, Refills: 3      apixaban (ELIQUIS) 5 MG TABS tablet Take 1 tablet by mouth 2 times daily  Qty: 178 tablet, Refills: 0           CONTINUE these medications which have NOT CHANGED    Details   furosemide (LASIX) 80 MG tablet Take 1 tablet by mouth once a week Every Sunday      carvedilol (COREG) 12.5 MG tablet Take 1 tablet by mouth 2 times daily (with meals)  Qty: 60 tablet, Refills: 3      atorvastatin (LIPITOR) 40 MG tablet Take 1 tablet by mouth nightly  Qty: 30 tablet, Refills: 3      losartan (COZAAR) 100 MG tablet Take 1 tablet by mouth daily  Qty: 30 tablet, Refills: 0      tamsulosin (FLOMAX) 0.4 MG capsule Take 1 capsule by mouth daily      ASPIRIN LOW DOSE 81 MG chewable tablet Take 1 tablet by mouth daily      B Complex-C-Folic Acid (RENAL) 1 MG CAPS Take 1 capsule by mouth daily      insulin glargine (LANTUS) 100 UNIT/ML injection vial 25 Units           It is important that you take the medication exactly as they are prescribed.   Keep your medication in the bottles provided by the pharmacist and keep a list of the medication names, dosages, and times to be taken in your wallet.   Do not take other medications without consulting your doctor.           FOLLOW UP CARE:      Minutes spent on discharge: 40 minutes spent coordinating this discharge (review instructions/follow-up, prescriptions, preparing report for sign off)    Freddy Gandara DO  4/19/2025 2:18 PM    Disclaimer: Sections of this note are dictated using utilizing voice recognition software. Minor typographical errors may be present. If questions arise, please do not hesitate to contact me or call our department.      Freddy England DO, JIMMIE, MS  [facility]  Hospitalist

## 2025-04-19 NOTE — PLAN OF CARE
HEMODIALYSIS Plan:  Time: 3 hrs  Dialysate: 2 K+  2.5Ca++  [place]  Net UF: 2.5 L As tolerated  Access: Aseptic care for ANILA AVF  Hemodynamic stability: Maintain BP WNL  Problem: Chronic Conditions and Co-morbidities  Goal: Patient's chronic conditions and co-morbidity symptoms are monitored and maintained or improved  Outcome: Progressing     
  HEMODIALYSIS Plan:  Time: 3 hrs  Dialysate: 2 K+  2.5Ca++  [place]  Net UF: 2.5 L As tolerated  Access: Aseptic care for SARINA AVF  Hemodynamic stability: Maintain BP WNL  Problem: Chronic Conditions and Co-morbidities  Goal: Patient's chronic conditions and co-morbidity symptoms are monitored and maintained or improved  Outcome: Progressing     
  Problem: Chronic Conditions and Co-morbidities  Goal: Patient's chronic conditions and co-morbidity symptoms are monitored and maintained or improved  4/16/2025 0123 by Keagan Ardon RN  Outcome: Progressing  Flowsheets (Taken 4/16/2025 0123)  Care Plan - Patient's Chronic Conditions and Co-Morbidity Symptoms are Monitored and Maintained or Improved: Monitor and assess patient's chronic conditions and comorbid symptoms for stability, deterioration, or improvement     Problem: Pain  Goal: Verbalizes/displays adequate comfort level or baseline comfort level  4/16/2025 0123 by Keagan Ardon RN  Outcome: Progressing  Flowsheets (Taken 4/16/2025 0123)  Verbalizes/displays adequate comfort level or baseline comfort level:   Encourage patient to monitor pain and request assistance   Assess pain using appropriate pain scale   Administer analgesics based on type and severity of pain and evaluate response     Problem: Safety - Adult  Goal: Free from fall injury  4/16/2025 0123 by Keagan Ardon RN  Outcome: Progressing  Note: Patient will be free from falls during this admission. Instructed patient to call for assistance when needed.     Problem: Cardiovascular - Adult  Goal: Maintains optimal cardiac output and hemodynamic stability  4/16/2025 0123 by Keagan Ardon RN  Outcome: Progressing  Flowsheets (Taken 4/16/2025 0123)  Maintains optimal cardiac output and hemodynamic stability: Monitor blood pressure and heart rate     Problem: Genitourinary - Adult  Goal: Absence of urinary retention  4/16/2025 0123 by Keagan Ardon RN  Outcome: Progressing  Flowsheets (Taken 4/16/2025 0123)  Absence of urinary retention:   Assess patient’s ability to void and empty bladder   Monitor intake/output and perform bladder scan as needed     Problem: Infection - Adult  Goal: Absence of infection at discharge  4/16/2025 0123 by Keagan Ardon RN  Outcome: Progressing  Flowsheets (Taken 4/16/2025 
  Problem: Chronic Conditions and Co-morbidities  Goal: Patient's chronic conditions and co-morbidity symptoms are monitored and maintained or improved  4/17/2025 1433 by Kaylee Ruiz RN  Outcome: Progressing  Flowsheets (Taken 4/17/2025 1055)  Care Plan - Patient's Chronic Conditions and Co-Morbidity Symptoms are Monitored and Maintained or Improved:   Monitor and assess patient's chronic conditions and comorbid symptoms for stability, deterioration, or improvement   Collaborate with multidisciplinary team to address chronic and comorbid conditions and prevent exacerbation or deterioration  4/17/2025 0238 by Keagan Ardon RN  Outcome: Progressing  Flowsheets (Taken 4/17/2025 0238)  Care Plan - Patient's Chronic Conditions and Co-Morbidity Symptoms are Monitored and Maintained or Improved: Monitor and assess patient's chronic conditions and comorbid symptoms for stability, deterioration, or improvement     Problem: Pain  Goal: Verbalizes/displays adequate comfort level or baseline comfort level  4/17/2025 1433 by Kaylee Ruiz RN  Outcome: Progressing  Flowsheets (Taken 4/17/2025 0238 by Keagan Ardon RN)  Verbalizes/displays adequate comfort level or baseline comfort level:   Encourage patient to monitor pain and request assistance   Assess pain using appropriate pain scale   Administer analgesics based on type and severity of pain and evaluate response  4/17/2025 0238 by Keagan Ardon RN  Outcome: Progressing  Flowsheets (Taken 4/17/2025 0238)  Verbalizes/displays adequate comfort level or baseline comfort level:   Encourage patient to monitor pain and request assistance   Assess pain using appropriate pain scale   Administer analgesics based on type and severity of pain and evaluate response     Problem: Safety - Adult  Goal: Free from fall injury  4/17/2025 1433 by Kaylee Ruiz RN  Outcome: Progressing  Flowsheets (Taken 4/17/2025 1432)  Free From Fall Injury:   Instruct 
  Problem: Chronic Conditions and Co-morbidities  Goal: Patient's chronic conditions and co-morbidity symptoms are monitored and maintained or improved  4/18/2025 1259 by Radha Wild RN  Outcome: Progressing  Flowsheets (Taken 4/18/2025 0805)  Care Plan - Patient's Chronic Conditions and Co-Morbidity Symptoms are Monitored and Maintained or Improved: Monitor and assess patient's chronic conditions and comorbid symptoms for stability, deterioration, or improvement     Problem: Pain  Goal: Verbalizes/displays adequate comfort level or baseline comfort level  4/18/2025 1259 by Radha Wild RN  Outcome: Progressing  Flowsheets (Taken 4/18/2025 0837)  Verbalizes/displays adequate comfort level or baseline comfort level:   Encourage patient to monitor pain and request assistance   Assess pain using appropriate pain scale   Administer analgesics based on type and severity of pain and evaluate response     Problem: Safety - Adult  Goal: Free from fall injury  4/18/2025 1259 by Radha Wild RN  Outcome: Progressing  Flowsheets (Taken 4/18/2025 0353 by Matthew Camarena RN)  Free From Fall Injury: Instruct family/caregiver on patient safety  Note: Patient will remain free from falls during hospital visit  Nurse will ensure patients bed is in lowest position, wheels locked and alarm on.  Nurse will ensure all fall safety protocols have been initiated including call light within reach, fall sign posted and yellow non slip socks are being worn.  Patient will demonstrate understanding of fall precautions and safety  Patient will demonstrate understanding of utilizing call bell for assistance with needs.      Problem: Cardiovascular - Adult  Goal: Maintains optimal cardiac output and hemodynamic stability  4/18/2025 1259 by Radha Wild RN  Outcome: Progressing  Flowsheets (Taken 4/18/2025 0805)  Maintains optimal cardiac output and hemodynamic stability: Monitor blood pressure and heart rate   
  Problem: Chronic Conditions and Co-morbidities  Goal: Patient's chronic conditions and co-morbidity symptoms are monitored and maintained or improved  4/18/2025 2355 by Matthew Camarena RN  Outcome: Progressing  Flowsheets (Taken 4/18/2025 1951)  Care Plan - Patient's Chronic Conditions and Co-Morbidity Symptoms are Monitored and Maintained or Improved:   Monitor and assess patient's chronic conditions and comorbid symptoms for stability, deterioration, or improvement   Collaborate with multidisciplinary team to address chronic and comorbid conditions and prevent exacerbation or deterioration   Update acute care plan with appropriate goals if chronic or comorbid symptoms are exacerbated and prevent overall improvement and discharge  Note: Educated patient on importance of monitoring vital signs every 4 hours. Educated patient on importance of adhering to prescribed medication regimen and treatment including on an outpatient basis. Educated patient on importance of keeping heart monitor on at all times.   4/18/2025 1259 by Radha Wild RN  Outcome: Progressing  Flowsheets (Taken 4/18/2025 0805)  Care Plan - Patient's Chronic Conditions and Co-Morbidity Symptoms are Monitored and Maintained or Improved: Monitor and assess patient's chronic conditions and comorbid symptoms for stability, deterioration, or improvement     Problem: Pain  Goal: Verbalizes/displays adequate comfort level or baseline comfort level  4/18/2025 2355 by Matthew Camarena RN  Outcome: Progressing  Flowsheets (Taken 4/18/2025 0837 by Radha Wild RN)  Verbalizes/displays adequate comfort level or baseline comfort level:   Encourage patient to monitor pain and request assistance   Assess pain using appropriate pain scale   Administer analgesics based on type and severity of pain and evaluate response  Note: Educated patient on dosage and frequency of prescribed pain medication. Patient denies pain at this time.   4/18/2025 1259 by 
  Problem: Chronic Conditions and Co-morbidities  Goal: Patient's chronic conditions and co-morbidity symptoms are monitored and maintained or improved  4/19/2025 1651 by Kassandra Fairbanks RN  Outcome: Progressing     Problem: Pain  Goal: Verbalizes/displays adequate comfort level or baseline comfort level  4/19/2025 1651 by Kassandra Fairbanks RN  Outcome: Progressing  Flowsheets (Taken 4/19/2025 0736)  Verbalizes/displays adequate comfort level or baseline comfort level:   Encourage patient to monitor pain and request assistance   Assess pain using appropriate pain scale   Administer analgesics based on type and severity of pain and evaluate response   Implement non-pharmacological measures as appropriate and evaluate response   Consider cultural and social influences on pain and pain management   Notify Licensed Independent Practitioner if interventions unsuccessful or patient reports new pain  Note: Patient expresses pain in upper right arm/ shoulder   Patient will be able to efficiently rate pain on appropriate pain scale.  Patient will be able to identify pain triggers and ways to manage pain using non pharmacologic interventions  Nurse will monitor patients pain level and provide the appropriate pain management interventions  Nurse will reassess patients pain level per protocol and evaluate response to intervention.        Problem: Cardiovascular - Adult  Goal: Maintains optimal cardiac output and hemodynamic stability  4/19/2025 1651 by Kassandra Fairbanks RN  Outcome: Progressing  Flowsheets (Taken 4/19/2025 0736)  Maintains optimal cardiac output and hemodynamic stability:   Monitor blood pressure and heart rate   Monitor urine output and notify Licensed Independent Practitioner for values outside of normal range   Assess for signs of decreased cardiac output   Administer vasoactive medications as ordered  Note: Patient refusing telemetry leads  Nurse will monitor vitals Q4 hours.      Problem: Genitourinary - 
  Problem: Chronic Conditions and Co-morbidities  Goal: Patient's chronic conditions and co-morbidity symptoms are monitored and maintained or improved  Outcome: Progressing  Flowsheets  Taken 4/15/2025 1206  Care Plan - Patient's Chronic Conditions and Co-Morbidity Symptoms are Monitored and Maintained or Improved: Update acute care plan with appropriate goals if chronic or comorbid symptoms are exacerbated and prevent overall improvement and discharge  Taken 4/15/2025 1130  Care Plan - Patient's Chronic Conditions and Co-Morbidity Symptoms are Monitored and Maintained or Improved: Monitor and assess patient's chronic conditions and comorbid symptoms for stability, deterioration, or improvement     Problem: Pain  Goal: Verbalizes/displays adequate comfort level or baseline comfort level  Outcome: Progressing  Flowsheets (Taken 4/15/2025 1115)  Verbalizes/displays adequate comfort level or baseline comfort level:   Encourage patient to monitor pain and request assistance   Assess pain using appropriate pain scale   Administer analgesics based on type and severity of pain and evaluate response   Implement non-pharmacological measures as appropriate and evaluate response   Consider cultural and social influences on pain and pain management     Problem: Safety - Adult  Goal: Free from fall injury  Outcome: Progressing  Flowsheets (Taken 4/15/2025 1206)  Free From Fall Injury: Instruct family/caregiver on patient safety     Problem: Cardiovascular - Adult  Goal: Maintains optimal cardiac output and hemodynamic stability  Outcome: Progressing  Flowsheets  Taken 4/15/2025 1206  Maintains optimal cardiac output and hemodynamic stability: Monitor blood pressure and heart rate  Taken 4/15/2025 1130  Maintains optimal cardiac output and hemodynamic stability: Monitor blood pressure and heart rate     Problem: Genitourinary - Adult  Goal: Absence of urinary retention  Outcome: Progressing  Flowsheets  Taken 4/15/2025 
  Problem: Chronic Conditions and Co-morbidities  Goal: Patient's chronic conditions and co-morbidity symptoms are monitored and maintained or improved  Outcome: Progressing  Flowsheets (Taken 4/16/2025 0902)  Care Plan - Patient's Chronic Conditions and Co-Morbidity Symptoms are Monitored and Maintained or Improved:   Monitor and assess patient's chronic conditions and comorbid symptoms for stability, deterioration, or improvement   Collaborate with multidisciplinary team to address chronic and comorbid conditions and prevent exacerbation or deterioration     Problem: Pain  Goal: Verbalizes/displays adequate comfort level or baseline comfort level  Outcome: Progressing  Flowsheets (Taken 4/16/2025 0123 by Keagan Ardon RN)  Verbalizes/displays adequate comfort level or baseline comfort level:   Encourage patient to monitor pain and request assistance   Assess pain using appropriate pain scale   Administer analgesics based on type and severity of pain and evaluate response     Problem: Safety - Adult  Goal: Free from fall injury  Outcome: Progressing  Flowsheets (Taken 4/16/2025 1605)  Free From Fall Injury:   Instruct family/caregiver on patient safety   Based on caregiver fall risk screen, instruct family/caregiver to ask for assistance with transferring infant if caregiver noted to have fall risk factors     Problem: Cardiovascular - Adult  Goal: Maintains optimal cardiac output and hemodynamic stability  Outcome: Progressing  Flowsheets (Taken 4/16/2025 0902)  Maintains optimal cardiac output and hemodynamic stability:   Monitor blood pressure and heart rate   Assess for signs of decreased cardiac output   Monitor urine output and notify Licensed Independent Practitioner for values outside of normal range     Problem: Genitourinary - Adult  Goal: Absence of urinary retention  Outcome: Progressing  Flowsheets (Taken 4/16/2025 0902)  Absence of urinary retention:   Assess patient’s ability to void and empty 
  Problem: Infection - Adult  Goal: Absence of infection during hospitalization  4/17/2025 0238 by Keagan Ardno RN  Outcome: Progressing  Flowsheets (Taken 4/17/2025 0238)  Absence of infection during hospitalization:   Assess and monitor for signs and symptoms of infection   Monitor lab/diagnostic results   Monitor all insertion sites i.e., indwelling lines, tubes and drains     Problem: Metabolic/Fluid and Electrolytes - Adult  Goal: Electrolytes maintained within normal limits  4/17/2025 0238 by Keagan Ardon RN  Outcome: Progressing  Flowsheets (Taken 4/17/2025 0238)  Electrolytes maintained within normal limits: Monitor labs and assess patient for signs and symptoms of electrolyte imbalances     
symptoms of infection   Monitor lab/diagnostic results   Monitor all insertion sites i.e., indwelling lines, tubes and drains   Instruct and encourage patient and family to use good hand hygiene technique  Note: Educated patient on importance of good hand hygiene.  4/17/2025 1433 by Kaylee Ruiz, RN  Outcome: Progressing  Flowsheets (Taken 4/17/2025 1055)  Absence of infection during hospitalization:   Assess and monitor for signs and symptoms of infection   Monitor all insertion sites i.e., indwelling lines, tubes and drains     Problem: Metabolic/Fluid and Electrolytes - Adult  Goal: Electrolytes maintained within normal limits  4/18/2025 0353 by Matthew Camarena, RN  Outcome: Progressing  Flowsheets (Taken 4/17/2025 1934)  Electrolytes maintained within normal limits:   Monitor labs and assess patient for signs and symptoms of electrolyte imbalances   Administer electrolyte replacement as ordered   Monitor response to electrolyte replacements, including repeat lab results as appropriate  Note: Patient receives hemodialysis.  4/17/2025 1433 by Kaylee Ruiz, RN  Outcome: Progressing  Flowsheets (Taken 4/17/2025 1055)  Electrolytes maintained within normal limits: Monitor labs and assess patient for signs and symptoms of electrolyte imbalances

## 2025-04-19 NOTE — PROGRESS NOTES
Subkective  No new co   No cp or sob     Review of Systems  A comprehensive review of systems was negative except for that written in the History of Present Illness.      Physical Exam:      Visit Vitals  /71   Pulse 63   Temp 98.1 °F (36.7 °C) (Oral)   Resp 16   Ht 1.829 m (6' 0.01\")   Wt 82.5 kg (181 lb 14.1 oz)   SpO2 99%   BMI 24.66 kg/m²       Physical Exam:  Physical Exam:   General:  Alert, cooperative, no distress, appears stated age.   Eyes:  Conjunctivae/corneas clear. PERRL, EOMs intact. Fundi benign   Ears:  Normal TMs and external ear canals both ears.   Nose: Nares normal. Septum midline. Mucosa normal. No drainage or sinus tenderness.   Mouth/Throat: Lips, mucosa, and tongue normal. Teeth and gums normal.   Neck: Supple, symmetrical, trachea midline, no adenopathy, thyroid: no enlargement/tenderness/nodules, no carotid bruit and no JVD.   Back:   Symmetric, no curvature. ROM normal. No CVA tenderness.   Lungs:   Clear to auscultation bilaterally.   Heart:  Regular rate and rhythm, S1, S2 normal, no murmur, click, rub or gallop.   Abdomen:   Soft, non-tender. Bowel sounds normal. No masses,  No organomegaly.   Extremities: Extremities normal, atraumatic, no cyanosis or edema.  From outside right arm lateral swelling and tender but still I can feel the thrill and bruit   Pulses: 2+ and symmetric all extremities.   Skin: Skin color, texture, turgor normal. No rashes or lesions               Labs Reviewed:  Labs: Results:   Chemistry Recent Labs     04/15/25  1027 04/16/25  0213   GLUCOSE 123* 156*    136   K 4.1 3.8    102   CO2 24 26   BUN 73* 82*   CREATININE 9.85* 9.81*   GLOB 3.9  --    ALT 18  --    AST 13  --       CBC w/Diff Recent Labs     04/15/25  1027 04/16/25  0213   WBC 6.9 7.1   RBC 2.98* 3.08*   HGB 9.5* 9.8*   HCT 28.7* 29.8*    228          Assessment/Plan     Principal Problem:    Complication of arteriovenous dialysis fistula, initial encounter  Resolved 
  OCCUPATIONAL THERAPY EVALUATION/DISCHARGE    Patient: Simone Lamas (70 y.o. male)  Date: 4/16/2025  Primary Diagnosis: Edema [R60.9]  Complication of arteriovenous dialysis fistula, initial encounter [T82.9XXA]  Procedure(s) (LRB):  Fistulogram right-W/POSS TDC PLACEMENT (N/A) * Day of Surgery *   Precautions: General Precautions  PLOF: Pt was independent with self-care and functional mobility.      ASSESSMENT AND RECOMMENDATIONS:  Pt cleared to participate in OT evaluation by RN. Pt supine in bed, alert, and agreeable to participate. Educated on the role of OT, evaluation process, and safety during this admission with pt verbalizing understanding. RUE observed with increased swelling from shoulder to finger tips. Elevated onto pillows with patient educated on importance of elevation to assist with swelling. In addition, pt educated on the importance of shoulder flex/ext, elbow flex/ext, wrist flex/ext and ulnar and radial deviation, as well as active finger and hand movement to prevent edema and maintain function. Able to demonstrate AROM of BUE to perform exercises. Patient issued resistance glove to squeeze during admission. Based on the objective data described below, pt presents with no deficits that impede pt function with ADLs, functional transfers, and functional mobility in preparation for selfcare tasks. At this time pt with no concerns from selfcare standpoint. Pt left all needs met and call bell in reach.      Maximum therapeutic gains met at current level of care and patient will be discharged from occupational therapy at this time.    Further Equipment Recommendations for Discharge: None    AMPAC: AM-PAC Inpatient Daily Activity Raw Score: 24    At this time and based on an AM-PAC score, no further OT is recommended upon discharge.  Recommend patient returns to prior setting with prior services.    This [facility] score should be considered in conjunction with interdisciplinary team recommendations to 
 completed the initial Spiritual Assessment of the patient, and offered Pastoral Care support to the patient in bed 15 of the emergency room where he may be admitted due to swelling, in his right are after having surgery for blood clots last week. Patient says it has continued to swell and it scares me.  There is no advance directive on file.  He is not active in any laura community.Patient does not have any Yazidi/cultural needs that will affect patient’s preferences in health care. Chaplains will continue to follow and will provide pastoral care on an as needed/requested basis.    Spiritual Health History and Assessment/Progress Note  [facility]    Initial Encounter, Crisis (iv-sa-eje), Follow up (swelling of are after having surgery a few day ago.),  ,      Name: Simone Lamas MRN: [id number]    Age: 70 y.o.     Sex: male   Language: English   Taoist: Episcopalian   Complication of arteriovenous dialysis fistula, initial encounter     Date: 4/15/2025            Total Time Calculated: 8 min              Spiritual Assessment began in [facility] EMERGENCY DEPARTMENT        Referral/Consult From: Rounding   Encounter Overview/Reason: Initial Encounter, Crisis (iv-sa-eje)  Service Provided For: Patient    Laura, Belief, Meaning:   Patient Other: is not connected to any laura tradition or community.  Family/Friends No family/friends present      Importance and Influence:  Patient has no beliefs influential to healthcare decision-making identified during this visit  Family/Friends No family/friends present    Community:  Patient   Family/Friends No family/friends present    Assessment and Plan of Care:     Patient Interventions include: Affirmed coping skills/support systems  Family/Friends Interventions include: No family/friends present    Patient Plan of Care: Spiritual Care available upon further referral  Family/Friends Plan of Care: No family/friends present    Electronically 
4 Eyes Skin Assessment     NAME:  Simone Lamas  YOB: 1955  MEDICAL RECORD NUMBER:  [id number]    The patient is being assessed for  Admission    I agree that at least one RN has performed a thorough Head to Toe Skin Assessment on the patient. ALL assessment sites listed below have been assessed.      Areas assessed by both nurses:    Head, Face, Ears, Shoulders, Back, Chest, Arms, Elbows, Hands, Sacrum. Buttock, Coccyx, Ischium, Legs. Feet and Heels, and Under Medical Devices         Does the Patient have a Wound? No noted wound(s)       Guerrero Prevention initiated by RN: Yes  Wound Care Orders initiated by RN: No    Pressure Injury (Stage 3,4, Unstageable, DTI, NWPT, and Complex wounds) if present, place Wound referral order by RN under : No    New Ostomies, if present place, Ostomy referral order under : No     Nurse 1 eSignature: Electronically signed by Milana Luo RN on 4/15/25 at 11:35 AM EDT    **SHARE this note so that the co-signing nurse can place an eSignature**    Nurse 2 eSignature: {Esignature:[id number]}   
Cath holding summary:  0715: Verbal report received from Keagan Lamas being received from [address] [place] [address] for ordered procedure. Report consisted of patient's Situation, Background, Assessment and Recommendations (SBAR). Information from the following report(s) Nurse Handoff Report, Intake/Output, MAR, Recent Results, Med Rec Status, Cardiac Rhythm NSR, Quality Measures, and Neuro Assessment was reviewed with the receiving nurse. Pt A&O x4, no c/o pain. Opportunity for questions and clarification provided.    0730: Patient transported on bed from inpatient room 460 without difficulty, placed on monitor NSR. A&O x4, no c/o pain. NPO since midnight, ID and allergies verified. H&P reviewed, med rec completed.  Consent ready for signature.    0801: Verbal report given to Tomeka Lamas being transferred to [facility] for ordered procedure. Report consisted of patient's Situation, Background, Assessment and Recommendations (SBAR). Information from the following report(s) Nurse Handoff Report, Intake/Output, MAR, Recent Results, Med Rec Status, Cardiac Rhythm NSR, Pre Procedure Checklist, and Procedure Verification was reviewed with the receiving nurse. Opportunity for questions and clarification was provided.    0930: Verbal report received from Kassandra Lamas being received from [facility] for routine post-op. Report consisted of patient's Situation, Background, Assessment and Recommendations (SBAR). Information from the following report(s) Nurse Handoff Report, Surgery Report, Intake/Output, MAR, Recent Results, Med Rec Status, Cardiac Rhythm NSR, and Event Log was reviewed with the receiving nurse. Pt A&O x4, no c/o pain. Opportunity for questions and clarification provided.  Procedure: Fistulogram  Intervention: Yes   If yes, antiplatelet administered:  Site: Right, Arm    0941: Verbal report given to Kassandra Lamas being transferred to [facility] for routine progression of patient care. Report consisted of 
Consult Note    Patient: Simone Lamas               Sex: male          DOA: 4/15/2025         YOB: 1955      Age:  70 y.o.        LOS:  LOS: 0 days              HPI:     Simone Lamas is a 70 y.o. male who has been seen on consultation at the request of the emergency room physician for her ESRD and need for dialysis.  Patient is well-known to me gets dialysis under my care every Tuesday Thursday Saturday.  She was just discharged on last Sunday after taking care of nonfunctional for complication of complicated right arm AV fistula..  Since discharge he did not go to outpatient dialysis unit.  Patient noticed that his right arm is getting more swollen and painful for that reason he came back to the emergency room for further evaluation..  Patient denies any shortness of breath no chest pain no palpitation.  A noninvasive Doppler study was done which shows acute occlusive thrombosis in 1 of 2 brachial veins and also acute nonocclusive thrombosis in 1 of 2 brachial veins and axillary vein..  Patent stent with nonocclusive thrombus present medial to fistula fistula with reduced volume flow of less than 600 cc/min.  Patent stent with reduced volume less than 400 cc/min flow at the distal portion of the atrial fibrillation fistula..  Known for 1025 acute nonocclusive thrombosis in the subclavian vein outflow vessel.  Prior exam demonstrated occlusive thrombus with thrombectomy performed    Past Medical History:   Diagnosis Date    CHF (congestive heart failure) (HCC)     Diabetes mellitus (HCC)     End stage renal disease (HCC)     Hypertension     TIA (transient ischemic attack)        Past Surgical History:   Procedure Laterality Date    CARDIAC PROCEDURE N/A 7/22/2024    Left heart cath performed by Abhijeet Martinez MD at [facility] CARDIAC CATH LAB    CARDIAC PROCEDURE N/A 7/22/2024    Coronary angiography performed by Abhijeet Martinez MD at [facility] CARDIAC CATH LAB    INVASIVE VASCULAR N/A 4/10/2025    Fistulogram 
Dr. Chamberlain is informed that this patient removed his telemetry monitor. He says he does not want it and if I put it back he says he willl remove it again. He is A&Ox4, NSR with BBB. I explained to him and encouraged him to put it back but he maintains that he does not want it. Will try to put back later.    1:23 AM Patient continues to refuse telemetry monitor.    3:24 AM Patient continues to refuse telemetry monitor.    5:40 AM Patient continues to refuse telemetry monitor.      
Dr. Muro is informed  that  patient is concerned about his urine that is  dark tea  in color. He says this is the first time this happened, usually yellow urine. He had dialysis today. I explained to him that this happens when patients are on dialysis, but he says he's been on dialysis for 3 years and this never happened before.   New order of urinalysis is received.     9:35 PM Dr. Bray is informed of above because patient remained very concerned after this RN told him that Dr. Muro was informed. Dr. Bray said to send urine for urinalysis and that he will talk to patient in the morning.  
[facility]  Progress Note    Patient: Simone Lamas Age: 70 y.o. : 1955 MR#: [id number] SSN: [id number]  Date/Time: 2025     Chief Complaint   Patient presents with    Vascular Access Problem       Subjective:   Patient seen and evaluated.  No new events overnight.  Patient underwent thrombectomy and balloon in right subclavian vein.  Patient tolerated procedure well.  Will continue to monitor, hemodialysis today.    Assessment/Plan:   AV dialysis fistula complication  -Vascular surgery following, patient underwent thrombectomy and balloon dilation today.  Discussed with vascular surgery, will dialyze today and see how his arm performs.  Appreciate assistance in care.  - Keep n.p.o. sips with meds.  Pain control  -Holding chemical prophylaxis including aspirin for now.  Resume once able.       ESRD  - Nephrology Dr. Bray following, appreciate assistance in care.  Dialysis per nephrology likely post procedure today.     Insulin-dependent type 2 diabetes  - Accu-Cheks SSI while NPO.  Lantus monitor adjust accordingly  - Hypoglycemic protocol     Chronic systolic heart failure  - Not in acute exacerbation.  Strict I's and O's, daily weights continue carvedilol     Hypertension  - Holding home losartan for now, continue carvedilol.  Should improve with hemodialysis     Urinary retention/BPH  - Flomax  Hyperlipidemia-Home atorvastatin  COPD without exacerbation-not on inhalers per chart review.  DuoNebs as needed.  CAD-aspirin and losartan on hold as mentioned above.  Continue beta-blocker and statin.  Anemia of chronic disease  -Hemoglobin stable.  Transfuse to keep hemoglobin greater than 7.  No signs symptoms of active ble          Dispo plan: Home once medically stable anticipated discharge date -    I spent 50 minutes with the patient in face-to-face consultation, of which greater than 50% was spent in counseling and coordination of care as described 
[facility]      No events overnight.  Underwent HD via his RUE AV access yesterday. Had an episode of chest pain. Vein mapping was performed.       PE:   Blood pressure 104/70, pulse 76, temperature 98.1 °F (36.7 °C), temperature source Oral, resp. rate 18, height 1.829 m (6'), weight 82.1 kg (181 lb), SpO2 97%.      Intake/Output Summary (Last 24 hours) at 4/18/2025 1435  Last data filed at 4/18/2025 1138  Gross per 24 hour   Intake 980 ml   Output 3750 ml   Net -2770 ml       NAD  Breathing comfortably  RUE with markedly decreased edema. Hand-forearm wrap in place.   Pulsatile thrill of the fistula.       Lab Results   Component Value Date    WBC 9.0 04/18/2025    HGB 10.3 (L) 04/18/2025    HCT 31.3 (L) 04/18/2025    MCV 96.9 04/18/2025     04/18/2025     Lab Results   Component Value Date/Time     04/18/2025 12:44 AM    K 4.1 04/18/2025 12:44 AM     04/18/2025 12:44 AM    CO2 30 04/18/2025 12:44 AM    BUN 51 04/18/2025 12:44 AM    CREATININE 6.40 04/18/2025 12:44 AM    GLUCOSE 165 04/18/2025 12:44 AM    CALCIUM 9.3 04/18/2025 12:44 AM    LABGLOM 9 04/18/2025 12:44 AM    LABGLOM 11 04/23/2024 06:08 AM    LABGLOM 11 01/26/2023 04:26 AM      Vein mapping of the LUE: Diminutive cephalic vein at the wrist and SVT in the AC fossa segment. In the upper arm, it measures 2mm. The basilic vein is 2mm in the forearm and 3.3, 3.6, 3.7mm in the upper arm. It bifurcates at the AC fossa.     Access duplex of the RUE (prior to the second intervention) Acute occlusive DVT in 1/2 brachial veins. Acute nonocclusive DVT in 1/2 brachial veins. Thrombus within the R axillary and subclavian veins.       IMPRESSION:   Patent RUE brachial cephalic AVF, with significant outflow axillary, subclavian, and innominate vein chronic stenosis. He was able to undergo dialysis via the fistula yesterday.   Concurrent RUE 2/2 brachial vein DVT. I would consider this a provoked DVT due to poor outflow. 
[facility]    71 y/o M with RUE brachiocephalic AVF s/p recent thrombectomy 4/10/25 for an occluded AVF. Outflow cephalic vein stent was placed.   There was subclavian-innominate vein stenosis present.     Plan for RIGHT UE AV fistulogram, with possible intervention and possible tunneled dialysis catheter placement. He may need fistula ligation, with possible access on the contralateral extremity in the future if unable to optimize the central vein stenoses.  Risks, benefits and alternatives were discussed with the patient including but not limited to bleeding, infection, injury to surrounding structures including nerves and/or other organs, scar tissue accumulation, need for surveillance, need for further procedures, inability to intervene.  The patient expressed understanding after the opportunity to ask questions, and consented to the procedure.     [facility]  Vascular Surgeon    
PT order received and chart reviewed. Patient is independent with functional mobility. Skilled PT not indicated, will sign off. Thank you.   Brina Manning PT, DPT  
Progress Note    Simone Lamas  70 y.o.      Admit Date: 4/15/2025  Patient Active Problem List   Diagnosis    Elevated serum creatinine    Systolic CHF, chronic (HCC)    Chronic renal insufficiency    Hypertension    Stage 3 chronic renal impairment associated with type 2 diabetes mellitus    Hepatitis C infection    Cocaine use    Cardiac LV ejection fraction 30-35%    Homelessness    Hypertriglyceridemia    Elevated HDL    Decreased GFR    NSTEMI (non-ST elevated myocardial infarction) (HCC)    History of heroin abuse    Elevated alkaline phosphatase level    History of cocaine abuse    TIA (transient ischemic attack)    Type 2 diabetes mellitus with insulin therapy (HCC)    COPD, mild (HCC)    CHF (congestive heart failure) (HCC)    Cardiomyopathy    Insulin dependent type 2 diabetes mellitus, controlled (HCC)    CVA (cerebral vascular accident) (HCC)    ESRD (end stage renal disease) (HCC)    Substance abuse    Anemia    End stage renal disease (HCC)    Pericardial effusion    Hyperglycemia    CKD (chronic kidney disease) stage 5, GFR less than 15 ml/min (HCC)    CKD (chronic kidney disease) stage 4, GFR 15-29 ml/min (HCC)    ESRD (end stage renal disease) on dialysis (HCC)    AVF (arteriovenous fistula)    Volume overload    Chest pain    Diabetes due to undrl condition w oth diabetic kidney comp (HCC)    Unstable angina (HCC)    AV fistula occlusion, initial encounter    Complication of arteriovenous dialysis fistula    Systolic dysfunction with acute on chronic heart failure (HCC)           Subjective:     Patient is comfortable.  No new complaint.  Discomfort and pain on the right arm is very minimal.  Does not see any blood in the urine and she is not concerned anymore like before about that concentrated urine.  Waiting to be dialyzed.       A comprehensive review of systems was negative except for that written in the History of Present Illness.    Objective:     /68   Pulse 66   Temp 98.1 °F (36.7 
Progress Note    Simone Lamas  70 y.o.      Admit Date: 4/15/2025  Patient Active Problem List   Diagnosis    Elevated serum creatinine    Systolic CHF, chronic (HCC)    Chronic renal insufficiency    Hypertension    Stage 3 chronic renal impairment associated with type 2 diabetes mellitus    Hepatitis C infection    Cocaine use    Cardiac LV ejection fraction 30-35%    Homelessness    Hypertriglyceridemia    Elevated HDL    Decreased GFR    NSTEMI (non-ST elevated myocardial infarction) (HCC)    History of heroin abuse    Elevated alkaline phosphatase level    History of cocaine abuse    TIA (transient ischemic attack)    Type 2 diabetes mellitus with insulin therapy (HCC)    COPD, mild (HCC)    CHF (congestive heart failure) (HCC)    Cardiomyopathy    Insulin dependent type 2 diabetes mellitus, controlled (HCC)    CVA (cerebral vascular accident) (HCC)    ESRD (end stage renal disease) (HCC)    Substance abuse    Anemia    End stage renal disease (HCC)    Pericardial effusion    Hyperglycemia    CKD (chronic kidney disease) stage 5, GFR less than 15 ml/min (HCC)    CKD (chronic kidney disease) stage 4, GFR 15-29 ml/min (HCC)    ESRD (end stage renal disease) on dialysis (HCC)    AVF (arteriovenous fistula)    Volume overload    Chest pain    Diabetes due to undrl condition w oth diabetic kidney comp (HCC)    Unstable angina (HCC)    AV fistula occlusion, initial encounter    Dialysis AV fistula malfunction    Systolic dysfunction with acute on chronic heart failure (HCC)           Subjective:     Patient is comfortable today.  When improved his pressure dressing on the right arm.  Right arm swelling is less.  Undergoing thrombectomy of his right arm AV fistula yesterday and subsequently was dialyzed.  No more chest pain.  Last night and is concerned that his urine looked quite dark and concentrated.  Urine was examined unremarkable except mild hematuria.       A comprehensive review of systems was negative except 
Progress Note    Simone Lamas  70 y.o.      Admit Date: 4/15/2025  Patient Active Problem List   Diagnosis    Elevated serum creatinine    Systolic CHF, chronic (HCC)    Chronic renal insufficiency    Hypertension    Stage 3 chronic renal impairment associated with type 2 diabetes mellitus    Hepatitis C infection    Cocaine use    Cardiac LV ejection fraction 30-35%    Homelessness    Hypertriglyceridemia    Elevated HDL    Decreased GFR    NSTEMI (non-ST elevated myocardial infarction) (HCC)    History of heroin abuse    Elevated alkaline phosphatase level    History of cocaine abuse    TIA (transient ischemic attack)    Type 2 diabetes mellitus with insulin therapy (HCC)    COPD, mild (HCC)    CHF (congestive heart failure) (HCC)    Cardiomyopathy    Insulin dependent type 2 diabetes mellitus, controlled (HCC)    CVA (cerebral vascular accident) (HCC)    ESRD (end stage renal disease) (HCC)    Substance abuse    Anemia    End stage renal disease (HCC)    Pericardial effusion    Hyperglycemia    CKD (chronic kidney disease) stage 5, GFR less than 15 ml/min (HCC)    CKD (chronic kidney disease) stage 4, GFR 15-29 ml/min (HCC)    ESRD (end stage renal disease) on dialysis ([facility])    AVF (arteriovenous fistula)    Volume overload    Chest pain    Diabetes due to undrl condition w oth diabetic kidney comp (HCC)    Unstable angina (HCC)    AV fistula occlusion, initial encounter    Dialysis AV fistula malfunction    Systolic dysfunction with acute on chronic heart failure (HCC)           Subjective:     Patient was seen early this morning just after the procedure of fistulogram undergone thrombectomy.  Has significant central vein thrombosis in the right subclavian level.  Postsurgery has a good thrill and bruit on the right arm and left side.  Discussed with the vascular surgery postprocedure and was cleared to use the fistula but was not sure whether he would be able to use or not advised to keep n.p.o may need to 
31.7* 31.3*   MCV 96.8 97.5 96.9   RDW 12.6 12.8 12.8    246 258     CHEMISTRIES:  Recent Labs     04/16/25  0213 04/17/25  0239 04/18/25  0044    135* 135*   K 3.8 4.5 4.1    103 101   CO2 26 23 30   BUN 82* 87* 51*   CREATININE 9.81* 9.64* 6.40*   GLUCOSE 156* 154* 165*   PHOS  --   --  3.6     PT/INR:No results for input(s): \"PROTIME\", \"INR\" in the last 72 hours.  APTT:No results for input(s): \"APTT\" in the last 72 hours.  LIVER PROFILE:  No results for input(s): \"AST\", \"ALT\", \"BILIDIR\", \"BILITOT\", \"ALKPHOS\" in the last 72 hours.    Imaging:  XR CHEST PORTABLE  Result Date: 4/17/2025  No acute cardiopulmonary process Electronically signed by Dorene Fisher      Current Medications:  Current Facility-Administered Medications: nitroGLYCERIN (NITROSTAT) SL tablet 0.4 mg, 0.4 mg, SubLINGual, Q5 Min PRN  carvedilol (COREG) tablet 12.5 mg, 12.5 mg, Oral, BID WC  losartan (COZAAR) tablet 100 mg, 100 mg, Oral, Daily  isosorbide mononitrate (IMDUR) extended release tablet 30 mg, 30 mg, Oral, Daily  insulin glargine (LANTUS) injection vial 10 Units, 10 Units, SubCUTAneous, Nightly  hydrALAZINE (APRESOLINE) injection 10 mg, 10 mg, IntraVENous, Q6H PRN  sodium chloride flush 0.9 % injection 5-40 mL, 5-40 mL, IntraVENous, 2 times per day  sodium chloride flush 0.9 % injection 5-40 mL, 5-40 mL, IntraVENous, PRN  0.9 % sodium chloride infusion, , IntraVENous, PRN  ondansetron (ZOFRAN-ODT) disintegrating tablet 4 mg, 4 mg, Oral, Q8H PRN **OR** ondansetron (ZOFRAN) injection 4 mg, 4 mg, IntraVENous, Q6H PRN  polyethylene glycol (GLYCOLAX) packet 17 g, 17 g, Oral, Daily PRN  acetaminophen (TYLENOL) tablet 650 mg, 650 mg, Oral, Q6H PRN **OR** acetaminophen (TYLENOL) suppository 650 mg, 650 mg, Rectal, Q6H PRN  [Held by provider] aspirin chewable tablet 81 mg, 81 mg, Oral, Daily  atorvastatin (LIPITOR) tablet 40 mg, 40 mg, Oral, Nightly  [Held by provider] furosemide (LASIX) tablet 80 mg, 80 mg, Oral, 
Q6H PRN    Or    acetaminophen (TYLENOL) suppository 650 mg  650 mg Rectal Q6H PRN    [Held by provider] aspirin chewable tablet 81 mg  81 mg Oral Daily    atorvastatin (LIPITOR) tablet 40 mg  40 mg Oral Nightly    carvedilol (COREG) tablet 12.5 mg  12.5 mg Oral BID WC    [Held by provider] furosemide (LASIX) tablet 80 mg  80 mg Oral Weekly    tamsulosin (FLOMAX) capsule 0.4 mg  0.4 mg Oral Daily    [Held by provider] losartan (COZAAR) tablet 100 mg  100 mg Oral Daily    glucose chewable tablet 16 g  4 tablet Oral PRN    dextrose bolus 10% 125 mL  125 mL IntraVENous PRN    Or    dextrose bolus 10% 250 mL  250 mL IntraVENous PRN    glucagon injection 1 mg  1 mg SubCUTAneous PRN    dextrose 10 % infusion   IntraVENous Continuous PRN    oxyCODONE (ROXICODONE) immediate release tablet 5 mg  5 mg Oral Q4H PRN    lidocaine 4 % external patch 1 patch  1 patch TransDERmal Daily    insulin lispro (HUMALOG,ADMELOG) injection vial 0-4 Units  0-4 Units SubCUTAneous 4x Daily AC & HS       Labs:    Recent Results (from the past 24 hours)   POCT Glucose    Collection Time: 04/15/25  4:38 PM   Result Value Ref Range    POC Glucose 92 70 - 110 mg/dL   POCT Glucose    Collection Time: 04/15/25  8:37 PM   Result Value Ref Range    POC Glucose 184 (H) 70 - 110 mg/dL   CBC with Auto Differential    Collection Time: 04/16/25  2:13 AM   Result Value Ref Range    WBC 7.1 4.6 - 13.2 K/uL    RBC 3.08 (L) 4.35 - 5.65 M/uL    Hemoglobin 9.8 (L) 13.0 - 16.0 g/dL    Hematocrit 29.8 (L) 36.0 - 48.0 %    MCV 96.8 78.0 - 100.0 FL    MCH 31.8 24.0 - 34.0 PG    MCHC 32.9 31.0 - 37.0 g/dL    RDW 12.6 11.6 - 14.5 %    Platelets 228 135 - 420 K/uL    MPV 10.3 9.2 - 11.8 FL    Nucleated RBCs 0.0 0  WBC    nRBC 0.00 0.00 - 0.01 K/uL    Neutrophils % 58.3 40.0 - 73.0 %    Lymphocytes % 21.1 21.0 - 52.0 %    Monocytes % 12.2 (H) 3.0 - 10.0 %    Eosinophils % 7.0 (H) 0.0 - 5.0 %    Basophils % 0.7 0.0 - 2.0 %    Immature Granulocytes % 0.7 (H) 0.0 -

## 2025-04-28 LAB — ECHO BSA: 2.04 M2

## 2025-04-29 ENCOUNTER — TELEPHONE (OUTPATIENT)
Age: 70
End: 2025-04-29

## 2025-04-29 ENCOUNTER — APPOINTMENT (OUTPATIENT)
Facility: HOSPITAL | Age: 70
End: 2025-04-29
Payer: MEDICARE

## 2025-04-29 ENCOUNTER — HOSPITAL ENCOUNTER (EMERGENCY)
Facility: HOSPITAL | Age: 70
Discharge: HOME | End: 2025-04-29
Payer: MEDICARE

## 2025-04-29 VITALS
WEIGHT: 181 LBS | HEART RATE: 85 BPM | RESPIRATION RATE: 18 BRPM | HEIGHT: 72 IN | SYSTOLIC BLOOD PRESSURE: 138 MMHG | BODY MASS INDEX: 24.52 KG/M2 | DIASTOLIC BLOOD PRESSURE: 91 MMHG | OXYGEN SATURATION: 100 % | TEMPERATURE: 97.8 F

## 2025-04-29 DIAGNOSIS — M79.89 ARM SWELLING: Primary | ICD-10-CM

## 2025-04-29 DIAGNOSIS — I82.621 ACUTE DEEP VEIN THROMBOSIS (DVT) OF RIGHT UPPER EXTREMITY, UNSPECIFIED VEIN (HCC): ICD-10-CM

## 2025-04-29 LAB
ABO + RH BLD: NORMAL
ANION GAP SERPL CALC-SCNC: 2 MMOL/L (ref 3–18)
BASOPHILS # BLD: 0.05 K/UL (ref 0–0.1)
BASOPHILS NFR BLD: 0.8 % (ref 0–2)
BLOOD GROUP ANTIBODIES SERPL: NORMAL
BUN SERPL-MCNC: 19 MG/DL (ref 7–18)
BUN/CREAT SERPL: 5 (ref 12–20)
CALCIUM SERPL-MCNC: 9.4 MG/DL (ref 8.5–10.1)
CHLORIDE SERPL-SCNC: 102 MMOL/L (ref 100–111)
CO2 SERPL-SCNC: 32 MMOL/L (ref 21–32)
CREAT SERPL-MCNC: 3.9 MG/DL (ref 0.6–1.3)
DIFFERENTIAL METHOD BLD: ABNORMAL
EOSINOPHIL # BLD: 0.33 K/UL (ref 0–0.4)
EOSINOPHIL NFR BLD: 5.2 % (ref 0–5)
ERYTHROCYTE [DISTWIDTH] IN BLOOD BY AUTOMATED COUNT: 12.8 % (ref 11.6–14.5)
GLUCOSE SERPL-MCNC: 343 MG/DL (ref 74–99)
HCT VFR BLD AUTO: 28.3 % (ref 36–48)
HGB BLD-MCNC: 9.5 G/DL (ref 13–16)
IMM GRANULOCYTES # BLD AUTO: 0.03 K/UL (ref 0–0.04)
IMM GRANULOCYTES NFR BLD AUTO: 0.5 % (ref 0–0.5)
LYMPHOCYTES # BLD: 1.22 K/UL (ref 0.9–3.6)
LYMPHOCYTES NFR BLD: 19.4 % (ref 21–52)
MAGNESIUM SERPL-MCNC: 1.9 MG/DL (ref 1.6–2.6)
MCH RBC QN AUTO: 31.1 PG (ref 24–34)
MCHC RBC AUTO-ENTMCNC: 33.6 G/DL (ref 31–37)
MCV RBC AUTO: 92.8 FL (ref 78–100)
MONOCYTES # BLD: 0.57 K/UL (ref 0.05–1.2)
MONOCYTES NFR BLD: 9 % (ref 3–10)
NEUTS SEG # BLD: 4.1 K/UL (ref 1.8–8)
NEUTS SEG NFR BLD: 65.1 % (ref 40–73)
NRBC # BLD: 0 K/UL (ref 0–0.01)
NRBC BLD-RTO: 0 PER 100 WBC
PLATELET # BLD AUTO: 344 K/UL (ref 135–420)
PMV BLD AUTO: 10.2 FL (ref 9.2–11.8)
POTASSIUM SERPL-SCNC: 3.5 MMOL/L (ref 3.5–5.5)
RBC # BLD AUTO: 3.05 M/UL (ref 4.35–5.65)
SODIUM SERPL-SCNC: 136 MMOL/L (ref 136–145)
SPECIMEN EXP DATE BLD: NORMAL
TROPONIN I SERPL HS-MCNC: 81 NG/L (ref 0–78)
TROPONIN T SERPL HS-MCNC: 76.5 NG/L (ref 0–22)
WBC # BLD AUTO: 6.3 K/UL (ref 4.6–13.2)

## 2025-04-29 PROCEDURE — 94761 N-INVAS EAR/PLS OXIMETRY MLT: CPT

## 2025-04-29 PROCEDURE — 93005 ELECTROCARDIOGRAM TRACING: CPT

## 2025-04-29 PROCEDURE — 86901 BLOOD TYPING SEROLOGIC RH(D): CPT

## 2025-04-29 PROCEDURE — 85025 COMPLETE CBC W/AUTO DIFF WBC: CPT

## 2025-04-29 PROCEDURE — 99285 EMERGENCY DEPT VISIT HI MDM: CPT

## 2025-04-29 PROCEDURE — 93971 EXTREMITY STUDY: CPT

## 2025-04-29 PROCEDURE — 86850 RBC ANTIBODY SCREEN: CPT

## 2025-04-29 PROCEDURE — 80048 BASIC METABOLIC PNL TOTAL CA: CPT

## 2025-04-29 PROCEDURE — 84484 ASSAY OF TROPONIN QUANT: CPT

## 2025-04-29 PROCEDURE — 99284 EMERGENCY DEPT VISIT MOD MDM: CPT | Performed by: SURGERY

## 2025-04-29 PROCEDURE — 87040 BLOOD CULTURE FOR BACTERIA: CPT

## 2025-04-29 PROCEDURE — 83735 ASSAY OF MAGNESIUM: CPT

## 2025-04-29 PROCEDURE — 71046 X-RAY EXAM CHEST 2 VIEWS: CPT

## 2025-04-29 PROCEDURE — 86900 BLOOD TYPING SEROLOGIC ABO: CPT

## 2025-04-29 ASSESSMENT — PAIN - FUNCTIONAL ASSESSMENT: PAIN_FUNCTIONAL_ASSESSMENT: NONE - DENIES PAIN

## 2025-04-29 NOTE — TELEPHONE ENCOUNTER
Nesha from [facility] called and stated that patient right arm Fistula is about to \"explode\" and it has a crater? She stated that they did not want to touch and they are sending the patient to [facility] ED. Informed Dr. Downs, on call vascular at the [facility].

## 2025-04-29 NOTE — CONSENT
Informed Consent for Blood Component Transfusion Note    I have discussed with the patient the rationale for blood component transfusion; its benefits in treating or preventing fatigue, organ damage, or death; and its risk which includes mild transfusion reactions, rare risk of blood borne infection, or more serious but rare reactions. I have discussed the alternatives to transfusion, including the risk and consequences of not receiving transfusion. The patient had an opportunity to ask questions and had agreed to proceed with transfusion of blood components.    Electronically signed by Cornell Downs MD on 4/29/25 at 10:18 AM EDT

## 2025-04-29 NOTE — ED NOTES
Pt states that he did not finish his dialysis earlier due to elevated HR and BP, per pt \"I still have 1hr left but they stopped it because of my heart rate and blood pressure. 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    Pt's nephro is Dr. Bray.  Pt goes to [facility], with R AVF as access.

## 2025-04-29 NOTE — H&P (VIEW-ONLY)
[facility]     Chief Complaint/Reason for Consultation: Bleeding and concerning appearance of right arm brachial artery - axillary vein AV dialysis graft  History:   Mr. Simone Lamas is a 70M well-known to our practice, with past medical history significant for   ESRD, CHF, Diabetes Mellitus, hypertension with recent graft thrombectomy for occluded HD graft and on 4/17 Angiojet Zelante thrombolysis and percutaneous thrombectomy of the R subclavian and innominate veins with accompanying PTA of the right axillary, subclavian, and innominate vein with a 10 x 80 Athletis balloon. He was referred urgently today from [facility] with complaints that his access was \"about to explode\" with crater [ulceration] overlying graft course. He was sent to the ED where his BP was controlled and no active bleeding noted. After triage he was sent to Vascular Lab for graft duplex and right arm DVT study.      Past Medical History:   Diagnosis Date    CHF (congestive heart failure) (HCC)     Diabetes mellitus (HCC)     End stage renal disease (HCC)     Hypertension     TIA (transient ischemic attack)      Past Surgical History:   Procedure Laterality Date    CARDIAC PROCEDURE N/A 7/22/2024    Left heart cath performed by Abhijeet Martinez MD at [facility] CARDIAC CATH LAB    CARDIAC PROCEDURE N/A 7/22/2024    Coronary angiography performed by Abhijeet Martinez MD at [facility] CARDIAC CATH LAB    INVASIVE VASCULAR N/A 4/10/2025    Fistulogram right/POSS INTERVENTION performed by George Jesus MD at [facility] CARDIAC CATH LAB    INVASIVE VASCULAR N/A 4/17/2025    Fistulogram right-W/POSS TDC PLACEMENT performed by Rubén Carranza MD at [facility] CARDIAC CATH LAB    INVASIVE VASCULAR N/A 4/17/2025    Angioplasty fistula/dialysis circuit performed by Rubén Carranza MD at [facility] CARDIAC CATH LAB    INVASIVE VASCULAR N/A 4/17/2025    Thrombectomy peripheral vein performed by Rubén Carranza MD at [facility] CARDIAC

## 2025-04-29 NOTE — TELEPHONE ENCOUNTER
Called and spoke to patient at 2:45pm on 04/29/2025 to schedule patient for TDC Placement on May 1, 2025 (Thursday) at [facility], Cath lab.  Patient confirmed.     Patient instructions:   Check in at [facility] Cath Lab at 10:00am.  Do not eat, drink or chew gum after midnight prior to surgery.   Only take heart and blood pressure medication with a sip of water the morning of the procedure.   Take last dose of Eliquis today, 04/29/2025.   Patient instructed to have RIDE available when discharged from hospital. Patient is not allowed to drive, walk or go home using public transportation (including [employer] and [employer]).   Patient given hospital discharge appointment on 05/14/2025 and 1:30pm with Dr. Carranza.   Patient confirmed and repeated instructions.   Physical Therapy     Referred by: Shane Avila MD; Medical Diagnosis (from order):    Diagnosis Information      Diagnosis    719.46 (ICD-9-CM) - M25.562 (ICD-10-CM) - Left knee pain                Daily Treatment Note    Visit:  3     SUBJECTIVE                                                                                                               Pt reports she's continued to experience pain in her left knee after prolonged activity, specifically related to her day to day activities, like stairs, lifting, walking, etc.     OBJECTIVE                                                                                                                        TREATMENT                                                                                                                  Therapeutic Exercise:  - Leg Press 77# 3 x 20   - Hooklying Hip Adduction Isometric x 30 (2\" hold)   - Hooklying Hip Abduction Isometric x 30 (2\" hold)     - Partial ROM [place]ack Squat x 10 (each direction)   - Split Squat x 10 (each lower extremity leading)     - Step Ups x 30  - Lateral Step Ups x 15 (each lower extremity leading)     Skilled input: verbal instruction/cues and tactile instruction/cues    Writer verbally educated and received verbal consent for hand placement, positioning of patient, and techniques to be performed today from patient for clothing adjustments for techniques, therapist position for techniques and hand placement and palpation for techniques as described above and how they are pertinent to the patient's plan of care.    Home Exercise Program/Education Materials: Access Code: [email or web address]  URL: [email or web address]  Date: 11/19/2021  Prepared by: Tyrone Flores    Exercises  Supine Active Straight Leg Raise - 1 x daily - 7 x weekly - 3 sets - 10 reps  Squat - 1 x daily - 7 x weekly - 3 sets - 10 reps  Supine Bridge - 1 x daily - 7 x weekly - 3 sets - 10 reps       ASSESSMENT                                                                                                              Pt responded well to today's treatment session, was able to tolerate exercises performed with only mild discomfort reported. Pt was provided extensive education about individualizing HEP to her unique needs, adjusting parameters based on her tolerance on a given day. She was also provided an updated HEP. Pt continues to demonstrate limited activity tolerance and would benefit from continued skilled therapy to address these deficits.       PLAN                                                                                                                           Suggestions for next session as indicated: Plan to progress activity tolerance depending on pt presentation.          Therapy procedure time and total treatment time can be found documented on the Time Entry flowsheet

## 2025-04-29 NOTE — CONSULTS
[facility]     Chief Complaint/Reason for Consultation: Bleeding and concerning appearance of right arm brachial artery - axillary vein AV dialysis graft  History:   Mr. Simone Lamas is a 70M well-known to our practice, with past medical history significant for   ESRD, CHF, Diabetes Mellitus, hypertension with recent graft thrombectomy for occluded HD graft and on 4/17 Angiojet Zelante thrombolysis and percutaneous thrombectomy of the R subclavian and innominate veins with accompanying PTA of the right axillary, subclavian, and innominate vein with a 10 x 80 Athletis balloon. He was referred urgently today from [facility] with complaints that his access was \"about to explode\" with crater [ulceration] overlying graft course. He was sent to the ED where his BP was controlled and no active bleeding noted. After triage he was sent to Vascular Lab for graft duplex and right arm DVT study.      Past Medical History:   Diagnosis Date    CHF (congestive heart failure) (HCC)     Diabetes mellitus (HCC)     End stage renal disease (HCC)     Hypertension     TIA (transient ischemic attack)      Past Surgical History:   Procedure Laterality Date    CARDIAC PROCEDURE N/A 7/22/2024    Left heart cath performed by Abhijeet Martinez MD at [facility] CARDIAC CATH LAB    CARDIAC PROCEDURE N/A 7/22/2024    Coronary angiography performed by Abhijeet Martinez MD at [facility] CARDIAC CATH LAB    INVASIVE VASCULAR N/A 4/10/2025    Fistulogram right/POSS INTERVENTION performed by George Jesus MD at [facility] CARDIAC CATH LAB    INVASIVE VASCULAR N/A 4/17/2025    Fistulogram right-W/POSS TDC PLACEMENT performed by Rubén Carranza MD at [facility] CARDIAC CATH LAB    INVASIVE VASCULAR N/A 4/17/2025    Angioplasty fistula/dialysis circuit performed by Rubén Carranza MD at [facility] CARDIAC CATH LAB    INVASIVE VASCULAR N/A 4/17/2025    Thrombectomy peripheral vein performed by Rubén Carranza MD at [facility] CARDIAC

## 2025-04-29 NOTE — ED TRIAGE NOTES
Pt ambulatory to triage c/o wound check on right arm. Pt concerned because while getting HD his HR and BP shot up. VSS in triage.

## 2025-04-29 NOTE — DISCHARGE INSTRUCTIONS
Call PCP for f/u.  Continue blood thinners as prescribed.  Quit clot as instructed  Return to ED for new or worsening/ concerning symptoms

## 2025-04-30 ENCOUNTER — TELEPHONE (OUTPATIENT)
Age: 70
End: 2025-04-30

## 2025-04-30 LAB
ECHO BSA: 2.04 M2
EKG ATRIAL RATE: 78 BPM
EKG DIAGNOSIS: NORMAL
EKG P AXIS: 42 DEGREES
EKG P-R INTERVAL: 152 MS
EKG Q-T INTERVAL: 440 MS
EKG QRS DURATION: 152 MS
EKG QTC CALCULATION (BAZETT): 501 MS
EKG R AXIS: -56 DEGREES
EKG T AXIS: 55 DEGREES
EKG VENTRICULAR RATE: 78 BPM

## 2025-04-30 PROCEDURE — 93971 EXTREMITY STUDY: CPT | Performed by: SURGERY

## 2025-04-30 PROCEDURE — 93010 ELECTROCARDIOGRAM REPORT: CPT | Performed by: INTERNAL MEDICINE

## 2025-04-30 NOTE — TELEPHONE ENCOUNTER
Called and left message at 10:30am and 3:53pm to remind patient of upcoming surgery for Tunneled Dialysis catheter placement with Dr. Downs at [facility], Cath lab on May 1, 2025 (Thursday). Check in time changed to 8:45am. NPO. Waiting for patient to call back.

## 2025-05-01 ENCOUNTER — HOSPITAL ENCOUNTER (OUTPATIENT)
Facility: HOSPITAL | Age: 70
Setting detail: OUTPATIENT SURGERY
Discharge: HOME | End: 2025-05-01
Attending: SURGERY
Payer: MEDICARE

## 2025-05-01 VITALS
RESPIRATION RATE: 17 BRPM | HEIGHT: 72 IN | WEIGHT: 181 LBS | SYSTOLIC BLOOD PRESSURE: 176 MMHG | OXYGEN SATURATION: 99 % | HEART RATE: 94 BPM | BODY MASS INDEX: 24.52 KG/M2 | DIASTOLIC BLOOD PRESSURE: 108 MMHG

## 2025-05-01 DIAGNOSIS — T82.898A ARTERIOVENOUS FISTULA OCCLUSION, INITIAL ENCOUNTER: ICD-10-CM

## 2025-05-01 DIAGNOSIS — N18.6 ESRD (END STAGE RENAL DISEASE) (HCC): ICD-10-CM

## 2025-05-01 LAB — ECHO BSA: 2.04 M2

## 2025-05-01 PROCEDURE — 77001 FLUOROGUIDE FOR VEIN DEVICE: CPT | Performed by: SURGERY

## 2025-05-01 PROCEDURE — 36558 INSERT TUNNELED CV CATH: CPT | Performed by: SURGERY

## 2025-05-01 PROCEDURE — 76937 US GUIDE VASCULAR ACCESS: CPT | Performed by: SURGERY

## 2025-05-01 PROCEDURE — 76000 FLUOROSCOPY <1 HR PHYS/QHP: CPT | Performed by: SURGERY

## 2025-05-01 PROCEDURE — C1894 INTRO/SHEATH, NON-LASER: HCPCS | Performed by: SURGERY

## 2025-05-01 PROCEDURE — C1750 CATH, HEMODIALYSIS,LONG-TERM: HCPCS | Performed by: SURGERY

## 2025-05-01 PROCEDURE — 7100000010 HC PHASE II RECOVERY - FIRST 15 MIN: Performed by: SURGERY

## 2025-05-01 PROCEDURE — 2709999900 HC NON-CHARGEABLE SUPPLY: Performed by: SURGERY

## 2025-05-01 PROCEDURE — 6360000002 HC RX W HCPCS: Performed by: SURGERY

## 2025-05-01 RX ORDER — HEPARIN SODIUM 1000 [USP'U]/ML
INJECTION, SOLUTION INTRAVENOUS; SUBCUTANEOUS PRN
Status: DISCONTINUED | OUTPATIENT
Start: 2025-05-01 | End: 2025-05-01 | Stop reason: HOSPADM

## 2025-05-01 RX ORDER — HYDRALAZINE HYDROCHLORIDE 20 MG/ML
INJECTION INTRAMUSCULAR; INTRAVENOUS PRN
Status: DISCONTINUED | OUTPATIENT
Start: 2025-05-01 | End: 2025-05-01 | Stop reason: HOSPADM

## 2025-05-01 RX ORDER — LIDOCAINE HYDROCHLORIDE 10 MG/ML
INJECTION, SOLUTION EPIDURAL; INFILTRATION; INTRACAUDAL; PERINEURAL PRN
Status: DISCONTINUED | OUTPATIENT
Start: 2025-05-01 | End: 2025-05-01 | Stop reason: HOSPADM

## 2025-05-01 ASSESSMENT — PAIN - FUNCTIONAL ASSESSMENT: PAIN_FUNCTIONAL_ASSESSMENT: NONE - DENIES PAIN

## 2025-05-01 NOTE — OP NOTE
Operative Note      Patient: Simone Lamas  YOB: 1955  MRN: [id number]    Date of Procedure: 5/1/2025    Pre-Op Diagnosis Codes:      * Dialysis AV fistula malfunction, subsequent encounter [T82.590D]     * ESRD (end stage renal disease) (HCC) [N18.6]    Post-Op Diagnosis: Same       Procedure(s):  Tunnel dialysis catheter insertion    Surgeon(s):  Cornell Downs MD    Assistant:  CV Scrub: Hasmukh Ivan    IV Sedation  CV Monitor: Tana Duke  CV Sedation Nurse: Mai Veloz RN    Anesthesia Type: MAC and Local/No Anesthesia  NO SEDATION GIVEN  Local: 25 mL 1% Lidocaine    Fluoro Time: 2.0  min  Radiation Dose: 33 mGy    Complications: NONE  EBL: Minimal    Implants:  23 cm tip-to-cuff 2L Palindrome tunneled hemodialysis catheter RIJ vein      Findings:  Infection Present At Time Of Surgery (PATOS) (choose all levels that have infection present):  No infection present  Findings:  A 23 cm tip-to-cuff 14.5 Fr Palindrome 2-lumen tunneled HD catheter was placed successfully in the patient's right internal jugular vein. There was brisk flush and easy aspiration from both ports. The catheter tip was in the right atrium. The catheter followed a smooth course from the right atrium/SVC junction to the neck, then exited the jugular vein and curved down inferiorly to the infraclavicular exit site. Concentrated heparin was instilled in both ports.     The catheter was secured with 2-0 Prolene nonabsorbable suture. The neck incision was closed with buried 3-0 Monocryl and Dermabond was applied. A Biopatch and dry sterile dressing (Tegaderm) were applied. The pt tolerated the procedure well. The pt was awakened and taken to recovery in stable condition.     Detailed Description of Procedure:     The patient was brought to the cath lab suite and placed supine on the procedure table and appropriate intravenous lines and physiologic monitors were verified or established by the nursing staff. The  patient's neck and chest were prepped and draped in the usual sterile fashion from the angle of the mandible to his nipples bilaterally. Timeout was performed. IV sedation was administered. Patency of the right internal jugular vein was confirmed with ultrasound, demonstrating compressibility and phasic change with respiration.    Local anesthetic was instilled subcutaneously in the skin overlying the presumptive access point as well as the skin of the base of the neck and skin and subcutaneous tissues along the direct path from the presumptive access point to the exit site on the anterior chest wall in the infraclavicular/superior pectoral region.  The right internal jugular vein was accessed under ultrasound guidance with a microkit. Wire access was confirmed under fluoroscopy.  The puncture site was extended with a #11 blade scalpel which was advanced along the entry needle, using it as an anvil, and then gently spread with a curved mosquito clamp.  Generous subcutaneous dissection was then performed from this incision at the base of the neck at the entry site down towards the clavicle.  The micropuncture dilator and wire were then removed and exchanged for a 100 cm 0.035\" floppy tip Bentson guidewire which was advanced under fluoroscopic guidance into the right atrium and then into the inferior vena cava.  This guidewire was then secured in place, and attention was turned to tunneling the catheter.    The catheter was laid out on the anterior chest wall such that the straight/intrathoracic portion overlay the course of the Bentson wire and I could approximate the level of the cuff, positioning the exit site about 1 to 2 cm distal to this.    A 3 mm cutaneous incision was made in the skin of the infraclavicular/superior pectoral region, and I performed gentle dissection subcutaneously, directed towards the neck incision. The access site was dilated under fluoroscopy. A peel away sheath was placed over the Bentson

## 2025-05-01 NOTE — BRIEF OP NOTE
Brief Postoperative Note      Patient: Simone Lamas  YOB: 1955  MRN: [id number]    Date of Procedure: 5/1/2025    Pre-Op Diagnosis Codes:      * Dialysis AV fistula malfunction, subsequent encounter [T82.590D]     * ESRD (end stage renal disease) (HCC) [N18.6]    Post-Op Diagnosis: Same       Procedure(s):  Tunnel dialysis catheter insertion    Surgeon(s):  Cornell Downs MD    Assistant:  CV Scrub: Hasmukh Ivan    IV Sedation  CV Monitor: Tana Duke  CV Sedation Nurse: Mai Veloz RN    Anesthesia Type: MAC and Local/No Anesthesia  NO SEDATION GIVEN  Local: 25 mL 1% Lidocaine    Fluoro Time: 2.0  min  Radiation Dose: 33 mGy    Complications: NONE  EBL: Minimal    Implants:  23 cm tip-to-cuff 2L Palindrome tunneled hemodialysis catheter RIJ vein      Findings:  Infection Present At Time Of Surgery (PATOS) (choose all levels that have infection present):  No infection present  Findings:  A 23 cm tip-to-cuff 14.5 Fr Palindrome 2-lumen tunneled HD catheter was placed successfully in the patient's right internal jugular vein. There was brisk flush and easy aspiration from both ports. The catheter tip was in the right atrium. The catheter followed a smooth course from the right atrium/SVC junction to the neck, then exited the jugular vein and curved down inferiorly to the infraclavicular exit site. Concentrated heparin was instilled in both ports.     The catheter was secured with 2-0 Prolene nonabsorbable suture. The neck incision was closed with buried 3-0 Monocryl and Dermabond was applied. A Biopatch and dry sterile dressing (Tegaderm) were applied. The pt tolerated the procedure well. The pt was awakened and taken to recovery in stable condition.       Electronically signed by Cornell Downs MD on 5/1/2025 at 10:38 AM

## 2025-05-01 NOTE — PRE SEDATION
Sedation Plan  ASA: class 3 - patient with severe systemic disease     Mallampati class: II - soft palate, uvula, fauces visible.    Sedation plan: local anesthesia  Sedation plan comments: Patient had 1/2 can of Ginger Ale this morning. Reportedly did not take any meds - held not only his Eliquis, but BP meds as well. Informed patient that it was not safe for me to administer sedation bc of beverage. Pt is willing to proceed with local anesthetic alone.   Risks, benefits, and alternatives discussed with patient.  Use of blood products discussed with patient who consented to blood products.       Immediate reassessment prior to sedation:  Patient's status reviewed and vital signs assessed; acceptable to perform procedure and proceed to administer sedation as planned.

## 2025-05-01 NOTE — PROGRESS NOTES
Cath holding summary:    0857: Patient ambulated from waiting area without difficulty, placed on monitor nsr. A&O x4, no c/o pain. NPO since midnight, ID and allergies verified. H&P reviewed, med rec completed. PIV x1 inserted.consent ready for signature.    0932: Verbal report given to GET Oneill on Simone Lamas being transferred to cath lab for ordered procedure. Report consisted of patient's Situation, Background, Assessment and Recommendations (SBAR). Information from the following report(s) Nurse Handoff Report, Adult Overview, and MAR was reviewed with the receiving nurse. Opportunity for questions and clarification was provided.    1035: Verbal report received from Tana DODD on Simone Lamas being received from cath lab for routine post-op. Report consisted of patient's Situation, Background, Assessment and Recommendations (SBAR). Information from the following report(s) Nurse Handoff Report, Adult Overview, Surgery Report, and MAR was reviewed with the receiving nurse. Pt A&O x4, no c/o pain. Opportunity for questions and clarification provided.  Procedure: Tunneled Dialysis Catheter  Intervention: N/A  If yes, antiplatelet administered: n/a  Site: Right, Chest  Pain: 0    1100: AVS Discharge instructions reviewed with patient and copy given to patient.  All questions answered and all medications reviewed with the patient.  Patient verbalized understanding to all discharge instructions, including when to resume all medications prescribed.  PIV removed. Procedural site within normal limits.  No hematoma or bleeding noted from procedural and PIV site. No pain noted at discharge. Patient back to neurological baseline, A&O x4. Patient discharged in the presence of a responsible adult (friend) who will accompany patient home and is able to report post procedure complications.

## 2025-05-04 LAB
BACTERIA SPEC CULT: NORMAL
SERVICE CMNT-IMP: NORMAL

## 2025-05-05 LAB
BACTERIA SPEC CULT: NORMAL
SERVICE CMNT-IMP: NORMAL

## 2025-05-06 ASSESSMENT — ENCOUNTER SYMPTOMS
VOMITING: 0
ROS SKIN COMMENTS: RIGHT ARM SWELLING
COUGH: 0
SHORTNESS OF BREATH: 0
CHEST TIGHTNESS: 0
NAUSEA: 0
BACK PAIN: 0
ABDOMINAL PAIN: 0

## 2025-05-06 NOTE — ED PROVIDER NOTES
[facility] EMERGENCY DEPARTMENT  EMERGENCY DEPARTMENT ENCOUNTER      Pt Name: Simone Lamas  MRN: [id number]  Birthdate 1955  Date of evaluation: 4/29/2025  Provider: NATHAN Castañeda  12:28 AM    CHIEF COMPLAINT       Chief Complaint   Patient presents with    Wound Check         HISTORY OF PRESENT ILLNESS    Simone Lamas is a 70 y.o. male who presents to the emergency department with swelling to right arm.     69 y/o M with pmhx of ESRD, DVT, HTN presents to ED for wound check. He was instructed to come to ED from [facility] dialysis with for evaluation of right arm  access; \"about to explode\" with crater [ulceration] overlying graft course.He also reports that his BP was elevated at dialysis.  No active bleeding noted. He has no acute symptoms. He reports that he has been compliant with his home meds.          Nursing Notes were reviewed.    REVIEW OF SYSTEMS       Review of Systems   Constitutional:  Negative for activity change, chills, fatigue and fever.   HENT:  Negative for congestion and ear pain.    Eyes:  Negative for visual disturbance.   Respiratory:  Negative for cough, chest tightness and shortness of breath.    Cardiovascular:  Negative for chest pain and palpitations.   Gastrointestinal:  Negative for abdominal pain, nausea and vomiting.   Genitourinary:  Negative for flank pain.   Musculoskeletal:  Negative for back pain, myalgias, neck pain and neck stiffness.   Skin:  Negative for pallor and wound.        Right arm swelling   Neurological:  Negative for dizziness, seizures, syncope, speech difficulty, weakness, light-headedness, numbness and headaches.   Hematological: Negative.    Psychiatric/Behavioral: Negative.         Except as noted above the remainder of the review of systems was reviewed and negative.       PAST MEDICAL HISTORY     Past Medical History:   Diagnosis Date    CHF (congestive heart failure) (HCC)     Diabetes mellitus (HCC)     End stage renal disease

## 2025-05-10 ENCOUNTER — ANESTHESIA EVENT (OUTPATIENT)
Dept: CARDIOTHORACIC SURGERY | Facility: HOSPITAL | Age: 70
End: 2025-05-10
Payer: MEDICARE

## 2025-05-10 ENCOUNTER — HOSPITAL ENCOUNTER (INPATIENT)
Facility: HOSPITAL | Age: 70
LOS: 3 days | Discharge: HOME | DRG: 182 | End: 2025-05-13
Attending: STUDENT IN AN ORGANIZED HEALTH CARE EDUCATION/TRAINING PROGRAM | Admitting: INTERNAL MEDICINE
Payer: COMMERCIAL

## 2025-05-10 ENCOUNTER — ANESTHESIA (OUTPATIENT)
Dept: CARDIOTHORACIC SURGERY | Facility: HOSPITAL | Age: 70
End: 2025-05-10
Payer: MEDICARE

## 2025-05-10 DIAGNOSIS — Z99.2 ESRD (END STAGE RENAL DISEASE) ON DIALYSIS (HCC): Primary | ICD-10-CM

## 2025-05-10 DIAGNOSIS — T82.898A AV FISTULA OCCLUSION, INITIAL ENCOUNTER: ICD-10-CM

## 2025-05-10 DIAGNOSIS — N18.6 ESRD (END STAGE RENAL DISEASE) ON DIALYSIS (HCC): Primary | ICD-10-CM

## 2025-05-10 DIAGNOSIS — T82.838D HEMORRHAGE OF ARTERIOVENOUS FISTULA, SUBSEQUENT ENCOUNTER: ICD-10-CM

## 2025-05-10 DIAGNOSIS — I50.9 CHRONIC CONGESTIVE HEART FAILURE, UNSPECIFIED HEART FAILURE TYPE (HCC): ICD-10-CM

## 2025-05-10 DIAGNOSIS — T82.838A HEMORRHAGE OF ARTERIOVENOUS FISTULA, INITIAL ENCOUNTER: ICD-10-CM

## 2025-05-10 LAB
ALBUMIN SERPL-MCNC: 3.1 G/DL (ref 3.4–5)
ALBUMIN/GLOB SERPL: 0.9 (ref 0.8–1.7)
ALP SERPL-CCNC: 115 U/L (ref 45–117)
ALT SERPL-CCNC: 28 U/L (ref 10–50)
ANION GAP SERPL CALC-SCNC: 13 MMOL/L (ref 3–18)
AST SERPL-CCNC: 30 U/L (ref 10–38)
BASOPHILS # BLD: 0.07 K/UL (ref 0–0.1)
BASOPHILS NFR BLD: 0.9 % (ref 0–2)
BILIRUB SERPL-MCNC: 0.3 MG/DL (ref 0.2–1)
BUN SERPL-MCNC: 28 MG/DL (ref 6–23)
BUN/CREAT SERPL: 5 (ref 12–20)
CALCIUM SERPL-MCNC: 9.2 MG/DL (ref 8.5–10.1)
CHLORIDE SERPL-SCNC: 104 MMOL/L (ref 98–107)
CO2 SERPL-SCNC: 24 MMOL/L (ref 21–32)
CREAT SERPL-MCNC: 5.14 MG/DL (ref 0.6–1.3)
DIFFERENTIAL METHOD BLD: ABNORMAL
EOSINOPHIL # BLD: 0.37 K/UL (ref 0–0.4)
EOSINOPHIL NFR BLD: 4.9 % (ref 0–5)
ERYTHROCYTE [DISTWIDTH] IN BLOOD BY AUTOMATED COUNT: 14.2 % (ref 11.6–14.5)
GLOBULIN SER CALC-MCNC: 3.6 G/DL (ref 2–4)
GLUCOSE BLD STRIP.AUTO-MCNC: 195 MG/DL (ref 70–110)
GLUCOSE BLD STRIP.AUTO-MCNC: 210 MG/DL (ref 70–110)
GLUCOSE BLD STRIP.AUTO-MCNC: 289 MG/DL (ref 70–110)
GLUCOSE BLD STRIP.AUTO-MCNC: 340 MG/DL (ref 70–110)
GLUCOSE SERPL-MCNC: 273 MG/DL (ref 74–108)
HCT VFR BLD AUTO: 25.6 % (ref 36–48)
HCT VFR BLD AUTO: 26.9 % (ref 36–48)
HCT VFR BLD AUTO: 28.1 % (ref 36–48)
HGB BLD-MCNC: 8.1 G/DL (ref 13–16)
HGB BLD-MCNC: 8.4 G/DL (ref 13–16)
HGB BLD-MCNC: 9.1 G/DL (ref 13–16)
HISTORY CHECK: NORMAL
IMM GRANULOCYTES # BLD AUTO: 0.06 K/UL (ref 0–0.04)
IMM GRANULOCYTES NFR BLD AUTO: 0.8 % (ref 0–0.5)
INR PPP: 1 (ref 0.9–1.1)
LYMPHOCYTES # BLD: 1.92 K/UL (ref 0.9–3.6)
LYMPHOCYTES NFR BLD: 25.3 % (ref 21–52)
MCH RBC QN AUTO: 31.3 PG (ref 24–34)
MCHC RBC AUTO-ENTMCNC: 32.4 G/DL (ref 31–37)
MCV RBC AUTO: 96.6 FL (ref 78–100)
MONOCYTES # BLD: 0.66 K/UL (ref 0.05–1.2)
MONOCYTES NFR BLD: 8.7 % (ref 3–10)
NEUTS SEG # BLD: 4.5 K/UL (ref 1.8–8)
NEUTS SEG NFR BLD: 59.4 % (ref 40–73)
NRBC # BLD: 0 K/UL (ref 0–0.01)
NRBC BLD-RTO: 0 PER 100 WBC
PLATELET # BLD AUTO: 208 K/UL (ref 135–420)
PMV BLD AUTO: 10.7 FL (ref 9.2–11.8)
POTASSIUM SERPL-SCNC: 3.4 MMOL/L (ref 3.5–5.5)
PROT SERPL-MCNC: 6.7 G/DL (ref 6.4–8.2)
PROTHROMBIN TIME: 13.8 SEC (ref 11.9–14.9)
RBC # BLD AUTO: 2.91 M/UL (ref 4.35–5.65)
SODIUM SERPL-SCNC: 141 MMOL/L (ref 136–145)
WBC # BLD AUTO: 7.6 K/UL (ref 4.6–13.2)

## 2025-05-10 PROCEDURE — 82803 BLOOD GASES ANY COMBINATION: CPT

## 2025-05-10 PROCEDURE — 85025 COMPLETE CBC W/AUTO DIFF WBC: CPT

## 2025-05-10 PROCEDURE — 2709999900 HC NON-CHARGEABLE SUPPLY: Performed by: SURGERY

## 2025-05-10 PROCEDURE — 85610 PROTHROMBIN TIME: CPT

## 2025-05-10 PROCEDURE — 3600000002 HC SURGERY LEVEL 2 BASE: Performed by: SURGERY

## 2025-05-10 PROCEDURE — 96374 THER/PROPH/DIAG INJ IV PUSH: CPT

## 2025-05-10 PROCEDURE — 83605 ASSAY OF LACTIC ACID: CPT

## 2025-05-10 PROCEDURE — 3600000012 HC SURGERY LEVEL 2 ADDTL 15MIN: Performed by: SURGERY

## 2025-05-10 PROCEDURE — 3700000000 HC ANESTHESIA ATTENDED CARE: Performed by: SURGERY

## 2025-05-10 PROCEDURE — 6360000002 HC RX W HCPCS: Performed by: STUDENT IN AN ORGANIZED HEALTH CARE EDUCATION/TRAINING PROGRAM

## 2025-05-10 PROCEDURE — 51798 US URINE CAPACITY MEASURE: CPT

## 2025-05-10 PROCEDURE — 2000000000 HC ICU R&B

## 2025-05-10 PROCEDURE — 80053 COMPREHEN METABOLIC PANEL: CPT

## 2025-05-10 PROCEDURE — 03L70ZZ OCCLUSION OF RIGHT BRACHIAL ARTERY, OPEN APPROACH: ICD-10-PCS | Performed by: SURGERY

## 2025-05-10 PROCEDURE — 2580000003 HC RX 258: Performed by: STUDENT IN AN ORGANIZED HEALTH CARE EDUCATION/TRAINING PROGRAM

## 2025-05-10 PROCEDURE — 94761 N-INVAS EAR/PLS OXIMETRY MLT: CPT

## 2025-05-10 PROCEDURE — 85014 HEMATOCRIT: CPT

## 2025-05-10 PROCEDURE — 86900 BLOOD TYPING SEROLOGIC ABO: CPT

## 2025-05-10 PROCEDURE — 6370000000 HC RX 637 (ALT 250 FOR IP): Performed by: SURGERY

## 2025-05-10 PROCEDURE — 82962 GLUCOSE BLOOD TEST: CPT

## 2025-05-10 PROCEDURE — 2500000003 HC RX 250 WO HCPCS: Performed by: SURGERY

## 2025-05-10 PROCEDURE — 99223 1ST HOSP IP/OBS HIGH 75: CPT | Performed by: INTERNAL MEDICINE

## 2025-05-10 PROCEDURE — 86901 BLOOD TYPING SEROLOGIC RH(D): CPT

## 2025-05-10 PROCEDURE — 85018 HEMOGLOBIN: CPT

## 2025-05-10 PROCEDURE — 82947 ASSAY GLUCOSE BLOOD QUANT: CPT

## 2025-05-10 PROCEDURE — 2500000003 HC RX 250 WO HCPCS: Performed by: NURSE ANESTHETIST, CERTIFIED REGISTERED

## 2025-05-10 PROCEDURE — 36415 COLL VENOUS BLD VENIPUNCTURE: CPT

## 2025-05-10 PROCEDURE — 6360000002 HC RX W HCPCS: Performed by: NURSE ANESTHETIST, CERTIFIED REGISTERED

## 2025-05-10 PROCEDURE — 82330 ASSAY OF CALCIUM: CPT

## 2025-05-10 PROCEDURE — 3700000001 HC ADD 15 MINUTES (ANESTHESIA): Performed by: SURGERY

## 2025-05-10 PROCEDURE — 6370000000 HC RX 637 (ALT 250 FOR IP): Performed by: INTERNAL MEDICINE

## 2025-05-10 PROCEDURE — 84132 ASSAY OF SERUM POTASSIUM: CPT

## 2025-05-10 PROCEDURE — 6360000002 HC RX W HCPCS: Performed by: SURGERY

## 2025-05-10 PROCEDURE — 2720000010 HC SURG SUPPLY STERILE: Performed by: SURGERY

## 2025-05-10 PROCEDURE — 86850 RBC ANTIBODY SCREEN: CPT

## 2025-05-10 PROCEDURE — 99285 EMERGENCY DEPT VISIT HI MDM: CPT

## 2025-05-10 PROCEDURE — 86923 COMPATIBILITY TEST ELECTRIC: CPT

## 2025-05-10 PROCEDURE — 84295 ASSAY OF SERUM SODIUM: CPT

## 2025-05-10 RX ORDER — FENTANYL CITRATE 50 UG/ML
INJECTION, SOLUTION INTRAMUSCULAR; INTRAVENOUS
Status: DISCONTINUED | OUTPATIENT
Start: 2025-05-10 | End: 2025-05-10 | Stop reason: SDUPTHER

## 2025-05-10 RX ORDER — PANTOPRAZOLE SODIUM 40 MG/1
40 TABLET, DELAYED RELEASE ORAL
Status: DISCONTINUED | OUTPATIENT
Start: 2025-05-10 | End: 2025-05-13 | Stop reason: HOSPADM

## 2025-05-10 RX ORDER — INSULIN LISPRO 100 [IU]/ML
0-8 INJECTION, SOLUTION INTRAVENOUS; SUBCUTANEOUS
Status: DISCONTINUED | OUTPATIENT
Start: 2025-05-10 | End: 2025-05-13 | Stop reason: HOSPADM

## 2025-05-10 RX ORDER — CEFAZOLIN SODIUM 1 G/3ML
INJECTION, POWDER, FOR SOLUTION INTRAMUSCULAR; INTRAVENOUS
Status: DISCONTINUED | OUTPATIENT
Start: 2025-05-10 | End: 2025-05-10 | Stop reason: SDUPTHER

## 2025-05-10 RX ORDER — INSULIN LISPRO 100 [IU]/ML
0-8 INJECTION, SOLUTION INTRAVENOUS; SUBCUTANEOUS
Status: DISCONTINUED | OUTPATIENT
Start: 2025-05-10 | End: 2025-05-10

## 2025-05-10 RX ORDER — DEXTROSE MONOHYDRATE 100 MG/ML
INJECTION, SOLUTION INTRAVENOUS CONTINUOUS PRN
Status: DISCONTINUED | OUTPATIENT
Start: 2025-05-10 | End: 2025-05-13 | Stop reason: HOSPADM

## 2025-05-10 RX ORDER — LOSARTAN POTASSIUM 50 MG/1
100 TABLET ORAL DAILY
Status: DISCONTINUED | OUTPATIENT
Start: 2025-05-10 | End: 2025-05-13 | Stop reason: HOSPADM

## 2025-05-10 RX ORDER — HEPARIN SODIUM 1000 [USP'U]/ML
INJECTION, SOLUTION INTRAVENOUS; SUBCUTANEOUS
Status: DISPENSED
Start: 2025-05-10 | End: 2025-05-10

## 2025-05-10 RX ORDER — SODIUM CHLORIDE 9 MG/ML
INJECTION, SOLUTION INTRAVENOUS PRN
Status: COMPLETED | OUTPATIENT
Start: 2025-05-10 | End: 2025-05-10

## 2025-05-10 RX ORDER — HYDRALAZINE HYDROCHLORIDE 20 MG/ML
10 INJECTION INTRAMUSCULAR; INTRAVENOUS EVERY 4 HOURS PRN
Status: DISCONTINUED | OUTPATIENT
Start: 2025-05-10 | End: 2025-05-13 | Stop reason: HOSPADM

## 2025-05-10 RX ORDER — POTASSIUM CHLORIDE 1500 MG/1
40 TABLET, EXTENDED RELEASE ORAL ONCE
Status: COMPLETED | OUTPATIENT
Start: 2025-05-10 | End: 2025-05-10

## 2025-05-10 RX ORDER — ISOSORBIDE MONONITRATE 30 MG/1
30 TABLET, EXTENDED RELEASE ORAL DAILY
Status: DISCONTINUED | OUTPATIENT
Start: 2025-05-10 | End: 2025-05-13 | Stop reason: HOSPADM

## 2025-05-10 RX ORDER — DEXTROSE MONOHYDRATE 100 MG/ML
INJECTION, SOLUTION INTRAVENOUS CONTINUOUS PRN
Status: DISCONTINUED | OUTPATIENT
Start: 2025-05-10 | End: 2025-05-10 | Stop reason: HOSPADM

## 2025-05-10 RX ORDER — POTASSIUM CHLORIDE 1500 MG/1
20 TABLET, EXTENDED RELEASE ORAL
Status: DISCONTINUED | OUTPATIENT
Start: 2025-05-10 | End: 2025-05-10

## 2025-05-10 RX ORDER — HEPARIN SODIUM 200 [USP'U]/100ML
INJECTION, SOLUTION INTRAVENOUS CONTINUOUS PRN
Status: COMPLETED | OUTPATIENT
Start: 2025-05-10 | End: 2025-05-10

## 2025-05-10 RX ORDER — FUROSEMIDE 40 MG/1
80 TABLET ORAL WEEKLY
Status: DISCONTINUED | OUTPATIENT
Start: 2025-05-16 | End: 2025-05-13 | Stop reason: HOSPADM

## 2025-05-10 RX ORDER — SODIUM CHLORIDE 9 MG/ML
INJECTION, SOLUTION INTRAVENOUS PRN
Status: DISCONTINUED | OUTPATIENT
Start: 2025-05-10 | End: 2025-05-13 | Stop reason: HOSPADM

## 2025-05-10 RX ORDER — INSULIN LISPRO 100 [IU]/ML
0-15 INJECTION, SOLUTION INTRAVENOUS; SUBCUTANEOUS ONCE
Status: DISCONTINUED | OUTPATIENT
Start: 2025-05-10 | End: 2025-05-10 | Stop reason: HOSPADM

## 2025-05-10 RX ORDER — CARVEDILOL 12.5 MG/1
12.5 TABLET ORAL 2 TIMES DAILY WITH MEALS
Status: DISCONTINUED | OUTPATIENT
Start: 2025-05-10 | End: 2025-05-13 | Stop reason: HOSPADM

## 2025-05-10 RX ORDER — NEPHROCAP 1 MG
1 CAP ORAL DAILY
Status: DISCONTINUED | OUTPATIENT
Start: 2025-05-10 | End: 2025-05-13 | Stop reason: HOSPADM

## 2025-05-10 RX ORDER — ONDANSETRON 2 MG/ML
4 INJECTION INTRAMUSCULAR; INTRAVENOUS EVERY 6 HOURS PRN
Status: DISCONTINUED | OUTPATIENT
Start: 2025-05-10 | End: 2025-05-13 | Stop reason: HOSPADM

## 2025-05-10 RX ORDER — TAMSULOSIN HYDROCHLORIDE 0.4 MG/1
0.4 CAPSULE ORAL DAILY
Status: DISCONTINUED | OUTPATIENT
Start: 2025-05-10 | End: 2025-05-13 | Stop reason: HOSPADM

## 2025-05-10 RX ORDER — HYDROMORPHONE HYDROCHLORIDE 1 MG/ML
1 INJECTION, SOLUTION INTRAMUSCULAR; INTRAVENOUS; SUBCUTANEOUS
Status: COMPLETED | OUTPATIENT
Start: 2025-05-10 | End: 2025-05-10

## 2025-05-10 RX ORDER — ASPIRIN 81 MG/1
81 TABLET, CHEWABLE ORAL DAILY
Status: DISCONTINUED | OUTPATIENT
Start: 2025-05-10 | End: 2025-05-13 | Stop reason: HOSPADM

## 2025-05-10 RX ORDER — EPHEDRINE SULFATE/0.9% NACL/PF 25 MG/5 ML
SYRINGE (ML) INTRAVENOUS
Status: DISCONTINUED | OUTPATIENT
Start: 2025-05-10 | End: 2025-05-10 | Stop reason: SDUPTHER

## 2025-05-10 RX ORDER — LIDOCAINE HYDROCHLORIDE 20 MG/ML
INJECTION, SOLUTION EPIDURAL; INFILTRATION; INTRACAUDAL; PERINEURAL
Status: DISCONTINUED | OUTPATIENT
Start: 2025-05-10 | End: 2025-05-10 | Stop reason: SDUPTHER

## 2025-05-10 RX ORDER — ATORVASTATIN CALCIUM 40 MG/1
40 TABLET, FILM COATED ORAL NIGHTLY
Status: DISCONTINUED | OUTPATIENT
Start: 2025-05-10 | End: 2025-05-13 | Stop reason: HOSPADM

## 2025-05-10 RX ORDER — INSULIN GLARGINE 100 [IU]/ML
15 INJECTION, SOLUTION SUBCUTANEOUS 2 TIMES DAILY
Status: DISCONTINUED | OUTPATIENT
Start: 2025-05-10 | End: 2025-05-13 | Stop reason: HOSPADM

## 2025-05-10 RX ORDER — INSULIN GLARGINE 100 [IU]/ML
35 INJECTION, SOLUTION SUBCUTANEOUS EVERY MORNING
Status: DISCONTINUED | OUTPATIENT
Start: 2025-05-10 | End: 2025-05-10

## 2025-05-10 RX ORDER — ONDANSETRON 4 MG/1
4 TABLET, ORALLY DISINTEGRATING ORAL EVERY 8 HOURS PRN
Status: DISCONTINUED | OUTPATIENT
Start: 2025-05-10 | End: 2025-05-13 | Stop reason: HOSPADM

## 2025-05-10 RX ORDER — SODIUM CHLORIDE 0.9 % (FLUSH) 0.9 %
5-40 SYRINGE (ML) INJECTION EVERY 12 HOURS SCHEDULED
Status: DISCONTINUED | OUTPATIENT
Start: 2025-05-10 | End: 2025-05-13 | Stop reason: HOSPADM

## 2025-05-10 RX ORDER — ONDANSETRON 2 MG/ML
INJECTION INTRAMUSCULAR; INTRAVENOUS
Status: DISCONTINUED | OUTPATIENT
Start: 2025-05-10 | End: 2025-05-10 | Stop reason: SDUPTHER

## 2025-05-10 RX ORDER — TRAMADOL HYDROCHLORIDE 50 MG/1
50 TABLET ORAL EVERY 12 HOURS PRN
Status: DISCONTINUED | OUTPATIENT
Start: 2025-05-10 | End: 2025-05-13 | Stop reason: HOSPADM

## 2025-05-10 RX ORDER — MAGNESIUM HYDROXIDE 1200 MG/15ML
LIQUID ORAL CONTINUOUS PRN
Status: COMPLETED | OUTPATIENT
Start: 2025-05-10 | End: 2025-05-10

## 2025-05-10 RX ORDER — PROPOFOL 10 MG/ML
INJECTION, EMULSION INTRAVENOUS
Status: DISCONTINUED | OUTPATIENT
Start: 2025-05-10 | End: 2025-05-10 | Stop reason: SDUPTHER

## 2025-05-10 RX ORDER — SODIUM CHLORIDE 0.9 % (FLUSH) 0.9 %
5-40 SYRINGE (ML) INJECTION PRN
Status: DISCONTINUED | OUTPATIENT
Start: 2025-05-10 | End: 2025-05-13 | Stop reason: HOSPADM

## 2025-05-10 RX ADMIN — ONDANSETRON 4 MG: 2 INJECTION, SOLUTION INTRAMUSCULAR; INTRAVENOUS at 06:35

## 2025-05-10 RX ADMIN — INSULIN LISPRO 6 UNITS: 100 INJECTION, SOLUTION INTRAVENOUS; SUBCUTANEOUS at 12:48

## 2025-05-10 RX ADMIN — INSULIN LISPRO 2 UNITS: 100 INJECTION, SOLUTION INTRAVENOUS; SUBCUTANEOUS at 17:13

## 2025-05-10 RX ADMIN — FENTANYL CITRATE 50 MCG: 50 INJECTION INTRAMUSCULAR; INTRAVENOUS at 07:00

## 2025-05-10 RX ADMIN — EPHEDRINE SULFATE 5 MG: 5 INJECTION INTRAVENOUS at 06:47

## 2025-05-10 RX ADMIN — INSULIN GLARGINE 15 UNITS: 100 INJECTION, SOLUTION SUBCUTANEOUS at 08:32

## 2025-05-10 RX ADMIN — INSULIN LISPRO 2 UNITS: 100 INJECTION, SOLUTION INTRAVENOUS; SUBCUTANEOUS at 20:45

## 2025-05-10 RX ADMIN — INSULIN LISPRO 4 UNITS: 100 INJECTION, SOLUTION INTRAVENOUS; SUBCUTANEOUS at 08:36

## 2025-05-10 RX ADMIN — FENTANYL CITRATE 25 MCG: 50 INJECTION INTRAMUSCULAR; INTRAVENOUS at 06:34

## 2025-05-10 RX ADMIN — ATORVASTATIN CALCIUM 40 MG: 40 TABLET, FILM COATED ORAL at 20:45

## 2025-05-10 RX ADMIN — INSULIN GLARGINE 15 UNITS: 100 INJECTION, SOLUTION SUBCUTANEOUS at 20:45

## 2025-05-10 RX ADMIN — LIDOCAINE HYDROCHLORIDE 100 MG: 20 INJECTION, SOLUTION EPIDURAL; INFILTRATION; INTRACAUDAL; PERINEURAL at 05:05

## 2025-05-10 RX ADMIN — ASPIRIN 81 MG CHEWABLE TABLET 81 MG: 81 TABLET CHEWABLE at 08:19

## 2025-05-10 RX ADMIN — TAMSULOSIN HYDROCHLORIDE 0.4 MG: 0.4 CAPSULE ORAL at 08:23

## 2025-05-10 RX ADMIN — SODIUM CHLORIDE: 9 INJECTION, SOLUTION INTRAVENOUS at 05:03

## 2025-05-10 RX ADMIN — EPHEDRINE SULFATE 5 MG: 5 INJECTION INTRAVENOUS at 06:48

## 2025-05-10 RX ADMIN — HYDROMORPHONE HYDROCHLORIDE 1 MG: 1 INJECTION, SOLUTION INTRAMUSCULAR; INTRAVENOUS; SUBCUTANEOUS at 03:45

## 2025-05-10 RX ADMIN — CARVEDILOL 12.5 MG: 12.5 TABLET, FILM COATED ORAL at 08:18

## 2025-05-10 RX ADMIN — PROPOFOL 200 MG: 10 INJECTION, EMULSION INTRAVENOUS at 05:05

## 2025-05-10 RX ADMIN — SODIUM CHLORIDE, PRESERVATIVE FREE 10 ML: 5 INJECTION INTRAVENOUS at 08:23

## 2025-05-10 RX ADMIN — CEFAZOLIN 2 G: 1 INJECTION, POWDER, FOR SOLUTION INTRAMUSCULAR; INTRAVENOUS at 05:19

## 2025-05-10 RX ADMIN — FENTANYL CITRATE 25 MCG: 50 INJECTION INTRAMUSCULAR; INTRAVENOUS at 05:03

## 2025-05-10 RX ADMIN — HYDRALAZINE HYDROCHLORIDE 10 MG: 20 INJECTION INTRAMUSCULAR; INTRAVENOUS at 09:10

## 2025-05-10 RX ADMIN — POTASSIUM CHLORIDE 40 MEQ: 1500 TABLET, EXTENDED RELEASE ORAL at 08:35

## 2025-05-10 RX ADMIN — PANTOPRAZOLE SODIUM 40 MG: 40 TABLET, DELAYED RELEASE ORAL at 09:10

## 2025-05-10 RX ADMIN — LOSARTAN POTASSIUM 100 MG: 50 TABLET, FILM COATED ORAL at 08:35

## 2025-05-10 RX ADMIN — ISOSORBIDE MONONITRATE 30 MG: 30 TABLET, EXTENDED RELEASE ORAL at 08:19

## 2025-05-10 RX ADMIN — CARVEDILOL 12.5 MG: 12.5 TABLET, FILM COATED ORAL at 17:08

## 2025-05-10 RX ADMIN — Medication 1 MG: at 08:19

## 2025-05-10 ASSESSMENT — PAIN DESCRIPTION - ORIENTATION: ORIENTATION: RIGHT

## 2025-05-10 ASSESSMENT — PAIN DESCRIPTION - LOCATION: LOCATION: ARM

## 2025-05-10 ASSESSMENT — PAIN SCALES - GENERAL
PAINLEVEL_OUTOF10: 10
PAINLEVEL_OUTOF10: 0

## 2025-05-10 ASSESSMENT — PAIN - FUNCTIONAL ASSESSMENT: PAIN_FUNCTIONAL_ASSESSMENT: NONE - DENIES PAIN

## 2025-05-10 NOTE — ED PROVIDER NOTES
couple figure-of-eight sutures and simple interrupted sutures, with no success.  The resident also placed a couple which seemed to help.  It is still oozing but bleeding is better controlled.  Quick clot is applied with a pressure dressing.  This seems to control the bleeding but this is all with the tourniquet still in place.  Patient having a lot of pain now that the tourniquet is been on for some time and so he is given some Dilaudid to help with pain.  Labs are sent as well as a type and screen.  Vascular is consulted as I do believe this patient needs to go stat to the OR for bleeding control.  Patient is known to the on cardiovascular Dr. Who will arrange to take him to the OR.    Initial blood work shows an elevated BUN and creatinine consistent with his known ESRD.  CBC within normal limitsWith the exception of a normocytic anemia with hemoglobin of 9.1, however this appears to be around the patient's baseline.  INR within normal limits.    Vascular request that a type and cross be sent for 4 units for the OR this is done.    Patient states he is having a lot of pain and discomfort.  Does have worsening edema in his arm.  We did consider taking out his tourniquet but with how much it was bleeding even with the tourniquet on I do not feel this is a for the patient.    When vascular surgery arrives they are informed that the tourniquet has been on for an hour and 50 minutes and we will defer to them to take it down.  Patient taken to the OR.  Hospitalist consulted for admission after OR.             CRITICAL CARE TIME   Total Critical Care time was 42 minutes, excluding separately reportable procedures.  There was a high probability of clinically significant/life threatening deterioration in the patient's condition which required my urgent intervention.     CONSULTS:  None    PROCEDURES:  Unless otherwise noted below, none     Procedures      FINAL IMPRESSION      1. Hemorrhage of arteriovenous fistula,

## 2025-05-10 NOTE — PLAN OF CARE
Problem: Chronic Conditions and Co-morbidities  Goal: Patient's chronic conditions and co-morbidity symptoms are monitored and maintained or improved  Outcome: Progressing  Flowsheets (Taken 5/10/2025 1840)  Care Plan - Patient's Chronic Conditions and Co-Morbidity Symptoms are Monitored and Maintained or Improved:   Monitor and assess patient's chronic conditions and comorbid symptoms for stability, deterioration, or improvement   Collaborate with multidisciplinary team to address chronic and comorbid conditions and prevent exacerbation or deterioration   Update acute care plan with appropriate goals if chronic or comorbid symptoms are exacerbated and prevent overall improvement and discharge     Problem: Discharge Planning  Goal: Discharge to home or other facility with appropriate resources  Outcome: Progressing  Flowsheets (Taken 5/10/2025 1840)  Discharge to home or other facility with appropriate resources:   Identify barriers to discharge with patient and caregiver   Arrange for needed discharge resources and transportation as appropriate   Identify discharge learning needs (meds, wound care, etc)   Refer to discharge planning if patient needs post-hospital services based on physician order or complex needs related to functional status, cognitive ability or social support system     Problem: Skin/Tissue Integrity  Goal: Skin integrity remains intact  Description: 1.  Monitor for areas of redness and/or skin breakdown2.  Assess vascular access sites hourly3.  Every 4-6 hours minimum:  Change oxygen saturation probe site4.  Every 4-6 hours:  If on nasal continuous positive airway pressure, respiratory therapy assess nares and determine need for appliance change or resting period  Outcome: Progressing  Flowsheets (Taken 5/10/2025 1840)  Skin Integrity Remains Intact:   Monitor for areas of redness and/or skin breakdown   Assess vascular access sites hourly   Turn and reposition as indicated   Positioning

## 2025-05-10 NOTE — ANESTHESIA POSTPROCEDURE EVALUATION
Department of Anesthesiology  Postprocedure Note    Patient: Simone Lamas  MRN: [id number]  YOB: 1955  Date of evaluation: 5/10/2025    Procedure Summary       Date: 05/10/25 Room / Location: [facility] 02 / [facility] CARDIAC SURGERY    Anesthesia Start: 0503 Anesthesia Stop: 0709    Procedure: RIGHT ARM ARTERIOVENOUS FISTULA  LIGATION (Right: Arm Upper) Diagnosis:       Hemorrhage of arteriovenous fistula, initial encounter      (Hemorrhage of arteriovenous fistula, initial encounter [T82.838A])    Surgeons: Cornell Downs MD Responsible Provider: Shelby Hinds MD    Anesthesia Type: General ASA Status: 4            Anesthesia Type: General    Gautam Phase I:      Gautam Phase II:      Anesthesia Post Evaluation    Patient location during evaluation: bedside  Patient participation: complete - patient participated  Level of consciousness: awake  Pain score: 0  Airway patency: patent  Nausea & Vomiting: no nausea  Cardiovascular status: hemodynamically stable  Respiratory status: acceptable  Hydration status: euvolemic  Pain management: satisfactory to patient    No notable events documented.

## 2025-05-10 NOTE — H&P
HPI    Patient presented to the ED with bleeding at the dialysis Fistula site on his right upper arm.    He is seen by vascular surgery team and  determined to needing immediate surgical repair to start the bleeding     He was taken to the OR  where ligation of the fistula   and  the bleeding has been stopped      Patient stated that this am, he removed a bandage from the fistula and it started bleeding and he could not get it to stop    He is on eliquis due to multiple instance of thrombotic events to the fistula    He was dc on 4/19 after thrombectomy from [facility].      Past Medical History:   Diagnosis Date    CHF (congestive heart failure) (HCC)     Diabetes mellitus (HCC)     End stage renal disease (HCC)     Hypertension     TIA (transient ischemic attack)       Past Surgical History:   Procedure Laterality Date    CARDIAC PROCEDURE N/A 7/22/2024    Left heart cath performed by Abhijeet Martinez MD at [facility] CARDIAC CATH LAB    CARDIAC PROCEDURE N/A 7/22/2024    Coronary angiography performed by Abhijeet Martinez MD at [facility] CARDIAC CATH LAB    INVASIVE VASCULAR N/A 4/10/2025    Fistulogram right/POSS INTERVENTION performed by George Jesus MD at [facility] CARDIAC CATH LAB    INVASIVE VASCULAR N/A 4/17/2025    Fistulogram right-W/POSS TDC PLACEMENT performed by Rubén Carranza MD at [facility] CARDIAC CATH LAB    INVASIVE VASCULAR N/A 4/17/2025    Angioplasty fistula/dialysis circuit performed by Rubén Carranza MD at [facility] CARDIAC CATH LAB    INVASIVE VASCULAR N/A 4/17/2025    Thrombectomy peripheral vein performed by Rubén Carranza MD at [facility] CARDIAC CATH LAB    INVASIVE VASCULAR N/A 5/1/2025    Tunnel dialysis catheter insertion performed by Cornell Downs MD at [facility] CARDIAC CATH LAB     History reviewed. No pertinent family history.   Social History     Tobacco Use    Smoking status: Never    Smokeless tobacco: Never   Substance Use Topics    Alcohol use: Not on file       Prior to 
HGB 9.1 (L) 05/10/2025    HCT 28.1 (L) 05/10/2025    MCV 96.6 05/10/2025     05/10/2025     Lab Results   Component Value Date/Time     05/10/2025 03:36 AM    K 3.4 05/10/2025 03:36 AM     05/10/2025 03:36 AM    CO2 24 05/10/2025 03:36 AM    BUN 28 05/10/2025 03:36 AM    CREATININE 5.14 05/10/2025 03:36 AM    GLUCOSE 273 05/10/2025 03:36 AM    CALCIUM 9.2 05/10/2025 03:36 AM    LABGLOM 11 05/10/2025 03:36 AM    LABGLOM 11 04/23/2024 06:08 AM    LABGLOM 11 01/26/2023 04:26 AM        Imaging/Studies:   None applicable/required    IMPRESSION:   Right upper extremity active hemorrhage from ulceration overlying brachiocephalic AV fistula with prior complications including outflow obstruction due to central venous stenosis/thrombosis  Plans were already underway for elective ligation of this fistula and creation of alternative access on the contralateral arm    PLAN:   To OR emergently for proximal arterial control and definitive ligation of the fistula  CV OR team has been notified and mobilized      Cornell Downs MD  Vascular Surgeon  [facility]  [phone]   
CXR negative - No pneumothorax, No opacities, No free air

## 2025-05-10 NOTE — ANESTHESIA PRE PROCEDURE
Department of Anesthesiology  Preprocedure Note       Name:  Simone Lamas   Age:  70 y.o.  :  1955                                          MRN:  [id number]         Date:  5/10/2025      Surgeon: Surgeon(s):  Cornell Downs MD    Procedure: Procedure(s):  RIGHT ARM ARTERIOVENOUS FISTULA  LIGATION    Medications prior to admission:   Prior to Admission medications    Medication Sig Start Date End Date Taking? Authorizing Provider   isosorbide mononitrate (IMDUR) 30 MG extended release tablet Take 1 tablet by mouth daily 25   Freddy Gandara DO   apixaban (ELIQUIS) 5 MG TABS tablet Take 1 tablet by mouth 2 times daily 25  Freddy Gandara DO   furosemide (LASIX) 80 MG tablet Take 1 tablet by mouth once a week Every   Patient not taking: Reported on 2025    Ector Schwarz MD   carvedilol (COREG) 12.5 MG tablet Take 1 tablet by mouth 2 times daily (with meals) 24   Rafael Rader MD   atorvastatin (LIPITOR) 40 MG tablet Take 1 tablet by mouth nightly 24   Willie King MD   losartan (COZAAR) 100 MG tablet Take 1 tablet by mouth daily 24   Willie King MD   tamsulosin (FLOMAX) 0.4 MG capsule Take 1 capsule by mouth daily  Patient not taking: Reported on 2025    Ector Schwarz MD   ASPIRIN LOW DOSE 81 MG chewable tablet Take 1 tablet by mouth daily 3/15/23   Ector Schwarz MD B Complex-C-Folic Acid (RENAL) 1 MG CAPS Take 1 capsule by mouth daily  Patient not taking: Reported on 2025 3/15/23   Ector Schwarz MD   insulin glargine (LANTUS) 100 UNIT/ML injection vial 25 Units 10/5/22   Automatic Reconciliation, Ar       Current medications:    Current Facility-Administered Medications   Medication Dose Route Frequency Provider Last Rate Last Admin   • 0.9 % sodium chloride infusion   IntraVENous PRN Kassandra Kilpatrick MD       • heparin (porcine) 1000 UNIT/ML injection            • heparin (porcine)

## 2025-05-10 NOTE — PROGRESS NOTES
Patient admitted by my colleague earlier this morning for active hemorrhage from right upper arm brachiocephalic fistula.  Vascular surgery on board.  Patient underwent procedure by vascular surgery.  Will continue to monitor.  ESRD on HD, nephrology on board.  Further treatment plan as outlined in H&P, continue to follow.

## 2025-05-10 NOTE — BRIEF OP NOTE
Brief Postoperative Note      Patient: Simone Lamas  YOB: 1955  MRN: [id number]    Date of Procedure: 5/10/2025    Pre-Op Diagnosis Codes:      * Hemorrhage of arteriovenous fistula, initial encounter [T82.838A]    Post-Op Diagnosis: Same       Procedure(s):  RIGHT ARM ARTERIOVENOUS FISTULA  LIGATION [64259]    Surgeon(s):  Cornell Downs MD    Assistant:  Surgical Assistant: Luis Prado    Anesthesia: General via LMA    Estimated Blood Loss (mL): 20 mL    Complications: None    Specimens:   * No specimens in log *    Implants:  * No implants in log *        Findings:  Infection Present At Time Of Surgery (PATOS) (choose all levels that have infection present):  No infection present  Other Findings:   Ultrasound localization and mapping of brachial artery medially in upper arm, fistula proximal to first (more peripheral) ulcer and distal to 2nd (second) ulcer (bleeding source)  Ligation of arteriovenous fistula with 0 silk ties x 2 in continuity proximal to first ulcer and distal to second ulcer with definitive control  Layered closure of brachial artery exposure as well as both ligation sites  Surgicel packed gently into upper arm ulcer with Tegaderm dressing  Hemostasis was absolute    Electronically signed by Cornell Downs MD on 5/10/2025 at 7:16 AM

## 2025-05-10 NOTE — OP NOTE
Operative Note      Patient: Simone Lamas  YOB: 1955  MRN: [id number]    Date of Procedure: 5/10/2025    Pre-Op Diagnosis Codes:      * Hemorrhage of arteriovenous fistula, initial encounter [T82.838A]    Post-Op Diagnosis: Same       Procedure(s):  RIGHT ARM ARTERIOVENOUS FISTULA  LIGATION [78652]    Surgeon(s):  Cornell Downs MD    Assistant:   Surgical Assistant: Luis Prado    Anesthesia: General via LMA  Anesthesiologist: Shelby Hinds MD  CRNA: Mandy Michaud APRN - SITA    Estimated Blood Loss (mL): 20 mL  IVF: 150 mL    Complications: None    Specimens:   * No specimens in log *    Implants:  * No implants in log *      Drains: * No LDAs found *    Findings:  Infection Present At Time Of Surgery (PATOS) (choose all levels that have infection present):  No infection present  Other Findings:   Ultrasound localization and mapping of brachial artery medially in upper arm, fistula proximal to first (more peripheral) ulcer and distal to 2nd (second) ulcer (bleeding source)  Ligation of arteriovenous fistula with 0 silk ties x 2 in continuity proximal to first ulcer and distal to second ulcer with definitive control  Layered closure of brachial artery exposure as well as both ligation sites  Surgicel packed gently into upper arm ulcer with Tegaderm dressing  Hemostasis was absolute    Detailed Description of Procedure:   The patient was taken to the Operating Room, placed in the supine position, and appropriate intravenous lines and physiologic monitors were established or verified by Anesthesia. He received 2 g of Ancef intravenously as surgical wound prophylaxis.  He was intubated without difficulty using laryngeal mask airway (LMA) following surgical timeout and confirmation of the procedure.    He was then prepped and draped in the usual sterile fashion about his right upper extremity from just beyond the tourniquet placed at his shoulder over the axillary artery down to his wrist.

## 2025-05-10 NOTE — ANESTHESIA POSTPROCEDURE EVALUATION
Department of Anesthesiology  Postprocedure Note    Patient: Simone Lamas  MRN: [id number]  YOB: 1955  Date of evaluation: 5/10/2025    Procedure Summary       Date: 05/10/25 Room / Location: [facility] 02 / [facility] CARDIAC SURGERY    Anesthesia Start: 0503 Anesthesia Stop: 0709    Procedure: RIGHT ARM ARTERIOVENOUS FISTULA  LIGATION (Right: Arm Upper) Diagnosis:       Hemorrhage of arteriovenous fistula, initial encounter      (Hemorrhage of arteriovenous fistula, initial encounter [T82.838A])    Surgeons: Cornell Downs MD Responsible Provider: Shelby Hinds MD    Anesthesia Type: General ASA Status: 4            Anesthesia Type: General    Gautam Phase I:      Gautam Phase II:      Anesthesia Post Evaluation    Patient location during evaluation: ICU  Patient participation: complete - patient participated  Level of consciousness: awake  Pain score: 0  Airway patency: patent  Nausea & Vomiting: no nausea and no vomiting  Cardiovascular status: hemodynamically stable  Respiratory status: acceptable  Hydration status: euvolemic  Pain management: satisfactory to patient    No notable events documented.

## 2025-05-10 NOTE — ED NOTES
Patient yells out, \"nurse, I think I am running a fever, I am so hot.\" Checked patient's temp and is 97.8

## 2025-05-10 NOTE — ED TRIAGE NOTES
Pt arrived via EMS from home c/o dialysis fistula bleeding from right arm. Pt arrives with tourniquet above to control bleeding. Dr. Kilpatrick at the bedside.

## 2025-05-10 NOTE — INTERVAL H&P NOTE
Update History & Physical    The patient's History and Physical of April 29, 2025 was reviewed with the patient and I examined the patient. There was no change. The surgical site was confirmed by the patient and me.     He is indicated for a left IJ vein tunneled dialysis catheter as alternate dialysis access to avoid use of his right upper arm AV fistula, which needs to avoid cannulation to prevent disruption of his eschar/ulcer and possible rupture.    Plan: The risks, benefits, expected outcome, and alternative to the recommended procedure have been discussed with the patient. Patient understands and wants to proceed with the procedure.     Electronically signed by Cornell Downs MD on 5/10/2025 at 7:59 AM

## 2025-05-10 NOTE — PLAN OF CARE
Problem: Chronic Conditions and Co-morbidities  Goal: Patient's chronic conditions and co-morbidity symptoms are monitored and maintained or improved  5/10/2025 1959 by Abby Duron RN  Outcome: Progressing  5/10/2025 1840 by Lupe Rush RN  Outcome: Progressing  Flowsheets (Taken 5/10/2025 1840)  Care Plan - Patient's Chronic Conditions and Co-Morbidity Symptoms are Monitored and Maintained or Improved:   Monitor and assess patient's chronic conditions and comorbid symptoms for stability, deterioration, or improvement   Collaborate with multidisciplinary team to address chronic and comorbid conditions and prevent exacerbation or deterioration   Update acute care plan with appropriate goals if chronic or comorbid symptoms are exacerbated and prevent overall improvement and discharge     Problem: Discharge Planning  Goal: Discharge to home or other facility with appropriate resources  5/10/2025 1959 by Abby Duron RN  Outcome: Progressing  5/10/2025 1840 by Lupe Rush RN  Outcome: Progressing  Flowsheets (Taken 5/10/2025 1840)  Discharge to home or other facility with appropriate resources:   Identify barriers to discharge with patient and caregiver   Arrange for needed discharge resources and transportation as appropriate   Identify discharge learning needs (meds, wound care, etc)   Refer to discharge planning if patient needs post-hospital services based on physician order or complex needs related to functional status, cognitive ability or social support system     Problem: Skin/Tissue Integrity  Goal: Skin integrity remains intact  Description: 1.  Monitor for areas of redness and/or skin breakdown2.  Assess vascular access sites hourly3.  Every 4-6 hours minimum:  Change oxygen saturation probe site4.  Every 4-6 hours:  If on nasal continuous positive airway pressure, respiratory therapy assess nares and determine need for appliance change or resting period  5/10/2025 1959 by Abby Duron, RN  Outcome:

## 2025-05-11 LAB
ALBUMIN SERPL-MCNC: 2.6 G/DL (ref 3.4–5)
ALBUMIN/GLOB SERPL: 0.8 (ref 0.8–1.7)
ALP SERPL-CCNC: 101 U/L (ref 45–117)
ALT SERPL-CCNC: 14 U/L (ref 10–50)
ANION GAP BLD CALC-SCNC: ABNORMAL MMOL/L (ref 10–20)
ANION GAP SERPL CALC-SCNC: 10 MMOL/L (ref 3–18)
AST SERPL-CCNC: 31 U/L (ref 10–38)
BASE EXCESS BLD CALC-SCNC: 3.7 MMOL/L
BASOPHILS # BLD: 0.06 K/UL (ref 0–0.1)
BASOPHILS NFR BLD: 0.8 % (ref 0–2)
BILIRUB SERPL-MCNC: 0.3 MG/DL (ref 0.2–1)
BUN SERPL-MCNC: 36 MG/DL (ref 6–23)
BUN/CREAT SERPL: 6 (ref 12–20)
CA-I BLD-MCNC: 1.24 MMOL/L (ref 1.15–1.33)
CALCIUM SERPL-MCNC: 8.8 MG/DL (ref 8.5–10.1)
CHLORIDE BLD-SCNC: 104 MMOL/L (ref 98–107)
CHLORIDE SERPL-SCNC: 106 MMOL/L (ref 98–107)
CO2 BLD-SCNC: 28 MMOL/L (ref 22–29)
CO2 SERPL-SCNC: 24 MMOL/L (ref 21–32)
CREAT BLD-MCNC: 5.47 MG/DL (ref 0.6–1.3)
CREAT SERPL-MCNC: 5.67 MG/DL (ref 0.6–1.3)
DIFFERENTIAL METHOD BLD: ABNORMAL
EOSINOPHIL # BLD: 0.28 K/UL (ref 0–0.4)
EOSINOPHIL NFR BLD: 3.7 % (ref 0–5)
ERYTHROCYTE [DISTWIDTH] IN BLOOD BY AUTOMATED COUNT: 14.2 % (ref 11.6–14.5)
EST. AVERAGE GLUCOSE BLD GHB EST-MCNC: 194 MG/DL
GLOBULIN SER CALC-MCNC: 3.2 G/DL (ref 2–4)
GLUCOSE BLD STRIP.AUTO-MCNC: 192 MG/DL (ref 70–110)
GLUCOSE BLD STRIP.AUTO-MCNC: 204 MG/DL (ref 70–110)
GLUCOSE BLD STRIP.AUTO-MCNC: 84 MG/DL (ref 70–110)
GLUCOSE BLD STRIP.AUTO-MCNC: 88 MG/DL (ref 70–110)
GLUCOSE BLD-MCNC: 335 MG/DL (ref 74–99)
GLUCOSE SERPL-MCNC: 68 MG/DL (ref 74–108)
HBA1C MFR BLD: 8.4 % (ref 4.2–5.6)
HCO3 BLD-SCNC: 28.7 MMOL/L (ref 21–28)
HCT VFR BLD AUTO: 26.3 % (ref 36–48)
HCT VFR BLD CALC: 24 % (ref 36–49)
HGB BLD-MCNC: 8.6 G/DL (ref 13–16)
IMM GRANULOCYTES # BLD AUTO: 0.06 K/UL (ref 0–0.04)
IMM GRANULOCYTES NFR BLD AUTO: 0.8 % (ref 0–0.5)
LACTATE BLD-SCNC: 1.48 MMOL/L (ref 0.4–2)
LYMPHOCYTES # BLD: 1.28 K/UL (ref 0.9–3.6)
LYMPHOCYTES NFR BLD: 17 % (ref 21–52)
MAGNESIUM SERPL-MCNC: 2 MG/DL (ref 1.6–2.6)
MCH RBC QN AUTO: 32.2 PG (ref 24–34)
MCHC RBC AUTO-ENTMCNC: 32.7 G/DL (ref 31–37)
MCV RBC AUTO: 98.5 FL (ref 78–100)
MONOCYTES # BLD: 0.57 K/UL (ref 0.05–1.2)
MONOCYTES NFR BLD: 7.6 % (ref 3–10)
NEUTS SEG # BLD: 5.26 K/UL (ref 1.8–8)
NEUTS SEG NFR BLD: 70.1 % (ref 40–73)
NRBC # BLD: 0 K/UL (ref 0–0.01)
NRBC BLD-RTO: 0 PER 100 WBC
PCO2 BLDV: 45.2 MMHG (ref 41–51)
PH BLDV: 7.41 (ref 7.32–7.42)
PHOSPHATE SERPL-MCNC: 3.5 MG/DL (ref 2.5–4.9)
PLATELET # BLD AUTO: 172 K/UL (ref 135–420)
PMV BLD AUTO: 11.5 FL (ref 9.2–11.8)
PO2 BLDV: 44 MMHG (ref 25–40)
POTASSIUM BLD-SCNC: 3 MMOL/L (ref 3.5–5.1)
POTASSIUM SERPL-SCNC: 3.8 MMOL/L (ref 3.5–5.5)
PROT SERPL-MCNC: 5.8 G/DL (ref 6.4–8.2)
RBC # BLD AUTO: 2.67 M/UL (ref 4.35–5.65)
SAO2 % BLD: 80 % (ref 94–98)
SERVICE CMNT-IMP: ABNORMAL
SODIUM BLD-SCNC: 144 MMOL/L (ref 136–145)
SODIUM SERPL-SCNC: 140 MMOL/L (ref 136–145)
SPECIMEN SITE: ABNORMAL
WBC # BLD AUTO: 7.5 K/UL (ref 4.6–13.2)

## 2025-05-11 PROCEDURE — 6370000000 HC RX 637 (ALT 250 FOR IP): Performed by: SURGERY

## 2025-05-11 PROCEDURE — 5A1D70Z PERFORMANCE OF URINARY FILTRATION, INTERMITTENT, LESS THAN 6 HOURS PER DAY: ICD-10-PCS | Performed by: INTERNAL MEDICINE

## 2025-05-11 PROCEDURE — 83036 HEMOGLOBIN GLYCOSYLATED A1C: CPT

## 2025-05-11 PROCEDURE — 6360000002 HC RX W HCPCS: Performed by: INTERNAL MEDICINE

## 2025-05-11 PROCEDURE — 6370000000 HC RX 637 (ALT 250 FOR IP): Performed by: INTERNAL MEDICINE

## 2025-05-11 PROCEDURE — 90935 HEMODIALYSIS ONE EVALUATION: CPT

## 2025-05-11 PROCEDURE — 1100000000 HC RM PRIVATE

## 2025-05-11 PROCEDURE — 94761 N-INVAS EAR/PLS OXIMETRY MLT: CPT

## 2025-05-11 PROCEDURE — 2500000003 HC RX 250 WO HCPCS: Performed by: SURGERY

## 2025-05-11 PROCEDURE — 84100 ASSAY OF PHOSPHORUS: CPT

## 2025-05-11 PROCEDURE — 99232 SBSQ HOSP IP/OBS MODERATE 35: CPT | Performed by: HOSPITALIST

## 2025-05-11 PROCEDURE — 2700000000 HC OXYGEN THERAPY PER DAY

## 2025-05-11 PROCEDURE — 85025 COMPLETE CBC W/AUTO DIFF WBC: CPT

## 2025-05-11 PROCEDURE — 36415 COLL VENOUS BLD VENIPUNCTURE: CPT

## 2025-05-11 PROCEDURE — 83735 ASSAY OF MAGNESIUM: CPT

## 2025-05-11 RX ORDER — ALBUMIN (HUMAN) 12.5 G/50ML
25 SOLUTION INTRAVENOUS
Status: DISCONTINUED | OUTPATIENT
Start: 2025-05-11 | End: 2025-05-13 | Stop reason: HOSPADM

## 2025-05-11 RX ORDER — 0.9 % SODIUM CHLORIDE 0.9 %
100 INTRAVENOUS SOLUTION INTRAVENOUS PRN
Status: DISCONTINUED | OUTPATIENT
Start: 2025-05-11 | End: 2025-05-13 | Stop reason: HOSPADM

## 2025-05-11 RX ADMIN — APIXABAN 5 MG: 5 TABLET, FILM COATED ORAL at 20:30

## 2025-05-11 RX ADMIN — LOSARTAN POTASSIUM 100 MG: 50 TABLET, FILM COATED ORAL at 09:39

## 2025-05-11 RX ADMIN — PANTOPRAZOLE SODIUM 40 MG: 40 TABLET, DELAYED RELEASE ORAL at 09:43

## 2025-05-11 RX ADMIN — INSULIN LISPRO 2 UNITS: 100 INJECTION, SOLUTION INTRAVENOUS; SUBCUTANEOUS at 17:02

## 2025-05-11 RX ADMIN — EPOETIN ALFA-EPBX 10000 UNITS: 10000 INJECTION, SOLUTION INTRAVENOUS; SUBCUTANEOUS at 18:04

## 2025-05-11 RX ADMIN — TAMSULOSIN HYDROCHLORIDE 0.4 MG: 0.4 CAPSULE ORAL at 09:38

## 2025-05-11 RX ADMIN — ISOSORBIDE MONONITRATE 30 MG: 30 TABLET, EXTENDED RELEASE ORAL at 09:39

## 2025-05-11 RX ADMIN — CARVEDILOL 12.5 MG: 12.5 TABLET, FILM COATED ORAL at 09:37

## 2025-05-11 RX ADMIN — INSULIN LISPRO 2 UNITS: 100 INJECTION, SOLUTION INTRAVENOUS; SUBCUTANEOUS at 12:24

## 2025-05-11 RX ADMIN — SODIUM CHLORIDE, PRESERVATIVE FREE 10 ML: 5 INJECTION INTRAVENOUS at 09:41

## 2025-05-11 RX ADMIN — Medication 1 MG: at 09:37

## 2025-05-11 RX ADMIN — INSULIN GLARGINE 15 UNITS: 100 INJECTION, SOLUTION SUBCUTANEOUS at 09:40

## 2025-05-11 RX ADMIN — ASPIRIN 81 MG CHEWABLE TABLET 81 MG: 81 TABLET CHEWABLE at 09:36

## 2025-05-11 RX ADMIN — ATORVASTATIN CALCIUM 40 MG: 40 TABLET, FILM COATED ORAL at 20:29

## 2025-05-11 ASSESSMENT — PAIN SCALES - GENERAL
PAINLEVEL_OUTOF10: 0
PAINLEVEL_OUTOF10: 0

## 2025-05-11 NOTE — PROGRESS NOTES
Received pre HD report from [facility].      Pt in bed A+O x4, no s/s of distress noted on room air.      Accessed right chest CVC w/o complications. Tx initiated at 1715.      CVC flowing with ease.  For hemodynamic stability UF goal 2000 ml as tolerated.  Offered assistance with repositioning every 2 hours or prn. Vascular access visible at all times during treatment, line connections intact at all times.      Tx completed at 1945, tolerated well 1.5 L removed.  De-accessed per protocol.    Heparin indwell 1.9ml in arterial, and 1.9ml in venous catheter lumen.      Post dialysis report given to [facility].

## 2025-05-11 NOTE — PLAN OF CARE
Problem: Chronic Conditions and Co-morbidities  Goal: Patient's chronic conditions and co-morbidity symptoms are monitored and maintained or improved  Outcome: Progressing     Problem: Discharge Planning  Goal: Discharge to home or other facility with appropriate resources  Outcome: Progressing     Problem: Skin/Tissue Integrity  Goal: Skin integrity remains intact  Description: 1.  Monitor for areas of redness and/or skin breakdown2.  Assess vascular access sites hourly3.  Every 4-6 hours minimum:  Change oxygen saturation probe site4.  Every 4-6 hours:  If on nasal continuous positive airway pressure, respiratory therapy assess nares and determine need for appliance change or resting period  Outcome: Progressing     Problem: Safety - Adult  Goal: Free from fall injury  Outcome: Progressing     Problem: Pain  Goal: Verbalizes/displays adequate comfort level or baseline comfort level  Outcome: Progressing     Problem: Respiratory - Adult  Goal: Achieves optimal ventilation and oxygenation  Outcome: Progressing  Flowsheets (Taken 5/11/2025 1523)  Achieves optimal ventilation and oxygenation:   Assess for changes in respiratory status   Assess for changes in mentation and behavior   Position to facilitate oxygenation and minimize respiratory effort   Oxygen supplementation based on oxygen saturation or arterial blood gases   Assess and instruct to report shortness of breath or any respiratory difficulty     Problem: Cardiovascular - Adult  Goal: Maintains optimal cardiac output and hemodynamic stability  Outcome: Progressing  Goal: Absence of cardiac dysrhythmias or at baseline  Outcome: Progressing     Problem: Skin/Tissue Integrity - Adult  Goal: Skin integrity remains intact  Description: 1.  Monitor for areas of redness and/or skin breakdown2.  Assess vascular access sites hourly3.  Every 4-6 hours minimum:  Change oxygen saturation probe site4.  Every 4-6 hours:  If on nasal continuous positive airway pressure,

## 2025-05-11 NOTE — PROGRESS NOTES
Seen     Full note to follow     Discussed with oncall Dialysis nurse Frederic     Heading to dialyze patient     Orders placed    Discussed with pharmacy  and nursing     Appreciate team efforts     Patient stable

## 2025-05-11 NOTE — PLAN OF CARE
Problem: Chronic Conditions and Co-morbidities  Goal: Patient's chronic conditions and co-morbidity symptoms are monitored and maintained or improved    INTERVENTION:  HEMODYNAMIC STABILIZATION  MAINTAIN BP WNL WHILE ON HD.    INTERVENTION:  FLUID MANAGEMENT  WILL ATTEMPT 2000 ML TOTAL FLUID REMOVAL AS TOLERATED.    INTERVENTION:  METABOLIC/ELECTROLYTE MANAGEMENT  2.0 POTASSIUM 2.5 CALCIUM DIALYSATE USED WITH HD TODAY.    INTERVENTION:  HEMODIALYSIS ACCESS SITE MANAGEMENT  RIJ CVC ACCESSED USING ASEPTIC TECHNIQUE.    GOAL:  SIGNS AND SYMPTOMS OF LISTED POTENTIAL PROBLEMS WILL BE ABSENT OR MANAGEABLE.    OUTCOME:  PROGRESSING.    HD PLANNED FOR 2.5 HOURS TODAY.

## 2025-05-11 NOTE — PROGRESS NOTES
Bedside and Verbal shift change report given to [facility] (oncoming nurse) by [facility] (offgoing nurse). Report included the following information Nurse Handoff Report, Surgery Report, Intake/Output, MAR, and Cardiac Rhythm NSR/BBB .    Wound Prevention Checklist    Patient: Simone Lamas (70 y.o. male)  Date: 5/11/2025  Diagnosis: Hemorrhage of arteriovenous fistula, initial encounter [T82.838A]  Hemorrhage of arteriovenous fistula, subsequent encounter [T82.838D] Hemorrhage of arteriovenous fistula, subsequent encounter    Precautions:         [x]  Heel prevention boots placed on patient    [x]  Patient turned q2h during shift    []  Lift team ordered    [x]  Patient on [place] bed/Specialty bed    [x]  Each Wound is documented during shift (Stage, Color, drainage, odor, measurements, and dressings)    [x]  Dual skin check done with [facility] RN        7622- Dialysis nurse at bedside.

## 2025-05-11 NOTE — PROGRESS NOTES
Spiritual Health History and Assessment/Progress Note  [facility]    Spiritual/Emotional Needs,  ,  ,      Name: Simone Lamas MRN: [id number]    Age: 70 y.o.     Sex: male   Language: English   Muslim: Buddhist   Hemorrhage of arteriovenous fistula, subsequent encounter     Date: 5/11/2025            Total Time Calculated: (P) 8 min              Spiritual Assessment began in [facility] 2 CV INTNSV CARE        Referral/Consult From: (P) Multi-disciplinary team   Encounter Overview/Reason: Spiritual/Emotional Needs  Service Provided For: (P) Patient    Laura, Belief, Meaning:   Patient has beliefs or practices that help with coping during difficult times  Family/Friends No family/friends present      Importance and Influence:  Patient has spiritual/personal beliefs that influence decisions regarding their health  Family/Friends No family/friends present    Community:  Patient feels well-supported. Support system includes: Extended family  Family/Friends No family/friends present    Assessment and Plan of Care:     Patient Interventions include: Facilitated expression of thoughts and feelings  Family/Friends Interventions include: No family/friends present    Patient Plan of Care: No spiritual needs identified for follow-up  Family/Friends Plan of Care: No family/friends present    Electronically signed by TIERNEY Mena on 5/11/2025 at 1:58 PM

## 2025-05-11 NOTE — PROGRESS NOTES
[facility] Hospitalist Group  Progress Note    Patient: Simone Lamas Age: 70 y.o. : 1955 MR#: [id number] SSN: [id number]  Date/Time: 2025     Subjective:     Patient seen evaluated, lying in bed, no acute distress.  Right arm status post AV fistula ligation.  Patient has a history of end-stage renal disease on hemodialysis, nephrology on board        Assessment/Plan:     Hemorrhage of AV fistula status post ligation by vascular surgery, continue to monitor,  Anemia secondary to acute blood loss, continue to monitor H&H.  ESRD on HD, nephrology on board, patient to undergo hemodialysis today.  History of hypertension-continue Coreg, Imdur, Cozaar  History of hyperlipidemia-continue Lipitor  History of type 2 diabetes-continue Lantus and insulin sliding scale  History of BPH-continue Flomax  DVT prophylaxis-SCDs  Full code            Anticipated Discharge and Disposition:    1-2 days-Home    Miguel Angel Foley MD  25      Case discussed with:  [x]Patient  []Family  []Nursing  []Case Management  DVT Prophylaxis:  []Lovenox  []Hep SQ  [x]SCDs  []Coumadin   []On Heparin gtt    Objective:   VS:   Vitals:    25 1200   BP: (!) 145/87   Pulse: 75   Resp: 10   Temp: 98.7 °F (37.1 °C)   SpO2: 95%        Intake/Output Summary (Last 24 hours) at 2025 1318  Last data filed at 2025 0944  Gross per 24 hour   Intake 300 ml   Output 785 ml   Net -485 ml       General:  Alert, cooperative, no acute distress    Pulmonary:  CTA Bilaterally. No Wheezing/Rhonchi/Rales.  Cardiovascular: Regular rate and Rhythm.  GI:  Soft, Non distended, Non tender. + Bowel sounds.  Extremities: Right upper extremity swelling secondary to recent procedure  Neurologic: Alert and oriented X 3. No acute neuro deficits.  Additional:    Medications:   Current Facility-Administered Medications   Medication Dose Route Frequency    epoetin jai-epbx (RETACRIT) injection 10,000 Units  10,000 Units

## 2025-05-11 NOTE — PROGRESS NOTES
[facility] Vein and Vascular Surgery    POD#1 Ligation bleeding right upper arm brachiocephalic AVF with ulceration and central venous stenosis/obstruction    No significant events overnight.  Patient denies pain; feeling \"pretty good.\" Eager to get home; \"I got a lot of blood I gotta clean up.\"    Current Facility-Administered Medications:     epoetin jai-epbx (RETACRIT) injection 10,000 Units, 10,000 Units, SubCUTAneous, Once per day on Tuesday Thursday Saturday, Vin Randle MD    sodium chloride 0.9 % bolus 100 mL, 100 mL, IntraVENous, PRN, Vin Randle MD    albumin human 25% IV solution 25 g, 25 g, IntraVENous, Once PRN, Vin Randle MD    hydrALAZINE (APRESOLINE) injection 10 mg, 10 mg, IntraVENous, Q4H PRN, Cornell Downs MD, 10 mg at 05/10/25 0910    aspirin chewable tablet 81 mg, 81 mg, Oral, Daily, Cornell Downs MD, 81 mg at 05/11/25 0936    Virt-Caps 1 mg, 1 capsule, Oral, Daily, Cornell Downs MD, 1 mg at 05/11/25 0937    tamsulosin (FLOMAX) capsule 0.4 mg, 0.4 mg, Oral, Daily, Cornell Downs MD, 0.4 mg at 05/11/25 0938    atorvastatin (LIPITOR) tablet 40 mg, 40 mg, Oral, Nightly, Cornell Downs MD, 40 mg at 05/10/25 2045    losartan (COZAAR) tablet 100 mg, 100 mg, Oral, Daily, Cornell Downs MD, 100 mg at 05/11/25 0939    carvedilol (COREG) tablet 12.5 mg, 12.5 mg, Oral, BID WC, Cornell Downs MD, 12.5 mg at 05/11/25 0937    [START ON 5/16/2025] furosemide (LASIX) tablet 80 mg, 80 mg, Oral, Weekly, Cornell Downs MD    isosorbide mononitrate (IMDUR) extended release tablet 30 mg, 30 mg, Oral, Daily, Cornell Downs MD, 30 mg at 05/11/25 0939    sodium chloride flush 0.9 % injection 5-40 mL, 5-40 mL, IntraVENous, 2 times per day, Cornell Downs MD, 10 mL at 05/11/25 0941    sodium chloride flush 0.9 % injection 5-40 mL, 5-40 mL, IntraVENous, PRN, Cornell Downs MD, 10 mL at 05/10/25 0823    0.9 % sodium chloride infusion, , IntraVENous, PRN, Cornell Downs,

## 2025-05-12 ENCOUNTER — APPOINTMENT (OUTPATIENT)
Facility: HOSPITAL | Age: 70
DRG: 182 | End: 2025-05-12
Attending: SURGERY
Payer: COMMERCIAL

## 2025-05-12 LAB
ALBUMIN SERPL-MCNC: 2.7 G/DL (ref 3.4–5)
ALBUMIN/GLOB SERPL: 0.8 (ref 0.8–1.7)
ALP SERPL-CCNC: 102 U/L (ref 45–117)
ALT SERPL-CCNC: 11 U/L (ref 10–50)
ANION GAP SERPL CALC-SCNC: 12 MMOL/L (ref 3–18)
AST SERPL-CCNC: 30 U/L (ref 10–38)
BASOPHILS # BLD: 0.06 K/UL (ref 0–0.1)
BASOPHILS NFR BLD: 0.8 % (ref 0–2)
BILIRUB SERPL-MCNC: 0.3 MG/DL (ref 0.2–1)
BUN SERPL-MCNC: 26 MG/DL (ref 6–23)
BUN/CREAT SERPL: 6 (ref 12–20)
CALCIUM SERPL-MCNC: 8.4 MG/DL (ref 8.5–10.1)
CHLORIDE SERPL-SCNC: 99 MMOL/L (ref 98–107)
CO2 SERPL-SCNC: 25 MMOL/L (ref 21–32)
CREAT SERPL-MCNC: 4.25 MG/DL (ref 0.6–1.3)
DIFFERENTIAL METHOD BLD: ABNORMAL
ECHO BSA: 2.04 M2
EOSINOPHIL # BLD: 0.39 K/UL (ref 0–0.4)
EOSINOPHIL NFR BLD: 5.3 % (ref 0–5)
ERYTHROCYTE [DISTWIDTH] IN BLOOD BY AUTOMATED COUNT: 14 % (ref 11.6–14.5)
GLOBULIN SER CALC-MCNC: 3.3 G/DL (ref 2–4)
GLUCOSE BLD STRIP.AUTO-MCNC: 181 MG/DL (ref 70–110)
GLUCOSE BLD STRIP.AUTO-MCNC: 209 MG/DL (ref 70–110)
GLUCOSE BLD STRIP.AUTO-MCNC: 228 MG/DL (ref 70–110)
GLUCOSE BLD STRIP.AUTO-MCNC: 238 MG/DL (ref 70–110)
GLUCOSE SERPL-MCNC: 198 MG/DL (ref 74–108)
HCT VFR BLD AUTO: 25.6 % (ref 36–48)
HGB BLD-MCNC: 8.4 G/DL (ref 13–16)
IMM GRANULOCYTES # BLD AUTO: 0.09 K/UL (ref 0–0.04)
IMM GRANULOCYTES NFR BLD AUTO: 1.2 % (ref 0–0.5)
LYMPHOCYTES # BLD: 1.41 K/UL (ref 0.9–3.6)
LYMPHOCYTES NFR BLD: 19.1 % (ref 21–52)
Lab: 0.97 CM
Lab: 1.05 CM
MAGNESIUM SERPL-MCNC: 1.8 MG/DL (ref 1.6–2.6)
MCH RBC QN AUTO: 31.7 PG (ref 24–34)
MCHC RBC AUTO-ENTMCNC: 32.8 G/DL (ref 31–37)
MCV RBC AUTO: 96.6 FL (ref 78–100)
MONOCYTES # BLD: 0.74 K/UL (ref 0.05–1.2)
MONOCYTES NFR BLD: 10 % (ref 3–10)
NEUTS SEG # BLD: 4.7 K/UL (ref 1.8–8)
NEUTS SEG NFR BLD: 63.6 % (ref 40–73)
NRBC # BLD: 0 K/UL (ref 0–0.01)
NRBC BLD-RTO: 0 PER 100 WBC
PHOSPHATE SERPL-MCNC: 2.7 MG/DL (ref 2.5–4.9)
PLATELET # BLD AUTO: 179 K/UL (ref 135–420)
PMV BLD AUTO: 11.1 FL (ref 9.2–11.8)
POTASSIUM SERPL-SCNC: 3.1 MMOL/L (ref 3.5–5.5)
PROT SERPL-MCNC: 6 G/DL (ref 6.4–8.2)
RBC # BLD AUTO: 2.65 M/UL (ref 4.35–5.65)
SODIUM SERPL-SCNC: 137 MMOL/L (ref 136–145)
VAS LEFT ANTECUBITAL VEIN DEPTH: 0.32 CM
VAS LEFT ANTECUBITAL VEIN DIAM: 0.27 CM
VAS LEFT AX A MID PSV: 74.2 CM/S
VAS LEFT AXILLARY ARTERY AP DIAMETER: 0.82 CM
VAS LEFT AXILLARY VEIN DIAMETER: 0.99 CM
VAS LEFT BASILIC VEIN LOWER ARM MID DIAM: 0.25 CM
VAS LEFT BASILIC VEIN LOWER ARM PROX DIAM: 0.21 CM
VAS LEFT BASILIC VEIN LOWER DIST DEPTH: 0.17 CM
VAS LEFT BASILIC VEIN LOWER MID DEPTH: 0.15 CM
VAS LEFT BASILIC VEIN LOWER PROX DEPTH: 0.17 CM
VAS LEFT BASILIC VEIN UPPER ARM DIST DIAM: 0.58 CM
VAS LEFT BASILIC VEIN UPPER ARM MID DIAM: 0.55 CM
VAS LEFT BASILIC VEIN UPPER ARM PROX DIAM: 0.61 CM
VAS LEFT BASILIC VEIN UPPER DIST DEPTH: 0.91 CM
VAS LEFT BASILIC VEIN UPPER MID DEPTH: 1.21 CM
VAS LEFT BASILIC VEIN UPPER PROX DEPTH: 1.13 CM
VAS LEFT BASLIC VEIN LOWER ARM DIST DIAM: 0.23 CM
VAS LEFT BRACHIAL A DIST PSV: 56.7 CM/S
VAS LEFT BRACHIAL A MID PSV: 62.2 CM/S
VAS LEFT BRACHIAL A PROX PSV: 57.4 CM/S
VAS LEFT BRACHIAL DIST AP DIAM: 0.61 CM
VAS LEFT BRACHIAL MID AP DIAM: 0.62 CM
VAS LEFT BRACHIAL PROX AP DIAM: 0.63 CM
VAS LEFT BRACHIAL VEIN 1 DIAM: 0.58 CM
VAS LEFT BRACHIAL VEIN 2 DIAM: 0.38 CM
VAS LEFT CEPHALIC VEIN LOWER ARM DIST DIAM: 0.21 CM
VAS LEFT CEPHALIC VEIN LOWER ARM MID DIAM: 0.26 CM
VAS LEFT CEPHALIC VEIN LOWER ARM PROX DIAM: 0.24 CM
VAS LEFT CEPHALIC VEIN LOWER DIST DEPTH: 0.31 CM
VAS LEFT CEPHALIC VEIN LOWER MID DEPTH: 0.42 CM
VAS LEFT CEPHALIC VEIN LOWER PROX DEPTH: 0.2 CM
VAS LEFT CEPHALIC VEIN UPPER ARM DIST DIAM: 0.36 CM
VAS LEFT CEPHALIC VEIN UPPER ARM MID DIAM: 0.37 CM
VAS LEFT CEPHALIC VEIN UPPER ARM PROX DIAM: 0.4 CM
VAS LEFT CEPHALIC VEIN UPPER DIST DEPTH: 0.24 CM
VAS LEFT CEPHALIC VEIN UPPER MID DEPTH: 0.32 CM
VAS LEFT CEPHALIC VEIN UPPER PROX DEPTH: 0.47 CM
VAS LEFT CEPHALIC VEIN WRIST DEPTH: 0.2 CM
VAS LEFT CEPHALIC VEIN WRIST DIAM: 0.2 CM
VAS LEFT RADIAL A DIST PSV: 48.9 CM/S
VAS LEFT RADIAL A MID PSV: 45.6 CM/S
VAS LEFT RADIAL A PROX PSV: 44.5 CM/S
VAS LEFT RADIAL DIST AP DIAM: 0.3 CM
VAS LEFT RADIAL MID AP DIAM: 0.33 CM
VAS LEFT RADIAL PROX AP DIAM: 0.32 CM
VAS LEFT SUBCLAVIAN AP MID DIAM: 1.07 CM
VAS LEFT SUBCLAVIAN DIST PSV: 68.9 CM/S
VAS LEFT SUBCLAVIAN MID PSV: 49 CM/S
VAS LEFT SUBCLAVIAN PROX PSV: 49.4 CM/S
VAS LEFT SUBCLAVIAN VEIN DIAMETER: 0.98 CM
VAS LEFT ULNAR A DIST PSV: 32.1 CM/S
VAS LEFT ULNAR A MID PSV: 44.7 CM/S
VAS LEFT ULNAR A PROX PSV: 11.6 CM/S
VAS LEFT ULNAR DIST AP DIAM: 0.18 CM
VAS LEFT ULNAR MID AP DIAM: 0.21 CM
VAS LEFT ULNAR PROX AP DIAM: 0.2 CM
WBC # BLD AUTO: 7.4 K/UL (ref 4.6–13.2)

## 2025-05-12 PROCEDURE — 93970 EXTREMITY STUDY: CPT

## 2025-05-12 PROCEDURE — 2700000000 HC OXYGEN THERAPY PER DAY

## 2025-05-12 PROCEDURE — 6370000000 HC RX 637 (ALT 250 FOR IP): Performed by: SURGERY

## 2025-05-12 PROCEDURE — 99232 SBSQ HOSP IP/OBS MODERATE 35: CPT | Performed by: HOSPITALIST

## 2025-05-12 PROCEDURE — 94761 N-INVAS EAR/PLS OXIMETRY MLT: CPT

## 2025-05-12 PROCEDURE — 36556 INSERT NON-TUNNEL CV CATH: CPT

## 2025-05-12 PROCEDURE — 82962 GLUCOSE BLOOD TEST: CPT

## 2025-05-12 PROCEDURE — 80053 COMPREHEN METABOLIC PANEL: CPT

## 2025-05-12 PROCEDURE — 1100000000 HC RM PRIVATE

## 2025-05-12 PROCEDURE — 6370000000 HC RX 637 (ALT 250 FOR IP): Performed by: INTERNAL MEDICINE

## 2025-05-12 PROCEDURE — 85025 COMPLETE CBC W/AUTO DIFF WBC: CPT

## 2025-05-12 PROCEDURE — 2500000003 HC RX 250 WO HCPCS: Performed by: SURGERY

## 2025-05-12 PROCEDURE — 84100 ASSAY OF PHOSPHORUS: CPT

## 2025-05-12 PROCEDURE — 83735 ASSAY OF MAGNESIUM: CPT

## 2025-05-12 PROCEDURE — 36415 COLL VENOUS BLD VENIPUNCTURE: CPT

## 2025-05-12 RX ORDER — POTASSIUM CHLORIDE 1500 MG/1
40 TABLET, EXTENDED RELEASE ORAL ONCE
Status: COMPLETED | OUTPATIENT
Start: 2025-05-12 | End: 2025-05-12

## 2025-05-12 RX ADMIN — APIXABAN 5 MG: 5 TABLET, FILM COATED ORAL at 20:33

## 2025-05-12 RX ADMIN — PANTOPRAZOLE SODIUM 40 MG: 40 TABLET, DELAYED RELEASE ORAL at 08:55

## 2025-05-12 RX ADMIN — ASPIRIN 81 MG CHEWABLE TABLET 81 MG: 81 TABLET CHEWABLE at 08:56

## 2025-05-12 RX ADMIN — INSULIN GLARGINE 15 UNITS: 100 INJECTION, SOLUTION SUBCUTANEOUS at 20:33

## 2025-05-12 RX ADMIN — ISOSORBIDE MONONITRATE 30 MG: 30 TABLET, EXTENDED RELEASE ORAL at 08:55

## 2025-05-12 RX ADMIN — SODIUM CHLORIDE, PRESERVATIVE FREE 10 ML: 5 INJECTION INTRAVENOUS at 20:34

## 2025-05-12 RX ADMIN — INSULIN LISPRO 2 UNITS: 100 INJECTION, SOLUTION INTRAVENOUS; SUBCUTANEOUS at 20:33

## 2025-05-12 RX ADMIN — INSULIN LISPRO 2 UNITS: 100 INJECTION, SOLUTION INTRAVENOUS; SUBCUTANEOUS at 12:49

## 2025-05-12 RX ADMIN — SODIUM CHLORIDE, PRESERVATIVE FREE 10 ML: 5 INJECTION INTRAVENOUS at 08:57

## 2025-05-12 RX ADMIN — POTASSIUM CHLORIDE 40 MEQ: 1500 TABLET, EXTENDED RELEASE ORAL at 08:55

## 2025-05-12 RX ADMIN — APIXABAN 5 MG: 5 TABLET, FILM COATED ORAL at 08:56

## 2025-05-12 RX ADMIN — ATORVASTATIN CALCIUM 40 MG: 40 TABLET, FILM COATED ORAL at 20:33

## 2025-05-12 RX ADMIN — INSULIN LISPRO 2 UNITS: 100 INJECTION, SOLUTION INTRAVENOUS; SUBCUTANEOUS at 09:02

## 2025-05-12 RX ADMIN — INSULIN LISPRO 2 UNITS: 100 INJECTION, SOLUTION INTRAVENOUS; SUBCUTANEOUS at 17:05

## 2025-05-12 RX ADMIN — CARVEDILOL 12.5 MG: 12.5 TABLET, FILM COATED ORAL at 17:05

## 2025-05-12 RX ADMIN — TAMSULOSIN HYDROCHLORIDE 0.4 MG: 0.4 CAPSULE ORAL at 08:55

## 2025-05-12 RX ADMIN — LOSARTAN POTASSIUM 100 MG: 50 TABLET, FILM COATED ORAL at 08:55

## 2025-05-12 RX ADMIN — Medication 1 MG: at 08:55

## 2025-05-12 RX ADMIN — INSULIN GLARGINE 15 UNITS: 100 INJECTION, SOLUTION SUBCUTANEOUS at 09:01

## 2025-05-12 RX ADMIN — CARVEDILOL 12.5 MG: 12.5 TABLET, FILM COATED ORAL at 08:55

## 2025-05-12 ASSESSMENT — PAIN SCALES - GENERAL
PAINLEVEL_OUTOF10: 0

## 2025-05-12 NOTE — PROGRESS NOTES
Bedside and Verbal shift change report given to Anand Bryant RN (oncoming nurse) by GET Mims (offgoing nurse). Report included the following information Nurse Handoff Report, Intake/Output, MAR, Recent Results, and Cardiac Rhythm NSR .

## 2025-05-12 NOTE — CARE COORDINATION
CM spoke with patient at the bedside.   HIPAA verified.   Facesheet and Demographics verified.   Patient said he also has [employer] Medicare in addition to traditional Medicare, and [facility] Medicaid.   Dialysis patient, information in CM assessment.   No CM needs at this time.   Patient said he will not have a ride home at time of discharge, and that patient does not have texting on his cell phone for a Medicaid Cab, and that patient will need a Lyft at time of discharge.             Sofia Schneider RN Case Manager  [phone]

## 2025-05-12 NOTE — NURSE NAVIGATOR
Met with patient provided education on Living with Heart Failure, reviewed heart failure zones, reinforced low sodium diet and fluid restrictions. Provided time for questions. Scales provided. Will continue to follow

## 2025-05-12 NOTE — PLAN OF CARE
Problem: Chronic Conditions and Co-morbidities  Goal: Patient's chronic conditions and co-morbidity symptoms are monitored and maintained or improved  5/11/2025 2137 by Kaitlynn Trujillo RN  Outcome: Progressing  5/11/2025 1744 by Arnold Valles RN  Outcome: Progressing  5/11/2025 1523 by Nyla Connell RN  Outcome: Progressing     Problem: Discharge Planning  Goal: Discharge to home or other facility with appropriate resources  5/11/2025 2137 by Kaitlynn Trujillo RN  Outcome: Progressing  5/11/2025 1523 by Nyla Connell RN  Outcome: Progressing     Problem: Skin/Tissue Integrity  Goal: Skin integrity remains intact  Description: 1.  Monitor for areas of redness and/or skin breakdown2.  Assess vascular access sites hourly3.  Every 4-6 hours minimum:  Change oxygen saturation probe site4.  Every 4-6 hours:  If on nasal continuous positive airway pressure, respiratory therapy assess nares and determine need for appliance change or resting period  5/11/2025 2137 by Kaitlynn Trujillo RN  Outcome: Progressing  5/11/2025 1523 by Nyla Connell RN  Outcome: Progressing     Problem: Safety - Adult  Goal: Free from fall injury  5/11/2025 2137 by Kaitlynn Trujillo RN  Outcome: Progressing  5/11/2025 1523 by Nyla Connell RN  Outcome: Progressing     Problem: Pain  Goal: Verbalizes/displays adequate comfort level or baseline comfort level  5/11/2025 2137 by Kaitlynn Trujillo RN  Outcome: Progressing  5/11/2025 1523 by Nyla Connell RN  Outcome: Progressing     Problem: Neurosensory - Adult  Goal: Achieves stable or improved neurological status  Outcome: Progressing     Problem: Respiratory - Adult  Goal: Achieves optimal ventilation and oxygenation  5/11/2025 2137 by Kaitlynn Trujillo RN  Outcome: Progressing  5/11/2025 1523 by Nyla Connell RN  Outcome: Progressing  Flowsheets (Taken 5/11/2025 1523)  Achieves optimal ventilation and oxygenation:   Assess for changes in respiratory status   Assess for changes in mentation and behavior

## 2025-05-12 NOTE — PROGRESS NOTES
[facility] Hospitalist Group  Progress Note    Patient: Simone Lamas Age: 70 y.o. : 1955 MR#: [id number] SSN: [id number]  Date/Time: 2025     Subjective:     Patient seen evaluated, lying in bed, no acute distress.  Right arm status post AV fistula ligation.  Patient has a history of end-stage renal disease on hemodialysis, nephrology on board    -no new events overnight.  Patient underwent hemodialysis yesterday and tolerated procedure.  Discussed with nephrology, will continue hemodialysis and anticipate discharge in a.m.    Assessment/Plan:     Hemorrhage of AV fistula status post ligation by vascular surgery, continue to monitor,  Anemia secondary to acute blood loss, continue to monitor H&H.  ESRD on HD, nephrology on board, patient underwent hemodialysis yesterday, tolerated procedure well, anticipate repeat hemodialysis in a.m. and discharge.  History of hypertension-continue Coreg, Imdur, Cozaar  History of hyperlipidemia-continue Lipitor  History of type 2 diabetes-continue Lantus and insulin sliding scale  History of BPH-continue Flomax  DVT prophylaxis-SCDs  Full code            Anticipated Discharge and Disposition:    1-2 days-Home    Miguel Angel Foley MD  25      Case discussed with:  [x]Patient  []Family  []Nursing  []Case Management  DVT Prophylaxis:  []Lovenox  []Hep SQ  [x]SCDs  []Coumadin   []On Heparin gtt    Objective:   VS:   Vitals:    25 1130   BP:    Pulse:    Resp:    Temp: 98.6 °F (37 °C)   SpO2:         Intake/Output Summary (Last 24 hours) at 2025 1209  Last data filed at 2025 1129  Gross per 24 hour   Intake 620 ml   Output 3260 ml   Net -2640 ml       General:  Alert, cooperative, no acute distress    Pulmonary:  CTA Bilaterally. No Wheezing/Rhonchi/Rales.  Cardiovascular: Regular rate and Rhythm.  GI:  Soft, Non distended, Non tender. + Bowel sounds.  Extremities: Right upper extremity swelling secondary to recent

## 2025-05-12 NOTE — CARE COORDINATION
05/12/25 1605   Readmission Assessment   Number of Days since last admission? 8-30 days   Previous Disposition Home Alone   Who is being Interviewed Patient   What was the patient's/caregiver's perception as to why they think they needed to return back to the hospital? Other (Comment)  (Fistula was bleeding.)   Did you visit your Primary Care Physician after you left the hospital, before you returned this time? Yes   Did you see a specialist, such as Cardiac, Pulmonary, Orthopedic Physician, etc. after you left the hospital? No  (Patient has an appointment on 05/14/2025.)   Who advised the patient to return to the hospital? Self-referral   Does the patient report anything that got in the way of taking their medications? No   In our efforts to provide the best possible care to you and others like you, can you think of anything that we could have done to help you after you left the hospital the first time, so that you might not have needed to return so soon? Other (Comment)  (Patient said nothing the hospital could have done to prevent the readmission to the hospital.)             Sofia Schneider RN Case Manager  [phone]

## 2025-05-12 NOTE — FLOWSHEET NOTE
Received patient to room 2314 via wheelchair. Patient connected to BSM. Patient given dinner tray & call light with instructions on how to use. No distress noted at this time, equal chest rise and fall noted. VSS on BSM at this time,

## 2025-05-12 NOTE — CONSULTS
baseline.   Psychiatric:         Mood and Affect: Mood normal.         Behavior: Behavior normal.          Laboratory Data  and Imaging Data    .labs  Hemoglobin   Date Value Ref Range Status   05/11/2025 8.6 (L) 13.0 - 16.0 g/dL Final     Hematocrit   Date Value Ref Range Status   05/11/2025 26.3 (L) 36.0 - 48.0 % Final     Platelets   Date Value Ref Range Status   05/11/2025 172 135 - 420 K/uL Final     Sodium   Date Value Ref Range Status   05/11/2025 140 136 - 145 mmol/L Final     Potassium   Date Value Ref Range Status   05/11/2025 3.8 3.5 - 5.5 mmol/L Final     Chloride   Date Value Ref Range Status   05/11/2025 106 98 - 107 mmol/L Final     CO2   Date Value Ref Range Status   05/11/2025 24 21 - 32 mmol/L Final     BUN   Date Value Ref Range Status   05/11/2025 36 (H) 6 - 23 MG/DL Final

## 2025-05-12 NOTE — PROGRESS NOTES
Pt's telemetry order dc'd, notified Dr. Og santiago.     13:50 Pt off unit for vascular duplex    1449 - pt returned to unit, VS taken, and back on monitor

## 2025-05-12 NOTE — CARE COORDINATION
CM went to see patient at the bedside.   Carina DEUTSCH let CM know that patient is off the unit at Vascular at this time.           Sofia Schneider RN Case Manager  [phone]

## 2025-05-12 NOTE — CARDIO/PULMONARY
Cardiopulmonary Rehab Screen:    Pt seen this day to discuss cardiopulmonary rehabilitation s/p discharge. Pt appropriate for Pulmonary rehab; please enter ambulatory order for rehab upon discharge.      Negin Chavez, RN, RN, BSN, CCRP  5/12/2025

## 2025-05-12 NOTE — CARE COORDINATION
05/12/25 1638   Service Assessment   Patient Orientation Alert and Oriented;Person;Place;Situation;Self   Cognition Alert   History Provided By Patient   Primary Caregiver Self   Accompanied By/Relationship No one is at the bedside with patient at this time.   Support Systems Family Members   Patient's Healthcare Decision Maker is: Legal Next of Kin   PCP Verified by CM Yes   Last Visit to PCP Within last 3 months   Prior Functional Level Independent in ADLs/IADLs   Current Functional Level Independent in ADLs/IADLs   Can patient return to prior living arrangement Yes   Ability to make needs known: Good   Family able to assist with home care needs: Yes   Would you like for me to discuss the discharge plan with any other family members/significant others, and if so, who? Yes  (Patient's sister, listed in patient's contact list.)   Financial Resources Medicare;Medicaid   Community Resources Other (Comment)   CM/[facility] Referral Other (see comment)  (Discharge planning.)   Social/Functional History   Lives With Alone   Type of Home House   Home Layout One level   Home Access Stairs to enter with rails   Entrance Stairs - Number of Steps 3 to 4 Steps to enter the home.   Entrance Stairs - Rails Both   Bathroom Shower/Tub Tub/Shower unit   Bathroom Toilet Standard   Bathroom Equipment Grab bars in shower   Bathroom Accessibility Accessible   Home Equipment None   Receives Help From Family   Prior Level of Assist for ADLs Independent   Prior Level of Assist for Homemaking Independent   Homemaking Responsibilities Yes   Ambulation Assistance Independent   Prior Level of Assist for Transfers Independent   Active  No   Patient's  Info Public Transportation and Medicaid Transport.   Mode of Transportation Bus;Van   Education Not applicable.   Occupation Retired   Type of Occupation Not applicable.   Discharge Planning   Type of Residence House   Living Arrangements Alone   Current Services Prior To Admission Other

## 2025-05-12 NOTE — PROGRESS NOTES
Progress Note    Simone Lamas  70 y.o.      Admit Date: 5/10/2025  Patient Active Problem List   Diagnosis    Elevated serum creatinine    Systolic CHF, chronic (HCC)    Chronic renal insufficiency    Hypertension    Stage 3 chronic renal impairment associated with type 2 diabetes mellitus (HCC)    Hepatitis C infection    Cocaine use    Cardiac LV ejection fraction 30-35%    Homelessness    Hypertriglyceridemia    Elevated HDL    Decreased GFR    NSTEMI (non-ST elevated myocardial infarction) (HCC)    History of heroin abuse (HCC)    Elevated alkaline phosphatase level    History of cocaine abuse (HCC)    TIA (transient ischemic attack)    Type 2 diabetes mellitus with insulin therapy (HCC)    COPD, mild (HCC)    CHF (congestive heart failure) (HCC)    Cardiomyopathy (HCC)    Insulin dependent type 2 diabetes mellitus, controlled (HCC)    CVA (cerebral vascular accident) (HCC)    ESRD (end stage renal disease) (HCC)    Substance abuse (HCC)    Anemia    End stage renal disease (HCC)    Pericardial effusion    Hyperglycemia    CKD (chronic kidney disease) stage 5, GFR less than 15 ml/min (HCC)    CKD (chronic kidney disease) stage 4, GFR 15-29 ml/min (HCC)    ESRD (end stage renal disease) on dialysis (HCC)    AVF (arteriovenous fistula)    Volume overload    Chest pain    Diabetes due to undrl condition w oth diabetic kidney comp (HCC)    Unstable angina (HCC)    AV fistula occlusion, initial encounter    Complication of arteriovenous dialysis fistula    Systolic dysfunction with acute on chronic heart failure (HCC)    Hemorrhage of arteriovenous fistula, subsequent encounter           Subjective:     Patient feels good was dialyzed last night.  Right femoral complicated AV graft was ligated.  Has a tunneled dialysis catheter in the right IJ..  No chest pain or shortness of breath..       A comprehensive review of systems was negative except for that written in the History of Present Illness.    Objective:

## 2025-05-12 NOTE — PROGRESS NOTES
[facility] Vein and Vascular Surgery    POD#2 Ligation bleeding right upper arm brachiocephalic AVF with ulceration and central venous stenosis/obstruction     No events overnight.  No complaints; overall feels well.    Current Facility-Administered Medications:     epoetin jai-epbx (RETACRIT) injection 10,000 Units, 10,000 Units, SubCUTAneous, Once per day on Tuesday Thursday Saturday, Vin Randle MD, 10,000 Units at 05/11/25 1804    sodium chloride 0.9 % bolus 100 mL, 100 mL, IntraVENous, PRN, Vin Randle MD    albumin human 25% IV solution 25 g, 25 g, IntraVENous, Once PRN, Vin Randle MD    apixaban (ELIQUIS) tablet 5 mg, 5 mg, Oral, BID, Cornell Downs MD, 5 mg at 05/12/25 0856    hydrALAZINE (APRESOLINE) injection 10 mg, 10 mg, IntraVENous, Q4H PRN, Cornell Downs MD, 10 mg at 05/10/25 0910    aspirin chewable tablet 81 mg, 81 mg, Oral, Daily, Cornell Downs MD, 81 mg at 05/12/25 0856    Virt-Caps 1 mg, 1 capsule, Oral, Daily, Cornell Downs MD, 1 mg at 05/12/25 0855    tamsulosin (FLOMAX) capsule 0.4 mg, 0.4 mg, Oral, Daily, Cornell Downs MD, 0.4 mg at 05/12/25 0855    atorvastatin (LIPITOR) tablet 40 mg, 40 mg, Oral, Nightly, Cornell Downs MD, 40 mg at 05/11/25 2029    losartan (COZAAR) tablet 100 mg, 100 mg, Oral, Daily, Cornell Downs MD, 100 mg at 05/12/25 0855    carvedilol (COREG) tablet 12.5 mg, 12.5 mg, Oral, BID WC, Cornell Downs MD, 12.5 mg at 05/12/25 0855    [START ON 5/16/2025] furosemide (LASIX) tablet 80 mg, 80 mg, Oral, Weekly, Cornell Downs MD    isosorbide mononitrate (IMDUR) extended release tablet 30 mg, 30 mg, Oral, Daily, Cornell Downs MD, 30 mg at 05/12/25 0855    sodium chloride flush 0.9 % injection 5-40 mL, 5-40 mL, IntraVENous, 2 times per day, Cornell Downs MD, 10 mL at 05/12/25 0857    sodium chloride flush 0.9 % injection 5-40 mL, 5-40 mL, IntraVENous, PRN, Cornell Downs MD, 10 mL at 05/10/25 0823    0.9 % sodium chloride

## 2025-05-12 NOTE — PLAN OF CARE
Problem: Chronic Conditions and Co-morbidities  Goal: Patient's chronic conditions and co-morbidity symptoms are monitored and maintained or improved  5/12/2025 1059 by Felicita Irwin RN  Outcome: Progressing  Flowsheets (Taken 5/10/2025 1840 by Lupe Rush, RN)  Care Plan - Patient's Chronic Conditions and Co-Morbidity Symptoms are Monitored and Maintained or Improved:   Monitor and assess patient's chronic conditions and comorbid symptoms for stability, deterioration, or improvement   Collaborate with multidisciplinary team to address chronic and comorbid conditions and prevent exacerbation or deterioration   Update acute care plan with appropriate goals if chronic or comorbid symptoms are exacerbated and prevent overall improvement and discharge     Problem: Skin/Tissue Integrity  Goal: Skin integrity remains intact  Description: 1.  Monitor for areas of redness and/or skin breakdown2.  Assess vascular access sites hourly3.  Every 4-6 hours minimum:  Change oxygen saturation probe site4.  Every 4-6 hours:  If on nasal continuous positive airway pressure, respiratory therapy assess nares and determine need for appliance change or resting period  5/12/2025 1059 by Felicita Irwin RN  Outcome: Progressing  Flowsheets (Taken 5/12/2025 1059)  Skin Integrity Remains Intact:   Monitor for areas of redness and/or skin breakdown   Assess vascular access sites hourly   Turn and reposition as indicated   Assess need for specialty bed   Positioning devices     Problem: Safety - Adult  Goal: Free from fall injury  5/12/2025 1059 by Felicita Irwin RN  Outcome: Progressing  Flowsheets (Taken 5/10/2025 1840 by Lupe Rush, RN)  Free From Fall Injury: Based on caregiver fall risk screen, instruct family/caregiver to ask for assistance with transferring infant if caregiver noted to have fall risk factors     Problem: Musculoskeletal - Adult  Goal: Return mobility to safest level of function  5/12/2025 1059 by Alida

## 2025-05-12 NOTE — PROGRESS NOTES
Pt transferred to 2314; report given to GET Mora. Pt eating dinner and has no complaints at this time.

## 2025-05-13 VITALS
TEMPERATURE: 98.2 F | WEIGHT: 190.92 LBS | DIASTOLIC BLOOD PRESSURE: 85 MMHG | BODY MASS INDEX: 25.86 KG/M2 | HEIGHT: 72 IN | HEART RATE: 75 BPM | RESPIRATION RATE: 18 BRPM | SYSTOLIC BLOOD PRESSURE: 138 MMHG | OXYGEN SATURATION: 99 %

## 2025-05-13 LAB
ALBUMIN SERPL-MCNC: 2.8 G/DL (ref 3.4–5)
ALBUMIN/GLOB SERPL: 0.9 (ref 0.8–1.7)
ALP SERPL-CCNC: 101 U/L (ref 45–117)
ALT SERPL-CCNC: 7 U/L (ref 10–50)
ANION GAP SERPL CALC-SCNC: 12 MMOL/L (ref 3–18)
AST SERPL-CCNC: 29 U/L (ref 10–38)
BASOPHILS # BLD: 0.05 K/UL (ref 0–0.1)
BASOPHILS NFR BLD: 0.7 % (ref 0–2)
BILIRUB SERPL-MCNC: 0.3 MG/DL (ref 0.2–1)
BUN SERPL-MCNC: 36 MG/DL (ref 6–23)
BUN/CREAT SERPL: 7 (ref 12–20)
CALCIUM SERPL-MCNC: 9.2 MG/DL (ref 8.5–10.1)
CHLORIDE SERPL-SCNC: 102 MMOL/L (ref 98–107)
CO2 SERPL-SCNC: 24 MMOL/L (ref 21–32)
CREAT SERPL-MCNC: 5.54 MG/DL (ref 0.6–1.3)
DIFFERENTIAL METHOD BLD: ABNORMAL
EOSINOPHIL # BLD: 0.39 K/UL (ref 0–0.4)
EOSINOPHIL NFR BLD: 5.7 % (ref 0–5)
ERYTHROCYTE [DISTWIDTH] IN BLOOD BY AUTOMATED COUNT: 14 % (ref 11.6–14.5)
GLOBULIN SER CALC-MCNC: 3.3 G/DL (ref 2–4)
GLUCOSE BLD STRIP.AUTO-MCNC: 94 MG/DL (ref 70–110)
GLUCOSE SERPL-MCNC: 112 MG/DL (ref 74–108)
HBV SURFACE AG SER QL: NONREACTIVE
HCT VFR BLD AUTO: 26 % (ref 36–48)
HGB BLD-MCNC: 8.5 G/DL (ref 13–16)
IMM GRANULOCYTES # BLD AUTO: 0.07 K/UL (ref 0–0.04)
IMM GRANULOCYTES NFR BLD AUTO: 1 % (ref 0–0.5)
LYMPHOCYTES # BLD: 1.68 K/UL (ref 0.9–3.6)
LYMPHOCYTES NFR BLD: 24.4 % (ref 21–52)
MAGNESIUM SERPL-MCNC: 1.9 MG/DL (ref 1.6–2.6)
MCH RBC QN AUTO: 31.1 PG (ref 24–34)
MCHC RBC AUTO-ENTMCNC: 32.7 G/DL (ref 31–37)
MCV RBC AUTO: 95.2 FL (ref 78–100)
MONOCYTES # BLD: 0.67 K/UL (ref 0.05–1.2)
MONOCYTES NFR BLD: 9.7 % (ref 3–10)
NEUTS SEG # BLD: 4.03 K/UL (ref 1.8–8)
NEUTS SEG NFR BLD: 58.5 % (ref 40–73)
NRBC # BLD: 0 K/UL (ref 0–0.01)
NRBC BLD-RTO: 0 PER 100 WBC
PHOSPHATE SERPL-MCNC: 2.9 MG/DL (ref 2.5–4.9)
PLATELET # BLD AUTO: 180 K/UL (ref 135–420)
PMV BLD AUTO: 11.2 FL (ref 9.2–11.8)
POTASSIUM SERPL-SCNC: 3.4 MMOL/L (ref 3.5–5.5)
PROT SERPL-MCNC: 6.1 G/DL (ref 6.4–8.2)
RBC # BLD AUTO: 2.73 M/UL (ref 4.35–5.65)
SODIUM SERPL-SCNC: 137 MMOL/L (ref 136–145)
WBC # BLD AUTO: 6.9 K/UL (ref 4.6–13.2)

## 2025-05-13 PROCEDURE — 6370000000 HC RX 637 (ALT 250 FOR IP): Performed by: INTERNAL MEDICINE

## 2025-05-13 PROCEDURE — 87340 HEPATITIS B SURFACE AG IA: CPT

## 2025-05-13 PROCEDURE — 80053 COMPREHEN METABOLIC PANEL: CPT

## 2025-05-13 PROCEDURE — 90935 HEMODIALYSIS ONE EVALUATION: CPT

## 2025-05-13 PROCEDURE — 6360000002 HC RX W HCPCS: Performed by: SURGERY

## 2025-05-13 PROCEDURE — 6370000000 HC RX 637 (ALT 250 FOR IP): Performed by: FAMILY MEDICINE

## 2025-05-13 PROCEDURE — 6370000000 HC RX 637 (ALT 250 FOR IP): Performed by: SURGERY

## 2025-05-13 PROCEDURE — 85025 COMPLETE CBC W/AUTO DIFF WBC: CPT

## 2025-05-13 PROCEDURE — 36415 COLL VENOUS BLD VENIPUNCTURE: CPT

## 2025-05-13 PROCEDURE — 2500000003 HC RX 250 WO HCPCS: Performed by: SURGERY

## 2025-05-13 PROCEDURE — 84100 ASSAY OF PHOSPHORUS: CPT

## 2025-05-13 PROCEDURE — 99239 HOSP IP/OBS DSCHRG MGMT >30: CPT | Performed by: HOSPITALIST

## 2025-05-13 PROCEDURE — 83735 ASSAY OF MAGNESIUM: CPT

## 2025-05-13 PROCEDURE — 82962 GLUCOSE BLOOD TEST: CPT

## 2025-05-13 RX ORDER — POTASSIUM CHLORIDE 750 MG/1
10 TABLET, EXTENDED RELEASE ORAL ONCE
Status: COMPLETED | OUTPATIENT
Start: 2025-05-13 | End: 2025-05-13

## 2025-05-13 RX ADMIN — HYDRALAZINE HYDROCHLORIDE 10 MG: 20 INJECTION INTRAMUSCULAR; INTRAVENOUS at 01:05

## 2025-05-13 RX ADMIN — POTASSIUM CHLORIDE 10 MEQ: 750 TABLET, EXTENDED RELEASE ORAL at 06:08

## 2025-05-13 RX ADMIN — ASPIRIN 81 MG CHEWABLE TABLET 81 MG: 81 TABLET CHEWABLE at 08:23

## 2025-05-13 RX ADMIN — Medication 1 MG: at 08:24

## 2025-05-13 RX ADMIN — INSULIN GLARGINE 15 UNITS: 100 INJECTION, SOLUTION SUBCUTANEOUS at 08:24

## 2025-05-13 RX ADMIN — APIXABAN 5 MG: 5 TABLET, FILM COATED ORAL at 08:24

## 2025-05-13 RX ADMIN — Medication 3 MG: at 01:09

## 2025-05-13 RX ADMIN — TAMSULOSIN HYDROCHLORIDE 0.4 MG: 0.4 CAPSULE ORAL at 08:23

## 2025-05-13 RX ADMIN — PANTOPRAZOLE SODIUM 40 MG: 40 TABLET, DELAYED RELEASE ORAL at 08:23

## 2025-05-13 RX ADMIN — SODIUM CHLORIDE, PRESERVATIVE FREE 10 ML: 5 INJECTION INTRAVENOUS at 08:24

## 2025-05-13 ASSESSMENT — PAIN SCALES - GENERAL
PAINLEVEL_OUTOF10: 0

## 2025-05-13 ASSESSMENT — PAIN SCALES - WONG BAKER: WONGBAKER_NUMERICALRESPONSE: NO HURT

## 2025-05-13 NOTE — PLAN OF CARE
Problem: Discharge Planning  Goal: Discharge to home or other facility with appropriate resources  Outcome: Progressing  Flowsheets (Taken 5/13/2025 0514)  Discharge to home or other facility with appropriate resources:   Identify barriers to discharge with patient and caregiver   Arrange for needed discharge resources and transportation as appropriate   Identify discharge learning needs (meds, wound care, etc)     Problem: Metabolic/Fluid and Electrolytes - Adult  Goal: Hemodynamic stability and optimal renal function maintained  Outcome: Progressing  Flowsheets (Taken 5/13/2025 0514)  Hemodynamic stability and optimal renal function maintained:   Monitor labs and assess for signs and symptoms of volume excess or deficit   Monitor intake, output and patient weight   Monitor response to interventions for patient's volume status, including labs, urine output, blood pressure (other measures as available)     Problem: Chronic Conditions and Co-morbidities  Goal: Patient's chronic conditions and co-morbidity symptoms are monitored and maintained or improved  Flowsheets (Taken 5/13/2025 0514)  Care Plan - Patient's Chronic Conditions and Co-Morbidity Symptoms are Monitored and Maintained or Improved:   Monitor and assess patient's chronic conditions and comorbid symptoms for stability, deterioration, or improvement   Collaborate with multidisciplinary team to address chronic and comorbid conditions and prevent exacerbation or deterioration   Update acute care plan with appropriate goals if chronic or comorbid symptoms are exacerbated and prevent overall improvement and discharge

## 2025-05-13 NOTE — DISCHARGE SUMMARY
Hospitalist Discharge Summary      Patient: Simone Lamas MRN: [id number]  CSN: [id number]    YOB: 1955  Age: 70 y.o.  Sex: male    DOA: 5/10/2025 LOS:  LOS: 3 days   Discharge Date:     Admission Diagnoses: Hemorrhage of arteriovenous fistula, initial encounter [T82.838A]  Hemorrhage of arteriovenous fistula, subsequent encounter [T82.838D]    Discharge Diagnoses:    Hemorrhage of AV fistula status post ligation vascular surgery right arm  Anemia secondary to acute blood loss  ESRD on HD  History of hypertension  History of hyperlipidemia  History of type 2 diabetes  History of BPH    Discharge Condition: Fair    Discharge To: Home    CODE STATUS: Full Code         PHYSICAL EXAM  Visit Vitals  BP (!) 142/87   Pulse 72   Temp 98 °F (36.7 °C)   Resp 18   Ht 1.829 m (6')   Wt 86.6 kg (190 lb 14.7 oz)   SpO2 98%   BMI 25.89 kg/m²       General: Alert, cooperative, no acute distress    Lungs:  CTA Bilaterally. No Wheezing/Rhonchi/Rales.  Heart:  Regular rate and Rhythm.  Abdomen: Soft, Non distended, Non tender. + Bowel sounds.  Extremities: No edema/ cyanosis/ clubbing  Neurologic:  AA oriented X 3. Moves all extremities.                                HPI:      Patient presented to the ED with bleeding at the dialysis Fistula site on his right upper arm.     He is seen by vascular surgery team and  determined to needing immediate surgical repair to start the bleeding      He was taken to the OR  where ligation of the fistula   and  the bleeding has been stopped        Patient stated that this am, he removed a bandage from the fistula and it started bleeding and he could not get it to stop     He is on eliquis due to multiple instance of thrombotic events to the fistula     He was dc on 4/19 after thrombectomy from [facility].    Hospital Course:     Patient admitted to the hospital secondary to right arm AV fistula hemorrhage.  Patient has previously had multiple problems with his fistula and has not

## 2025-05-13 NOTE — PROGRESS NOTES
Progress Note    Simone Lamas  70 y.o.      Admit Date: 5/10/2025  Patient Active Problem List   Diagnosis    Elevated serum creatinine    Systolic CHF, chronic (HCC)    Chronic renal insufficiency    Hypertension    Stage 3 chronic renal impairment associated with type 2 diabetes mellitus (HCC)    Hepatitis C infection    Cocaine use    Cardiac LV ejection fraction 30-35%    Homelessness    Hypertriglyceridemia    Elevated HDL    Decreased GFR    NSTEMI (non-ST elevated myocardial infarction) (HCC)    History of heroin abuse (HCC)    Elevated alkaline phosphatase level    History of cocaine abuse (HCC)    TIA (transient ischemic attack)    Type 2 diabetes mellitus with insulin therapy (HCC)    COPD, mild (HCC)    CHF (congestive heart failure) (HCC)    Cardiomyopathy (HCC)    Insulin dependent type 2 diabetes mellitus, controlled (HCC)    CVA (cerebral vascular accident) (HCC)    ESRD (end stage renal disease) (HCC)    Substance abuse (HCC)    Anemia    End stage renal disease (HCC)    Pericardial effusion    Hyperglycemia    CKD (chronic kidney disease) stage 5, GFR less than 15 ml/min (HCC)    CKD (chronic kidney disease) stage 4, GFR 15-29 ml/min (HCC)    ESRD (end stage renal disease) on dialysis (HCC)    AVF (arteriovenous fistula)    Volume overload    Chest pain    Diabetes due to undrl condition w oth diabetic kidney comp (HCC)    Unstable angina (HCC)    AV fistula occlusion, initial encounter    Complication of arteriovenous dialysis fistula    Systolic dysfunction with acute on chronic heart failure (HCC)    Hemorrhage of arteriovenous fistula, subsequent encounter           Subjective:     Patient is comfortable and getting dialysis as planned.  Seen during dialysis.  No shortness of breath but using oxygen.  Plan to take 1.5 to 2 L of fluid off.  Using new hemodialysis catheter with a blood flow 300.  Undergone vein mapping of the both arms including the left arm results of which are not

## 2025-05-13 NOTE — PROGRESS NOTES
Patient to be discharged home post hemodialysis if okay with nephrology and vascular surgery.  No further episodes of bleeding.  Resume home medications on discharge.  [facility] completed.

## 2025-05-13 NOTE — PLAN OF CARE
HEMODIALYSIS Plan:  Time: 3 hrs  Dialysate: 3 K+  2.5Ca++  Bath  Net UF: 1-2 L As tolerated  Access: Aseptic care for RT Chest TDC  Hemodynamic stability: Maintain BP WNL  Problem: Chronic Conditions and Co-morbidities  Goal: Patient's chronic conditions and co-morbidity symptoms are monitored and maintained or improved  Outcome: Progressing

## 2025-05-13 NOTE — CARE COORDINATION
05/13/25 1315   IMM Letter   IMM Letter given to Patient/Family/Significant other/Guardian/POA/by: Jerilyn Rodriguez   IMM Letter date given: 05/13/25   IMM Letter time given: 1307         Medicare pt has received, reviewed, and signed 2nd IM letter informing them of their right to appeal the discharge.  Signed copy has been placed on pt bedside chart.    Pt waved 4 hours right to appeal d/c.          Jerilyn Rodriguez, JANNETTEN RN  Care Management

## 2025-05-13 NOTE — CARE COORDINATION
Pt just got back from Dialysis.  He stated he will need transport home.        JANNETTE FariasN RN  Care Management

## 2025-05-13 NOTE — CARE COORDINATION
Called [place] [employer] transportation at [phone] scheduled cab transportation to patient's home ([address] [place] [address]) with [facility] and received confirmation number [id number].  Transportation can span up to 3 hours,  will call nurse's station when in route to the facility.  Informed CM transportation has been scheduled.

## 2025-05-13 NOTE — CARE COORDINATION
Requested Case Management specialist to assist with transportation to home.  Address is [address] [place] [address] and phone number is [phone]  Any steps at the residence?   Patient will require transport.   Pt requires Cab   Height:6'   Weight: 190 Ib  Is the pt ready now? YES    Insurance verified on face sheet: Yes  Auth needed for transport: Yes          MALENA Farias RN  Care Management

## 2025-05-13 NOTE — CARE COORDINATION
Discharge orders noted  Pt is at Dialysis at this time.          JANNETTE FariasN RN  Care Management

## 2025-05-13 NOTE — DIALYSIS
HD Care plan  Time: 3 hrs  Dialysate: 3 K+  2.5 Ca++  Bath  Net UF: 1-2 L as tolerated  Access: Aseptic care for RT Chest TDC  Hemodynamic stability: Maintain BP WNL     Pre Dialysis:  [facility] received from [facility] . Patient arrived on a bed, A+O x 4, on RA, no s/s of acute distress noted. RT Chest TDC assessed, no abnormalities noted. TDC accessed per protocol without any difficulty, line patent with good flow.     Intra Dialysis:  Time out / safety process performed per policy. Tx initiated at 0930.    TDC flowing with ease. For hemodynamic stability UF goal set at 2000 ml as tolerated.  Pt offered assistance with repositioning every 2 hours/prn    Vascular access visible and line connections remained intact throughout entire duration of treatment.   Vital signs checked every 15 mins.     Post Dialysis:  Tx completed at 1230,   Tolerated well, 2 L  removed. De-accessed per protocol.    Dialysis catheter locked accordingly with Heparin 1.9 ml in arterial port, and 1.9 ml in venous port. Catheter dressing clean, dry and intact.  Post Dialysis report given to [facility]

## 2025-05-14 ENCOUNTER — OFFICE VISIT (OUTPATIENT)
Age: 70
End: 2025-05-14

## 2025-05-14 VITALS
HEART RATE: 77 BPM | SYSTOLIC BLOOD PRESSURE: 140 MMHG | BODY MASS INDEX: 25.73 KG/M2 | WEIGHT: 190 LBS | HEIGHT: 72 IN | OXYGEN SATURATION: 98 % | DIASTOLIC BLOOD PRESSURE: 78 MMHG

## 2025-05-14 DIAGNOSIS — N18.6 ESRD (END STAGE RENAL DISEASE) ON DIALYSIS (HCC): Primary | ICD-10-CM

## 2025-05-14 DIAGNOSIS — Z99.2 ESRD (END STAGE RENAL DISEASE) ON DIALYSIS (HCC): Primary | ICD-10-CM

## 2025-05-14 LAB
ABO + RH BLD: NORMAL
BLD PROD TYP BPU: NORMAL
BLOOD BANK DISPENSE STATUS: NORMAL
BLOOD GROUP ANTIBODIES SERPL: NORMAL
BPU ID: NORMAL
CALLED TO:: NORMAL
CROSSMATCH RESULT: NORMAL
SPECIMEN EXP DATE BLD: NORMAL
UNIT DIVISION: 0

## 2025-05-14 PROCEDURE — 99024 POSTOP FOLLOW-UP VISIT: CPT | Performed by: SURGERY

## 2025-05-22 NOTE — PROGRESS NOTES
mononitrate (IMDUR) 30 MG extended release tablet Take 1 tablet by mouth daily 30 tablet 3    apixaban (ELIQUIS) 5 MG TABS tablet Take 1 tablet by mouth 2 times daily 178 tablet 0    furosemide (LASIX) 80 MG tablet Take 1 tablet by mouth once a week Every Sunday      carvedilol (COREG) 12.5 MG tablet Take 1 tablet by mouth 2 times daily (with meals) 60 tablet 3    atorvastatin (LIPITOR) 40 MG tablet Take 1 tablet by mouth nightly 30 tablet 3    losartan (COZAAR) 100 MG tablet Take 1 tablet by mouth daily 30 tablet 0    tamsulosin (FLOMAX) 0.4 MG capsule Take 1 capsule by mouth daily      ASPIRIN LOW DOSE 81 MG chewable tablet Take 1 tablet by mouth daily      B Complex-C-Folic Acid (RENAL) 1 MG CAPS Take 1 capsule by mouth daily      insulin glargine (LANTUS) 100 UNIT/ML injection vial 25 Units       No current facility-administered medications for this visit.     Allergies   Allergen Reactions    Acetaminophen Other (See Comments)     Pt was advised not to take Tylenol per .      Social History     Tobacco Use    Smoking status: Never    Smokeless tobacco: Never     No family history on file.

## 2025-05-24 ENCOUNTER — HOSPITAL ENCOUNTER (EMERGENCY)
Facility: HOSPITAL | Age: 70
Discharge: HOME | End: 2025-05-24
Attending: EMERGENCY MEDICINE
Payer: MEDICARE

## 2025-05-24 VITALS
WEIGHT: 181 LBS | BODY MASS INDEX: 24.52 KG/M2 | TEMPERATURE: 97.9 F | SYSTOLIC BLOOD PRESSURE: 178 MMHG | HEART RATE: 75 BPM | HEIGHT: 72 IN | OXYGEN SATURATION: 97 % | RESPIRATION RATE: 14 BRPM | DIASTOLIC BLOOD PRESSURE: 123 MMHG

## 2025-05-24 DIAGNOSIS — Z99.2 ESRD ON DIALYSIS (HCC): ICD-10-CM

## 2025-05-24 DIAGNOSIS — N18.6 ESRD ON DIALYSIS (HCC): ICD-10-CM

## 2025-05-24 DIAGNOSIS — R42 LIGHTHEADEDNESS: Primary | ICD-10-CM

## 2025-05-24 LAB
ANION GAP SERPL CALC-SCNC: 12 MMOL/L (ref 3–18)
BASOPHILS # BLD: 0.06 K/UL (ref 0–0.1)
BASOPHILS NFR BLD: 1 % (ref 0–2)
BUN SERPL-MCNC: 26 MG/DL (ref 6–23)
BUN/CREAT SERPL: 7 (ref 12–20)
CALCIUM SERPL-MCNC: 9.4 MG/DL (ref 8.5–10.1)
CHLORIDE SERPL-SCNC: 103 MMOL/L (ref 98–107)
CO2 SERPL-SCNC: 26 MMOL/L (ref 21–32)
CREAT SERPL-MCNC: 3.85 MG/DL (ref 0.6–1.3)
DIFFERENTIAL METHOD BLD: ABNORMAL
EKG ATRIAL RATE: 75 BPM
EKG DIAGNOSIS: NORMAL
EKG P AXIS: 64 DEGREES
EKG P-R INTERVAL: 164 MS
EKG Q-T INTERVAL: 524 MS
EKG QRS DURATION: 150 MS
EKG QTC CALCULATION (BAZETT): 585 MS
EKG R AXIS: -39 DEGREES
EKG T AXIS: 31 DEGREES
EKG VENTRICULAR RATE: 75 BPM
EOSINOPHIL # BLD: 0.38 K/UL (ref 0–0.4)
EOSINOPHIL NFR BLD: 6.5 % (ref 0–5)
ERYTHROCYTE [DISTWIDTH] IN BLOOD BY AUTOMATED COUNT: 14.4 % (ref 11.6–14.5)
GLUCOSE BLD STRIP.AUTO-MCNC: 74 MG/DL (ref 70–110)
GLUCOSE SERPL-MCNC: 61 MG/DL (ref 74–108)
HCT VFR BLD AUTO: 29.3 % (ref 36–48)
HGB BLD-MCNC: 9.2 G/DL (ref 13–16)
IMM GRANULOCYTES # BLD AUTO: 0.03 K/UL (ref 0–0.04)
IMM GRANULOCYTES NFR BLD AUTO: 0.5 % (ref 0–0.5)
LYMPHOCYTES # BLD: 1.17 K/UL (ref 0.9–3.6)
LYMPHOCYTES NFR BLD: 20.1 % (ref 21–52)
MCH RBC QN AUTO: 31 PG (ref 24–34)
MCHC RBC AUTO-ENTMCNC: 31.4 G/DL (ref 31–37)
MCV RBC AUTO: 98.7 FL (ref 78–100)
MONOCYTES # BLD: 0.54 K/UL (ref 0.05–1.2)
MONOCYTES NFR BLD: 9.3 % (ref 3–10)
NEUTS SEG # BLD: 3.63 K/UL (ref 1.8–8)
NEUTS SEG NFR BLD: 62.6 % (ref 40–73)
NRBC # BLD: 0 K/UL (ref 0–0.01)
NRBC BLD-RTO: 0 PER 100 WBC
PLATELET # BLD AUTO: 240 K/UL (ref 135–420)
PMV BLD AUTO: 10.6 FL (ref 9.2–11.8)
POTASSIUM SERPL-SCNC: 3.7 MMOL/L (ref 3.5–5.5)
RBC # BLD AUTO: 2.97 M/UL (ref 4.35–5.65)
SODIUM SERPL-SCNC: 141 MMOL/L (ref 136–145)
WBC # BLD AUTO: 5.8 K/UL (ref 4.6–13.2)

## 2025-05-24 PROCEDURE — 82962 GLUCOSE BLOOD TEST: CPT

## 2025-05-24 PROCEDURE — 93005 ELECTROCARDIOGRAM TRACING: CPT | Performed by: EMERGENCY MEDICINE

## 2025-05-24 PROCEDURE — 93010 ELECTROCARDIOGRAM REPORT: CPT | Performed by: INTERNAL MEDICINE

## 2025-05-24 PROCEDURE — 85025 COMPLETE CBC W/AUTO DIFF WBC: CPT

## 2025-05-24 PROCEDURE — 99284 EMERGENCY DEPT VISIT MOD MDM: CPT

## 2025-05-24 PROCEDURE — 80048 BASIC METABOLIC PNL TOTAL CA: CPT

## 2025-05-24 ASSESSMENT — PAIN - FUNCTIONAL ASSESSMENT: PAIN_FUNCTIONAL_ASSESSMENT: 0-10

## 2025-05-24 ASSESSMENT — PAIN SCALES - GENERAL: PAINLEVEL_OUTOF10: 0

## 2025-05-24 NOTE — ED NOTES
After visit summary provided. Reviewed and educated patient on information; including, medication, follow-up appointments, and additional care instructions.  All questions and concerns addressed at this time.  Patient verbalized understanding of discharge teaching.  Provider aware of vitals at time of discharge.      Pt provided juice and pbj.  Rpt BG is 74.  Pt given additional juice and pbj.

## 2025-05-24 NOTE — ED TRIAGE NOTES
Patient arrived to ED via EMS from dialysis with complaints of dizziness.  Pt was 1 hour into dialysis and felt dizzy.  Dialysis was stopped and EMS was called.  Per patient dizziness has subsided.     Per EMS VSS, BG 89

## 2025-05-24 NOTE — ED PROVIDER NOTES
EMERGENCY DEPARTMENT HISTORY AND PHYSICAL EXAM    7:51 AM EDT seen at this time in room 8        Date: 5/24/2025  Patient Name: Simone Lamas    History of Presenting Illness     Chief Complaint   Patient presents with    Dizziness         History Provided By: patient    Additional History (Context): Simone Lamas is a 70 y.o. male presents with was 1 hour into his routine dialysis treatment and acutely became very hot flushed and felt like he was going to pass out.  Since coming to the emergency department he now feels fine and is totally asymptomatic.  No chest pain abdominal pain shortness of breath or syncope.  No focal neurologic deficit..    PCP: Liang Aguilar APRN - NP    Chief Complaint:   Duration:    Timing:    Location:   Quality:   Severity:   Modifying Factors:   Associated Symptoms:       No current facility-administered medications for this encounter.     Current Outpatient Medications   Medication Sig Dispense Refill    isosorbide mononitrate (IMDUR) 30 MG extended release tablet Take 1 tablet by mouth daily 30 tablet 3    apixaban (ELIQUIS) 5 MG TABS tablet Take 1 tablet by mouth 2 times daily 178 tablet 0    furosemide (LASIX) 80 MG tablet Take 1 tablet by mouth once a week Every Sunday      carvedilol (COREG) 12.5 MG tablet Take 1 tablet by mouth 2 times daily (with meals) 60 tablet 3    atorvastatin (LIPITOR) 40 MG tablet Take 1 tablet by mouth nightly 30 tablet 3    losartan (COZAAR) 100 MG tablet Take 1 tablet by mouth daily 30 tablet 0    tamsulosin (FLOMAX) 0.4 MG capsule Take 1 capsule by mouth daily      ASPIRIN LOW DOSE 81 MG chewable tablet Take 1 tablet by mouth daily      B Complex-C-Folic Acid (RENAL) 1 MG CAPS Take 1 capsule by mouth daily      insulin glargine (LANTUS) 100 UNIT/ML injection vial 25 Units         Past History     Past Medical History:  Past Medical History:   Diagnosis Date    CHF (congestive heart failure) (HCC)     Diabetes mellitus (HCC)     End stage

## 2025-06-15 ENCOUNTER — APPOINTMENT (OUTPATIENT)
Facility: HOSPITAL | Age: 70
DRG: 194 | End: 2025-06-15
Payer: COMMERCIAL

## 2025-06-15 ENCOUNTER — HOSPITAL ENCOUNTER (INPATIENT)
Facility: HOSPITAL | Age: 70
LOS: 2 days | Discharge: HOME | DRG: 194 | End: 2025-06-17
Attending: EMERGENCY MEDICINE | Admitting: HOSPITALIST
Payer: COMMERCIAL

## 2025-06-15 DIAGNOSIS — R09.02 HYPOXIA: ICD-10-CM

## 2025-06-15 DIAGNOSIS — I21.4 NSTEMI (NON-ST ELEVATED MYOCARDIAL INFARCTION) (HCC): ICD-10-CM

## 2025-06-15 DIAGNOSIS — E87.79 VOLUME OVERLOAD STATE OF HEART: ICD-10-CM

## 2025-06-15 DIAGNOSIS — R06.00 DYSPNEA, UNSPECIFIED TYPE: Primary | ICD-10-CM

## 2025-06-15 DIAGNOSIS — N18.6 ESRD ON DIALYSIS (HCC): ICD-10-CM

## 2025-06-15 DIAGNOSIS — Z99.2 ESRD ON DIALYSIS (HCC): ICD-10-CM

## 2025-06-15 PROBLEM — J96.01 ACUTE RESPIRATORY FAILURE WITH HYPOXIA (HCC): Status: ACTIVE | Noted: 2025-06-15

## 2025-06-15 LAB
ALBUMIN SERPL-MCNC: 3 G/DL (ref 3.4–5)
ALBUMIN/GLOB SERPL: 0.9 (ref 0.8–1.7)
ALP SERPL-CCNC: 117 U/L (ref 45–117)
ALT SERPL-CCNC: 32 U/L (ref 10–50)
ANION GAP SERPL CALC-SCNC: 14 MMOL/L (ref 3–18)
APTT PPP: 29.3 SEC (ref 23–36.4)
AST SERPL-CCNC: 70 U/L (ref 10–38)
BASE EXCESS BLD CALC-SCNC: 5.3 MMOL/L
BASOPHILS # BLD: 0.08 K/UL (ref 0–0.1)
BASOPHILS NFR BLD: 1.1 % (ref 0–2)
BILIRUB SERPL-MCNC: 1.2 MG/DL (ref 0.2–1)
BUN SERPL-MCNC: 20 MG/DL (ref 6–23)
BUN/CREAT SERPL: 4 (ref 12–20)
CALCIUM SERPL-MCNC: 9.6 MG/DL (ref 8.5–10.1)
CHLORIDE SERPL-SCNC: 101 MMOL/L (ref 98–107)
CO2 SERPL-SCNC: 23 MMOL/L (ref 21–32)
CREAT SERPL-MCNC: 4.44 MG/DL (ref 0.6–1.3)
DIFFERENTIAL METHOD BLD: ABNORMAL
EOSINOPHIL # BLD: 0.16 K/UL (ref 0–0.4)
EOSINOPHIL NFR BLD: 2.1 % (ref 0–5)
ERYTHROCYTE [DISTWIDTH] IN BLOOD BY AUTOMATED COUNT: 14.3 % (ref 11.6–14.5)
ERYTHROCYTE [DISTWIDTH] IN BLOOD BY AUTOMATED COUNT: 14.4 % (ref 11.6–14.5)
FLUAV RNA SPEC QL NAA+PROBE: NOT DETECTED
FLUBV RNA SPEC QL NAA+PROBE: NOT DETECTED
GLOBULIN SER CALC-MCNC: 3.4 G/DL (ref 2–4)
GLUCOSE SERPL-MCNC: 222 MG/DL (ref 74–108)
HCO3 BLD-SCNC: 28.6 MMOL/L (ref 21–28)
HCT VFR BLD AUTO: 30.3 % (ref 36–48)
HCT VFR BLD AUTO: 33.9 % (ref 36–48)
HGB BLD-MCNC: 10 G/DL (ref 13–16)
HGB BLD-MCNC: 10.7 G/DL (ref 13–16)
IMM GRANULOCYTES # BLD AUTO: 0.02 K/UL (ref 0–0.04)
IMM GRANULOCYTES NFR BLD AUTO: 0.3 % (ref 0–0.5)
LACTATE BLD-SCNC: 1.96 MMOL/L (ref 0.4–2)
LYMPHOCYTES # BLD: 1.14 K/UL (ref 0.9–3.6)
LYMPHOCYTES NFR BLD: 15.2 % (ref 21–52)
MAGNESIUM SERPL-MCNC: 2 MG/DL (ref 1.6–2.6)
MCH RBC QN AUTO: 31 PG (ref 24–34)
MCH RBC QN AUTO: 31.9 PG (ref 24–34)
MCHC RBC AUTO-ENTMCNC: 31.6 G/DL (ref 31–37)
MCHC RBC AUTO-ENTMCNC: 33 G/DL (ref 31–37)
MCV RBC AUTO: 96.8 FL (ref 78–100)
MCV RBC AUTO: 98.3 FL (ref 78–100)
MONOCYTES # BLD: 0.6 K/UL (ref 0.05–1.2)
MONOCYTES NFR BLD: 8 % (ref 3–10)
NEUTS SEG # BLD: 5.49 K/UL (ref 1.8–8)
NEUTS SEG NFR BLD: 73.3 % (ref 40–73)
NRBC # BLD: 0 K/UL (ref 0–0.01)
NRBC # BLD: 0 K/UL (ref 0–0.01)
NRBC BLD-RTO: 0 PER 100 WBC
NRBC BLD-RTO: 0 PER 100 WBC
NT PRO BNP: ABNORMAL PG/ML (ref 36–900)
PCO2 BLD: 36.3 MMHG (ref 35–48)
PH BLD: 7.5 (ref 7.35–7.45)
PLATELET # BLD AUTO: 224 K/UL (ref 135–420)
PLATELET # BLD AUTO: 225 K/UL (ref 135–420)
PMV BLD AUTO: 10.8 FL (ref 9.2–11.8)
PMV BLD AUTO: 11.2 FL (ref 9.2–11.8)
PO2 BLD: 56 MMHG (ref 83–108)
POTASSIUM SERPL-SCNC: 5.1 MMOL/L (ref 3.5–5.5)
PROT SERPL-MCNC: 6.4 G/DL (ref 6.4–8.2)
RBC # BLD AUTO: 3.13 M/UL (ref 4.35–5.65)
RBC # BLD AUTO: 3.45 M/UL (ref 4.35–5.65)
SAO2 % BLD: 91.4 % (ref 92–97)
SARS-COV-2 RNA RESP QL NAA+PROBE: NOT DETECTED
SERVICE CMNT-IMP: ABNORMAL
SODIUM SERPL-SCNC: 138 MMOL/L (ref 136–145)
SOURCE: NORMAL
SPECIMEN TYPE: ABNORMAL
TROPONIN T SERPL HS-MCNC: 75.5 NG/L (ref 0–22)
TROPONIN T SERPL HS-MCNC: 82.8 NG/L (ref 0–22)
WBC # BLD AUTO: 7.5 K/UL (ref 4.6–13.2)
WBC # BLD AUTO: 7.8 K/UL (ref 4.6–13.2)

## 2025-06-15 PROCEDURE — 6370000000 HC RX 637 (ALT 250 FOR IP): Performed by: EMERGENCY MEDICINE

## 2025-06-15 PROCEDURE — 85730 THROMBOPLASTIN TIME PARTIAL: CPT

## 2025-06-15 PROCEDURE — 94760 N-INVAS EAR/PLS OXIMETRY 1: CPT

## 2025-06-15 PROCEDURE — 6360000002 HC RX W HCPCS: Performed by: EMERGENCY MEDICINE

## 2025-06-15 PROCEDURE — 85027 COMPLETE CBC AUTOMATED: CPT

## 2025-06-15 PROCEDURE — 2700000000 HC OXYGEN THERAPY PER DAY

## 2025-06-15 PROCEDURE — 99223 1ST HOSP IP/OBS HIGH 75: CPT | Performed by: HOSPITALIST

## 2025-06-15 PROCEDURE — 71045 X-RAY EXAM CHEST 1 VIEW: CPT

## 2025-06-15 PROCEDURE — 82803 BLOOD GASES ANY COMBINATION: CPT

## 2025-06-15 PROCEDURE — 5A1D70Z PERFORMANCE OF URINARY FILTRATION, INTERMITTENT, LESS THAN 6 HOURS PER DAY: ICD-10-PCS | Performed by: STUDENT IN AN ORGANIZED HEALTH CARE EDUCATION/TRAINING PROGRAM

## 2025-06-15 PROCEDURE — 90935 HEMODIALYSIS ONE EVALUATION: CPT

## 2025-06-15 PROCEDURE — 87636 SARSCOV2 & INF A&B AMP PRB: CPT

## 2025-06-15 PROCEDURE — 83735 ASSAY OF MAGNESIUM: CPT

## 2025-06-15 PROCEDURE — 83880 ASSAY OF NATRIURETIC PEPTIDE: CPT

## 2025-06-15 PROCEDURE — 85025 COMPLETE CBC W/AUTO DIFF WBC: CPT

## 2025-06-15 PROCEDURE — 1100000000 HC RM PRIVATE

## 2025-06-15 PROCEDURE — 84484 ASSAY OF TROPONIN QUANT: CPT

## 2025-06-15 PROCEDURE — 80053 COMPREHEN METABOLIC PANEL: CPT

## 2025-06-15 PROCEDURE — 99291 CRITICAL CARE FIRST HOUR: CPT

## 2025-06-15 PROCEDURE — 83605 ASSAY OF LACTIC ACID: CPT

## 2025-06-15 PROCEDURE — 93005 ELECTROCARDIOGRAM TRACING: CPT | Performed by: EMERGENCY MEDICINE

## 2025-06-15 RX ORDER — LOSARTAN POTASSIUM 50 MG/1
100 TABLET ORAL DAILY
Status: DISCONTINUED | OUTPATIENT
Start: 2025-06-16 | End: 2025-06-17 | Stop reason: HOSPADM

## 2025-06-15 RX ORDER — POLYETHYLENE GLYCOL 3350 17 G/17G
17 POWDER, FOR SOLUTION ORAL DAILY PRN
Status: DISCONTINUED | OUTPATIENT
Start: 2025-06-15 | End: 2025-06-17 | Stop reason: HOSPADM

## 2025-06-15 RX ORDER — SODIUM CHLORIDE 0.9 % (FLUSH) 0.9 %
5-40 SYRINGE (ML) INJECTION PRN
Status: DISCONTINUED | OUTPATIENT
Start: 2025-06-15 | End: 2025-06-17 | Stop reason: HOSPADM

## 2025-06-15 RX ORDER — HEPARIN SODIUM 1000 [USP'U]/ML
4000 INJECTION, SOLUTION INTRAVENOUS; SUBCUTANEOUS ONCE
Status: COMPLETED | OUTPATIENT
Start: 2025-06-15 | End: 2025-06-15

## 2025-06-15 RX ORDER — HEPARIN SODIUM 1000 [USP'U]/ML
2000 INJECTION, SOLUTION INTRAVENOUS; SUBCUTANEOUS PRN
Status: DISCONTINUED | OUTPATIENT
Start: 2025-06-15 | End: 2025-06-17

## 2025-06-15 RX ORDER — ASPIRIN 81 MG/1
81 TABLET, CHEWABLE ORAL DAILY
Status: DISCONTINUED | OUTPATIENT
Start: 2025-06-16 | End: 2025-06-17 | Stop reason: HOSPADM

## 2025-06-15 RX ORDER — CARVEDILOL 12.5 MG/1
12.5 TABLET ORAL 2 TIMES DAILY WITH MEALS
Status: DISCONTINUED | OUTPATIENT
Start: 2025-06-16 | End: 2025-06-17 | Stop reason: HOSPADM

## 2025-06-15 RX ORDER — ONDANSETRON 2 MG/ML
4 INJECTION INTRAMUSCULAR; INTRAVENOUS EVERY 6 HOURS PRN
Status: DISCONTINUED | OUTPATIENT
Start: 2025-06-15 | End: 2025-06-17 | Stop reason: HOSPADM

## 2025-06-15 RX ORDER — SODIUM CHLORIDE 0.9 % (FLUSH) 0.9 %
5-40 SYRINGE (ML) INJECTION EVERY 12 HOURS SCHEDULED
Status: DISCONTINUED | OUTPATIENT
Start: 2025-06-16 | End: 2025-06-17 | Stop reason: HOSPADM

## 2025-06-15 RX ORDER — ISOSORBIDE MONONITRATE 30 MG/1
30 TABLET, EXTENDED RELEASE ORAL DAILY
Status: DISCONTINUED | OUTPATIENT
Start: 2025-06-16 | End: 2025-06-17

## 2025-06-15 RX ORDER — SODIUM CHLORIDE 9 MG/ML
INJECTION, SOLUTION INTRAVENOUS PRN
Status: DISCONTINUED | OUTPATIENT
Start: 2025-06-15 | End: 2025-06-17 | Stop reason: HOSPADM

## 2025-06-15 RX ORDER — HEPARIN SODIUM 10000 [USP'U]/100ML
5-30 INJECTION, SOLUTION INTRAVENOUS CONTINUOUS
Status: DISCONTINUED | OUTPATIENT
Start: 2025-06-15 | End: 2025-06-17

## 2025-06-15 RX ORDER — NEPHROCAP 1 MG
1 CAP ORAL DAILY
Status: DISCONTINUED | OUTPATIENT
Start: 2025-06-16 | End: 2025-06-17 | Stop reason: HOSPADM

## 2025-06-15 RX ORDER — HEPARIN SODIUM 1000 [USP'U]/ML
4000 INJECTION, SOLUTION INTRAVENOUS; SUBCUTANEOUS PRN
Status: DISCONTINUED | OUTPATIENT
Start: 2025-06-15 | End: 2025-06-17

## 2025-06-15 RX ORDER — ATORVASTATIN CALCIUM 40 MG/1
40 TABLET, FILM COATED ORAL NIGHTLY
Status: DISCONTINUED | OUTPATIENT
Start: 2025-06-16 | End: 2025-06-17 | Stop reason: HOSPADM

## 2025-06-15 RX ORDER — HEPARIN SODIUM 1000 [USP'U]/ML
3800 INJECTION, SOLUTION INTRAVENOUS; SUBCUTANEOUS PRN
Status: DISCONTINUED | OUTPATIENT
Start: 2025-06-15 | End: 2025-06-17 | Stop reason: HOSPADM

## 2025-06-15 RX ORDER — TAMSULOSIN HYDROCHLORIDE 0.4 MG/1
0.4 CAPSULE ORAL DAILY
Status: DISCONTINUED | OUTPATIENT
Start: 2025-06-16 | End: 2025-06-17 | Stop reason: HOSPADM

## 2025-06-15 RX ORDER — ONDANSETRON 4 MG/1
4 TABLET, ORALLY DISINTEGRATING ORAL EVERY 8 HOURS PRN
Status: DISCONTINUED | OUTPATIENT
Start: 2025-06-15 | End: 2025-06-17 | Stop reason: HOSPADM

## 2025-06-15 RX ORDER — ASPIRIN 81 MG/1
162 TABLET, CHEWABLE ORAL ONCE
Status: COMPLETED | OUTPATIENT
Start: 2025-06-15 | End: 2025-06-15

## 2025-06-15 RX ADMIN — HEPARIN SODIUM 4000 UNITS: 1000 INJECTION INTRAVENOUS; SUBCUTANEOUS at 19:04

## 2025-06-15 RX ADMIN — ASPIRIN 81 MG CHEWABLE TABLET 162 MG: 81 TABLET CHEWABLE at 19:04

## 2025-06-15 RX ADMIN — HEPARIN SODIUM 12 UNITS/KG/HR: 10000 INJECTION, SOLUTION INTRAVENOUS at 19:08

## 2025-06-15 RX ADMIN — NITROGLYCERIN 1 INCH: 20 OINTMENT TOPICAL at 19:06

## 2025-06-15 ASSESSMENT — PAIN - FUNCTIONAL ASSESSMENT: PAIN_FUNCTIONAL_ASSESSMENT: NONE - DENIES PAIN

## 2025-06-15 NOTE — ED NOTES
Patient snatched everything off of him and states he doesn't want to be here and that he needs dialysis but its ridiculous to be receiving it this late.    X Size Of Lesion In Cm (Optional): 0 Detail Level: Detailed

## 2025-06-15 NOTE — ED PROVIDER NOTES
mouth daily      B Complex-C-Folic Acid (RENAL) 1 MG CAPS Take 1 capsule by mouth daily      insulin glargine (LANTUS) 100 UNIT/ML injection vial 25 Units         Allergies:  Allergies   Allergen Reactions    Acetaminophen Other (See Comments)     Pt was advised not to take Tylenol per        Social Determinants of Health:  Social Drivers of Health     Tobacco Use: Low Risk  (5/10/2025)    Patient History     Smoking Tobacco Use: Never     Smokeless Tobacco Use: Never     Passive Exposure: Not on file   Alcohol Use: Not At Risk (4/23/2024)    AUDIT-C     Frequency of Alcohol Consumption: Never     Average Number of Drinks: Patient does not drink     Frequency of Binge Drinking: Never   Financial Resource Strain: Not on file   Food Insecurity: No Food Insecurity (4/15/2025)    Hunger Vital Sign     Worried About Running Out of Food in the Last Year: Never true     Ran Out of Food in the Last Year: Never true   Transportation Needs: No Transportation Needs (4/15/2025)    PRAPARE - Transportation     Lack of Transportation (Medical): No     Lack of Transportation (Non-Medical): No   Physical Activity: Not on file   Stress: Not on file   Social Connections: Not on file   Intimate Partner Violence: Not on file   Depression: Not at risk (6/5/2023)    PHQ-2     PHQ-2 Score: 0   Housing Stability: High Risk (4/15/2025)    Housing Stability Vital Sign     Unable to Pay for Housing in the Last Year: No     Number of Times Moved in the Last Year: 1     Homeless in the Last Year: Yes   Interpersonal Safety: Not At Risk (6/15/2025)    Interpersonal Safety Domain Source: IP Abuse Screening     Physical abuse: Denies     Verbal abuse: Denies     Emotional abuse: Denies     Financial abuse: Denies     Sexual abuse: Denies   Utilities: Not At Risk (4/15/2025)    [facility] Utilities     Threatened with loss of utilities: No       Review of Systems     Negative except as listed above in HPI.    Physical Exam     Vitals:    06/15/25 1615

## 2025-06-15 NOTE — DISCHARGE INSTRUCTIONS
DISCHARGE SUMMARY from Nurse    PATIENT INSTRUCTIONS:    After general anesthesia or intravenous sedation, for 24 hours or while taking prescription Narcotics:  Limit your activities  Do not drive and operate hazardous machinery  Do not make important personal or business decisions  Do  not drink alcoholic beverages  If you have not urinated within 8 hours after discharge, please contact your surgeon on call.    Report the following to your surgeon:  Excessive pain, swelling, redness or odor of or around the surgical area  Temperature over 100.5  Nausea and vomiting lasting longer than 4 hours or if unable to take medications  Any signs of decreased circulation or nerve impairment to extremity: change in color, persistent  numbness, tingling, coldness or increase pain  Any questions    What to do at Home:  Recommended activity: activity as tolerated,     If you experience any of the following symptoms chest pain, shortness of breath, swelling in extremities, return to ER if needed please follow up with Dr. Bray.    *  Please give a list of your current medications to your Primary Care Provider.    *  Please update this list whenever your medications are discontinued, doses are      changed, or new medications (including over-the-counter products) are added.    *  Please carry medication information at all times in case of emergency situations.    These are general instructions for a healthy lifestyle:    No smoking/ No tobacco products/ Avoid exposure to second hand smoke  Surgeon General's Warning:  Quitting smoking now greatly reduces serious risk to your health.    Obesity, smoking, and sedentary lifestyle greatly increases your risk for illness    A healthy diet, regular physical exercise & weight monitoring are important for maintaining a healthy lifestyle    You may be retaining fluid if you have a history of heart failure or if you experience any of the following symptoms:  Weight gain of 3 pounds or more

## 2025-06-15 NOTE — ED TRIAGE NOTES
Patient arrived via EMS from home. Started having shortness of breath around 1000 this morning.      Per EMS:  90% on RA  Hx of CHF  Dialysis Tuesday, Thursday, Saturday  2 Nitro   On CPAP  Lung sounds diminished

## 2025-06-15 NOTE — ED NOTES
Bedside and Verbal shift change report given to [facility]. Report included the following information Nurse Handoff Report, ED SBAR, MAR, and Recent Results.

## 2025-06-16 LAB
ANION GAP SERPL CALC-SCNC: 16 MMOL/L (ref 3–18)
APTT PPP: 30.1 SEC (ref 23–36.4)
APTT PPP: 31.7 SEC (ref 23–36.4)
APTT PPP: 38.4 SEC (ref 23–36.4)
APTT PPP: >180 SEC (ref 23–36.4)
BASOPHILS # BLD: 0.07 K/UL (ref 0–0.1)
BASOPHILS NFR BLD: 0.9 % (ref 0–2)
BUN SERPL-MCNC: 17 MG/DL (ref 6–23)
BUN/CREAT SERPL: 4 (ref 12–20)
CALCIUM SERPL-MCNC: 9.9 MG/DL (ref 8.5–10.1)
CHLORIDE SERPL-SCNC: 98 MMOL/L (ref 98–107)
CO2 SERPL-SCNC: 26 MMOL/L (ref 21–32)
CREAT SERPL-MCNC: 3.76 MG/DL (ref 0.6–1.3)
DIFFERENTIAL METHOD BLD: ABNORMAL
EKG ATRIAL RATE: 89 BPM
EKG DIAGNOSIS: NORMAL
EKG P AXIS: 70 DEGREES
EKG P-R INTERVAL: 154 MS
EKG Q-T INTERVAL: 454 MS
EKG QRS DURATION: 144 MS
EKG QTC CALCULATION (BAZETT): 552 MS
EKG R AXIS: -48 DEGREES
EKG T AXIS: 182 DEGREES
EKG VENTRICULAR RATE: 89 BPM
EOSINOPHIL # BLD: 0.09 K/UL (ref 0–0.4)
EOSINOPHIL NFR BLD: 1.2 % (ref 0–5)
ERYTHROCYTE [DISTWIDTH] IN BLOOD BY AUTOMATED COUNT: 14.3 % (ref 11.6–14.5)
GLUCOSE SERPL-MCNC: 242 MG/DL (ref 74–108)
HCT VFR BLD AUTO: 38.2 % (ref 36–48)
HGB BLD-MCNC: 12.1 G/DL (ref 13–16)
IMM GRANULOCYTES # BLD AUTO: 0.03 K/UL (ref 0–0.04)
IMM GRANULOCYTES NFR BLD AUTO: 0.4 % (ref 0–0.5)
LYMPHOCYTES # BLD: 1.13 K/UL (ref 0.9–3.6)
LYMPHOCYTES NFR BLD: 15.2 % (ref 21–52)
MCH RBC QN AUTO: 31 PG (ref 24–34)
MCHC RBC AUTO-ENTMCNC: 31.7 G/DL (ref 31–37)
MCV RBC AUTO: 97.9 FL (ref 78–100)
MONOCYTES # BLD: 0.61 K/UL (ref 0.05–1.2)
MONOCYTES NFR BLD: 8.2 % (ref 3–10)
NEUTS SEG # BLD: 5.52 K/UL (ref 1.8–8)
NEUTS SEG NFR BLD: 74.1 % (ref 40–73)
NRBC # BLD: 0 K/UL (ref 0–0.01)
NRBC BLD-RTO: 0 PER 100 WBC
PLATELET # BLD AUTO: 244 K/UL (ref 135–420)
PMV BLD AUTO: 10.6 FL (ref 9.2–11.8)
POTASSIUM SERPL-SCNC: 3.8 MMOL/L (ref 3.5–5.5)
RBC # BLD AUTO: 3.9 M/UL (ref 4.35–5.65)
SODIUM SERPL-SCNC: 139 MMOL/L (ref 136–145)
TROPONIN T SERPL HS-MCNC: 83.1 NG/L (ref 0–22)
WBC # BLD AUTO: 7.5 K/UL (ref 4.6–13.2)

## 2025-06-16 PROCEDURE — 6360000002 HC RX W HCPCS: Performed by: STUDENT IN AN ORGANIZED HEALTH CARE EDUCATION/TRAINING PROGRAM

## 2025-06-16 PROCEDURE — 99222 1ST HOSP IP/OBS MODERATE 55: CPT | Performed by: STUDENT IN AN ORGANIZED HEALTH CARE EDUCATION/TRAINING PROGRAM

## 2025-06-16 PROCEDURE — 94761 N-INVAS EAR/PLS OXIMETRY MLT: CPT

## 2025-06-16 PROCEDURE — 93010 ELECTROCARDIOGRAM REPORT: CPT | Performed by: INTERNAL MEDICINE

## 2025-06-16 PROCEDURE — 85730 THROMBOPLASTIN TIME PARTIAL: CPT

## 2025-06-16 PROCEDURE — 85025 COMPLETE CBC W/AUTO DIFF WBC: CPT

## 2025-06-16 PROCEDURE — 6370000000 HC RX 637 (ALT 250 FOR IP): Performed by: STUDENT IN AN ORGANIZED HEALTH CARE EDUCATION/TRAINING PROGRAM

## 2025-06-16 PROCEDURE — 84484 ASSAY OF TROPONIN QUANT: CPT

## 2025-06-16 PROCEDURE — 1100000003 HC PRIVATE W/ TELEMETRY

## 2025-06-16 PROCEDURE — 2500000003 HC RX 250 WO HCPCS: Performed by: HOSPITALIST

## 2025-06-16 PROCEDURE — 36415 COLL VENOUS BLD VENIPUNCTURE: CPT

## 2025-06-16 PROCEDURE — 80048 BASIC METABOLIC PNL TOTAL CA: CPT

## 2025-06-16 PROCEDURE — 6360000002 HC RX W HCPCS: Performed by: HOSPITALIST

## 2025-06-16 PROCEDURE — 6370000000 HC RX 637 (ALT 250 FOR IP): Performed by: HOSPITALIST

## 2025-06-16 RX ORDER — HYDRALAZINE HYDROCHLORIDE 20 MG/ML
10 INJECTION INTRAMUSCULAR; INTRAVENOUS EVERY 6 HOURS PRN
Status: DISCONTINUED | OUTPATIENT
Start: 2025-06-16 | End: 2025-06-17 | Stop reason: HOSPADM

## 2025-06-16 RX ADMIN — HEPARIN SODIUM 4000 UNITS: 1000 INJECTION INTRAVENOUS; SUBCUTANEOUS at 09:09

## 2025-06-16 RX ADMIN — ATORVASTATIN CALCIUM 40 MG: 40 TABLET, FILM COATED ORAL at 20:32

## 2025-06-16 RX ADMIN — ISOSORBIDE MONONITRATE 30 MG: 30 TABLET, EXTENDED RELEASE ORAL at 09:01

## 2025-06-16 RX ADMIN — SODIUM CHLORIDE, PRESERVATIVE FREE 10 ML: 5 INJECTION INTRAVENOUS at 09:02

## 2025-06-16 RX ADMIN — ATORVASTATIN CALCIUM 40 MG: 40 TABLET, FILM COATED ORAL at 00:23

## 2025-06-16 RX ADMIN — CARVEDILOL 12.5 MG: 12.5 TABLET, FILM COATED ORAL at 09:01

## 2025-06-16 RX ADMIN — SODIUM CHLORIDE, PRESERVATIVE FREE 10 ML: 5 INJECTION INTRAVENOUS at 20:35

## 2025-06-16 RX ADMIN — TAMSULOSIN HYDROCHLORIDE 0.4 MG: 0.4 CAPSULE ORAL at 09:01

## 2025-06-16 RX ADMIN — ASPIRIN 81 MG CHEWABLE TABLET 81 MG: 81 TABLET CHEWABLE at 09:01

## 2025-06-16 RX ADMIN — HYDRALAZINE HYDROCHLORIDE 10 MG: 20 INJECTION INTRAMUSCULAR; INTRAVENOUS at 20:32

## 2025-06-16 RX ADMIN — CARVEDILOL 12.5 MG: 12.5 TABLET, FILM COATED ORAL at 16:59

## 2025-06-16 RX ADMIN — LOSARTAN POTASSIUM 100 MG: 50 TABLET, FILM COATED ORAL at 09:01

## 2025-06-16 RX ADMIN — HEPARIN SODIUM 4000 UNITS: 1000 INJECTION INTRAVENOUS; SUBCUTANEOUS at 16:57

## 2025-06-16 RX ADMIN — Medication 1 MG: at 09:01

## 2025-06-16 ASSESSMENT — PAIN SCALES - GENERAL
PAINLEVEL_OUTOF10: 0
PAINLEVEL_OUTOF10: 0

## 2025-06-16 NOTE — PLAN OF CARE
Problem: Chronic Conditions and Co-morbidities  Goal: Patient's chronic conditions and co-morbidity symptoms are monitored and maintained or improved  Outcome: Progressing     Problem: Discharge Planning  Goal: Discharge to home or other facility with appropriate resources  Outcome: Progressing     Problem: Pain  Goal: Verbalizes/displays adequate comfort level or baseline comfort level  Outcome: Progressing     Problem: Respiratory - Adult  Goal: Achieves optimal ventilation and oxygenation  Outcome: Progressing     Problem: Cardiovascular - Adult  Goal: Maintains optimal cardiac output and hemodynamic stability  Outcome: Progressing  Goal: Absence of cardiac dysrhythmias or at baseline  Outcome: Progressing     Problem: Skin/Tissue Integrity - Adult  Goal: Skin integrity remains intact  Outcome: Progressing     Problem: Musculoskeletal - Adult  Goal: Return mobility to safest level of function  Outcome: Progressing

## 2025-06-16 NOTE — DIALYSIS
HD Care plan  Time: 3  hrs  Dialysate: 2 K+  2.5 Ca++  Bath  Net UF: 2 L as tolerated  Access: Aseptic care for RT Chest  TDC  Hemodynamic stability: Maintain BP WNL     Pre Dialysis:  [facility] received from [facility] . Patient arrived on a bed, A+O x 4, on oxygen 2 L via NC , SPO2 95%, no s/s of acute distress noted.Heparin drip in progress, RT Chest TDC assessed, no abnormalities noted. TDC accessed per protocol without any difficulty, line patent with good flow.     Intra Dialysis:  Time out / safety process performed per policy. Tx initiated at 0845.    TDC flowing with ease. For hemodynamic stability UF goal set at 2000 ml as tolerated.  Pt offered assistance with repositioning every 2 hours/prn    Vascular access visible and line connections remained intact throughout entire duration of treatment.   Vital signs checked every 15 mins. BP high, Has a nitro patch on the chest , he remains asymptomatic     Post Dialysis:  Tx completed at 2345, Pt removed oxygen saying he does not need it anymore, SPO2 on RA 96%   Tolerated well, 2 L  removed. De-accessed per protocol.    Dialysis catheter locked accordingly with Heparin 1.9 ml in arterial port, and 1.9 ml in venous port. Catheter dressing clean, dry and intact.  Post Dialysis report given to [facility]

## 2025-06-16 NOTE — PLAN OF CARE
HEMODIALYSIS Plan:  Time: 3 hrs  Dialysate: 2 K+  2.5Ca++  Bath  Net UF: 2 L As tolerated  Access: Aseptic care for  RT Chest TDC  Hemodynamic stability: Maintain BP WNL  Problem: Chronic Conditions and Co-morbidities  Goal: Patient's chronic conditions and co-morbidity symptoms are monitored and maintained or improved  Outcome: Progressing      Tylenol prn, range of motion exercises

## 2025-06-16 NOTE — ED NOTES
TRANSFER - OUT REPORT:    Verbal report given to [facility] on Simone Lamas  being transferred to [facility] for routine progression of patient care       Report consisted of patient's Situation, Background, Assessment and   Recommendations(SBAR).     Information from the following report(s) Nurse Handoff Report, Index, ED Encounter Summary, ED SBAR, Adult Overview, Intake/Output, MAR, Recent Results, Quality Measures, Neuro Assessment, and Event Log was reviewed with the receiving nurse.    [place] Assessment:                           Lines:   Peripheral IV 06/15/25 Left;Posterior Forearm (Active)       Peripheral IV 06/16/25 Left Antecubital (Active)       Hemodialysis Central Access - Temporary Right Subclavian (Active)   Continued need for line? Yes 06/15/25 2330   Site Assessment Clean, dry & intact 06/15/25 2330   Blue Lumen Status Flushed;Heparin locked 06/15/25 2330   Red Lumen Status Flushed;Normal saline locked 06/15/25 2330   Line Care Ports disinfected;Chlorhexidine wipes;Connections checked and tightened;Cap changed 06/15/25 2330   Dressing Type Antimicrobial;Sterile dressing, transparent 06/15/25 2330   Date of Last Dressing Change 06/15/25 06/15/25 2330   Dressing Status Clean, dry & intact 06/15/25 2330   Dressing Intervention Dressing changed 06/15/25 2045        CRITICAL LABS:    Elevated trops and BNP  Heparin drip @ 10u/kg/hr   Repeat appt @ 2250     Karlene Ratliff RN     Opportunity for questions and clarification was provided.      Patient transported with:  Registered Nurse

## 2025-06-17 VITALS
WEIGHT: 165.57 LBS | HEIGHT: 72 IN | RESPIRATION RATE: 18 BRPM | TEMPERATURE: 97.9 F | OXYGEN SATURATION: 98 % | BODY MASS INDEX: 22.43 KG/M2 | SYSTOLIC BLOOD PRESSURE: 180 MMHG | HEART RATE: 87 BPM | DIASTOLIC BLOOD PRESSURE: 102 MMHG

## 2025-06-17 PROBLEM — R06.00 DYSPNEA: Status: ACTIVE | Noted: 2025-06-17

## 2025-06-17 LAB
ANION GAP SERPL CALC-SCNC: 14 MMOL/L (ref 3–18)
APTT PPP: 46.8 SEC (ref 23–36.4)
BUN SERPL-MCNC: 33 MG/DL (ref 6–23)
BUN/CREAT SERPL: 7 (ref 12–20)
CALCIUM SERPL-MCNC: 9.8 MG/DL (ref 8.5–10.1)
CHLORIDE SERPL-SCNC: 101 MMOL/L (ref 98–107)
CO2 SERPL-SCNC: 22 MMOL/L (ref 21–32)
CREAT SERPL-MCNC: 4.85 MG/DL (ref 0.6–1.3)
ERYTHROCYTE [DISTWIDTH] IN BLOOD BY AUTOMATED COUNT: 13.8 % (ref 11.6–14.5)
GLUCOSE BLD STRIP.AUTO-MCNC: 346 MG/DL (ref 70–110)
GLUCOSE SERPL-MCNC: 326 MG/DL (ref 74–108)
HCT VFR BLD AUTO: 31.9 % (ref 36–48)
HGB BLD-MCNC: 10.6 G/DL (ref 13–16)
MCH RBC QN AUTO: 31.7 PG (ref 24–34)
MCHC RBC AUTO-ENTMCNC: 33.2 G/DL (ref 31–37)
MCV RBC AUTO: 95.5 FL (ref 78–100)
NRBC # BLD: 0 K/UL (ref 0–0.01)
NRBC BLD-RTO: 0 PER 100 WBC
NT PRO BNP: ABNORMAL PG/ML (ref 36–900)
PHOSPHATE SERPL-MCNC: 2.1 MG/DL (ref 2.5–4.9)
PLATELET # BLD AUTO: 209 K/UL (ref 135–420)
PMV BLD AUTO: 11.1 FL (ref 9.2–11.8)
POTASSIUM SERPL-SCNC: 3.5 MMOL/L (ref 3.5–5.5)
RBC # BLD AUTO: 3.34 M/UL (ref 4.35–5.65)
SODIUM SERPL-SCNC: 138 MMOL/L (ref 136–145)
TROPONIN T SERPL HS-MCNC: 81.9 NG/L (ref 0–22)
WBC # BLD AUTO: 6.6 K/UL (ref 4.6–13.2)

## 2025-06-17 PROCEDURE — 6370000000 HC RX 637 (ALT 250 FOR IP): Performed by: STUDENT IN AN ORGANIZED HEALTH CARE EDUCATION/TRAINING PROGRAM

## 2025-06-17 PROCEDURE — 6360000002 HC RX W HCPCS: Performed by: STUDENT IN AN ORGANIZED HEALTH CARE EDUCATION/TRAINING PROGRAM

## 2025-06-17 PROCEDURE — 82962 GLUCOSE BLOOD TEST: CPT

## 2025-06-17 PROCEDURE — 36415 COLL VENOUS BLD VENIPUNCTURE: CPT

## 2025-06-17 PROCEDURE — 84484 ASSAY OF TROPONIN QUANT: CPT

## 2025-06-17 PROCEDURE — 2500000003 HC RX 250 WO HCPCS: Performed by: HOSPITALIST

## 2025-06-17 PROCEDURE — 6360000002 HC RX W HCPCS: Performed by: HOSPITALIST

## 2025-06-17 PROCEDURE — 99239 HOSP IP/OBS DSCHRG MGMT >30: CPT | Performed by: STUDENT IN AN ORGANIZED HEALTH CARE EDUCATION/TRAINING PROGRAM

## 2025-06-17 PROCEDURE — 99223 1ST HOSP IP/OBS HIGH 75: CPT | Performed by: INTERNAL MEDICINE

## 2025-06-17 PROCEDURE — 85730 THROMBOPLASTIN TIME PARTIAL: CPT

## 2025-06-17 PROCEDURE — 80048 BASIC METABOLIC PNL TOTAL CA: CPT

## 2025-06-17 PROCEDURE — 83880 ASSAY OF NATRIURETIC PEPTIDE: CPT

## 2025-06-17 PROCEDURE — 6370000000 HC RX 637 (ALT 250 FOR IP): Performed by: HOSPITALIST

## 2025-06-17 PROCEDURE — 84100 ASSAY OF PHOSPHORUS: CPT

## 2025-06-17 PROCEDURE — 90935 HEMODIALYSIS ONE EVALUATION: CPT

## 2025-06-17 PROCEDURE — 85027 COMPLETE CBC AUTOMATED: CPT

## 2025-06-17 RX ORDER — HEPARIN SODIUM 10000 [USP'U]/100ML
5-30 INJECTION, SOLUTION INTRAVENOUS CONTINUOUS
Status: DISCONTINUED | OUTPATIENT
Start: 2025-06-17 | End: 2025-06-17

## 2025-06-17 RX ORDER — LOSARTAN POTASSIUM 100 MG/1
100 TABLET ORAL DAILY
Qty: 30 TABLET | Refills: 1 | Status: SHIPPED | OUTPATIENT
Start: 2025-06-17

## 2025-06-17 RX ORDER — ISOSORBIDE MONONITRATE 60 MG/1
60 TABLET, EXTENDED RELEASE ORAL DAILY
Status: DISCONTINUED | OUTPATIENT
Start: 2025-06-18 | End: 2025-06-17 | Stop reason: HOSPADM

## 2025-06-17 RX ORDER — HEPARIN SODIUM 1000 [USP'U]/ML
80 INJECTION, SOLUTION INTRAVENOUS; SUBCUTANEOUS PRN
Status: DISCONTINUED | OUTPATIENT
Start: 2025-06-17 | End: 2025-06-17

## 2025-06-17 RX ORDER — HEPARIN SODIUM 1000 [USP'U]/ML
80 INJECTION, SOLUTION INTRAVENOUS; SUBCUTANEOUS ONCE
Status: DISCONTINUED | OUTPATIENT
Start: 2025-06-17 | End: 2025-06-17

## 2025-06-17 RX ORDER — HEPARIN SODIUM 1000 [USP'U]/ML
40 INJECTION, SOLUTION INTRAVENOUS; SUBCUTANEOUS PRN
Status: DISCONTINUED | OUTPATIENT
Start: 2025-06-17 | End: 2025-06-17

## 2025-06-17 RX ORDER — ISOSORBIDE MONONITRATE 60 MG/1
60 TABLET, EXTENDED RELEASE ORAL DAILY
Qty: 30 TABLET | Refills: 3 | Status: SHIPPED | OUTPATIENT
Start: 2025-06-18

## 2025-06-17 RX ADMIN — LOSARTAN POTASSIUM 100 MG: 50 TABLET, FILM COATED ORAL at 16:39

## 2025-06-17 RX ADMIN — APIXABAN 5 MG: 5 TABLET, FILM COATED ORAL at 16:39

## 2025-06-17 RX ADMIN — SODIUM CHLORIDE, PRESERVATIVE FREE 10 ML: 5 INJECTION INTRAVENOUS at 09:32

## 2025-06-17 RX ADMIN — CARVEDILOL 12.5 MG: 12.5 TABLET, FILM COATED ORAL at 16:39

## 2025-06-17 RX ADMIN — Medication 1 MG: at 09:30

## 2025-06-17 RX ADMIN — HEPARIN SODIUM 4000 UNITS: 1000 INJECTION INTRAVENOUS; SUBCUTANEOUS at 01:15

## 2025-06-17 RX ADMIN — ASPIRIN 81 MG CHEWABLE TABLET 81 MG: 81 TABLET CHEWABLE at 09:30

## 2025-06-17 RX ADMIN — TAMSULOSIN HYDROCHLORIDE 0.4 MG: 0.4 CAPSULE ORAL at 09:30

## 2025-06-17 RX ADMIN — HEPARIN SODIUM 12 UNITS/KG/HR: 10000 INJECTION, SOLUTION INTRAVENOUS at 07:47

## 2025-06-17 RX ADMIN — HYDRALAZINE HYDROCHLORIDE 10 MG: 20 INJECTION INTRAMUSCULAR; INTRAVENOUS at 05:54

## 2025-06-17 ASSESSMENT — PAIN SCALES - GENERAL
PAINLEVEL_OUTOF10: 0

## 2025-06-17 NOTE — PLAN OF CARE
Problem: Coping  Goal: Pt/Family able to verbalize concerns and demonstrate effective coping strategies  Description: INTERVENTIONS:  1. Assist patient/family to identify coping skills, available support systems and cultural and spiritual values  2. Provide emotional support, including active listening and acknowledgement of concerns of patient and caregivers  3. Reduce environmental stimuli, as able  4. Instruct patient/family in relaxation techniques, as appropriate  5. Assess for spiritual pain/suffering and initiate Spiritual Care, Psychosocial Clinical Specialist consults as needed  Outcome: Progressing     Problem: Behavior  Goal: Pt/Family maintain appropriate behavior and adhere to behavioral management agreement, if implemented  Description: INTERVENTIONS:  1. Assess patient/family's coping skills and  non-compliant behavior (including use of illegal substances)  2. Notify security of behavior or suspected illegal substances which indicate the need for search of the family and/or belongings  3. Encourage verbalization of thoughts and concerns in a socially appropriate manner  4. Utilize positive, consistent limit setting strategies supporting safety of patient, staff and others  5. Encourage participation in the decision making process about the behavioral management agreement  6. If a visitor's behavior poses a threat to safety call refer to organization policy.  7. Initiate consult with , Psychosocial CNS, Spiritual Care as appropriate  Outcome: Progressing     Problem: Decision Making  Goal: Pt/Family able to effectively weigh alternatives and participate in decision making related to treatment and care  Description: INTERVENTIONS:  1. Determine when there are differences between patient's view, family's view, and healthcare provider's view of condition  2. Facilitate patient and family articulation of goals for care  3. Help patient and family identify pros/cons of alternative

## 2025-06-17 NOTE — CARE COORDINATION
D/C order noted for today. Orders reviewed. No needs identified at this time. Patient state he will catch the bus when ready to discharge. SW remains available if needed.       Mickey Mooney MSW  Case Management

## 2025-06-17 NOTE — PLAN OF CARE
INTERVENTION:  HEMODYNAMIC STABILIZATION  MAINTAIN BP WNL WHILE ON HD.    INTERVENTION:  FLUID MANAGEMENT  WILL ATTEMPT 2500 ML TOTAL FLUID REMOVAL AS TOLERATED.    INTERVENTION:  METABOLIC/ELECTROLYTE MANAGEMENT  3.0 POTASSIUM 2.5 CALCIUM DIALYSATE USED WITH HD TODAY.    INTERVENTION:  HEMODIALYSIS ACCESS SITE MANAGEMENT  RIGHT CHEST TDC ACCESSED USING ASEPTIC TECHNIQUE.    GOAL:  SIGNS AND SYMPTOMS OF LISTED POTENTIAL PROBLEMS WILL BE ABSENT OR MANAGEABLE.    OUTCOME:  PROGRESSING.    HD PLANNED FOR 3 HOURS TODAY.  Problem: Chronic Conditions and Co-morbidities  Goal: Patient's chronic conditions and co-morbidity symptoms are monitored and maintained or improved  6/17/2025 1322 by Maricruz Pedro, RN  Outcome: Progressing      [Well Developed] : well developed [Well Nourished] : well nourished [No Acute Distress] : no acute distress [Normal Conjunctiva] : normal conjunctiva [Normal Venous Pressure] : normal venous pressure [No Carotid Bruit] : no carotid bruit [Normal S1, S2] : normal S1, S2 [No Murmur] : no murmur [No Rub] : no rub [No Gallop] : no gallop [Clear Lung Fields] : clear lung fields [Good Air Entry] : good air entry [No Respiratory Distress] : no respiratory distress  [Soft] : abdomen soft [Non Tender] : non-tender [No Masses/organomegaly] : no masses/organomegaly [Normal Bowel Sounds] : normal bowel sounds [Normal Gait] : normal gait [No Edema] : no edema [No Cyanosis] : no cyanosis [No Clubbing] : no clubbing [No Varicosities] : no varicosities [No Rash] : no rash [No Skin Lesions] : no skin lesions [Moves all extremities] : moves all extremities [No Focal Deficits] : no focal deficits [Normal Speech] : normal speech [Alert and Oriented] : alert and oriented [Normal memory] : normal memory

## 2025-06-17 NOTE — PLAN OF CARE
Problem: Chronic Conditions and Co-morbidities  Goal: Patient's chronic conditions and co-morbidity symptoms are monitored and maintained or improved  Outcome: Progressing  Flowsheets (Taken 6/17/2025 1104)  Care Plan - Patient's Chronic Conditions and Co-Morbidity Symptoms are Monitored and Maintained or Improved: Monitor and assess patient's chronic conditions and comorbid symptoms for stability, deterioration, or improvement     Problem: Discharge Planning  Goal: Discharge to home or other facility with appropriate resources  Outcome: Progressing     Problem: Pain  Goal: Verbalizes/displays adequate comfort level or baseline comfort level  Outcome: Completed     Problem: Respiratory - Adult  Goal: Achieves optimal ventilation and oxygenation  Outcome: Progressing     Problem: Cardiovascular - Adult  Goal: Maintains optimal cardiac output and hemodynamic stability  Outcome: Progressing  Goal: Absence of cardiac dysrhythmias or at baseline  Outcome: Progressing     Problem: Skin/Tissue Integrity - Adult  Goal: Skin integrity remains intact  Outcome: Progressing     Problem: Musculoskeletal - Adult  Goal: Return mobility to safest level of function  Outcome: Progressing     Problem: Neurosensory - Adult  Goal: Achieves stable or improved neurological status  Outcome: Progressing  Goal: Achieves maximal functionality and self care  Outcome: Progressing     Problem: Metabolic/Fluid and Electrolytes - Adult  Goal: Electrolytes maintained within normal limits  Outcome: Progressing  Goal: Glucose maintained within prescribed range  Outcome: Progressing     Problem: Safety - Adult  Goal: Free from fall injury  Outcome: Progressing

## 2025-06-17 NOTE — DISCHARGE SUMMARY
[facility]    Discharge Summary    Patient: Simone Lamas MRN: [id number]  [facility]: [id number]    YOB: 1955  Age: 70 y.o.  Sex: male    DOA: 6/15/2025 LOS:  LOS: 2 days   Discharge Date:      Admission Diagnoses: Hypoxia [R09.02]  ESRD on dialysis ([facility]) [N18.6, Z99.2]  NSTEMI (non-ST elevated myocardial infarction) ([facility]) [I21.4]  Volume overload [E87.70]  Acute respiratory failure with hypoxia ([facility]) [J96.01]  Dyspnea, unspecified type [R06.00]  Volume overload state of heart [E87.79]    Discharge Diagnoses:    Hospital Problems           Last Modified POA    * (Principal) Volume overload 6/15/2025 Yes    Systolic CHF, chronic ([facility]) 6/15/2025 Yes    Hypertension 6/15/2025 Yes    NSTEMI (non-ST elevated myocardial infarction) ([facility]) 6/15/2025 Yes    Type 2 diabetes mellitus with insulin therapy ([facility]) 6/15/2025 Yes    COPD, mild ([facility]) 6/15/2025 Yes    Insulin dependent type 2 diabetes mellitus, controlled ([facility]) 6/15/2025 Yes    ESRD (end stage renal disease) on dialysis ([facility]) 6/15/2025 Yes    Acute respiratory failure with hypoxia ([facility]) 6/15/2025 Yes     Discharge Condition: Stable    Discharge To: Home    Consults: Cardiology and Nephrology    HPI:   Per admission HPI   70 y.o. male with a PMHx significant for hypertension, diabetes mellitus type 2, end-stage renal disease on hemodialysis, CHF who presented to the ED with the above chief complaint.  Patient states that he started having sudden onset of shortness of breath and difficulty breathing around 10 AM this morning.  Currently patient states that he feels better.  Denies any chest pain or dizziness.  No nausea or vomiting.  No abdominal pain.  His last dialysis was yesterday but reported that his weight remained the same after dialysis.  Patient was brought to the ED via EMS on CPAP.  In the emergency room he was noted to be tachypneic and tachycardic.  He was noted to have elevated blood pressure.  Workup

## 2025-06-17 NOTE — CARE COORDINATION
Case Management Assessment  Initial Evaluation    Date/Time of Evaluation: 6/17/2025 9:43 AM  Assessment Completed by: Mickey Mooney      Patient Name: Simone Lamas                   YOB: 1955  Diagnosis: Hypoxia [R09.02]  ESRD on dialysis ([facility]) [N18.6, Z99.2]  NSTEMI (non-ST elevated myocardial infarction) ([facility]) [I21.4]  Volume overload [E87.70]  Acute respiratory failure with hypoxia ([facility]) [J96.01]  Dyspnea, unspecified type [R06.00]  Volume overload state of heart [E87.79]                   Date / Time: 6/15/2025  3:25 PM      Met with patient in room. HIPAA verified. Initial case management admission assessment completed with patient's permission. Patient Demographics verified. Patient's preference for disposition at discharge is home. Patient has a HD chair at [facility] on [address] [facility] in [place] on Ties/ Thurs/ Sat. Patient use medical transport to his HD chair time at 6:00 am.       06/17/25 0940   Service Assessment   Patient Orientation Alert and Oriented;Person;Place;Situation   Cognition Alert   History Provided By Patient   Primary Caregiver Self   Accompanied By/Relationship Alone in the room   Support Systems None   Patient's Healthcare Decision Maker is: Legal Next of Kin   PCP Verified by CM Yes   Last Visit to PCP Within last 3 months   Prior Functional Level Independent in ADLs/IADLs   Current Functional Level Independent in ADLs/IADLs   Can patient return to prior living arrangement Yes   Ability to make needs known: Good   Family able to assist with home care needs: Yes   Would you like for me to discuss the discharge plan with any other family members/significant others, and if so, who? No   Financial Resources Other (Comment);Medicaid  ([employer])   Community Resources Transportation   CM/SW Referral ADLs/IADLs;Other (see comment)   Social/Functional History   Lives With Alone   Type of Home House   Home Layout One level   Home Access Stairs to enter with rails   Entrance Stairs -

## 2025-06-17 NOTE — CONSULTS
Consult Note    Patient: Simone Lamas               Sex: male          DOA: 6/15/2025         YOB: 1955      Age:  70 y.o.        LOS:  LOS: 1 day              HPI:     Simone Lamas is a 70 y.o. male who has been seen on consultation at the request of the emergency room physician.  This patient has ESRD and gets dialysis under my care every Tuesday Thursday Saturday early morning nowadays he has a tendency of missing treatment for decreasing his time for dialysis.  Last time when I saw her in the dialysis unit on last Thursday I asked about his noncompliance issues and he answered ASAP to him however he wants to do he will continue to do.    Patient was brought to the emergency room by EMS with shortness of breath as the patient was not feeling great he has history of ESRD with underlying hypertension and type 2 diabetes mellitus and also has underlying ischemic heart disease with coronary artery disease and systolic dysfunction.  Patient AV fistula is clotted now that she gets dialysis a tunneled dialysis catheter.    The patient was seen by EMS his oxygen saturation was 90% and they put him on CPAP.  By the time in the emergency room he was still continued to be fast.  But the oxygen saturation improved and he was put on oxygen 2 L by nasal cannula and I was notified to arrange dialysis..  Patient was comfortable by that time with oxygen 2 L and threatening to leave hospital AGAINST MEDICAL ADVICE..  Finally after discussion he has agreed to stay in the hospital.   I have significant problem in finding out the dialysis nurse on call through the hospital  also placing her as they have changed call scheduling.    After trying for almost 2 hours or more and calling different sources finally solve the problem of dialysis nurse on call and find out the correct nurse on-call.  She was instructed to come without any further delay in dialysis the patient.  Patient was dialyzed as instructed 
Ventricular Rate 89    Atrial Rate 89    P-R Interval 154    QRS Duration 144    Q-T Interval 454    QTc Calculation (Bazett) 552    P Axis 70    R Axis -48    T Axis 182    Diagnosis      Sinus rhythm with occasional premature ventricular complexes  Left atrial enlargement  Right bundle branch block  Left anterior fascicular block  Bifascicular block  Diffuse nondiagnostic ST & T wave abnormality  Abnormal ECG  When compared with ECG of 24-MAY-2025 08:25,  premature ventricular complexes are now present    Confirmed by Stanley Hilton ([id number]) on 6/16/2025 7:36:00 PM       04/10/25    ECHO (TTE) COMPLETE (PRN CONTRAST/BUBBLE/STRAIN/3D) 04/13/2025 10:14 AM (Final)    Interpretation Summary    Image quality is adequate.    Left Ventricle: Moderately reduced left ventricular systolic function with a visually estimated EF of 40 - 45%. Left ventricle size is normal. Mildly increased wall thickness. Global hypokinesis present. Grade I diastolic dysfunction with normal LAP.    Mitral Valve: Mild to moderate regurgitation.    Normal right ventricular size and function    Insufficient TR to estimate pulmonary artery pressure    Signed by: Kirk Boudreaux MD on 4/13/2025 10:14 AM    07/18/24    NM STRESS TEST WITH MYOCARDIAL PERFUSION 07/19/2024  1:44 PM (Final)    Interpretation Summary    Stress Combined Conclusion: The study is negative for myocardial ischemia. Findings suggest a moderate risk of cardiac events with severe LV dysfunction.    Stress Function: Left ventricular function post-stress is abnormal. Global function is severely reduced. There was a single regional abnormality during stress. Post-stress ejection fraction is 30%. The stress end diastolic cavity size is severely enlarged.    Perfusion Comments: LV perfusion is abnormal. There is no evidence of inducible ischemia.    Perfusion Defect: There is a moderate severity left ventricular stress perfusion defect that is medium to large in size present in the

## 2025-06-17 NOTE — FLOWSHEET NOTE
B/P 162/109 Hydralazine 10 mg IV given.    0110 Patient awake up at BS, gown off patient appears restless, admits to some disorientation upon awakening. Patient reoriented and redirected back to bed. Call light in reach.

## 2025-06-19 NOTE — PROGRESS NOTES
Physician Progress Note      PATIENT:               THANG FUNES  CSN #:                  [id number]  :                       1955  ADMIT DATE:       6/15/2025 3:25 PM  DISCH DATE:        2025 5:41 PM  RESPONDING  PROVIDER #:        Rowdy Campbell DO          QUERY TEXT:    Noted documentation of chronic systolic heart failure in  DC Summary and   acute on chronic systolic hear failure in  Cardiology consult note.    Based on your medical judgment, please clarify these findings and document if   any of the following are being evaluated and/or treated:    The clinical indicators include:  from  Cardiology consult note: \"Acute on chronic systolic and diastolic   heart failure by evidence of shortness of breath, nt pro bnp 25K, CXR with   pulmonary vascular congestion.  - Nonischemic dilated cardiomyopathy  ? Echo 2025 EF 40-45%, global hypokinesis, grade I DD  ? LHC 2024 with patent coronary arteries  ? GDMT losartan and coreg \"    06/15/25 15:30 NT Pro-BNP: 25,539; 25 08:57 NT Pro-BNP: 31,914    06/15 Chest Xray: Moderate cardiomegaly with moderate pulmonary vascular   congestion-findings  may represent congestive heart failure and/or elevated intravascular volume;  superimposed pneumonia not excluded. Concern for LEFT lung basilar atelectatic  consolidation with a small pleural effusion.      Cardiology Consult, labs, chest Xray, Carvedilol  Options provided:  -- Chronic Systolic Heart Failure confirmed present on admission  -- Acute on Chronic Systolic Heart Failure confirmed present on admission  -- Other - I will add my own diagnosis  -- Disagree - Not applicable / Not valid  -- Disagree - Clinically unable to determine / Unknown  -- Refer to Clinical Documentation Reviewer    PROVIDER RESPONSE TEXT:    The diagnosis of acute on chronic systolic heart failure was confirmed to be   present on admission.    Query created by: Nesha Kohler on 2025 9:03 
 conducted an initial consultation and Spiritual Assessment for Simone Lamas, who is a 70 y.o.,male. Patient's Primary Language is: English.   According to the patient's EMR Hoahaoism Affiliation is: Taoism.     The reason the Patient came to the hospital is:   Patient Active Problem List    Diagnosis Date Noted    Dyspnea 06/17/2025    Acute respiratory failure with hypoxia ([facility]) 06/15/2025    Hemorrhage of arteriovenous fistula, subsequent encounter 05/10/2025    Systolic dysfunction with acute on chronic heart failure ([facility]) 04/17/2025    Complication of arteriovenous dialysis fistula 04/15/2025    AV fistula occlusion, initial encounter 04/10/2025    Chest pain 07/18/2024    Diabetes due to undrl condition w [facility] diabetic kidney comp ([facility]) 07/18/2024    Unstable angina ([facility]) 07/18/2024    Volume overload 04/23/2024    AVF (arteriovenous fistula) 01/26/2023    ESRD (end stage renal disease) on dialysis ([facility]) 01/25/2023    CKD (chronic kidney disease) stage 4, GFR 15-29 ml/min ([facility]) 11/30/2022    ESRD (end stage renal disease) ([facility]) 08/16/2022    Substance abuse ([facility]) 08/16/2022    Anemia 08/16/2022    End stage renal disease ([facility]) 08/16/2022    Pericardial effusion 08/16/2022    Hyperglycemia 08/16/2022    CKD (chronic kidney disease) stage 5, GFR less than 15 ml/min ([facility]) 08/16/2022    TIA (transient ischemic attack) 04/11/2018    CVA (cerebral vascular accident) ([facility]) 04/11/2018    Elevated serum creatinine 08/10/2016    Hypertriglyceridemia 08/10/2016    Elevated HDL 08/10/2016    Decreased GFR 08/10/2016    History of heroin abuse ([facility]) 08/10/2016    Elevated alkaline phosphatase level 08/10/2016    History of cocaine abuse ([facility]) 08/10/2016    Systolic CHF, chronic ([facility]) 07/10/2016    Chronic renal insufficiency 07/10/2016    NSTEMI (non-ST elevated myocardial infarction) ([facility]) 07/08/2016    COPD, mild ([facility]) 12/02/2015    Insulin dependent type 2 diabetes mellitus, controlled ([facility]) 12/02/2015    CHF 
Patient returned from dialysis. Blood pressure resulting 175/112. Given ordered PO medications Coreg 12.5 mg along with Losartan 100 mg. MD Shannan gomez serve and made aware. MD stated stable for discharge.   
Progress Note    Simone Lamas  70 y.o.      Admit Date: 6/15/2025  Patient Active Problem List   Diagnosis    Elevated serum creatinine    Systolic CHF, chronic (HCC)    Chronic renal insufficiency    Hypertension    Stage 3 chronic renal impairment associated with type 2 diabetes mellitus (HCC)    Hepatitis C infection    Cocaine use    Cardiac LV ejection fraction 30-35%    Homelessness    Hypertriglyceridemia    Elevated HDL    Decreased GFR    NSTEMI (non-ST elevated myocardial infarction) (HCC)    History of heroin abuse (HCC)    Elevated alkaline phosphatase level    History of cocaine abuse (HCC)    TIA (transient ischemic attack)    Type 2 diabetes mellitus with insulin therapy (HCC)    COPD, mild (HCC)    CHF (congestive heart failure) (HCC)    Cardiomyopathy (HCC)    Insulin dependent type 2 diabetes mellitus, controlled (HCC)    CVA (cerebral vascular accident) (HCC)    ESRD (end stage renal disease) (HCC)    Substance abuse (HCC)    Anemia    End stage renal disease (HCC)    Pericardial effusion    Hyperglycemia    CKD (chronic kidney disease) stage 5, GFR less than 15 ml/min (HCC)    CKD (chronic kidney disease) stage 4, GFR 15-29 ml/min (HCC)    ESRD (end stage renal disease) on dialysis (HCC)    AVF (arteriovenous fistula)    Volume overload    Chest pain    Diabetes due to undrl condition w oth diabetic kidney comp (HCC)    Unstable angina (HCC)    AV fistula occlusion, initial encounter    Complication of arteriovenous dialysis fistula    Systolic dysfunction with acute on chronic heart failure (HCC)    Hemorrhage of arteriovenous fistula, subsequent encounter    Acute respiratory failure with hypoxia (HCC)           Subjective:     Patient is comfortable except mild shortness of breath.  Not using any oxygen.  On heparin drip.  Cardiology has not seen yet..  Waiting to be dialyzed today.       A comprehensive review of systems was negative except for that written in the History of Present 
Received pre HD report from AURORA Mckeon RN.      Pt in bed, A+O x4, no s/s of distress noted. On room air. With ongoing heparin drip at 12 units/kg/hr noted via right IVC.    Accessed right CVC w/o complications.  Tx initiated at 1302.      CVC flowing with ease.  For hemodynamic stability UF goal 2500 ml.  Offered assistance with repositioning every 2 hours.  Vascular access visible at all times during treatment, line connections intact at all times.      Tx completed at 1602, tolerated well 2L net UF removed.  De-accessed per protocol.    Heparin indwell 1.9ml in arterial, and 1.9ml in venous catheter.      Unit nurse AURORA Mckeon, RN given report. No s/s of distress.   
Verbal and written discharge instructions given to patient. Questions asked and answered.  
06/15/25  4:17 PM   Result Value Ref Range    Ventricular Rate 89 BPM    Atrial Rate 89 BPM    P-R Interval 154 ms    QRS Duration 144 ms    Q-T Interval 454 ms    QTc Calculation (Bazett) 552 ms    P Axis 70 degrees    R Axis -48 degrees    T Axis 182 degrees    Diagnosis       Sinus rhythm with occasional premature ventricular complexes  Possible Left atrial enlargement  Right bundle branch block  Left anterior fascicular block  Bifascicular block  Possible Inferior infarct (cited on or before 21-SEP-2024)  T wave abnormality, consider lateral ischemia  Abnormal ECG  When compared with ECG of 24-MAY-2025 08:25,  premature ventricular complexes are now present  T wave inversion more evident in Anterior leads     Troponin    Collection Time: 06/15/25  5:30 PM   Result Value Ref Range    Troponin T 82.8 (H) 0 - 22 ng/L   CBC    Collection Time: 06/15/25  7:00 PM   Result Value Ref Range    WBC 7.8 4.6 - 13.2 K/uL    RBC 3.45 (L) 4.35 - 5.65 M/uL    Hemoglobin 10.7 (L) 13.0 - 16.0 g/dL    Hematocrit 33.9 (L) 36.0 - 48.0 %    MCV 98.3 78.0 - 100.0 FL    MCH 31.0 24.0 - 34.0 PG    MCHC 31.6 31.0 - 37.0 g/dL    RDW 14.3 11.6 - 14.5 %    Platelets 225 135 - 420 K/uL    MPV 10.8 9.2 - 11.8 FL    Nucleated RBCs 0.0 0  WBC    nRBC 0.00 0.00 - 0.01 K/uL   APTT    Collection Time: 06/15/25  7:00 PM   Result Value Ref Range    APTT 29.3 23.0 - 36.4 SEC   APTT    Collection Time: 06/16/25 12:27 AM   Result Value Ref Range    APTT >180.0 (HH) 23.0 - 36.4 SEC   Troponin    Collection Time: 06/16/25 12:27 AM   Result Value Ref Range    Troponin T 83.1 (H) 0 - 22 ng/L   APTT    Collection Time: 06/16/25  6:50 AM   Result Value Ref Range    APTT 30.1 23.0 - 36.4 SEC   Basic Metabolic Panel w/ Reflex to MG    Collection Time: 06/16/25  6:50 AM   Result Value Ref Range    Sodium 139 136 - 145 mmol/L    Potassium 3.8 3.5 - 5.5 mmol/L    Chloride 98 98 - 107 mmol/L    CO2 26 21 - 32 mmol/L    Anion Gap 16 3.0 - 18.0 mmol/L

## 2025-07-10 ENCOUNTER — HOSPITAL ENCOUNTER (OUTPATIENT)
Facility: HOSPITAL | Age: 70
Setting detail: OBSERVATION
Discharge: HOME | DRG: 280 | End: 2025-07-11
Attending: STUDENT IN AN ORGANIZED HEALTH CARE EDUCATION/TRAINING PROGRAM | Admitting: STUDENT IN AN ORGANIZED HEALTH CARE EDUCATION/TRAINING PROGRAM
Payer: MEDICARE

## 2025-07-10 ENCOUNTER — APPOINTMENT (OUTPATIENT)
Facility: HOSPITAL | Age: 70
DRG: 280 | End: 2025-07-10
Payer: MEDICARE

## 2025-07-10 DIAGNOSIS — N18.6 ESRD (END STAGE RENAL DISEASE) ON DIALYSIS (HCC): Primary | ICD-10-CM

## 2025-07-10 DIAGNOSIS — Z99.2 ESRD (END STAGE RENAL DISEASE) ON DIALYSIS (HCC): Primary | ICD-10-CM

## 2025-07-10 PROBLEM — Z91.199 NONCOMPLIANCE: Status: ACTIVE | Noted: 2025-07-10

## 2025-07-10 LAB
BASOPHILS # BLD: 0.04 K/UL (ref 0–0.1)
BASOPHILS NFR BLD: 0.8 % (ref 0–2)
DIFFERENTIAL METHOD BLD: ABNORMAL
EKG ATRIAL RATE: 69 BPM
EKG DIAGNOSIS: NORMAL
EKG P AXIS: 83 DEGREES
EKG P-R INTERVAL: 166 MS
EKG Q-T INTERVAL: 492 MS
EKG QRS DURATION: 158 MS
EKG QTC CALCULATION (BAZETT): 527 MS
EKG R AXIS: -34 DEGREES
EKG T AXIS: 244 DEGREES
EKG VENTRICULAR RATE: 69 BPM
EOSINOPHIL # BLD: 0.24 K/UL (ref 0–0.4)
EOSINOPHIL NFR BLD: 4.6 % (ref 0–5)
ERYTHROCYTE [DISTWIDTH] IN BLOOD BY AUTOMATED COUNT: 13.7 % (ref 11.6–14.5)
HCT VFR BLD AUTO: 32.1 % (ref 36–48)
HGB BLD-MCNC: 10.6 G/DL (ref 13–16)
IMM GRANULOCYTES # BLD AUTO: 0.01 K/UL (ref 0–0.04)
IMM GRANULOCYTES NFR BLD AUTO: 0.2 % (ref 0–0.5)
LYMPHOCYTES # BLD: 0.97 K/UL (ref 0.9–3.6)
LYMPHOCYTES NFR BLD: 18.7 % (ref 21–52)
MCH RBC QN AUTO: 30.8 PG (ref 24–34)
MCHC RBC AUTO-ENTMCNC: 33 G/DL (ref 31–37)
MCV RBC AUTO: 93.3 FL (ref 78–100)
MONOCYTES # BLD: 0.38 K/UL (ref 0.05–1.2)
MONOCYTES NFR BLD: 7.3 % (ref 3–10)
NEUTS SEG # BLD: 3.54 K/UL (ref 1.8–8)
NEUTS SEG NFR BLD: 68.4 % (ref 40–73)
NRBC # BLD: 0 K/UL (ref 0–0.01)
NRBC BLD-RTO: 0 PER 100 WBC
PLATELET # BLD AUTO: 210 K/UL (ref 135–420)
PMV BLD AUTO: 11.1 FL (ref 9.2–11.8)
RBC # BLD AUTO: 3.44 M/UL (ref 4.35–5.65)
WBC # BLD AUTO: 5.2 K/UL (ref 4.6–13.2)

## 2025-07-10 PROCEDURE — 6360000002 HC RX W HCPCS: Performed by: STUDENT IN AN ORGANIZED HEALTH CARE EDUCATION/TRAINING PROGRAM

## 2025-07-10 PROCEDURE — 93005 ELECTROCARDIOGRAM TRACING: CPT | Performed by: STUDENT IN AN ORGANIZED HEALTH CARE EDUCATION/TRAINING PROGRAM

## 2025-07-10 PROCEDURE — 85025 COMPLETE CBC W/AUTO DIFF WBC: CPT

## 2025-07-10 PROCEDURE — 99285 EMERGENCY DEPT VISIT HI MDM: CPT

## 2025-07-10 PROCEDURE — 90935 HEMODIALYSIS ONE EVALUATION: CPT

## 2025-07-10 PROCEDURE — 71046 X-RAY EXAM CHEST 2 VIEWS: CPT

## 2025-07-10 PROCEDURE — 2500000003 HC RX 250 WO HCPCS: Performed by: STUDENT IN AN ORGANIZED HEALTH CARE EDUCATION/TRAINING PROGRAM

## 2025-07-10 PROCEDURE — 96372 THER/PROPH/DIAG INJ SC/IM: CPT

## 2025-07-10 PROCEDURE — 1100000000 HC RM PRIVATE

## 2025-07-10 PROCEDURE — 36556 INSERT NON-TUNNEL CV CATH: CPT

## 2025-07-10 PROCEDURE — 93010 ELECTROCARDIOGRAM REPORT: CPT | Performed by: INTERNAL MEDICINE

## 2025-07-10 RX ORDER — SODIUM CHLORIDE 9 MG/ML
INJECTION, SOLUTION INTRAVENOUS PRN
Status: DISCONTINUED | OUTPATIENT
Start: 2025-07-10 | End: 2025-07-11 | Stop reason: HOSPADM

## 2025-07-10 RX ORDER — ACETAMINOPHEN 650 MG/1
650 SUPPOSITORY RECTAL EVERY 6 HOURS PRN
Status: DISCONTINUED | OUTPATIENT
Start: 2025-07-10 | End: 2025-07-10

## 2025-07-10 RX ORDER — ONDANSETRON 4 MG/1
4 TABLET, ORALLY DISINTEGRATING ORAL EVERY 8 HOURS PRN
Status: DISCONTINUED | OUTPATIENT
Start: 2025-07-10 | End: 2025-07-11 | Stop reason: HOSPADM

## 2025-07-10 RX ORDER — SODIUM CHLORIDE 0.9 % (FLUSH) 0.9 %
5-40 SYRINGE (ML) INJECTION EVERY 12 HOURS SCHEDULED
Status: DISCONTINUED | OUTPATIENT
Start: 2025-07-10 | End: 2025-07-11 | Stop reason: HOSPADM

## 2025-07-10 RX ORDER — SODIUM CHLORIDE 0.9 % (FLUSH) 0.9 %
5-40 SYRINGE (ML) INJECTION PRN
Status: DISCONTINUED | OUTPATIENT
Start: 2025-07-10 | End: 2025-07-11 | Stop reason: HOSPADM

## 2025-07-10 RX ORDER — HEPARIN SODIUM 5000 [USP'U]/ML
5000 INJECTION, SOLUTION INTRAVENOUS; SUBCUTANEOUS EVERY 8 HOURS SCHEDULED
Status: DISCONTINUED | OUTPATIENT
Start: 2025-07-10 | End: 2025-07-11 | Stop reason: HOSPADM

## 2025-07-10 RX ORDER — ONDANSETRON 2 MG/ML
4 INJECTION INTRAMUSCULAR; INTRAVENOUS EVERY 6 HOURS PRN
Status: DISCONTINUED | OUTPATIENT
Start: 2025-07-10 | End: 2025-07-11 | Stop reason: HOSPADM

## 2025-07-10 RX ORDER — POLYETHYLENE GLYCOL 3350 17 G/17G
17 POWDER, FOR SOLUTION ORAL DAILY PRN
Status: DISCONTINUED | OUTPATIENT
Start: 2025-07-10 | End: 2025-07-11 | Stop reason: HOSPADM

## 2025-07-10 RX ORDER — ACETAMINOPHEN 325 MG/1
650 TABLET ORAL EVERY 6 HOURS PRN
Status: DISCONTINUED | OUTPATIENT
Start: 2025-07-10 | End: 2025-07-10

## 2025-07-10 RX ADMIN — HEPARIN SODIUM 5000 UNITS: 5000 INJECTION INTRAVENOUS; SUBCUTANEOUS at 20:19

## 2025-07-10 RX ADMIN — SODIUM CHLORIDE, PRESERVATIVE FREE 10 ML: 5 INJECTION INTRAVENOUS at 20:19

## 2025-07-10 ASSESSMENT — ENCOUNTER SYMPTOMS
VOMITING: 0
ABDOMINAL PAIN: 0
SHORTNESS OF BREATH: 0
CHEST TIGHTNESS: 0
BACK PAIN: 0
COUGH: 0
NAUSEA: 0

## 2025-07-10 ASSESSMENT — PAIN - FUNCTIONAL ASSESSMENT: PAIN_FUNCTIONAL_ASSESSMENT: NONE - DENIES PAIN

## 2025-07-10 ASSESSMENT — PAIN SCALES - GENERAL: PAINLEVEL_OUTOF10: 0

## 2025-07-10 NOTE — H&P
History & Physical    Patient: Simone Lamas MRN: [id number]  [facility]: [id number]    YOB: 1955  Age: 70 y.o.  Sex: male             DOA: 7/10/2025    Chief Complaint:   Chief Complaint   Patient presents with    Needs Dialysis          HPI:    Simone Lamas is a 70 y.o.  male with a PMHx significant for hypertension, diabetes mellitus type 2, end-stage renal disease on hemodialysis, CHF who presented to the ED requesting dialysis. Patient reports that he missed [address] appointments [place] the road and dialysis will not see him. He denies any acute symptoms at this time.     Past Medical History:   Diagnosis Date    CHF (congestive heart failure) (HCC)     Diabetes mellitus (HCC)     End stage renal disease (HCC)     Hypertension     TIA (transient ischemic attack)        Past Surgical History:   Procedure Laterality Date    CARDIAC PROCEDURE N/A 7/22/2024    Left heart cath performed by Abhijeet Martinez MD at [facility] CARDIAC CATH LAB    CARDIAC PROCEDURE N/A 7/22/2024    Coronary angiography performed by Abhijeet Martinez MD at [facility] CARDIAC CATH LAB    DIALYSIS FISTULA CREATION Right 5/10/2025    RIGHT ARM ARTERIOVENOUS FISTULA  LIGATION performed by Cornell Downs MD at [facility] CARDIAC SURGERY    INVASIVE VASCULAR N/A 4/10/2025    Fistulogram right/POSS INTERVENTION performed by George Jesus MD at [facility] CARDIAC CATH LAB    INVASIVE VASCULAR N/A 4/17/2025    Fistulogram right-W/POSS TDC PLACEMENT performed by uRbén Carranza MD at [facility] CARDIAC CATH LAB    INVASIVE VASCULAR N/A 4/17/2025    Angioplasty fistula/dialysis circuit performed by Rubén Carranza MD at [facility] CARDIAC CATH LAB    INVASIVE VASCULAR N/A 4/17/2025    Thrombectomy peripheral vein performed by Rubén Carranza MD at [facility] CARDIAC CATH LAB    INVASIVE VASCULAR N/A 5/1/2025    Tunnel dialysis catheter insertion performed by Cornell Downs MD at [facility] CARDIAC  Bowel sounds normal.   Extremities:Extremities no cyanosis or edema.  Pulses: 2+ and symmetric all extremities.  Skin:No rashes or lesions  Neurologic:AAOx3, moves all extremities, motor strength      Labs Reviewed:  Recent Results (from the past 24 hours)   CBC with Auto Differential    Collection Time: 07/10/25 12:50 PM   Result Value Ref Range    WBC 5.2 4.6 - 13.2 K/uL    RBC 3.44 (L) 4.35 - 5.65 M/uL    Hemoglobin 10.6 (L) 13.0 - 16.0 g/dL    Hematocrit 32.1 (L) 36.0 - 48.0 %    MCV 93.3 78.0 - 100.0 FL    MCH 30.8 24.0 - 34.0 PG    MCHC 33.0 31.0 - 37.0 g/dL    RDW 13.7 11.6 - 14.5 %    Platelets 210 135 - 420 K/uL    MPV 11.1 9.2 - 11.8 FL    Nucleated RBCs 0.0 0  WBC    nRBC 0.00 0.00 - 0.01 K/uL    Neutrophils % 68.4 40.0 - 73.0 %    Lymphocytes % 18.7 (L) 21.0 - 52.0 %    Monocytes % 7.3 3.0 - 10.0 %    Eosinophils % 4.6 0.0 - 5.0 %    Basophils % 0.8 0.0 - 2.0 %    Immature Granulocytes % 0.2 0.0 - 0.5 %    Neutrophils Absolute 3.54 1.80 - 8.00 K/UL    Lymphocytes Absolute 0.97 0.90 - 3.60 K/UL    Monocytes Absolute 0.38 0.05 - 1.20 K/UL    Eosinophils Absolute 0.24 0.00 - 0.40 K/UL    Basophils Absolute 0.04 0.00 - 0.10 K/UL    Immature Granulocytes Absolute 0.01 0.00 - 0.04 K/UL    Differential Type AUTOMATED     EKG 12 Lead    Collection Time: 07/10/25 12:52 PM   Result Value Ref Range    Ventricular Rate 69 BPM    Atrial Rate 69 BPM    P-R Interval 166 ms    QRS Duration 158 ms    Q-T Interval 492 ms    QTc Calculation (Bazett) 527 ms    P Axis 83 degrees    R Axis -34 degrees    T Axis 244 degrees    Diagnosis       Normal sinus rhythm  Possible Left atrial enlargement  Left axis deviation  Right bundle branch block  Inferior infarct (cited on or before 10-JUL-2025)  T wave abnormality, consider lateral ischemia  Abnormal ECG  When compared with ECG of 15-ROYCE-2025 16:17,  premature ventricular complexes are no longer present  Left anterior fascicular block is no longer present  Confirmed by

## 2025-07-10 NOTE — PLAN OF CARE
INTERVENTION:  HEMODYNAMIC STABILIZATION  MAINTAIN BP WNL WHILE ON HD.     INTERVENTION:  FLUID MANAGEMENT  WILL ATTEMPT 2000 ML TOTAL FLUID REMOVAL AS TOLERATED.     INTERVENTION:  METABOLIC/ELECTROLYTE MANAGEMENT  2.0 POTASSIUM 2.5 CALCIUM DIALYSATE USED WITH HD TODAY.     INTERVENTION:  HEMODIALYSIS ACCESS SITE MANAGEMENT  RIGHT CHEST TDC  ACCESSED USING ASEPTIC TECHNIQUE.     GOAL:  SIGNS AND SYMPTOMS OF LISTED POTENTIAL PROBLEMS WILL BE ABSENT OR MANAGEABLE.     OUTCOME:  PROGRESSING.     HD PLANNED FOR 3 HOURS TODAY.

## 2025-07-10 NOTE — ED TRIAGE NOTES
Pt presents to ED for dialysis. States [facility] will not perform dialysis because he missed 5 appointments in a row. No complaints otherwise.

## 2025-07-10 NOTE — CONSULTS
Progress Note    Simone Lamas  70 y.o.      Admit Date: 7/10/2025  Patient Active Problem List   Diagnosis    Elevated serum creatinine    Systolic CHF, chronic (HCC)    Chronic renal insufficiency    Hypertension    Stage 3 chronic renal impairment associated with type 2 diabetes mellitus (HCC)    Hepatitis C infection    Cocaine use    Cardiac LV ejection fraction 30-35%    Homelessness    Hypertriglyceridemia    Elevated HDL    Decreased GFR    NSTEMI (non-ST elevated myocardial infarction) (HCC)    History of heroin abuse (HCC)    Elevated alkaline phosphatase level    History of cocaine abuse (HCC)    TIA (transient ischemic attack)    Type 2 diabetes mellitus with insulin therapy (HCC)    COPD, mild (HCC)    CHF (congestive heart failure) (HCC)    Cardiomyopathy (HCC)    Insulin dependent type 2 diabetes mellitus, controlled (HCC)    CVA (cerebral vascular accident) (HCC)    ESRD (end stage renal disease) (HCC)    Substance abuse (HCC)    Anemia    End stage renal disease (HCC)    Pericardial effusion    Hyperglycemia    CKD (chronic kidney disease) stage 5, GFR less than 15 ml/min (HCC)    CKD (chronic kidney disease) stage 4, GFR 15-29 ml/min (HCC)    ESRD (end stage renal disease) on dialysis (HCC)    AVF (arteriovenous fistula)    Volume overload    Chest pain    Diabetes due to undrl condition w oth diabetic kidney comp (HCC)    Unstable angina (HCC)    AV fistula occlusion, initial encounter    Complication of arteriovenous dialysis fistula    Systolic dysfunction with acute on chronic heart failure (HCC)    Hemorrhage of arteriovenous fistula, subsequent encounter    Acute respiratory failure with hypoxia (HCC)    Dyspnea    Noncompliance           Subjective:     Patient finally came to the hospital to get his dialysis.  He has not come to the outpatient dialysis center for almost a month or more.  Given his status and cardiac problem several calls were made to him without any response from.  Given  Units          Physical Exam:     Physical Exam:   General:  Alert, cooperative, no distress, appears stated age.   Eyes:  Conjunctivae/corneas clear. PERRL, EOMs intact.    Mouth/Throat: Lips, mucosa, and tongue normal. Teeth and gums normal.   Neck: Supple, symmetrical, trachea midline, no adenopathy, thyroid: no enlargement/tenderness/nodules, no carotid bruit and no JVD.   Lungs:   Clear to auscultation bilaterally.   Heart:  Regular rate and rhythm, S1, S2 normal, no murmur, click, rub or gallop.   Abdomen:   Soft, non-tender. Bowel sounds normal. No masses,  No organomegaly.   Extremities: Extremities normal, atraumatic, no cyanosis or edema, tunneled dialysis catheter exit site is clean no drainage.       Data Review:    Labs: Results:   Chemistry No results for input(s): \"GLUCOSE\", \"NA\", \"K\", \"CL\", \"CO2\", \"BUN\", \"CREATININE\", \"TP\", \"GLOB\", \"ALT\", \"AST\" in the last 72 hours.    Invalid input(s): \"CA\", \"AGAP\", \"BUCR\", \"TBIL\", \"GPT\", \"AP\", \"ALB\", \"AGRAT\"   CBC w/Diff Recent Labs     07/10/25  1250   WBC 5.2   RBC 3.44*   HGB 10.6*   HCT 32.1*           Reviewed chest x-ray from today no significant interstitial edema no pulmonary congestion no pleural effusion no cardiomegaly.  Chemistry results still pending        Imaging:     Procedures/imaging: see electronic medical records for all procedures, Xrays and details which were not copied into this note but were reviewed prior to   taking my decision.  Impression:       Active Hospital Problems    Diagnosis Date Noted    Noncompliance 07/10/2025    ESRD (end stage renal disease) ([facility]) 08/16/2022    CHF (congestive heart failure) ([facility]) 11/30/2015            Plan:     Given his history of missing dialysis treatment for at least 5-6 treatment he does not look too bad volume wise.  No signs of any pulmonary edema or heart failure though he has a history of systolic dysfunction.  Still will arrange his dialysis in hospital today.  No need to give any Retacrit

## 2025-07-10 NOTE — ED PROVIDER NOTES
[address]  EMERGENCY DEPARTMENT ENCOUNTER      Pt Name: Simone Lamas  MRN: [id number]  Birthdate 1955  Date of evaluation: 7/10/2025  Provider: NATHAN Castañeda  8:45 PM    CHIEF COMPLAINT       Chief Complaint   Patient presents with    Needs Dialysis         HISTORY OF PRESENT ILLNESS    Simone Lamas is a 70 y.o. male who presents to the emergency department requesting CHF.      70 y.o. male with a PMHx significant for hypertension, diabetes mellitus type 2, end-stage renal disease on hemodialysis, CHF who presented to the ED requesting dialysis.  Patient reports that he missed 5 appointments in the road and dialysis will not see him.  He denies any acute symptoms at this time.          Nursing Notes were reviewed.    REVIEW OF SYSTEMS       Review of Systems   Constitutional:  Negative for activity change, chills, fatigue and fever.   HENT:  Negative for congestion and ear pain.    Eyes:  Negative for visual disturbance.   Respiratory:  Negative for cough, chest tightness and shortness of breath.    Cardiovascular:  Negative for chest pain and palpitations.   Gastrointestinal:  Negative for abdominal pain, nausea and vomiting.   Genitourinary:  Negative for dysuria and flank pain.   Musculoskeletal:  Negative for back pain, myalgias, neck pain and neck stiffness.   Skin:  Negative for pallor and wound.   Neurological:  Negative for dizziness, seizures, syncope, weakness, numbness and headaches.   Hematological: Negative.    Psychiatric/Behavioral: Negative.         Except as noted above the remainder of the review of systems was reviewed and negative.       PAST MEDICAL HISTORY     Past Medical History:   Diagnosis Date    CHF (congestive heart failure) (HCC)     Diabetes mellitus (HCC)     End stage renal disease (HCC)     Hypertension     TIA (transient ischemic attack)          SURGICAL HISTORY       Past Surgical History:   Procedure Laterality Date    CARDIAC PROCEDURE N/A 7/22/2024     tablet, R-3Normal      tamsulosin (FLOMAX) 0.4 MG capsule Take 1 capsule by mouth dailyHistorical Med      B Complex-C-Folic Acid (RENAL) 1 MG CAPS Take 1 capsule by mouth dailyHistorical Med             ALLERGIES     Acetaminophen    FAMILY HISTORY     No family history on file.       SOCIAL HISTORY       Social History     Socioeconomic History    Marital status: Single   Tobacco Use    Smoking status: Never    Smokeless tobacco: Never     Social Drivers of Health     Food Insecurity: No Food Insecurity (7/10/2025)    Hunger Vital Sign     Worried About Running Out of Food in the Last Year: Never true     Ran Out of Food in the Last Year: Never true   Transportation Needs: No Transportation Needs (7/10/2025)    PRAPARE - Transportation     Lack of Transportation (Medical): No     Lack of Transportation (Non-Medical): No   Housing Stability: Low Risk  (7/10/2025)    Housing Stability Vital Sign     Unable to Pay for Housing in the Last Year: No     Number of Times Moved in the Last Year: 1     Homeless in the Last Year: No   Recent Concern: Housing Stability - High Risk (6/16/2025)    Housing Stability Vital Sign     Unable to Pay for Housing in the Last Year: No     Number of Times Moved in the Last Year: 1     Homeless in the Last Year: Yes       SCREENINGS                         Isaac Coma Scale  Eye Opening: Spontaneous  Best Verbal Response: Oriented  Best Motor Response: Obeys commands  Isaac Coma Scale Score: 15                     CIWA Assessment  BP: (!) 162/108  Pulse: 68                 PHYSICAL EXAM       ED Triage Vitals [07/10/25 1203]   BP Systolic BP Percentile Diastolic BP Percentile Temp Temp Source Pulse Respirations SpO2   (!) 151/104 -- -- 97.5 °F (36.4 °C) Oral 86 16 99 %      Height Weight - Scale         1.829 m (6') 74.8 kg (165 lb)             Physical Exam  Vitals and nursing note reviewed.   Constitutional:       General: He is not in acute distress.     Appearance: Normal

## 2025-07-10 NOTE — PROGRESS NOTES
HD Care plan  Time: 3 hrs  Dialysate: 2K+  2.5Ca++  [place]ath  Net UF: 2L  Access: Aseptic care for Right TDC  Hemodynamic stability: Maintain BP WNL     Pre Dialysis:  [facility] received from [facility] . Patient arrived on a bed, A+O x 4, on RA, no s/s of acute distress noted. rIGHT TDC assessed, no abnormalities noted. TDC accessed per protocol without any difficulty, line patent with good flow.     Intra Dialysis:  Time out / safety process performed per policy. Tx initiated at 1530.   TDC flowing with ease. For hemodynamic stability UF goal set at 2000 ml as tolerated.  Pt offered assistance with repositioning every 2 hours/prn    Vascular access visible and line connections remained intact throughout entire duration of treatment.   Vital signs checked every 15 mins.     Post Dialysis:  Tx completed at 1830,   Tolerated well, 2 L  removed. De-accessed per protocol.    Dialysis catheter locked accordingly with Heparin 1.9 ml in arterial port, and 1.9 ml in venous port. Catheter dressing clean, dry and intact.  Post Dialysis report given to GIOVANA Luo RN

## 2025-07-11 VITALS
HEART RATE: 68 BPM | HEIGHT: 72 IN | TEMPERATURE: 98.4 F | DIASTOLIC BLOOD PRESSURE: 108 MMHG | SYSTOLIC BLOOD PRESSURE: 162 MMHG | RESPIRATION RATE: 16 BRPM | BODY MASS INDEX: 22.35 KG/M2 | OXYGEN SATURATION: 99 % | WEIGHT: 165 LBS

## 2025-07-11 PROBLEM — Z99.2 HEMODIALYSIS PATIENT: Status: ACTIVE | Noted: 2025-07-11

## 2025-07-11 LAB
ALBUMIN SERPL-MCNC: 2.9 G/DL (ref 3.4–5)
ANION GAP SERPL CALC-SCNC: 12 MMOL/L (ref 3–18)
BASOPHILS # BLD: 0.05 K/UL (ref 0–0.1)
BASOPHILS NFR BLD: 0.9 % (ref 0–2)
BUN SERPL-MCNC: 34 MG/DL (ref 6–23)
BUN/CREAT SERPL: 7 (ref 12–20)
CALCIUM SERPL-MCNC: 9 MG/DL (ref 8.5–10.1)
CHLORIDE SERPL-SCNC: 103 MMOL/L (ref 98–107)
CO2 SERPL-SCNC: 22 MMOL/L (ref 21–32)
CREAT SERPL-MCNC: 5.04 MG/DL (ref 0.6–1.3)
DIFFERENTIAL METHOD BLD: ABNORMAL
EOSINOPHIL # BLD: 0.35 K/UL (ref 0–0.4)
EOSINOPHIL NFR BLD: 6.6 % (ref 0–5)
ERYTHROCYTE [DISTWIDTH] IN BLOOD BY AUTOMATED COUNT: 13.2 % (ref 11.6–14.5)
GLUCOSE SERPL-MCNC: 296 MG/DL (ref 74–108)
HCT VFR BLD AUTO: 31.8 % (ref 36–48)
HGB BLD-MCNC: 11 G/DL (ref 13–16)
IMM GRANULOCYTES # BLD AUTO: 0.03 K/UL (ref 0–0.04)
IMM GRANULOCYTES NFR BLD AUTO: 0.6 % (ref 0–0.5)
LYMPHOCYTES # BLD: 1.5 K/UL (ref 0.9–3.6)
LYMPHOCYTES NFR BLD: 28.4 % (ref 21–52)
MCH RBC QN AUTO: 31.4 PG (ref 24–34)
MCHC RBC AUTO-ENTMCNC: 34.6 G/DL (ref 31–37)
MCV RBC AUTO: 90.9 FL (ref 78–100)
MONOCYTES # BLD: 0.5 K/UL (ref 0.05–1.2)
MONOCYTES NFR BLD: 9.5 % (ref 3–10)
NEUTS SEG # BLD: 2.85 K/UL (ref 1.8–8)
NEUTS SEG NFR BLD: 54 % (ref 40–73)
NRBC # BLD: 0 K/UL (ref 0–0.01)
NRBC BLD-RTO: 0 PER 100 WBC
PHOSPHATE SERPL-MCNC: 2.6 MG/DL (ref 2.5–4.9)
PLATELET # BLD AUTO: 191 K/UL (ref 135–420)
PMV BLD AUTO: 11.8 FL (ref 9.2–11.8)
POTASSIUM SERPL-SCNC: 3.5 MMOL/L (ref 3.5–5.5)
RBC # BLD AUTO: 3.5 M/UL (ref 4.35–5.65)
SODIUM SERPL-SCNC: 137 MMOL/L (ref 136–145)
WBC # BLD AUTO: 5.3 K/UL (ref 4.6–13.2)

## 2025-07-11 PROCEDURE — 94761 N-INVAS EAR/PLS OXIMETRY MLT: CPT

## 2025-07-11 PROCEDURE — 6370000000 HC RX 637 (ALT 250 FOR IP): Performed by: STUDENT IN AN ORGANIZED HEALTH CARE EDUCATION/TRAINING PROGRAM

## 2025-07-11 PROCEDURE — 6360000002 HC RX W HCPCS: Performed by: STUDENT IN AN ORGANIZED HEALTH CARE EDUCATION/TRAINING PROGRAM

## 2025-07-11 PROCEDURE — 2500000003 HC RX 250 WO HCPCS: Performed by: STUDENT IN AN ORGANIZED HEALTH CARE EDUCATION/TRAINING PROGRAM

## 2025-07-11 PROCEDURE — 85025 COMPLETE CBC W/AUTO DIFF WBC: CPT

## 2025-07-11 PROCEDURE — G0378 HOSPITAL OBSERVATION PER HR: HCPCS

## 2025-07-11 PROCEDURE — 96372 THER/PROPH/DIAG INJ SC/IM: CPT

## 2025-07-11 PROCEDURE — 99238 HOSP IP/OBS DSCHRG MGMT 30/<: CPT | Performed by: NURSE PRACTITIONER

## 2025-07-11 PROCEDURE — 36415 COLL VENOUS BLD VENIPUNCTURE: CPT

## 2025-07-11 PROCEDURE — 80069 RENAL FUNCTION PANEL: CPT

## 2025-07-11 RX ORDER — TAMSULOSIN HYDROCHLORIDE 0.4 MG/1
0.4 CAPSULE ORAL DAILY
Status: DISCONTINUED | OUTPATIENT
Start: 2025-07-11 | End: 2025-07-11 | Stop reason: HOSPADM

## 2025-07-11 RX ORDER — ATORVASTATIN CALCIUM 40 MG/1
40 TABLET, FILM COATED ORAL NIGHTLY
Status: DISCONTINUED | OUTPATIENT
Start: 2025-07-11 | End: 2025-07-11 | Stop reason: HOSPADM

## 2025-07-11 RX ORDER — FUROSEMIDE 40 MG/1
80 TABLET ORAL WEEKLY
Status: DISCONTINUED | OUTPATIENT
Start: 2025-07-11 | End: 2025-07-11 | Stop reason: HOSPADM

## 2025-07-11 RX ORDER — ISOSORBIDE MONONITRATE 60 MG/1
60 TABLET, EXTENDED RELEASE ORAL DAILY
Status: DISCONTINUED | OUTPATIENT
Start: 2025-07-11 | End: 2025-07-11 | Stop reason: HOSPADM

## 2025-07-11 RX ORDER — HYDRALAZINE HYDROCHLORIDE 25 MG/1
25 TABLET, FILM COATED ORAL EVERY 8 HOURS PRN
Status: DISCONTINUED | OUTPATIENT
Start: 2025-07-11 | End: 2025-07-11 | Stop reason: HOSPADM

## 2025-07-11 RX ORDER — ASPIRIN 81 MG/1
81 TABLET, CHEWABLE ORAL DAILY
Status: DISCONTINUED | OUTPATIENT
Start: 2025-07-11 | End: 2025-07-11 | Stop reason: HOSPADM

## 2025-07-11 RX ORDER — LOSARTAN POTASSIUM 50 MG/1
100 TABLET ORAL DAILY
Status: DISCONTINUED | OUTPATIENT
Start: 2025-07-11 | End: 2025-07-11 | Stop reason: HOSPADM

## 2025-07-11 RX ORDER — CARVEDILOL 12.5 MG/1
12.5 TABLET ORAL 2 TIMES DAILY WITH MEALS
Status: DISCONTINUED | OUTPATIENT
Start: 2025-07-11 | End: 2025-07-11 | Stop reason: HOSPADM

## 2025-07-11 RX ADMIN — CARVEDILOL 12.5 MG: 12.5 TABLET, FILM COATED ORAL at 11:46

## 2025-07-11 RX ADMIN — HEPARIN SODIUM 5000 UNITS: 5000 INJECTION INTRAVENOUS; SUBCUTANEOUS at 05:54

## 2025-07-11 RX ADMIN — ISOSORBIDE MONONITRATE 60 MG: 60 TABLET, EXTENDED RELEASE ORAL at 11:45

## 2025-07-11 RX ADMIN — ASPIRIN 81 MG CHEWABLE TABLET 81 MG: 81 TABLET CHEWABLE at 11:45

## 2025-07-11 RX ADMIN — SODIUM CHLORIDE, PRESERVATIVE FREE 10 ML: 5 INJECTION INTRAVENOUS at 09:11

## 2025-07-11 RX ADMIN — TAMSULOSIN HYDROCHLORIDE 0.4 MG: 0.4 CAPSULE ORAL at 11:46

## 2025-07-11 RX ADMIN — LOSARTAN POTASSIUM 100 MG: 50 TABLET, FILM COATED ORAL at 11:46

## 2025-07-11 RX ADMIN — FUROSEMIDE 80 MG: 40 TABLET ORAL at 11:46

## 2025-07-11 ASSESSMENT — PAIN SCALES - GENERAL
PAINLEVEL_OUTOF10: 0

## 2025-07-11 NOTE — PLAN OF CARE
Problem: Chronic Conditions and Co-morbidities  Goal: Patient's chronic conditions and co-morbidity symptoms are monitored and maintained or improved  7/11/2025 1155 by Yunior Luo, RN  Outcome: Progressing  Flowsheets (Taken 7/11/2025 0758)  Care Plan - Patient's Chronic Conditions and Co-Morbidity Symptoms are Monitored and Maintained or Improved: Monitor and assess patient's chronic conditions and comorbid symptoms for stability, deterioration, or improvement  7/10/2025 2331 by Becka Salazar, RN  Outcome: Progressing  Flowsheets (Taken 7/10/2025 2025)  Care Plan - Patient's Chronic Conditions and Co-Morbidity Symptoms are Monitored and Maintained or Improved: Monitor and assess patient's chronic conditions and comorbid symptoms for stability, deterioration, or improvement     Problem: Pain  Goal: Verbalizes/displays adequate comfort level or baseline comfort level  Outcome: Progressing  Flowsheets (Taken 7/11/2025 1155)  Verbalizes/displays adequate comfort level or baseline comfort level:   Encourage patient to monitor pain and request assistance   Assess pain using appropriate pain scale     Problem: Safety - Adult  Goal: Free from fall injury  Outcome: Progressing  Flowsheets (Taken 7/11/2025 1155)  Free From Fall Injury: Instruct family/caregiver on patient safety     Problem: Discharge Planning  Goal: Discharge to home or other facility with appropriate resources  Flowsheets  Taken 7/11/2025 1155  Discharge to home or other facility with appropriate resources:   Identify barriers to discharge with patient and caregiver   Arrange for needed discharge resources and transportation as appropriate  Taken 7/11/2025 0758  Discharge to home or other facility with appropriate resources: Identify barriers to discharge with patient and caregiver

## 2025-07-11 NOTE — PROGRESS NOTES
Progress Note    Simone Lamas  70 y.o.      Admit Date: 7/10/2025  Patient Active Problem List   Diagnosis    Elevated serum creatinine    Systolic CHF, chronic (HCC)    Chronic renal insufficiency    Hypertension    Stage 3 chronic renal impairment associated with type 2 diabetes mellitus (HCC)    Hepatitis C infection    Cocaine use    Cardiac LV ejection fraction 30-35%    Homelessness    Hypertriglyceridemia    Elevated HDL    Decreased GFR    NSTEMI (non-ST elevated myocardial infarction) (HCC)    History of heroin abuse (HCC)    Elevated alkaline phosphatase level    History of cocaine abuse (HCC)    TIA (transient ischemic attack)    Type 2 diabetes mellitus with insulin therapy (HCC)    COPD, mild (HCC)    CHF (congestive heart failure) (HCC)    Cardiomyopathy (HCC)    Insulin dependent type 2 diabetes mellitus, controlled (HCC)    CVA (cerebral vascular accident) (HCC)    ESRD (end stage renal disease) (HCC)    Substance abuse (HCC)    Anemia    End stage renal disease (HCC)    Pericardial effusion    Hyperglycemia    CKD (chronic kidney disease) stage 5, GFR less than 15 ml/min (HCC)    CKD (chronic kidney disease) stage 4, GFR 15-29 ml/min (HCC)    ESRD (end stage renal disease) on dialysis (HCC)    AVF (arteriovenous fistula)    Volume overload    Chest pain    Diabetes due to undrl condition w oth diabetic kidney comp (HCC)    Unstable angina (HCC)    AV fistula occlusion, initial encounter    Complication of arteriovenous dialysis fistula    Systolic dysfunction with acute on chronic heart failure (HCC)    Hemorrhage of arteriovenous fistula, subsequent encounter    Acute respiratory failure with hypoxia (HCC)    Dyspnea    Noncompliance           Subjective:   Stable      A comprehensive review of systems was negative except for that written in the History of Present Illness.    Objective:     BP (!) 162/108   Pulse 68   Temp 98.4 °F (36.9 °C) (Oral)   Resp 16   Ht 1.829 m (6')   Wt 74.8 kg

## 2025-07-11 NOTE — CARE COORDINATION
07/11/25 1231   IMM Letter   IMM Letter given to Patient/Family/Significant other/Guardian/POA/by: Jenelle Child RN   IMM Letter date given: 07/11/25   IMM Letter time given: 1221     Jenelle Child BSN, RN   Case Management   [phone]

## 2025-07-11 NOTE — CARE COORDINATION
Requested Case Management specialist to assist with transportation to patient's home.  Address is [address] [place] [address] and phone number is [phone]  Any steps at the residence? No  Patient will require transport.   Pt requires Cab   Patient is currently requiring oxygen  no  Height:6'   Weight: 165lbs  Pt is on isolation: No   Is the pt ready now? yes  Requested time: Next Available  PCS Faxed: No  Insurance verified on face sheet: Yes  Auth needed for transport: Yes  CM completed PCS/ Envelope and placed on chart.     Jenelle BHATIAN, RN   Case Management   [phone]

## 2025-07-11 NOTE — PLAN OF CARE
Problem: Chronic Conditions and Co-morbidities  Goal: Patient's chronic conditions and co-morbidity symptoms are monitored and maintained or improved  7/11/2025 1358 by Yunior Luo RN  Outcome: Adequate for Discharge  7/11/2025 1155 by Yunior Luo RN  Outcome: Progressing  Flowsheets (Taken 7/11/2025 0758)  Care Plan - Patient's Chronic Conditions and Co-Morbidity Symptoms are Monitored and Maintained or Improved: Monitor and assess patient's chronic conditions and comorbid symptoms for stability, deterioration, or improvement     Problem: Pain  Goal: Verbalizes/displays adequate comfort level or baseline comfort level  7/11/2025 1358 by Yunior Luo RN  Outcome: Adequate for Discharge  7/11/2025 1155 by Yunior Luo RN  Outcome: Progressing  Flowsheets (Taken 7/11/2025 1155)  Verbalizes/displays adequate comfort level or baseline comfort level:   Encourage patient to monitor pain and request assistance   Assess pain using appropriate pain scale     Problem: Safety - Adult  Goal: Free from fall injury  7/11/2025 1358 by Yunior Luo RN  Outcome: Adequate for Discharge  7/11/2025 1155 by Yunior Luo RN  Outcome: Progressing  Flowsheets (Taken 7/11/2025 1155)  Free From Fall Injury: Instruct family/caregiver on patient safety     Problem: Discharge Planning  Goal: Discharge to home or other facility with appropriate resources  7/11/2025 1358 by Yunior Luo RN  Outcome: Adequate for Discharge  7/11/2025 1155 by Yunior Luo RN  Flowsheets  Taken 7/11/2025 1155  Discharge to home or other facility with appropriate resources:   Identify barriers to discharge with patient and caregiver   Arrange for needed discharge resources and transportation as appropriate  Taken 7/11/2025 0758  Discharge to home or other facility with appropriate resources: Identify barriers to discharge with patient and caregiver  NOTE: Discharge instruction given to the patient. IV line

## 2025-07-11 NOTE — DISCHARGE INSTRUCTIONS
DISCHARGE SUMMARY from Nurse    PATIENT INSTRUCTIONS:    After general anesthesia or intravenous sedation, for 24 hours or while taking prescription Narcotics:  Limit your activities  Do not drive and operate hazardous machinery  Do not make important personal or business decisions  Do  not drink alcoholic beverages  If you have not urinated within 8 hours after discharge, please contact your surgeon on call.    Report the following to your surgeon:  Excessive pain, swelling, redness or odor of or around the surgical area  Temperature over 100.5  Nausea and vomiting lasting longer than 4 hours or if unable to take medications  Any signs of decreased circulation or nerve impairment to extremity: change in color, persistent  numbness, tingling, coldness or increase pain  Any questions    What to do at Home:  Recommended activity: activity as tolerated,  home      If you experience any of the following symptoms nausea, vomiting, short of breath, increased swelling,  fever over 101, dizziness, fainting, , please follow up with  primary care physician. For emergencies, dial 911.    *  Please give a list of your current medications to your Primary Care Provider.    *  Please update this list whenever your medications are discontinued, doses are      changed, or new medications (including over-the-counter products) are added.    *  Please carry medication information at all times in case of emergency situations.    These are general instructions for a healthy lifestyle:    No smoking/ No tobacco products/ Avoid exposure to second hand smoke  Surgeon General's Warning:  Quitting smoking now greatly reduces serious risk to your health.    Obesity, smoking, and sedentary lifestyle greatly increases your risk for illness    A healthy diet, regular physical exercise & weight monitoring are important for maintaining a healthy lifestyle    You may be retaining fluid if you have a history of heart failure or if you experience

## 2025-07-11 NOTE — CARE COORDINATION
Called [employer] transportation [phone], spoke to Dulce, arranged transport to patients home address [address] [place] [address] via cab. Reference number is [id number].     Jenelle GUY, RN   Case Management   [phone]

## 2025-07-11 NOTE — DISCHARGE SUMMARY
Hospitalist Discharge Summary      Patient: Simone Lamas MRN: [id number]  CSN: [id number]    YOB: 1955  Age: 70 y.o.  Sex: male    DOA: 7/10/2025 LOS:  LOS: 1 day   Discharge Date:     Admission Diagnoses: ESRD (end stage renal disease) ([facility]) [N18.6]  ESRD (end stage renal disease) on dialysis (HCC) [N18.6, Z99.2]    Discharge Diagnoses:    ESRD on HD     Discharge Condition: Stable    Discharge To: Home    CODE STATUS: Full Code         PHYSICAL EXAM  Visit Vitals  BP (!) 162/108   Pulse 68   Temp 98.4 °F (36.9 °C) (Oral)   Resp 16   Ht 1.829 m (6')   Wt 74.8 kg (165 lb)   SpO2 99%   BMI 22.38 kg/m²       General: Alert, cooperative, no acute distress    Lungs:  CTA Bilaterally. No Wheezing/Rhonchi/Rales.  Heart:  Regular rate and Rhythm.  Abdomen: Soft, Non distended, Non tender. + Bowel sounds.  Extremities: No edema/ cyanosis/ clubbing  Neurologic:  AA oriented X 3. Moves all extremities.                                HPI:    Per H&P by Dr. Green: \"HPI:     Siomne Lamas is a 70 y.o.  male with a PMHx significant for hypertension, diabetes mellitus type 2, end-stage renal disease on hemodialysis, CHF who presented to the ED requesting dialysis. Patient reports that he missed [address] and dialysis will not see him. He denies any acute symptoms at this time.\"    Hospital Course:     Patient admitted to the hospital for hemodialysis due to noncompliance.  On admission he did not appear to be in volume overload.  Chest x-ray revealed trace left pleural effusion.  Nephrology was consulted and he had ongoing hemodialysis, 2 L were removed.  Patient is hypertensive, Home meds were resumed.  Patient is stable for discharge home.    Discharge plan discussed with patient/family who verbalized understanding.    Readmission Risk:    31%    FOLLOW-UP RECOMMENDATIONS:     No follow-up provider specified.       Consults: Nephrology    Significant Diagnostic Studies:      Imaging:  XR CHEST (2 VW)  Result Date: 7/10/2025  CHEST PA AND LATERAL: INDICATIONS: Missed dialysis COMPARISONS: 6/15/2025 FINDINGS: Lungs: Left basilar pleural-parenchymal opacity. Cardiac silhouette and mediastinal contours: Mildly enlarged cardiac silhouette is stable. Pleural spaces: Trace left pleural effusion. No pneumothorax. Bones and soft tissues: Unremarkable. Support lines/catheters: Stable right IJ dialysis catheter with the distal tip overlying the cavoatrial junction.     Trace left pleural effusion and left basilar atelectasis. Electronically signed by Bennett Singletary          Procedures:       [] None       [] Other           04/10/25    ECHO (TTE) COMPLETE (PRN CONTRAST/BUBBLE/STRAIN/3D) 04/13/2025 10:14 AM (Final)    Interpretation Summary    Image quality is adequate.    Left Ventricle: Moderately reduced left ventricular systolic function with a visually estimated EF of 40 - 45%. Left ventricle size is normal. Mildly increased wall thickness. Global hypokinesis present. Grade I diastolic dysfunction with normal LAP.    Mitral Valve: Mild to moderate regurgitation.    Normal right ventricular size and function    Insufficient TR to estimate pulmonary artery pressure    Signed by: Kirk Boudreaux MD on 4/13/2025 10:14 AM                 Current Discharge Medication List        CONTINUE these medications which have NOT CHANGED    Details   losartan (COZAAR) 100 MG tablet Take 1 tablet by mouth daily  Qty: 30 tablet, Refills: 1      apixaban (ELIQUIS) 5 MG TABS tablet Take 1 tablet by mouth 2 times daily  Qty: 178 tablet, Refills: 0      isosorbide mononitrate (IMDUR) 60 MG extended release tablet Take 1 tablet by mouth daily  Qty: 30 tablet, Refills: 3      ASPIRIN LOW DOSE 81 MG chewable tablet Take 1 tablet by mouth daily      insulin glargine (LANTUS) 100 UNIT/ML injection vial 25 Units      furosemide (LASIX) 80 MG tablet Take 1 tablet by mouth once a week Every Sunday      carvedilol (COREG)

## 2025-07-11 NOTE — PLAN OF CARE
Problem: Chronic Conditions and Co-morbidities  Goal: Patient's chronic conditions and co-morbidity symptoms are monitored and maintained or improved  7/10/2025 2331 by Becka Salazar, RN  Outcome: Progressing  Flowsheets (Taken 7/10/2025 2025)  Care Plan - Patient's Chronic Conditions and Co-Morbidity Symptoms are Monitored and Maintained or Improved: Monitor and assess patient's chronic conditions and comorbid symptoms for stability, deterioration, or improvement  7/10/2025 1644 by Cathryn Gorman, RN  Outcome: Progressing

## 2025-07-11 NOTE — CARE COORDINATION
07/11/25 0846   Service Assessment   Patient Orientation Alert and Oriented   Cognition Alert   History Provided By Patient   Primary Caregiver Self   Accompanied By/Relationship no one at bedside   Support Systems Friends/Neighbors   Patient's Healthcare Decision Maker is: Patient Declined (Legal Next of Kin Remains as Decision Maker)   PCP Verified by CM Yes   Last Visit to PCP Within last 3 months   Prior Functional Level Independent in ADLs/IADLs   Current Functional Level Independent in ADLs/IADLs   Can patient return to prior living arrangement Yes   Ability to make needs known: Good   Family able to assist with home care needs: Yes   Would you like for me to discuss the discharge plan with any other family members/significant others, and if so, who? No   Financial Resources Medicaid   Community Resources Transportation   CM/SW Referral Other (see comment)  (not applicable)   Social/Functional History   Lives With Alone   Type of Home House   Home Layout One level   Home Access Stairs to enter with rails   Entrance Stairs - Number of Steps 4   Entrance Stairs - Rails Both   Bathroom Shower/Tub Tub/Shower unit   Bathroom Toilet Standard   Bathroom Equipment Grab bars in shower   Bathroom Accessibility Accessible   Home Equipment None   Receives Help From Friend(s)   Prior Level of Assist for ADLs Independent   Prior Level of Assist for Homemaking Independent   Homemaking Responsibilities Yes   Ambulation Assistance Independent   Prior Level of Assist for Transfers Independent   Active  No   Patient's  Info Public Transportation and Medicaid Transport.   Mode of Transportation Bus   Education n/a   Occupation On disability   Type of Occupation n/a   Discharge Planning   Type of Residence House   Living Arrangements Alone   Current Services Prior To Admission None   Potential Assistance Needed N/A   DME Ordered? No   Potential Assistance Purchasing Medications No   Type of Home Care Services None

## 2025-07-11 NOTE — CARE COORDINATION
07/11/25 0848   Readmission Assessment   Number of Days since last admission? 8-30 days   Previous Disposition Home Alone   Who is being Interviewed Patient   What was the patient's/caregiver's perception as to why they think they needed to return back to the hospital? Other (Comment)  (Needed dialysis)   Did you visit your Primary Care Physician after you left the hospital, before you returned this time? Yes   Did you see a specialist, such as Cardiac, Pulmonary, Orthopedic Physician, etc. after you left the hospital? No   Who advised the patient to return to the hospital? Other (Comment)  (Dialysis nurse)   In our efforts to provide the best possible care to you and others like you, can you think of anything that we could have done to help you after you left the hospital the first time, so that you might not have needed to return so soon? Other (Comment)  (no)     Patient stated the dialysis center told him he missed too many sessions, so he needed to go to the ER for dialysis. Patient stated he was mad and did not want to go to dialysis.     Jenlele BHATIAN, RN   Case Management   [phone]

## 2025-07-11 NOTE — PROGRESS NOTES
Spiritual Health History and Assessment/Progress Note  [facility]    Spiritual/Emotional Needs, Advance Care Planning,  ,  ,      Name: Simone Lamas MRN: [id number]    Age: 70 y.o.     Sex: male   Language: English   Rastafarian: Druze   ESRD (end stage renal disease) (HCC)     Date: 7/11/2025            Total Time Calculated: 7 min              Spiritual Assessment began in [address] MEDICAL        Referral/Consult From: Multi-disciplinary team   Encounter Overview/Reason: Spiritual/Emotional Needs, Advance Care Planning  Service Provided For: Patient    Laura, Belief, Meaning:   Patient has beliefs or practices that help with coping during difficult times  Family/Friends No family/friends present      Importance and Influence:  Patient has spiritual/personal beliefs that influence decisions regarding their health  Family/Friends No family/friends present    Community:  Patient is connected with a spiritual community  Family/Friends No family/friends present    Assessment and Plan of Care:     Patient Interventions include: Facilitated expression of thoughts and feelings and Affirmed coping skills/support systems  Family/Friends Interventions include: No family/friends present    Patient Plan of Care: No spiritual needs identified for follow-up  Family/Friends Plan of Care: No family/friends present    Electronically signed by TIERNEY Mena on 7/11/2025 at 2:37 PM

## 2025-07-12 ENCOUNTER — APPOINTMENT (OUTPATIENT)
Facility: HOSPITAL | Age: 70
DRG: 280 | End: 2025-07-12
Payer: MEDICARE

## 2025-07-12 ENCOUNTER — HOSPITAL ENCOUNTER (INPATIENT)
Facility: HOSPITAL | Age: 70
LOS: 2 days | Discharge: HOME | DRG: 280 | End: 2025-07-14
Attending: STUDENT IN AN ORGANIZED HEALTH CARE EDUCATION/TRAINING PROGRAM | Admitting: STUDENT IN AN ORGANIZED HEALTH CARE EDUCATION/TRAINING PROGRAM
Payer: MEDICARE

## 2025-07-12 DIAGNOSIS — R07.9 CHEST PAIN, UNSPECIFIED TYPE: Primary | ICD-10-CM

## 2025-07-12 LAB
ALBUMIN SERPL-MCNC: 3.6 G/DL (ref 3.4–5)
ALBUMIN/GLOB SERPL: 1.1 (ref 0.8–1.7)
ALP SERPL-CCNC: 97 U/L (ref 45–117)
ALT SERPL-CCNC: 15 U/L (ref 10–50)
ANION GAP SERPL CALC-SCNC: 14 MMOL/L (ref 3–18)
APTT PPP: 31.1 SEC (ref 21.7–34.2)
APTT PPP: 72.9 SEC (ref 21.7–34.2)
AST SERPL-CCNC: 16 U/L (ref 10–38)
BASOPHILS # BLD: 0.05 K/UL (ref 0–0.1)
BASOPHILS NFR BLD: 0.8 % (ref 0–2)
BILIRUB SERPL-MCNC: 0.7 MG/DL (ref 0.2–1)
BUN SERPL-MCNC: 16 MG/DL (ref 6–23)
BUN/CREAT SERPL: 5 (ref 12–20)
CALCIUM SERPL-MCNC: 9.2 MG/DL (ref 8.5–10.1)
CHLORIDE SERPL-SCNC: 101 MMOL/L (ref 98–107)
CO2 SERPL-SCNC: 27 MMOL/L (ref 21–32)
CREAT SERPL-MCNC: 2.95 MG/DL (ref 0.6–1.3)
DIFFERENTIAL METHOD BLD: ABNORMAL
EKG ATRIAL RATE: 66 BPM
EKG DIAGNOSIS: NORMAL
EKG P AXIS: 61 DEGREES
EKG P-R INTERVAL: 170 MS
EKG Q-T INTERVAL: 522 MS
EKG QRS DURATION: 160 MS
EKG QTC CALCULATION (BAZETT): 547 MS
EKG R AXIS: -59 DEGREES
EKG T AXIS: 206 DEGREES
EKG VENTRICULAR RATE: 66 BPM
EOSINOPHIL # BLD: 0.21 K/UL (ref 0–0.4)
EOSINOPHIL NFR BLD: 3.2 % (ref 0–5)
ERYTHROCYTE [DISTWIDTH] IN BLOOD BY AUTOMATED COUNT: 13.5 % (ref 11.6–14.5)
ERYTHROCYTE [DISTWIDTH] IN BLOOD BY AUTOMATED COUNT: 13.7 % (ref 11.6–14.5)
GLOBULIN SER CALC-MCNC: 3.3 G/DL (ref 2–4)
GLUCOSE BLD STRIP.AUTO-MCNC: 183 MG/DL (ref 70–110)
GLUCOSE SERPL-MCNC: 168 MG/DL (ref 74–108)
HCT VFR BLD AUTO: 32.4 % (ref 36–48)
HCT VFR BLD AUTO: 35.4 % (ref 36–48)
HGB BLD-MCNC: 10.7 G/DL (ref 13–16)
HGB BLD-MCNC: 11.8 G/DL (ref 13–16)
IMM GRANULOCYTES # BLD AUTO: 0.05 K/UL (ref 0–0.04)
IMM GRANULOCYTES NFR BLD AUTO: 0.8 % (ref 0–0.5)
LYMPHOCYTES # BLD: 1.22 K/UL (ref 0.9–3.6)
LYMPHOCYTES NFR BLD: 18.8 % (ref 21–52)
MCH RBC QN AUTO: 30.1 PG (ref 24–34)
MCH RBC QN AUTO: 30.2 PG (ref 24–34)
MCHC RBC AUTO-ENTMCNC: 33 G/DL (ref 31–37)
MCHC RBC AUTO-ENTMCNC: 33.3 G/DL (ref 31–37)
MCV RBC AUTO: 90.5 FL (ref 78–100)
MCV RBC AUTO: 91.3 FL (ref 78–100)
MONOCYTES # BLD: 0.56 K/UL (ref 0.05–1.2)
MONOCYTES NFR BLD: 8.6 % (ref 3–10)
NEUTS SEG # BLD: 4.41 K/UL (ref 1.8–8)
NEUTS SEG NFR BLD: 67.8 % (ref 40–73)
NRBC # BLD: 0 K/UL (ref 0–0.01)
NRBC # BLD: 0 K/UL (ref 0–0.01)
NRBC BLD-RTO: 0 PER 100 WBC
NRBC BLD-RTO: 0 PER 100 WBC
NT PRO BNP: ABNORMAL PG/ML (ref 36–900)
PLATELET # BLD AUTO: 182 K/UL (ref 135–420)
PLATELET # BLD AUTO: 193 K/UL (ref 135–420)
PMV BLD AUTO: 10 FL (ref 9.2–11.8)
PMV BLD AUTO: 10.4 FL (ref 9.2–11.8)
POTASSIUM SERPL-SCNC: 2.9 MMOL/L (ref 3.5–5.5)
POTASSIUM SERPL-SCNC: 3 MMOL/L (ref 3.5–5.5)
PROT SERPL-MCNC: 6.9 G/DL (ref 6.4–8.2)
RBC # BLD AUTO: 3.55 M/UL (ref 4.35–5.65)
RBC # BLD AUTO: 3.91 M/UL (ref 4.35–5.65)
SODIUM SERPL-SCNC: 141 MMOL/L (ref 136–145)
TROPONIN T SERPL HS-MCNC: 106 NG/L (ref 0–22)
TROPONIN T SERPL HS-MCNC: 114 NG/L (ref 0–22)
WBC # BLD AUTO: 6.4 K/UL (ref 4.6–13.2)
WBC # BLD AUTO: 6.5 K/UL (ref 4.6–13.2)

## 2025-07-12 PROCEDURE — 96365 THER/PROPH/DIAG IV INF INIT: CPT

## 2025-07-12 PROCEDURE — 6360000002 HC RX W HCPCS: Performed by: EMERGENCY MEDICINE

## 2025-07-12 PROCEDURE — 1100000003 HC PRIVATE W/ TELEMETRY

## 2025-07-12 PROCEDURE — 84132 ASSAY OF SERUM POTASSIUM: CPT

## 2025-07-12 PROCEDURE — 5A1D70Z PERFORMANCE OF URINARY FILTRATION, INTERMITTENT, LESS THAN 6 HOURS PER DAY: ICD-10-PCS | Performed by: INTERNAL MEDICINE

## 2025-07-12 PROCEDURE — 36415 COLL VENOUS BLD VENIPUNCTURE: CPT

## 2025-07-12 PROCEDURE — 2500000003 HC RX 250 WO HCPCS: Performed by: STUDENT IN AN ORGANIZED HEALTH CARE EDUCATION/TRAINING PROGRAM

## 2025-07-12 PROCEDURE — 85027 COMPLETE CBC AUTOMATED: CPT

## 2025-07-12 PROCEDURE — 93005 ELECTROCARDIOGRAM TRACING: CPT | Performed by: STUDENT IN AN ORGANIZED HEALTH CARE EDUCATION/TRAINING PROGRAM

## 2025-07-12 PROCEDURE — 6370000000 HC RX 637 (ALT 250 FOR IP): Performed by: EMERGENCY MEDICINE

## 2025-07-12 PROCEDURE — 80053 COMPREHEN METABOLIC PANEL: CPT

## 2025-07-12 PROCEDURE — 84484 ASSAY OF TROPONIN QUANT: CPT

## 2025-07-12 PROCEDURE — 96366 THER/PROPH/DIAG IV INF ADDON: CPT

## 2025-07-12 PROCEDURE — 93010 ELECTROCARDIOGRAM REPORT: CPT | Performed by: INTERNAL MEDICINE

## 2025-07-12 PROCEDURE — 99285 EMERGENCY DEPT VISIT HI MDM: CPT

## 2025-07-12 PROCEDURE — 99222 1ST HOSP IP/OBS MODERATE 55: CPT | Performed by: STUDENT IN AN ORGANIZED HEALTH CARE EDUCATION/TRAINING PROGRAM

## 2025-07-12 PROCEDURE — 82962 GLUCOSE BLOOD TEST: CPT

## 2025-07-12 PROCEDURE — 71046 X-RAY EXAM CHEST 2 VIEWS: CPT

## 2025-07-12 PROCEDURE — 94761 N-INVAS EAR/PLS OXIMETRY MLT: CPT

## 2025-07-12 PROCEDURE — 83880 ASSAY OF NATRIURETIC PEPTIDE: CPT

## 2025-07-12 PROCEDURE — 85730 THROMBOPLASTIN TIME PARTIAL: CPT

## 2025-07-12 PROCEDURE — 85025 COMPLETE CBC W/AUTO DIFF WBC: CPT

## 2025-07-12 RX ORDER — CARVEDILOL 12.5 MG/1
12.5 TABLET ORAL 2 TIMES DAILY WITH MEALS
Status: DISCONTINUED | OUTPATIENT
Start: 2025-07-12 | End: 2025-07-14

## 2025-07-12 RX ORDER — HEPARIN SODIUM 1000 [USP'U]/ML
4000 INJECTION, SOLUTION INTRAVENOUS; SUBCUTANEOUS ONCE
Status: COMPLETED | OUTPATIENT
Start: 2025-07-12 | End: 2025-07-12

## 2025-07-12 RX ORDER — SODIUM CHLORIDE 9 MG/ML
INJECTION, SOLUTION INTRAVENOUS PRN
Status: DISCONTINUED | OUTPATIENT
Start: 2025-07-12 | End: 2025-07-14 | Stop reason: HOSPADM

## 2025-07-12 RX ORDER — GLUCAGON 1 MG
1 KIT INJECTION PRN
Status: DISCONTINUED | OUTPATIENT
Start: 2025-07-12 | End: 2025-07-14 | Stop reason: HOSPADM

## 2025-07-12 RX ORDER — POTASSIUM CHLORIDE 1500 MG/1
40 TABLET, EXTENDED RELEASE ORAL ONCE
Status: COMPLETED | OUTPATIENT
Start: 2025-07-13 | End: 2025-07-13

## 2025-07-12 RX ORDER — ASPIRIN 81 MG/1
81 TABLET ORAL DAILY
Status: DISCONTINUED | OUTPATIENT
Start: 2025-07-13 | End: 2025-07-14 | Stop reason: HOSPADM

## 2025-07-12 RX ORDER — ISOSORBIDE MONONITRATE 60 MG/1
60 TABLET, EXTENDED RELEASE ORAL DAILY
Status: DISCONTINUED | OUTPATIENT
Start: 2025-07-13 | End: 2025-07-13

## 2025-07-12 RX ORDER — INSULIN GLARGINE 100 [IU]/ML
25 INJECTION, SOLUTION SUBCUTANEOUS NIGHTLY
Status: DISCONTINUED | OUTPATIENT
Start: 2025-07-12 | End: 2025-07-14 | Stop reason: HOSPADM

## 2025-07-12 RX ORDER — SODIUM CHLORIDE 0.9 % (FLUSH) 0.9 %
5-40 SYRINGE (ML) INJECTION PRN
Status: DISCONTINUED | OUTPATIENT
Start: 2025-07-12 | End: 2025-07-14 | Stop reason: HOSPADM

## 2025-07-12 RX ORDER — POLYETHYLENE GLYCOL 3350 17 G/17G
17 POWDER, FOR SOLUTION ORAL DAILY PRN
Status: DISCONTINUED | OUTPATIENT
Start: 2025-07-12 | End: 2025-07-14 | Stop reason: HOSPADM

## 2025-07-12 RX ORDER — ONDANSETRON 4 MG/1
4 TABLET, ORALLY DISINTEGRATING ORAL EVERY 8 HOURS PRN
Status: DISCONTINUED | OUTPATIENT
Start: 2025-07-12 | End: 2025-07-14 | Stop reason: HOSPADM

## 2025-07-12 RX ORDER — LOSARTAN POTASSIUM 50 MG/1
100 TABLET ORAL DAILY
Status: DISCONTINUED | OUTPATIENT
Start: 2025-07-13 | End: 2025-07-14 | Stop reason: HOSPADM

## 2025-07-12 RX ORDER — ATORVASTATIN CALCIUM 40 MG/1
40 TABLET, FILM COATED ORAL NIGHTLY
Status: DISCONTINUED | OUTPATIENT
Start: 2025-07-12 | End: 2025-07-14 | Stop reason: HOSPADM

## 2025-07-12 RX ORDER — DEXTROSE MONOHYDRATE 100 MG/ML
INJECTION, SOLUTION INTRAVENOUS CONTINUOUS PRN
Status: DISCONTINUED | OUTPATIENT
Start: 2025-07-12 | End: 2025-07-14 | Stop reason: HOSPADM

## 2025-07-12 RX ORDER — CARVEDILOL 12.5 MG/1
12.5 TABLET ORAL 2 TIMES DAILY WITH MEALS
Status: DISCONTINUED | OUTPATIENT
Start: 2025-07-13 | End: 2025-07-12

## 2025-07-12 RX ORDER — NITROGLYCERIN 0.4 MG/1
0.4 TABLET SUBLINGUAL PRN
Status: DISCONTINUED | OUTPATIENT
Start: 2025-07-12 | End: 2025-07-14 | Stop reason: HOSPADM

## 2025-07-12 RX ORDER — HEPARIN SODIUM 1000 [USP'U]/ML
4000 INJECTION, SOLUTION INTRAVENOUS; SUBCUTANEOUS PRN
Status: DISCONTINUED | OUTPATIENT
Start: 2025-07-12 | End: 2025-07-13

## 2025-07-12 RX ORDER — ONDANSETRON 2 MG/ML
4 INJECTION INTRAMUSCULAR; INTRAVENOUS EVERY 6 HOURS PRN
Status: DISCONTINUED | OUTPATIENT
Start: 2025-07-12 | End: 2025-07-14 | Stop reason: HOSPADM

## 2025-07-12 RX ORDER — INSULIN GLARGINE 100 [IU]/ML
20 INJECTION, SOLUTION SUBCUTANEOUS NIGHTLY
Status: DISCONTINUED | OUTPATIENT
Start: 2025-07-12 | End: 2025-07-12

## 2025-07-12 RX ORDER — SODIUM CHLORIDE 0.9 % (FLUSH) 0.9 %
5-40 SYRINGE (ML) INJECTION EVERY 12 HOURS SCHEDULED
Status: DISCONTINUED | OUTPATIENT
Start: 2025-07-12 | End: 2025-07-14 | Stop reason: HOSPADM

## 2025-07-12 RX ORDER — HEPARIN SODIUM 10000 [USP'U]/100ML
5-30 INJECTION, SOLUTION INTRAVENOUS CONTINUOUS
Status: DISCONTINUED | OUTPATIENT
Start: 2025-07-12 | End: 2025-07-13

## 2025-07-12 RX ORDER — INSULIN LISPRO 100 [IU]/ML
0-8 INJECTION, SOLUTION INTRAVENOUS; SUBCUTANEOUS
Status: DISCONTINUED | OUTPATIENT
Start: 2025-07-12 | End: 2025-07-14 | Stop reason: HOSPADM

## 2025-07-12 RX ORDER — HYDRALAZINE HYDROCHLORIDE 20 MG/ML
10 INJECTION INTRAMUSCULAR; INTRAVENOUS EVERY 6 HOURS PRN
Status: DISCONTINUED | OUTPATIENT
Start: 2025-07-12 | End: 2025-07-14 | Stop reason: HOSPADM

## 2025-07-12 RX ORDER — ASPIRIN 81 MG/1
324 TABLET, CHEWABLE ORAL
Status: COMPLETED | OUTPATIENT
Start: 2025-07-12 | End: 2025-07-12

## 2025-07-12 RX ORDER — TAMSULOSIN HYDROCHLORIDE 0.4 MG/1
0.4 CAPSULE ORAL DAILY
Status: DISCONTINUED | OUTPATIENT
Start: 2025-07-13 | End: 2025-07-14 | Stop reason: HOSPADM

## 2025-07-12 RX ORDER — HEPARIN SODIUM 1000 [USP'U]/ML
2000 INJECTION, SOLUTION INTRAVENOUS; SUBCUTANEOUS PRN
Status: DISCONTINUED | OUTPATIENT
Start: 2025-07-12 | End: 2025-07-13

## 2025-07-12 RX ADMIN — NITROGLYCERIN 0.4 MG: 0.4 TABLET SUBLINGUAL at 12:49

## 2025-07-12 RX ADMIN — HEPARIN SODIUM 4000 UNITS: 1000 INJECTION, SOLUTION INTRAVENOUS; SUBCUTANEOUS at 12:40

## 2025-07-12 RX ADMIN — SODIUM CHLORIDE, PRESERVATIVE FREE 10 ML: 5 INJECTION INTRAVENOUS at 23:00

## 2025-07-12 RX ADMIN — NITROGLYCERIN 0.4 MG: 0.4 TABLET SUBLINGUAL at 13:06

## 2025-07-12 RX ADMIN — ASPIRIN 81 MG CHEWABLE TABLET 324 MG: 81 TABLET CHEWABLE at 12:34

## 2025-07-12 RX ADMIN — NITROGLYCERIN 0.4 MG: 0.4 TABLET SUBLINGUAL at 13:01

## 2025-07-12 RX ADMIN — HEPARIN SODIUM AND DEXTROSE 12 UNITS/KG/HR: 10000; 5 INJECTION INTRAVENOUS at 12:43

## 2025-07-12 ASSESSMENT — PAIN DESCRIPTION - LOCATION
LOCATION: CHEST

## 2025-07-12 ASSESSMENT — PAIN DESCRIPTION - DESCRIPTORS
DESCRIPTORS: ACHING
DESCRIPTORS: ACHING
DESCRIPTORS: SHARP

## 2025-07-12 ASSESSMENT — PAIN - FUNCTIONAL ASSESSMENT
PAIN_FUNCTIONAL_ASSESSMENT: 0-10
PAIN_FUNCTIONAL_ASSESSMENT: ACTIVITIES ARE NOT PREVENTED
PAIN_FUNCTIONAL_ASSESSMENT: 0-10
PAIN_FUNCTIONAL_ASSESSMENT: 0-10
PAIN_FUNCTIONAL_ASSESSMENT: ACTIVITIES ARE NOT PREVENTED
PAIN_FUNCTIONAL_ASSESSMENT: 0-10
PAIN_FUNCTIONAL_ASSESSMENT: ACTIVITIES ARE NOT PREVENTED

## 2025-07-12 ASSESSMENT — PAIN SCALES - GENERAL
PAINLEVEL_OUTOF10: 4
PAINLEVEL_OUTOF10: 2
PAINLEVEL_OUTOF10: 3
PAINLEVEL_OUTOF10: 3
PAINLEVEL_OUTOF10: 0
PAINLEVEL_OUTOF10: 4
PAINLEVEL_OUTOF10: 4
PAINLEVEL_OUTOF10: 7
PAINLEVEL_OUTOF10: 4

## 2025-07-12 ASSESSMENT — PAIN DESCRIPTION - ORIENTATION: ORIENTATION: MID

## 2025-07-12 NOTE — ED PROVIDER NOTES
EMERGENCY DEPARTMENT HISTORY AND PHYSICAL EXAM      Date: 7/12/2025  Patient Name: Simone Lamas    History of Presenting Illness     Chief Complaint   Patient presents with    Chest Pain       History (Context): Simone Lamas is a 70 y.o. male  presents to the ED today with chief complaint of chest pain while he was at dialysis today.  Said it happened at the end of his dialysis session.  Dr. Tyson is his nephrologist.  Patient completed his dialysis safe for 3 minutes as he needed to be picked up and his ride could not wait.  Patient says he has a history of NSTEMI but not sure who his cardiologist is or what interventions were placed.  Patient has a history of hepatitis cocaine and heroin abuse.  He also has a history of systolic heart failure with an EF of 40%.  Has not had anything to eat or drink today.  Patient states he still having chest pain.      PCP: Liang Aguilar APRN - NP    Current Facility-Administered Medications   Medication Dose Route Frequency Provider Last Rate Last Admin    nitroGLYCERIN (NITROSTAT) SL tablet 0.4 mg  0.4 mg SubLINGual PRN Jacqui Nascimento PA   0.4 mg at 07/12/25 1306    heparin (porcine) injection 4,000 Units  4,000 Units IntraVENous PRN Jacqui Nascimento PA        heparin (porcine) injection 2,000 Units  2,000 Units IntraVENous PRN Jacqui Nascimento PA        heparin 25,000 units in dextrose 5% 250 mL (premix) infusion  5-30 Units/kg/hr IntraVENous Continuous Jacqui Nascimento PA 9.5 mL/hr at 07/12/25 1243 12 Units/kg/hr at 07/12/25 1243     Current Outpatient Medications   Medication Sig Dispense Refill    losartan (COZAAR) 100 MG tablet Take 1 tablet by mouth daily 30 tablet 1    apixaban (ELIQUIS) 5 MG TABS tablet Take 1 tablet by mouth 2 times daily 178 tablet 0    isosorbide mononitrate (IMDUR) 60 MG extended release tablet Take 1 tablet by mouth daily 30 tablet 3    furosemide (LASIX) 80 MG tablet Take 1 tablet by mouth once a week Every Sunday      carvedilol (COREG)

## 2025-07-12 NOTE — ED NOTES
TRANSFER - OUT REPORT:    Verbal report given to Judith on Simone Lamas  being transferred to [address] [place] [address] for routine progression of patient care       Report consisted of patient's Situation, Background, Assessment and   Recommendations(SBAR).     Information from the following report(s) ED Encounter Summary, ED SBAR, and MAR was reviewed with the receiving nurse.    Hammond Fall Assessment:                           Lines:   Peripheral IV 07/12/25 Left Antecubital (Active)       Hemodialysis Central Access - Temporary Right (Active)        Opportunity for questions and clarification was provided.      Patient transported with:  Registered Nurse

## 2025-07-12 NOTE — ED TRIAGE NOTES
Patient arrived to the ER with complaints of chest pain that started today. Patient is a dialysis patient and says he completed dialysis today.

## 2025-07-13 PROBLEM — I48.0 PAROXYSMAL A-FIB (HCC): Status: ACTIVE | Noted: 2025-07-13

## 2025-07-13 LAB
ALBUMIN SERPL-MCNC: 2.7 G/DL (ref 3.4–5)
ALBUMIN/GLOB SERPL: 0.8 (ref 0.8–1.7)
ALP SERPL-CCNC: 79 U/L (ref 45–117)
ALT SERPL-CCNC: 10 U/L (ref 10–50)
ANION GAP SERPL CALC-SCNC: 11 MMOL/L (ref 3–18)
APTT PPP: 41.7 SEC (ref 21.7–34.2)
AST SERPL-CCNC: 14 U/L (ref 10–38)
BASOPHILS # BLD: 0.06 K/UL (ref 0–0.1)
BASOPHILS NFR BLD: 1.1 % (ref 0–2)
BILIRUB SERPL-MCNC: 0.4 MG/DL (ref 0.2–1)
BUN SERPL-MCNC: 22 MG/DL (ref 6–23)
BUN/CREAT SERPL: 5 (ref 12–20)
CALCIUM SERPL-MCNC: 9 MG/DL (ref 8.5–10.1)
CHLORIDE SERPL-SCNC: 103 MMOL/L (ref 98–107)
CO2 SERPL-SCNC: 25 MMOL/L (ref 21–32)
CREAT SERPL-MCNC: 4.56 MG/DL (ref 0.6–1.3)
DIFFERENTIAL METHOD BLD: ABNORMAL
EOSINOPHIL # BLD: 0.4 K/UL (ref 0–0.4)
EOSINOPHIL NFR BLD: 7.3 % (ref 0–5)
ERYTHROCYTE [DISTWIDTH] IN BLOOD BY AUTOMATED COUNT: 13.5 % (ref 11.6–14.5)
GLOBULIN SER CALC-MCNC: 3.2 G/DL (ref 2–4)
GLUCOSE BLD STRIP.AUTO-MCNC: 133 MG/DL (ref 70–110)
GLUCOSE BLD STRIP.AUTO-MCNC: 197 MG/DL (ref 70–110)
GLUCOSE BLD STRIP.AUTO-MCNC: 202 MG/DL (ref 70–110)
GLUCOSE BLD STRIP.AUTO-MCNC: 210 MG/DL (ref 70–110)
GLUCOSE SERPL-MCNC: 212 MG/DL (ref 74–108)
HCT VFR BLD AUTO: 34 % (ref 36–48)
HGB BLD-MCNC: 11.4 G/DL (ref 13–16)
IMM GRANULOCYTES # BLD AUTO: 0.02 K/UL (ref 0–0.04)
IMM GRANULOCYTES NFR BLD AUTO: 0.4 % (ref 0–0.5)
LYMPHOCYTES # BLD: 1.2 K/UL (ref 0.9–3.6)
LYMPHOCYTES NFR BLD: 21.8 % (ref 21–52)
MCH RBC QN AUTO: 31 PG (ref 24–34)
MCHC RBC AUTO-ENTMCNC: 33.5 G/DL (ref 31–37)
MCV RBC AUTO: 92.4 FL (ref 78–100)
MONOCYTES # BLD: 0.5 K/UL (ref 0.05–1.2)
MONOCYTES NFR BLD: 9.1 % (ref 3–10)
NEUTS SEG # BLD: 3.33 K/UL (ref 1.8–8)
NEUTS SEG NFR BLD: 60.3 % (ref 40–73)
NRBC # BLD: 0 K/UL (ref 0–0.01)
NRBC BLD-RTO: 0 PER 100 WBC
PLATELET # BLD AUTO: 181 K/UL (ref 135–420)
PMV BLD AUTO: 11.7 FL (ref 9.2–11.8)
POTASSIUM SERPL-SCNC: 3.4 MMOL/L (ref 3.5–5.5)
PROT SERPL-MCNC: 5.9 G/DL (ref 6.4–8.2)
RBC # BLD AUTO: 3.68 M/UL (ref 4.35–5.65)
SODIUM SERPL-SCNC: 139 MMOL/L (ref 136–145)
TROPONIN T SERPL HS-MCNC: 113 NG/L (ref 0–22)
WBC # BLD AUTO: 5.5 K/UL (ref 4.6–13.2)

## 2025-07-13 PROCEDURE — 36415 COLL VENOUS BLD VENIPUNCTURE: CPT

## 2025-07-13 PROCEDURE — 6360000002 HC RX W HCPCS: Performed by: INTERNAL MEDICINE

## 2025-07-13 PROCEDURE — 6370000000 HC RX 637 (ALT 250 FOR IP): Performed by: STUDENT IN AN ORGANIZED HEALTH CARE EDUCATION/TRAINING PROGRAM

## 2025-07-13 PROCEDURE — 97165 OT EVAL LOW COMPLEX 30 MIN: CPT

## 2025-07-13 PROCEDURE — 84484 ASSAY OF TROPONIN QUANT: CPT

## 2025-07-13 PROCEDURE — 82962 GLUCOSE BLOOD TEST: CPT

## 2025-07-13 PROCEDURE — 85730 THROMBOPLASTIN TIME PARTIAL: CPT

## 2025-07-13 PROCEDURE — 6370000000 HC RX 637 (ALT 250 FOR IP)

## 2025-07-13 PROCEDURE — 2500000003 HC RX 250 WO HCPCS: Performed by: STUDENT IN AN ORGANIZED HEALTH CARE EDUCATION/TRAINING PROGRAM

## 2025-07-13 PROCEDURE — 80053 COMPREHEN METABOLIC PANEL: CPT

## 2025-07-13 PROCEDURE — 85025 COMPLETE CBC W/AUTO DIFF WBC: CPT

## 2025-07-13 PROCEDURE — 99232 SBSQ HOSP IP/OBS MODERATE 35: CPT | Performed by: INTERNAL MEDICINE

## 2025-07-13 PROCEDURE — 1100000003 HC PRIVATE W/ TELEMETRY

## 2025-07-13 PROCEDURE — 97116 GAIT TRAINING THERAPY: CPT

## 2025-07-13 PROCEDURE — 6370000000 HC RX 637 (ALT 250 FOR IP): Performed by: HOSPITALIST

## 2025-07-13 PROCEDURE — 99222 1ST HOSP IP/OBS MODERATE 55: CPT | Performed by: INTERNAL MEDICINE

## 2025-07-13 PROCEDURE — 97161 PT EVAL LOW COMPLEX 20 MIN: CPT

## 2025-07-13 RX ORDER — ISOSORBIDE MONONITRATE 60 MG/1
120 TABLET, EXTENDED RELEASE ORAL DAILY
Status: DISCONTINUED | OUTPATIENT
Start: 2025-07-13 | End: 2025-07-14 | Stop reason: HOSPADM

## 2025-07-13 RX ORDER — ISOSORBIDE MONONITRATE 60 MG/1
120 TABLET, EXTENDED RELEASE ORAL DAILY
Status: DISCONTINUED | OUTPATIENT
Start: 2025-07-14 | End: 2025-07-13

## 2025-07-13 RX ORDER — HEPARIN SODIUM 5000 [USP'U]/ML
5000 INJECTION, SOLUTION INTRAVENOUS; SUBCUTANEOUS EVERY 8 HOURS SCHEDULED
Status: DISCONTINUED | OUTPATIENT
Start: 2025-07-13 | End: 2025-07-14 | Stop reason: HOSPADM

## 2025-07-13 RX ADMIN — HEPARIN SODIUM 5000 UNITS: 5000 INJECTION INTRAVENOUS; SUBCUTANEOUS at 20:56

## 2025-07-13 RX ADMIN — HEPARIN SODIUM 5000 UNITS: 5000 INJECTION INTRAVENOUS; SUBCUTANEOUS at 13:33

## 2025-07-13 RX ADMIN — INSULIN GLARGINE 25 UNITS: 100 INJECTION, SOLUTION SUBCUTANEOUS at 00:09

## 2025-07-13 RX ADMIN — SODIUM CHLORIDE, PRESERVATIVE FREE 10 ML: 5 INJECTION INTRAVENOUS at 20:56

## 2025-07-13 RX ADMIN — ISOSORBIDE MONONITRATE 120 MG: 60 TABLET, EXTENDED RELEASE ORAL at 13:32

## 2025-07-13 RX ADMIN — POTASSIUM CHLORIDE 40 MEQ: 1500 TABLET, EXTENDED RELEASE ORAL at 00:05

## 2025-07-13 RX ADMIN — ATORVASTATIN CALCIUM 40 MG: 40 TABLET, FILM COATED ORAL at 00:05

## 2025-07-13 RX ADMIN — ASPIRIN 81 MG: 81 TABLET, DELAYED RELEASE ORAL at 10:27

## 2025-07-13 RX ADMIN — INSULIN LISPRO 2 UNITS: 100 INJECTION, SOLUTION INTRAVENOUS; SUBCUTANEOUS at 13:32

## 2025-07-13 RX ADMIN — CARVEDILOL 12.5 MG: 12.5 TABLET, FILM COATED ORAL at 10:27

## 2025-07-13 RX ADMIN — INSULIN GLARGINE 25 UNITS: 100 INJECTION, SOLUTION SUBCUTANEOUS at 20:56

## 2025-07-13 RX ADMIN — ATORVASTATIN CALCIUM 40 MG: 40 TABLET, FILM COATED ORAL at 20:56

## 2025-07-13 RX ADMIN — CARVEDILOL 12.5 MG: 12.5 TABLET, FILM COATED ORAL at 17:01

## 2025-07-13 RX ADMIN — SODIUM CHLORIDE, PRESERVATIVE FREE 10 ML: 5 INJECTION INTRAVENOUS at 10:28

## 2025-07-13 RX ADMIN — INSULIN LISPRO 2 UNITS: 100 INJECTION, SOLUTION INTRAVENOUS; SUBCUTANEOUS at 20:56

## 2025-07-13 RX ADMIN — INSULIN LISPRO 2 UNITS: 100 INJECTION, SOLUTION INTRAVENOUS; SUBCUTANEOUS at 00:09

## 2025-07-13 RX ADMIN — TAMSULOSIN HYDROCHLORIDE 0.4 MG: 0.4 CAPSULE ORAL at 10:27

## 2025-07-13 RX ADMIN — LOSARTAN POTASSIUM 100 MG: 50 TABLET, FILM COATED ORAL at 10:27

## 2025-07-13 RX ADMIN — CARVEDILOL 12.5 MG: 12.5 TABLET, FILM COATED ORAL at 00:05

## 2025-07-13 ASSESSMENT — PAIN SCALES - GENERAL
PAINLEVEL_OUTOF10: 0

## 2025-07-13 NOTE — PROGRESS NOTES
Occupational Therapy  OCCUPATIONAL THERAPY EVALUATION/DISCHARGE    Patient: Simone Lamas (70 y.o. male)  Date: 7/13/2025  Primary Diagnosis: Acute chest pain [R07.9]  Chest pain, unspecified type [R07.9]  Precautions: Fall Risk  PLOF: Independent ambulator, frequent falls secondary to dizziness by pt report, has no equipment, lives in 1 story, 4 steps to enter with HR     ASSESSMENT AND RECOMMENDATIONS:  Pt cleared for OT eval and pt agreeable to participate. Pt presents at baseline functioning for ADL tasks. Mynor in bed mobility, LB dressing, functional transfers and functional mobility using RW. Reports no dizziness or LOB. No acute deficits warranting continued occupational therapy at current level of care. Will sign off.     Further Equipment Recommendations for Discharge: shower chair and rolling walker    AMPA: AM-PAC Inpatient Daily Activity Raw Score: 24    Current research shows that an AM-PAC score of 18 or greater is associated with a discharge to the patient's home setting.    This AMPAC score should be considered in conjunction with interdisciplinary team recommendations to determine the most appropriate discharge setting. Patient's social support, diagnosis, medical stability, and prior level of function should also be taken into consideration.     SUBJECTIVE:   Patient stated “I am independent.”    OBJECTIVE DATA SUMMARY:     Past Medical History:   Diagnosis Date    CHF (congestive heart failure) (HCC)     Diabetes mellitus (HCC)     End stage renal disease (HCC)     Hypertension     TIA (transient ischemic attack)      Past Surgical History:   Procedure Laterality Date    CARDIAC PROCEDURE N/A 7/22/2024    Left heart cath performed by Abhijeet Martinez MD at [facility] CARDIAC CATH LAB    CARDIAC PROCEDURE N/A 7/22/2024    Coronary angiography performed by Abhijeet Martinez MD at [facility] CARDIAC CATH LAB    DIALYSIS FISTULA CREATION Right 5/10/2025    RIGHT ARM ARTERIOVENOUS FISTULA  LIGATION performed by Himanshu

## 2025-07-13 NOTE — CONSULTS
Assessment     ESRD   Chest   Anemia   Hx of non compliance with HD   Abn Troponin   Hyperlipidemia   DM  CAD  BPH       Plan     HD TTS  MI per primary team and cardiology   Retacrit   Cont Coreg   Lantus  Isosorbide  Tamsulosin      Chief Complaint    ESRD     History of Present Illness    As quoted from admission HPI and discussedd with patient erna Lamas is a 70 y.o. male  presents to the ED today with chief complaint of chest pain while he was at dialysis today.  Said it happened at the end of his dialysis session.  Dr. Tyson is his nephrologist.  Patient completed his dialysis safe for 3 minutes as he needed to be picked up and his ride could not wait.  Patient says he has a history of NSTEMI but not sure who his cardiologist is or what interventions were placed.  Patient has a history of hepatitis cocaine and heroin abuse.  He also has a history of systolic heart failure with an EF of 40%.  Has not had anything to eat or drink today.  Patient states he still having chest pain.         Past Medical History:   Diagnosis Date    CHF (congestive heart failure) (HCC)     Diabetes mellitus (HCC)     End stage renal disease (HCC)     Hypertension     TIA (transient ischemic attack)    ;    Past Surgical History:   Procedure Laterality Date    CARDIAC PROCEDURE N/A 7/22/2024    Left heart cath performed by Abhijeet Martinez MD at [facility] CARDIAC CATH LAB    CARDIAC PROCEDURE N/A 7/22/2024    Coronary angiography performed by Abhijeet Martinez MD at [facility] CARDIAC CATH LAB    DIALYSIS FISTULA CREATION Right 5/10/2025    RIGHT ARM ARTERIOVENOUS FISTULA  LIGATION performed by Cornell Downs MD at [facility] CARDIAC SURGERY    INVASIVE VASCULAR N/A 4/10/2025    Fistulogram right/POSS INTERVENTION performed by George Jesus MD at [facility] CARDIAC CATH LAB    INVASIVE VASCULAR N/A 4/17/2025    Fistulogram right-W/POSS TDC PLACEMENT performed by Rubén Carranza MD at [facility] CARDIAC CATH LAB    INVASIVE VASCULAR N/A 
3.5 3.0* 2.9*    101  --    CO2 22 27  --    BUN 34* 16  --    PHOS 2.6  --   --    ALT  --  15  --        [unfilled]    All Cardiac Markers in the last 24 hours:    Lab Results   Component Value Date/Time    TROPHS 81 04/29/2025 10:16 AM     Lab Results   Component Value Date/Time    BNP 1,074 08/16/2022 07:35 AM       Last Lipid:    Lab Results   Component Value Date/Time    CHOL 195 07/19/2024 01:34 AM    HDL 35 07/19/2024 01:34 AM    LDL 99.6 07/19/2024 01:34 AM       Cardiographics:     Encounter Date: 07/12/25   EKG 12 Lead   Result Value    Ventricular Rate 66    Atrial Rate 66    P-R Interval 170    QRS Duration 160    Q-T Interval 522    QTc Calculation (Bazett) 547    P Axis 61    R Axis -59    T Axis 206    Diagnosis      Normal sinus rhythm  Left atrial enlargement  Right bundle branch block  Left anterior fascicular block  Bifascicular block  Left ventricular hypertrophy with repolarization abnormality  Inferior infarct (cited on or before 10-JUL-2025)  Abnormal ECG  When compared with ECG of 10-JUL-2025 12:52,  No significant change was found  Confirmed by MD Felipa, George ([id number]) on 7/12/2025 1:11:41 PM       04/10/25    ECHO (TTE) COMPLETE (PRN CONTRAST/BUBBLE/STRAIN/3D) 04/13/2025 10:14 AM (Final)    Interpretation Summary    Image quality is adequate.    Left Ventricle: Moderately reduced left ventricular systolic function with a visually estimated EF of 40 - 45%. Left ventricle size is normal. Mildly increased wall thickness. Global hypokinesis present. Grade I diastolic dysfunction with normal LAP.    Mitral Valve: Mild to moderate regurgitation.    Normal right ventricular size and function    Insufficient TR to estimate pulmonary artery pressure    Signed by: Kirk Boudreaux MD on 4/13/2025 10:14 AM    07/18/24    NM STRESS TEST WITH MYOCARDIAL PERFUSION 07/19/2024  1:44 PM (Final)    Interpretation Summary    Stress Combined Conclusion: The study is negative for myocardial

## 2025-07-13 NOTE — PROGRESS NOTES
[facility]  Progress Note    Patient: Simone Lamas Age: 70 y.o. : 1955 MR#: [id number] SSN: [id number]  Date/Time: 2025     Subjective:   Patient seen and examined at bedside.  His chest pain-free at this time.  Noted cardiology recs for no ischemic evaluation.  Blood pressure medicine increased and patient will likely discharge tomorrow    Disposition Home  Discharge tomorrow    Review of systems  General: No fevers or chills.  Cardiovascular: No chest pain or pressure. No palpitations.   Pulmonary: No shortness of breath, cough or wheeze.   Gastrointestinal: No abdominal pain, nausea, vomiting or diarrhea.   Genitourinary: No urinary frequency, urgency, hesitancy or dysuria.   Musculoskeletal: No joint or muscle pain, no back pain, no recent trauma.    Neurologic: No headache, numbness, tingling or weakness.   Assessment/Plan:   Acute chest pain-resolved  NSTEMI-likely type II given troponin trended down  Elevated BNP-at baseline  ESRD  Diabetes mellitus  Hypertension  TIA    Admitted to 2 S.  Cardiac monitoring  Imdur increased to 120 mg daily per cardiology  No ischemic evaluation planned by cardiology per notes.  Heparin Dc'd  Continue aspirin and statin and beta-blocker  Continue losartan      I spent 40 minutes with the patient in face-to-face consultation, of which greater than 50% was spent in counseling and coordination of care as described above.    Case discussed with:  [x]Patient  []Family  []Nursing  []Case Management  DVT Prophylaxis:  []Lovenox  [x]Hep SQ  []SCDs  []Coumadin   []Eliquis/Xarelto     Objective:   VS: BP (!) 144/101   Pulse 64   Temp 98.4 °F (36.9 °C) (Oral)   Resp 18   Ht 1.829 m (6')   Wt 79.4 kg (175 lb)   SpO2 96%   BMI 23.73 kg/m²    Tmax/24hrs: Temp (24hrs), Av.4 °F (36.9 °C), Min:97.7 °F (36.5 °C), Max:99.3 °F (37.4 °C)  IOBRIEFNo intake or output data in the 24 hours ending 25 1242    General:  Alert,

## 2025-07-13 NOTE — PROGRESS NOTES
Assessment      ESRD   Chest   Anemia   Hx of non compliance with HD   Abn Troponin   Hyperlipidemia   DM  CAD  BPH         Plan      HD TTS  MI per primary team and cardiology   Retacrit   Cont Coreg   Lantus  Isosorbide  Tamsulosin      Subjective :     No new complaints   No chest pain or SOB   No nausea vomiting or diarrhea  No fever or chills    Objective :     BP (!) 144/101   Pulse 64   Temp 98.4 °F (36.9 °C) (Oral)   Resp 18   Ht 1.829 m (6')   Wt 79.4 kg (175 lb)   SpO2 96%   BMI 23.73 kg/m²         General : No apparent distress.   HEENT : Normocephalic and atraumatic  Lungs : Clear to auscultation bilaterally  Cardiovascular : Regular rate and rhythm, no rubs or gallops  Abdomen : Soft , nontender, nondistended with positive bowel sounds.  Extremities : No lower extremity edema    Laboratory and imaging data:     Pertinent laboratory testing and imaging data reviewed    Checklist    Code status :  Full Code        Diet :    ADULT DIET; Regular; 4 carb choices (60 gm/meal); Low Sodium (2 gm); Low Potassium (Less than 3000 mg/day); Low Phosphorus (Less than 1000 mg)    Treatment Team :  Treatment Team:   Rohan Moralez DO Haque, Mosta G, MD Chung, MD Ken Bee Brooke, OT Dowdy, Justyn, Miguel Angel Chapman MD Taylor, Jamie, PT  Tana Mcelroy    Medications :   Current Medications  :     Current Facility-Administered Medications   Medication Dose Route Frequency Provider Last Rate Last Admin    isosorbide mononitrate (IMDUR) extended release tablet 120 mg  120 mg Oral Daily Mario Colunga ACNP        heparin (porcine) injection 5,000 Units  5,000 Units SubCUTAneous 3 times per day Rohan Moralez DO        nitroGLYCERIN (NITROSTAT) SL tablet 0.4 mg  0.4 mg SubLINGual PRN Jacqui Nascimento PA   0.4 mg at 07/12/25 1306    hydrALAZINE (APRESOLINE) injection 10 mg  10 mg IntraVENous Q6H PRN Patrick Rendon DO        sodium chloride flush 0.9 % injection 5-40 mL  5-40 mL IntraVENous 2

## 2025-07-14 VITALS
TEMPERATURE: 98.2 F | HEIGHT: 72 IN | WEIGHT: 175 LBS | OXYGEN SATURATION: 98 % | SYSTOLIC BLOOD PRESSURE: 155 MMHG | RESPIRATION RATE: 16 BRPM | HEART RATE: 70 BPM | BODY MASS INDEX: 23.7 KG/M2 | DIASTOLIC BLOOD PRESSURE: 84 MMHG

## 2025-07-14 LAB
ALBUMIN SERPL-MCNC: 2.7 G/DL (ref 3.4–5)
ALBUMIN/GLOB SERPL: 0.8 (ref 0.8–1.7)
ALP SERPL-CCNC: 80 U/L (ref 45–117)
ALT SERPL-CCNC: 10 U/L (ref 10–50)
ANION GAP SERPL CALC-SCNC: 13 MMOL/L (ref 3–18)
AST SERPL-CCNC: 15 U/L (ref 10–38)
BASOPHILS # BLD: 0.06 K/UL (ref 0–0.1)
BASOPHILS NFR BLD: 0.8 % (ref 0–2)
BILIRUB SERPL-MCNC: 0.4 MG/DL (ref 0.2–1)
BUN SERPL-MCNC: 29 MG/DL (ref 6–23)
BUN/CREAT SERPL: 6 (ref 12–20)
CALCIUM SERPL-MCNC: 9.4 MG/DL (ref 8.5–10.1)
CHLORIDE SERPL-SCNC: 107 MMOL/L (ref 98–107)
CO2 SERPL-SCNC: 23 MMOL/L (ref 21–32)
CREAT SERPL-MCNC: 5.07 MG/DL (ref 0.6–1.3)
DIFFERENTIAL METHOD BLD: ABNORMAL
EOSINOPHIL # BLD: 0.45 K/UL (ref 0–0.4)
EOSINOPHIL NFR BLD: 6.4 % (ref 0–5)
ERYTHROCYTE [DISTWIDTH] IN BLOOD BY AUTOMATED COUNT: 13.9 % (ref 11.6–14.5)
GLOBULIN SER CALC-MCNC: 3.3 G/DL (ref 2–4)
GLUCOSE BLD STRIP.AUTO-MCNC: 141 MG/DL (ref 70–110)
GLUCOSE BLD STRIP.AUTO-MCNC: 166 MG/DL (ref 70–110)
GLUCOSE SERPL-MCNC: 77 MG/DL (ref 74–108)
HCT VFR BLD AUTO: 33.6 % (ref 36–48)
HGB BLD-MCNC: 10.9 G/DL (ref 13–16)
IMM GRANULOCYTES # BLD AUTO: 0.02 K/UL (ref 0–0.04)
IMM GRANULOCYTES NFR BLD AUTO: 0.3 % (ref 0–0.5)
LYMPHOCYTES # BLD: 1.57 K/UL (ref 0.9–3.6)
LYMPHOCYTES NFR BLD: 22.2 % (ref 21–52)
MCH RBC QN AUTO: 30 PG (ref 24–34)
MCHC RBC AUTO-ENTMCNC: 32.4 G/DL (ref 31–37)
MCV RBC AUTO: 92.6 FL (ref 78–100)
MONOCYTES # BLD: 0.53 K/UL (ref 0.05–1.2)
MONOCYTES NFR BLD: 7.5 % (ref 3–10)
NEUTS SEG # BLD: 4.44 K/UL (ref 1.8–8)
NEUTS SEG NFR BLD: 62.8 % (ref 40–73)
NRBC # BLD: 0 K/UL (ref 0–0.01)
NRBC BLD-RTO: 0 PER 100 WBC
PLATELET # BLD AUTO: 191 K/UL (ref 135–420)
PMV BLD AUTO: 10.9 FL (ref 9.2–11.8)
POTASSIUM SERPL-SCNC: 3.1 MMOL/L (ref 3.5–5.5)
PROT SERPL-MCNC: 6.1 G/DL (ref 6.4–8.2)
RBC # BLD AUTO: 3.63 M/UL (ref 4.35–5.65)
SODIUM SERPL-SCNC: 142 MMOL/L (ref 136–145)
WBC # BLD AUTO: 7.1 K/UL (ref 4.6–13.2)

## 2025-07-14 PROCEDURE — 6370000000 HC RX 637 (ALT 250 FOR IP)

## 2025-07-14 PROCEDURE — 6360000002 HC RX W HCPCS: Performed by: INTERNAL MEDICINE

## 2025-07-14 PROCEDURE — 99239 HOSP IP/OBS DSCHRG MGMT >30: CPT | Performed by: INTERNAL MEDICINE

## 2025-07-14 PROCEDURE — 6370000000 HC RX 637 (ALT 250 FOR IP): Performed by: HOSPITALIST

## 2025-07-14 PROCEDURE — 85025 COMPLETE CBC W/AUTO DIFF WBC: CPT

## 2025-07-14 PROCEDURE — 99232 SBSQ HOSP IP/OBS MODERATE 35: CPT | Performed by: INTERNAL MEDICINE

## 2025-07-14 PROCEDURE — 36415 COLL VENOUS BLD VENIPUNCTURE: CPT

## 2025-07-14 PROCEDURE — 2500000003 HC RX 250 WO HCPCS: Performed by: STUDENT IN AN ORGANIZED HEALTH CARE EDUCATION/TRAINING PROGRAM

## 2025-07-14 PROCEDURE — 80053 COMPREHEN METABOLIC PANEL: CPT

## 2025-07-14 PROCEDURE — 82962 GLUCOSE BLOOD TEST: CPT

## 2025-07-14 PROCEDURE — 6370000000 HC RX 637 (ALT 250 FOR IP): Performed by: INTERNAL MEDICINE

## 2025-07-14 PROCEDURE — 6370000000 HC RX 637 (ALT 250 FOR IP): Performed by: STUDENT IN AN ORGANIZED HEALTH CARE EDUCATION/TRAINING PROGRAM

## 2025-07-14 RX ORDER — POTASSIUM CHLORIDE 1500 MG/1
40 TABLET, EXTENDED RELEASE ORAL ONCE
Status: COMPLETED | OUTPATIENT
Start: 2025-07-14 | End: 2025-07-14

## 2025-07-14 RX ORDER — HYDRALAZINE HYDROCHLORIDE 25 MG/1
25 TABLET, FILM COATED ORAL EVERY 8 HOURS SCHEDULED
Status: DISCONTINUED | OUTPATIENT
Start: 2025-07-14 | End: 2025-07-14 | Stop reason: HOSPADM

## 2025-07-14 RX ORDER — CARVEDILOL 25 MG/1
25 TABLET ORAL 2 TIMES DAILY WITH MEALS
Status: DISCONTINUED | OUTPATIENT
Start: 2025-07-14 | End: 2025-07-14 | Stop reason: HOSPADM

## 2025-07-14 RX ORDER — HYDRALAZINE HYDROCHLORIDE 25 MG/1
25 TABLET, FILM COATED ORAL EVERY 8 HOURS SCHEDULED
Qty: 90 TABLET | Refills: 3 | Status: SHIPPED | OUTPATIENT
Start: 2025-07-14

## 2025-07-14 RX ORDER — CARVEDILOL 25 MG/1
25 TABLET ORAL 2 TIMES DAILY WITH MEALS
Qty: 60 TABLET | Refills: 3 | Status: SHIPPED | OUTPATIENT
Start: 2025-07-14

## 2025-07-14 RX ADMIN — SODIUM CHLORIDE, PRESERVATIVE FREE 10 ML: 5 INJECTION INTRAVENOUS at 10:58

## 2025-07-14 RX ADMIN — HYDRALAZINE HYDROCHLORIDE 25 MG: 25 TABLET ORAL at 10:58

## 2025-07-14 RX ADMIN — HYDRALAZINE HYDROCHLORIDE 25 MG: 25 TABLET ORAL at 14:38

## 2025-07-14 RX ADMIN — HEPARIN SODIUM 5000 UNITS: 5000 INJECTION INTRAVENOUS; SUBCUTANEOUS at 05:40

## 2025-07-14 RX ADMIN — ASPIRIN 81 MG: 81 TABLET, DELAYED RELEASE ORAL at 10:57

## 2025-07-14 RX ADMIN — ISOSORBIDE MONONITRATE 120 MG: 60 TABLET, EXTENDED RELEASE ORAL at 10:57

## 2025-07-14 RX ADMIN — TAMSULOSIN HYDROCHLORIDE 0.4 MG: 0.4 CAPSULE ORAL at 10:57

## 2025-07-14 RX ADMIN — CARVEDILOL 25 MG: 25 TABLET, FILM COATED ORAL at 17:02

## 2025-07-14 RX ADMIN — LOSARTAN POTASSIUM 100 MG: 50 TABLET, FILM COATED ORAL at 10:58

## 2025-07-14 RX ADMIN — POTASSIUM CHLORIDE 40 MEQ: 1500 TABLET, EXTENDED RELEASE ORAL at 07:01

## 2025-07-14 ASSESSMENT — PAIN SCALES - GENERAL
PAINLEVEL_OUTOF10: 0

## 2025-07-14 NOTE — DISCHARGE SUMMARY
cardiac in nature. Cardiology recommended against a stress test or other invasive testing. Cardiology did advise controlling BP better and as such his imdur was increased, hydralazine added and coreg was increased. Patient will follow up with cardiology outpatient.    Consults: cardiology, nephrology    Significant Diagnostic Studies:   CXR  Trace bilateral pleural effusions. Otherwise unremarkable     Discharge Medications:     Current Discharge Medication List        START taking these medications    Details   hydrALAZINE (APRESOLINE) 25 MG tablet Take 1 tablet by mouth every 8 hours  Qty: 90 tablet, Refills: 3           CONTINUE these medications which have CHANGED    Details   carvedilol (COREG) 25 MG tablet Take 1 tablet by mouth 2 times daily (with meals)  Qty: 60 tablet, Refills: 3           CONTINUE these medications which have NOT CHANGED    Details   losartan (COZAAR) 100 MG tablet Take 1 tablet by mouth daily  Qty: 30 tablet, Refills: 1      apixaban (ELIQUIS) 5 MG TABS tablet Take 1 tablet by mouth 2 times daily  Qty: 178 tablet, Refills: 0      isosorbide mononitrate (IMDUR) 60 MG extended release tablet Take 1 tablet by mouth daily  Qty: 30 tablet, Refills: 3      furosemide (LASIX) 80 MG tablet Take 1 tablet by mouth once a week Every Sunday      atorvastatin (LIPITOR) 40 MG tablet Take 1 tablet by mouth nightly  Qty: 30 tablet, Refills: 3      tamsulosin (FLOMAX) 0.4 MG capsule Take 1 capsule by mouth daily      ASPIRIN LOW DOSE 81 MG chewable tablet Take 1 tablet by mouth daily      B Complex-C-Folic Acid (RENAL) 1 MG CAPS Take 1 capsule by mouth daily      insulin glargine (LANTUS) 100 UNIT/ML injection vial 25 Units             Activity: activity as tolerated    Diet: cardiac diet, 2 L fluid restriction    Wound Care: none needed    Follow-up: with PCP, Liang Aguilar, WENDY - NP in 7-10days    Minutes spent on discharge: >30 minutes spent coordinating this discharge (review

## 2025-07-14 NOTE — PLAN OF CARE
Problem: Chronic Conditions and Co-morbidities  Goal: Patient's chronic conditions and co-morbidity symptoms are monitored and maintained or improved  7/13/2025 2349 by Yumiko Hayes RN  Outcome: Progressing  Flowsheets (Taken 7/13/2025 2000)  Care Plan - Patient's Chronic Conditions and Co-Morbidity Symptoms are Monitored and Maintained or Improved: Monitor and assess patient's chronic conditions and comorbid symptoms for stability, deterioration, or improvement  7/13/2025 1217 by Flako Sutherland RN  Outcome: Progressing  Flowsheets (Taken 7/13/2025 0741)  Care Plan - Patient's Chronic Conditions and Co-Morbidity Symptoms are Monitored and Maintained or Improved: Monitor and assess patient's chronic conditions and comorbid symptoms for stability, deterioration, or improvement     Problem: Discharge Planning  Goal: Discharge to home or other facility with appropriate resources  7/13/2025 2349 by Yumiko Hayes RN  Outcome: Progressing  Flowsheets (Taken 7/13/2025 2000)  Discharge to home or other facility with appropriate resources: Identify barriers to discharge with patient and caregiver  7/13/2025 1217 by Flako Sutherland RN  Outcome: Progressing  Flowsheets (Taken 7/13/2025 0741)  Discharge to home or other facility with appropriate resources: Identify barriers to discharge with patient and caregiver     Problem: Pain  Goal: Verbalizes/displays adequate comfort level or baseline comfort level  7/13/2025 2349 by Yumiko Hayes RN  Outcome: Progressing  7/13/2025 1217 by Flako Sutherland RN  Outcome: Progressing     Problem: ABCDS Injury Assessment  Goal: Absence of physical injury  7/13/2025 2349 by Yumiko Hayes RN  Outcome: Progressing  7/13/2025 1217 by Flako Sutherland RN  Outcome: Progressing     Problem: Cardiovascular - Adult  Goal: Maintains optimal cardiac output and hemodynamic stability  7/13/2025 2349 by Yumiko Hayes RN  Outcome: Progressing  Flowsheets (Taken 7/13/2025 2000)  Maintains optimal 
  Problem: Chronic Conditions and Co-morbidities  Goal: Patient's chronic conditions and co-morbidity symptoms are monitored and maintained or improved  7/14/2025 1234 by Brina Velez RN  Outcome: Progressing  Flowsheets (Taken 7/13/2025 2000 by Yumiko Hayes RN)  Care Plan - Patient's Chronic Conditions and Co-Morbidity Symptoms are Monitored and Maintained or Improved: Monitor and assess patient's chronic conditions and comorbid symptoms for stability, deterioration, or improvement  7/13/2025 2349 by Yumiko Hayes RN  Outcome: Progressing  Flowsheets (Taken 7/13/2025 2000)  Care Plan - Patient's Chronic Conditions and Co-Morbidity Symptoms are Monitored and Maintained or Improved: Monitor and assess patient's chronic conditions and comorbid symptoms for stability, deterioration, or improvement     Problem: Discharge Planning  Goal: Discharge to home or other facility with appropriate resources  7/14/2025 1234 by Brina Velez RN  Outcome: Progressing  Flowsheets (Taken 7/13/2025 2000 by Yumiko Hayes RN)  Discharge to home or other facility with appropriate resources: Identify barriers to discharge with patient and caregiver  7/13/2025 2349 by Yumiko Hayes RN  Outcome: Progressing  Flowsheets (Taken 7/13/2025 2000)  Discharge to home or other facility with appropriate resources: Identify barriers to discharge with patient and caregiver     Problem: Pain  Goal: Verbalizes/displays adequate comfort level or baseline comfort level  7/14/2025 1234 by Brina Velez RN  Outcome: Progressing  Flowsheets (Taken 7/14/2025 1234)  Verbalizes/displays adequate comfort level or baseline comfort level:   Assess pain using appropriate pain scale   Encourage patient to monitor pain and request assistance   Administer analgesics based on type and severity of pain and evaluate response  7/13/2025 2349 by Yumiko Hayes RN  Outcome: Progressing     Problem: ABCDS Injury Assessment  Goal: Absence of physical 
  Problem: Chronic Conditions and Co-morbidities  Goal: Patient's chronic conditions and co-morbidity symptoms are monitored and maintained or improved  7/14/2025 1305 by Brina Velez RN  Outcome: Completed  7/14/2025 1234 by Brina Velez RN  Outcome: Progressing  Flowsheets (Taken 7/13/2025 2000 by Yumiko Hayes, RN)  Care Plan - Patient's Chronic Conditions and Co-Morbidity Symptoms are Monitored and Maintained or Improved: Monitor and assess patient's chronic conditions and comorbid symptoms for stability, deterioration, or improvement  7/13/2025 2349 by Yumiko Hayes RN  Outcome: Progressing  Flowsheets (Taken 7/13/2025 2000)  Care Plan - Patient's Chronic Conditions and Co-Morbidity Symptoms are Monitored and Maintained or Improved: Monitor and assess patient's chronic conditions and comorbid symptoms for stability, deterioration, or improvement     Problem: Discharge Planning  Goal: Discharge to home or other facility with appropriate resources  7/14/2025 1305 by Brina Velez RN  Outcome: Completed  7/14/2025 1234 by Brina Velez RN  Outcome: Progressing  Flowsheets (Taken 7/13/2025 2000 by Yumiko Hayes, RN)  Discharge to home or other facility with appropriate resources: Identify barriers to discharge with patient and caregiver  7/13/2025 2349 by Yumiko Hayes RN  Outcome: Progressing  Flowsheets (Taken 7/13/2025 2000)  Discharge to home or other facility with appropriate resources: Identify barriers to discharge with patient and caregiver     Problem: Pain  Goal: Verbalizes/displays adequate comfort level or baseline comfort level  7/14/2025 1305 by Brina Velez RN  Outcome: Completed  7/14/2025 1234 by Brina Velez RN  Outcome: Progressing  Flowsheets (Taken 7/14/2025 1234)  Verbalizes/displays adequate comfort level or baseline comfort level:   Assess pain using appropriate pain scale   Encourage patient to monitor pain and request assistance   Administer analgesics based on type 
  Problem: Chronic Conditions and Co-morbidities  Goal: Patient's chronic conditions and co-morbidity symptoms are monitored and maintained or improved  Outcome: Progressing  Flowsheets (Taken 7/13/2025 0741)  Care Plan - Patient's Chronic Conditions and Co-Morbidity Symptoms are Monitored and Maintained or Improved: Monitor and assess patient's chronic conditions and comorbid symptoms for stability, deterioration, or improvement     Problem: Discharge Planning  Goal: Discharge to home or other facility with appropriate resources  Outcome: Progressing  Flowsheets (Taken 7/13/2025 0741)  Discharge to home or other facility with appropriate resources: Identify barriers to discharge with patient and caregiver     Problem: Pain  Goal: Verbalizes/displays adequate comfort level or baseline comfort level  Outcome: Progressing     Problem: ABCDS Injury Assessment  Goal: Absence of physical injury  Outcome: Progressing     Problem: Cardiovascular - Adult  Goal: Maintains optimal cardiac output and hemodynamic stability  Outcome: Progressing  Goal: Absence of cardiac dysrhythmias or at baseline  Outcome: Progressing     Problem: Musculoskeletal - Adult  Goal: Return mobility to safest level of function  Outcome: Progressing  Flowsheets (Taken 7/13/2025 0741)  Return Mobility to Safest Level of Function: Assess patient stability and activity tolerance for standing, transferring and ambulating with or without assistive devices     Problem: Genitourinary - Adult  Goal: Absence of urinary retention  Outcome: Progressing     
two or more falls in the past year or any fall with injury in the past year?: Yes  Prior Level of Assist for ADLs: Independent  Prior Level of Assist for Homemaking: Independent  Prior Level of Assist for Ambulation: Independent community ambulator, with or without device  Prior Level of Assist for Transfers: Independent  Critical Behavior:  Orientation  Overall Orientation Status: Within Normal Limits  Orientation Level: Oriented X4  Cognition  Overall Cognitive Status: WNL    Strength:    Strength: Within functional limits    Tone & Sensation:   Tone: Normal  Sensation: Impaired (reports occasional numbness  in B LEs)    Range Of Motion:  AROM: Within functional limits  PROM: Within functional limits    Functional Mobility:  Bed Mobility:   Bed Mobility Training  Bed Mobility Training: Yes  Rolling: Modified independent  Supine to Sit: Modified independent  Sit to Supine: Modified independent  Scooting: Modified independent  Transfers:   Transfer Training  Transfer Training: Yes  Sit to Stand: Modified independent  Stand to Sit: Modified independent  Balance:   Balance  Sitting: Intact  Standing: Intact;With support  Ambulation/Gait Training:  Gait  Gait Training: Yes  Left Side Weight Bearing: Full  Right Side Weight Bearing: Full  Overall Level of Assistance: Stand by assistance  Distance (ft): 40 Feet (2x20)  Assistive Device: Walker, rolling  Interventions: Safety awareness training;Verbal cues;Demonstration;Tactile cues  Gait Abnormalities: Decreased step clearance    Pain:  Intensity Pre-treatment: 0/10   Intensity Post-treatment: 0/10  Scale: Numeric Rating Scale    Activity Tolerance:   Activity Tolerance: Patient tolerated evaluation without incident;Patient tolerated treatment well  Please refer to the flowsheet for vital signs taken during this treatment.    After treatment:   []         Patient left in no apparent distress sitting up in chair  [x]         Patient left in no apparent distress in

## 2025-07-14 NOTE — PROGRESS NOTES
Reviewed discharge instructions with patient. Verbalized an understanding. Patient aware of the changes to his medications and that he is to  a medication from his pharmacy. Patient states that he will follow up with cardiology and his primary care provider. No other needs or concerns. Call light in reach.

## 2025-07-14 NOTE — CARE COORDINATION
CM met patient at the bedside, initial assessment completed. Patient demographics and HIPAA verified. Patient preferences is to return home and declines home health services at this time. Patient will need medicaid transport when medically ready for discharge.     07/14/25 1050   Service Assessment   Patient Orientation Alert and Oriented   Cognition Alert   History Provided By Patient   Primary Caregiver Self   Support Systems Friends/Neighbors   Patient's Healthcare Decision Maker is: Legal Next of Kin   PCP Verified by CM Yes   Last Visit to PCP Within last 3 months   Prior Functional Level Independent in ADLs/IADLs   Current Functional Level Independent in ADLs/IADLs   Can patient return to prior living arrangement Yes   Ability to make needs known: Good   Family able to assist with home care needs: Yes   Would you like for me to discuss the discharge plan with any other family members/significant others, and if so, who? No   Financial Resources Medicaid   Community Resources Transportation   CM/[facility] Referral Other (see comment)  (discharge planning)   Social/Functional History   Lives With Alone   Type of Home House   Home Layout One level   Home Access Stairs to enter with rails   Entrance Stairs - Number of Steps 4   Entrance Stairs - Rails Both   Bathroom Shower/Tub Tub/Shower unit   Bathroom Toilet Standard   Bathroom Equipment None   Bathroom Accessibility Accessible   Home Equipment None   Receives Help From Friend(s)   Prior Level of Assist for ADLs Independent   Prior Level of Assist for Homemaking Independent   Homemaking Responsibilities Yes   Ambulation Assistance Independent   Prior Level of Assist for Transfers Independent   Active  No   Patient's  Info Public Transportation and Medicaid Transport.   Mode of Transportation Bus   Education n/a   Occupation On disability   Type of Occupation n/a   Discharge Planning   Type of Residence House   Living Arrangements Alone   Current Services

## 2025-07-14 NOTE — PROGRESS NOTES
Progress Note    Simone Lamas  70 y.o.      Admit Date: 7/12/2025  Patient Active Problem List   Diagnosis    Elevated serum creatinine    Chronic combined systolic and diastolic CHF, NYHA class 3 (HCC)    Chronic renal insufficiency    Hypertension    Stage 3 chronic renal impairment associated with type 2 diabetes mellitus (HCC)    Hepatitis C infection    Cocaine use    Cardiac LV ejection fraction 30-35%    Homelessness    Hypertriglyceridemia    Elevated HDL    Decreased GFR    NSTEMI (non-ST elevated myocardial infarction) (HCC)    History of heroin abuse (HCC)    Elevated alkaline phosphatase level    History of cocaine abuse (HCC)    TIA (transient ischemic attack)    Type 2 diabetes mellitus without complication (HCC)    COPD, mild (HCC)    CHF (congestive heart failure) (HCC)    Cardiomyopathy (HCC)    Insulin dependent type 2 diabetes mellitus, controlled (HCC)    CVA (cerebral vascular accident) (HCC)    ESRD (end stage renal disease) (HCC)    Substance abuse (HCC)    Anemia    End stage renal disease (HCC)    Pericardial effusion    Hyperglycemia    CKD (chronic kidney disease) stage 5, GFR less than 15 ml/min (HCC)    CKD (chronic kidney disease) stage 4, GFR 15-29 ml/min (HCC)    ESRD (end stage renal disease) on dialysis (HCC)    AVF (arteriovenous fistula)    Volume overload    Chest pain    Diabetes due to undrl condition w oth diabetic kidney comp (HCC)    Unstable angina (HCC)    AV fistula occlusion, initial encounter    Complication of arteriovenous dialysis fistula    Systolic dysfunction with acute on chronic heart failure (HCC)    Hemorrhage of arteriovenous fistula, subsequent encounter    Acute respiratory failure with hypoxia (HCC)    Dyspnea    Noncompliance    Hemodialysis patient    Acute chest pain    Paroxysmal A-fib (HCC)           Subjective:     Patient is seen with this morning without any chest pain no shortness of breath cardiologist note reviewed no further need for any

## 2025-07-14 NOTE — CARE COORDINATION
D/C order noted for today. Orders reviewed. No needs identified at this time. CM remains available if needed.         Mickey Mooney MSW  Case Management

## 2025-07-14 NOTE — CARE COORDINATION
07/14/25 1057   Readmission Assessment   Number of Days since last admission? 1-7 days   Previous Disposition Home Alone   Who is being Interviewed Patient   What was the patient's/caregiver's perception as to why they think they needed to return back to the hospital? Other (Comment)   Did you visit your Primary Care Physician after you left the hospital, before you returned this time? Yes   Did you see a specialist, such as Cardiac, Pulmonary, Orthopedic Physician, etc. after you left the hospital? No   Who advised the patient to return to the hospital? Self-referral   Does the patient report anything that got in the way of taking their medications? No   In our efforts to provide the best possible care to you and others like you, can you think of anything that we could have done to help you after you left the hospital the first time, so that you might not have needed to return so soon? Other (Comment)  (N/A)         Gloria Castro RN  Care Management

## 2025-07-14 NOTE — PROGRESS NOTES
4 Eyes Skin Assessment     NAME:  Simone Lamas  YOB: 1955  MEDICAL RECORD NUMBER:  [id number]    The patient is being assessed for  Shift Handoff    I agree that at least one RN has performed a thorough Head to Toe Skin Assessment on the patient. ALL assessment sites listed below have been assessed.      Areas assessed by both nurses:    Head, Face, Ears, Shoulders, Back, Chest, Arms, Elbows, Hands, Sacrum. Buttock, Coccyx, Ischium, Legs. Feet and Heels, and Under Medical Devices         Does the Patient have a Wound? No noted wound(s)       Guerrero Prevention initiated by RN: No  Wound Care Orders initiated by RN: No    Pressure Injury (Stage 1,2,3,4, Unstageable, DTI, NWPT, and Complex wounds) if present, place Wound referral order by RN under : No    New Ostomies, if present place, Ostomy referral order under : No     Nurse 1 eSignature: Electronically signed by TAMI VAZQUEZ RN on 7/13/25 at 11:50 PM EDT    **SHARE this note so that the co-signing nurse can place an eSignature**    Nurse 2 eSignature: {Esignature:[id number]}

## 2025-07-14 NOTE — PROGRESS NOTES
As per discussion with patient, he will need medical transport set up due to family unable to come get him.

## 2025-07-14 NOTE — CARE COORDINATION
Called [employer] transportation [phone], spoke to Brina, arranged transport to patients home address [address] [place] [address] via cab. Reference number is [id number]. Dispatch was advice to call nurse [facility]  when in route.      Mickey Mooney MSW  Case Management

## 2025-07-14 NOTE — PROGRESS NOTES
[facility] - Progress Note    Admit Date: 7/12/2025  Attending Cardiologist: Dr. Martinez     Assessment:     - CP. He has a chronically elevated troponin, 106>114. Likely secondary to demand ischemia in the setting of uncontrolled HTN and ESRD.   [facility] 4/2024 with patent coronary arteries  - HTN, uncontrolled. On losartan, coreg and imdur as outpatient.   - Chronic systolic and diastolic heart failure. NT proBNP 25K, CXR showed trace bilateral pleural effusions. Volume management with HD.   - Nonischemic dilated cardiomyopathy  Echo 4/2025 EF 40-45%, global hypokinesis, grade I DD  [facility] 4/2024 with patent coronary arteries  GDMT losartan and coreg   - ESRD. HD T, Th, S   - Mild to moderate MR on Echo 4/2025  - History of DVT   - pAF. He had an EKG done 4/23/24 that showed Aflutter.  Not on AC.  No recurrence  - Hx of CVA. on aspirin and statin  - HLD. On atorvastatin.   - T2DM     Primary cardiologist: Dr. Martinez    Plan:     Addendum: Independently seen and evaluated.  Agree with below.  He has known history of patent coronary arteries by coronary angiography back in 2024.  He has known history of nonischemic cardiomyopathy, mild to 40-45% EF by echo April 2025.  Would continue conservative measures.  Would make sure that his blood pressure is well-controlled.    - Continue coreg, imdur, and losartan. Added hydralazine.   - Stable from cardiology standpoint for discharge home today. Will arrange outpatient follow up.     Subjective:     No new complaints.     Objective:      Patient Vitals for the past 8 hrs:   Temp Pulse Resp BP SpO2   07/14/25 0815 98.4 °F (36.9 °C) 73 16 (!) 168/103 97 %   07/14/25 0500 -- 75 -- -- --   07/14/25 0400 97.9 °F (36.6 °C) 70 18 (!) 161/106 100 %   07/14/25 0300 -- 67 -- -- --         Patient Vitals for the past 96 hrs:   Weight   07/12/25 1038 79.4 kg (175 lb)       TELE: normal sinus rhythm               Current Facility-Administered Medications   Medication Dose Route

## 2025-07-30 ENCOUNTER — TELEPHONE (OUTPATIENT)
Age: 70
End: 2025-07-30

## 2025-08-21 ENCOUNTER — APPOINTMENT (OUTPATIENT)
Facility: HOSPITAL | Age: 70
End: 2025-08-21
Payer: MEDICARE

## 2025-08-21 ENCOUNTER — HOSPITAL ENCOUNTER (OUTPATIENT)
Facility: HOSPITAL | Age: 70
Setting detail: OBSERVATION
Discharge: HOME | End: 2025-08-21
Attending: EMERGENCY MEDICINE | Admitting: STUDENT IN AN ORGANIZED HEALTH CARE EDUCATION/TRAINING PROGRAM
Payer: MEDICARE

## 2025-08-21 VITALS
HEIGHT: 72 IN | BODY MASS INDEX: 23.7 KG/M2 | DIASTOLIC BLOOD PRESSURE: 104 MMHG | OXYGEN SATURATION: 99 % | TEMPERATURE: 97.6 F | WEIGHT: 175 LBS | SYSTOLIC BLOOD PRESSURE: 163 MMHG | HEART RATE: 73 BPM | RESPIRATION RATE: 18 BRPM

## 2025-08-21 DIAGNOSIS — R06.00 DYSPNEA, UNSPECIFIED TYPE: Primary | ICD-10-CM

## 2025-08-21 DIAGNOSIS — Z99.2 END STAGE RENAL DISEASE ON DIALYSIS (HCC): ICD-10-CM

## 2025-08-21 DIAGNOSIS — N18.6 END STAGE RENAL DISEASE ON DIALYSIS (HCC): ICD-10-CM

## 2025-08-21 PROBLEM — J81.0 PULMONARY EDEMA, ACUTE (HCC): Status: ACTIVE | Noted: 2025-08-21

## 2025-08-21 LAB
ANION GAP SERPL CALC-SCNC: 14 MMOL/L (ref 3–18)
BASOPHILS # BLD: 0.06 K/UL (ref 0–0.1)
BASOPHILS NFR BLD: 1 % (ref 0–2)
BUN SERPL-MCNC: 36 MG/DL (ref 6–23)
BUN/CREAT SERPL: 6 (ref 12–20)
CALCIUM SERPL-MCNC: 9.5 MG/DL (ref 8.5–10.1)
CHLORIDE SERPL-SCNC: 102 MMOL/L (ref 98–107)
CO2 SERPL-SCNC: 22 MMOL/L (ref 21–32)
CREAT SERPL-MCNC: 5.72 MG/DL (ref 0.6–1.3)
DIFFERENTIAL METHOD BLD: ABNORMAL
EKG ATRIAL RATE: 71 BPM
EKG DIAGNOSIS: NORMAL
EKG P AXIS: 74 DEGREES
EKG P-R INTERVAL: 158 MS
EKG Q-T INTERVAL: 494 MS
EKG QRS DURATION: 154 MS
EKG QTC CALCULATION (BAZETT): 536 MS
EKG R AXIS: -50 DEGREES
EKG T AXIS: 135 DEGREES
EKG VENTRICULAR RATE: 71 BPM
EOSINOPHIL # BLD: 0.32 K/UL (ref 0–0.4)
EOSINOPHIL NFR BLD: 5.3 % (ref 0–5)
ERYTHROCYTE [DISTWIDTH] IN BLOOD BY AUTOMATED COUNT: 16.2 % (ref 11.6–14.5)
GLUCOSE BLD STRIP.AUTO-MCNC: 182 MG/DL (ref 70–110)
GLUCOSE SERPL-MCNC: 235 MG/DL (ref 74–108)
HCT VFR BLD AUTO: 40.1 % (ref 36–48)
HGB BLD-MCNC: 12.5 G/DL (ref 13–16)
IMM GRANULOCYTES # BLD AUTO: 0.03 K/UL (ref 0–0.04)
IMM GRANULOCYTES NFR BLD AUTO: 0.5 % (ref 0–0.5)
LYMPHOCYTES # BLD: 1.48 K/UL (ref 0.9–3.6)
LYMPHOCYTES NFR BLD: 24.7 % (ref 21–52)
MCH RBC QN AUTO: 29.8 PG (ref 24–34)
MCHC RBC AUTO-ENTMCNC: 31.2 G/DL (ref 31–37)
MCV RBC AUTO: 95.7 FL (ref 78–100)
MONOCYTES # BLD: 0.4 K/UL (ref 0.05–1.2)
MONOCYTES NFR BLD: 6.7 % (ref 3–10)
NEUTS SEG # BLD: 3.7 K/UL (ref 1.8–8)
NEUTS SEG NFR BLD: 61.8 % (ref 40–73)
NRBC # BLD: 0 K/UL (ref 0–0.01)
NRBC BLD-RTO: 0 PER 100 WBC
PLATELET # BLD AUTO: 201 K/UL (ref 135–420)
PMV BLD AUTO: 10.8 FL (ref 9.2–11.8)
POTASSIUM SERPL-SCNC: 3.9 MMOL/L (ref 3.5–5.5)
RBC # BLD AUTO: 4.19 M/UL (ref 4.35–5.65)
SODIUM SERPL-SCNC: 139 MMOL/L (ref 136–145)
WBC # BLD AUTO: 6 K/UL (ref 4.6–13.2)

## 2025-08-21 PROCEDURE — 6370000000 HC RX 637 (ALT 250 FOR IP): Performed by: STUDENT IN AN ORGANIZED HEALTH CARE EDUCATION/TRAINING PROGRAM

## 2025-08-21 PROCEDURE — 99238 HOSP IP/OBS DSCHRG MGMT 30/<: CPT | Performed by: HOSPITALIST

## 2025-08-21 PROCEDURE — 93010 ELECTROCARDIOGRAM REPORT: CPT | Performed by: INTERNAL MEDICINE

## 2025-08-21 PROCEDURE — 6360000002 HC RX W HCPCS: Performed by: STUDENT IN AN ORGANIZED HEALTH CARE EDUCATION/TRAINING PROGRAM

## 2025-08-21 PROCEDURE — 80048 BASIC METABOLIC PNL TOTAL CA: CPT

## 2025-08-21 PROCEDURE — 99222 1ST HOSP IP/OBS MODERATE 55: CPT | Performed by: STUDENT IN AN ORGANIZED HEALTH CARE EDUCATION/TRAINING PROGRAM

## 2025-08-21 PROCEDURE — 82962 GLUCOSE BLOOD TEST: CPT

## 2025-08-21 PROCEDURE — G0257 UNSCHED DIALYSIS ESRD PT HOS: HCPCS

## 2025-08-21 PROCEDURE — G0378 HOSPITAL OBSERVATION PER HR: HCPCS

## 2025-08-21 PROCEDURE — 94761 N-INVAS EAR/PLS OXIMETRY MLT: CPT

## 2025-08-21 PROCEDURE — 6370000000 HC RX 637 (ALT 250 FOR IP): Performed by: EMERGENCY MEDICINE

## 2025-08-21 PROCEDURE — 71045 X-RAY EXAM CHEST 1 VIEW: CPT

## 2025-08-21 PROCEDURE — 94640 AIRWAY INHALATION TREATMENT: CPT

## 2025-08-21 PROCEDURE — 96372 THER/PROPH/DIAG INJ SC/IM: CPT

## 2025-08-21 PROCEDURE — 85025 COMPLETE CBC W/AUTO DIFF WBC: CPT

## 2025-08-21 PROCEDURE — 93005 ELECTROCARDIOGRAM TRACING: CPT | Performed by: EMERGENCY MEDICINE

## 2025-08-21 PROCEDURE — 99285 EMERGENCY DEPT VISIT HI MDM: CPT

## 2025-08-21 RX ORDER — SODIUM CHLORIDE 0.9 % (FLUSH) 0.9 %
5-40 SYRINGE (ML) INJECTION EVERY 12 HOURS SCHEDULED
Status: DISCONTINUED | OUTPATIENT
Start: 2025-08-21 | End: 2025-08-21 | Stop reason: HOSPADM

## 2025-08-21 RX ORDER — HEPARIN SODIUM 1000 [USP'U]/ML
3800 INJECTION, SOLUTION INTRAVENOUS; SUBCUTANEOUS PRN
Status: DISCONTINUED | OUTPATIENT
Start: 2025-08-21 | End: 2025-08-21 | Stop reason: HOSPADM

## 2025-08-21 RX ORDER — BUDESONIDE 0.5 MG/2ML
0.5 INHALANT ORAL
Status: DISCONTINUED | OUTPATIENT
Start: 2025-08-21 | End: 2025-08-21 | Stop reason: HOSPADM

## 2025-08-21 RX ORDER — CARVEDILOL 25 MG/1
25 TABLET ORAL 2 TIMES DAILY WITH MEALS
Qty: 60 TABLET | Refills: 0 | Status: SHIPPED | OUTPATIENT
Start: 2025-08-21

## 2025-08-21 RX ORDER — IPRATROPIUM BROMIDE AND ALBUTEROL SULFATE 2.5; .5 MG/3ML; MG/3ML
1 SOLUTION RESPIRATORY (INHALATION)
Status: DISCONTINUED | OUTPATIENT
Start: 2025-08-21 | End: 2025-08-21 | Stop reason: HOSPADM

## 2025-08-21 RX ORDER — LOSARTAN POTASSIUM 50 MG/1
100 TABLET ORAL DAILY
Status: DISCONTINUED | OUTPATIENT
Start: 2025-08-21 | End: 2025-08-21 | Stop reason: HOSPADM

## 2025-08-21 RX ORDER — ONDANSETRON 4 MG/1
4 TABLET, ORALLY DISINTEGRATING ORAL EVERY 8 HOURS PRN
Status: DISCONTINUED | OUTPATIENT
Start: 2025-08-21 | End: 2025-08-21 | Stop reason: HOSPADM

## 2025-08-21 RX ORDER — CARVEDILOL 25 MG/1
25 TABLET ORAL 2 TIMES DAILY WITH MEALS
Status: DISCONTINUED | OUTPATIENT
Start: 2025-08-21 | End: 2025-08-21 | Stop reason: HOSPADM

## 2025-08-21 RX ORDER — LOSARTAN POTASSIUM 100 MG/1
100 TABLET ORAL DAILY
Qty: 30 TABLET | Refills: 0 | Status: SHIPPED | OUTPATIENT
Start: 2025-08-21

## 2025-08-21 RX ORDER — HEPARIN SODIUM 5000 [USP'U]/ML
5000 INJECTION, SOLUTION INTRAVENOUS; SUBCUTANEOUS EVERY 8 HOURS SCHEDULED
Status: DISCONTINUED | OUTPATIENT
Start: 2025-08-21 | End: 2025-08-21 | Stop reason: HOSPADM

## 2025-08-21 RX ORDER — HYDRALAZINE HYDROCHLORIDE 25 MG/1
25 TABLET, FILM COATED ORAL EVERY 8 HOURS SCHEDULED
Status: DISCONTINUED | OUTPATIENT
Start: 2025-08-21 | End: 2025-08-21 | Stop reason: HOSPADM

## 2025-08-21 RX ORDER — ONDANSETRON 2 MG/ML
4 INJECTION INTRAMUSCULAR; INTRAVENOUS EVERY 6 HOURS PRN
Status: DISCONTINUED | OUTPATIENT
Start: 2025-08-21 | End: 2025-08-21 | Stop reason: HOSPADM

## 2025-08-21 RX ORDER — SODIUM CHLORIDE 0.9 % (FLUSH) 0.9 %
5-40 SYRINGE (ML) INJECTION PRN
Status: DISCONTINUED | OUTPATIENT
Start: 2025-08-21 | End: 2025-08-21 | Stop reason: HOSPADM

## 2025-08-21 RX ORDER — ISOSORBIDE MONONITRATE 60 MG/1
60 TABLET, EXTENDED RELEASE ORAL DAILY
Status: DISCONTINUED | OUTPATIENT
Start: 2025-08-21 | End: 2025-08-21 | Stop reason: HOSPADM

## 2025-08-21 RX ORDER — SODIUM CHLORIDE 9 MG/ML
INJECTION, SOLUTION INTRAVENOUS PRN
Status: DISCONTINUED | OUTPATIENT
Start: 2025-08-21 | End: 2025-08-21 | Stop reason: HOSPADM

## 2025-08-21 RX ORDER — HYDRALAZINE HYDROCHLORIDE 25 MG/1
25 TABLET, FILM COATED ORAL EVERY 8 HOURS SCHEDULED
Qty: 90 TABLET | Refills: 0 | Status: SHIPPED | OUTPATIENT
Start: 2025-08-21

## 2025-08-21 RX ORDER — POLYETHYLENE GLYCOL 3350 17 G/17G
17 POWDER, FOR SOLUTION ORAL DAILY PRN
Status: DISCONTINUED | OUTPATIENT
Start: 2025-08-21 | End: 2025-08-21 | Stop reason: HOSPADM

## 2025-08-21 RX ADMIN — IPRATROPIUM BROMIDE AND ALBUTEROL SULFATE 1 DOSE: .5; 3 SOLUTION RESPIRATORY (INHALATION) at 08:34

## 2025-08-21 RX ADMIN — HYDRALAZINE HYDROCHLORIDE 25 MG: 25 TABLET ORAL at 15:53

## 2025-08-21 RX ADMIN — LOSARTAN POTASSIUM 100 MG: 50 TABLET, FILM COATED ORAL at 07:07

## 2025-08-21 RX ADMIN — BUDESONIDE 500 MCG: 0.5 INHALANT RESPIRATORY (INHALATION) at 08:35

## 2025-08-21 RX ADMIN — HYDRALAZINE HYDROCHLORIDE 25 MG: 25 TABLET ORAL at 07:08

## 2025-08-21 RX ADMIN — IPRATROPIUM BROMIDE AND ALBUTEROL SULFATE 1 DOSE: .5; 3 SOLUTION RESPIRATORY (INHALATION) at 15:54

## 2025-08-21 RX ADMIN — CARVEDILOL 25 MG: 25 TABLET, FILM COATED ORAL at 15:53

## 2025-08-21 RX ADMIN — ISOSORBIDE MONONITRATE 60 MG: 60 TABLET, EXTENDED RELEASE ORAL at 07:08

## 2025-08-21 RX ADMIN — CARVEDILOL 25 MG: 25 TABLET, FILM COATED ORAL at 07:08

## 2025-08-21 RX ADMIN — HEPARIN SODIUM 5000 UNITS: 5000 INJECTION, SOLUTION INTRAVENOUS; SUBCUTANEOUS at 15:53

## 2025-08-21 ASSESSMENT — PAIN - FUNCTIONAL ASSESSMENT: PAIN_FUNCTIONAL_ASSESSMENT: 0-10

## 2025-08-21 ASSESSMENT — PAIN SCALES - GENERAL
PAINLEVEL_OUTOF10: 0

## (undated) DEVICE — SET FLD ADMIN 3 W STPCOCK FIX FEM L BOR 1IN

## (undated) DEVICE — KIT OR TURNOVER

## (undated) DEVICE — LIQUIBAND RAPID ADHESIVE 36/CS 0.8ML: Brand: MEDLINE

## (undated) DEVICE — PACK PROCEDURE SURG VASC CATH 161 MMC LF

## (undated) DEVICE — VASCULAR CATH-PACK

## (undated) DEVICE — PROCEDURE KIT FLUID MGMT 10 FR CUST MAINFOLD

## (undated) DEVICE — FOGARTY ARTERIAL EMBOLECTOMY CATHETER 4F 40CM: Brand: FOGARTY

## (undated) DEVICE — INTENDED FOR TISSUE SEPARATION, AND OTHER PROCEDURES THAT REQUIRE A SHARP SURGICAL BLADE TO PUNCTURE OR CUT.: Brand: BARD-PARKER ® CARBON RIB-BACK BLADES

## (undated) DEVICE — ANGIOGRAPHY KIT CUST VASC

## (undated) DEVICE — GAUZE, BORDER, 3"X6", 1.5"X4"PAD, STERIL: Brand: MEDLINE INDUSTRIES, INC.

## (undated) DEVICE — INTENDED FOR TISSUE SEPARATION, AND OTHER PROCEDURES THAT REQUIRE A SHARP SURGICAL BLADE TO PUNCTURE OR CUT.: Brand: BARD-PARKER ® STAINLESS STEEL BLADES

## (undated) DEVICE — KIT CLN UP BON SECOURS MARYV

## (undated) DEVICE — RADIFOCUS GLIDEWIRE: Brand: GLIDEWIRE

## (undated) DEVICE — DECANTER BAG 9": Brand: MEDLINE INDUSTRIES, INC.

## (undated) DEVICE — SUTURE PERMA-HAND SZ 2-0 L30IN NONABSORBABLE BLK L26MM SH K833H

## (undated) DEVICE — COVER US PRB W15XL120CM W/ GEL RUBBERBAND TAPE STRP FLD GEN

## (undated) DEVICE — DRAPE,REIN 53X77,STERILE: Brand: MEDLINE

## (undated) DEVICE — SUTURE PROL SZ 2-0 L36IN NONABSORBABLE BLU V-7 L26MM 1/2 8977H

## (undated) DEVICE — SUTURE ABSORBABLE BRAIDED 2-0 CT-1 27 IN UD VICRYL J259H

## (undated) DEVICE — SUTURE PROL SZ 6-0 L24IN NONABSORBABLE BLU L9.3MM BV-1 3/8 8805H

## (undated) DEVICE — CATHETER 6FR JL 4 CORDIS 100CM

## (undated) DEVICE — GUIDEWIRE VASC L180CM DIA0.035IN L15CM STR TIP PTFE S STL

## (undated) DEVICE — CATHETER HAD L36CM INSRT L19CM PALINDROME

## (undated) DEVICE — GUIDEWIRE VASC L180CM DIA0.035IN L4CM DIA1.5MM J TIP PTFE S

## (undated) DEVICE — CATHETER 6FR JR4 CORDIS 100CM

## (undated) DEVICE — PERCLOSE™ PROSTYLE™ SUTURE-MEDIATED CLOSURE AND REPAIR SYSTEM: Brand: PERCLOSE™ PROSTYLE™

## (undated) DEVICE — SUTURE PERMAHAND SZ 3-0 L18IN NONABSORBABLE BLK L26MM SH C013D

## (undated) DEVICE — 450 ML BOTTLE OF 0.05% CHLORHEXIDINE GLUCONATE IN 99.95% STERILE WATER FOR IRRIGATION, USP AND APPLICATOR.: Brand: IRRISEPT ANTIMICROBIAL WOUND LAVAGE

## (undated) DEVICE — 3M™ TEGADERM™ TRANSPARENT FILM DRESSING FRAME STYLE, 1626W, 4 IN X 4-3/4 IN (10 CM X 12 CM), 50/CT 4CT/CASE: Brand: 3M™ TEGADERM™

## (undated) DEVICE — SUTURE PERMA-HAND SZ 3-0 L24IN NONABSORBABLE BLK W/O NDL SA74H

## (undated) DEVICE — Device

## (undated) DEVICE — TOWEL,OR,DSP,ST,WHITE,DLX,XR,4/PK,20PK/C: Brand: MEDLINE

## (undated) DEVICE — BLANKET WRM AD W50XL85.8IN PACU FULL BODY FORC AIR

## (undated) DEVICE — PROBE VASC 8MHZ WTRPRF

## (undated) DEVICE — SUTURE PERMAHAND SZ 2-0 L30IN NONABSORBABLE BLK SILK W/O A305H

## (undated) DEVICE — INTRODUCER SHTH 0.018 IN 4 FRX40 CM KT SFT TIP NIT VSI 7266V

## (undated) DEVICE — SUTURE MONOCRYL SZ 4-0 L18IN ABSRB UD L19MM PS-2 3/8 CIR PRIM Y496G

## (undated) DEVICE — PRESSURE MONITORING SET: Brand: TRUWAVE

## (undated) DEVICE — SUTURE VCRL SZ 3-0 L27IN ABSRB UD L26MM SH 1/2 CIR J416H

## (undated) DEVICE — GLOVE ORANGE PI 7 1/2   MSG9075

## (undated) DEVICE — DRAPE,ANGIO,BRACH,STERILE,38X44: Brand: MEDLINE

## (undated) DEVICE — ELECTRODE PT RET AD L9FT HI MOIST COND ADH HYDRGEL CORDED

## (undated) DEVICE — AGENT HEMOSTATIC SURGIFLOW MATRIX KIT W/THROMBIN

## (undated) DEVICE — STERILE POLYISOPRENE POWDER-FREE SURGICAL GLOVES: Brand: PROTEXIS

## (undated) DEVICE — INTRODUCER SHTH 6FR CANN L11CM DIL TIP 35MM GRN TUNGSTEN

## (undated) DEVICE — SUTURE PROL SZ 7-0 L30IN NONABSORBABLE BLU L9.3MM BV-1 1/2 8703H

## (undated) DEVICE — KIT HAD L40CM CATH SYMMETRIC TIP 2 LUMN CATH SFTY SHTH TISS

## (undated) DEVICE — SUTURE ETHILON SZ 3-0 L18IN NONABSORBABLE BLK L24MM PS-1 3/8 1663G

## (undated) DEVICE — SUTURE PERMAHAND SZ 4-0 L12X30IN NONABSORBABLE BLK SILK A303H

## (undated) DEVICE — BLANKET WRM W40.2XL55.9IN IORT LO BODY + MISTRAL AIR

## (undated) DEVICE — SUTURE MCRYL SZ 4-0 L18IN ABSRB UD L19MM PS-2 3/8 CIR PRIM Y496G

## (undated) DEVICE — SPONGE LAP W18XL18IN WHT COT 4 PLY FLD STRUNG RADPQ DISP ST 2 PER PACK

## (undated) DEVICE — SUTURE PERMAHAND SZ 0 L30IN NONABSORBABLE BLK SILK BRAID A306H

## (undated) DEVICE — APPLIER CLP L9.375IN APER 2.1MM CLS L3.8MM 20 SM TI CLP

## (undated) DEVICE — COPILOT KIT INCLUDES BLEEDBACK CONTROL VALVE 20/30 INDEFLATOR INFLATION DEVICE 30 ATM 20 CC / GUIDE WIRE INTRODUCER / TORQUE DEVICE: Brand: INDEFLATOR

## (undated) DEVICE — APPLICATOR MEDICATED 26 CC SOLUTION HI LT ORNG CHLORAPREP

## (undated) DEVICE — SUTURE REMOVAL TRAY: Brand: MEDLINE INDUSTRIES, INC.

## (undated) DEVICE — SOLUTION IRRIG 1000ML STRL H2O USP PLAS POUR BTL

## (undated) DEVICE — SUTURE NONABSORBABLE MONOFILAMENT 6-0 C-1 1X30 IN PROLENE 8706H

## (undated) DEVICE — DRESSING FOAM DISK DIA1IN H 7MM HYDRPHLC CHG IMPREG IN SL

## (undated) DEVICE — SUTURE VICRYL + SZ 3-0 L27IN ABSRB UD L26MM SH 1/2 CIR VCP416H

## (undated) DEVICE — SYRINGE KIT,PACKAGED,,150FT,MK 7(ANGIO-ARTERION, 150ML SYR KIT W/QFT,MC)(60729385): Brand: MEDRAD® MARK 7 ARTERION DISPOSABLE SYRINGE 150 ML WITH QUICK FILL TUBE

## (undated) DEVICE — DRESSING HEMSTAT W4XL4IN 4 PLY WHT IMPREG KAOLIN HYDRPHLC

## (undated) DEVICE — GARMENT,MEDLINE,DVT,INT,CALF,MED, GEN2: Brand: MEDLINE

## (undated) DEVICE — SUTURE NONABSORBABLE MONOFILAMENT 5-0 C-1 1X24 IN PROLENE 8725H

## (undated) DEVICE — MICROPUNCTURE INTRODUCER SET SILHOUETTE TRANSITIONLESS WITH STAINLESS STEEL WIRE GUIDE: Brand: MICROPUNCTURE

## (undated) DEVICE — GAUZE,SPONGE,4"X4",12PLY,STERILE,LF,2'S: Brand: MEDLINE

## (undated) DEVICE — HI-TORQUE SUPRA CORE .035 PERIPHERAL GUIDE WIRE .035 X 300 CM: Brand: HI-TORQUE SUPRA CORE

## (undated) DEVICE — APPLICATOR BNDG 1MM ADH PREMIERPRO EXOFIN

## (undated) DEVICE — SUTURE PROL SZ 5-0 L36IN NONABSORBABLE BLU L13MM C-1 3/8 8720H

## (undated) DEVICE — SUTURE PROL SZ 5-0 L30IN NONABSORBABLE BLU L13MM RB-2 1/2 8710H

## (undated) DEVICE — SUTURE MCRYL SZ 3-0 L27IN ABSRB UD L26MM SH 1/2 CIR Y416H

## (undated) DEVICE — PTA BALLOON DILATATION CATHETER: Brand: CHARGER™

## (undated) DEVICE — INTENDED USED TO PROTECT, TAG AND HELP LOCATED SUTURES DURING SURGERY: Brand: STERION®SUTURE AID BOOTIES

## (undated) DEVICE — GUIDEWIRE VASC L180CM DIA0.035IN TIP L7CM PTFE S STL STR

## (undated) DEVICE — BLADE CLIPPER GEN PURP NS

## (undated) DEVICE — SHEATH 6FR 11CM BRITETIP